# Patient Record
Sex: MALE | Race: WHITE | NOT HISPANIC OR LATINO | Employment: OTHER | URBAN - METROPOLITAN AREA
[De-identification: names, ages, dates, MRNs, and addresses within clinical notes are randomized per-mention and may not be internally consistent; named-entity substitution may affect disease eponyms.]

---

## 2017-01-30 ENCOUNTER — ALLSCRIPTS OFFICE VISIT (OUTPATIENT)
Dept: OTHER | Facility: OTHER | Age: 72
End: 2017-01-30

## 2017-01-30 DIAGNOSIS — Z11.59 ENCOUNTER FOR SCREENING FOR OTHER VIRAL DISEASES: ICD-10-CM

## 2017-01-31 ENCOUNTER — GENERIC CONVERSION - ENCOUNTER (OUTPATIENT)
Dept: OTHER | Facility: OTHER | Age: 72
End: 2017-01-31

## 2017-05-25 ENCOUNTER — APPOINTMENT (OUTPATIENT)
Dept: LAB | Facility: CLINIC | Age: 72
End: 2017-05-25
Payer: MEDICARE

## 2017-05-25 ENCOUNTER — TRANSCRIBE ORDERS (OUTPATIENT)
Dept: LAB | Facility: CLINIC | Age: 72
End: 2017-05-25

## 2017-05-25 ENCOUNTER — TRANSCRIBE ORDERS (OUTPATIENT)
Dept: ADMINISTRATIVE | Facility: HOSPITAL | Age: 72
End: 2017-05-25

## 2017-05-25 ENCOUNTER — ALLSCRIPTS OFFICE VISIT (OUTPATIENT)
Dept: OTHER | Facility: OTHER | Age: 72
End: 2017-05-25

## 2017-05-25 ENCOUNTER — HOSPITAL ENCOUNTER (OUTPATIENT)
Dept: RADIOLOGY | Facility: HOSPITAL | Age: 72
Discharge: HOME | End: 2017-05-25
Attending: INTERNAL MEDICINE
Payer: MEDICARE

## 2017-05-25 DIAGNOSIS — M10.071 IDIOPATHIC GOUT, RIGHT ANKLE AND FOOT: ICD-10-CM

## 2017-05-25 LAB — URATE SERPL-MCNC: 6.8 MG/DL (ref 4.2–8)

## 2017-05-25 PROCEDURE — 73630 X-RAY EXAM OF FOOT: CPT

## 2017-05-25 PROCEDURE — 84550 ASSAY OF BLOOD/URIC ACID: CPT

## 2017-05-25 PROCEDURE — 36415 COLL VENOUS BLD VENIPUNCTURE: CPT

## 2017-06-15 ENCOUNTER — ALLSCRIPTS OFFICE VISIT (OUTPATIENT)
Dept: OTHER | Facility: OTHER | Age: 72
End: 2017-06-15

## 2017-07-10 DIAGNOSIS — M10.071 IDIOPATHIC GOUT, RIGHT ANKLE AND FOOT: ICD-10-CM

## 2017-08-10 ENCOUNTER — APPOINTMENT (OUTPATIENT)
Dept: LAB | Facility: HOSPITAL | Age: 72
End: 2017-08-10
Attending: INTERNAL MEDICINE
Payer: MEDICARE

## 2017-08-10 ENCOUNTER — ALLSCRIPTS OFFICE VISIT (OUTPATIENT)
Dept: OTHER | Facility: OTHER | Age: 72
End: 2017-08-10

## 2017-08-10 ENCOUNTER — TRANSCRIBE ORDERS (OUTPATIENT)
Dept: ADMINISTRATIVE | Facility: HOSPITAL | Age: 72
End: 2017-08-10

## 2017-08-10 ENCOUNTER — HOSPITAL ENCOUNTER (OUTPATIENT)
Dept: RADIOLOGY | Facility: HOSPITAL | Age: 72
Discharge: HOME | End: 2017-08-10
Attending: INTERNAL MEDICINE
Payer: MEDICARE

## 2017-08-10 DIAGNOSIS — R06.00 DYSPNEA: ICD-10-CM

## 2017-08-10 LAB
ALBUMIN SERPL BCP-MCNC: 3.4 G/DL (ref 3.5–5)
ALP SERPL-CCNC: 76 U/L (ref 46–116)
ALT SERPL W P-5'-P-CCNC: 33 U/L (ref 12–78)
ANION GAP SERPL CALCULATED.3IONS-SCNC: 9 MMOL/L (ref 4–13)
AST SERPL W P-5'-P-CCNC: 34 U/L (ref 5–45)
BASOPHILS # BLD AUTO: 0.12 THOUSANDS/ΜL (ref 0–0.1)
BASOPHILS NFR BLD AUTO: 1 % (ref 0–1)
BILIRUB SERPL-MCNC: 1.7 MG/DL (ref 0.2–1)
BUN SERPL-MCNC: 13 MG/DL (ref 5–25)
CALCIUM SERPL-MCNC: 8.9 MG/DL (ref 8.3–10.1)
CHLORIDE SERPL-SCNC: 107 MMOL/L (ref 100–108)
CO2 SERPL-SCNC: 24 MMOL/L (ref 21–32)
CREAT SERPL-MCNC: 1.08 MG/DL (ref 0.6–1.3)
EOSINOPHIL # BLD AUTO: 0.41 THOUSAND/ΜL (ref 0–0.61)
EOSINOPHIL NFR BLD AUTO: 3 % (ref 0–6)
ERYTHROCYTE [DISTWIDTH] IN BLOOD BY AUTOMATED COUNT: 13.7 % (ref 11.6–15.1)
GFR SERPL CREATININE-BSD FRML MDRD: 69 ML/MIN/1.73SQ M
GLUCOSE P FAST SERPL-MCNC: 124 MG/DL (ref 65–99)
HCT VFR BLD AUTO: 41.9 % (ref 36.5–49.3)
HGB BLD-MCNC: 14.3 G/DL (ref 12–17)
LYMPHOCYTES # BLD AUTO: 1.07 THOUSANDS/ΜL (ref 0.6–4.47)
LYMPHOCYTES NFR BLD AUTO: 8 % (ref 14–44)
MCH RBC QN AUTO: 34.2 PG (ref 26.8–34.3)
MCHC RBC AUTO-ENTMCNC: 34.1 G/DL (ref 31.4–37.4)
MCV RBC AUTO: 100 FL (ref 82–98)
MONOCYTES # BLD AUTO: 1.59 THOUSAND/ΜL (ref 0.17–1.22)
MONOCYTES NFR BLD AUTO: 12 % (ref 4–12)
NEUTROPHILS # BLD AUTO: 10.05 THOUSANDS/ΜL (ref 1.85–7.62)
NEUTS SEG NFR BLD AUTO: 76 % (ref 43–75)
NRBC BLD AUTO-RTO: 0 /100 WBCS
PLATELET # BLD AUTO: 232 THOUSANDS/UL (ref 149–390)
PMV BLD AUTO: 9 FL (ref 8.9–12.7)
POTASSIUM SERPL-SCNC: 4 MMOL/L (ref 3.5–5.3)
PROT SERPL-MCNC: 7.3 G/DL (ref 6.4–8.2)
RBC # BLD AUTO: 4.18 MILLION/UL (ref 3.88–5.62)
SODIUM SERPL-SCNC: 140 MMOL/L (ref 136–145)
WBC # BLD AUTO: 13.3 THOUSAND/UL (ref 4.31–10.16)

## 2017-08-10 PROCEDURE — 85025 COMPLETE CBC W/AUTO DIFF WBC: CPT

## 2017-08-10 PROCEDURE — 80053 COMPREHEN METABOLIC PANEL: CPT

## 2017-08-10 PROCEDURE — 71020 HB CHEST X-RAY 2VW FRONTAL&LATL: CPT

## 2017-08-10 PROCEDURE — 36415 COLL VENOUS BLD VENIPUNCTURE: CPT

## 2017-08-14 ENCOUNTER — GENERIC CONVERSION - ENCOUNTER (OUTPATIENT)
Dept: OTHER | Facility: OTHER | Age: 72
End: 2017-08-14

## 2017-08-18 ENCOUNTER — ALLSCRIPTS OFFICE VISIT (OUTPATIENT)
Dept: OTHER | Facility: OTHER | Age: 72
End: 2017-08-18

## 2017-11-30 ENCOUNTER — GENERIC CONVERSION - ENCOUNTER (OUTPATIENT)
Dept: OTHER | Facility: OTHER | Age: 72
End: 2017-11-30

## 2017-11-30 DIAGNOSIS — I10 ESSENTIAL (PRIMARY) HYPERTENSION: ICD-10-CM

## 2018-01-10 NOTE — PROGRESS NOTES
Assessment    1  CAD (coronary atherosclerotic disease) (414 00) (I25 10)   2  Hypercholesterolemia (272 0) (E78 0)   3  Hypertension (401 9) (I10)   4  GERD (gastroesophageal reflux disease) (530 81) (K21 9)   5  Chronic back pain (724 5,338 29) (M54 9,G89 29)    Plan  Hypertension    · Follow-up visit in 6 months Evaluation and Treatment  Follow-up  Status: Complete   Done: [id number]  Unlinked    · Protonix 40 MG Oral Tablet Delayed Release (Pantoprazole Sodium)    Discussion/Summary  Discussion Summary:   His chronic problems are managed by cardiology  He will try to have copies of testing sent here as well  For his chronic narcotics, I explained that current recommendations are that they not be used long term and, thus, I would refer him to a pain specialist to consider other options  He declined and stated he will ask his doctors in [place]  Old records reviewed  Counseling Documentation With Imm: The patient was counseled regarding instructions for management, impressions  Chief Complaint  Chief Complaint Free Text Note Form: PATIENT HERE TO ESTABLISH WITH PRIMARY CARE PHYSICIAN      History of Present Illness  HPI: Patient is a 72year old male who comes in today for first time visit  His doctor in [place] has retired and he lives out here  He wishes to keep his specialists in [place] until our local hospital is built  He has CAD and had a valve replacement  Cardiology manages his coumadin  He also has high cholesterol and he reports that they take care of monitoring this as well  Pressure has been controlled  Further reports a history of acid reflux that he does better with taking Zantac vs a PPI  Finally, he reports a history of chronic back pain and is on chronic narcotics for this for many years  Review of Systems  Complete-Male:   Constitutional: no fever  Cardiovascular: no chest pain  Respiratory: no shortness of breath  Gastrointestinal: no abdominal pain  Musculoskeletal: joint stiffness  Integumentary: no rashes  ROS Reviewed:   ROS reviewed  Active Problems    1  Hypercholesterolemia (272 0) (E78 0)   2  Hypertension (401 9) (I10)    Past Medical History    1  History of conjunctivitis (V12 49) (Z86 69)   2  History of heart artery stent (V45 82) (Z95 5)   3  History of transient cerebral ischemia (V12 54) (Z86 73)    Surgical History    1  History of Cholecystectomy   2  History of Mitral Valve Repair   3  History of Surgery Testis Orchiectomy   4  History of Total Hip Replacement    Family History    1  Family history of     2  Family history of     Social History    · Former smoker (J93 00) (E65 550)   · Occasional alcohol use    Current Meds   1  Amiodarone HCl - 200 MG Oral Tablet; Therapy: (Recorded:20Ucd9228) to Recorded   2  Crestor 20 MG Oral Tablet; Therapy: (Recorded:17Sid7937) to Recorded   3  Furosemide 40 MG Oral Tablet; Therapy: (Recorded:48Sfj9577) to Recorded   4  Lisinopril 40 MG Oral Tablet; TAKE 1 TABLET DAILY; Therapy: 01XYO6349 to Recorded   5  Multivitamins TABS; Therapy: (Recorded:71Oty6842) to Recorded   6  Oxycodone-Acetaminophen 7 5-325 MG Oral Tablet; TAKE 1 TABLET EVERY 6 HOURS   AS NEEDED FOR PAIN;   Therapy: 21XLS6755 to Recorded   7  Protonix 40 MG Oral Tablet Delayed Release; Therapy: (Recorded:29Wdx8929) to Recorded   8  Ranitidine HCl - 300 MG Oral Capsule; TAKE 1 CAPSULE DAILY; Therapy: 67RSE9012 to Recorded   9  Vitamin C 500 MG Oral Tablet; Therapy: (Recorded:64Uyd6940) to Recorded   10  Warfarin Sodium 2 MG Oral Tablet; Therapy: (Recorded:40Lig5807) to Recorded    Allergies    1   No Known Drug Allergies    Vitals  Signs [Data Includes: Current Encounter]   Recorded: 01YMU5567 11:15AM   Temperature: 98 1 F  Heart Rate: 80  Systolic: 546  Diastolic: 60  Height: 5 ft 6 in  Weight: 158 lb   BMI Calculated: 25 5  BSA Calculated: 1 82  O2 Saturation: 98    Physical Exam    Constitutional   General appearance: No acute distress, well appearing and well nourished  Eyes   Conjunctiva and lids: No swelling, erythema, or discharge  Pupils and irises: Equal, round and reactive to light  Ears, Nose, Mouth, and Throat   External inspection of ears and nose: Normal     Otoscopic examination: Tympanic membrance translucent with normal light reflex  Canals patent without erythema  Oropharynx: Normal with no erythema, edema, exudate or lesions  Pulmonary   Respiratory effort: No increased work of breathing or signs of respiratory distress  Auscultation of lungs: Clear to auscultation, equal breath sounds bilaterally, no wheezes, no rales, no rhonci  Cardiovascular   Auscultation of heart: Normal rate and rhythm, normal S1 and S2, without murmurs  Examination of extremities for edema and/or varicosities: Normal     Carotid pulses: Normal     Abdomen   Abdomen: Non-tender, no masses  Liver and spleen: No hepatomegaly or splenomegaly  Musculoskeletal   Gait and station: Normal     Skin   Skin and subcutaneous tissue: Normal without rashes or lesions      Psychiatric   Orientation to person, place and time: Normal     Mood and affect: Normal          Future Appointments    Date/Time Provider Specialty Site   07/25/2016 11:00 AM Daniella Mojica MD Internal Medicine [address] INTERNAL MED     Signatures   Electronically signed by : Barb Vitale MD; Jan 25 2016 12:25PM EST                       (Author)

## 2018-01-10 NOTE — PROGRESS NOTES
Assessment    1  Encounter for preventive health examination (V70 0) (Z00 00)    Plan  Health Maintenance    · Begin a limited exercise program ; Status:Complete;   Done: [id number]   · There are many exercise options for seniors ; Status:Complete;   Done: 89EHX9979  Need for hepatitis C screening test    · (1) HEP C ANTIBODY; Status:Active; Requested EEO:24JNM2774;     Discussion/Summary  Impression: Initial Annual Wellness Visit, with preventive exam as well as age and risk appropriate counseling completed  Cardiovascular screening and counseling: screening is current  Diabetes screening and counseling: screening is current  Colorectal cancer screening and counseling: screening is current  Prostate cancer screening and counseling: screening is current  Glaucoma screening and counseling: screening is current  Immunizations: influenza vaccine is up to date this year and the lifetime pneumococcal vaccine has been completed  Advance Directive Planning: paperwork and instructions were given to the patient  Patient Discussion: plan discussed with the patient, follow-up visit needed in one year  History of Present Illness  The patient is being seen for the initial annual wellness visit and The patient's questionnaire was reviewed with them and a copy is in the chart as well  Medicare Screening and Risk Factors   Hospitalizations: no previous hospitalizations  Medicare Screening Tests Risk Questions   Drug and Alcohol Use: The patient has never smoked cigarettes  The patient reports occasional alcohol use  Alcohol concern:   The patient has no concerns about alcohol abuse  He has never used illicit drugs  Diet and Physical Activity: Current diet includes 1 servings of fruit per day, 1 servings of meat per day and 1 servings of whole grains per day  The patient does not exercise     Mood Disorder and Cognitive Impairment Screening: He denies feeling down, depressed, or hopeless over the past two weeks  He denies feeling little interest or pleasure in doing things over the past two weeks  Cognitive impairment screening: difficulty learning/retaining new information, denies difficulty handling complex tasks, difficulty with reasoning and denies difficulty with language  Functional Ability/Level of Safety: He denies hearing difficulties  Activities of daily living details: does not need help using the phone, no transportation help needed, does not need help shopping, no meal preparation help needed, does not need help doing housework, does not need help doing laundry and does not need help managing money  Fall risk factors: The patient fell 0 times in the past 12 months  Home safety risk factors:  loose rugs and no handrails on the stairs, but no unfamiliar surroundings, no poor household lighting, no uneven floors, no household clutter and grab bars in the bathroom  Advance Directives: Advance directives: living will and durable power of  for health care directives  end of life decisions were reviewed with the patient  Co-Managers and Medical Equipment/Suppliers: See Patient Care Team      Review of Systems    Cardiovascular: no chest pain  Respiratory: no shortness of breath  Active Problems     1  Chronic back pain (724 5,338 29) (M54 9,G89 29)   2  GERD (gastroesophageal reflux disease) (530 81) (K21 9)   3  Keratotic lesion (701 1) (L57 0)    Hypercholesterolemia (272 0) (E78 00)       Hypertension (401 9) (I10)       CAD (coronary atherosclerotic disease) (414 00) (I25 10)          Past Medical History    1  History of conjunctivitis (V12 49) (Z86 69)   2  History of heart artery stent (V45 82) (Z95 5)   3  Denied: History of mental disorder   4  History of transient cerebral ischemia (V12 54) (Z86 73)    The active problems and past medical history were reviewed and updated today  Surgical History    1  History of Cholecystectomy   2  History of Mitral Valve Repair   3  History of Surgery Testis Orchiectomy   4  History of Total Hip Replacement    The surgical history was reviewed and updated today  Family History  Mother    1  Family history of    2  Family history of cerebrovascular accident (CVA) (V17 1) (Z82 3)  Father    3  Family history of    4  Family history of Hodgkin's lymphoma (V16 7) (Z80 7)   5  Family history of lung cancer (V16 1) (Z80 1)  Family History    6  Denied: Family history of mental disorder   7  Denied: Family history of substance abuse    The family history was reviewed and updated today  Social History    ·    · Former smoker (O61 46) (M13 702)   · Denied: History of High risk sexual behavior   · Denied: History of Illicit drug use   · Occasional alcohol use   · Retired  The social history was reviewed and updated today  Current Meds   1  Atorvastatin Calcium 20 MG Oral Tablet; Therapy: (Recorded:90Aag1009) to Recorded   2  Carvedilol 6 25 MG Oral Tablet; Therapy: (Recorded:46Qlz4079) to Recorded   3  Furosemide 40 MG Oral Tablet; Therapy: (Recorded:37Qwq3263) to Recorded   4  Lisinopril 40 MG Oral Tablet; TAKE 1 TABLET DAILY; Therapy: 06VYQ0782 to Recorded   5  Multivitamins TABS; Therapy: (Recorded:84Hdv6756) to Recorded   6  Oxycodone-Acetaminophen  MG Oral Tablet; Therapy: (Recorded:54Hsu2685) to Recorded   7  RaNITidine HCl - 300 MG Oral Capsule; TAKE 1 CAPSULE DAILY; Therapy: 06WPX6755 to Recorded   8  Vitamin C 500 MG Oral Tablet; Therapy: (Recorded:68Axi2311) to Recorded   9  Warfarin Sodium 2 MG Oral Tablet; Therapy: (Recorded:00Iqs5201) to Recorded    The medication list was reviewed and updated today  Allergies    1   No Known Drug Allergies    Immunizations  Influenza --- [place]: [id number] Fetch: 20-Aug-2016   PCV --- Series1: 01-Dec-2015     Vitals  Signs   Recorded: 63BCH1809 11:31AM   Heart Rate: 80  Systolic: 214  Diastolic: 70  Height: 5 ft 6 in  Weight: 170 lb 4 00 oz  BMI Calculated: 27 48  BSA Calculated: 1 87  O2 Saturation: 97    Future Appointments    Date/Time Provider Specialty Site   08/02/2017 11:20 AM Jeramy Hager MD Internal Medicine [facility] INTERNAL MED     Signatures   Electronically signed by : Tiara Gill MD; Jan 30 2017 12:53PM EST                       (Author)

## 2018-01-12 VITALS
HEIGHT: 66 IN | BODY MASS INDEX: 26.52 KG/M2 | HEART RATE: 87 BPM | DIASTOLIC BLOOD PRESSURE: 76 MMHG | WEIGHT: 165 LBS | OXYGEN SATURATION: 98 % | SYSTOLIC BLOOD PRESSURE: 138 MMHG

## 2018-01-14 VITALS
BODY MASS INDEX: 26.36 KG/M2 | WEIGHT: 164 LBS | HEIGHT: 66 IN | HEART RATE: 80 BPM | OXYGEN SATURATION: 99 % | SYSTOLIC BLOOD PRESSURE: 110 MMHG | DIASTOLIC BLOOD PRESSURE: 70 MMHG

## 2018-01-14 VITALS
HEART RATE: 90 BPM | OXYGEN SATURATION: 94 % | SYSTOLIC BLOOD PRESSURE: 156 MMHG | TEMPERATURE: 97.5 F | DIASTOLIC BLOOD PRESSURE: 80 MMHG | BODY MASS INDEX: 27.02 KG/M2 | WEIGHT: 168.13 LBS | HEIGHT: 66 IN

## 2018-01-14 VITALS
SYSTOLIC BLOOD PRESSURE: 128 MMHG | BODY MASS INDEX: 27.36 KG/M2 | HEART RATE: 80 BPM | WEIGHT: 170.25 LBS | OXYGEN SATURATION: 97 % | DIASTOLIC BLOOD PRESSURE: 70 MMHG | HEIGHT: 66 IN

## 2018-01-14 VITALS
HEART RATE: 78 BPM | DIASTOLIC BLOOD PRESSURE: 80 MMHG | WEIGHT: 165 LBS | OXYGEN SATURATION: 98 % | BODY MASS INDEX: 26.52 KG/M2 | SYSTOLIC BLOOD PRESSURE: 124 MMHG | HEIGHT: 66 IN

## 2018-01-15 NOTE — RESULT NOTES
Verified Results  * XR CHEST PA & LATERAL 10Aug2017 12:15PM John Krueger Order Number: [id number]     Test Name Result Flag Reference   XR CHEST PA & LATERAL (Report)     CHEST - DUAL ENERGY     INDICATION: Dyspnea     COMPARISON: None     VIEWS: PA (including soft tissue/bone algorithms) and lateral projections     IMAGES: 4     FINDINGS:        Cardiomediastinal silhouette appears unremarkable  No acute airspace consolidation seen   Interstitial prominence in the lung seen   Kerley B line seen   Postsurgical changes from prior median sternotomy are noted   Mitral ring seen       IMPRESSION:     Interstitial prominence in the lung seen which may be due to mild congestion   No acute airspace consolidation seen       Workstation performed: [id number]     Signed by:   Luz Maria Nazario MD   8/14/17

## 2018-01-22 VITALS
HEART RATE: 68 BPM | SYSTOLIC BLOOD PRESSURE: 120 MMHG | BODY MASS INDEX: 26.42 KG/M2 | DIASTOLIC BLOOD PRESSURE: 72 MMHG | WEIGHT: 164.38 LBS | TEMPERATURE: 98.1 F | OXYGEN SATURATION: 97 % | HEIGHT: 66 IN

## 2018-02-05 ENCOUNTER — OFFICE VISIT (OUTPATIENT)
Dept: INTERNAL MEDICINE CLINIC | Facility: CLINIC | Age: 73
End: 2018-02-05
Payer: MEDICARE

## 2018-02-05 VITALS
SYSTOLIC BLOOD PRESSURE: 148 MMHG | BODY MASS INDEX: 26.66 KG/M2 | HEART RATE: 82 BPM | WEIGHT: 160 LBS | HEIGHT: 65 IN | OXYGEN SATURATION: 95 % | DIASTOLIC BLOOD PRESSURE: 80 MMHG | TEMPERATURE: 97.5 F

## 2018-02-05 DIAGNOSIS — J20.9 ACUTE BRONCHITIS, UNSPECIFIED ORGANISM: Primary | ICD-10-CM

## 2018-02-05 PROBLEM — I50.9 CHF (CONGESTIVE HEART FAILURE) (HCC): Status: ACTIVE | Noted: 2017-08-18

## 2018-02-05 PROCEDURE — 99213 OFFICE O/P EST LOW 20 MIN: CPT | Performed by: INTERNAL MEDICINE

## 2018-02-05 RX ORDER — MULTIVIT-MINERALS/FA/LYCOPENE 0.4 MG-6
TABLET ORAL
COMMUNITY

## 2018-02-05 RX ORDER — ALLOPURINOL 100 MG/1
TABLET ORAL DAILY
Refills: 0 | COMMUNITY
Start: 2018-01-17 | End: 2018-07-23 | Stop reason: SDUPTHER

## 2018-02-05 RX ORDER — AMOXICILLIN 500 MG/1
CAPSULE ORAL
Refills: 0 | COMMUNITY
Start: 2017-11-20 | End: 2018-05-01 | Stop reason: ALTCHOICE

## 2018-02-05 RX ORDER — FUROSEMIDE 20 MG/1
TABLET ORAL
Refills: 0 | COMMUNITY
Start: 2017-12-28 | End: 2021-04-23

## 2018-02-05 RX ORDER — AZITHROMYCIN 250 MG/1
TABLET, FILM COATED ORAL
Qty: 6 TABLET | Refills: 0 | Status: SHIPPED | OUTPATIENT
Start: 2018-02-05 | End: 2018-02-10

## 2018-02-05 RX ORDER — WARFARIN SODIUM 2.5 MG/1
2.5 TABLET ORAL
Refills: 0 | Status: ON HOLD | COMMUNITY
Start: 2018-01-10 | End: 2018-06-25

## 2018-02-05 NOTE — PROGRESS NOTES
Assessment/Plan:    Failure of conservative outpatient measures  Will add antibiotics  He can also take Robitussin DM over-the-counter  No problem-specific Assessment & Plan notes found for this encounter  Diagnoses and all orders for this visit:    Acute bronchitis, unspecified organism  -     azithromycin (ZITHROMAX) 250 mg tablet; 2 tabs today then 1 tab daily for 4 days    Other orders  -     allopurinol (ZYLOPRIM) 100 mg tablet;   -     amoxicillin (AMOXIL) 500 mg capsule; take 4 capsules by mouth 1 hour prior to appointment  -     furosemide (LASIX) 20 mg tablet;   -     Multiple Vitamins-Minerals (PX MENS MULTIVITAMINS) TABS; Take by mouth  -     warfarin (COUMADIN) 2 5 mg tablet; Take 2 5 mg by mouth 2 (two) times a day          Subjective:      Patient ID: Fernando Tafoya is a 71 y o  male  Patient comes in today complaining of a recurrence of his upper respiratory symptoms with productive cough and headaches  No fevers  This has been going on for about 2 weeks  He has tried over-the-counter remedies but is afraid to take stuff because of his other medical problems  States it is similar to what he had in November  That lasted a few weeks and the cough lasted a little longer  The following portions of the patient's history were reviewed and updated as appropriate: allergies, current medications and problem list     Review of Systems   Respiratory: Positive for cough  Negative for shortness of breath  Cardiovascular: Negative for chest pain  Gastrointestinal: Negative for abdominal pain  Objective:     Physical Exam   Constitutional: He is oriented to person, place, and time  He appears well-developed and well-nourished  HENT:   Right Ear: External ear normal    Left Ear: External ear normal    Nose: Nose normal    Mouth/Throat: Oropharynx is clear and moist  No oropharyngeal exudate     Eyes: Conjunctivae are normal    Cardiovascular: Normal rate, regular rhythm and normal heart sounds  Pulmonary/Chest: Effort normal and breath sounds normal    Lymphadenopathy:     He has no cervical adenopathy  Neurological: He is alert and oriented to person, place, and time  Skin: He is not diaphoretic  Nursing note and vitals reviewed

## 2018-04-25 ENCOUNTER — APPOINTMENT (OUTPATIENT)
Dept: LAB | Facility: HOSPITAL | Age: 73
End: 2018-04-25
Attending: INTERNAL MEDICINE
Payer: MEDICARE

## 2018-04-25 ENCOUNTER — TRANSCRIBE ORDERS (OUTPATIENT)
Dept: ADMINISTRATIVE | Facility: HOSPITAL | Age: 73
End: 2018-04-25

## 2018-04-25 DIAGNOSIS — Z79.01 LONG TERM (CURRENT) USE OF ANTICOAGULANTS: ICD-10-CM

## 2018-04-25 DIAGNOSIS — I10 ESSENTIAL (PRIMARY) HYPERTENSION: ICD-10-CM

## 2018-04-25 DIAGNOSIS — G45.9 TRANSIENT CEREBRAL ISCHEMIA, UNSPECIFIED TYPE: ICD-10-CM

## 2018-04-25 DIAGNOSIS — I48.0 PAROXYSMAL ATRIAL FIBRILLATION (HCC): ICD-10-CM

## 2018-04-25 DIAGNOSIS — I48.0 PAROXYSMAL ATRIAL FIBRILLATION (HCC): Primary | ICD-10-CM

## 2018-04-25 LAB
ALBUMIN SERPL BCP-MCNC: 3.8 G/DL (ref 3.5–5)
ALP SERPL-CCNC: 71 U/L (ref 46–116)
ALT SERPL W P-5'-P-CCNC: 41 U/L (ref 12–78)
ANION GAP SERPL CALCULATED.3IONS-SCNC: 6 MMOL/L (ref 4–13)
AST SERPL W P-5'-P-CCNC: 44 U/L (ref 5–45)
BASOPHILS # BLD AUTO: 0.14 THOUSANDS/ΜL (ref 0–0.1)
BASOPHILS NFR BLD AUTO: 2 % (ref 0–1)
BILIRUB SERPL-MCNC: 1 MG/DL (ref 0.2–1)
BUN SERPL-MCNC: 15 MG/DL (ref 5–25)
CALCIUM SERPL-MCNC: 8.7 MG/DL (ref 8.3–10.1)
CHLORIDE SERPL-SCNC: 106 MMOL/L (ref 100–108)
CHOLEST SERPL-MCNC: 119 MG/DL (ref 50–200)
CO2 SERPL-SCNC: 27 MMOL/L (ref 21–32)
CREAT SERPL-MCNC: 1.15 MG/DL (ref 0.6–1.3)
EOSINOPHIL # BLD AUTO: 0.42 THOUSAND/ΜL (ref 0–0.61)
EOSINOPHIL NFR BLD AUTO: 5 % (ref 0–6)
ERYTHROCYTE [DISTWIDTH] IN BLOOD BY AUTOMATED COUNT: 14.6 % (ref 11.6–15.1)
GFR SERPL CREATININE-BSD FRML MDRD: 64 ML/MIN/1.73SQ M
GLUCOSE P FAST SERPL-MCNC: 109 MG/DL (ref 65–99)
HCT VFR BLD AUTO: 44.7 % (ref 36.5–49.3)
HDLC SERPL-MCNC: 45 MG/DL (ref 40–60)
HGB BLD-MCNC: 14.8 G/DL (ref 12–17)
INR PPP: 2.33 (ref 0.86–1.16)
LDLC SERPL CALC-MCNC: 53 MG/DL (ref 0–100)
LYMPHOCYTES # BLD AUTO: 0.96 THOUSANDS/ΜL (ref 0.6–4.47)
LYMPHOCYTES NFR BLD AUTO: 11 % (ref 14–44)
MCH RBC QN AUTO: 34.9 PG (ref 26.8–34.3)
MCHC RBC AUTO-ENTMCNC: 33.1 G/DL (ref 31.4–37.4)
MCV RBC AUTO: 105 FL (ref 82–98)
MONOCYTES # BLD AUTO: 0.96 THOUSAND/ΜL (ref 0.17–1.22)
MONOCYTES NFR BLD AUTO: 11 % (ref 4–12)
NEUTROPHILS # BLD AUTO: 6.6 THOUSANDS/ΜL (ref 1.85–7.62)
NEUTS SEG NFR BLD AUTO: 73 % (ref 43–75)
NONHDLC SERPL-MCNC: 74 MG/DL
NRBC BLD AUTO-RTO: 0 /100 WBCS
PLATELET # BLD AUTO: 216 THOUSANDS/UL (ref 149–390)
PMV BLD AUTO: 9.1 FL (ref 8.9–12.7)
POTASSIUM SERPL-SCNC: 4 MMOL/L (ref 3.5–5.3)
PROT SERPL-MCNC: 7.2 G/DL (ref 6.4–8.2)
PROTHROMBIN TIME: 26.2 SECONDS (ref 12.1–14.4)
RBC # BLD AUTO: 4.24 MILLION/UL (ref 3.88–5.62)
SODIUM SERPL-SCNC: 139 MMOL/L (ref 136–145)
TRIGL SERPL-MCNC: 106 MG/DL
WBC # BLD AUTO: 9.11 THOUSAND/UL (ref 4.31–10.16)

## 2018-04-25 PROCEDURE — 85610 PROTHROMBIN TIME: CPT

## 2018-04-25 PROCEDURE — 85025 COMPLETE CBC W/AUTO DIFF WBC: CPT

## 2018-04-25 PROCEDURE — 80061 LIPID PANEL: CPT

## 2018-04-25 PROCEDURE — 36415 COLL VENOUS BLD VENIPUNCTURE: CPT

## 2018-04-25 PROCEDURE — 80053 COMPREHEN METABOLIC PANEL: CPT

## 2018-05-01 ENCOUNTER — OFFICE VISIT (OUTPATIENT)
Dept: INTERNAL MEDICINE CLINIC | Facility: CLINIC | Age: 73
End: 2018-05-01
Payer: MEDICARE

## 2018-05-01 VITALS
RESPIRATION RATE: 20 BRPM | BODY MASS INDEX: 28.02 KG/M2 | HEIGHT: 65 IN | TEMPERATURE: 97.9 F | SYSTOLIC BLOOD PRESSURE: 130 MMHG | WEIGHT: 168.2 LBS | HEART RATE: 67 BPM | DIASTOLIC BLOOD PRESSURE: 60 MMHG | OXYGEN SATURATION: 97 %

## 2018-05-01 DIAGNOSIS — Z11.59 NEED FOR HEPATITIS C SCREENING TEST: ICD-10-CM

## 2018-05-01 DIAGNOSIS — K63.5 POLYP OF COLON, UNSPECIFIED PART OF COLON, UNSPECIFIED TYPE: ICD-10-CM

## 2018-05-01 DIAGNOSIS — I48.0 PAF (PAROXYSMAL ATRIAL FIBRILLATION) (HCC): ICD-10-CM

## 2018-05-01 DIAGNOSIS — E78.00 HYPERCHOLESTEROLEMIA: ICD-10-CM

## 2018-05-01 DIAGNOSIS — I10 ESSENTIAL HYPERTENSION: Primary | ICD-10-CM

## 2018-05-01 DIAGNOSIS — R73.01 IMPAIRED FASTING GLUCOSE: ICD-10-CM

## 2018-05-01 PROBLEM — C61 PROSTATE CA (HCC): Status: ACTIVE | Noted: 2018-05-01

## 2018-05-01 PROBLEM — C61 PROSTATE CA (HCC): Status: RESOLVED | Noted: 2018-05-01 | Resolved: 2018-05-01

## 2018-05-01 PROCEDURE — 99214 OFFICE O/P EST MOD 30 MIN: CPT | Performed by: INTERNAL MEDICINE

## 2018-05-01 NOTE — PATIENT INSTRUCTIONS
Dr Armin Maciel (GI specialist) did your last colonoscopy? May try MIRALAX OTC low dose for the constipation

## 2018-05-01 NOTE — PROGRESS NOTES
Assessment/Plan:     Chronic problems are stable  Continue present medications  Continue diet and exercise  Ordered labs for next visit  For the slight elevation in sugar, reinforced diet with him  Will check A1c with next blood work continue follow-up with his cardiologist   He is going to call Dr Indio Chew office to make sure he is not due for a repeat colonoscopy  Followup scheduled per orders  No problem-specific Assessment & Plan notes found for this encounter  Diagnoses and all orders for this visit:    Essential hypertension  -     CBC and differential; Future  -     Comprehensive metabolic panel; Future  -     Lipid panel; Future    Hypercholesterolemia    PAF (paroxysmal atrial fibrillation) (HCC)    Impaired fasting glucose  -     HEMOGLOBIN A1C W/ EAG ESTIMATION; Future    Need for hepatitis C screening test  -     Hepatitis C antibody; Future    Polyp of colon, unspecified part of colon, unspecified type          Subjective:      Patient ID: Sofiya Ta is a 71 y o  male  Patient comes in today for routine follow-up  He states he is doing well for the most part  He is current with his cardiologist   His heart disease has been stable  Blood pressure was initially high but came down upon repeat  He did walk up the stairs to the office today  Blood work was okay except slightly elevated sugar  He admits that he likes his sweets  He thinks he may be due for a repeat colonoscopy  Because of the conversion to the new EMR, we only have an older colonoscopy from 2011  He has also noticed some mild constipation issues lately  No change in diet  No change in medications  He has been reluctant to try any medicine for that because he usually has looser stools          ALLERGIES:  No Known Allergies    CURRENT MEDICATIONS:    Current Outpatient Prescriptions:     allopurinol (ZYLOPRIM) 100 mg tablet, Take by mouth daily  , Disp: , Rfl: 0    atorvastatin (LIPITOR) 40 mg tablet, Take 40 mg by mouth daily  , Disp: , Rfl:     carvedilol (COREG) 6 25 mg tablet, Take 6 25 mg by mouth  , Disp: , Rfl:     furosemide (LASIX) 20 mg tablet, , Disp: , Rfl: 0    lisinopril (ZESTRIL) 40 mg tablet, Take 40 mg by mouth daily, Disp: , Rfl:     Multiple Vitamins-Minerals (PX MENS MULTIVITAMINS) TABS, Take by mouth, Disp: , Rfl:     ranitidine (ZANTAC) 300 MG capsule, Take 300 mg by mouth every evening, Disp: , Rfl:     warfarin (COUMADIN) 2 5 mg tablet, Take 2 5 mg by mouth 2 (two) times a day, Disp: , Rfl: 0    ACTIVE PROBLEM LIST:  Patient Active Problem List   Diagnosis    CAD (coronary atherosclerotic disease)    [facility]estive [facility] failure[facility])    Chronic back pain    GERD (gastroesophageal reflux disease)    Hypercholesterolemia    Hypertension    PAF (paroxysmal atrial fibrillation) ([facility])    Peptic ulcer disease    S/P MVR (mitral valve repair)    Polyp of colon       PAST MEDICAL HISTORY:  Past Medical History:   Diagnosis Date    CAD (coronary artery disease)     [facility]     prostate    Coronary artery disease     GERD (gastroesophageal reflux disease)     H/O heart artery stent     Hypercholesteremia     Hypertension     Keratotic lesion     [facility]        PAST SURGICAL HISTORY:  Past Surgical History:   Procedure Laterality Date    BACK SURGERY      CARDIAC SURGERY      mvp repair    CHOLECYSTECTOMY      JOINT REPLACEMENT      L hip replacement    ORCHIECTOMY      PROSTATECTOMY      TOTAL HIP ARTHROPLASTY         FAMILY HISTORY:  Family History   Problem Relation Age of Onset    Heart disease Mother     Cancer Father        SOCIAL HISTORY:  Social History     Social History    Marital status:      Spouse name: N/A    Number of children: N/A    Years of education: N/A     Occupational History    Not on file       Social History Main Topics    Smoking status: Former Smoker    Smokeless tobacco: Never Used    Alcohol use No      Comment: social    Drug use: No    Sexual activity: No     Other Topics Concern    Not on file     Social History Narrative    No narrative on file       Review of Systems   Respiratory: Negative for shortness of breath  Cardiovascular: Negative for chest pain  Gastrointestinal: Negative for abdominal pain  Objective:  Vitals:    05/01/18 1004 05/01/18 1028   BP: 168/82 130/60   BP Location: Right arm    Patient Position: Sitting    Cuff Size: Adult    Pulse: 67    Resp: 20    Temp: 97 9 °F (36 6 °C)    TempSrc: Tympanic    SpO2: 97%    Weight: 76 3 kg (168 lb 3 2 oz)    Height: 5' 5" (1 651 m)         Physical Exam   Constitutional: He is oriented to person, place, and time  He appears well-developed and well-nourished  Cardiovascular: Normal rate, regular rhythm and normal heart sounds  Pulmonary/Chest: Effort normal and breath sounds normal    Abdominal: Soft  Bowel sounds are normal    Musculoskeletal: He exhibits no edema  Neurological: He is alert and oriented to person, place, and time  Nursing note and vitals reviewed          RESULTS:    Recent Results (from the past 1008 hour(s))   CBC and differential    Collection Time: 04/25/18 10:39 AM   Result Value Ref Range    WBC 9 11 4 31 - 10 16 Thousand/uL    RBC 4 24 3 88 - 5 62 Million/uL    Hemoglobin 14 8 12 0 - 17 0 g/dL    Hematocrit 44 7 36 5 - 49 3 %     (H) 82 - 98 fL    MCH 34 9 (H) 26 8 - 34 3 pg    MCHC 33 1 31 4 - 37 4 g/dL    RDW 14 6 11 6 - 15 1 %    MPV 9 1 8 9 - 12 7 fL    Platelets 558 260 - 006 Thousands/uL    nRBC 0 /100 WBCs    Neutrophils Relative 73 43 - 75 %    Lymphocytes Relative 11 (L) 14 - 44 %    Monocytes Relative 11 4 - 12 %    Eosinophils Relative 5 0 - 6 %    Basophils Relative 2 (H) 0 - 1 %    Neutrophils Absolute 6 60 1 85 - 7 62 Thousands/µL    Lymphocytes Absolute 0 96 0 60 - 4 47 Thousands/µL    Monocytes Absolute 0 96 0 17 - 1 22 Thousand/µL    Eosinophils Absolute 0 42 0 00 - 0 61 Thousand/µL Basophils Absolute 0 14 (H) 0 00 - 0 10 Thousands/µL   Comprehensive metabolic panel    Collection Time: 04/25/18 10:39 AM   Result Value Ref Range    Sodium 139 136 - 145 mmol/L    Potassium 4 0 3 5 - 5 3 mmol/L    Chloride 106 100 - 108 mmol/L    CO2 27 21 - 32 mmol/L    Anion Gap 6 4 - 13 mmol/L    BUN 15 5 - 25 mg/dL    Creatinine 1 15 0 60 - 1 30 mg/dL    Glucose, Fasting 109 (H) 65 - 99 mg/dL    Calcium 8 7 8 3 - 10 1 mg/dL    AST 44 5 - 45 U/L    ALT 41 12 - 78 U/L    Alkaline Phosphatase 71 46 - 116 U/L    Total Protein 7 2 6 4 - 8 2 g/dL    Albumin 3 8 3 5 - 5 0 g/dL    Total Bilirubin 1 00 0 20 - 1 00 mg/dL    eGFR 64 ml/min/1 73sq m   Lipid panel    Collection Time: 04/25/18 10:39 AM   Result Value Ref Range    Cholesterol 119 50 - 200 mg/dL    Triglycerides 106 <=150 mg/dL    HDL, Direct 45 40 - 60 mg/dL    LDL Calculated 53 0 - 100 mg/dL    Non-HDL-Chol (CHOL-HDL) 74 mg/dl   Protime-INR    Collection Time: 04/25/18 10:39 AM   Result Value Ref Range    Protime 26 2 (H) 12 1 - 14 4 seconds    INR 2 33 (H) 0 86 - 1 16       This note was created with voice recognition software  Phonic, grammatical and spelling errors may be present within the note as a result

## 2018-06-07 ENCOUNTER — OFFICE VISIT (OUTPATIENT)
Dept: INTERNAL MEDICINE CLINIC | Facility: CLINIC | Age: 73
End: 2018-06-07
Payer: MEDICARE

## 2018-06-07 ENCOUNTER — HOSPITAL ENCOUNTER (OUTPATIENT)
Dept: RADIOLOGY | Facility: HOSPITAL | Age: 73
Discharge: HOME | End: 2018-06-07
Payer: MEDICARE

## 2018-06-07 VITALS
OXYGEN SATURATION: 97 % | BODY MASS INDEX: 28.16 KG/M2 | HEART RATE: 70 BPM | DIASTOLIC BLOOD PRESSURE: 62 MMHG | WEIGHT: 169 LBS | HEIGHT: 65 IN | RESPIRATION RATE: 18 BRPM | SYSTOLIC BLOOD PRESSURE: 134 MMHG

## 2018-06-07 DIAGNOSIS — J20.9 ACUTE BRONCHITIS WITH BRONCHOSPASM: ICD-10-CM

## 2018-06-07 DIAGNOSIS — R05.9 COUGH: ICD-10-CM

## 2018-06-07 DIAGNOSIS — J20.9 ACUTE BRONCHITIS WITH BRONCHOSPASM: Primary | ICD-10-CM

## 2018-06-07 DIAGNOSIS — J04.0 LARYNGITIS: ICD-10-CM

## 2018-06-07 PROCEDURE — 99213 OFFICE O/P EST LOW 20 MIN: CPT | Performed by: PHYSICIAN ASSISTANT

## 2018-06-07 PROCEDURE — 71046 X-RAY EXAM CHEST 2 VIEWS: CPT

## 2018-06-07 RX ORDER — METHYLPREDNISOLONE 4 MG/1
TABLET ORAL
Qty: 21 TABLET | Refills: 0 | Status: ON HOLD | OUTPATIENT
Start: 2018-06-07 | End: 2018-06-19 | Stop reason: ALTCHOICE

## 2018-06-07 RX ORDER — CEFUROXIME AXETIL 500 MG/1
500 TABLET ORAL EVERY 12 HOURS SCHEDULED
Qty: 20 TABLET | Refills: 0 | Status: SHIPPED | OUTPATIENT
Start: 2018-06-07 | End: 2018-06-17

## 2018-06-07 RX ORDER — BENZONATATE 100 MG/1
100 CAPSULE ORAL 3 TIMES DAILY PRN
Qty: 30 CAPSULE | Refills: 0 | Status: SHIPPED | OUTPATIENT
Start: 2018-06-07 | End: 2018-06-15 | Stop reason: SDUPTHER

## 2018-06-07 NOTE — PROGRESS NOTES
Assessment/Plan:      Patient Instructions   Will send patient to hospital for chest x-ray  Will start patient on antibiotic twice daily  Will also start Medrol for wheezing  And provide cough medicine as needed for cough  Will have patient return next week for re-evaluation  Stay hydrated  Return in about 1 week (around 6/14/2018) for Recheck  Diagnoses and all orders for this visit:    Acute bronchitis with bronchospasm  -     XR chest pa & lateral; Future  -     cefuroxime (CEFTIN) 500 mg tablet; Take 1 tablet (500 mg total) by mouth every 12 (twelve) hours for 10 days  -     Methylprednisolone 4 MG TBPK; Use as directed on package    Cough  -     XR chest pa & lateral; Future  -     cefuroxime (CEFTIN) 500 mg tablet; Take 1 tablet (500 mg total) by mouth every 12 (twelve) hours for 10 days  -     benzonatate (TESSALON PERLES) 100 mg capsule; Take 1 capsule (100 mg total) by mouth 3 (three) times a day as needed for cough    Laryngitis          Subjective:      Patient ID: Colleen Vick is a 71 y o  male  Acute visit    Patient states he started over 2 weeks ago with the onset of a cough  He then developed a hoarse voice, and a sore throat  He states over the past 3 days the cough has worsened, he is unable to sleep at night due to the cough  He has not been aware of any fever but did admit to chills  He has not noted any swelling in his ankles or feet  He has noted some increased shortness of breath  He also has admitted he has lost his appetite  He is feeling tired  He denies sneezing, runny nose, earaches          ALLERGIES:  No Known Allergies    CURRENT MEDICATIONS:    Current Outpatient Prescriptions:     allopurinol (ZYLOPRIM) 100 mg tablet, Take by mouth daily  , Disp: , Rfl: 0    atorvastatin (LIPITOR) 40 mg tablet, Take 40 mg by mouth daily  , Disp: , Rfl:     carvedilol (COREG) 6 25 mg tablet, Take 6 25 mg by mouth  , Disp: , Rfl:     furosemide (LASIX) 20 mg tablet, , Disp: , Rfl: 0    lisinopril (ZESTRIL) 40 mg tablet, Take 40 mg by mouth daily, Disp: , Rfl:     Multiple Vitamins-Minerals (PX MENS MULTIVITAMINS) TABS, Take by mouth, Disp: , Rfl:     ranitidine (ZANTAC) 300 MG capsule, Take 300 mg by mouth every evening, Disp: , Rfl:     warfarin (COUMADIN) 2 5 mg tablet, Take 2 5 mg by mouth 2 (two) times a day, Disp: , Rfl: 0    benzonatate (TESSALON PERLES) 100 mg capsule, Take 1 capsule (100 mg total) by mouth 3 (three) times a day as needed for cough, Disp: 30 capsule, Rfl: 0    cefuroxime (CEFTIN) 500 mg tablet, Take 1 tablet (500 mg total) by mouth every 12 (twelve) hours for 10 days, Disp: 20 tablet, Rfl: 0    Methylprednisolone 4 MG TBPK, Use as directed on package, Disp: 21 tablet, Rfl: 0    ACTIVE PROBLEM LIST:  Patient Active Problem List   Diagnosis    CAD (coronary atherosclerotic disease)    [facility]    Chronic back pain    GERD (gastroesophageal reflux disease)    Hypercholesterolemia    Hypertension    [facility]oxysmal atrial fibrillation) ([facility]    Peptic ulcer disease    S/P MVR (mitral valve repair)    Polyp of colon    Acute bronchitis with bronchospasm    Cough    Laryngitis       PAST MEDICAL HISTORY:  Past Medical History:   Diagnosis Date    CAD (coronary artery disease)     [email or web address] )     prostate    Coronary artery disease     GERD (gastroesophageal reflux disease)     H/O heart artery stent     Hypercholesteremia     Hypertension     Keratotic lesion     Stroke ([email or web address] )        PAST SURGICAL HISTORY:  Past Surgical History:   Procedure Laterality Date    BACK SURGERY      CARDIAC SURGERY      mvp repair    CHOLECYSTECTOMY      JOINT REPLACEMENT      L hip replacement    ORCHIECTOMY      PROSTATECTOMY      TOTAL HIP ARTHROPLASTY         FAMILY HISTORY:  Family History   Problem Relation Age of Onset    Heart disease Mother     Cancer Father        SOCIAL HISTORY:  Social History Social History    Marital status:      Spouse name: N/A    Number of children: N/A    Years of education: N/A     Occupational History    Not on file  Social History Main Topics    Smoking status: Former Smoker    Smokeless tobacco: Never Used    Alcohol use No      Comment: social    Drug use: No    Sexual activity: No     Other Topics Concern    Not on file     Social History Narrative    No narrative on file       Review of Systems   Constitutional: Positive for appetite change, chills and fatigue  Negative for activity change and fever  HENT: Positive for sore throat  Negative for congestion  Eyes: Negative for discharge and itching  Respiratory: Positive for cough, shortness of breath and wheezing  Negative for chest tightness  Cardiovascular: Negative for chest pain, palpitations and leg swelling  Gastrointestinal: Negative for abdominal pain, diarrhea and nausea  Genitourinary: Negative for difficulty urinating  Musculoskeletal: Negative for arthralgias and myalgias  Skin: Negative for rash  Allergic/Immunologic: Negative for immunocompromised state  Neurological: Negative for dizziness, syncope, weakness, light-headedness and headaches  Hematological: Negative for adenopathy  Does not bruise/bleed easily  Psychiatric/Behavioral: Negative for dysphoric mood  The patient is not nervous/anxious  Objective:  Vitals:    06/07/18 1258   BP: 134/62   BP Location: Left arm   Patient Position: Sitting   Cuff Size: Adult   Pulse: 70   Resp: 18   SpO2: 97%   Weight: 76 7 kg (169 lb)   Height: 5' 5" (1 651 m)        Physical Exam   Constitutional: He is oriented to person, place, and time  He appears well-developed and well-nourished  Hyperemia of cheeks is present   HENT:   HEENT-unremarkable, voice is hoarse  Eyes:   Conjunctiva are pale   Neck: Neck supple  Cardiovascular: Normal rate and regular rhythm      Murmur (Grade 2/6 systolic murmur best audible at the apex, grade 2/6 systolic murmur present in the aortic area) heard  Pulmonary/Chest: Effort normal  He has wheezes  He has rales (Decreased breath sounds left base with crackles present, and dullness to percussion)  Abdominal: Soft  Bowel sounds are normal    Musculoskeletal: He exhibits no edema  Lymphadenopathy:     He has no cervical adenopathy  Neurological: He is alert and oriented to person, place, and time  Skin: Skin is warm and dry  No rash noted  Psychiatric: He has a normal mood and affect  His behavior is normal    Nursing note and vitals reviewed  RESULTS:    No results found for this or any previous visit (from the past 1008 hour(s))  This note was created with voice recognition software  Phonic, grammatical and spelling errors may be present within the note as a result

## 2018-06-07 NOTE — PATIENT INSTRUCTIONS
Will send patient to hospital for chest x-ray  Will start patient on antibiotic twice daily  Will also start Medrol for wheezing  And provide cough medicine as needed for cough  Will have patient return next week for re-evaluation

## 2018-06-15 ENCOUNTER — OFFICE VISIT (OUTPATIENT)
Dept: INTERNAL MEDICINE CLINIC | Facility: CLINIC | Age: 73
End: 2018-06-15
Payer: MEDICARE

## 2018-06-15 VITALS
HEIGHT: 65 IN | WEIGHT: 156 LBS | DIASTOLIC BLOOD PRESSURE: 78 MMHG | BODY MASS INDEX: 25.99 KG/M2 | TEMPERATURE: 97 F | HEART RATE: 73 BPM | OXYGEN SATURATION: 98 % | RESPIRATION RATE: 18 BRPM | SYSTOLIC BLOOD PRESSURE: 142 MMHG

## 2018-06-15 DIAGNOSIS — R05.9 COUGH: ICD-10-CM

## 2018-06-15 DIAGNOSIS — R53.83 FATIGUE, UNSPECIFIED TYPE: ICD-10-CM

## 2018-06-15 DIAGNOSIS — R63.4 WEIGHT LOSS: ICD-10-CM

## 2018-06-15 DIAGNOSIS — Z12.11 SCREENING FOR COLON CANCER: ICD-10-CM

## 2018-06-15 DIAGNOSIS — J20.9 ACUTE BRONCHITIS WITH BRONCHOSPASM: Primary | ICD-10-CM

## 2018-06-15 DIAGNOSIS — R63.0 ANOREXIA: ICD-10-CM

## 2018-06-15 DIAGNOSIS — R71.8 ABNORMAL RBC INDICES: ICD-10-CM

## 2018-06-15 PROCEDURE — 99214 OFFICE O/P EST MOD 30 MIN: CPT | Performed by: PHYSICIAN ASSISTANT

## 2018-06-15 RX ORDER — BENZONATATE 100 MG/1
100 CAPSULE ORAL 3 TIMES DAILY PRN
Qty: 30 CAPSULE | Refills: 0 | Status: SHIPPED | OUTPATIENT
Start: 2018-06-15 | End: 2018-07-06 | Stop reason: ALTCHOICE

## 2018-06-15 NOTE — PATIENT INSTRUCTIONS
Will start workup for weight loss  Will check labs  Will schedule for CT of chest abdomen and pelvis  Schedule follow-up here to review them in 1 month  Continue other medications at this time

## 2018-06-15 NOTE — PROGRESS NOTES
Assessment/Plan:   Patient Instructions   Will start workup for weight loss  Will check labs  Will schedule for CT of chest abdomen and pelvis  Schedule follow-up here to review them in 1 month  Continue other medications at this time  Concern for this patient's weight loss  Will possibly have to proceed to EGD, however patient did not want to do this at this time  Return in about 4 weeks (around 7/13/2018) for Recheck  Diagnoses and all orders for this visit:    Acute bronchitis with bronchospasm  -     benzonatate (TESSALON PERLES) 100 mg capsule; Take 1 capsule (100 mg total) by mouth 3 (three) times a day as needed for cough    Weight loss  -     CT chest abdomen pelvis wo contrast; Future  -     CBC and differential; Future  -     Comprehensive metabolic panel; Future    Anorexia  -     CT chest abdomen pelvis wo contrast; Future  -     CBC and differential; Future  -     Comprehensive metabolic panel; Future  -     Lipase; Future  -     Amylase; Future    Cough  -     benzonatate (TESSALON PERLES) 100 mg capsule; Take 1 capsule (100 mg total) by mouth 3 (three) times a day as needed for cough    Abnormal RBC indices  -     Vitamin B12; Future    Fatigue, unspecified type    Screening for colon cancer  -     Occult Bloood,Fecal Immunochemical; Future          Subjective:      Patient ID: Tara Roche is a 71 y o  male  Follow-up    Patient returns for follow-up of a bronchitis with bronchospasm  He was treated with antibiotic, cough medicine and Medrol pack  He states today is the 1st day he feels better  He had less coughing last night was able to sleep  He does note that when he gets coughing jags his nose does run, but denies postnasal drip, head congestion, sneezing, itchy watery eyes  Chest x-ray done was unremarkable in its interpretation  Patient has a concern in that for the past 2 months he has lost 14 lb  He states he has no appetite    He notes he does have some upper abdomen discomfort  He has not noted any dark black stools or blood in his stools  No fever chills  No nausea or vomiting, no night sweats  Patient also notes generalized fatigue which has been persistent over the same time          ALLERGIES:  No Known Allergies    CURRENT MEDICATIONS:    Current Outpatient Prescriptions:     allopurinol (ZYLOPRIM) 100 mg tablet, Take by mouth daily  , Disp: , Rfl: 0    atorvastatin (LIPITOR) 40 mg tablet, Take 40 mg by mouth daily  , Disp: , Rfl:     benzonatate (TESSALON PERLES) 100 mg capsule, Take 1 capsule (100 mg total) by mouth 3 (three) times a day as needed for cough, Disp: 30 capsule, Rfl: 0    carvedilol (COREG) 6 25 mg tablet, Take 6 25 mg by mouth  , Disp: , Rfl:     cefuroxime (CEFTIN) 500 mg tablet, Take 1 tablet (500 mg total) by mouth every 12 (twelve) hours for 10 days, Disp: 20 tablet, Rfl: 0    furosemide (LASIX) 20 mg tablet, , Disp: , Rfl: 0    lisinopril (ZESTRIL) 40 mg tablet, Take 40 mg by mouth daily, Disp: , Rfl:     Methylprednisolone 4 MG TBPK, Use as directed on package, Disp: 21 tablet, Rfl: 0    Multiple Vitamins-Minerals (PX MENS MULTIVITAMINS) TABS, Take by mouth, Disp: , Rfl:     ranitidine (ZANTAC) 300 MG capsule, Take 300 mg by mouth every evening, Disp: , Rfl:     warfarin (COUMADIN) 2 5 mg tablet, Take 2 5 mg by mouth 2 (two) times a day, Disp: , Rfl: 0    ACTIVE PROBLEM LIST:  Patient Active Problem List   Diagnosis    CAD (coronary atherosclerotic disease)    CHF (congestive heart failure) ([facility])    Chronic back pain    GERD (gastroesophageal reflux disease)    Hypercholesterolemia    Hypertension    [facility])    Peptic ulcer disease    S/P MVR (mitral valve repair)    Polyp of colon    Acute bronchitis with bronchospasm    Cough    Laryngitis    Weight loss    Anorexia    Abnormal RBC indices    Fatigue       PAST MEDICAL HISTORY:  Past Medical History:   Diagnosis Date  CAD (coronary artery disease)     Cancer (HCC)     prostate    Coronary artery disease     GERD (gastroesophageal reflux disease)     H/O heart artery stent     x2    Hypercholesteremia     Hypertension     Keratotic lesion     St[facility] ([facility] [address] [facility]     Transient cerebral ischemia        PAST SURGICAL HISTORY:  Past Surgical History:   Procedure Laterality Date    BACK SURGERY      CARDIAC SURGERY      mvp repair    CHOLECYSTECTOMY      JOINT REPLACEMENT      L hip replacement    ORCHIECTOMY      PROSTATECTOMY      TOTAL HIP ARTHROPLASTY         FAMILY HISTORY:  Family History   Problem Relation Age of Onset    Heart disease Mother     Stroke Mother         CVA    Cancer Father     Hodgkin's lymphoma Father     Lung cancer Father        SOCIAL HISTORY:  Social History     Social History    Marital status:      Spouse name: N/A    Number of children: N/A    Years of education: N/A     Occupational History    retired      Social History Main Topics    Smoking status: Former Smoker    Smokeless tobacco: Never Used    Alcohol use No      Comment: social  / occasional per [employer]    Drug use: No    Sexual activity: No      Comment: no high risk sexual behavior     Other Topics Concern    Not on file     Social History Narrative    No narrative on file       Review of Systems   Constitutional: Positive for appetite change and unexpected weight change  Negative for activity change, chills, fatigue and fever  HENT: Positive for rhinorrhea  Negative for congestion  Eyes: Negative for discharge  Respiratory: Negative for cough, chest tightness and shortness of breath  Cardiovascular: Negative for chest pain, palpitations and leg swelling  Gastrointestinal: Positive for abdominal pain  Negative for blood in stool  Genitourinary: Negative for difficulty urinating  Musculoskeletal: Negative for arthralgias and myalgias  Skin: Negative for rash     Allergic/Immunologic: Negative for immunocompromised state  Neurological: Negative for dizziness, syncope, weakness, light-headedness and headaches  Hematological: Negative for adenopathy  Does not bruise/bleed easily  Psychiatric/Behavioral: Negative for dysphoric mood  The patient is not nervous/anxious  Objective:  Vitals:    06/15/18 1135   BP: 142/78   BP Location: Left arm   Patient Position: Sitting   Cuff Size: Adult   Pulse: 73   Resp: 18   Temp: (!) 97 °F (36 1 °C)   TempSrc: Temporal   SpO2: 98%   Weight: 70 8 kg (156 lb)   Height: 5' 5" (1 651 m)        Physical Exam   Constitutional: He is oriented to person, place, and time  He appears well-developed and well-nourished  HENT:   HEENT-unremarkable   Neck: Neck supple  No thyromegaly present  Cardiovascular: Normal rate, regular rhythm and normal heart sounds  Pulmonary/Chest: Effort normal  No respiratory distress  Coarse breath sounds remain in the anterior fields and right upper base   Abdominal: Soft  Bowel sounds are normal  There is tenderness (Epigastrium)  Musculoskeletal: He exhibits no edema  Lymphadenopathy:     He has no cervical adenopathy  No palpable adenopathy   Neurological: He is alert and oriented to person, place, and time  Skin: Skin is warm and dry  No rash noted  Psychiatric: He has a normal mood and affect  His behavior is normal    Nursing note and vitals reviewed  RESULTS:    No results found for this or any previous visit (from the past 1008 hour(s))  This note was created with voice recognition software  Phonic, grammatical and spelling errors may be present within the note as a result

## 2018-06-19 ENCOUNTER — HOSPITAL ENCOUNTER (INPATIENT)
Facility: HOSPITAL | Age: 73
LOS: 6 days | Discharge: HOME HEALTH | DRG: 868 | End: 2018-06-25
Attending: EMERGENCY MEDICINE | Admitting: INTERNAL MEDICINE
Payer: MEDICARE

## 2018-06-19 ENCOUNTER — APPOINTMENT (EMERGENCY)
Dept: CT IMAGING | Facility: HOSPITAL | Age: 73
DRG: 868 | End: 2018-06-19
Payer: MEDICARE

## 2018-06-19 DIAGNOSIS — R63.4 WEIGHT LOSS: Primary | ICD-10-CM

## 2018-06-19 DIAGNOSIS — N17.9 AKI (ACUTE KIDNEY INJURY) (HCC): ICD-10-CM

## 2018-06-19 DIAGNOSIS — D69.6 THROMBOCYTOPENIA (HCC): ICD-10-CM

## 2018-06-19 DIAGNOSIS — R50.9 FEVER: ICD-10-CM

## 2018-06-19 DIAGNOSIS — I50.9 CHF (CONGESTIVE HEART FAILURE) (HCC): ICD-10-CM

## 2018-06-19 DIAGNOSIS — K21.9 GASTROESOPHAGEAL REFLUX DISEASE WITHOUT ESOPHAGITIS: ICD-10-CM

## 2018-06-19 DIAGNOSIS — I48.0 PAF (PAROXYSMAL ATRIAL FIBRILLATION) (HCC): ICD-10-CM

## 2018-06-19 DIAGNOSIS — A77.49 ANAPLASMOSIS: ICD-10-CM

## 2018-06-19 DIAGNOSIS — R63.0 ANOREXIA: ICD-10-CM

## 2018-06-19 DIAGNOSIS — I25.10 ATHEROSCLEROSIS OF NATIVE CORONARY ARTERY OF NATIVE HEART WITHOUT ANGINA PECTORIS: ICD-10-CM

## 2018-06-19 DIAGNOSIS — E86.0 DEHYDRATION: ICD-10-CM

## 2018-06-19 PROBLEM — Z79.01 CHRONIC ANTICOAGULATION: Status: ACTIVE | Noted: 2018-06-19

## 2018-06-19 PROBLEM — R53.1 GENERALIZED WEAKNESS: Status: ACTIVE | Noted: 2018-06-19

## 2018-06-19 LAB
ALBUMIN SERPL BCP-MCNC: 3.2 G/DL (ref 3.5–5)
ALP SERPL-CCNC: 59 U/L (ref 46–116)
ALT SERPL W P-5'-P-CCNC: 78 U/L (ref 12–78)
ANION GAP SERPL CALCULATED.3IONS-SCNC: 12 MMOL/L (ref 4–13)
APTT PPP: 42 SECONDS (ref 24–36)
AST SERPL W P-5'-P-CCNC: 77 U/L (ref 5–45)
ATRIAL RATE: 78 BPM
BACTERIA UR QL AUTO: ABNORMAL /HPF
BASOPHILS # BLD MANUAL: 0 THOUSAND/UL (ref 0–0.1)
BASOPHILS NFR MAR MANUAL: 0 % (ref 0–1)
BILIRUB SERPL-MCNC: 1.4 MG/DL (ref 0.2–1)
BILIRUB UR QL STRIP: NEGATIVE
BUN SERPL-MCNC: 34 MG/DL (ref 5–25)
CALCIUM SERPL-MCNC: 8.9 MG/DL (ref 8.3–10.1)
CHLORIDE SERPL-SCNC: 98 MMOL/L (ref 100–108)
CLARITY UR: ABNORMAL
CO2 SERPL-SCNC: 27 MMOL/L (ref 21–32)
COLOR UR: ABNORMAL
CREAT SERPL-MCNC: 2.18 MG/DL (ref 0.6–1.3)
EOSINOPHIL # BLD MANUAL: 0 THOUSAND/UL (ref 0–0.4)
EOSINOPHIL NFR BLD MANUAL: 0 % (ref 0–6)
ERYTHROCYTE [DISTWIDTH] IN BLOOD BY AUTOMATED COUNT: 13.7 % (ref 11.6–15.1)
FIBRINOGEN PPP-MCNC: 455 MG/DL (ref 227–495)
GFR SERPL CREATININE-BSD FRML MDRD: 29 ML/MIN/1.73SQ M
GLUCOSE SERPL-MCNC: 130 MG/DL (ref 65–140)
GLUCOSE UR STRIP-MCNC: NEGATIVE MG/DL
HCT VFR BLD AUTO: 43.6 % (ref 36.5–49.3)
HGB BLD-MCNC: 14.5 G/DL (ref 12–17)
HGB UR QL STRIP.AUTO: ABNORMAL
HYALINE CASTS #/AREA URNS LPF: ABNORMAL /LPF
INR PPP: 1.93 (ref 0.86–1.17)
KETONES UR STRIP-MCNC: ABNORMAL MG/DL
LEUKOCYTE ESTERASE UR QL STRIP: NEGATIVE
LIPASE SERPL-CCNC: 112 U/L (ref 73–393)
LYMPHOCYTES # BLD AUTO: 0.4 THOUSAND/UL (ref 0.6–4.47)
LYMPHOCYTES # BLD AUTO: 5 % (ref 14–44)
MAGNESIUM SERPL-MCNC: 1.9 MG/DL (ref 1.6–2.6)
MCH RBC QN AUTO: 34.1 PG (ref 26.8–34.3)
MCHC RBC AUTO-ENTMCNC: 33.3 G/DL (ref 31.4–37.4)
MCV RBC AUTO: 103 FL (ref 82–98)
MONOCYTES # BLD AUTO: 0.56 THOUSAND/UL (ref 0–1.22)
MONOCYTES NFR BLD: 7 % (ref 4–12)
MUCOUS THREADS UR QL AUTO: ABNORMAL
NEUTROPHILS # BLD MANUAL: 7.01 THOUSAND/UL (ref 1.85–7.62)
NEUTS SEG NFR BLD AUTO: 88 % (ref 43–75)
NITRITE UR QL STRIP: NEGATIVE
NON-SQ EPI CELLS URNS QL MICRO: ABNORMAL /HPF
NRBC BLD AUTO-RTO: 0 /100 WBCS
NT-PROBNP SERPL-MCNC: 608 PG/ML
P AXIS: 66 DEGREES
PH UR STRIP.AUTO: 6 [PH] (ref 4.5–8)
PHOSPHATE SERPL-MCNC: 3.2 MG/DL (ref 2.3–4.1)
PLATELET # BLD AUTO: 89 THOUSANDS/UL (ref 149–390)
PLATELET BLD QL SMEAR: ABNORMAL
PMV BLD AUTO: 10.7 FL (ref 8.9–12.7)
POTASSIUM SERPL-SCNC: 4.3 MMOL/L (ref 3.5–5.3)
PR INTERVAL: 176 MS
PROT SERPL-MCNC: 6.6 G/DL (ref 6.4–8.2)
PROT UR STRIP-MCNC: ABNORMAL MG/DL
PROTHROMBIN TIME: 21.8 SECONDS (ref 11.8–14.2)
QRS AXIS: -20 DEGREES
QRSD INTERVAL: 146 MS
QT INTERVAL: 406 MS
QTC INTERVAL: 462 MS
RBC # BLD AUTO: 4.25 MILLION/UL (ref 3.88–5.62)
RBC #/AREA URNS AUTO: ABNORMAL /HPF
SODIUM SERPL-SCNC: 137 MMOL/L (ref 136–145)
SP GR UR STRIP.AUTO: 1.02 (ref 1–1.03)
T WAVE AXIS: 164 DEGREES
TOTAL CELLS COUNTED SPEC: 100
TROPONIN I SERPL-MCNC: 0.04 NG/ML
TSH SERPL DL<=0.05 MIU/L-ACNC: 0.91 UIU/ML (ref 0.36–3.74)
UROBILINOGEN UR QL STRIP.AUTO: 0.2 E.U./DL
VENTRICULAR RATE: 78 BPM
WBC # BLD AUTO: 7.97 THOUSAND/UL (ref 4.31–10.16)
WBC #/AREA URNS AUTO: ABNORMAL /HPF

## 2018-06-19 PROCEDURE — 83735 ASSAY OF MAGNESIUM: CPT | Performed by: EMERGENCY MEDICINE

## 2018-06-19 PROCEDURE — 96360 HYDRATION IV INFUSION INIT: CPT

## 2018-06-19 PROCEDURE — 74176 CT ABD & PELVIS W/O CONTRAST: CPT

## 2018-06-19 PROCEDURE — 85007 BL SMEAR W/DIFF WBC COUNT: CPT | Performed by: EMERGENCY MEDICINE

## 2018-06-19 PROCEDURE — 93005 ELECTROCARDIOGRAM TRACING: CPT

## 2018-06-19 PROCEDURE — 85362 FIBRIN DEGRADATION PRODUCTS: CPT | Performed by: INTERNAL MEDICINE

## 2018-06-19 PROCEDURE — 81001 URINALYSIS AUTO W/SCOPE: CPT | Performed by: EMERGENCY MEDICINE

## 2018-06-19 PROCEDURE — 83690 ASSAY OF LIPASE: CPT | Performed by: EMERGENCY MEDICINE

## 2018-06-19 PROCEDURE — 99223 1ST HOSP IP/OBS HIGH 75: CPT | Performed by: INTERNAL MEDICINE

## 2018-06-19 PROCEDURE — 84100 ASSAY OF PHOSPHORUS: CPT | Performed by: EMERGENCY MEDICINE

## 2018-06-19 PROCEDURE — 84484 ASSAY OF TROPONIN QUANT: CPT | Performed by: EMERGENCY MEDICINE

## 2018-06-19 PROCEDURE — 85384 FIBRINOGEN ACTIVITY: CPT | Performed by: INTERNAL MEDICINE

## 2018-06-19 PROCEDURE — 84443 ASSAY THYROID STIM HORMONE: CPT | Performed by: INTERNAL MEDICINE

## 2018-06-19 PROCEDURE — 99285 EMERGENCY DEPT VISIT HI MDM: CPT

## 2018-06-19 PROCEDURE — 85730 THROMBOPLASTIN TIME PARTIAL: CPT | Performed by: EMERGENCY MEDICINE

## 2018-06-19 PROCEDURE — 85027 COMPLETE CBC AUTOMATED: CPT | Performed by: EMERGENCY MEDICINE

## 2018-06-19 PROCEDURE — 85610 PROTHROMBIN TIME: CPT | Performed by: EMERGENCY MEDICINE

## 2018-06-19 PROCEDURE — 71250 CT THORAX DX C-: CPT

## 2018-06-19 PROCEDURE — 93010 ELECTROCARDIOGRAM REPORT: CPT | Performed by: INTERNAL MEDICINE

## 2018-06-19 PROCEDURE — 87040 BLOOD CULTURE FOR BACTERIA: CPT | Performed by: INTERNAL MEDICINE

## 2018-06-19 PROCEDURE — 80053 COMPREHEN METABOLIC PANEL: CPT | Performed by: EMERGENCY MEDICINE

## 2018-06-19 PROCEDURE — 36415 COLL VENOUS BLD VENIPUNCTURE: CPT | Performed by: EMERGENCY MEDICINE

## 2018-06-19 PROCEDURE — 83880 ASSAY OF NATRIURETIC PEPTIDE: CPT | Performed by: EMERGENCY MEDICINE

## 2018-06-19 RX ORDER — ATORVASTATIN CALCIUM 40 MG/1
40 TABLET, FILM COATED ORAL DAILY
Status: DISCONTINUED | OUTPATIENT
Start: 2018-06-20 | End: 2018-06-25 | Stop reason: HOSPADM

## 2018-06-19 RX ORDER — BENZONATATE 100 MG/1
100 CAPSULE ORAL 3 TIMES DAILY PRN
Status: DISCONTINUED | OUTPATIENT
Start: 2018-06-19 | End: 2018-06-25 | Stop reason: HOSPADM

## 2018-06-19 RX ORDER — CARVEDILOL 6.25 MG/1
6.25 TABLET ORAL 2 TIMES DAILY WITH MEALS
Status: DISCONTINUED | OUTPATIENT
Start: 2018-06-19 | End: 2018-06-25 | Stop reason: HOSPADM

## 2018-06-19 RX ORDER — FAMOTIDINE 20 MG/1
20 TABLET, FILM COATED ORAL DAILY
Status: DISCONTINUED | OUTPATIENT
Start: 2018-06-20 | End: 2018-06-20

## 2018-06-19 RX ORDER — SODIUM CHLORIDE 9 MG/ML
50 INJECTION, SOLUTION INTRAVENOUS CONTINUOUS
Status: DISCONTINUED | OUTPATIENT
Start: 2018-06-19 | End: 2018-06-23

## 2018-06-19 RX ORDER — ALLOPURINOL 100 MG/1
100 TABLET ORAL DAILY
Status: DISCONTINUED | OUTPATIENT
Start: 2018-06-20 | End: 2018-06-25 | Stop reason: HOSPADM

## 2018-06-19 RX ORDER — WARFARIN SODIUM 2.5 MG/1
2.5 TABLET ORAL
Status: DISCONTINUED | OUTPATIENT
Start: 2018-06-19 | End: 2018-06-20

## 2018-06-19 RX ORDER — ONDANSETRON 2 MG/ML
4 INJECTION INTRAMUSCULAR; INTRAVENOUS EVERY 6 HOURS PRN
Status: DISCONTINUED | OUTPATIENT
Start: 2018-06-19 | End: 2018-06-25 | Stop reason: HOSPADM

## 2018-06-19 RX ADMIN — WARFARIN SODIUM 2.5 MG: 2.5 TABLET ORAL at 18:17

## 2018-06-19 RX ADMIN — SODIUM CHLORIDE 1000 ML: 0.9 INJECTION, SOLUTION INTRAVENOUS at 12:26

## 2018-06-19 RX ADMIN — SODIUM CHLORIDE 75 ML/HR: 0.9 INJECTION, SOLUTION INTRAVENOUS at 18:17

## 2018-06-19 RX ADMIN — CARVEDILOL 6.25 MG: 6.25 TABLET, FILM COATED ORAL at 18:17

## 2018-06-19 NOTE — H&P
History and Physical - [facility] Internal Medicine    Patient Information: Felisha Morris 71 y o  male MRN: [id number]  Unit/Bed#: -01 Encounter: [id number]  Admitting Physician: Sophie Chow MD  PCP: Vani Davidson MD  Date of Admission:  06/19/18    Assessment/Plan:    Hospital Problem List:     Principal Problem:    Generalized weakness  Active Problems:    CAD (coronary atherosclerotic disease)    GERD (gastroesophageal reflux disease)    Hypercholesterolemia    Hypertension    PAF (paroxysmal atrial fibrillation) ([facility])    Peptic ulcer disease    Weight loss    Anorexia    ARF (acute renal failure) ([facility])    Chronic anticoagulation    Thrombocytopenia ([address] )    Present on Admission:   Generalized weakness   CAD (coronary atherosclerotic disease)   GERD (gastroesophageal reflux disease)   Hypercholesterolemia   Hypertension   PA[facility]oxysmal atrial fibrillation) [facility] [address] [facility]   Peptic ulcer disease   Weight loss   Anorexia   ARF (acute renal failure) ([facility] [address] )      Plan for the Primary Problem(s):  · Generalized weakness-likely multifactorial including being in acute renal failure and also not eating well with loss of weight-as per patient he usually weighs about 164 lb  He does not have any appetite  He has had previous EGD/colonoscopy done  Would ask for GI evaluation  Would also hold his diuretics and nephrotoxin medications and started on gentle IV fluids  PT/OT evaluation and treatment  Consult Nephrology regarding acute kidney injury  ·  He also has history of coronary artery disease and CABG in the past   On chronic anticoagulation with Coumadin with slightly subtherapeutic INR  Continue with close monitoring and Coumadin at this point which should suffice DVT prophylaxis as well  · Thrombocytopenia  This is new  Would ask for Hematology input  Would also ask for DIC profile  May not be accurate  He is on Coumadin  · Elevated BNP  Do not know what his baseline ejection fraction is   Would get an echocardiogram    Plan for Additional Problems:   · Essential hypertension  May have to use different antihypertensive medications in the setting of acute renal failure  VTE Prophylaxis: Warfarin (Coumadin)  / sequential compression device   Code Status:  Full code  POLST: There is no POLST form on file for this patient (pre-hospital)    Anticipated Length of Stay:  Patient will be admitted on an Inpatient basis with an anticipated length of stay of  more than 2 midnights  Justification for Hospital Stay:  Acute renal failure/generalized weakness    Total Time for Visit, including Counseling / Coordination of Care: 45 minutes  Greater than 50% of this total time spent on direct patient counseling and coordination of care  Chief Complaint:   Not feeling well    History of Present Illness:    Hilda Christiansen is a 71 y o  male who presents with not feeling well for the last couple of months  He has been sick  He describes being sick like having cough for the last 6 weeks and has been lightheaded  He has upset stomach  He cannot sleep and cannot eat  He has no appetite  He has lost  Weight he thinks that maybe 14 lb within the last few weeks/months although he weighed here 157 lb and he usually weighs about 164 lb  He has history of prostate cancer  He denies any chest pain  He has been having this cough for some time now as above  He had some diarrhea this morning although none now  He has been very weak and walking very slowly  He does not require any cane or walker when he walks  No fall  Has chronic back pain issues  No hematemesis, melena, or hematochezia            Review of Systems    All systems are reviewed  Positive as per history of presenting illness  Patient answered no to all other questions         Past Medical and Surgical History:     Past Medical History:   Diagnosis Date    CAD (coronary artery disease)     Cancer [facility] [address] )     prostate    Coronary artery disease  GERD (gastroesophageal reflux disease)     H/O heart artery stent     x2    Hypercholesteremia     Hypertension     Keratotic lesion     Stroke [facility] [address] [facility]     Transient cerebral ischemia        Past Surgical History:   Procedure Laterality Date    BACK SURGERY      CARDIAC SURGERY      mvp repair    CHOLECYSTECTOMY      JOINT REPLACEMENT      L hip replacement    ORCHIECTOMY      PROSTATECTOMY      TOTAL HIP ARTHROPLASTY         Meds/Allergies:    Prior to Admission medications    Medication Sig Start Date End Date Taking?  Authorizing Provider   allopurinol (ZYLOPRIM) 100 mg tablet Take by mouth daily   1/17/18  Yes Historical Provider, MD   atorvastatin (LIPITOR) 40 mg tablet Take 40 mg by mouth daily     Yes Historical Provider, MD   benzonatate (TESSALON PERLES) 100 mg capsule Take 1 capsule (100 mg total) by mouth 3 (three) times a day as needed for cough 6/15/18  Yes Jeniffer Henson PA-C   carvedilol (COREG) 6 25 mg tablet Take 6 25 mg by mouth     Yes Historical Provider, MD   furosemide (LASIX) 20 mg tablet  12/28/17  Yes Historical Provider, MD   lisinopril (ZESTRIL) 40 mg tablet Take 40 mg by mouth daily   Yes Historical Provider, MD   Multiple Vitamins-Minerals (PX MENS MULTIVITAMINS) TABS Take by mouth   Yes Historical Provider, MD   ranitidine (ZANTAC) 300 MG capsule Take 300 mg by mouth every evening   Yes Historical Provider, MD   warfarin (COUMADIN) 2 5 mg tablet Take 2 5 mg by mouth 2 (two) times a day 1/10/18  Yes Historical Provider, MD   Methylprednisolone 4 MG TBPK Use as directed on package 6/7/18 6/19/18  Jeniffer Henson PA-C     Reviewed as documented in admission med rec    Allergies: No Known Allergies    Social History:     Marital Status:    Occupation:  Does not work  Patient Pre-hospital Living Situation:  Lives alone  Patient Pre-hospital Level of Mobility:  Independent  Patient Pre-hospital Diet Restrictions:  None  Substance Use History:   History   Alcohol Use No Comment: social  / occasional per [employer]     History   Smoking Status    Former Smoker   Smokeless Tobacco    Never Used     History   Drug Use No       Family History:    Family history is reviewed and is not pertinent to his current illness        Physical Exam      Vitals:   Blood Pressure: 144/60 (06/19/18 1635)  Pulse: 80 (06/19/18 1635)  Temperature: 98 5 °F (36 9 °C) (06/19/18 1635)  Temp Source: Oral (06/19/18 1635)  Respirations: 18 (06/19/18 1635)  Height: 5' 6" (167 6 cm) (06/19/18 1104)  Weight - Scale: 71 2 kg (157 lb) (06/19/18 1104)  SpO2: 96 % (06/19/18 1635)    Vital signs are reviewed as above  Constitutional:  Patient sitting in bed  He would have some bouts of cough off and on  Eyes: EOM grossly intact  Conjunctivae slightly pale  Patient has anicteric sclera  HENT: Oropharynx are dry   Has some white coating  on the tongue  Head normocephalic  Neck: Neck is supple  I was not able to visualize any JVD at 90°  There is no significant lymphadenopathy  I also did not notice any significant thyromegaly  Cardiac: I did not hear any rubs or gallop  Patient appears to be in sinus rhythm  Respiratory: Patient not in significant respiratory distress while not talking, however, when he starts talking, he would become more short of breath and wheezy  Air entry in general is poor  He has few wheezes bilaterally on auscultation as well  GI: Abdomen is soft  It is grossly nontender  Bowel sounds are adequate  I was not able to appreciate any hepatosplenomegaly  Neurologic:  Patient is awake and alert  Neurological examination is grossly intact  No obvious focal neurological deficit noticed  Skin: Skin is warm and dry  Psychiatric: Mood and affect are pleasant  Musculoskeletal  Patient moving all extremities while in bed    He  Has chronic arthritic joints   Extremities: Patient has no significant cyanosis, clubbing, or lower extremity edema           Additional Data:     Lab Results: I have personally reviewed pertinent reports  Results from last 7 days  Lab Units 06/19/18  1200   WBC Thousand/uL 7 97   HEMOGLOBIN g/dL 14 5   HEMATOCRIT % 43 6   PLATELETS Thousands/uL 89*   LYMPHO PCT % 5*   MONO PCT MAN % 7   EOSINO PCT MANUAL % 0       Results from last 7 days  Lab Units 06/19/18  1200   SODIUM mmol/L 137   POTASSIUM mmol/L 4 3   CHLORIDE mmol/L 98*   CO2 mmol/L 27   BUN mg/dL 34*   CREATININE mg/dL 2 18*   CALCIUM mg/dL 8 9   TOTAL PROTEIN g/dL 6 6   BILIRUBIN TOTAL mg/dL 1 40*   ALK PHOS U/L 59   ALT U/L 78   AST U/L 77*   GLUCOSE RANDOM mg/dL 130       Results from last 7 days  Lab Units 06/19/18  1200   INR  1 93*       Imaging: I have personally reviewed pertinent reports  Ct Chest Abdomen Pelvis Wo Contrast    Result Date: 6/19/2018  Narrative: CT CHEST, ABDOMEN AND PELVIS WITHOUT IV CONTRAST INDICATION:   shortness of breath, weakness, loss of weight, abdominal pain, night sweats  History of cholecystectomy and prostatectomy COMPARISON: None  TECHNIQUE: CT examination of the chest, abdomen and pelvis was performed without intravenous contrast   Axial, sagittal, and coronal 2D reformatted images were created from the source data and submitted for interpretation  Radiation dose length product (DLP) for this visit:  526 mGy-cm   This examination, like all CT scans performed in the [facility], was performed utilizing techniques to minimize radiation dose exposure, including the use of iterative reconstruction and automated exposure control  Enteric contrast was not administered  The absence of intravenous and gastrointestinal contrast significantly limits evaluation of the abdominal and pelvic viscera  FINDINGS: CHEST LUNGS:  Subsegmental atelectasis or scar in the lingula and left lower lobe  Lungs otherwise clear  Alistair Rower PLEURA:  Unremarkable  HEART/GREAT VESSELS:  Cardiomegaly  Prior open heart surgery    Thoracic aorta and central pulmonary arteries normal in caliber  MEDIASTINUM AND GUSTAVO: No lymphadenopathy or mass  Esophagus unremarkable  Trachea and main stem bronchi unremarkable  A portion of the right upper lobe is supplied by a right-sided accessory tracheal bronchus ("pig bronchus"), an anatomic variant  CHEST WALL AND LOWER NECK:   Unremarkable  ABDOMEN LIVER/BILIARY TREE:  Unremarkable  GALLBLADDER:  Postcholecystectomy  SPLEEN:  Unremarkable  PANCREAS:  Unremarkable  ADRENAL GLANDS:  Unremarkable  KIDNEYS/URETERS:  Atrophic right kidney  Left kidney grossly unremarkable  STOMACH AND BOWEL:  Unremarkable  APPENDIX:  No findings to suggest appendicitis  ABDOMINOPELVIC CAVITY: No lymphadenopathy or mass  No ascites  No extraluminal gas  VESSELS:  Atherosclerotic changes are present  No evidence of aneurysm  PELVIS REPRODUCTIVE ORGANS:  Artifact from a left hip prosthesis obscures portions of the prostatic bed and prostate not identified with certainty  Prostatectomy history  URINARY BLADDER:  Largely obscured by artifact from a left hip prosthesis  ABDOMINAL WALL/INGUINAL REGIONS:  Unremarkable  OSSEOUS STRUCTURES:  Left hip prosthesis  Scoliosis  Degenerative disease in the thoracic and lumbar spine  Postoperative changes in the lumbar spine  No evidence of recent fracture or destructive lesion  Impression: No evidence of acute abnormality in the chest, abdomen or pelvis  Workstation performed: [id number]     Xr Chest Pa & Lateral    Result Date: 6/7/2018  Narrative: CHEST INDICATION:   J20 9: Acute bronchitis, unspecified R05: Cough  COMPARISON:  8/10/2017 EXAM PERFORMED/VIEWS:  XR CHEST PA & LATERAL  The frontal view was performed utilizing dual energy radiographic technique  Images: 4 FINDINGS:  There are median sternotomy wires indicating prior cardiac surgery  Cardiomediastinal silhouette appears unremarkable  The lungs are clear  No pneumothorax or pleural effusion  Osseous structures appear within normal limits for patient age  Impression: No acute cardiopulmonary disease  Workstation performed: [id number]       EKG, Pathology, and Other Studies Reviewed on Admission:   · EKG:  Sinus rhythm  Heart rate about 80  Patient has T-wave abnormalities/? changes, however, he has no old EKG to compare with    [employer] Records Reviewed: No     ** Please Note: Dragon 360 Dictation voice to text software may have been used in the creation of this document   **

## 2018-06-19 NOTE — PLAN OF CARE
DISCHARGE PLANNING     Discharge to home or other facility with appropriate resources Progressing        INFECTION - ADULT     Absence or prevention of progression during hospitalization Progressing     Absence of fever/infection during neutropenic period Progressing        Knowledge Deficit     Patient/family/caregiver demonstrates understanding of disease process, treatment plan, medications, and discharge instructions Progressing        Nutrition/Hydration-ADULT     Nutrient/Hydration intake appropriate for improving, restoring or maintaining nutritional needs Progressing        PAIN - ADULT     Verbalizes/displays adequate comfort level or baseline comfort level Progressing        Potential for Falls     Patient will remain free of falls Progressing        Prexisting or High Potential for Compromised Skin Integrity     Skin integrity is maintained or improved Progressing        SAFETY ADULT     Maintain or return to baseline ADL function Progressing     Maintain or return mobility status to optimal level Progressing          DISCHARGE PLANNING     Discharge to home or other facility with appropriate resources Progressing        INFECTION - ADULT     Absence or prevention of progression during hospitalization Progressing     Absence of fever/infection during neutropenic period Progressing        Knowledge Deficit     Patient/family/caregiver demonstrates understanding of disease process, treatment plan, medications, and discharge instructions Progressing        Nutrition/Hydration-ADULT     Nutrient/Hydration intake appropriate for improving, restoring or maintaining nutritional needs Progressing        PAIN - ADULT     Verbalizes/displays adequate comfort level or baseline comfort level Progressing        Potential for Falls     Patient will remain free of falls Progressing        Prexisting or High Potential for Compromised Skin Integrity     Skin integrity is maintained or improved Progressing        SAFETY ADULT     Maintain or return to baseline ADL function Progressing     Maintain or return mobility status to optimal level Progressing

## 2018-06-19 NOTE — ED PROVIDER NOTES
History  Chief Complaint   Patient presents with    Weight Loss     Patient presents to ED with complaints of weight loss (14lbs in 2 weeks), not eating, generalized weakness and fatigue  Was seen at PCP on Friday and given outpatient labs but has not had them done yet  HPI      This is a 71-year-old male with history of coronary disease, prostate cancer, atrial fibrillation on Coumadin, hypertension, who presents today with generalized weakness along with weight loss  Patient states for the past 2-3 weeks he has lost approximately 14 lb  Patient states he has not been eating or drinking any water  States generalized fatigue and weakness  Patient lives at home by himself and states all he has been doing is lying in bed  He has seen his PCP on Friday who wrote for outpatient labs along with imaging  Patient states this all started about 3 weeks ago when he had a bad cough that lingered on  He states no sputum production  Patient currently is a smoker  States he also has been having some mild abdominal pain that comes and goes  Back pain is chronic  Denies any fevers  Patient states he has been having significant night sweats where he is drenched  In terms of his prostate cancer he had prostatectomy  Previous abdominal surgeries also include cholecystectomy  He denies any travel outside the country  Patient states he cannot go on any longer like this since he feels very weak  He states slight movements cause him to P lightheaded and dizzy  On arrival patient is hypotensive  Patient appears to be extremely dehydrated  Will get lab work imaging and symptomatic treatment      Past Medical History:   Diagnosis Date    CAD (coronary artery disease)     Cancer ([facility] )     prostate    Coronary artery disease     GERD (gastroesophageal reflux disease)     H/O heart artery stent     x2    Hypercholesteremia     Hypertension     Keratotic lesion     Stroke ([facility] )     Transient cerebral ischemia Prior to Admission Medications   Prescriptions Last Dose Informant Patient Reported? Taking?    Multiple Vitamins-Minerals (PX MENS MULTIVITAMINS) TABS 6/19/2018 at Unknown time  Yes Yes   Sig: Take by mouth   allopurinol (ZYLOPRIM) 100 mg tablet 6/19/2018 at Unknown time Self Yes Yes   Sig: Take by mouth daily     atorvastatin (LIPITOR) 40 mg tablet 6/19/2018 at Unknown time Self Yes Yes   Sig: Take 40 mg by mouth daily     benzonatate (TESSALON PERLES) 100 mg capsule Past Month at Unknown time  No Yes   Sig: Take 1 capsule (100 mg total) by mouth 3 (three) times a day as needed for cough   carvedilol (COREG) 6 25 mg tablet 6/19/2018 at Unknown time Self Yes Yes   Sig: Take 6 25 mg by mouth     furosemide (LASIX) 20 mg tablet 6/18/2018 at Unknown time  Yes Yes   lisinopril (ZESTRIL) 40 mg tablet 6/19/2018 at Unknown time Self Yes Yes   Sig: Take 40 mg by mouth daily   ranitidine (ZANTAC) 300 MG capsule 6/19/2018 at Unknown time  Yes Yes   Sig: Take 300 mg by mouth every evening   warfarin (COUMADIN) 2 5 mg tablet 6/18/2018 at Unknown time  Yes Yes   Sig: Take 2 5 mg by mouth 2 (two) times a day      Facility-Administered Medications: None       Past Medical History:   Diagnosis Date    CAD (coronary artery disease)     [facility] ([facility] )     prostate    Coronary artery disease     GERD (gastroesophageal reflux disease)     H/O heart artery stent     x2    Hypercholesteremia     Hypertension     Keratotic lesion     Stroke ([facility] )     Transient cerebral ischemia        Past Surgical History:   Procedure Laterality Date    BACK SURGERY      CARDIAC SURGERY      mvp repair    CHOLECYSTECTOMY      JOINT REPLACEMENT      L hip replacement    ORCHIECTOMY      PROSTATECTOMY      TOTAL HIP ARTHROPLASTY         Family History   Problem Relation Age of Onset    Heart disease Mother     Stroke Mother         CVA    Cancer Father     Hodgkin's lymphoma Father     Lung cancer Father      I have reviewed and agree with the history as documented  Social History   Substance Use Topics    Smoking status: Former Smoker    Smokeless tobacco: Never Used    Alcohol use No      Comment: social  / occasional per [employer]        Review of Systems   Constitutional: Positive for activity change, appetite change, fatigue and unexpected weight change  Negative for diaphoresis and fever  HENT: Negative  Respiratory: Positive for cough and shortness of breath  Negative for wheezing  Cardiovascular: Negative  Negative for chest pain, palpitations and leg swelling  Gastrointestinal: Positive for abdominal pain  Negative for abdominal distention, nausea and vomiting  Genitourinary: Negative  Musculoskeletal: Positive for back pain  Skin: Negative  Neurological: Positive for dizziness, weakness and light-headedness  Negative for headaches  Psychiatric/Behavioral: Negative  All other systems reviewed and are negative  Physical Exam  Physical Exam   Constitutional: He is oriented to person, place, and time  He appears well-developed and well-nourished  No distress  HENT:   Head: Normocephalic and atraumatic  Nose: Nose normal    Dry mucous membranes   Eyes: Conjunctivae and EOM are normal  Pupils are equal, round, and reactive to light  Right eye exhibits no discharge  Left eye exhibits no discharge  Neck: Normal range of motion  Neck supple  Cardiovascular: Normal rate, regular rhythm and normal heart sounds  No murmur heard  Pulmonary/Chest: Effort normal and breath sounds normal  No respiratory distress  He has no wheezes  He has no rales  Abdominal: Soft  Bowel sounds are normal  He exhibits no distension  There is no tenderness  There is no rebound and no guarding  Musculoskeletal: Normal range of motion  He exhibits no edema, tenderness or deformity  Neurological: He is alert and oriented to person, place, and time  No cranial nerve deficit  Skin: Skin is warm and dry  Capillary refill takes less than 2 seconds  No rash noted  He is not diaphoretic  No erythema  No pallor  Psychiatric: He has a normal mood and affect  Vitals reviewed        Vital Signs  ED Triage Vitals   Temperature Pulse Respirations Blood Pressure SpO2   06/19/18 1104 06/19/18 1104 06/19/18 1104 06/19/18 1107 06/19/18 1104   97 6 °F (36 4 °C) 84 20 (!) 87/51 98 %      Temp Source Heart Rate Source Patient Position - Orthostatic VS BP Location FiO2 (%)   06/19/18 1104 06/19/18 1104 06/19/18 1602 06/19/18 1602 --   Oral Monitor Lying Left arm       Pain Score       06/19/18 1104       No Pain           Vitals:    06/21/18 1900 06/21/18 2356 06/22/18 0300 06/22/18 0700   BP: 148/69 160/70 156/68 147/67   Pulse: 66 77 75 72   Patient Position - Orthostatic VS: Lying Lying Lying Lying       Visual Acuity      ED Medications  Medications   allopurinol (ZYLOPRIM) tablet 100 mg (100 mg Oral Not Given 6/21/18 0907)   atorvastatin (LIPITOR) tablet 40 mg (40 mg Oral Given 6/21/18 0907)   benzonatate (TESSALON PERLES) capsule 100 mg (100 mg Oral Given 6/21/18 2109)   carvedilol (COREG) tablet 6 25 mg (6 25 mg Oral Given 6/21/18 1729)   ondansetron (ZOFRAN) injection 4 mg (not administered)   sodium chloride 0 9 % infusion (50 mL/hr Intravenous New Bag 6/22/18 0106)   acetaminophen (TYLENOL) tablet 650 mg (650 mg Oral Given 6/20/18 2318)   pantoprazole (PROTONIX) EC tablet 40 mg (40 mg Oral Not Given 6/22/18 0852)   sodium chloride 0 9 % bolus 1,000 mL (0 mL Intravenous Stopped 6/19/18 1452)   perflutren lipid microsphere (DEFINITY) injection (0 6 mL/min Intravenous Given 6/20/18 1111)       Diagnostic Studies  Results Reviewed     Procedure Component Value Units Date/Time    Urine Microscopic [[id number]]  (Abnormal) Collected:  06/19/18 1611    Lab Status:  Final result Specimen:  Urine from Urine, Clean Catch Updated:  06/19/18 1643     RBC, UA 0-1 (A) /hpf      WBC, UA None Seen /hpf      Epithelial Cells Occasional /hpf      Bacteria, UA None Seen /hpf      Hyaline Casts, UA 2-4 (A) /lpf      MUCOUS THREADS Occasional (A)    UA w Reflex to Microscopic [[id number]]  (Abnormal) Collected:  06/19/18 1611    Lab Status:  Final result Specimen:  Urine from Urine, Clean Catch Updated:  06/19/18 1630     Color, UA Elsa     Clarity, UA Cloudy     Specific Gravity, UA 1 025     pH, UA 6 0     Leukocytes, UA Negative     Nitrite, UA Negative     Protein, UA 30 (1+) (A) mg/dl      Glucose, UA Negative mg/dl      Ketones, UA Trace (A) mg/dl      Urobilinogen, UA 0 2 E U /dl      Bilirubin, UA Negative     Blood, UA Moderate (A)    APTT [[id number]]  (Abnormal) Collected:  06/19/18 1200    Lab Status:  Final result Specimen:  Blood from Arm, Left Updated:  06/19/18 1249     PTT 42 (H) seconds     Protime-INR [[id number]]  (Abnormal) Collected:  06/19/18 1200    Lab Status:  Final result Specimen:  Blood from Arm, Left Updated:  06/19/18 1243     Protime 21 8 (H) seconds      INR 1 93 (H)    Lipase [[id number]]  (Normal) Collected:  06/19/18 1200    Lab Status:  Final result Specimen:  Blood from Arm, Left Updated:  06/19/18 1234     Lipase 112 u/L     B-type natriuretic peptide [[id number]]  (Abnormal) Collected:  06/19/18 1200    Lab Status:  Final result Specimen:  Blood from Arm, Left Updated:  06/19/18 1234     NT-proBNP 608 (H) pg/mL     Magnesium [[id number]]  (Normal) Collected:  06/19/18 1200    Lab Status:  Final result Specimen:  Blood from Arm, Left Updated:  06/19/18 1234     Magnesium 1 9 mg/dL     Phosphorus [[id number]]  (Normal) Collected:  06/19/18 1200    Lab Status:  Final result Specimen:  Blood from Arm, Left Updated:  06/19/18 1234     Phosphorus 3 2 mg/dL     CBC and differential [[id number]]  (Abnormal) Collected:  06/19/18 1200    Lab Status:  Final result Specimen:  Blood from Arm, Left Updated:  06/19/18 1232     WBC 7 97 Thousand/uL      RBC 4 25 Million/uL      Hemoglobin 14 5 g/dL      Hematocrit 43 6 %       (H) fL MCH 34 1 pg      MCHC 33 3 g/dL      RDW 13 7 %      MPV 10 7 fL      Platelets 89 (L) Thousands/uL      nRBC 0 /100 WBCs     Troponin I [[id number]]  (Normal) Collected:  06/19/18 1200    Lab Status:  Final result Specimen:  Blood from Arm, Left Updated:  06/19/18 1230     Troponin I 0 04 ng/mL     Comprehensive metabolic panel [[id number]]  (Abnormal) Collected:  06/19/18 1200    Lab Status:  Final result Specimen:  Blood from Arm, Left Updated:  06/19/18 1227     Sodium 137 mmol/L      Potassium 4 3 mmol/L      Chloride 98 (L) mmol/L      CO2 27 mmol/L      Anion Gap 12 mmol/L      BUN 34 (H) mg/dL      Creatinine 2 18 (H) mg/dL      Glucose 130 mg/dL      Calcium 8 9 mg/dL      AST 77 (H) U/L      ALT 78 U/L      Alkaline Phosphatase 59 U/L      Total Protein 6 6 g/dL      Albumin 3 2 (L) g/dL      Total Bilirubin 1 40 (H) mg/dL      eGFR 29 ml/min/1 73sq m     Narrative:         National Kidney Disease Education Program recommendations are as follows:  GFR calculation is accurate only with a steady state creatinine  Chronic Kidney disease less than 60 ml/min/1 73 sq  meters  Kidney failure less than 15 ml/min/1 73 sq  meters  US kidney and bladder   Final Result by Erin Ness MD (06/21 2139)   1  Right renal atrophy is again noted  2   No hydronephrosis  Workstation performed: [id number]         US liver   Final Result by Douglas Johnson DO (06/20 1139)   1  Limited examination due to overlying bowel gas  2   Status post cholecystectomy  Otherwise, normal sonographic appearance of the right upper quadrant  Workstation performed: [id number]         CT chest abdomen pelvis wo contrast   Final Result by Miles Gomez MD (06/19 1319)      No evidence of acute abnormality in the chest, abdomen or pelvis                 Workstation performed: [id number]                    Procedures  Procedures       Phone Contacts  ED Phone Contact    ED Course  ED Course as of Jun 22 0928   Geovanny Campos Jun 19, 2018   1248 Total Bilirubin: (!) 1 40   1248 Dehydration Creatinine: (!) 2 18   1249 Acute change Platelets: (!) 89   4771 Will get patient admitted for weight loss, NED, thrombocytopenia     1339 I had a very lengthy discussion with patient and his son  All questions were answered  1353 No history of TB or risk factors for TB                                 MDM  Number of Diagnoses or Management Options  NED (acute kidney injury) ([facility] ): new and requires workup  Dehydration: new and requires workup  Thrombocytopenia ([facility] ): new and requires workup  Weight loss: new and requires workup  Diagnosis management comments: This is a 72 yo M who presents with weight loss and malaise  With the history will obtain labs and CT scan  Differential broad including but not limited to cancer, electrolyte deficiencies, anemia, infectious etilogy, endocarditiis, and more  Less likely to start antibiotic with no WBC, fever  Hold currently until further workup obtained  Less likely concerned for TB with normal CT of chest and no risk factors  Will admit patients for electrolyte abnormalities, dehydration     Patient will require further workup, hence inpatient admission        Amount and/or Complexity of Data Reviewed  Clinical lab tests: ordered and reviewed  Tests in the radiology section of CPT®: ordered and reviewed  Tests in the medicine section of CPT®: ordered and reviewed    Risk of Complications, Morbidity, and/or Mortality  Presenting problems: high  Diagnostic procedures: high  Management options: high    Patient Progress  Patient progress: stable    CritCare Time    Disposition  Final diagnoses:   Weight loss   Thrombocytopenia ([facility] )   NED (acute kidney injury) ([facility] )   Dehydration     Time reflects when diagnosis was documented in both MDM as applicable and the Disposition within this note     Time User Action Codes Description Comment    6/19/2018  1:30 PM Rajeev Aguirre U  8  [R63 4] Weight loss     6/19/2018 1:30 PM Joe Lazcanodip Add [D69 6] Thrombocytopenia ([place] [address] )     6/19/2018  1:30 PM Lynda Lazcanop Add [N17 9] NED (acute kidney injury) ([place] [address] )     6/19/2018  1:30 PM TrejoJoe Salcidodip Add [E86 0] Dehydration     6/20/2018  8:21 AM Lilli Pal Add [R50 9] Fever     6/21/2018  8:40 AM Lilli Pal Add [I50 9] CHF (congestive heart failure) ([place] [address] )     6/21/2018  4:25 PM Alvie Mention Add [R63 0] Anorexia       ED Disposition     ED Disposition Condition Comment    Admit  Case was discussed with raul and the patient's admission status was agreed to be Admission Status: inpatient status to the service of Dr Lolis Davenport   Follow-up Information    None         Current Discharge Medication List      CONTINUE these medications which have NOT CHANGED    Details   allopurinol (ZYLOPRIM) 100 mg tablet Take by mouth daily    Refills: 0      atorvastatin (LIPITOR) 40 mg tablet Take 40 mg by mouth daily        benzonatate (TESSALON PERLES) 100 mg capsule Take 1 capsule (100 mg total) by mouth 3 (three) times a day as needed for cough  Qty: 30 capsule, Refills: 0    Associated Diagnoses: Cough; Acute bronchitis with bronchospasm      carvedilol (COREG) 6 25 mg tablet Take 6 25 mg by mouth        furosemide (LASIX) 20 mg tablet Refills: 0      lisinopril (ZESTRIL) 40 mg tablet Take 40 mg by mouth daily      Multiple Vitamins-Minerals (PX MENS MULTIVITAMINS) TABS Take by mouth      ranitidine (ZANTAC) 300 MG capsule Take 300 mg by mouth every evening      warfarin (COUMADIN) 2 5 mg tablet Take 2 5 mg by mouth 2 (two) times a day  Refills: 0           No discharge procedures on file      ED Provider  Electronically Signed by           David Chaidez MD  06/22/18 8552

## 2018-06-20 ENCOUNTER — APPOINTMENT (INPATIENT)
Dept: NON INVASIVE DIAGNOSTICS | Facility: HOSPITAL | Age: 73
DRG: 868 | End: 2018-06-20
Payer: MEDICARE

## 2018-06-20 ENCOUNTER — APPOINTMENT (INPATIENT)
Dept: ULTRASOUND IMAGING | Facility: HOSPITAL | Age: 73
DRG: 868 | End: 2018-06-20
Payer: MEDICARE

## 2018-06-20 PROBLEM — D72.825 BANDEMIA: Status: ACTIVE | Noted: 2018-06-20

## 2018-06-20 PROBLEM — R19.7 DIARRHEA: Status: ACTIVE | Noted: 2018-06-20

## 2018-06-20 LAB
% PARASITEMIA: 0
ALBUMIN SERPL BCP-MCNC: 2.9 G/DL (ref 3.5–5)
ALP SERPL-CCNC: 57 U/L (ref 46–116)
ALT SERPL W P-5'-P-CCNC: 79 U/L (ref 12–78)
ANION GAP SERPL CALCULATED.3IONS-SCNC: 7 MMOL/L (ref 4–13)
AST SERPL W P-5'-P-CCNC: 82 U/L (ref 5–45)
BASOPHILS # BLD MANUAL: 0.06 THOUSAND/UL (ref 0–0.1)
BASOPHILS NFR MAR MANUAL: 1 % (ref 0–1)
BILIRUB SERPL-MCNC: 1.4 MG/DL (ref 0.2–1)
BUN SERPL-MCNC: 24 MG/DL (ref 5–25)
CALCIUM SERPL-MCNC: 7.7 MG/DL (ref 8.3–10.1)
CHLORIDE SERPL-SCNC: 104 MMOL/L (ref 100–108)
CO2 SERPL-SCNC: 26 MMOL/L (ref 21–32)
CREAT SERPL-MCNC: 1.48 MG/DL (ref 0.6–1.3)
EOSINOPHIL # BLD MANUAL: 0 THOUSAND/UL (ref 0–0.4)
EOSINOPHIL NFR BLD MANUAL: 0 % (ref 0–6)
ERYTHROCYTE [DISTWIDTH] IN BLOOD BY AUTOMATED COUNT: 13.8 % (ref 11.6–15.1)
FDP BLD QL AGGL: <10
GFR SERPL CREATININE-BSD FRML MDRD: 47 ML/MIN/1.73SQ M
GLUCOSE SERPL-MCNC: 101 MG/DL (ref 65–140)
HCT VFR BLD AUTO: 42 % (ref 36.5–49.3)
HGB BLD-MCNC: 13.9 G/DL (ref 12–17)
INR PPP: 2.13 (ref 0.86–1.17)
LDH SERPL-CCNC: 576 U/L (ref 81–234)
LYMPHOCYTES # BLD AUTO: 0.35 THOUSAND/UL (ref 0.6–4.47)
LYMPHOCYTES # BLD AUTO: 6 % (ref 14–44)
MAGNESIUM SERPL-MCNC: 2.3 MG/DL (ref 1.6–2.6)
MCH RBC QN AUTO: 33.9 PG (ref 26.8–34.3)
MCHC RBC AUTO-ENTMCNC: 33.1 G/DL (ref 31.4–37.4)
MCV RBC AUTO: 102 FL (ref 82–98)
MONOCYTES # BLD AUTO: 0.52 THOUSAND/UL (ref 0–1.22)
MONOCYTES NFR BLD: 9 % (ref 4–12)
NEUTROPHILS # BLD MANUAL: 4.86 THOUSAND/UL (ref 1.85–7.62)
NEUTS BAND NFR BLD MANUAL: 10 % (ref 0–8)
NEUTS SEG NFR BLD AUTO: 74 % (ref 43–75)
NRBC BLD AUTO-RTO: 0 /100 WBCS
PARASITE BLD: NO
PATHOLOGIST INTERPRETATION: NORMAL
PHOSPHATE SERPL-MCNC: 2.8 MG/DL (ref 2.3–4.1)
PLATELET # BLD AUTO: 60 THOUSANDS/UL (ref 149–390)
PLATELET BLD QL SMEAR: ABNORMAL
PMV BLD AUTO: 10.9 FL (ref 8.9–12.7)
POTASSIUM SERPL-SCNC: 3.9 MMOL/L (ref 3.5–5.3)
PROCALCITONIN SERPL-MCNC: 0.71 NG/ML
PROT SERPL-MCNC: 6.3 G/DL (ref 6.4–8.2)
PROTHROMBIN TIME: 23.5 SECONDS (ref 11.8–14.2)
RBC # BLD AUTO: 4.1 MILLION/UL (ref 3.88–5.62)
SODIUM SERPL-SCNC: 137 MMOL/L (ref 136–145)
TOTAL CELLS COUNTED SPEC: 100
WBC # BLD AUTO: 5.78 THOUSAND/UL (ref 4.31–10.16)

## 2018-06-20 PROCEDURE — 99222 1ST HOSP IP/OBS MODERATE 55: CPT | Performed by: PHYSICIAN ASSISTANT

## 2018-06-20 PROCEDURE — 87536 HIV-1 QUANT&REVRSE TRNSCRPJ: CPT | Performed by: PHYSICIAN ASSISTANT

## 2018-06-20 PROCEDURE — 85007 BL SMEAR W/DIFF WBC COUNT: CPT | Performed by: INTERNAL MEDICINE

## 2018-06-20 PROCEDURE — 86618 LYME DISEASE ANTIBODY: CPT | Performed by: INTERNAL MEDICINE

## 2018-06-20 PROCEDURE — 92610 EVALUATE SWALLOWING FUNCTION: CPT

## 2018-06-20 PROCEDURE — 86705 HEP B CORE ANTIBODY IGM: CPT | Performed by: PHYSICIAN ASSISTANT

## 2018-06-20 PROCEDURE — 86803 HEPATITIS C AB TEST: CPT | Performed by: PHYSICIAN ASSISTANT

## 2018-06-20 PROCEDURE — 87207 SMEAR SPECIAL STAIN: CPT | Performed by: INTERNAL MEDICINE

## 2018-06-20 PROCEDURE — 84145 PROCALCITONIN (PCT): CPT | Performed by: INTERNAL MEDICINE

## 2018-06-20 PROCEDURE — 99222 1ST HOSP IP/OBS MODERATE 55: CPT | Performed by: INTERNAL MEDICINE

## 2018-06-20 PROCEDURE — 76705 ECHO EXAM OF ABDOMEN: CPT

## 2018-06-20 PROCEDURE — 99221 1ST HOSP IP/OBS SF/LOW 40: CPT | Performed by: PHYSICIAN ASSISTANT

## 2018-06-20 PROCEDURE — 93306 TTE W/DOPPLER COMPLETE: CPT | Performed by: INTERNAL MEDICINE

## 2018-06-20 PROCEDURE — 83735 ASSAY OF MAGNESIUM: CPT | Performed by: INTERNAL MEDICINE

## 2018-06-20 PROCEDURE — 99223 1ST HOSP IP/OBS HIGH 75: CPT | Performed by: INTERNAL MEDICINE

## 2018-06-20 PROCEDURE — 86704 HEP B CORE ANTIBODY TOTAL: CPT | Performed by: PHYSICIAN ASSISTANT

## 2018-06-20 PROCEDURE — 87340 HEPATITIS B SURFACE AG IA: CPT | Performed by: PHYSICIAN ASSISTANT

## 2018-06-20 PROCEDURE — 87493 C DIFF AMPLIFIED PROBE: CPT | Performed by: INTERNAL MEDICINE

## 2018-06-20 PROCEDURE — 99233 SBSQ HOSP IP/OBS HIGH 50: CPT | Performed by: INTERNAL MEDICINE

## 2018-06-20 PROCEDURE — 85610 PROTHROMBIN TIME: CPT | Performed by: INTERNAL MEDICINE

## 2018-06-20 PROCEDURE — 87798 DETECT AGENT NOS DNA AMP: CPT | Performed by: INTERNAL MEDICINE

## 2018-06-20 PROCEDURE — C8929 TTE W OR WO FOL WCON,DOPPLER: HCPCS

## 2018-06-20 PROCEDURE — 85027 COMPLETE CBC AUTOMATED: CPT | Performed by: INTERNAL MEDICINE

## 2018-06-20 PROCEDURE — 83615 LACTATE (LD) (LDH) ENZYME: CPT | Performed by: INTERNAL MEDICINE

## 2018-06-20 PROCEDURE — 80053 COMPREHEN METABOLIC PANEL: CPT | Performed by: INTERNAL MEDICINE

## 2018-06-20 PROCEDURE — 84100 ASSAY OF PHOSPHORUS: CPT | Performed by: INTERNAL MEDICINE

## 2018-06-20 RX ORDER — PANTOPRAZOLE SODIUM 40 MG/1
40 TABLET, DELAYED RELEASE ORAL
Status: DISCONTINUED | OUTPATIENT
Start: 2018-06-21 | End: 2018-06-20

## 2018-06-20 RX ORDER — PANTOPRAZOLE SODIUM 40 MG/1
40 TABLET, DELAYED RELEASE ORAL
Status: DISCONTINUED | OUTPATIENT
Start: 2018-06-21 | End: 2018-06-25 | Stop reason: HOSPADM

## 2018-06-20 RX ORDER — ACETAMINOPHEN 325 MG/1
650 TABLET ORAL EVERY 6 HOURS PRN
Status: DISCONTINUED | OUTPATIENT
Start: 2018-06-20 | End: 2018-06-25 | Stop reason: HOSPADM

## 2018-06-20 RX ADMIN — ACETAMINOPHEN 650 MG: 325 TABLET, FILM COATED ORAL at 23:18

## 2018-06-20 RX ADMIN — PERFLUTREN 0.6 ML/MIN: 6.52 INJECTION, SUSPENSION INTRAVENOUS at 11:11

## 2018-06-20 RX ADMIN — SODIUM CHLORIDE 75 ML/HR: 0.9 INJECTION, SOLUTION INTRAVENOUS at 07:50

## 2018-06-20 RX ADMIN — ALLOPURINOL 100 MG: 100 TABLET ORAL at 09:59

## 2018-06-20 RX ADMIN — ACETAMINOPHEN 650 MG: 325 TABLET, FILM COATED ORAL at 00:15

## 2018-06-20 RX ADMIN — CARVEDILOL 6.25 MG: 6.25 TABLET, FILM COATED ORAL at 17:56

## 2018-06-20 RX ADMIN — ATORVASTATIN CALCIUM 40 MG: 40 TABLET, FILM COATED ORAL at 09:58

## 2018-06-20 RX ADMIN — FAMOTIDINE 20 MG: 20 TABLET, FILM COATED ORAL at 09:58

## 2018-06-20 RX ADMIN — SODIUM CHLORIDE 75 ML/HR: 0.9 INJECTION, SOLUTION INTRAVENOUS at 20:58

## 2018-06-20 RX ADMIN — CARVEDILOL 6.25 MG: 6.25 TABLET, FILM COATED ORAL at 09:59

## 2018-06-20 NOTE — SPEECH THERAPY NOTE
Speech-Language Pathology Bedside Swallow Evaluation        Patient Name: Jesus Alberto Navarrete    [id number] Date: 6/20/2018     Problem List  Patient Active Problem List   Diagnosis    CAD (coronary atherosclerotic disease)    CHF (congestive heart failure) (HCC)    Chronic back pain    GERD (gastroesophageal reflux disease)    Hypercholesterolemia    Hypertension    PAF (paroxysmal atrial fibrillation) (HCC)    Peptic ulcer disease    S/P MVR (mitral valve repair)    Polyp of colon    Acute bronchitis with bronchospasm    Cough    Laryngitis    Weight loss    Anorexia    Abnormal RBC indices    Fatigue    Generalized weakness    ARF (acute renal failure) (HCC)    Chronic anticoagulation    Thrombocytopenia (HCC)    Diarrhea    Bandemia       Past Medical History  Past Medical History:   Diagnosis Date    CAD (coronary artery disease)     [facility] )     prostate    Coronary artery disease     GERD (gastroesophageal reflux disease)     H/O heart artery stent     x2    Hypercholesteremia     Hypertension     Keratotic lesion     Stroke ([facility] )     Transient cerebral ischemia        Past Surgical History  Past Surgical History:   Procedure Laterality Date    BACK SURGERY      CARDIAC SURGERY      mvp repair    CHOLECYSTECTOMY      JOINT REPLACEMENT      L hip replacement    ORCHIECTOMY      PROSTATECTOMY      TOTAL HIP ARTHROPLASTY         Summary    Pt presents with oral and pharyngeal stages of swallowing largely [facility] for current PO diet  No overt s/s of aspiration observed with PO intake  Patient reports to have lower abdominal discomfort when eating  No further SLP intervention is felt clinically indicated at this time  Attending physician may wish to consult gastroenterology  Please reconsult SLP service if further is needed       Recommendations:   Diet: regular diet and thin liquids   Meds: whole with liquid   Aspiration precautions and compensatory swallowing strategies: upright posture, slow rate of feeding, small bites/sips and alternating bites and sips  Other Recommendations/ considerations: Please maintain current PO diet per physician discretion  May wish to consider GI consult related to patient's symptoms of lower abdomen discomfort      Current Medical Status  Pt is a 71 y o  male who presented to [facility] with multiple medical problems, has been ill for the last 2 months  Patient describes mostly constitutional symptoms, with intermittent chills, night sweats, generalized fatigue, anorexia and 10 lb weight loss over this time frame  He also had intermittent diarrhea without nausea/vomiting or abdominal pain  Approximately 2 weeks ago, he also developed a dry cough  He saw his PCP and was given a Z-Donta, without improvement  Patient finally came to the ER yesterday due to progressive symptom, without any acute event      On presentation, patient had fever but no leukocytosis  He had thrombocytopenia and elevated LFTs  He also had NED  Patient was admitted  Due to concern for possible C difficile colitis, he was started on p o  vancomycin and IV Flagyl  We are asked to evaluate the patient      This morning, patient feels about the same  His symptoms are as outlined above  He is still without abdominal pain  He had diarrhea last night but none today so far  No abdominal pain  Patient lives at home by himself  He denies extensive outdoor activities  No pets  No travel  No ill contact      Past medical history:   Please see H&P for details  CAD (coronary artery disease)       Cancer ([facility] )       prostate    Coronary artery disease      GERD (gastroesophageal reflux disease)      H/O heart artery stent       x2    Hypercholesteremia      Hypertension      Keratotic lesion      Stroke ([facility] )      Transient cerebral ischemia          Special Studies:6/19 CT chest abdomen pelvis: No evidence of acute abnormality in the chest, abdomen or pelvis      Social/Education/Vocational Hx:  Pt lives alone    Swallow Information   Current Risks for Dysphagia & Aspiration: none  Current Symptoms/Concerns: staff observed dry cough  Current Diet: regular diet and thin liquids   Baseline Diet: regular diet and thin liquids    Baseline Assessment   Behavior/Cognition: alert  Speech/Language Status: able to participate in conversation and able to follow commands  Patient Positioning: upright in bed     Swallow Mechanism Exam   Facial: symmetrical  Labial: WFL  Lingual: WFL  Velum: symmetrical  Mandible: adequate ROM  Dentition: upper dentures and natural lower dentition  Vocal quality:clear/adequate   Volitional Cough: strong/productive   Respiratory: On room air at time of assessment; Did not observe increased work of breathing with speech, chewing or swallowing    Consistencies Assessed and Performance   Consistencies Administered: thin liquids, puree and hard solids (very dry toast with fruit jelly)   Patient accepted very little PO intake given his report of discomfort in lower abdomen after he takes a tsp/bite of food  Needed encouragement to accept PO for assessment purposes  Patient reports that he is hungry although has discomfort in lower abdomen when he eats  Oral Stage: Self feeding was safe  Mastication was prolonged (+30 cycles) for hard solids although fully effective for breakdown  Noted that patient had delay with a-p transfer of hard solids  Pharyngeal Stage: Prompt swallow initiation suspected as felt by palpation with thin liquids via cup and straw sip  No overt s/s of aspiration   No reported sensation of residue      Esophageal Concerns: Patient did not report any globus sensation; Belch X1      Results Reviewed with: patient   Dysphagia Goals: none at this time     Speech Therapy Prognosis   Prognosis: good    Prognosis Considerations: medical status

## 2018-06-20 NOTE — CASE MANAGEMENT
Initial Clinical Review    Admission: Date/Time/Statement: 6/19/18 @ 1331 Inpatient Written     Orders Placed This Encounter   Procedures    Inpatient Admission (expected length of stay for this patient is greater than two midnights)     Standing Status:   Standing     Number of Occurrences:   1     Order Specific Question:   Admitting Physician     Answer:   Tremaine Juárez [[id number]]     Order Specific Question:   Level of Care     Answer:   Med Surg [[id number]]     Order Specific Question:   Estimated length of stay     Answer:   More than 2 Midnights     Order Specific Question:   Certification     Answer:   I certify that inpatient services are medically necessary for this patient for a duration of greater than two midnights  See H&P and MD Progress Notes for additional information about the patient's course of treatment  ED: Date/Time/Mode of Arrival:   ED Arrival Information     Expected Arrival Acuity Means of Arrival Escorted By Service Admission Type    - 6/19/2018 10:33 Emergent Walk-In Family [facility] General Medicine Emergency    Arrival Complaint    Weakness           Chief Complaint:   Chief Complaint   Patient presents with    Weight Loss     Patient presents to ED with complaints of weight loss (14lbs in 2 weeks), not eating, generalized weakness and fatigue  Was seen at PCP on Friday and given outpatient labs but has not had them done yet  History of Illness: Jena Zabala is a 71 y o  male who presents with not feeling well for the last couple of months  He has been sick  He describes being sick like having cough for the last 6 weeks and has been lightheaded  He has upset stomach  He cannot sleep and cannot eat  He has no appetite  He has lost  Weight he thinks that maybe 14 lb within the last few weeks/months although he weighed here 157 lb and he usually weighs about 164 lb  He has history of prostate cancer  He has been having this cough for some time now as above    He had some diarrhea this morning although none now  He has been very weak and walking very slowly  He does not require any cane or walker when he walks  No fall  Has chronic back pain issues  ED Vital Signs:   ED Triage Vitals   Temperature Pulse Respirations Blood Pressure SpO2   06/19/18 1104 06/19/18 1104 06/19/18 1104 06/19/18 1107 06/19/18 1104   97 6 °F (36 4 °C) 84 20 (!) 87/51 98 %      Temp Source Heart Rate Source Patient Position - Orthostatic VS BP Location FiO2 (%)   06/19/18 1104 06/19/18 1104 06/19/18 1602 06/19/18 1602 --   Oral Monitor Lying Left arm       Pain Score       06/19/18 1104       No Pain        Wt Readings from Last 1 Encounters:   06/20/18 69 5 kg (153 lb 3 5 oz)       Vital Signs (abnormal): temp 101 2    Abnormal Labs/Diagnostic Test Results:   PLATELETS 89*   LYMPHO PCT 5*     CHLORIDE 98*   BUN 34*   CREATININE 2 18*   BILIRUBIN TOTAL 1 40*   AST 77*     Protime 21 8     INR 1 93       NT-proBNP 608       Protein, UA 30 (1+)     Ketones, UA Trace     Blood, UA Moderate       RBC, UA 0-1     Hyaline Casts, UA 2-4     MUCOUS THREADS Occasional       CT Chest, Abd, pelvis:  WNL  EKG:  SR, rate 80    ED Treatment:   Medication Administration from 06/19/2018 1033 to 06/19/2018 1626       Date/Time Order Dose Route Action     06/19/2018 1226 sodium chloride 0 9 % bolus 1,000 mL 1,000 mL Intravenous New Bag          Past Medical/Surgical History:    Active Ambulatory Problems     Diagnosis Date Noted    CAD (coronary atherosclerotic disease) 01/25/2016    CHF (congestive heart failure) ([facility] ) 08/18/2017    Chronic back pain 01/25/2016    GERD (gastroesophageal reflux disease) 01/25/2016    Hypercholesterolemia 02/26/2015    Hypertension 02/26/2015    PAF (paroxysmal atrial fibrillation) ([facility] ) 05/01/2018    Peptic ulcer disease 03/26/2015    S/P MVR (mitral valve repair) 10/27/2015    Polyp of colon 05/01/2018    Acute bronchitis with bronchospasm 06/07/2018    Cough 06/07/2018    Laryngitis 06/07/2018    Weight loss 06/15/2018    Anorexia 06/15/2018    Abnormal RBC indices 06/15/2018    Fatigue 06/15/2018     Resolved Ambulatory Problems     Diagnosis Date Noted    Prostate CA ([place] [address] ) 05/01/2018     Past Medical History:   Diagnosis Date    CAD (coronary artery disease)     Cancer ([place] [address] )     Coronary artery disease     GERD (gastroesophageal reflux disease)     H/O heart artery stent     Hypercholesteremia     Hypertension     Keratotic lesion     Stroke ([place] [address] )     Transient cerebral ischemia        Admitting Diagnosis: Dehydration [E86 0]  Thrombocytopenia (HCC) [D69 6]  Weight loss [R63 4]  NED (acute kidney injury) ([place] [address] ) [N17 9]    Age/Sex: 71 y o  male    Assessment/Plan:   Principal Problem:    Generalized weakness  Active Problems:    CAD (coronary atherosclerotic disease)    GERD (gastroesophageal reflux disease)    Hypercholesterolemia    Hypertension    PAF (paroxysmal atrial fibrillation) (HCC)    Peptic ulcer disease    Weight loss    Anorexia    ARF (acute renal failure) (HCC)    Chronic anticoagulation    Thrombocytopenia (HCC)     Present on Admission:   Generalized weakness   CAD (coronary atherosclerotic disease)   GERD (gastroesophageal reflux disease)   Hypercholesterolemia   Hypertension   PAF (paroxysmal atrial fibrillation) (HCC)   Peptic ulcer disease   Weight loss   Anorexia   ARF (acute renal failure) ([place] [address] )        Plan for the Primary Problem(s):  · Generalized weakness-likely multifactorial including being in acute renal failure and also not eating well with loss of weight-as per patient he usually weighs about 164 lb  He does not have any appetite  He has had previous EGD/colonoscopy done  Would ask for GI evaluation  Would also hold his diuretics and nephrotoxin medications and started on gentle IV fluids  PT/OT evaluation and treatment  Consult Nephrology regarding acute kidney injury    ·  He also has history of coronary artery disease and CABG in the past   On chronic anticoagulation with Coumadin with slightly subtherapeutic INR  Continue with close monitoring and Coumadin at this point which should suffice DVT prophylaxis as well  ? Thrombocytopenia  This is new  Would ask for Hematology input  Would also ask for DIC profile  May not be accurate  He is on Coumadin  ? Elevated BNP  Do not know what his baseline ejection fraction is   Would get an echocardiogram     Plan for Additional Problems:   · Essential hypertension  May have to use different antihypertensive medications in the setting of acute renal failure      VTE Prophylaxis: Warfarin (Coumadin)  / sequential compression device      Anticipated Length of Stay:  Patient will be admitted on an Inpatient basis with an anticipated length of stay of  more than 2 midnights  Justification for Hospital Stay:  Acute renal failure/generalized weakness     Admission Orders:  Telemetry   OOB with assist  Daily weights, I/O  Echo  US liver  PT, OT, Speech  Consult gastroenterology, hematology, nephrology, ID, cm, nutr ser  Sequential compression device   Blood cxs pending    Scheduled Meds:   Current Facility-Administered Medications:  acetaminophen 650 mg Oral Q6H PRN   allopurinol 100 mg Oral Daily   atorvastatin 40 mg Oral Daily   benzonatate 100 mg Oral TID PRN   carvedilol 6 25 mg Oral BID With Meals   famotidine 20 mg Oral Daily   ondansetron 4 mg Intravenous Q6H PRN   sodium chloride 75 mL/hr Intravenous Continuous   warfarin 2 5 mg Oral Daily (warfarin)     Continuous Infusions:   sodium chloride 75 mL/hr Last Rate: 75 mL/hr (06/20/18 0750)     PRN Meds:   Acetaminophen 650mg PO x1 thus far    benzonatate    ondansetron    Thank you,  [facility] in the [facility] by [facility] Valer[facility] for 2017  Network Utilization Review Department  Phone: [phone];  Fax [phone]  ATTENTION: The Network Utilization Review Department is now centralized for our 7 Facilities  Make a note that we have a new phone and fax numbers for our Department  Please call with any questions or concerns to [phone] and carefully follow the prompts so that you are directed to the right person  All voicemails are confidential  Fax any determinations, approvals, denials, and requests for initial or continue stay review clinical to [phone]  Due to HIGH CALL volume, it would be easier if you could please send faxed requests to expedite your requests and in part, help us provide discharge notifications faster

## 2018-06-20 NOTE — PLAN OF CARE
Problem: DISCHARGE PLANNING - CARE MANAGEMENT  Goal: Discharge to post-acute care or home with appropriate resources  INTERVENTIONS:  - Conduct assessment to determine patient/family and health care team treatment goals, and need for post-acute services based on payer coverage, community resources, and patient preferences, and barriers to discharge  - Address psychosocial, clinical, and financial barriers to discharge as identified in assessment in conjunction with the patient/family and health care team  - Arrange appropriate level of post-acute services according to patients   needs and preference and payer coverage in collaboration with the physician and health care team  - Communicate with and update the patient/family, physician, and health care team regarding progress on the discharge plan  - Arrange appropriate transportation to post-acute venues  Outcome: Progressing  CM attempted to meet with pt but was not successful  CM contacted pt's son Tara Pruett for assistance with assessment  Pt lives alone in [facility]  Pt has access to a walker and cane form previous surgeries but currently does not use them  Pt does not have hx of Oxygen  Pt has hx of Rehab through [facility]  Pt currently uses [facility] pharmacy in [facility]  Pt does not have hx of MH or alcohol  Pt has hx of smoking  Pt's POA/AD is his son Taar Pruett  Pt is retired and currently drives  Pt's son Tara Pruett will transport pt when ready for discharge  CM reviewed discharge planning process including the following: identifying help at home, patient preference for discharge planning needs, pharmacy preference, and availability of treatment team to discuss questions or concerns patient and/or family may have regarding understanding medications and recognizing signs and symptoms once discharged  CM also encouraged patient to follow up with all recommended appointments after discharge   Patient advised of importance for patient and family to participate in managing patients medical well being

## 2018-06-20 NOTE — PHYSICAL THERAPY NOTE
Physical Therapy Cancellation Note    PT order received  Chart review performed  Cleared by RN for assessment  At this time, PT evaluation cancelled per pt request, notes "I feel lousy", requesting to rest  yTrell notified  RN reporting pt has been ambulatory to/from [facility] since admission with A of 1 from [facility] staff  PT will follow and evaluate as appropriate      Fransisca Strong, PT, DPT

## 2018-06-20 NOTE — CONSULTS
Consultation - Infectious Disease   Murel Krabbe Deremer 71 y o  male MRN: [id number]  Unit/Bed#: -01 Encounter: [id number]      IMPRESSION & RECOMMENDATIONS:   Impression/Recommendations: This is a 71 y o  male, with multiple but stable medical problems, admitted yesterday with 2 months history of progressing constitutional symptoms with weight loss  Is febrile but no neutropenia  He is thrombocytopenic with elevated LFTs  He has NED  He was started on IV Flagyl/PO vancomycin for possible C difficile colitis, given diarrhea on admission  1   Fever, with multitude of constitutional symptoms  Patient's symptoms appear to be chronic per progressive  He has no leukocytosis  He does have thrombocytopenia and elevated LFTs  Chest/abdomen/pelvis CT is completely normal  This clinical picture is somewhat suggestive of tick related infection, especially but babesiosis, and possibly the anaplasmosis  Also, consider SBE  With chronicity of symptoms, I would not treat patient empirically with antibiotics for now  Will workup for above possibilities  Will also check procalcitonin  Follow-up on pending blood cultures  Check Anaplasma PCR, parasite blood smear, Lyme serologies  Check procalcitonin  Hold off on any empiric antibiotic for now  Monitor temperature/WBC  2   Diarrhea  This predates patient taking azithromycin  Also, diarrhea has been intermittent  Doubt C difficile colitis  Would hold off on treatment for now  Discontinue vancomycin/Flagyl  Monitor diarrhea  3   Thrombocytopenia  I suspect it is secondary to the syndromes outlined above  Will monitor for now  Patient may ultimately need bone marrow biopsy, only if above workup is unrevealing  Monitor CBC for now  4   Elevated LFTs  Wound I suspect it is secondary to the syndromes outlined above  Will monitor for now  Patient's abdominal exam is benign  Doubt biliary tract obstruction  Monitor LFTs for now      5   Dry cough   Etiology of this is unclear  No clinical signs of pneumonia  Chest CT is negative  Monitor for now  Monitor cough/respiratory symptoms  6   NED  I suspect this is secondary to dehydration from decreased p o  intake  Creatinine is already improved with IV fluid hydration overnight  Monitor creatinine for now  Discussed with patient in detail regarding above plan  Discussed with Dr Sara Muse from [facility] service  Thank you for this consultation  We will follow along with you  HISTORY OF PRESENT ILLNESS:  Reason for Consult:  Fever  HPI: Guillermo Rowell is a 71 y o  male, with multiple medical problems, has been ill for the last 2 months  Patient describes mostly constitutional symptoms, with intermittent chills, night sweats, generalized fatigue, anorexia and 10 lb weight loss over this time frame  He also had intermittent diarrhea without nausea/vomiting or abdominal pain  Approximately 2 weeks ago, he also developed a dry cough  He saw his PCP and was given a Z-Donta, without improvement  Patient finally came to the ER yesterday due to progressive symptom, without any acute event  On presentation, patient had fever but no leukocytosis  He had thrombocytopenia and elevated LFTs  He also had NED  Patient was admitted  Due to concern for possible C difficile colitis, he was started on p o  vancomycin and IV Flagyl  We are asked to evaluate the patient  This morning, patient feels about the same  His symptoms are as outlined above  He is still without abdominal pain  He had diarrhea last night but none today so far  No abdominal pain  Patient lives at home by himself  He denies extensive outdoor activities  No pets  No travel  No ill contact  REVIEW OF SYSTEMS:  A complete 12 point system-based review of systems was done, except for what is noted in HPI above, ROS is otherwise negative      PAST MEDICAL HISTORY:  Past Medical History:   Diagnosis Date    CAD (coronary artery disease)     Cancer ([facility] )     prostate    Coronary artery disease     GERD (gastroesophageal reflux disease)     H/O heart artery stent     x2    Hypercholesteremia     Hypertension     Keratotic lesion     Stroke ([facility] )     Transient cerebral ischemia      Past Surgical History:   Procedure Laterality Date    BACK SURGERY      CARDIAC SURGERY      mvp repair    CHOLECYSTECTOMY      JOINT REPLACEMENT      L hip replacement    ORCHIECTOMY      PROSTATECTOMY      TOTAL HIP ARTHROPLASTY       Problem list reviewed  FAMILY HISTORY:  Non-contributory    SOCIAL HISTORY:  History   Alcohol Use No     Comment: social  / occasional per [employer]     History   Drug Use No     History   Smoking Status    Former Smoker   Smokeless Tobacco    Never Used       ALLERGIES:  No Known Allergies    MEDICATIONS:  All current active medications have been reviewed  Patient is currently on p o  vancomycin/IV Flagyl  PHYSICAL EXAM:  Vitals:  HR:  [68-84] 68  Resp:  [18-20] 18  BP: ()/(51-70) 136/70  SpO2:  [94 %-98 %] 96 %  Temp (24hrs), Av 1 °F (37 3 °C), Min:97 4 °F (36 3 °C), Max:101 2 °F (38 4 °C)  Current: Temperature: (!) 97 4 °F (36 3 °C)     Physical Exam:  General:  Somewhat chronically ill appearing, nontoxic, relatively comfortable  Awake, alert and oriented x 3  Eyes:  Conjunctive clear with no hemorrhages or effusions  Oropharynx:  No ulcers, no lesions, pharynx benign, no tonsillitis  Neck:  Supple, no lymphadenopathy, no mass, nontender  Lungs:  Expansion symmetric, no rales, no wheezing, no accessory muscle use  Cardiac:  Regular rate and rhythm, normal S1, normal S2, no murmurs  Abdomen:  Soft, nondistended, non-tender, no HSM  Extremities:  No edema, no erythema, nontender   No ulcers  Skin:  No rashes, no ulcers  Neurological:  Moves all four extremities spontaneously, sensation grossly intact    LABS, IMAGING, & OTHER STUDIES:  Lab Results:  I have personally reviewed pertinent labs     Results from last 7 days  Lab Units 06/20/18  0447 06/19/18  1200   SODIUM mmol/L 137 137   POTASSIUM mmol/L 3 9 4 3   CHLORIDE mmol/L 104 98*   CO2 mmol/L 26 27   ANION GAP mmol/L 7 12   BUN mg/dL 24 34*   CREATININE mg/dL 1 48* 2 18*   EGFR ml/min/1 73sq m 47 29   GLUCOSE RANDOM mg/dL 101 130   CALCIUM mg/dL 7 7* 8 9   AST U/L 82* 77*   ALT U/L 79* 78   ALK PHOS U/L 57 59   TOTAL PROTEIN g/dL 6 3* 6 6   BILIRUBIN TOTAL mg/dL 1 40* 1 40*       Results from last 7 days  Lab Units 06/20/18  0447 06/19/18  1200   WBC Thousand/uL 5 78 7 97   HEMOGLOBIN g/dL 13 9 14 5   PLATELETS Thousands/uL 60* 89*           Imaging Studies:   I have personally reviewed pertinent imaging study reports and images in PACS  CXR reviewed personally  No infiltrates or consolidations  Chest/abdomen/pelvis CT reviewed personally  No abnormalities  EKG, Pathology, and Other Studies:   I have personally reviewed pertinent reports

## 2018-06-20 NOTE — PROGRESS NOTES
[facility] Progress Note  Patient: Juni Morris 71 y o  male   MRN: [id number]  PCP: Vladimir Oseguera MD  Unit/Bed#: MS Sandra-01 Encounter: [id number]  Date Of Visit: 06/20/18    Assessment/Plan:    Principal Problem:    Generalized weakness  Active Problems:    CAD (coronary atherosclerotic disease)    GERD (gastroesophageal reflux disease)    Hypercholesterolemia    Hypertension    PAF (paroxysmal atrial fibrillation) (HCC)    Peptic ulcer disease    Weight loss    Anorexia    ARF (acute renal failure) (HCC)    Chronic anticoagulation    Thrombocytopenia (HCC)    Diarrhea    Bandemia    Present on Admission:   Generalized weakness   CAD (coronary atherosclerotic disease)   GERD (gastroesophageal reflux disease)   Hypercholesterolemia   Hypertension   PAF (paroxysmal atrial fibrillation) (HCC)   Peptic ulcer disease   Weight loss   Anorexia   ARF (acute renal failure) (HCC)   Thrombocytopenia (HCC)   Diarrhea      · Generalized weakness-multifactorial including some underlying chronic process with febrile illness and poor oral intake  Continue with current treatment  · Thrombocytopenia-?  Etiology  ? Secondary to infectious process  Consulted Hematology/Oncology  Workup in process  FSP/fibrin no Canelo are within normal range  · Diarrhea-patient was on antibiotics in the recent past   Await C diff results  He has bandemia  LDH also elevated  ? Need for antibiotics  Infectious Disease consultant following  I had started patient initially on Flagyl and oral vancomycin  After discussion with ID, hold any antibiotics at this moment  Other cultures so far have been negative  · Weight loss James Ayala  Encouraged to eat more  GI evaluation pending  · Acute kidney injury  Continue to hold diuretics/ARB/ACE-inhibitor  Nephrology to see patient as well  Creatinine is better  On IV fluids  · History of paroxysmal AFib  Also on chronic anticoagulation with Coumadin    INR is therapeutic now  Continue with current treatment including Coumadin  Can hold Coumadin if needed for any procedures  · GERD/Peptic ulcer disease  Continue with current treatment  · Generalized deconditioning  PT/OT evaluation and treatment  · History of coronary artery disease  Echocardiogram pending  ? Need for GUY  · Elevated transaminases-?  Etiology      VTE Pharmacologic Prophylaxis:   Pharmacologic: Warfarin (Coumadin)  Mechanical VTE Prophylaxis in Place: Yes    Patient Centered Rounds: I have performed bedside rounds with nursing staff today  Discussions with Specialists or Other Care Team Provider:  Yes    Education and Discussions with Family / Patient:  Yes    Time Spent for Care: 45+ minutes  More than 50% of total time spent on counseling and coordination of care as described above  Current Length of Stay: 1 day(s)    Current Patient Status: Inpatient   Certification Statement: The patient will continue to require additional inpatient hospital stay due to Further workup for his thrombocytopenia/fevers    Discharge Plan:   Possible short-term rehab    Code Status: Level 1 - Full Code      Subjective:   Patient still feels the same  He had quite a few loose bowel movements overnight  Also had again bowel movement, however, staff was not able to collect the stools to sent for C diff as it was not enough  He denies any abdominal pain  Had fevers  Still appetite not good    Objective:     Vitals:   Temp (24hrs), Av 1 °F (37 3 °C), Min:97 4 °F (36 3 °C), Max:101 2 °F (38 4 °C)    HR:  [68-84] 68  Resp:  [18-20] 18  BP: ()/(51-70) 136/70  SpO2:  [94 %-98 %] 96 %  Body mass index is 24 73 kg/m²  Input and Output Summary (last 24 hours):        Intake/Output Summary (Last 24 hours) at 18 0838  Last data filed at 18 7395   Gross per 24 hour   Intake             1320 ml   Output              750 ml   Net              570 ml           Physical Exam:     Vital signs are reviewed as above  Constitutional:  Patient lying in bed  He does not appear to be in any significant respiratory distress and also not having any breathing difficulty while lying flat and getting an echocardiogram   Also not having that much of a cough either   Eyes: EOM grossly intact  Conjunctivae slightly pale  Patient has anicteric sclera  HENT: Oropharynx are much better hydrated  Also there is less white coating of his tongue  Head normocephalic  Neck: Neck is supple    There is no significant lymphadenopathy  I also did not notice any significant thyromegaly  Cardiac: I did not hear any rubs or gallop  Patient appears to be in sinus rhythm  Respiratory: Patient not in significant respiratory distress  Air entry in general is fair anterolaterally  GI: Abdomen is soft  It is grossly nontender  Bowel sounds are adequate  I was not able to appreciate any hepatosplenomegaly  Neurologic:  Patient is awake and alert  Neurological examination is grossly intact  No obvious focal neurological deficit noticed  Skin: Skin is warm and dry  Psychiatric:  Not happy how he feels  Musculoskeletal  Patient moving all extremities while in bed   Has chronic arthritic joints  Extremities: Patient has no significant cyanosis, clubbing, or lower extremity edema      Additional Data:     Labs:      Results from last 7 days  Lab Units 06/20/18 0447   WBC Thousand/uL 5 78   HEMOGLOBIN g/dL 13 9   HEMATOCRIT % 42 0   PLATELETS Thousands/uL 60*   LYMPHO PCT % 6*   MONO PCT MAN % 9   EOSINO PCT MANUAL % 0       Results from last 7 days  Lab Units 06/20/18 0447   SODIUM mmol/L 137   POTASSIUM mmol/L 3 9   CHLORIDE mmol/L 104   CO2 mmol/L 26   BUN mg/dL 24   CREATININE mg/dL 1 48*   CALCIUM mg/dL 7 7*   TOTAL PROTEIN g/dL 6 3*   BILIRUBIN TOTAL mg/dL 1 40*   ALK PHOS U/L 57   ALT U/L 79*   AST U/L 82*   GLUCOSE RANDOM mg/dL 101       Results from last 7 days  Lab Units 06/20/18  0447   INR  2 13*       * I Have Reviewed All Lab Data Listed Above  * Additional Pertinent Lab Tests Reviewed: All Labs Within Last 24 Hours Reviewed      Recent Cultures (last 7 days):           Last 24 Hours Medication List:     Current Facility-Administered Medications:  acetaminophen 650 mg Oral Q6H PRN Saintclair Curling, MD    allopurinol 100 mg Oral Daily Claire Concepcion MD    atorvastatin 40 mg Oral Daily Claire Concepcion MD    benzonatate 100 mg Oral TID PRN Claire Concepcion MD    carvedilol 6 25 mg Oral BID With Meals Claire Concepcion MD    famotidine 20 mg Oral Daily Claire Concepcion MD    metroNIDAZOLE 500 mg Intravenous Vannessa Hickey MD    ondansetron 4 mg Intravenous Q6H PRN Claire Concepcion MD    sodium chloride 75 mL/hr Intravenous Continuous Claire Concepcion MD Last Rate: 75 mL/hr (06/20/18 0750)   vancomycin 125 mg Oral Q6H [facility] Claire Concepcion MD    warfarin 2 5 mg Oral Daily (warfarin) Claire Concepcion MD         Today, Patient Was Seen By: Claire Concepcion MD    ** Please Note: Dragon 360 Dictation voice to text software may have been used in the creation of this document   **

## 2018-06-20 NOTE — CONSULTS
Consultation - [facility]  Lopez Awildaemma Arturo 71 y o  male MRN: [id number]  Unit/Bed#: -01 Encounter: [id number]        Consults    Reason for Consult / Principal Problem: Diarrhea, Loss of Appetite, Weight Loss    HPI: Mr Francisco Soto is a 70 yo M with a PMH of CAD s/p CABG, PAF on coumadin, GERD, history of PUD, prostate cancer, hypertension, presented yesterday with multiple symptoms including weight loss, poor p o  intake, diarrhea over the past few months  He is a difficult historian to make a good time line  He believes about 5-6 months ago is when he started with intermittent nausea with p  o  intake without any clear trigger  He was having intermittent diarrhea but then 7-8 weeks ago started with more consistent diarrhea on a daily basis on average about 2 times a day  He was on antibiotics 5 weeks ago for respiratory infection but the diarrhea did start before this  He feels the diarrhea may have become worse once he was on antibiotics  He denies any melena or hematochezia  He denies any associated abdominal pain  He denies any vomiting, peptic symptoms, dysphagia  Now he is not eating anything because anything he tries to eat he becomes nauseous and gags  He does have history of PUD which was diagnosed on EGD >5 years ago  He had a colonoscopy believes about 3 years ago with Dr Ayla Zapata but we don't have access to this report  He believes he has lost 14 lb in the last 6-8 weeks unintentionally  He denies heavy alcohol use but does drink about 2 beers every week  He denies any heavier alcohol use in the past or any known liver problems  He does take Tylenol for aches and pains but denies NSAID use  He is on Coumadin      REVIEW OF SYSTEMS:  Negative except for as stated above    Historical Information   Past Medical History:   Diagnosis Date    CAD (coronary artery disease)     Cancer [facility] [address] )     prostate    Coronary artery disease     GERD (gastroesophageal reflux disease)     H/O heart artery stent     x2    Hypercholesteremia     Hypertension     Keratotic lesion     Stroke ([address] )     Transient cerebral ischemia      Past Surgical History:   Procedure Laterality Date    BACK SURGERY      CARDIAC SURGERY      mvp repair    CHOLECYSTECTOMY      JOINT REPLACEMENT      L hip replacement    ORCHIECTOMY      PROSTATECTOMY      TOTAL HIP ARTHROPLASTY       Social History   History   Alcohol Use No     Comment: social  / occasional per [employer]     History   Drug Use No     History   Smoking Status    Former Smoker   Smokeless Tobacco    Never Used     Family History   Problem Relation Age of Onset    Heart disease Mother     Stroke Mother         CVA    Cancer Father     Hodgkin's lymphoma Father     Lung cancer Father        Meds/Allergies     Prescriptions Prior to Admission   Medication    allopurinol (ZYLOPRIM) 100 mg tablet    atorvastatin (LIPITOR) 40 mg tablet    benzonatate (TESSALON PERLES) 100 mg capsule    carvedilol (COREG) 6 25 mg tablet    furosemide (LASIX) 20 mg tablet    lisinopril (ZESTRIL) 40 mg tablet    Multiple Vitamins-Minerals (PX MENS MULTIVITAMINS) TABS    ranitidine (ZANTAC) 300 MG capsule    warfarin (COUMADIN) 2 5 mg tablet     Current Facility-Administered Medications   Medication Dose Route Frequency    acetaminophen (TYLENOL) tablet 650 mg  650 mg Oral Q6H PRN    allopurinol (ZYLOPRIM) tablet 100 mg  100 mg Oral Daily    atorvastatin (LIPITOR) tablet 40 mg  40 mg Oral Daily    benzonatate (TESSALON PERLES) capsule 100 mg  100 mg Oral TID PRN    carvedilol (COREG) tablet 6 25 mg  6 25 mg Oral BID With Meals    ondansetron (ZOFRAN) injection 4 mg  4 mg Intravenous Q6H PRN    [START ON 6/21/2018] pantoprazole (PROTONIX) EC tablet 40 mg  40 mg Oral Early Morning    sodium chloride 0 9 % infusion  75 mL/hr Intravenous Continuous       No Known Allergies        Objective     Blood pressure 145/78, pulse 81, temperature 98 5 °F (36 9 °C), temperature source Axillary, resp  rate 18, height 5' 6" (1 676 m), weight 69 5 kg (153 lb 3 5 oz), SpO2 96 %  Intake/Output Summary (Last 24 hours) at 06/20/18 1617  Last data filed at 06/20/18 0730   Gross per 24 hour   Intake              520 ml   Output              750 ml   Net             -230 ml         PHYSICAL EXAM:      General Appearance:   Alert, oriented x3, no acute distress    HENT[de-identified]  Eyes:   Normocephalic, atraumatic  Anicteric   Neck:  Supple, symmetrical, trachea midline   Lungs:   Clear to auscultation bilaterally, no respiratory distress   Heart[de-identified]   RRR, no murmur   Abdomen:   Non-distended, soft, BS active, NTTP    Rectal:   Deferred    Extremities:  No cyanosis or LE edema    Pulses:  2+ and symmetric all extremities    Skin:  No jaundice or pallor     Lab Results:     Results from last 7 days  Lab Units 06/20/18  0447   WBC Thousand/uL 5 78   HEMOGLOBIN g/dL 13 9   HEMATOCRIT % 42 0   PLATELETS Thousands/uL 60*   LYMPHO PCT % 6*   MONO PCT MAN % 9   EOSINO PCT MANUAL % 0       Results from last 7 days  Lab Units 06/20/18  0447   SODIUM mmol/L 137   POTASSIUM mmol/L 3 9   CHLORIDE mmol/L 104   CO2 mmol/L 26   BUN mg/dL 24   CREATININE mg/dL 1 48*   CALCIUM mg/dL 7 7*   TOTAL PROTEIN g/dL 6 3*   BILIRUBIN TOTAL mg/dL 1 40*   ALK PHOS U/L 57   ALT U/L 79*   AST U/L 82*   GLUCOSE RANDOM mg/dL 101       Results from last 7 days  Lab Units 06/20/18  0447   INR  2 13*       Results from last 7 days  Lab Units 06/19/18  1200   LIPASE u/L 112       Imaging Studies: I have personally reviewed pertinent imaging studies  Ct Chest Abdomen Pelvis Wo Contrast  Result Date: 6/19/2018  Impression: No evidence of acute abnormality in the chest, abdomen or pelvis  Us Liver  Result Date: 6/20/2018  Impression: 1  Limited examination due to overlying bowel gas  2   Status post cholecystectomy    Otherwise, normal sonographic appearance of the right upper quadrant  ASSESSMENT and PLAN:      Diarrhea  Weight Loss  Nausea  - He will need an EGD/colonoscopy for further evaluation of his diarrhea, weight loss, and poor PO intake 2/2 nausea and gagging  - This could be 2/2 malignancy v microscopic colitis v IBS v parasite  - Cdiff PCR pending; low suspicion since his symptoms started prior to antibiotic exposure  - Check stool culture, O+P  - Encourage PO intake, add Ensure  - Tentatively plan for EGD/colonoscopy on Friday though this may be delayed due to new thrombocytopenia requiring workup  - Discussed with [facility], will hold coumadin tonight in preparation for possible procedures on Friday pending platelet trend, fever curve    Fever   - ID consultation reviewed, possible tick borne illness v malignancy v infectious diarrhea  - F/U anaplasma, lyme, parasite blood smear  - Monitor off antibiotics pending fever curve    Elevated LFTs  Thrombocytopenia   - RUQ US WNL  - CT A/P without acute abnormality  - Pt does have alcohol use 1x weekly, denies any heavier use PTA  - His LFTs are very mildly elevated; recheck CMP and direct bilirubin tomorrow, TTE pending  - Check acute hepatitis panel  - Heme/onc consultation pending      Patient will be seen and examined by Dr Christianne Perez

## 2018-06-20 NOTE — SOCIAL WORK
CM attempted to meet with pt but was not successful  CM contacted pt's son Hailey Aguilar for assistance with assessment  Pt lives alone in [place]  Pt has access to a walker and cane form previous surgeries but currently does not use them  Pt does not have hx of Oxygen  Pt has hx of Rehab [facility]  Pt currently uses [facility] pharmacy in [address] [facility]  Pt does not have hx of MH or alcohol  Pt has hx of smoking  Pt's POA/AD is his son Hailey Aguilar  Pt is retired and currently drives  Pt's son Hailey Aguilar will transport pt when ready for discharge  CM reviewed discharge planning process including the following: identifying help at home, patient preference for discharge planning needs, pharmacy preference, and availability of treatment team to discuss questions or concerns patient and/or family may have regarding understanding medications and recognizing signs and symptoms once discharged  CM also encouraged patient to follow up with all recommended appointments after discharge  Patient advised of importance for patient and family to participate in managing patients medical well being  In the next few weeks you may receive a Press Ganey survey regarding your most recent clinic visit with us.  Please take a few moments to accurately evaluate your visit.  We strive for excellence and value your feedback.       If we need to contact you regarding any test results, we will make 2 attempts to reach you at the number you have listed during your office visit today.  If we are unable to reach you, a letter with your results and any further instructions will be mailed to you home.    Please do not hesitate to call our office with any questions or concerns at Dept: [phone].

## 2018-06-20 NOTE — CONSULTS
Consultation -  Hematology/Oncology   Terry Morris 71 y o  male MRN: [id number]  Unit/Bed#: -01 Encounter: [id number]    Physician Requesting Consult: Jerel Ramirez MD  Reason for Consult: thrombocytopenia    HPI: Hilda Christiansen is a 71y o  year old male with a history of HTN, HLD, GERD, prostate ca and CAD  who presented 6/19 for a 2 month history of intermittent anorexia, insomnia, sweats, chills, dry non productive cough, nausea and diarrhea  He reports over there past 2 weeks anorexia, fatigue, nausea and diarrhea have been worsening  Fatigue has been profound to the point he has been sleeping 20 hours a day  He denies SOB, CP, emesis, urinary symptoms  He denies pain including myalgia, arthralgia, headache, abdominal and flank pain  He has not been confused  He denies new medications other than tessalon perils for cough  He has not been around any sick contacts, no recent travel, no known insect bites  He has not been on heparin but takes coumadin for h/o cardiac stents  He denies ETOH use, is a tobacco smoker  Only history of malignancy is prostate cancer 6 years ago s/p radical prostatectomy resulting in LESLEE  PSA has been undetectable since that time  Admission CBC showed hemoglobin 14 5 (), tWBC 7 97 (w/88% bands), platelets 63,631  Total Bili 1 4, ast 77, alt 78, alk phos 59  Creatinine 2 1 (up from baseline 1 0-1 1)  CT CAP negative, no hepatosplenomegaly  ID is consulted and he is undergoing infectious work   Assessment/Plan:   #1Thrombocytopenia, isolated  -admission platelets 18,457 with drop to 60,000 the following day  Prior to that on 4/28 platelets were normal range (216,000)  -hemoglobin and leukocytes are normal, however his MCV is slightly elevated (103)  -CT CAP negative for hepatosplenomegaly, therefore no splenic sequestration of platelets  -he has slight elevation of LFT's and creatinine (dehydration)  LDH is elevated    -Recommend check  HIV and Hepatitis panel  I do not suspect TTP or ABDOULAYE  DIC panel is negative   -ITP is most likely diagnosis but this a diagnosis of exclusion  Consider ITP 2/2 infection/inflammation given isolated thrombocytopenia  MDS will remain in the differential for now, however, will defer bone marrow biopsy/further work up until other etiologies ruled out  We will follow infectious work up    -Do not recommend corticosteroids at this time, continue to monitor with daily CBCD  -if needed, transfuse to keep platelets>15,000 or sooner if bleeding or symptomatic  Discussed with the primary team ([facility]-Dr Prem Deshpande) on this date  Please contact us if you have any questions regarding this consult  Thank you for this consult  Past Medical History:   Diagnosis Date    CAD (coronary artery disease)     Cancer ([facility] )     prostate    Coronary artery disease     GERD (gastroesophageal reflux disease)     H/O heart artery stent     x2    Hypercholesteremia     Hypertension     Keratotic lesion     Stroke ([place] [id number] )     Transient cerebral ischemia      Family History   Problem Relation Age of Onset    Heart disease Mother     Stroke Mother         CVA    Cancer Father     Hodgkin's lymphoma Father     Lung cancer Father      Social History     Social History    Marital status:      Spouse name: N/A    Number of children: N/A    Years of education: N/A     Occupational History    retired      Social History Main Topics    Smoking status: Former Smoker    Smokeless tobacco: Never Used    Alcohol use No      Comment: social  / occasional per [employer]    Drug use: No    Sexual activity: No      Comment: no high risk sexual behavior     Other Topics Concern    None     Social History Narrative    None     No Known Allergies    Subjective: Today, he continues to feel poorly  He has ongoing diarrhea, is not yet taking imodium due to pending CDiff study  He has severe anorexia, cannot tolerate the thought of food   He is not nauseous or had emesis  He feels fatigued  No SOB, CP, fever or chills  No pain other than chronic back pain, unchanged  Review of Systems   Constitutional: Positive for appetite change and fatigue  Negative for chills and fever  HENT:   Negative for nosebleeds, sore throat and trouble swallowing  Eyes: Negative for eye problems and icterus  Respiratory: Positive for cough  Negative for chest tightness, hemoptysis, shortness of breath and wheezing  Cardiovascular: Negative for chest pain, leg swelling and palpitations  Gastrointestinal: Positive for diarrhea  Negative for abdominal distention, abdominal pain, blood in stool, constipation, nausea and vomiting  Genitourinary: Negative for dysuria, frequency and hematuria  Musculoskeletal: Positive for back pain  Negative for arthralgias and myalgias  Skin: Negative for rash  Neurological: Negative for extremity weakness, headaches and speech difficulty  Hematological: Negative for adenopathy  Does not bruise/bleed easily  Psychiatric/Behavioral: Negative for confusion  All other systems reviewed and are negative          Objective:  /78 (BP Location: Left arm)   Pulse 81   Temp 98 5 °F (36 9 °C) (Axillary)   Resp 18   Ht 5' 6" (1 676 m)   Wt 69 5 kg (153 lb 3 5 oz)   SpO2 96%   BMI 24 73 kg/m²       Current Facility-Administered Medications:     acetaminophen (TYLENOL) tablet 650 mg, 650 mg, Oral, Q6H PRN, Marie Wilson MD, 650 mg at 06/20/18 0015    allopurinol (ZYLOPRIM) tablet 100 mg, 100 mg, Oral, Daily, Trisha Ibrahim MD, 100 mg at 06/20/18 0959    atorvastatin (LIPITOR) tablet 40 mg, 40 mg, Oral, Daily, Trisha Ibrahim MD, 40 mg at 06/20/18 7446    benzonatate (TESSALON PERLES) capsule 100 mg, 100 mg, Oral, TID PRN, Trisha Ibrahim MD    carvedilol (COREG) tablet 6 25 mg, 6 25 mg, Oral, BID With Meals, Trisha Ibrahim MD, 6 25 mg at 06/20/18 0959    famotidine (PEPCID) tablet 20 mg, 20 mg, Oral, Daily, Trisha Ibrahim MD, 20 mg at 06/20/18 0958    ondansetron (ZOFRAN) injection 4 mg, 4 mg, Intravenous, Q6H PRN, Filomena Chowdary MD    sodium chloride 0 9 % infusion, 75 mL/hr, Intravenous, Continuous, Filomena Chowdary MD, Last Rate: 75 mL/hr at 06/20/18 0750, 75 mL/hr at 06/20/18 0750    warfarin (COUMADIN) tablet 2 5 mg, 2 5 mg, Oral, Daily (warfarin), Filomena Chowdary MD, 2 5 mg at 06/19/18 1817    Recent Labs      06/19/18   1200  06/20/18   0447   WBC  7 97  5 78   HGB  14 5  13 9   PLT  89*  60*   MCV  103*  102*   RDW  13 7  13 8   CREATININE  2 18*  1 48*   AST  77*  82*   ALT  78  79*     Laboratory studies were reviewed    Imaging:   -6/19 CT CAP:  No evidence of acute abnormality in the chest, abdomen or pelvis  Pathology: None    Physical Exam   Constitutional: He is oriented to person, place, and time  No distress  HENT:   Mouth/Throat: No oropharyngeal exudate  Eyes: Conjunctivae and EOM are normal  No scleral icterus  Cardiovascular: Normal rate and regular rhythm  Pulmonary/Chest: Effort normal and breath sounds normal  No respiratory distress  Abdominal: Soft  Bowel sounds are normal  He exhibits no distension  There is no tenderness  Musculoskeletal: Normal range of motion  He exhibits no edema or tenderness  Lymphadenopathy:     He has no cervical adenopathy  Neurological: He is alert and oriented to person, place, and time  Skin: Skin is warm and dry  No rash noted  Vitals reviewed  Code Status: Level 1 - Full Code    ** Please Note: Dictation voice to text software may have been used in the creation of this document   **

## 2018-06-21 ENCOUNTER — APPOINTMENT (INPATIENT)
Dept: ULTRASOUND IMAGING | Facility: HOSPITAL | Age: 73
DRG: 868 | End: 2018-06-21
Payer: MEDICARE

## 2018-06-21 PROBLEM — N17.9 AKI (ACUTE KIDNEY INJURY) (HCC): Status: ACTIVE | Noted: 2018-06-21

## 2018-06-21 LAB
25(OH)D3 SERPL-MCNC: 18.9 NG/ML (ref 30–100)
ALBUMIN SERPL BCP-MCNC: 2.3 G/DL (ref 3.5–5)
ALP SERPL-CCNC: 46 U/L (ref 46–116)
ALT SERPL W P-5'-P-CCNC: 66 U/L (ref 12–78)
ANION GAP SERPL CALCULATED.3IONS-SCNC: 4 MMOL/L (ref 4–13)
AST SERPL W P-5'-P-CCNC: 74 U/L (ref 5–45)
B BURGDOR IGG SER IA-ACNC: 0.18
B BURGDOR IGM SER IA-ACNC: 0.37
BACTERIA UR QL AUTO: ABNORMAL /HPF
BASOPHILS # BLD MANUAL: 0 THOUSAND/UL (ref 0–0.1)
BASOPHILS NFR MAR MANUAL: 0 % (ref 0–1)
BILIRUB DIRECT SERPL-MCNC: 0.32 MG/DL (ref 0–0.2)
BILIRUB SERPL-MCNC: 1 MG/DL (ref 0.2–1)
BILIRUB UR QL STRIP: NEGATIVE
BLD SMEAR INTERP: NORMAL
BUN SERPL-MCNC: 19 MG/DL (ref 5–25)
C DIFF TOX GENS STL QL NAA+PROBE: NORMAL
CALCIUM SERPL-MCNC: 7.3 MG/DL (ref 8.3–10.1)
CHLORIDE SERPL-SCNC: 108 MMOL/L (ref 100–108)
CHLORIDE UR-SCNC: 81 MMOL/L (ref 10–330)
CLARITY UR: CLEAR
CO2 SERPL-SCNC: 25 MMOL/L (ref 21–32)
COLOR UR: YELLOW
CREAT SERPL-MCNC: 1.18 MG/DL (ref 0.6–1.3)
CREAT UR-MCNC: 134 MG/DL
CREAT UR-MCNC: 137 MG/DL
CRP SERPL QL: >90 MG/L
EOSINOPHIL # BLD MANUAL: 0 THOUSAND/UL (ref 0–0.4)
EOSINOPHIL NFR BLD MANUAL: 0 % (ref 0–6)
ERYTHROCYTE [DISTWIDTH] IN BLOOD BY AUTOMATED COUNT: 13.5 % (ref 11.6–15.1)
ERYTHROCYTE [SEDIMENTATION RATE] IN BLOOD: 9 MM/HOUR (ref 0–10)
FERRITIN SERPL-MCNC: 920 NG/ML (ref 8–388)
FOLATE SERPL-MCNC: 19.8 NG/ML (ref 3.1–17.5)
GFR SERPL CREATININE-BSD FRML MDRD: 62 ML/MIN/1.73SQ M
GLUCOSE SERPL-MCNC: 87 MG/DL (ref 65–140)
GLUCOSE SERPL-MCNC: 89 MG/DL (ref 65–140)
GLUCOSE UR STRIP-MCNC: NEGATIVE MG/DL
HBV CORE AB SER QL: NORMAL
HBV CORE IGM SER QL: NORMAL
HBV SURFACE AG SER QL: NORMAL
HCT VFR BLD AUTO: 35.4 % (ref 36.5–49.3)
HCV AB SER QL: NORMAL
HGB BLD-MCNC: 11.8 G/DL (ref 12–17)
HGB UR QL STRIP.AUTO: ABNORMAL
INR PPP: 2.3 (ref 0.86–1.17)
IRON SATN MFR SERPL: 8 %
IRON SERPL-MCNC: 20 UG/DL (ref 65–175)
KETONES UR STRIP-MCNC: ABNORMAL MG/DL
LEUKOCYTE ESTERASE UR QL STRIP: NEGATIVE
LYMPHOCYTES # BLD AUTO: 1.15 THOUSAND/UL (ref 0.6–4.47)
LYMPHOCYTES # BLD AUTO: 30 % (ref 14–44)
MCH RBC QN AUTO: 34.4 PG (ref 26.8–34.3)
MCHC RBC AUTO-ENTMCNC: 33.3 G/DL (ref 31.4–37.4)
MCV RBC AUTO: 103 FL (ref 82–98)
MICROALBUMIN UR-MCNC: 37.4 MG/L (ref 0–20)
MICROALBUMIN/CREAT 24H UR: 27 MG/G CREATININE (ref 0–30)
MONOCYTES # BLD AUTO: 0.23 THOUSAND/UL (ref 0–1.22)
MONOCYTES NFR BLD: 6 % (ref 4–12)
NEUTROPHILS # BLD MANUAL: 2.33 THOUSAND/UL (ref 1.85–7.62)
NEUTS BAND NFR BLD MANUAL: 1 % (ref 0–8)
NEUTS SEG NFR BLD AUTO: 60 % (ref 43–75)
NITRITE UR QL STRIP: NEGATIVE
NON-SQ EPI CELLS URNS QL MICRO: ABNORMAL /HPF
NRBC BLD AUTO-RTO: 0 /100 WBCS
OSMOLALITY UR: 620 MMOL/KG
PH UR STRIP.AUTO: 6 [PH] (ref 4.5–8)
PHOSPHATE SERPL-MCNC: 2.1 MG/DL (ref 2.3–4.1)
PLATELET # BLD AUTO: 50 THOUSANDS/UL (ref 149–390)
PLATELET BLD QL SMEAR: ABNORMAL
PMV BLD AUTO: 12.1 FL (ref 8.9–12.7)
POTASSIUM SERPL-SCNC: 3.8 MMOL/L (ref 3.5–5.3)
PROT SERPL-MCNC: 5.2 G/DL (ref 6.4–8.2)
PROT UR STRIP-MCNC: ABNORMAL MG/DL
PROTHROMBIN TIME: 25 SECONDS (ref 11.8–14.2)
PTH-INTACT SERPL-MCNC: 89.9 PG/ML (ref 18.4–80.1)
RBC # BLD AUTO: 3.43 MILLION/UL (ref 3.88–5.62)
RBC #/AREA URNS AUTO: ABNORMAL /HPF
SODIUM 24H UR-SCNC: 60 MOL/L
SODIUM SERPL-SCNC: 137 MMOL/L (ref 136–145)
SP GR UR STRIP.AUTO: 1.02 (ref 1–1.03)
TIBC SERPL-MCNC: 244 UG/DL (ref 250–450)
TOTAL CELLS COUNTED SPEC: 100
URATE SERPL-MCNC: 5.5 MG/DL (ref 4.2–8)
UROBILINOGEN UR QL STRIP.AUTO: 0.2 E.U./DL
VARIANT LYMPHS # BLD AUTO: 3 %
VIT B12 SERPL-MCNC: 880 PG/ML (ref 100–900)
WBC # BLD AUTO: 3.82 THOUSAND/UL (ref 4.31–10.16)
WBC #/AREA URNS AUTO: ABNORMAL /HPF

## 2018-06-21 PROCEDURE — 81001 URINALYSIS AUTO W/SCOPE: CPT | Performed by: INTERNAL MEDICINE

## 2018-06-21 PROCEDURE — G8979 MOBILITY GOAL STATUS: HCPCS

## 2018-06-21 PROCEDURE — 82746 ASSAY OF FOLIC ACID SERUM: CPT | Performed by: PHYSICIAN ASSISTANT

## 2018-06-21 PROCEDURE — 82948 REAGENT STRIP/BLOOD GLUCOSE: CPT

## 2018-06-21 PROCEDURE — 99233 SBSQ HOSP IP/OBS HIGH 50: CPT | Performed by: INTERNAL MEDICINE

## 2018-06-21 PROCEDURE — 99232 SBSQ HOSP IP/OBS MODERATE 35: CPT | Performed by: INTERNAL MEDICINE

## 2018-06-21 PROCEDURE — 86038 ANTINUCLEAR ANTIBODIES: CPT | Performed by: INTERNAL MEDICINE

## 2018-06-21 PROCEDURE — 82728 ASSAY OF FERRITIN: CPT | Performed by: INTERNAL MEDICINE

## 2018-06-21 PROCEDURE — 82570 ASSAY OF URINE CREATININE: CPT | Performed by: INTERNAL MEDICINE

## 2018-06-21 PROCEDURE — 99222 1ST HOSP IP/OBS MODERATE 55: CPT | Performed by: INTERNAL MEDICINE

## 2018-06-21 PROCEDURE — 82248 BILIRUBIN DIRECT: CPT | Performed by: PHYSICIAN ASSISTANT

## 2018-06-21 PROCEDURE — 83970 ASSAY OF PARATHORMONE: CPT | Performed by: INTERNAL MEDICINE

## 2018-06-21 PROCEDURE — 85007 BL SMEAR W/DIFF WBC COUNT: CPT | Performed by: INTERNAL MEDICINE

## 2018-06-21 PROCEDURE — 82436 ASSAY OF URINE CHLORIDE: CPT | Performed by: INTERNAL MEDICINE

## 2018-06-21 PROCEDURE — 82043 UR ALBUMIN QUANTITATIVE: CPT | Performed by: INTERNAL MEDICINE

## 2018-06-21 PROCEDURE — 97163 PT EVAL HIGH COMPLEX 45 MIN: CPT

## 2018-06-21 PROCEDURE — G8978 MOBILITY CURRENT STATUS: HCPCS

## 2018-06-21 PROCEDURE — 83550 IRON BINDING TEST: CPT | Performed by: INTERNAL MEDICINE

## 2018-06-21 PROCEDURE — 83540 ASSAY OF IRON: CPT | Performed by: INTERNAL MEDICINE

## 2018-06-21 PROCEDURE — 82306 VITAMIN D 25 HYDROXY: CPT | Performed by: INTERNAL MEDICINE

## 2018-06-21 PROCEDURE — 85027 COMPLETE CBC AUTOMATED: CPT | Performed by: INTERNAL MEDICINE

## 2018-06-21 PROCEDURE — 84550 ASSAY OF BLOOD/URIC ACID: CPT | Performed by: INTERNAL MEDICINE

## 2018-06-21 PROCEDURE — 80053 COMPREHEN METABOLIC PANEL: CPT | Performed by: PHYSICIAN ASSISTANT

## 2018-06-21 PROCEDURE — 82607 VITAMIN B-12: CPT | Performed by: PHYSICIAN ASSISTANT

## 2018-06-21 PROCEDURE — 83935 ASSAY OF URINE OSMOLALITY: CPT | Performed by: INTERNAL MEDICINE

## 2018-06-21 PROCEDURE — 84300 ASSAY OF URINE SODIUM: CPT | Performed by: INTERNAL MEDICINE

## 2018-06-21 PROCEDURE — 76770 US EXAM ABDO BACK WALL COMP: CPT

## 2018-06-21 PROCEDURE — 84100 ASSAY OF PHOSPHORUS: CPT | Performed by: INTERNAL MEDICINE

## 2018-06-21 PROCEDURE — 86140 C-REACTIVE PROTEIN: CPT | Performed by: INTERNAL MEDICINE

## 2018-06-21 PROCEDURE — 85652 RBC SED RATE AUTOMATED: CPT | Performed by: INTERNAL MEDICINE

## 2018-06-21 PROCEDURE — 85610 PROTHROMBIN TIME: CPT | Performed by: INTERNAL MEDICINE

## 2018-06-21 RX ADMIN — BENZONATATE 100 MG: 100 CAPSULE ORAL at 21:09

## 2018-06-21 RX ADMIN — ATORVASTATIN CALCIUM 40 MG: 40 TABLET, FILM COATED ORAL at 09:07

## 2018-06-21 RX ADMIN — CARVEDILOL 6.25 MG: 6.25 TABLET, FILM COATED ORAL at 09:07

## 2018-06-21 RX ADMIN — CARVEDILOL 6.25 MG: 6.25 TABLET, FILM COATED ORAL at 17:29

## 2018-06-21 RX ADMIN — SODIUM CHLORIDE 75 ML/HR: 0.9 INJECTION, SOLUTION INTRAVENOUS at 09:07

## 2018-06-21 RX ADMIN — PANTOPRAZOLE SODIUM 40 MG: 40 TABLET, DELAYED RELEASE ORAL at 17:29

## 2018-06-21 RX ADMIN — PANTOPRAZOLE SODIUM 40 MG: 40 TABLET, DELAYED RELEASE ORAL at 09:09

## 2018-06-21 NOTE — PROGRESS NOTES
[facility] Progress Note  Patient: Reuben Morris 71 y o  male   MRN: [id number]  PCP: Varun Lutz MD  Unit/Bed#: MS [facility]-01 Encounter: [id number]  Date Of Visit: 06/21/18    Assessment/Plan:    Principal Problem: Thrombocytopenia (HCC)  Active Problems:    CAD (coronary atherosclerotic disease)    GERD (gastroesophageal reflux disease)    Hypercholesterolemia    Hypertension    PAF (paroxysmal atrial fibrillation) (HCC)    Peptic ulcer disease    Weight loss    Anorexia    Generalized weakness    ARF (acute renal failure) (HCC)    Chronic anticoagulation    Diarrhea    Bandemia    NED (acute kidney injury) ([facility] )    Present on Admission:   Generalized weakness   CAD (coronary atherosclerotic disease)   GERD (gastroesophageal reflux disease)   Hypercholesterolemia   Hypertension   PAF (paroxysmal atrial fibrillation) (HCC)   Peptic ulcer disease   Weight loss   Anorexia   ARF (acute renal failure) (HCC)   Thrombocytopenia (HCC)   Diarrhea   NED (acute kidney injury) (HCC)      · Thrombocytopenia  Exactly not sure what the etiology is  Infectious Disease consultant and Hematology on board  Platelets are further down  Anticoagulation on hold as he may need to go for procedures soon including GI and also transesophageal echocardiogram   · History of mitral valve prolapse and has had previous repair  Echocardiogram showed significant mitral regurgitation  For transesophageal echocardiogram to rule out any endocarditis  · Diarrhea-C diff negative  ? Other etiology  So far negative  · Coronary artery disease with history of previous intervention  Medical management  Cardiology input appreciated  · Fever of unknown origin  Currently not on any antibiotics  · Anorexia with weight loss and diarrhea  GI workup in process  · Peptic ulcer disease  Continue with current treatment  · Paroxysmal atrial fibrillation  Patient in sinus rhythm    Anticoagulation on hold because of need for procedures as above  · Acute kidney injury-resolving  Nephrology following  · Anemia-iron is pretty low  ?  Etiology  On the other hand, he also has high MCV  · Pancytopenia  His white cell count is now low as well in addition to low platelets and mild anemia  Decrease in the hemoglobin could be secondary to dilution  ? Need for bone marrow  · Vitamin-D deficiency  Replace  · Scott Avila is gone now  He has more lymphocytes and also has atypical lymphocytes on the smear  ? Etiology    Generalized deconditioning and weakness  Patient has no plan to go to any rehab because of issues with paying the bills as he has had rough time in the past   He is open to have home health services on discharge        VTE Pharmacologic Prophylaxis:   Pharmacologic: Coumadin on hold for reasons as above  INR is still therapeutic  Mechanical VTE Prophylaxis in Place: Yes    Patient Centered Rounds: I have performed bedside rounds with nursing staff today  Discussions with Specialists or Other Care Team Provider:  Yes    Education and Discussions with Family / Patient:  Yes    Time Spent for Care: 40+ minutes  More than 50% of total time spent on counseling and coordination of care as described above  Current Length of Stay: 2 day(s)    Current Patient Status: Inpatient   Certification Statement: The patient will continue to require additional inpatient hospital stay due to Thrombocytopenia/fevers/further workup for his symptoms    Discharge Plan:  Home with home health services most likely as patient would declined to go to rehab    Code Status: Level 1 - Full Code      Subjective:   He feels little better  Still having cough  Diarrhea is little better  No significant abdominal pain  No chest pain    No fever or chills today    Objective:     Vitals:   Temp (24hrs), Av 5 °F (36 9 °C), Min:97 7 °F (36 5 °C), Max:100 °F (37 8 °C)    HR:  [58-76] 64  Resp:  [18-19] 18  BP: (103-141)/(54-72) 141/63  SpO2:  [91 %-98 %] 95 %  Body mass index is 24 73 kg/m²  Input and Output Summary (last 24 hours): Intake/Output Summary (Last 24 hours) at 06/21/18 1417  Last data filed at 06/21/18 2060   Gross per 24 hour   Intake              950 ml   Output              575 ml   Net              375 ml           Physical Exam:     Vital signs are reviewed as above  Constitutional:  Patient in bed  Patient is sitting comfortably  He is having bouts of cough off and on  Eyes: EOM grossly intact  Conjunctivae slightly pale  Patient has anicteric sclera  HENT: Oropharynx are moist   Neck: Neck is supple  Cardiac: I did not hear any rubs or gallop  Patient appears to be in sinus rhythm  Respiratory: Patient not in significant respiratory distress  Air entry in general is fair with decreased breath sounds at the bases  GI: Abdomen is soft  It is grossly nontender  Bowel sounds are adequate  I was not able to appreciate any hepatosplenomegaly  Neurologic:  Patient is awake and alert  Neurological examination is grossly intact  No obvious focal neurological deficit noticed  Skin: Skin is warm and dry  Psychiatric: Mood and affect are pleasant  Musculoskeletal  Patient moving all extremities while in bed    He has been going to the bathroom with some help and has been able to ambulate by himself although not much   Extremities: Patient has no significant cyanosis, clubbing, or lower extremity edema       Additional Data:     Labs:      Results from last 7 days  Lab Units 06/21/18  0439   WBC Thousand/uL 3 82*   HEMOGLOBIN g/dL 11 8*   HEMATOCRIT % 35 4*   PLATELETS Thousands/uL 50*   LYMPHO PCT % 30   MONO PCT MAN % 6   EOSINO PCT MANUAL % 0       Results from last 7 days  Lab Units 06/21/18  0439   SODIUM mmol/L 137   POTASSIUM mmol/L 3 8   CHLORIDE mmol/L 108   CO2 mmol/L 25   BUN mg/dL 19   CREATININE mg/dL 1 18   CALCIUM mg/dL 7 3*   TOTAL PROTEIN g/dL 5 2*   BILIRUBIN TOTAL mg/dL 1 00   ALK PHOS U/L 46 ALT U/L 66   AST U/L 74*   GLUCOSE RANDOM mg/dL 87       Results from last 7 days  Lab Units 06/21/18  0440   INR  2 30*       * I Have Reviewed All Lab Data Listed Above  * Additional Pertinent Lab Tests Reviewed: All Labs Within Last 24 Hours Reviewed      Recent Cultures (last 7 days):       Results from last 7 days  Lab Units 06/20/18  1415 06/19/18  1915   BLOOD CULTURE   --  No Growth at 24 hrs  No Growth at 24 hrs  C DIFF TOXIN B  NEGATIVE for C difficle toxin by PCR    --        Last 24 Hours Medication List:     Current Facility-Administered Medications:  acetaminophen 650 mg Oral Q6H PRN Melvin Sherman MD    allopurinol 100 mg Oral Daily Deyanira Lemon MD    atorvastatin 40 mg Oral Daily Deyanira Lemon MD    benzonatate 100 mg Oral TID PRN Deyanira Lemon MD    carvedilol 6 25 mg Oral BID With Meals Deyanira Lemon MD    ondansetron 4 mg Intravenous Q6H PRN Deyanira Lemon MD    pantoprazole 40 mg Oral BID AC Baltazar Teague III, MD    sodium chloride 50 mL/hr Intravenous Continuous Deyanira Lemon MD Last Rate: 50 mL/hr (06/21/18 1151)        Today, Patient Was Seen By: Deyanira Lemon MD    ** Please Note: Dragon 360 Dictation voice to text software may have been used in the creation of this document   **

## 2018-06-21 NOTE — PROGRESS NOTES
NEPHROLOGY PROGRESS NOTE    Patient: Libby Morris               Sex: male          DOA: 6/19/2018 10:58 AM   YOB: 1945        Age:  71 y o         LOS:  LOS: 2 days       HPI     Patient admitted generalized weakness and anorexia along with acute kidney injury    SUBJECTIVE     Feeling better though still does not much appetite  Still feeling weak though better than yesterday  No feeling of thirst no chest pain no palpitation    CURRENT MEDICATIONS       Current Facility-Administered Medications:     acetaminophen (TYLENOL) tablet 650 mg, 650 mg, Oral, Q6H PRN, Idalia Burton MD, 650 mg at 06/20/18 2318    allopurinol (ZYLOPRIM) tablet 100 mg, 100 mg, Oral, Daily, Minus MD Dea, 100 mg at 06/20/18 0959    atorvastatin (LIPITOR) tablet 40 mg, 40 mg, Oral, Daily, Minus MD Dea, 40 mg at 06/21/18 0907    benzonatate (TESSALON PERLES) capsule 100 mg, 100 mg, Oral, TID PRN, Minus MD Dea    carvedilol (COREG) tablet 6 25 mg, 6 25 mg, Oral, BID With Meals, Minus MD Dea, 6 25 mg at 06/21/18 0907    ondansetron (ZOFRAN) injection 4 mg, 4 mg, Intravenous, Q6H PRN, Minus MD Dea    pantoprazole (PROTONIX) EC tablet 40 mg, 40 mg, Oral, BID AC, Rell Naqvi III, MD, 40 mg at 06/21/18 0909    sodium chloride 0 9 % infusion, 50 mL/hr, Intravenous, Continuous, Minus MD Dea, Last Rate: 50 mL/hr at 06/21/18 1151, 50 mL/hr at 06/21/18 1151    OBJECTIVE     Current Weight: Weight - Scale: 69 5 kg (153 lb 3 5 oz)  Vitals:    06/21/18 1100   BP: 141/63   Pulse: 64   Resp: 18   Temp: 98 1 °F (36 7 °C)   SpO2: 95%       Intake/Output Summary (Last 24 hours) at 06/21/18 1401  Last data filed at 06/21/18 9325   Gross per 24 hour   Intake              950 ml   Output              575 ml   Net              375 ml       PHYSICAL EXAMINATION     Physical Exam   Constitutional: He is oriented to person, place, and time  He appears well-developed  No distress     HENT:   Head: Normocephalic  Mouth/Throat: Oropharynx is clear and moist    Eyes: Conjunctivae are normal  No scleral icterus  Neck: Neck supple  No JVD present  Cardiovascular: Normal rate and normal heart sounds  Pulmonary/Chest: Effort normal  He has no wheezes  Abdominal: Soft  There is no tenderness  Musculoskeletal: Normal range of motion  He exhibits no edema  Neurological: He is alert and oriented to person, place, and time  Skin: Skin is warm  No rash noted  Psychiatric: He has a normal mood and affect  His behavior is normal         LAB RESULTS       Results from last 7 days  Lab Units 06/21/18  0439 06/20/18  0447 06/19/18  1200   WBC Thousand/uL 3 82* 5 78 7 97   HEMOGLOBIN g/dL 11 8* 13 9 14 5   HEMATOCRIT % 35 4* 42 0 43 6   PLATELETS Thousands/uL 50* 60* 89*   SODIUM mmol/L 137 137 137   POTASSIUM mmol/L 3 8 3 9 4 3   CHLORIDE mmol/L 108 104 98*   CO2 mmol/L 25 26 27   BUN mg/dL 19 24 34*   CREATININE mg/dL 1 18 1 48* 2 18*   EGFR ml/min/1 73sq m 62 47 29   CALCIUM mg/dL 7 3* 7 7* 8 9   MAGNESIUM mg/dL  --  2 3 1 9   PHOSPHORUS mg/dL 2 1* 2 8 3 2   TOTAL PROTEIN g/dL 5 2* 6 3* 6 6   GLUCOSE RANDOM mg/dL 87 101 130       RADIOLOGY RESULTS      No results found for this or any previous visit  Results for orders placed during the hospital encounter of 06/07/18   XR chest pa & lateral    Narrative CHEST     INDICATION:   J20 9: Acute bronchitis, unspecified  R05: Cough  COMPARISON:  8/10/2017    EXAM PERFORMED/VIEWS:  XR CHEST PA & LATERAL  The frontal view was performed utilizing dual energy radiographic technique  Images: 4    FINDINGS:  There are median sternotomy wires indicating prior cardiac surgery  Cardiomediastinal silhouette appears unremarkable  The lungs are clear  No pneumothorax or pleural effusion  Osseous structures appear within normal limits for patient age  Impression No acute cardiopulmonary disease          Workstation performed: [id number]       No results found for this or any previous visit  No results found for this or any previous visit  No results found for this or any previous visit  No results found for this or any previous visit  PLAN / RECOMMENDATIONS      Acute kidney injury:  Improved with hydration  I think it was volume related even though I do not have a urinary study back yet I will continue hydration and monitor    Generalized weakness and anorexia:  So far workup is negative Will continue to monitor    Anemia:  Etiology unclear workup in progress    Will continue to monitor    Spike Alonzo MD  Nephrology  6/21/2018        Portions of the record may have been created with voice recognition software  Occasional wrong word or "sound a like" substitutions may have occurred due to the inherent limitations of voice recognition software  Read the chart carefully and recognize, using context, where substitutions have occurred

## 2018-06-21 NOTE — PHYSICAL THERAPY NOTE
PT Evaluation (23min)  (10:20-10:43)    Past Medical History:   Diagnosis Date    CAD (coronary artery disease)     Cancer ([facility] )     prostate    Coronary artery disease     GERD (gastroesophageal reflux disease)     H/O heart artery stent     x2    Hypercholesteremia     Hypertension     Keratotic lesion     Stroke ([facility] )     Transient cerebral ischemia       06/21/18 1043   Note Type   Note type Eval only   Pain Assessment   Pain Assessment No/denies pain   Home Living   Type of [address] Two level  (bi-level; 5+6 steps to main level c rail)   Bathroom Equipment Shower chair; Toilet raiser   Home Equipment Walker;Cane   Prior Function   Level of [place] Independent with ADLs and functional mobility   Lives With Alone   Receives Help From Family   ADL Assistance Independent   IADLs Independent   Falls in the last 6 months 0   Vocational Retired   Comments (+) drives   Restrictions/Precautions   Other Precautions Bed Alarm;Multiple lines; Fall Risk   General   Additional Pertinent History pt presents [facility] c decreased appetite + diarrhea  dx: generalized weakness  pending EGD + colonoscopy 6/22/18  PT consulted for mobility + d/c planning  up c (A)  Family/Caregiver Present No   Cognition   Orientation Level Oriented X4   RUE Assessment   RUE Assessment WFL   LUE Assessment   LUE Assessment WFL   RLE Assessment   RLE Assessment WFL  (4-/5)   LLE Assessment   LLE Assessment WFL  (4-/5)   Coordination   Sensation WFL   Bed Mobility   Supine to Sit 5  Supervision   Additional items HOB elevated; Increased time required;Verbal cues   Sit to Supine 5  Supervision   Additional items Verbal cues; Increased time required   Transfers   Sit to Stand 5  Supervision   Additional items Verbal cues; Increased time required   Stand to Sit 5  Supervision   Additional items Verbal cues; Increased time required   Toilet transfer 5  Supervision   Additional items Verbal cues;Standard toilet; Increased time required  (c grab bar)   Ambulation/Elevation   Gait pattern Forward Flexion;Decreased foot clearance; Inconsistent melissa   Gait Assistance 5  Supervision   Additional items Verbal cues   Assistive Device None   Distance 20'x2 c seated rest for toileting; limited by fatigue   Balance   Static Sitting Good   Dynamic Sitting Good   Static Standing Fair   Dynamic Standing Fair   Ambulatory Fair   Activity Tolerance   Activity Tolerance Patient limited by fatigue   Nurse Made Aware Prem Mouse   Assessment   Prognosis Good   Problem List Decreased strength;Decreased endurance; Impaired balance;Decreased mobility   Assessment pt is a 71y/o m who presents to [facility] c generalized weakness  pending colonoscopy + EGD 6/22/18  PMH significant for chronic back pain, CHF, thrombocytopenia, CAD, + prostate cancer  at baseline, pt (I) c functional mobility s AD  resides alone in bi-level home c 5+6 steps to main level c rail  currently presents c significant deficits in strength, balance, gait quality, + activity tolerance noted in PT exam above  Barthel Index 55/100  ambulated 20'x2 c (S) + seated rest 2* fatigue  declined further ambulation at this time 2* significant fatigue  would benefit from skilled PT to maximize functional mobility + improve quality of life  upon d/c, recommend pt return home c hhpt  PT eval of high complexity 2* unstable med status c pt cont to report decreased appetite + weakness  pt pending colonsocopy + EGD 6/22/18  lives alone in bi-level home c 5+6 steps btwn levels  Barriers to Discharge Inaccessible home environment;Decreased caregiver support   Goals   Patient Goals "to go home"  STG Expiration Date 07/01/18   Short Term Goal #1 1  increase strength 1/2 grade to improve overall functional mobility, 2  perform bed mobility mod (I) to sit up + eat a meal, 3  perform transfers mod (I) to safely perform ADLs, 4  ambulate 300' (I)/mod (I) c no or least restrictive AD to safely navigate in community, 5  negotiate 6 stairs mod (I) to safely access main level of home   Plan   Treatment/Interventions Functional transfer training;LE strengthening/ROM; Elevations; Therapeutic exercise; Endurance training;Patient/family training;Bed mobility; Equipment eval/education;Gait training;Spoke to nursing   PT Frequency 5x/wk   Recommendation   Recommendation Home PT   PT - OK to Discharge No   Barthel Index   Feeding 10   Bathing 0   Grooming Score 5   Dressing Score 5   Bladder Score 10   Bowels Score 10   Toilet Use Score 5   Transfers (Bed/Chair) Score 10   Mobility (Level Surface) Score 0   Stairs Score 0   Barthel Index Score 55     Carissa Pacheco, PT

## 2018-06-21 NOTE — PROGRESS NOTES
Progress Note - Infectious Disease   Chon Morris 71 y o  male MRN: [id number]  Unit/Bed#: -01 Encounter: [id number]      Impression/Recommendations:  1  Fever, with multitude of constitutional symptoms  Patient's symptoms appear to be chronic per progressive  He has no leukocytosis  He does have thrombocytopenia and elevated LFTs  Chest/abdomen/pelvis CT is completely normal  This clinical picture is somewhat suggestive of tick related infection, especially but babesiosis, and possibly the anaplasmosis  Also, consider SBE  PCT mildly elevated at 0 71  2D echo with MV calcification versus vegetation  Blood cultures remain negative  Parasite blood smear negative  With chronicity of symptoms, I would not treat patient empirically with antibiotics for now  Will workup for above possibilities  Follow-up on pending blood cultures  Follow-up Anaplasma PCR, Lyme serologies  Hold off on any empiric antibiotic for now  Monitor temperature/WBC  Plan for GUY noted      2    Diarrhea  This predates patient taking azithromycin  Also, diarrhea has been intermittent  Doubt C difficile colitis  Stool C diff toxin negative  Monitor diarrhea for now  3   Thrombocytopenia  I suspect it is secondary to the syndromes outlined above  Will monitor for now  Patient may ultimately need bone marrow biopsy, only if above workup is unrevealing  Monitor CBC for now      4   Elevated LFTs  Wound I suspect it is secondary to the syndromes outlined above  Will monitor for now  Patient's abdominal exam remains benign  Doubt biliary tract obstruction  Monitor LFTs for now      5   Dry cough  Etiology of this is unclear  No clinical signs of pneumonia  Chest CT is negative  Monitor for now  Monitor cough/respiratory symptoms      6  NED  I suspect this is secondary to dehydration from decreased p o  intake  Creatinine continues to improve with IV fluid hydration    Monitor creatinine for now      Discussed with patient in detail regarding above plan  Discussed with Dr Karina Nuno from [facility] service earlier  Antibiotics:  None    Subjective:  Patient feels about the same  He has generalized fatigue  He is nauseated  He has no appetite  Diarrhea mild, off and on  Temperature is down  No further chills  Objective:  Vitals:  HR:  [58-74] 68  Resp:  [18] 18  BP: (103-151)/(54-65) 151/65  SpO2:  [91 %-96 %] 96 %  Temp (24hrs), Av 5 °F (36 9 °C), Min:97 7 °F (36 5 °C), Max:100 °F (37 8 °C)  Current: Temperature: 98 2 °F (36 8 °C)    Physical Exam:     General: Awake, alert, cooperative, no distress  Lungs: Expansion symmetric, no rales, no wheezing, respirations unlabored  Heart[de-identified]  Regular rate and rhythm, S1 and S2 normal, no murmur  Abdomen: Soft, nondistended, non-tender, bowel sounds active all four quadrants,        no masses, no organomegaly  Extremities: Trace edema  No erythema/warmth  No ulcer  Nontender to palpation  Skin:  No rash  Invasive Devices     Peripheral Intravenous Line            Peripheral IV 18 Left Antecubital 2 days    Peripheral IV 18 Right Antecubital 2 days                Labs studies:   I have personally reviewed pertinent labs      Results from last 7 days  Lab Units 18  04318  1200   SODIUM mmol/L 137 137 137   POTASSIUM mmol/L 3 8 3 9 4 3   CHLORIDE mmol/L 108 104 98*   CO2 mmol/L 25 26 27   ANION GAP mmol/L 4 7 12   BUN mg/dL 19 24 34*   CREATININE mg/dL 1 18 1 48* 2 18*   EGFR ml/min/1 73sq m 62 47 29   GLUCOSE RANDOM mg/dL 87 101 130   CALCIUM mg/dL 7 3* 7 7* 8 9   AST U/L 74* 82* 77*   ALT U/L 66 79* 78   ALK PHOS U/L 46 57 59   TOTAL PROTEIN g/dL 5 2* 6 3* 6 6   BILIRUBIN TOTAL mg/dL 1 00 1 40* 1 40*       Results from last 7 days  Lab Units 18  0439 18  0447 18  1200   WBC Thousand/uL 3 82* 5 78 7 97   HEMOGLOBIN g/dL 11 8* 13 9 14 5   PLATELETS Thousands/uL 50* 60* 89*       Results from last 7 days  Lab Units 06/20/18  1415 06/19/18  1915   BLOOD CULTURE   --  No Growth at 24 hrs  No Growth at 24 hrs  C DIFF TOXIN B  NEGATIVE for C difficle toxin by PCR    --        Imaging Studies:   I have personally reviewed pertinent imaging study reports and images in PACS  EKG, Pathology, and Other Studies:   I have personally reviewed pertinent reports

## 2018-06-21 NOTE — CONSULTS
Consultation - Cardiology    Tenzin Kyle Morris 71 y o  male MRN: [id number]  Unit/Bed#: -01 Encounter: [id number]    Physician Requesting Consult: Carrie Nj MD    Reason for Consult / Principal Problem: CHF     HPI: Tessa Menon is a 71y o  year old male with a past medical history significant for CAD, mitral valve regurgitation status post mitral valve repair, hypertension, hyperlipidemia  Patient presented with complaints of loss of appetite, fatigue, and weight loss  On arrival he was noted to have fever and thrombocytopenia--etiology at this time is unknown  Patient states he had mitral valve repair several years ago at [facility]   He follows with cardiologist regularly-- Dr Martin Junior in [place]  He denies recent dental procedures but did have dental cleaning in the last few months  He does report taking antibiotics beforehand   He denies chest pain, palpitations, shortness of breath and states that up until recently he was feeling well        Historical Information   Past Medical History:   Diagnosis Date    CAD (coronary artery disease)     [facility]     prostate    Coronary artery disease     GERD (gastroesophageal reflux disease)     H/O heart artery stent     x2    Hypercholesteremia     Hypertension     Keratotic lesion     Stroke [facility]     Transient cerebral ischemia      Past Surgical History:   Procedure Laterality Date    BACK SURGERY      CARDIAC SURGERY      mvp repair    CHOLECYSTECTOMY      JOINT REPLACEMENT      L hip replacement    ORCHIECTOMY      PROSTATECTOMY      TOTAL HIP ARTHROPLASTY       History   Alcohol Use No     Comment: social  / occasional per [employer]     History   Drug Use No     History   Smoking Status    Former Smoker   Smokeless Tobacco    Never Used       FAMILY HISTORY:  Family History   Problem Relation Age of Onset    Heart disease Mother     Stroke Mother         CVA    Cancer Father     Hodgkin's lymphoma Father     Lung cancer Father        MEDS & ALLERGIES:  all current active meds have been reviewed    sodium chloride 75 mL/hr Last Rate: 75 mL/hr (06/21/18 0907)     No Known Allergies      REVIEW OF SYSTEMS:  Constitutional: +fever, fatigue, loss of appetite   Eyes: Denies visual disturbance change in visual acuity   HENT: Denies nasal congestion or sore throat   Respiratory: Denies cough, expectoration or shortness of breath   Cardiovascular: Denies chest pain, palpitations or lower extremity swelling   GI: Denies abdominal pain, nausea, vomiting, bloody stools or diarrhea   : Denies dysuria, frequency, hematuria  Musculoskeletal: Denies back pain or joint pain   Neurologic: Denies lightheadedness, syncope, headache, focal weakness or sensory changes   Psychiatric: Denies depression, anxiety or panic       PHYSICAL EXAM:  Vitals:   Vitals:    06/21/18 0700   BP: 127/57   Pulse: 58   Resp: 18   Temp: 97 7 °F (36 5 °C)   SpO2: 91%     Body mass index is 24 73 kg/m²  Systolic (60ZMM), BYQ:071 , Min:103 , SCK:916     Diastolic (08XKH), ZSV:98, Min:54, Max:78      Intake/Output Summary (Last 24 hours) at 06/21/18 0912  Last data filed at 06/21/18 0276   Gross per 24 hour   Intake              950 ml   Output              575 ml   Net              375 ml     Weight (last 2 days)     Date/Time   Weight    06/21/18 0600  69 5 (153 22)    06/20/18 0600  69 5 (153 22)    06/19/18 1734  72 8 (160 5)    06/19/18 1635  71 3 (157 19)    06/19/18 1104  71 2 (157)            Invasive Devices     Peripheral Intravenous Line            Peripheral IV 06/19/18 Left Antecubital 1 day    Peripheral IV 06/19/18 Right Antecubital 1 day                General: Patient is not in acute distress  Laying comfortably in the bed  Head: Normocephalic  Atraumatic  HEENT: Both pupils normal sized, round and reactive to light  Nonicteric  Neck: JVP not elevated   Trachea central    Respiratory: Bilateral bronchovascular breath sounds with no crackles or rhonchi  Cardiovascular: RRR  S1 and S2 normal  3/6 Systolic murmur at apex   GI: Abdomen soft, nontender  No guarding or rigidity  Neurologic: Patient is awake, alert, responding to commands  Well-oriented to time, place and person   Moving all extremities  Extremities: No clubbing, cyanosis or edema  Integument: No skin rashes or ulceration      LABORATORY RESULTS:    Results from last 7 days  Lab Units 06/19/18  1200   TROPONIN I ng/mL 0 04       CBC with diff:   Results from last 7 days  Lab Units 06/21/18  0439 06/20/18 0447 06/19/18  1200   WBC Thousand/uL 3 82* 5 78 7 97   HEMOGLOBIN g/dL 11 8* 13 9 14 5   HEMATOCRIT % 35 4* 42 0 43 6   MCV fL 103* 102* 103*   PLATELETS Thousands/uL 50* 60* 89*   MCH pg 34 4* 33 9 34 1   MCHC g/dL 33 3 33 1 33 3   RDW % 13 5 13 8 13 7   MPV fL 12 1 10 9 10 7   NRBC AUTO /100 WBCs 0 0 0       CMP:  Results from last 7 days  Lab Units 06/21/18  0439 06/20/18 0447 06/19/18  1200   SODIUM mmol/L 137 137 137   POTASSIUM mmol/L 3 8 3 9 4 3   CHLORIDE mmol/L 108 104 98*   CO2 mmol/L 25 26 27   ANION GAP mmol/L 4 7 12   BUN mg/dL 19 24 34*   CREATININE mg/dL 1 18 1 48* 2 18*   GLUCOSE RANDOM mg/dL 87 101 130   CALCIUM mg/dL 7 3* 7 7* 8 9   AST U/L 74* 82* 77*   ALT U/L 66 79* 78   ALK PHOS U/L 46 57 59   TOTAL PROTEIN g/dL 5 2* 6 3* 6 6   BILIRUBIN TOTAL mg/dL 1 00 1 40* 1 40*   EGFR ml/min/1 73sq m 62 47 29       BMP:  Results from last 7 days  Lab Units 06/21/18 0439 06/20/18 0447 06/19/18  1200   SODIUM mmol/L 137 137 137   POTASSIUM mmol/L 3 8 3 9 4 3   CHLORIDE mmol/L 108 104 98*   CO2 mmol/L 25 26 27   BUN mg/dL 19 24 34*   CREATININE mg/dL 1 18 1 48* 2 18*   GLUCOSE RANDOM mg/dL 87 101 130   CALCIUM mg/dL 7 3* 7 7* 8 9         Results from last 7 days  Lab Units 06/19/18  1200   NT-PRO BNP pg/mL 608*        Results from last 7 days  Lab Units 06/20/18  0447 06/19/18  1200   MAGNESIUM mg/dL 2 3 1 9           Results from last 7 days  Lab Units 06/19/18  1912   TSH 3RD ANDREINA uIU/mL 0 908       Results from last 7 days  Lab Units 18  0440 18  0447 18  1200   INR  2 30* 2 13* 1 93*       Lipid Profile:   Lab Results   Component Value Date    CHOL 119 2018     Lab Results   Component Value Date    HDL 45 2018     Lab Results   Component Value Date    LDLCALC 53 2018     Lab Results   Component Value Date    TRIG 106 2018       Cardiac testing:   Results for orders placed during the hospital encounter of 18   Echo complete with contrast if indicated    [place], [place] [address]  [phone]    Transthoracic Echocardiogram  2D, M-mode, Doppler, and Color Doppler    Study date:  2018    Patient: Bonita Runner  MR number: [id number]  Account number: [de-identified]  : 1945  Age: 71 years  Gender: Male  Status: Inpatient  Location: Bedside  Height: 66 in  Weight: 153 lb  BP: 144/ 60 mmHg    Indications: Coronary Artery Disease    Diagnoses: I25 10 - Atherosclerotic heart disease of native coronary artery without angina pectoris    Sonographer:  RACHANA Steven,RDCS  Interpreting Physician:  Katerina Live MD  Referring Physician:  Suki Fernandez MD  Group:  [facility]    SUMMARY    LEFT VENTRICLE:  Ejection fraction was estimated to be 55 %  There were no regional wall motion abnormalities  Concentric hypertrophy was present  RIGHT VENTRICLE:  The ventricle was mildly dilated  Estimated peak pressure was at least 70 mmHg c/w severe pulmonary hypertension  LEFT ATRIUM:  The atrium was mildly to moderately dilated  RIGHT ATRIUM:  The atrium was dilated  MITRAL VALVE:  There was moderate to marked annular calcification  There was moderate calcification of MV leaflets  Vegetation can not be ruled out with certainty  Consider GUY if clinically indicated  There was moderate to severe regurgitation   Patient has history of MV repair  AORTIC VALVE:  There was mild to moderate stenosis  There was mild to moderate regurgitation  TRICUSPID VALVE:  There was mild regurgitation  HISTORY: PRIOR HISTORY: Hyperlipidemia, Atrial Fibrillation, Transient Ischemic Attack, Hypertension, Acute Renal Failure, S/P Stent, Mitral Valve Repair    PROCEDURE: The procedure was performed at the bedside  This was a routine study  The transthoracic approach was used  The study included complete 2D imaging, M-mode, complete spectral Doppler, and color Doppler  The heart rate was 77 bpm,  at the start of the study  Images were obtained from the parasternal, apical, subcostal, and suprasternal notch acoustic windows  Intravenous contrast ( 0 6ml Definity used in NSS) was administered to opacify the left ventricle  Image  quality was adequate  LEFT VENTRICLE: Size was normal  Ejection fraction was estimated to be 55 %  There were no regional wall motion abnormalities  Concentric hypertrophy was present  RIGHT VENTRICLE: The ventricle was mildly dilated  Systolic function was normal  Wall thickness was normal  DOPPLER: Estimated peak pressure was at least 70 mmHg c/w severe pulmonary hypertension  LEFT ATRIUM: The atrium was mildly to moderately dilated  RIGHT ATRIUM: The atrium was dilated  MITRAL VALVE: There was moderate to marked annular calcification  There was moderate calcification of MV leaflets  Vegetation can not be ruled out with certainty  Consider GUY if clinically indicated  DOPPLER: There was moderate to severe  regurgitation  Patient has history of MV repair  AORTIC VALVE: The valve was probably trileaflet  Leaflets exhibited mild to moderate calcification  DOPPLER: There was mild to moderate stenosis  There was mild to moderate regurgitation  TRICUSPID VALVE: The valve structure was normal  There was normal leaflet separation  DOPPLER: The transtricuspid velocity was within the normal range   There was no evidence for stenosis  There was mild regurgitation  PULMONIC VALVE: Leaflets exhibited normal thickness, no calcification, and normal cuspal separation  DOPPLER: The transpulmonic velocity was within the normal range  There was no regurgitation  PERICARDIUM: There was no pericardial effusion  The pericardium was normal in appearance  AORTA: The root exhibited normal size  SYSTEM MEASUREMENT TABLES    2D  %FS: 32 3 %  Ao Diam: 3 7 cm  EDV(Teich): 122 6 ml  EF Biplane: 46 %  EF(Teich): 60 3 %  ESV(Teich): 48 7 ml  IVSd: 1 3 cm  LA Area: 28 7 cm2  LA Diam: 3 3 cm  LVEDV MOD A2C: 147 ml  LVEDV MOD A4C: 149 7 ml  LVEDV MOD BP: 148 1 ml  LVEF MOD A2C: 39 7 %  LVEF MOD A4C: 52 3 %  LVESV MOD A2C: 88 6 ml  LVESV MOD A4C: 71 4 ml  LVESV MOD BP: 80 ml  LVIDd: 5 1 cm  LVIDs: 3 4 cm  LVLd A2C: 8 8 cm  LVLd A4C: 8 9 cm  LVLs A2C: 8 cm  LVLs A4C: 8 1 cm  LVOT Diam: 2 1 cm  LVPWd: 1 2 cm  RA Area: 21 3 cm2  RVIDd: 3 4 cm  SV MOD A2C: 58 4 ml  SV MOD A4C: 78 4 ml  SV(Teich): 73 9 ml    CW  AR Dec Scott: 2 7 m/s2  AR Dec Time: 1294 ms  AR PHT: 375 3 ms  AR Vmax: 3 5 m/s  AR maxP 1 mmHg  AV Env  Ti: 253 5 ms  AV VTI: 45 1 cm  AV Vmax: 2 8 m/s  AV Vmean: 1 8 m/s  AV maxP 4 mmHg  AV meanPG: 15 3 mmHg  HR: 71 5 BPM  MR VTI: 150 1 cm  MR Vmax: 5 6 m/s  MR Vmean: 4 4 m/s  MR maxP 7 mmHg  MR meanP 5 mmHg  MV VTI: 65 cm  MV Vmax: 2 2 m/s  MV Vmean: 1 2 m/s  MV maxP 4 mmHg  MV meanP 2 mmHg  TR Vmax: 4 2 m/s  TR maxP 6 mmHg    MM  TAPSE: 2 1 cm    PW  RADHA (VTI): 1 7 cm2  RADHA Vmax: 1 6 cm2  E': 0 m/s  E/E': 48 8  LVOT Env  Ti: 267 7 ms  LVOT VTI: 21 8 cm  LVOT Vmax: 1 3 m/s  LVOT Vmean: 0 8 m/s  LVOT maxP 8 mmHg  LVOT meanPG: 3 2 mmHg  LVSV Dopp: 76 8 ml  MV A Naun: 1 6 m/s  MV Dec Scott: 4 8 m/s2  MV DecT: 353 4 ms  MV E Naun: 1 7 m/s  MV E/A Ratio: 1 1  MV PHT: 102 5 ms  MVA (VTI): 1 2 cm2  MVA By PHT: 2 1 cm2    [facility] Accredited [facility]ch[facility]    Prepared and electronically signed by    Harpreet Fierro MD  Signed 20-Jun-2018 16:40:44       Imaging: I have personally reviewed pertinent reports  EKG reviewed personally:     Assessment and Plan:    Echocardiogram EF 55%, concentric hypertrophy, estimated peak PA pressure 70 mmhg (severe pulmonary hypertension, moderate to marked MAC, moderate to severe MR (history of MV repair) vegetation could not be ruled out , mild to moderate AS, mild to moderate AI, mild TR    Mitral valve regurgitation s/p Mitral valve repair   -last echo in 2015 through primary cardiologist Dr Karen Gonzales in [facility], mitral regurgitation was only mild, given fever of unknown origin and worsening of MR, endocarditis should be included in differential   -Would likely benefit from GUY to better evaluate degree of MR and also for presence of vegetation (could not be ruled on TTE)  Consider this to be done along with endoscopy vs at later time     CAD s/p PCI   -no recent anginal symptoms   -EF preserved, no evidence of acute volume overload  -ASA held in the setting of thrombocytopenia, on carvedilol, atorvastatin    Thrombocytopenia   -platelets 67-->99-->99 this admission, continue to follow   -heme/onc evaluated-- 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  Also ruling out HIV and hepitits  HIIT and TTP not suspected at this time      Fever/Weight loss  -CT chest/ab/pevlis without acute abnormality   -Infectious disease evaluated--possible tick borne illness vs malignancy   -GI also planning EGD/colonoscopy dependent on fever and platelet trend to evaluate for malignancy vs microscopic colitis vs IBS vs parasite     Hypertension- BP stable     Hyperlipidemia- on atorvastatin                  Code Status: Level 1 - Full Code      Thank you for allowing us to participate in this patient's care  Counseling / Coordination of Care  Total floor / unit time spent today 35 minutes    Greater than 50% of total time was spent with the patient and / or family counseling and / or coordination of care  A description of the counseling / coordination of care: Review of history, current assessment, development of a plan         Brooke Rodriguez [place]  6/21/2018,9:12 AM

## 2018-06-21 NOTE — PROGRESS NOTES
GI Progress Note - Nuha Morris 71 y o  male MRN: [id number]    Unit/Bed#: -01 Encounter: [id number]    Subjective: Mr Candida Reyna is forcing himself to eat  He kept down eggs this morning and is eating ice cream now  He still has no appetite  He has a sore spot on his upper gums anteriorly which has been bothering him  Objective:     Vitals: Blood pressure 151/65, pulse 68, temperature 98 2 °F (36 8 °C), temperature source Oral, resp  rate 18, height 5' 6" (1 676 m), weight 69 5 kg (153 lb 3 5 oz), SpO2 96 %  ,Body mass index is 24 73 kg/m²        Intake/Output Summary (Last 24 hours) at 06/21/18 1620  Last data filed at 06/21/18 1500   Gross per 24 hour   Intake             1190 ml   Output              725 ml   Net              465 ml       Physical Exam:     General Appearance: Alert, oriented x3, no acute distress  Lungs: Clear to auscultation bilaterally, no rales or rhonchi  Heart: RRR, no murmur  Abdomen: Non-distended, soft, BS active, NTTP  Extremities: No cyanosis or LE edema    Invasive Devices     Peripheral Intravenous Line            Peripheral IV 06/19/18 Left Antecubital 2 days    Peripheral IV 06/19/18 Right Antecubital 2 days                Lab Results:    Results from last 7 days  Lab Units 06/21/18  0439   WBC Thousand/uL 3 82*   HEMOGLOBIN g/dL 11 8*   HEMATOCRIT % 35 4*   PLATELETS Thousands/uL 50*   LYMPHO PCT % 30   MONO PCT MAN % 6   EOSINO PCT MANUAL % 0       Results from last 7 days  Lab Units 06/21/18  0439   SODIUM mmol/L 137   POTASSIUM mmol/L 3 8   CHLORIDE mmol/L 108   CO2 mmol/L 25   BUN mg/dL 19   CREATININE mg/dL 1 18   CALCIUM mg/dL 7 3*   TOTAL PROTEIN g/dL 5 2*   BILIRUBIN TOTAL mg/dL 1 00   ALK PHOS U/L 46   ALT U/L 66   AST U/L 74*   GLUCOSE RANDOM mg/dL 87       Results from last 7 days  Lab Units 06/21/18  0440   INR  2 30*       Results from last 7 days  Lab Units 06/19/18  1200   LIPASE u/L 112       Imaging Studies: I have personally reviewed pertinent imaging studies  Ct Chest Abdomen Pelvis Wo Contrast  Result Date: 6/19/2018  Impression: No evidence of acute abnormality in the chest, abdomen or pelvis  Us Liver  Result Date: 6/20/2018  Impression: 1  Limited examination due to overlying bowel gas  2   Status post cholecystectomy  Otherwise, normal sonographic appearance of the right upper quadrant  Us Kidney And Bladder  Result Date: 6/21/2018  Impression: 1  Right renal atrophy is again noted  2   No hydronephrosis  Assessment and Plan:     Diarrhea  Weight Loss  Nausea  - He will need an EGD/colonoscopy for further evaluation of his diarrhea, weight loss, and poor PO intake 2/2 nausea and gagging  - His thrombocytopenia has worsened; we will tentatively plan for EGD tomorrow only pending platelet count and combine with GUY due to abnormal appearance of mitral TTE  - Cdiff negative  - Stool culture and O+P pending  - Encourage PO intake, add Ensure  - NPO after midnight for EGD tomorrow; colonoscopy deferred for now  - Hold coumadin; recheck INR tomorrow AM  - Recheck CBC/platelets tomorrow morning prior to EGD/GUY    Fever   - ID consultation reviewed, possible tick borne illness v malignancy v infectious diarrhea  - Lyme panel negative  - F/U anaplasma, parasite blood smear  - Monitor off antibiotics for now     Elevated LFTs  Thrombocytopenia   - RUQ US WNL  - Acute hepatitis panel negative  - CT A/P without acute abnormality  - Pt does have alcohol use 1x weekly, denies any heavier use or any use in the past month  - Heme/onc consultation reviewed  - TTE shows abnormal appearing mitral valve; history of MV repair, GUY tomorrow to rule out vegetation      The patient will be seen by Dr Aletta Aase

## 2018-06-22 ENCOUNTER — APPOINTMENT (INPATIENT)
Dept: INTERVENTIONAL RADIOLOGY/VASCULAR | Facility: HOSPITAL | Age: 73
DRG: 868 | End: 2018-06-22
Payer: MEDICARE

## 2018-06-22 ENCOUNTER — APPOINTMENT (INPATIENT)
Dept: CT IMAGING | Facility: HOSPITAL | Age: 73
DRG: 868 | End: 2018-06-22
Payer: MEDICARE

## 2018-06-22 ENCOUNTER — ANESTHESIA (INPATIENT)
Dept: PERIOP | Facility: HOSPITAL | Age: 73
DRG: 868 | End: 2018-06-22
Payer: MEDICARE

## 2018-06-22 ENCOUNTER — ANESTHESIA EVENT (INPATIENT)
Dept: PERIOP | Facility: HOSPITAL | Age: 73
DRG: 868 | End: 2018-06-22
Payer: MEDICARE

## 2018-06-22 PROBLEM — K06.8 PAIN IN GUMS: Status: ACTIVE | Noted: 2018-06-22

## 2018-06-22 PROBLEM — E44.1 MILD PROTEIN-CALORIE MALNUTRITION (HCC): Status: ACTIVE | Noted: 2018-06-22

## 2018-06-22 LAB
A PHAGOCYTOPH DNA BLD QL NAA+PROBE: POSITIVE
ALBUMIN SERPL BCP-MCNC: 2.5 G/DL (ref 3.5–5)
ALP SERPL-CCNC: 54 U/L (ref 46–116)
ALT SERPL W P-5'-P-CCNC: 62 U/L (ref 12–78)
ANION GAP SERPL CALCULATED.3IONS-SCNC: 9 MMOL/L (ref 4–13)
ANISOCYTOSIS BLD QL SMEAR: PRESENT
AST SERPL W P-5'-P-CCNC: 64 U/L (ref 5–45)
BASOPHILS # BLD MANUAL: 0 THOUSAND/UL (ref 0–0.1)
BASOPHILS NFR MAR MANUAL: 0 % (ref 0–1)
BILIRUB SERPL-MCNC: 1.1 MG/DL (ref 0.2–1)
BUN SERPL-MCNC: 15 MG/DL (ref 5–25)
CALCIUM SERPL-MCNC: 7.7 MG/DL (ref 8.3–10.1)
CHLORIDE SERPL-SCNC: 108 MMOL/L (ref 100–108)
CO2 SERPL-SCNC: 22 MMOL/L (ref 21–32)
CREAT SERPL-MCNC: 1.04 MG/DL (ref 0.6–1.3)
EOSINOPHIL # BLD MANUAL: 0 THOUSAND/UL (ref 0–0.4)
EOSINOPHIL NFR BLD MANUAL: 0 % (ref 0–6)
ERYTHROCYTE [DISTWIDTH] IN BLOOD BY AUTOMATED COUNT: 13.2 % (ref 11.6–15.1)
GFR SERPL CREATININE-BSD FRML MDRD: 72 ML/MIN/1.73SQ M
GLUCOSE SERPL-MCNC: 108 MG/DL (ref 65–140)
HCT VFR BLD AUTO: 36 % (ref 36.5–49.3)
HGB BLD-MCNC: 12.1 G/DL (ref 12–17)
HIV1 RNA # SERPL NAA+PROBE: <20 COPIES/ML
HIV1 RNA SERPL NAA+PROBE-LOG#: NORMAL LOG10COPY/ML
INR PPP: 1.79 (ref 0.86–1.17)
LYMPHOCYTES # BLD AUTO: 0.96 THOUSAND/UL (ref 0.6–4.47)
LYMPHOCYTES # BLD AUTO: 13 % (ref 14–44)
MCH RBC QN AUTO: 34.1 PG (ref 26.8–34.3)
MCHC RBC AUTO-ENTMCNC: 33.6 G/DL (ref 31.4–37.4)
MCV RBC AUTO: 101 FL (ref 82–98)
MONOCYTES # BLD AUTO: 1.11 THOUSAND/UL (ref 0–1.22)
MONOCYTES NFR BLD: 15 % (ref 4–12)
NEUTROPHILS # BLD MANUAL: 5.01 THOUSAND/UL (ref 1.85–7.62)
NEUTS SEG NFR BLD AUTO: 68 % (ref 43–75)
NRBC BLD AUTO-RTO: 0 /100 WBCS
PLATELET # BLD AUTO: 63 THOUSANDS/UL (ref 149–390)
PLATELET BLD QL SMEAR: ABNORMAL
PMV BLD AUTO: 11.5 FL (ref 8.9–12.7)
POTASSIUM SERPL-SCNC: 4.3 MMOL/L (ref 3.5–5.3)
PROT SERPL-MCNC: 5.7 G/DL (ref 6.4–8.2)
PROTHROMBIN TIME: 20.5 SECONDS (ref 11.8–14.2)
RBC # BLD AUTO: 3.55 MILLION/UL (ref 3.88–5.62)
RYE IGE QN: NEGATIVE
SODIUM SERPL-SCNC: 139 MMOL/L (ref 136–145)
TOTAL CELLS COUNTED SPEC: 100
VARIANT LYMPHS # BLD AUTO: 4 %
WBC # BLD AUTO: 7.37 THOUSAND/UL (ref 4.31–10.16)

## 2018-06-22 PROCEDURE — 85007 BL SMEAR W/DIFF WBC COUNT: CPT | Performed by: INTERNAL MEDICINE

## 2018-06-22 PROCEDURE — 97116 GAIT TRAINING THERAPY: CPT

## 2018-06-22 PROCEDURE — 99233 SBSQ HOSP IP/OBS HIGH 50: CPT | Performed by: INTERNAL MEDICINE

## 2018-06-22 PROCEDURE — 93325 DOPPLER ECHO COLOR FLOW MAPG: CPT | Performed by: INTERNAL MEDICINE

## 2018-06-22 PROCEDURE — 99232 SBSQ HOSP IP/OBS MODERATE 35: CPT | Performed by: INTERNAL MEDICINE

## 2018-06-22 PROCEDURE — 88342 IMHCHEM/IMCYTCHM 1ST ANTB: CPT | Performed by: PATHOLOGY

## 2018-06-22 PROCEDURE — 85027 COMPLETE CBC AUTOMATED: CPT | Performed by: INTERNAL MEDICINE

## 2018-06-22 PROCEDURE — 97110 THERAPEUTIC EXERCISES: CPT

## 2018-06-22 PROCEDURE — 70487 CT MAXILLOFACIAL W/DYE: CPT

## 2018-06-22 PROCEDURE — 0DB68ZX EXCISION OF STOMACH, VIA NATURAL OR ARTIFICIAL OPENING ENDOSCOPIC, DIAGNOSTIC: ICD-10-PCS | Performed by: INTERNAL MEDICINE

## 2018-06-22 PROCEDURE — 88305 TISSUE EXAM BY PATHOLOGIST: CPT | Performed by: PATHOLOGY

## 2018-06-22 PROCEDURE — 80053 COMPREHEN METABOLIC PANEL: CPT | Performed by: PHYSICIAN ASSISTANT

## 2018-06-22 PROCEDURE — 93312 ECHO TRANSESOPHAGEAL: CPT | Performed by: INTERNAL MEDICINE

## 2018-06-22 PROCEDURE — 85610 PROTHROMBIN TIME: CPT | Performed by: INTERNAL MEDICINE

## 2018-06-22 PROCEDURE — 93320 DOPPLER ECHO COMPLETE: CPT | Performed by: INTERNAL MEDICINE

## 2018-06-22 PROCEDURE — 97535 SELF CARE MNGMENT TRAINING: CPT

## 2018-06-22 PROCEDURE — 43239 EGD BIOPSY SINGLE/MULTIPLE: CPT | Performed by: INTERNAL MEDICINE

## 2018-06-22 PROCEDURE — 93312 ECHO TRANSESOPHAGEAL: CPT

## 2018-06-22 RX ORDER — SODIUM CHLORIDE, SODIUM LACTATE, POTASSIUM CHLORIDE, CALCIUM CHLORIDE 600; 310; 30; 20 MG/100ML; MG/100ML; MG/100ML; MG/100ML
INJECTION, SOLUTION INTRAVENOUS CONTINUOUS PRN
Status: DISCONTINUED | OUTPATIENT
Start: 2018-06-22 | End: 2018-06-22 | Stop reason: SURG

## 2018-06-22 RX ORDER — ALBUTEROL SULFATE 2.5 MG/3ML
2.5 SOLUTION RESPIRATORY (INHALATION) EVERY 4 HOURS PRN
Status: DISCONTINUED | OUTPATIENT
Start: 2018-06-22 | End: 2018-06-25 | Stop reason: HOSPADM

## 2018-06-22 RX ORDER — LIDOCAINE HYDROCHLORIDE 10 MG/ML
INJECTION, SOLUTION INFILTRATION; PERINEURAL AS NEEDED
Status: DISCONTINUED | OUTPATIENT
Start: 2018-06-22 | End: 2018-06-22 | Stop reason: SURG

## 2018-06-22 RX ORDER — PROPOFOL 10 MG/ML
INJECTION, EMULSION INTRAVENOUS AS NEEDED
Status: DISCONTINUED | OUTPATIENT
Start: 2018-06-22 | End: 2018-06-22 | Stop reason: SURG

## 2018-06-22 RX ADMIN — PROPOFOL 50 MG: 10 INJECTION, EMULSION INTRAVENOUS at 13:25

## 2018-06-22 RX ADMIN — BENZONATATE 100 MG: 100 CAPSULE ORAL at 19:13

## 2018-06-22 RX ADMIN — CARVEDILOL 6.25 MG: 6.25 TABLET, FILM COATED ORAL at 07:30

## 2018-06-22 RX ADMIN — PROPOFOL 50 MG: 10 INJECTION, EMULSION INTRAVENOUS at 13:08

## 2018-06-22 RX ADMIN — ACETAMINOPHEN 650 MG: 325 TABLET, FILM COATED ORAL at 17:27

## 2018-06-22 RX ADMIN — IOHEXOL 85 ML: 350 INJECTION, SOLUTION INTRAVENOUS at 10:31

## 2018-06-22 RX ADMIN — CARVEDILOL 6.25 MG: 6.25 TABLET, FILM COATED ORAL at 17:27

## 2018-06-22 RX ADMIN — BENZONATATE 100 MG: 100 CAPSULE ORAL at 10:16

## 2018-06-22 RX ADMIN — LIDOCAINE HYDROCHLORIDE 50 MG: 10 INJECTION, SOLUTION INFILTRATION; PERINEURAL at 13:06

## 2018-06-22 RX ADMIN — PROPOFOL 50 MG: 10 INJECTION, EMULSION INTRAVENOUS at 13:06

## 2018-06-22 RX ADMIN — ALLOPURINOL 100 MG: 100 TABLET ORAL at 09:00

## 2018-06-22 RX ADMIN — PROPOFOL 50 MG: 10 INJECTION, EMULSION INTRAVENOUS at 13:12

## 2018-06-22 RX ADMIN — PROPOFOL 50 MG: 10 INJECTION, EMULSION INTRAVENOUS at 13:17

## 2018-06-22 RX ADMIN — PANTOPRAZOLE SODIUM 40 MG: 40 TABLET, DELAYED RELEASE ORAL at 16:00

## 2018-06-22 RX ADMIN — SODIUM CHLORIDE, SODIUM LACTATE, POTASSIUM CHLORIDE, AND CALCIUM CHLORIDE: .6; .31; .03; .02 INJECTION, SOLUTION INTRAVENOUS at 12:54

## 2018-06-22 RX ADMIN — SODIUM CHLORIDE 50 ML/HR: 0.9 INJECTION, SOLUTION INTRAVENOUS at 01:06

## 2018-06-22 RX ADMIN — ATORVASTATIN CALCIUM 40 MG: 40 TABLET, FILM COATED ORAL at 09:00

## 2018-06-22 NOTE — ANESTHESIA PREPROCEDURE EVALUATION
Review of Systems/Medical History  Patient summary reviewed  Chart reviewed  No history of anesthetic complications     Cardiovascular  Hyperlipidemia, Hypertension on > 1 medication, Valvular heart disease , aortic valve stenosis, aortic insufficiency, mitral regurgitation and tricuspid insufficiency, CAD , Dysrhythmias , atrial fibrillation, CHF compensated CHF,   Pulmonary hypertension Pulmonary       GI/Hepatic    PUD, GERD poorly controlled,        Prostatic disorder, history of prostate cancer       Endo/Other     GYN       Hematology    Thrombocytopenia,    Musculoskeletal       Neurology    TIA, CVA ,    Psychology           Physical Exam    Airway    Mallampati score: II  TM Distance: >3 FB  Neck ROM: full     Dental   upper dentures,     Cardiovascular      Pulmonary      Other Findings        Anesthesia Plan  ASA Score- 3     Anesthesia Type- IV sedation with anesthesia with ASA Monitors  Additional Monitors:   Airway Plan:         Plan Factors-    Induction- intravenous  Postoperative Plan-     Informed Consent- Anesthetic plan and risks discussed with patient  I personally reviewed this patient with the CRNA  Discussed and agreed on the Anesthesia Plan with the CRNA  Matthew Natarajan

## 2018-06-22 NOTE — PROGRESS NOTES
NEPHROLOGY PROGRESS NOTE    Patient: Harry Morris               Sex: male          DOA: 6/19/2018 10:58 AM   YOB: 1945        Age:  71 y o         LOS:  LOS: 3 days       HPI     Patient with acute kidney injury along with loss of weight and loss of appetite    SUBJECTIVE     Feeling better  Still feeling weak though improving  No other acute complaint    CURRENT MEDICATIONS       Current Facility-Administered Medications:     acetaminophen (TYLENOL) tablet 650 mg, 650 mg, Oral, Q6H PRN, Marie Wilson MD, 650 mg at 06/20/18 2318    albuterol inhalation solution 2 5 mg, 2 5 mg, Nebulization, Q4H PRN, Kylah Smith PA-C    allopurinol (ZYLOPRIM) tablet 100 mg, 100 mg, Oral, Daily, Trisha Ibrahim MD, 100 mg at 06/22/18 0900    atorvastatin (LIPITOR) tablet 40 mg, 40 mg, Oral, Daily, Trisha Ibrahim MD, 40 mg at 06/22/18 0900    benzonatate (TESSALON PERLES) capsule 100 mg, 100 mg, Oral, TID PRN, Trisha Ibrahim MD, 100 mg at 06/22/18 1016    carvedilol (COREG) tablet 6 25 mg, 6 25 mg, Oral, BID With Meals, Trisha Ibrahim MD, 6 25 mg at 06/22/18 0730    ondansetron (ZOFRAN) injection 4 mg, 4 mg, Intravenous, Q6H PRN, Trisha Ibrahim MD    pantoprazole (PROTONIX) EC tablet 40 mg, 40 mg, Oral, BID AC, Maryam Willis III, MD, 40 mg at 06/21/18 1729    sodium chloride 0 9 % infusion, 50 mL/hr, Intravenous, Continuous, Trisha Ibrahim MD, Last Rate: 50 mL/hr at 06/22/18 0106, 50 mL/hr at 06/22/18 0106    OBJECTIVE     Current Weight: Weight - Scale: 69 5 kg (153 lb 3 5 oz)  Vitals:    06/22/18 1100   BP: 117/89   Pulse: 75   Resp: 16   Temp: 98 6 °F (37 °C)   SpO2: 94%       Intake/Output Summary (Last 24 hours) at 06/22/18 1213  Last data filed at 06/22/18 1001   Gross per 24 hour   Intake              520 ml   Output             1250 ml   Net             -730 ml       PHYSICAL EXAMINATION     Physical Exam   Constitutional: He is oriented to person, place, and time   He appears well-developed  No distress  HENT:   Head: Normocephalic  Mouth/Throat: Oropharynx is clear and moist    Eyes: Conjunctivae are normal  No scleral icterus  Neck: Neck supple  No JVD present  Cardiovascular: Normal rate and normal heart sounds  Pulmonary/Chest: Effort normal  He has no wheezes  Abdominal: Soft  There is no tenderness  Musculoskeletal: Normal range of motion  He exhibits no edema  Neurological: He is alert and oriented to person, place, and time  Skin: Skin is warm  No rash noted  Psychiatric: He has a normal mood and affect  His behavior is normal         LAB RESULTS       Results from last 7 days  Lab Units 06/22/18  0554 06/21/18  0439 06/20/18  0447 06/19/18  1200   WBC Thousand/uL 7 37 3 82* 5 78 7 97   HEMOGLOBIN g/dL 12 1 11 8* 13 9 14 5   HEMATOCRIT % 36 0* 35 4* 42 0 43 6   PLATELETS Thousands/uL 63* 50* 60* 89*   SODIUM mmol/L 139 137 137 137   POTASSIUM mmol/L 4 3 3 8 3 9 4 3   CHLORIDE mmol/L 108 108 104 98*   CO2 mmol/L 22 25 26 27   BUN mg/dL 15 19 24 34*   CREATININE mg/dL 1 04 1 18 1 48* 2 18*   EGFR ml/min/1 73sq m 72 62 47 29   CALCIUM mg/dL 7 7* 7 3* 7 7* 8 9   MAGNESIUM mg/dL  --   --  2 3 1 9   PHOSPHORUS mg/dL  --  2 1* 2 8 3 2   TOTAL PROTEIN g/dL 5 7* 5 2* 6 3* 6 6   GLUCOSE RANDOM mg/dL 108 87 101 130       RADIOLOGY RESULTS      No results found for this or any previous visit  Results for orders placed during the hospital encounter of 06/07/18   XR chest pa & lateral    Narrative CHEST     INDICATION:   J20 9: Acute bronchitis, unspecified  R05: Cough  COMPARISON:  8/10/2017    EXAM PERFORMED/VIEWS:  XR CHEST PA & LATERAL  The frontal view was performed utilizing dual energy radiographic technique  Images: 4    FINDINGS:  There are median sternotomy wires indicating prior cardiac surgery  Cardiomediastinal silhouette appears unremarkable  The lungs are clear  No pneumothorax or pleural effusion      Osseous structures appear within normal limits for patient age  Impression No acute cardiopulmonary disease  Workstation performed: [id number]       No results found for this or any previous visit  No results found for this or any previous visit  No results found for this or any previous visit  No results found for this or any previous visit  PLAN / RECOMMENDATIONS      Acute kidney injury:  Improved with hydration  I will continue to monitor    Loss of weight:  For EGD colonoscopy on Monday    Will continue to monitor    Tiara Lion MD  Nephrology  6/22/2018        Portions of the record may have been created with voice recognition software  Occasional wrong word or "sound a like" substitutions may have occurred due to the inherent limitations of voice recognition software  Read the chart carefully and recognize, using context, where substitutions have occurred

## 2018-06-22 NOTE — PROGRESS NOTES
[facility] Progress Note  Patient: Daisha Morris 71 y o  male   MRN: [id number]  PCP: Ashley St MD  Unit/Bed#: -01 Encounter: [id number]  Date Of Visit: 06/22/18    Assessment/Plan:    Principal Problem: Thrombocytopenia (HCC)  Active Problems:    CAD (coronary atherosclerotic disease)    GERD (gastroesophageal reflux disease)    Hypercholesterolemia    Hypertension    PAF (paroxysmal atrial fibrillation) (HCC)    Peptic ulcer disease    Weight loss    Anorexia    Generalized weakness    ARF (acute renal failure) (HCC)    Chronic anticoagulation    Diarrhea    Bandemia    NED (acute kidney injury) (HCC)    Pain in gums    Mild protein-calorie malnutrition (HCC)    Present on Admission:   Generalized weakness   CAD (coronary atherosclerotic disease)   GERD (gastroesophageal reflux disease)   Hypercholesterolemia   Hypertension   PAF (paroxysmal atrial fibrillation) (HCC)   Peptic ulcer disease   Weight loss   Anorexia   ARF (acute renal failure) (HCC)   Thrombocytopenia (HCC)   Diarrhea   NED (acute kidney injury) (HCC)   Pain in gums      · Thrombocytopenia-exact etiology not clear  Yesterday, patient was also pancytopenic  His white cell count is better and so is his hemoglobin  Drop in his hemoglobin could be dilutional   His platelets are also slightly better today  He is also not febrile today  He is complaining of pain in his upper gums  This started when he came in, however, he did not mention to anyone until yesterday  I discussed with Radiology  Cannot get panoramic view  Would order a CT scan of his facial bones to make sure that he does not have any abscess/underlying inflammatory process causing his illness/symptoms  Hematology following  · Acute renal failure-resolved  Creatinine is better  On gentle IV fluids  · Poor oral intake with weight loss  At least mild protein caloric malnutrition    He is eating better although he claims that he feels the same and he had his food/legs/Ensure yesterday which he could not do it before  · Diarrhea-C diff negative  GI workup in process  He is going to get I believe EGD today  No diarrhea today either  · History of atrial fibrillation  Currently in sinus rhythm  He needs GUY given his mitral valve disease and also concern about endocarditis  He would likely have that done while he would get any EGD done today as well  Anticoagulation on hold for the procedures  · Fever-on no antibiotics  Plan as above  · Generalized deconditioning  Patient is absolutely refusing to go to any rehab  · Atypical lymphocytes/increased monocytes  ?  Etiology  ? Viral illness causing his symptoms  · Essential hypertension  Continue with current treatment  Blood pressure stable  · Coronary artery disease  Medical management      VTE Pharmacologic Prophylaxis:   Pharmacologic: Coumadin on hold for procedures as above  Mechanical VTE Prophylaxis in Place: Yes    Patient Centered Rounds: I have performed bedside rounds with nursing staff today  Discussions with Specialists or Other Care Team Provider:  Yes    Education and Discussions with Family / Patient:  Yes    Time Spent for Care: 40+ minutes  More than 50% of total time spent on counseling and coordination of care as described above  Current Length of Stay: 3 day(s)    Current Patient Status: Inpatient   Certification Statement: The patient will continue to require additional inpatient hospital stay due to Thrombocytopenia/further workup for his fevers/weight loss/diarrhea    Discharge Plan:  Home with home health services when stable    Code Status: Level 1 - Full Code      Subjective:   Patient plays down quite a bit  Although he ate yesterday, he mentioned that he feels the same although given the fact that he was not eating at all at home 4 weeks  No more diarrhea and he has had diarrhea before which is also better    He denies any chest pain or shortness of breath to me  He denies any fever or chills  He is waiting to go for GI/cardiology Procedures  Obviously could not be given food today because he is NPO for the procedures  His appetite surprisingly is back now and previously he had no appetite which is also better although he does not feel that he is better  Objective:     Vitals:   Temp (24hrs), Av 5 °F (36 9 °C), Min:98 °F (36 7 °C), Max:99 3 °F (37 4 °C)    HR:  [64-77] 72  Resp:  [18] 18  BP: (141-160)/(63-70) 147/67  SpO2:  [91 %-98 %] 93 %  Body mass index is 24 73 kg/m²  Input and Output Summary (last 24 hours): Intake/Output Summary (Last 24 hours) at 18 0947  Last data filed at 18 3636   Gross per 24 hour   Intake              520 ml   Output             1050 ml   Net             -530 ml           Physical Exam:     Vital signs are reviewed as above  Constitutional:  Patient lying flat in the bed on his left side  He is not in any respiratory distress   Eyes: EOM grossly intact  Conjunctivae are normal   Patient has anicteric sclera  HENT: Oropharynx are moist  Did not notice any significant lesions on the tongue  Head normocephalic  Neck: Neck is supple  I was not able to visualize any JVD while he is lying   There is no significant lymphadenopathy  I also did not notice any significant thyromegaly  Cardiac: I did not hear any rubs or gallop  Patient appears to be in sinus rhythm  Respiratory: Patient not in significant respiratory distress  Air entry in general is fair with decreased breath sounds at the bases  GI: Abdomen is soft  It is grossly nontender  Bowel sounds are adequate  I was not able to appreciate any hepatosplenomegaly  Neurologic:  Patient is awake and alert  Neurological examination is grossly intact  No obvious focal neurological deficit noticed  Skin: Skin is warm and dry  Psychiatric:  Sort of depressed  Musculoskeletal  Patient moving all extremities while in bed   Has chronic arthritic joints Extremities: Patient has no significant cyanosis, clubbing, or lower extremity edema       Additional Data:     Labs:      Results from last 7 days  Lab Units 06/22/18  0554   WBC Thousand/uL 7 37   HEMOGLOBIN g/dL 12 1   HEMATOCRIT % 36 0*   PLATELETS Thousands/uL 63*   LYMPHO PCT % 13*   MONO PCT MAN % 15*   EOSINO PCT MANUAL % 0       Results from last 7 days  Lab Units 06/22/18  0554   SODIUM mmol/L 139   POTASSIUM mmol/L 4 3   CHLORIDE mmol/L 108   CO2 mmol/L 22   BUN mg/dL 15   CREATININE mg/dL 1 04   CALCIUM mg/dL 7 7*   TOTAL PROTEIN g/dL 5 7*   BILIRUBIN TOTAL mg/dL 1 10*   ALK PHOS U/L 54   ALT U/L 62   AST U/L 64*   GLUCOSE RANDOM mg/dL 108       Results from last 7 days  Lab Units 06/22/18  0554   INR  1 79*       * I Have Reviewed All Lab Data Listed Above  * Additional Pertinent Lab Tests Reviewed: All Labs Within Last 24 Hours Reviewed    Recent Cultures (last 7 days):       Results from last 7 days  Lab Units 06/20/18  1415 06/19/18  1915   BLOOD CULTURE   --  No Growth at 48 hrs  No Growth at 48 hrs  C DIFF TOXIN B  NEGATIVE for C difficle toxin by PCR    --        Last 24 Hours Medication List:     Current Facility-Administered Medications:  acetaminophen 650 mg Oral Q6H PRN Kel Shelby MD    allopurinol 100 mg Oral Daily Reba Zee MD    atorvastatin 40 mg Oral Daily Reba Zee MD    benzonatate 100 mg Oral TID PRN Reba Zee MD    carvedilol 6 25 mg Oral BID With Meals Reba Zee MD    ondansetron 4 mg Intravenous Q6H PRN Reba Zee MD    pantoprazole 40 mg Oral BID AC Katherine Fung III, MD    sodium chloride 50 mL/hr Intravenous Continuous Reba Zee MD Last Rate: 50 mL/hr (06/22/18 0106)        Today, Patient Was Seen By: Reba Zee MD    ** Please Note: Dragon 360 Dictation voice to text software may have been used in the creation of this document   **

## 2018-06-22 NOTE — PROGRESS NOTES
Progress Note - Cardiology     Pranav Morris 71 y o  male MRN: [id number]  Unit/Bed#: -01 Encounter: [id number]      Subjective:   Patient states he is doing "so-so"  He has cough which is worsened from yesterday  He attributes this to not taking tessalon perles as he does regularly at home  He is pending EGD with GUY to follow later today  Colonoscopy planned for Monday  Results of echo were reviewed and need for GUY was once again discussed  Objective:   Vitals:  Vitals:    06/22/18 0700   BP: 147/67   Pulse: 72   Resp: 18   Temp: 99 3 °F (37 4 °C)   SpO2: 93%       Body mass index is 24 73 kg/m²  Systolic (99MDA), HLA:981 , Min:141 , WTE:474     Diastolic (53XAF), YCH:57, Min:63, Max:70      Intake/Output Summary (Last 24 hours) at 06/22/18 1006  Last data filed at 06/22/18 4745   Gross per 24 hour   Intake              520 ml   Output             1050 ml   Net             -530 ml     Weight (last 2 days)     Date/Time   Weight    06/21/18 0600  69 5 (153 22)    06/20/18 0600  69 5 (153 22)              PHYSICAL EXAM:  General: Patient is not in acute distress  Laying comfortably in the bed  Head: Normocephalic  Atraumatic  HEENT: Both pupils normal sized, round and reactive to light  Nonicteric  Neck: JVP not raised  Trachea central    Respiratory: scattered wheezing   Cardiovascular: RRR  S1 and S2 normal  3/6 systolic murmur   GI: Abdomen soft, nontender  No guarding or rigidity  Neurologic: Patient is awake, alert, responding to command   Moving all extremities  Integumentary:  No skin rash  Extremities: No clubbing, cyanosis or edema    LABORATORY RESULTS:    Results from last 7 days  Lab Units 06/19/18  1200   TROPONIN I ng/mL 0 04     CBC with diff:   Results from last 7 days  Lab Units 06/22/18  0554 06/21/18  0439 06/20/18  0447   WBC Thousand/uL 7 37 3 82* 5 78   HEMOGLOBIN g/dL 12 1 11 8* 13 9   HEMATOCRIT % 36 0* 35 4* 42 0   MCV fL 101* 103* 102*   PLATELETS Thousands/uL 63* 50* 60* MCH pg 34 1 34 4* 33 9   MCHC g/dL 33 6 33 3 33 1   RDW % 13 2 13 5 13 8   MPV fL 11 5 12 1 10 9   NRBC AUTO /100 WBCs 0 0 0       CMP:  Results from last 7 days  Lab Units 06/22/18  0554 06/21/18  0439 06/20/18  0447   SODIUM mmol/L 139 137 137   POTASSIUM mmol/L 4 3 3 8 3 9   CHLORIDE mmol/L 108 108 104   CO2 mmol/L 22 25 26   ANION GAP mmol/L 9 4 7   BUN mg/dL 15 19 24   CREATININE mg/dL 1 04 1 18 1 48*   GLUCOSE RANDOM mg/dL 108 87 101   CALCIUM mg/dL 7 7* 7 3* 7 7*   AST U/L 64* 74* 82*   ALT U/L 62 66 79*   ALK PHOS U/L 54 46 57   TOTAL PROTEIN g/dL 5 7* 5 2* 6 3*   BILIRUBIN TOTAL mg/dL 1 10* 1 00 1 40*   EGFR ml/min/1 73sq m 72 62 47       BMP:  Results from last 7 days  Lab Units 06/22/18  0554 06/21/18  0439 06/20/18  0447   SODIUM mmol/L 139 137 137   POTASSIUM mmol/L 4 3 3 8 3 9   CHLORIDE mmol/L 108 108 104   CO2 mmol/L 22 25 26   BUN mg/dL 15 19 24   CREATININE mg/dL 1 04 1 18 1 48*   GLUCOSE RANDOM mg/dL 108 87 101   CALCIUM mg/dL 7 7* 7 3* 7 7*         Results from last 7 days  Lab Units 06/19/18  1200   NT-PRO BNP pg/mL 608*          Results from last 7 days  Lab Units 06/20/18  0447 06/19/18  1200   MAGNESIUM mg/dL 2 3 1 9               Results from last 7 days  Lab Units 06/19/18  1912   TSH 3RD GENERATON uIU/mL 0 908         Results from last 7 days  Lab Units 06/22/18  0554 06/21/18  0440 06/20/18  0447 06/19/18  1200   INR  1 79* 2 30* 2 13* 1 93*       Lipid Profile:   Lab Results   Component Value Date    CHOL 119 04/25/2018     Lab Results   Component Value Date    HDL 45 04/25/2018     Lab Results   Component Value Date    LDLCALC 53 04/25/2018     Lab Results   Component Value Date    TRIG 106 04/25/2018       Cardiac testing:   Results for orders placed during the hospital encounter of 06/19/18   Echo complete with contrast if indicated    Narrative [address] [place] [id number] [phone]    Transthoracic Echocardiogram  2D, M-mode, Doppler, and Color Doppler    Study date:  2018    Patient: Asiya Amin  MR number: [id number]  Account number: [de-identified]  : 1945  Age: 71 years  Gender: Male  Status: Inpatient  Location: Bedside  Height: 66 in  Weight: 153 lb  BP: 144/ 60 mmHg    Indications: Coronary Artery Disease    Diagnoses: I25 10 - Atherosclerotic heart disease of native coronary artery without angina pectoris    Sonographer:  RACHANA Ho,RDCS  Interpreting Physician:  Bridgette Montano MD  Referring Physician:  Darvin Calles MD  Group:  [facility]    SUMMARY    LEFT VENTRICLE:  Ejection fraction was estimated to be 55 %  There were no regional wall motion abnormalities  Concentric hypertrophy was present  RIGHT VENTRICLE:  The ventricle was mildly dilated  Estimated peak pressure was at least 70 mmHg c/w severe pulmonary hypertension  LEFT ATRIUM:  The atrium was mildly to moderately dilated  RIGHT ATRIUM:  The atrium was dilated  MITRAL VALVE:  There was moderate to marked annular calcification  There was moderate calcification of MV leaflets  Vegetation can not be ruled out with certainty  Consider GUY if clinically indicated  There was moderate to severe regurgitation  Patient has history of MV repair  AORTIC VALVE:  There was mild to moderate stenosis  There was mild to moderate regurgitation  TRICUSPID VALVE:  There was mild regurgitation  HISTORY: PRIOR HISTORY: Hyperlipidemia, Atrial Fibrillation, Transient Ischemic Attack, Hypertension, Acute Renal Failure, S/P Stent, Mitral Valve Repair    PROCEDURE: The procedure was performed at the bedside  This was a routine study  The transthoracic approach was used  The study included complete 2D imaging, M-mode, complete spectral Doppler, and color Doppler  The heart rate was 77 bpm,  at the start of the study   Images were obtained from the parasternal, apical, subcostal, and suprasternal notch acoustic windows  Intravenous contrast ( 0 6ml Definity used in NSS) was administered to opacify the left ventricle  Image  quality was adequate  LEFT VENTRICLE: Size was normal  Ejection fraction was estimated to be 55 %  There were no regional wall motion abnormalities  Concentric hypertrophy was present  RIGHT VENTRICLE: The ventricle was mildly dilated  Systolic function was normal  Wall thickness was normal  DOPPLER: Estimated peak pressure was at least 70 mmHg c/w severe pulmonary hypertension  LEFT ATRIUM: The atrium was mildly to moderately dilated  RIGHT ATRIUM: The atrium was dilated  MITRAL VALVE: There was moderate to marked annular calcification  There was moderate calcification of MV leaflets  Vegetation can not be ruled out with certainty  Consider GUY if clinically indicated  DOPPLER: There was moderate to severe  regurgitation  Patient has history of MV repair  AORTIC VALVE: The valve was probably trileaflet  Leaflets exhibited mild to moderate calcification  DOPPLER: There was mild to moderate stenosis  There was mild to moderate regurgitation  TRICUSPID VALVE: The valve structure was normal  There was normal leaflet separation  DOPPLER: The transtricuspid velocity was within the normal range  There was no evidence for stenosis  There was mild regurgitation  PULMONIC VALVE: Leaflets exhibited normal thickness, no calcification, and normal cuspal separation  DOPPLER: The transpulmonic velocity was within the normal range  There was no regurgitation  PERICARDIUM: There was no pericardial effusion  The pericardium was normal in appearance  AORTA: The root exhibited normal size      SYSTEM MEASUREMENT TABLES    2D  %FS: 32 3 %  Ao Diam: 3 7 cm  EDV(Teich): 122 6 ml  EF Biplane: 46 %  EF(Teich): 60 3 %  ESV(Teich): 48 7 ml  IVSd: 1 3 cm  LA Area: 28 7 cm2  LA Diam: 3 3 cm  LVEDV MOD A2C: 147 ml  LVEDV MOD A4C: 149 7 ml  LVEDV MOD BP: 148 1 ml  LVEF MOD A2C: 39 7 %  LVEF MOD A4C: 52 3 %  LVESV MOD A2C: 88 6 ml  LVESV MOD A4C: 71 4 ml  LVESV MOD BP: 80 ml  LVIDd: 5 1 cm  LVIDs: 3 4 cm  LVLd A2C: 8 8 cm  LVLd A4C: 8 9 cm  LVLs A2C: 8 cm  LVLs A4C: 8 1 cm  LVOT Diam: 2 1 cm  LVPWd: 1 2 cm  RA Area: 21 3 cm2  RVIDd: 3 4 cm  SV MOD A2C: 58 4 ml  SV MOD A4C: 78 4 ml  SV(Teich): 73 9 ml    CW  AR Dec Miner: 2 7 m/s2  AR Dec Time: 1294 ms  AR PHT: 375 3 ms  AR Vmax: 3 5 m/s  AR maxP 1 mmHg  AV Env  Ti: 253 5 ms  AV VTI: 45 1 cm  AV Vmax: 2 8 m/s  AV Vmean: 1 8 m/s  AV maxP 4 mmHg  AV meanPG: 15 3 mmHg  HR: 71 5 BPM  MR VTI: 150 1 cm  MR Vmax: 5 6 m/s  MR Vmean: 4 4 m/s  MR maxP 7 mmHg  MR meanP 5 mmHg  MV VTI: 65 cm  MV Vmax: 2 2 m/s  MV Vmean: 1 2 m/s  MV maxP 4 mmHg  MV meanP 2 mmHg  TR Vmax: 4 2 m/s  TR maxP 6 mmHg    MM  TAPSE: 2 1 cm    PW  RADHA (VTI): 1 7 cm2  RADHA Vmax: 1 6 cm2  E': 0 m/s  E/E': 48 8  LVOT Env  Ti: 267 7 ms  LVOT VTI: 21 8 cm  LVOT Vmax: 1 3 m/s  LVOT Vmean: 0 8 m/s  LVOT maxP 8 mmHg  LVOT meanPG: 3 2 mmHg  LVSV Dopp: 76 8 ml  MV A Naun: 1 6 m/s  MV Dec Miner: 4 8 m/s2  MV DecT: 353 4 ms  MV E Naun: 1 7 m/s  MV E/A Ratio: 1 1  MV PHT: 102 5 ms  MVA (VTI): 1 2 cm2  MVA By PHT: 2 1 cm2    [employer] Accredited [facility]    Prepared and electronically signed by    Bk James MD  Signed 2018 16:40:44             Meds/Allergies   all current active meds have been reviewed  Prescriptions Prior to Admission   Medication    allopurinol (ZYLOPRIM) 100 mg tablet    atorvastatin (LIPITOR) 40 mg tablet    benzonatate (TESSALON PERLES) 100 mg capsule    carvedilol (COREG) 6 25 mg tablet    furosemide (LASIX) 20 mg tablet    lisinopril (ZESTRIL) 40 mg tablet    Multiple Vitamins-Minerals (PX MENS MULTIVITAMINS) TABS    ranitidine (ZANTAC) 300 MG capsule    warfarin (COUMADIN) 2 5 mg tablet         sodium chloride 50 mL/hr Last Rate: 50 mL/hr (18 0106)         Assessment and Plan:    Echocardiogram EF 55%, concentric hypertrophy, estimated peak PA pressure 70 mmhg (severe pulmonary hypertension, moderate to marked MAC, moderate to severe MR (history of MV repair) vegetation could not be ruled out , mild to moderate AS, mild to moderate AI, mild TR     Mitral valve regurgitation s/p Mitral valve repair   -last echo in 2015 through primary cardiologist Dr Jameson Cohen in [facility], mitral regurgitation was only mild, given fever of unknown origin and worsening of MR, endocarditis should be included in differential   -Would likely benefit from GUY to better evaluate degree of MR and also for presence of vegetation (could not be ruled on TTE)  Plan to do GUY immediately following endoscopy today  Risks and benefits reviewed  Discussed with GI  PAF  -INR 1 79, coumadin on hold      CAD s/p PCI   -no recent anginal symptoms   -EF preserved, no evidence of acute volume overload  -ASA held in the setting of thrombocytopenia, on carvedilol, atorvastatin     Thrombocytopenia   -platelets 44-->04-->07-->75 this admission, continue to follow   -heme/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

## 2018-06-22 NOTE — PROGRESS NOTES
Addendum:  Will also recheck PCT in a [facility] Progress Note - Infectious Disease   Sabi Morris 71 y o  male MRN: [id number]  Unit/Bed#: OR POOL Encounter: [id number]      Impression/Recommendations:  1   Fever, with multitude of constitutional symptoms   Patient's symptoms appear to be chronic per progressive   He has no leukocytosis   He does have thrombocytopenia and elevated LFTs   Chest/abdomen/pelvis CT is completely normal  This clinical picture is somewhat suggestive of tick related infection, especially babesiosis, and possibly the anaplasmosis   Also, consider SBE    PCT mildly elevated at 0 71  2D echo with MV calcification versus vegetation  Blood cultures remain negative  Parasite blood smear negative  Lyme screen negative  With chronicity of symptoms, I would not treat patient empirically with antibiotics for now   Will workup for above possibilities     Follow-up on pending blood cultures     Follow-up Anaplasma PCR     Hold off on any empiric antibiotic for now  Monitor temperature/WBC      2    Possible endocarditis  2D echo with MV calcification versus vegetation  Symptom complex above is certainly consistent with SBP  However, admission blood cultures are negative so far  Patient is scheduled to undergo GUY later today  If GUY shows MV vegetation, patient will need serial blood cultures  3   Pain in upper gum  This symptom is only over last week, unlikely etiology of constitutional symptoms over the last 2 months  CT imaging without significant abnormalities  Monitor for now  4   Thrombocytopenia   I suspect it is secondary to the syndromes outlined above   Will monitor for now  Platelet count is stable   Patient may ultimately need bone marrow biopsy, only if above workup is unrevealing  Monitor CBC for now      5   Elevated LFTs   Wound I suspect it is secondary to the syndromes outlined above   Will monitor for now   Patient's abdominal exam remains benign  Abdominal CT and ultrasound quite normal  Doubt biliary tract obstruction  LFTs improving spontaneously  Monitor LFTs for now      6  Nausea and anorexia  Abdominal exam benign  CT/ultrasound benign  GI evaluation noted  Plan for EGD noted  GI follow-up  7  Diarrhea   This predates patient taking azithromycin   Also, diarrhea has been intermittent   Doubt C difficile colitis    Stool C diff toxin negative  Monitor diarrhea for now      8   Dry cough   Etiology of this is unclear   No clinical signs of pneumonia   Chest CT is negative   Monitor for now  Monitor cough/respiratory symptoms      9   NED   I suspect this is secondary to dehydration from decreased p o  intake   Creatinine continues to improve with IV fluid hydration, near normalized now  Monitor creatinine for now      Discussed with patient in detail regarding above plan      Antibiotics:  None     Subjective:  Patient complains of pain in his upper gumline over the last week  He has dentures in upper jaw  He has teeth in lower jaw  Patient feels about the same  He has generalized fatigue  He is nauseated  He remains anorexic  Diarrhea mild, off and on  No abdominal pain  Temperature has been down  No further chills  Objective:  Vitals:  HR:  [66-77] 75  Resp:  [16-18] 16  BP: (117-160)/(65-89) 117/89  SpO2:  [91 %-98 %] 94 %  Temp (24hrs), Av 6 °F (37 °C), Min:98 °F (36 7 °C), Max:99 3 °F (37 4 °C)  Current: Temperature: 98 6 °F (37 °C)    Physical Exam:     General: Awake, alert, cooperative, no distress  Mouth: Mild erythema in right frontal upper gum  No purulence  No fluctuance  Mild tenderness  Lungs: Expansion symmetric, no rales, no wheezing, respirations unlabored  Heart[de-identified]  Regular rate and rhythm, S1 and S2 normal, no murmur  Abdomen: Soft, nondistended, non-tender, bowel sounds active all four quadrants,        no masses, no organomegaly  Extremities: Trace edema  No erythema/warmth  No ulcer  Nontender to palpation  Skin:  No rash  Invasive Devices     Peripheral Intravenous Line            Peripheral IV 06/19/18 Left Antecubital 3 days    Peripheral IV 06/19/18 Right Antecubital 2 days                Labs studies:   I have personally reviewed pertinent labs  Results from last 7 days  Lab Units 06/22/18  0554 06/21/18  0439 06/20/18  0447   SODIUM mmol/L 139 137 137   POTASSIUM mmol/L 4 3 3 8 3 9   CHLORIDE mmol/L 108 108 104   CO2 mmol/L 22 25 26   ANION GAP mmol/L 9 4 7   BUN mg/dL 15 19 24   CREATININE mg/dL 1 04 1 18 1 48*   EGFR ml/min/1 73sq m 72 62 47   GLUCOSE RANDOM mg/dL 108 87 101   CALCIUM mg/dL 7 7* 7 3* 7 7*   AST U/L 64* 74* 82*   ALT U/L 62 66 79*   ALK PHOS U/L 54 46 57   TOTAL PROTEIN g/dL 5 7* 5 2* 6 3*   BILIRUBIN TOTAL mg/dL 1 10* 1 00 1 40*       Results from last 7 days  Lab Units 06/22/18  0554 06/21/18  0439 06/20/18  0447   WBC Thousand/uL 7 37 3 82* 5 78   HEMOGLOBIN g/dL 12 1 11 8* 13 9   PLATELETS Thousands/uL 63* 50* 60*       Results from last 7 days  Lab Units 06/20/18  1415 06/19/18  1915   BLOOD CULTURE   --  No Growth at 48 hrs  No Growth at 48 hrs  C DIFF TOXIN B  NEGATIVE for C difficle toxin by PCR    --        Imaging Studies:   I have personally reviewed pertinent imaging study reports and images in PACS  Facial bone CT reviewed personally  Minimal left maxillary sinus mucosal thickening  Otherwise, normal study  Renal ultrasound reviewed  Mild right renal atrophy  No hydronephrosis  RUQ ultrasound reviewed  Status post cholecystectomy  Normal study  EKG, Pathology, and Other Studies:   I have personally reviewed pertinent reports

## 2018-06-22 NOTE — ANESTHESIA POSTPROCEDURE EVALUATION
Post-Op Assessment Note      CV Status:  Stable    Mental Status:  Awake and alert    Hydration Status:  Euvolemic    PONV Controlled:  Controlled    Airway Patency:  Patent    Post Op Vitals Reviewed: Yes          Staff: ALYSON           BP (!) 96/46 (06/22/18 1337)    Temp 98 3 °F (36 8 °C) (06/22/18 1337)    Pulse 68 (06/22/18 1337)   Resp (!) 24 (06/22/18 1337)    SpO2 94 % (06/22/18 1337)

## 2018-06-22 NOTE — PLAN OF CARE
Problem: PHYSICAL THERAPY ADULT  Goal: Performs mobility at highest level of function for planned discharge setting  See evaluation for individualized goals  Treatment/Interventions: Functional transfer training, LE strengthening/ROM, Elevations, Therapeutic exercise, Endurance training, Patient/family training, Bed mobility, Equipment eval/education, Gait training, Spoke to nursing          See flowsheet documentation for full assessment, interventions and recommendations  Outcome: Progressing  Prognosis: Good  Problem List: Decreased strength, Decreased endurance, Impaired balance, Decreased mobility  Assessment: pt demonstrating progress c PT, however cont to demonstrate significant weakness + fatigue  progressed ambulation distance to [de-identified]' c (S)  initiated B/L LE ther ex in supine/seated x10 reps c AROM + min verbal cues for technique  pt encouraged to ambulate c nsg staff TID to improve activity tolerance  also educated pt on safety at home + energy conservation techniques  pt declined sitting in chair at this time requesting return to supine 2* significant fatigue  pending EGD + GUY 6/22 along c colonoscopy 6/25  will cont skilled PT to maximize functional mobility + improve quality of life  upon d/c, recommend hhpt  Barriers to Discharge: Inaccessible home environment, Decreased caregiver support     Recommendation: Home PT     PT - OK to Discharge: No    See flowsheet documentation for full assessment

## 2018-06-22 NOTE — OP NOTE
**** GI/ENDOSCOPY REPORT ****     PATIENT NAME: SALTY JOINER - VISIT ID:  Patient ID: [id number]   YOB: 1945     INTRODUCTION: Esophagogastroduodenoscopy - A 71 male patient presents for   an inpatient Esophagogastroduodenoscopy at [facility]  INDICATIONS: Abdominal pain  GERD  CONSENT: The benefits, risks, and alternatives to the procedure were   discussed and informed consent was obtained from the patient  PREPARATION:  EKG, pulse, pulse oximetry and blood pressure were monitored   throughout the procedure  ASA Classification: Class 3 - Patient has severe   systemic disturbance that may or may not be related to the disorder   requiring surgery  The patient was kept NPO for eight hours prior to the   procedure  MEDICATIONS: MAC anesthesia  PROCEDURE:  The endoscope was passed without difficulty through the mouth   under direct visualization and advanced to the 2nd portion of the   duodenum  The scope was withdrawn and the mucosa was carefully examined  FINDINGS:   Esophagus: The esophagus appeared to be normal   Stomach: The   antrum, body of the stomach, cardia, and fundus appeared to be normal    Multiple random biopsies was taken  Duodenum: There was a single   medium-sized ulcer in the duodenal bulb  It was not bleeding and showed no   bleeding stigmata  The 2nd portion of the duodenum appeared to be normal      COMPLICATIONS: There were no complications  IMPRESSIONS: Normal esophagus  Normal antrum, body of the stomach, cardia,   and fundus  Multiple biopsies taken  An ulcer found in the duodenal bulb  Normal 2nd portion of the duodenum  RECOMMENDATIONS: Anti-reflux measures: Raise the head of the bed 4 to 6   inches  Avoid smoking  Avoid excess coffee, tea or other caffeinated   beverages  Avoid garments that fit tightly through the abdomen  Avoid   eating before bed  Follow-up on the results of the biopsy specimens in 1   week  Protonix 40mg BID for 3mths  Antibiotics per ID service; LFT   elevation likely reactive  No Colonoscopy planned; will sign off  ESTIMATED BLOOD LOSS: None  PATHOLOGY SPECIMENS: Multiple random biopsies taken  Yes     PROCEDURE CODES: 79655 - EGD flexible; with biopsy     ICD-9 Codes: 789 00 Abdominal pain, unspecified site 530 81 Esophageal   reflux     ICD-10 Codes: R10 Abdominal and pelvic pain K21 Gastro-esophageal reflux   disease K26 Duodenal ulcer     PERFORMED BY: SALLY Méndez  on 06/22/2018  Version 1, electronically signed by SALLY Isaac  on   06/22/2018 at 13:19

## 2018-06-22 NOTE — PHYSICAL THERAPY NOTE
PT Progress Note (39min)  (8:23-9:02)       06/22/18 0902   Pain Assessment   Pain Assessment No/denies pain   Restrictions/Precautions   Other Precautions Multiple lines;Telemetry; Fall Risk   General   Chart Reviewed Yes   Response to Previous Treatment Patient with no complaints from previous session  Family/Caregiver Present No   Cognition   Orientation Level Oriented X4   Subjective   Subjective pt agreeable to PT session  "I feel so weak and I didn't sleep at all last night"  Bed Mobility   Supine to Sit 5  Supervision   Additional items Increased time required;Verbal cues   Sit to Supine 5  Supervision   Additional items Increased time required;Verbal cues   Transfers   Sit to Stand 5  Supervision   Additional items Verbal cues; Increased time required   Stand to Sit 5  Supervision   Additional items Verbal cues; Increased time required   Ambulation/Elevation   Gait pattern Narrow KWASI; Forward Flexion;Decreased foot clearance; Excessively slow   Gait Assistance 5  Supervision   Additional items Verbal cues   Assistive Device None   Distance 80'; limited by fatigue   Balance   Static Sitting Good   Dynamic Sitting Good   Static Standing Fair   Dynamic Standing Fair   Ambulatory Fair   Activity Tolerance   Activity Tolerance Patient limited by fatigue   Nurse Made Aware Sophie   Exercises   Quad Sets Supine;10 reps;AROM; Bilateral   Heelslides Supine;10 reps;AROM; Bilateral   Glute Sets Supine;10 reps;AROM; Bilateral   Hip Abduction Supine;10 reps;AROM; Bilateral   Knee AROM Long Arc Quad Sitting;10 reps;AROM; Bilateral   Ankle Pumps Supine;10 reps;AROM; Bilateral   Marching 10 reps;AROM; Bilateral;Sitting   Assessment   Prognosis Good   Problem List Decreased strength;Decreased endurance; Impaired balance;Decreased mobility   Assessment pt demonstrating progress c PT, however cont to demonstrate significant weakness + fatigue  progressed ambulation distance to [de-identified]' c (S)   initiated B/L LE ther ex in supine/seated x10 reps c AROM + min verbal cues for technique  pt encouraged to ambulate c nsg staff TID to improve activity tolerance  also educated pt on safety at home + energy conservation techniques  pt declined sitting in chair at this time requesting return to supine 2* significant fatigue  pending EGD + GUY 6/22 along c colonoscopy 6/25  will cont skilled PT to maximize functional mobility + improve quality of life  upon d/c, recommend hhpt  Barriers to Discharge Inaccessible home environment;Decreased caregiver support   Goals   Patient Goals "to go home"  STG Expiration Date 07/01/18   Treatment Day 1   Plan   Treatment/Interventions Functional transfer training;LE strengthening/ROM; Elevations; Therapeutic exercise; Endurance training;Patient/family training;Equipment eval/education; Bed mobility;Gait training;Spoke to nursing   Progress Progressing toward goals   PT Frequency 5x/wk   Recommendation   Recommendation Home PT   PT - OK to Discharge Faiza Mcdermott, PT

## 2018-06-23 PROBLEM — I38 ENDOCARDITIS: Status: ACTIVE | Noted: 2018-06-23

## 2018-06-23 LAB
ALBUMIN SERPL BCP-MCNC: 2.3 G/DL (ref 3.5–5)
ALP SERPL-CCNC: 51 U/L (ref 46–116)
ALT SERPL W P-5'-P-CCNC: 56 U/L (ref 12–78)
ANION GAP SERPL CALCULATED.3IONS-SCNC: 7 MMOL/L (ref 4–13)
AST SERPL W P-5'-P-CCNC: 53 U/L (ref 5–45)
BASOPHILS # BLD AUTO: 0.03 THOUSANDS/ΜL (ref 0–0.1)
BASOPHILS NFR BLD AUTO: 0 % (ref 0–1)
BILIRUB SERPL-MCNC: 1.1 MG/DL (ref 0.2–1)
BUN SERPL-MCNC: 13 MG/DL (ref 5–25)
CALCIUM SERPL-MCNC: 7.8 MG/DL (ref 8.3–10.1)
CHLORIDE SERPL-SCNC: 108 MMOL/L (ref 100–108)
CO2 SERPL-SCNC: 26 MMOL/L (ref 21–32)
CREAT SERPL-MCNC: 0.98 MG/DL (ref 0.6–1.3)
EOSINOPHIL # BLD AUTO: 0.11 THOUSAND/ΜL (ref 0–0.61)
EOSINOPHIL NFR BLD AUTO: 1 % (ref 0–6)
ERYTHROCYTE [DISTWIDTH] IN BLOOD BY AUTOMATED COUNT: 13.3 % (ref 11.6–15.1)
GFR SERPL CREATININE-BSD FRML MDRD: 77 ML/MIN/1.73SQ M
GLUCOSE SERPL-MCNC: 106 MG/DL (ref 65–140)
HCT VFR BLD AUTO: 33.4 % (ref 36.5–49.3)
HGB BLD-MCNC: 11.3 G/DL (ref 12–17)
IMM GRANULOCYTES # BLD AUTO: 0.04 THOUSAND/UL (ref 0–0.2)
IMM GRANULOCYTES NFR BLD AUTO: 1 % (ref 0–2)
INR PPP: 1.76 (ref 0.86–1.17)
LYMPHOCYTES # BLD AUTO: 1.19 THOUSANDS/ΜL (ref 0.6–4.47)
LYMPHOCYTES NFR BLD AUTO: 15 % (ref 14–44)
MCH RBC QN AUTO: 33.9 PG (ref 26.8–34.3)
MCHC RBC AUTO-ENTMCNC: 33.8 G/DL (ref 31.4–37.4)
MCV RBC AUTO: 100 FL (ref 82–98)
MONOCYTES # BLD AUTO: 0.88 THOUSAND/ΜL (ref 0.17–1.22)
MONOCYTES NFR BLD AUTO: 11 % (ref 4–12)
NEUTROPHILS # BLD AUTO: 5.5 THOUSANDS/ΜL (ref 1.85–7.62)
NEUTS SEG NFR BLD AUTO: 72 % (ref 43–75)
NRBC BLD AUTO-RTO: 0 /100 WBCS
PLATELET # BLD AUTO: 82 THOUSANDS/UL (ref 149–390)
PMV BLD AUTO: 11.3 FL (ref 8.9–12.7)
POTASSIUM SERPL-SCNC: 4.2 MMOL/L (ref 3.5–5.3)
PROCALCITONIN SERPL-MCNC: 0.17 NG/ML
PROT SERPL-MCNC: 5.7 G/DL (ref 6.4–8.2)
PROTHROMBIN TIME: 20.3 SECONDS (ref 11.8–14.2)
RBC # BLD AUTO: 3.33 MILLION/UL (ref 3.88–5.62)
SODIUM SERPL-SCNC: 141 MMOL/L (ref 136–145)
WBC # BLD AUTO: 7.75 THOUSAND/UL (ref 4.31–10.16)

## 2018-06-23 PROCEDURE — 86038 ANTINUCLEAR ANTIBODIES: CPT | Performed by: INTERNAL MEDICINE

## 2018-06-23 PROCEDURE — 99232 SBSQ HOSP IP/OBS MODERATE 35: CPT | Performed by: INTERNAL MEDICINE

## 2018-06-23 PROCEDURE — 85610 PROTHROMBIN TIME: CPT | Performed by: INTERNAL MEDICINE

## 2018-06-23 PROCEDURE — 87103 BLOOD FUNGUS CULTURE: CPT | Performed by: INTERNAL MEDICINE

## 2018-06-23 PROCEDURE — 80053 COMPREHEN METABOLIC PANEL: CPT | Performed by: PHYSICIAN ASSISTANT

## 2018-06-23 PROCEDURE — 99233 SBSQ HOSP IP/OBS HIGH 50: CPT | Performed by: INTERNAL MEDICINE

## 2018-06-23 PROCEDURE — 84145 PROCALCITONIN (PCT): CPT | Performed by: INTERNAL MEDICINE

## 2018-06-23 PROCEDURE — 86226 DNA ANTIBODY SINGLE STRAND: CPT | Performed by: INTERNAL MEDICINE

## 2018-06-23 PROCEDURE — 85025 COMPLETE CBC W/AUTO DIFF WBC: CPT | Performed by: INTERNAL MEDICINE

## 2018-06-23 PROCEDURE — 87040 BLOOD CULTURE FOR BACTERIA: CPT | Performed by: INTERNAL MEDICINE

## 2018-06-23 RX ORDER — WARFARIN SODIUM 5 MG/1
5 TABLET ORAL
Status: DISCONTINUED | OUTPATIENT
Start: 2018-06-23 | End: 2018-06-23

## 2018-06-23 RX ORDER — WARFARIN SODIUM 5 MG/1
5 TABLET ORAL
Status: COMPLETED | OUTPATIENT
Start: 2018-06-23 | End: 2018-06-23

## 2018-06-23 RX ORDER — MIRTAZAPINE 15 MG/1
15 TABLET, FILM COATED ORAL
Status: DISCONTINUED | OUTPATIENT
Start: 2018-06-23 | End: 2018-06-25 | Stop reason: HOSPADM

## 2018-06-23 RX ADMIN — CARVEDILOL 6.25 MG: 6.25 TABLET, FILM COATED ORAL at 17:12

## 2018-06-23 RX ADMIN — ALLOPURINOL 100 MG: 100 TABLET ORAL at 08:50

## 2018-06-23 RX ADMIN — PANTOPRAZOLE SODIUM 40 MG: 40 TABLET, DELAYED RELEASE ORAL at 16:00

## 2018-06-23 RX ADMIN — WARFARIN SODIUM 5 MG: 5 TABLET ORAL at 17:12

## 2018-06-23 RX ADMIN — MIRTAZAPINE 15 MG: 15 TABLET, FILM COATED ORAL at 23:04

## 2018-06-23 RX ADMIN — CARVEDILOL 6.25 MG: 6.25 TABLET, FILM COATED ORAL at 06:31

## 2018-06-23 RX ADMIN — ATORVASTATIN CALCIUM 40 MG: 40 TABLET, FILM COATED ORAL at 08:51

## 2018-06-23 RX ADMIN — BENZONATATE 100 MG: 100 CAPSULE ORAL at 06:33

## 2018-06-23 RX ADMIN — PANTOPRAZOLE SODIUM 40 MG: 40 TABLET, DELAYED RELEASE ORAL at 06:31

## 2018-06-23 RX ADMIN — BENZONATATE 100 MG: 100 CAPSULE ORAL at 14:58

## 2018-06-23 NOTE — PROGRESS NOTES
NEPHROLOGY PROGRESS NOTE    Patient: Lazarus Hammock Deremer               Sex: male          DOA: 6/19/2018 10:58 AM   YOB: 1945        Age:  71 y o         LOS:  LOS: 4 days       HPI     Patient admitted with generalized weakness loss of weight and acute kidney injury    SUBJECTIVE     Slowly improving  No acute complaint  Eating better    CURRENT MEDICATIONS       Current Facility-Administered Medications:     acetaminophen (TYLENOL) tablet 650 mg, 650 mg, Oral, Q6H PRN, Naveen Smallwood MD, 650 mg at 06/22/18 1727    albuterol inhalation solution 2 5 mg, 2 5 mg, Nebulization, Q4H PRN, Kylah Smith PA-C    allopurinol (ZYLOPRIM) tablet 100 mg, 100 mg, Oral, Daily, Arvind Gill MD, 100 mg at 06/23/18 0850    atorvastatin (LIPITOR) tablet 40 mg, 40 mg, Oral, Daily, Arvind Gill MD, 40 mg at 06/23/18 0851    benzonatate (TESSALON PERLES) capsule 100 mg, 100 mg, Oral, TID PRN, Arvind Gill MD, 100 mg at 06/23/18 7069    carvedilol (COREG) tablet 6 25 mg, 6 25 mg, Oral, BID With Meals, Arvind Gill MD, 6 25 mg at 06/23/18 0631    mirtazapine (REMERON) tablet 15 mg, 15 mg, Oral, HS, Arvind Gill MD    ondansetron [facility]) injection 4 mg, 4 mg, Intravenous, Q6H PRN, Arvind Gill MD    pantoprazole (PROTONIX) EC tablet 40 mg, 40 mg, Oral, BID AC, Robert Johansen III, MD, 40 mg at 06/23/18 0582    warfarin (COUMADIN) tablet 5 mg, 5 mg, Oral, Once (warfarin), Arvind Gill MD    OBJECTIVE     Current Weight: Weight - Scale: 70 2 kg (154 lb 12 2 oz)  Vitals:    06/23/18 0700   BP: 144/62   Pulse: 81   Resp: 18   Temp: 98 3 °F (36 8 °C)   SpO2: 92%       Intake/Output Summary (Last 24 hours) at 06/23/18 1033  Last data filed at 06/23/18 0300   Gross per 24 hour   Intake              250 ml   Output              850 ml   Net             -600 ml       PHYSICAL EXAMINATION     Physical Exam   Constitutional: He is oriented to person, place, and time  He appears well-developed  No distress  HENT:   Head: Normocephalic  Mouth/Throat: Oropharynx is clear and moist    Eyes: Conjunctivae are normal  No scleral icterus  Neck: Neck supple  No JVD present  Cardiovascular: Normal rate and normal heart sounds  Pulmonary/Chest: Effort normal  He has no wheezes  Abdominal: Soft  There is no tenderness  Musculoskeletal: Normal range of motion  He exhibits no edema  Neurological: He is alert and oriented to person, place, and time  Skin: Skin is warm  No rash noted  Psychiatric: He has a normal mood and affect  His behavior is normal         LAB RESULTS       Results from last 7 days  Lab Units 06/23/18  0541 06/22/18  0554 06/21/18  0439 06/20/18  0447 06/19/18  1200   WBC Thousand/uL 7 75 7 37 3 82* 5 78 7 97   HEMOGLOBIN g/dL 11 3* 12 1 11 8* 13 9 14 5   HEMATOCRIT % 33 4* 36 0* 35 4* 42 0 43 6   PLATELETS Thousands/uL 82* 63* 50* 60* 89*   SODIUM mmol/L 141 139 137 137 137   POTASSIUM mmol/L 4 2 4 3 3 8 3 9 4 3   CHLORIDE mmol/L 108 108 108 104 98*   CO2 mmol/L 26 22 25 26 27   BUN mg/dL 13 15 19 24 34*   CREATININE mg/dL 0 98 1 04 1 18 1 48* 2 18*   EGFR ml/min/1 73sq m 77 72 62 47 29   CALCIUM mg/dL 7 8* 7 7* 7 3* 7 7* 8 9   MAGNESIUM mg/dL  --   --   --  2 3 1 9   PHOSPHORUS mg/dL  --   --  2 1* 2 8 3 2   TOTAL PROTEIN g/dL 5 7* 5 7* 5 2* 6 3* 6 6   GLUCOSE RANDOM mg/dL 106 108 87 101 130       RADIOLOGY RESULTS      No results found for this or any previous visit  Results for orders placed during the hospital encounter of 06/07/18   XR chest pa & lateral    Narrative CHEST     INDICATION:   J20 9: Acute bronchitis, unspecified  R05: Cough  COMPARISON:  8/10/2017    EXAM PERFORMED/VIEWS:  XR CHEST PA & LATERAL  The frontal view was performed utilizing dual energy radiographic technique  Images: 4    FINDINGS:  There are median sternotomy wires indicating prior cardiac surgery  Cardiomediastinal silhouette appears unremarkable  The lungs are clear    No pneumothorax or pleural effusion  Osseous structures appear within normal limits for patient age  Impression No acute cardiopulmonary disease  Workstation performed: [id number]       No results found for this or any previous visit  No results found for this or any previous visit  No results found for this or any previous visit  No results found for this or any previous visit  PLAN / RECOMMENDATIONS      Acute kidney injury:  Improved and normal at this point with hydration    Mitral valve vegetation:  Possible culture negative endocarditis discussed with cardiologist as well as slim    Generalized weakness:  Slowly improving    No new suggest renal point of view will follow up p r n  basis    Savannah Mack MD  Nephrology  6/23/2018        Portions of the record may have been created with voice recognition software  Occasional wrong word or "sound a like" substitutions may have occurred due to the inherent limitations of voice recognition software  Read the chart carefully and recognize, using context, where substitutions have occurred

## 2018-06-23 NOTE — PROGRESS NOTES
Progress Note - Cardiology     John Morris 71 y o  male MRN: [id number]  Unit/Bed#: -01 Encounter: [id number]      Subjective:   Patient reports that his appetite is the same  He otherwise has no new complaints  He had GUY and EGD yesterday  Objective:   Vitals:  Vitals:    06/23/18 0700   BP: 144/62   Pulse: 81   Resp: 18   Temp: 98 3 °F (36 8 °C)   SpO2: 92%       Body mass index is 24 98 kg/m²  Systolic (56JPW), FLB:736 , Min:96 , PPZ:188     Diastolic (05VMS), QZS:25, Min:46, Max:89      Intake/Output Summary (Last 24 hours) at 06/23/18 1003  Last data filed at 06/23/18 0300   Gross per 24 hour   Intake              250 ml   Output              850 ml   Net             -600 ml     Weight (last 2 days)     Date/Time   Weight    06/23/18 0600  70 2 (154 76)    06/21/18 0600  69 5 (153 22)              PHYSICAL EXAM:  General: Patient is not in acute distress  Laying comfortably in the bed  Head: Normocephalic  Atraumatic  HEENT: Both pupils normal sized, round and reactive to light  Nonicteric  Neck: JVP not raised  Trachea central    Respiratory: Bilateral bronchovascular breath sounds with no crackles or rhonchi  Cardiovascular: RRR  3/6 systolic murmur   GI: Abdomen soft, nontender  No guarding or rigidity  Neurologic: Patient is awake, alert, responding to command   Moving all extremities  Integumentary:  No skin rash  Extremities: No clubbing, cyanosis or edema    LABORATORY RESULTS:    Results from last 7 days  Lab Units 06/19/18  1200   TROPONIN I ng/mL 0 04     CBC with diff:   Results from last 7 days  Lab Units 06/23/18  0541 06/22/18  0554 06/21/18  0439   WBC Thousand/uL 7 75 7 37 3 82*   HEMOGLOBIN g/dL 11 3* 12 1 11 8*   HEMATOCRIT % 33 4* 36 0* 35 4*   MCV fL 100* 101* 103*   PLATELETS Thousands/uL 82* 63* 50*   MCH pg 33 9 34 1 34 4*   MCHC g/dL 33 8 33 6 33 3   RDW % 13 3 13 2 13 5   MPV fL 11 3 11 5 12 1   NRBC AUTO /100 WBCs 0 0 0       CMP:  Results from last 7 days  Lab Units 18  0541 18  0554 18  0439   SODIUM mmol/L 141 139 137   POTASSIUM mmol/L 4 2 4 3 3 8   CHLORIDE mmol/L 108 108 108   CO2 mmol/L 26 22 25   ANION GAP mmol/L 7 9 4   BUN mg/dL 13 15 19   CREATININE mg/dL 0 98 1 04 1 18   GLUCOSE RANDOM mg/dL 106 108 87   CALCIUM mg/dL 7 8* 7 7* 7 3*   AST U/L 53* 64* 74*   ALT U/L 56 62 66   ALK PHOS U/L 51 54 46   TOTAL PROTEIN g/dL 5 7* 5 7* 5 2*   BILIRUBIN TOTAL mg/dL 1 10* 1 10* 1 00   EGFR ml/min/1 73sq m 77 72 62       BMP:  Results from last 7 days  Lab Units 18  0541 18  0554 18  0439   SODIUM mmol/L 141 139 137   POTASSIUM mmol/L 4 2 4 3 3 8   CHLORIDE mmol/L 108 108 108   CO2 mmol/L 26 22 25   BUN mg/dL 13 15 19   CREATININE mg/dL 0 98 1 04 1 18   GLUCOSE RANDOM mg/dL 106 108 87   CALCIUM mg/dL 7 8* 7 7* 7 3*         Results from last 7 days  Lab Units 18  1200   NT-PRO BNP pg/mL 608*          Results from last 7 days  Lab Units 18  0447 18  1200   MAGNESIUM mg/dL 2 3 1 9               Results from last 7 days  Lab Units 18  1912   TSH 3RD GENERATON uIU/mL 0 908         Results from last 7 days  Lab Units 18  0541 18  0554 18  0440 18  0447 18  1200   INR  1 76* 1 79* 2 30* 2 13* 1 93*       Lipid Profile:   Lab Results   Component Value Date    CHOL 119 2018     Lab Results   Component Value Date    HDL 45 2018     Lab Results   Component Value Date    LDLCALC 53 2018     Lab Results   Component Value Date    TRIG 106 2018       Cardiac testing:   Results for orders placed during the hospital encounter of 18   Echo complete with contrast if indicated    Narrative [address] [place] [address] [phone]    Transthoracic Echocardiogram  2D, M-mode, Doppler, and Color Doppler    Study date:  2018    Patient: Camila Maldonado  MR number: [id number]  Account number: [de-identified]  : [id number]  Age: 71 years  Gender: Male  Status: Inpatient  Location: Bedside  Height: 66 in  Weight: 153 lb  BP: 144/ 60 mmHg    Indications: Coronary Artery Disease    Diagnoses: I25 10 - Atherosclerotic heart disease of native coronary artery without angina pectoris    Sonographer:  Jeoffrey Kehr, BS,RDCS  Interpreting Physician:  Niko Mathews MD  Referring Physician:  Lizet Rocha MD  Group:  [facility]    SUMMARY    LEFT VENTRICLE:  Ejection fraction was estimated to be 55 %  There were no regional wall motion abnormalities  Concentric hypertrophy was present  RIGHT VENTRICLE:  The ventricle was mildly dilated  Estimated peak pressure was at least 70 mmHg c/w severe pulmonary hypertension  LEFT ATRIUM:  The atrium was mildly to moderately dilated  RIGHT ATRIUM:  The atrium was dilated  MITRAL VALVE:  There was moderate to marked annular calcification  There was moderate calcification of MV leaflets  Vegetation can not be ruled out with certainty  Consider GUY if clinically indicated  There was moderate to severe regurgitation  Patient has history of MV repair  AORTIC VALVE:  There was mild to moderate stenosis  There was mild to moderate regurgitation  TRICUSPID VALVE:  There was mild regurgitation  HISTORY: PRIOR HISTORY: Hyperlipidemia, Atrial Fibrillation, Transient Ischemic Attack, Hypertension, Acute Renal Failure, S/P Stent, Mitral Valve Repair    PROCEDURE: The procedure was performed at the bedside  This was a routine study  The transthoracic approach was used  The study included complete 2D imaging, M-mode, complete spectral Doppler, and color Doppler  The heart rate was 77 bpm,  at the start of the study  Images were obtained from the parasternal, apical, subcostal, and suprasternal notch acoustic windows  Intravenous contrast ( 0 6ml Definity used in NSS) was administered to opacify the left ventricle  Image  quality was adequate      LEFT VENTRICLE: Size was normal  Ejection fraction was estimated to be 55 %  There were no regional wall motion abnormalities  Concentric hypertrophy was present  RIGHT VENTRICLE: The ventricle was mildly dilated  Systolic function was normal  Wall thickness was normal  DOPPLER: Estimated peak pressure was at least 70 mmHg c/w severe pulmonary hypertension  LEFT ATRIUM: The atrium was mildly to moderately dilated  RIGHT ATRIUM: The atrium was dilated  MITRAL VALVE: There was moderate to marked annular calcification  There was moderate calcification of MV leaflets  Vegetation can not be ruled out with certainty  Consider GUY if clinically indicated  DOPPLER: There was moderate to severe  regurgitation  Patient has history of MV repair  AORTIC VALVE: The valve was probably trileaflet  Leaflets exhibited mild to moderate calcification  DOPPLER: There was mild to moderate stenosis  There was mild to moderate regurgitation  TRICUSPID VALVE: The valve structure was normal  There was normal leaflet separation  DOPPLER: The transtricuspid velocity was within the normal range  There was no evidence for stenosis  There was mild regurgitation  PULMONIC VALVE: Leaflets exhibited normal thickness, no calcification, and normal cuspal separation  DOPPLER: The transpulmonic velocity was within the normal range  There was no regurgitation  PERICARDIUM: There was no pericardial effusion  The pericardium was normal in appearance  AORTA: The root exhibited normal size      SYSTEM MEASUREMENT TABLES    2D  %FS: 32 3 %  Ao Diam: 3 7 cm  EDV(Teich): 122 6 ml  EF Biplane: 46 %  EF(Teich): 60 3 %  ESV(Teich): 48 7 ml  IVSd: 1 3 cm  LA Area: 28 7 cm2  LA Diam: 3 3 cm  LVEDV MOD A2C: 147 ml  LVEDV MOD A4C: 149 7 ml  LVEDV MOD BP: 148 1 ml  LVEF MOD A2C: 39 7 %  LVEF MOD A4C: 52 3 %  LVESV MOD A2C: 88 6 ml  LVESV MOD A4C: 71 4 ml  LVESV MOD BP: 80 ml  LVIDd: 5 1 cm  LVIDs: 3 4 cm  LVLd A2C: 8 8 cm  LVLd A4C: 8 9 cm  LVLs A2C: 8 cm  LVLs A4C: 8 1 cm  LVOT Diam: 2 1 cm  LVPWd: 1 2 cm  RA Area: 21 3 cm2  RVIDd: 3 4 cm  SV MOD A2C: 58 4 ml  SV MOD A4C: 78 4 ml  SV(Teich): 73 9 ml    CW  AR Dec [place]: 2 7 m/s2  AR Dec Time: 1294 ms  AR PHT: 375 3 ms  AR Vmax: 3 5 m/s  AR maxP 1 mmHg  AV Env  Ti: 253 5 ms  AV VTI: 45 1 cm  AV Vmax: 2 8 m/s  AV Vmean: 1 8 m/s  AV maxP 4 mmHg  AV meanPG: 15 3 mmHg  HR: 71 5 BPM  MR VTI: 150 1 cm  MR Vmax: 5 6 m/s  MR Vmean: 4 4 m/s  MR maxP 7 mmHg  MR meanP 5 mmHg  MV VTI: 65 cm  MV Vmax: 2 2 m/s  MV Vmean: 1 2 m/s  MV maxP 4 mmHg  MV meanP 2 mmHg  TR Vmax: 4 2 m/s  TR maxP 6 mmHg    MM  TAPSE: 2 1 cm    PW  RADHA (VTI): 1 7 cm2  RADHA Vmax: 1 6 cm2  E': 0 m/s  E/E': 48 8  LVOT Env  Ti: 267 7 ms  LVOT VTI: 21 8 cm  LVOT Vmax: 1 3 m/s  LVOT Vmean: 0 8 m/s  LVOT maxP 8 mmHg  LVOT meanPG: 3 2 mmHg  LVSV Dopp: 76 8 ml  MV A Naun: 1 6 m/s  MV Dec [place]: 4 8 m/s2  MV DecT: 353 4 ms  MV E Naun: 1 7 m/s  MV E/A Ratio: 1 1  MV PHT: 102 5 ms  MVA (VTI): 1 2 cm2  MVA By PHT: 2 1 cm2    [address] [facility]    Prepared and electronically signed by    Ya Zaman MD  Signed 2018 16:40:44       Results for orders placed during the hospital encounter of 18   GUY    Narrative [address] [place] [address] [place], [place] [address] [phone]    Transesophageal Echocardiogram  2D, Doppler, and Color Doppler    Study date:  2018    Patient: Demetra Hoffman  MR number: [id number]  Account number: [de-identified]  : 1945  Age: 71 years  Gender: Male  Status: Inpatient  Location: Cath lab  Height: 66 in  Weight: 153 lb  BP: 109/ 52 mmHg    Indications: Mitral Valve Disease    Diagnoses: I34 9 - Nonrheumatic mitral valve disorder, unspecified    Sonographer:  RACHANA Palumbo,RDCS  Interpreting Physician:  Ya Zaman MD  Referring Physician:  Ya Zaman MD  Group:  [facility]    SUMMARY    LEFT VENTRICLE:  Ejection fraction was estimated to be 55 %  Abnormal septal motion noted  Concentric hypertrophy was present  LEFT ATRIUM:  The atrium was mildly to moderately dilated  ATRIAL SEPTUM:  Contrast injection was performed  suggestion of a small PFO with late apperance of bubbles crossing from right to left side  RIGHT ATRIUM:  The atrium was mildly dilated  MITRAL VALVE:  There was mild to moderate annular calcification  There is an echo density attached to the anterior mitral leaflet measuring 0 7X0 6cm likely c/w vegetation  There is restriction of the mobility of the posterior mitral leaflet  There was moderate to severe regurgitation  Patient is s/p mitral valve repair in 2014  AORTIC VALVE:  There was mild regurgitation  TRICUSPID VALVE:  There was moderate regurgitation  AORTA:  There was moderately severe atheroma  HISTORY: PRIOR HISTORY: Mitral Valve Repair, Coronary Artery Disease, Hyperlipidemia, Hypertension, Atrial Fibrillation, Congestive Heart Failure, Fatigue, Prostate Cancer, Stent, Stroke, Transient Ischemic Attack    PROCEDURE: The procedure was performed in the catheterization laboratory  This was a routine study  The risks and alternatives of the procedure were explained to the patient and informed consent was obtained  The transesophageal approach  was used  The study included complete 2D imaging, limited spectral Doppler, and color Doppler  The heart rate was 68 bpm, at the start of the study  An adult omniplane probe was inserted by the attending cardiologist  Intubated with ease  One intubation attempt(s)  There was no blood detected on the probe  There were no complications during the procedure  MEDICATIONS: Benzocaine spray, topical to oropharynx, prior to procedure  Sedation administered by anesthesia team     LEFT VENTRICLE: Size was normal  Ejection fraction was estimated to be 55 %  Abnormal septal motion noted  Concentric hypertrophy was present  RIGHT VENTRICLE: The size was normal  Systolic function was normal  Wall thickness was normal     LEFT ATRIUM: The atrium was mildly to moderately dilated  No thrombus was identified  ATRIAL SEPTUM: Contrast injection was performed  suggestion of a small PFO with late apperance of bubbles crossing from right to left side  RIGHT ATRIUM: The atrium was mildly dilated  MITRAL VALVE: There was mild to moderate annular calcification  There is an echo density attached to the anterior mitral leaflet measuring 0 7X0 6cm likely c/w vegetation  There is restriction of the mobility of the posterior mitral  leaflet  DOPPLER: There was moderate to severe regurgitation  Patient is s/p mitral valve repair in 2014  AORTIC VALVE: The valve was trileaflet  Leaflets exhibited mild calcification  DOPPLER: There was mild regurgitation  TRICUSPID VALVE: The valve structure was normal  There was normal leaflet separation  There was no echocardiographic evidence of vegetation  DOPPLER: There was moderate regurgitation  PULMONIC VALVE: Leaflets exhibited normal thickness, no calcification, and normal cuspal separation  There was no echocardiographic evidence of vegetation  PERICARDIUM: There was no pericardial effusion  The pericardium was normal in appearance  AORTA: The root exhibited normal size  There was no atheroma  There was moderately severe atheroma  There was no evidence for dissection  There was no evidence for aneurysm      [facility]    Prepared and electronically signed by    Niko Mathews MD  Signed 22-Jun-2018 15:23:48       Meds/Allergies   all current active meds have been reviewed  Prescriptions Prior to Admission   Medication    allopurinol (ZYLOPRIM) 100 mg tablet    atorvastatin (LIPITOR) 40 mg tablet    benzonatate (TESSALON PERLES) 100 mg capsule    carvedilol (COREG) 6 25 mg tablet    furosemide (LASIX) 20 mg tablet    lisinopril (ZESTRIL) 40 mg tablet    Multiple Vitamins-Minerals (PX MENS MULTIVITAMINS) TABS    ranitidine (ZANTAC) 300 MG capsule    warfarin (COUMADIN) 2 5 mg tablet              Assessment and Plan:    Trans esophageal echocardiogram EF 55%, concentric hypertrophy, mild to moderately dilated LA, suggestion of small PFO with bubbles crossing from right to left, mildly dilated RA, mild to moderate MAC, ECHODENSITY ATTACHED TO ANTERIOR MITRAL LEAFLET MEASURING 0 7 X 0 6 CM CONSISTENT WITH VEGETATION, restricted mobility of posterior mitral leaflet, moderate to severe MR (status post mitral valve repair in 2014), mild AI, moderate TR, moderate-severe atheroma noted in aorta    Trans thoracic Echocardiogram EF 55%, concentric hypertrophy, estimated peak PA pressure 70 mmhg (severe pulmonary hypertension, moderate to marked MAC, moderate to severe MR (history of MV repair) vegetation could not be ruled out , mild to moderate AS, mild to moderate AI, mild TR     Mitral valve regurgitation s/p Mitral valve repair/ Suspected endocarditis  -last echo in 2015 through primary cardiologist Dr Jasper Smith in [facility], mitral regurgitation was only mild   -atrial this visitation unknown  Blood culture negative  Consider infectious versus noninfectious cause  Rule out vasculitis/inflammatory  -possible need for intervention on valve in the future discussed    PAF  -INR 1 76, coumadin will likely need to be held for colonoscopy on monday     CAD s/p PCI   -no recent anginal symptoms   -EF preserved, no evidence of acute volume overload  -ASA held in the setting of thrombocytopenia, on carvedilol, atorvastatin     Thrombocytopenia   -platelets 84-->18-->26-->09-->47 this admission, continue to follow   -heme/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

## 2018-06-23 NOTE — PROGRESS NOTES
[facility] Progress Note  Patient: Sabi Morris 71 y o  male   MRN: [id number]  PCP: Judith Hampton MD  Unit/Bed#: MS Sandra-01 Encounter: [id number]  Date Of Visit: 06/23/18    Assessment/Plan:    Principal Problem: Thrombocytopenia (HCC)  Active Problems:    CAD (coronary atherosclerotic disease)    GERD (gastroesophageal reflux disease)    Hypercholesterolemia    Hypertension    PAF (paroxysmal atrial fibrillation) (HCC)    Peptic ulcer disease    Weight loss    Anorexia    Generalized weakness    ARF (acute renal failure) (HCC)    Chronic anticoagulation    Diarrhea    Bandemia    NED (acute kidney injury) (HCC)    Pain in gums    Mild protein-calorie malnutrition (HCC)    Endocarditis    Present on Admission:   Generalized weakness   CAD (coronary atherosclerotic disease)   GERD (gastroesophageal reflux disease)   Hypercholesterolemia   Hypertension   PAF (paroxysmal atrial fibrillation) (HCC)   Peptic ulcer disease   Weight loss   Anorexia   ARF (acute renal failure) (HCC)   Thrombocytopenia (HCC)   Diarrhea   NED (acute kidney injury) (HCC)   Pain in gums   Mild protein-calorie malnutrition (HCC)   Endocarditis      · Thrombocytopenia-exact etiology not clear  Platelets are getting better  Patient had a transesophageal echocardiogram yesterday  Results were reviewed  Patient appears to have a mitral valve vegetation  I did called Infectious Disease consultant-Dr Yue Pablo who has been seeing this patient with during this hospitalization  He advised to draw more blood cultures today  He would also order blood cultures on Monday  I ordered fungal cultures  Patient to have a colonoscopy on Monday as well as he has been having lots of diarrhea before he came in  So far other cultures have been negative  Possible culture negative endocarditis  · Gum pain-resolving  He is not eating much  Encouraged him to eat more  · Diarrhea-resolved  C diff negative    Has not had any bowel movement for the last couple of days, however, he was having lot of diarrhea before and has not eaten much at all  Again encouraged him to eat more  His appetite is poor  · Poor appetite/weight loss-mild protein caloric malnutrition-would try Remeron which would also help him sleep at night/depression and may also stimulate his appetite  ? Using Magace  · Acute renal failure-resolved  Discontinue IV fluids  · History of paroxysmal atrial fibrillation  Patient also has history of mitral bouts surgery and now has worsening/Kasie regurgitation/findings  · Chronic anticoagulation-Coumadin was on hold for procedures  Would give him a dose of Coumadin today  Can hold the Coumadin for colonoscopy on Monday  · Negative attitude   He has very negative attitude  He looks better although he still stays the same that he does not  · Peptic ulcer disease -to renal ulcer-seen on EGD  On PPI twice daily      VTE Pharmacologic Prophylaxis:   Pharmacologic: Warfarin (Coumadin)  Mechanical VTE Prophylaxis in Place: Yes    Patient Centered Rounds: I have performed bedside rounds with nursing staff today  Discussions with Specialists or Other Care Team Provider:  Yes    Education and Discussions with Family / Patient:  Yes    Time Spent for Care: 45+ minutes  More than 50% of total time spent on counseling and coordination of care as described above  Current Length of Stay: 4 day(s)    Current Patient Status: Inpatient   Certification Statement: The patient will continue to require additional inpatient hospital stay due to Further workup for his thrombocytopenia/endocarditis    Discharge Plan:  Home with home health services when stable    Code Status: Level 1 - Full Code      Subjective: Although patient appears much better to me since he came in, he still feels the same  Not eating much  Denies any chest pain  Denies any abdominal pain    He has not eaten much and therefore, has not had a bowel movement either  No more diarrhea  No chest pain or shortness of breath  He is tired and has no energy-obviously because he has not been eating much    Objective:     Vitals:   Temp (24hrs), Av 3 °F (36 8 °C), Min:97 4 °F (36 3 °C), Max:98 6 °F (37 °C)    HR:  [66-81] 81  Resp:  [16-24] 18  BP: ()/(46-89) 144/62  SpO2:  [92 %-95 %] 92 %  Body mass index is 24 98 kg/m²  Input and Output Summary (last 24 hours): Intake/Output Summary (Last 24 hours) at 18 0944  Last data filed at 18 0300   Gross per 24 hour   Intake              250 ml   Output             1050 ml   Net             -800 ml           Physical Exam:     Vital signs are reviewed as above  Constitutional:  Sitting up in the bed today  Trying to eat some food  Eyes: EOM grossly intact  Conjunctivae are  pale  Patient has anicteric sclera  HENT: Oropharynx are moist   Sore upper gum is better  Neck: Neck is supple  I was not able to visualize any JVD at 90°  There is no significant lymphadenopathy  I also did not notice any significant thyromegaly  Cardiac: I did not hear any rubs or gallop  Patient appears to be in sinus rhythm  Respiratory: Patient not in significant respiratory distress  Air entry in general is fair  GI: Abdomen is soft  It is grossly nontender  Bowel sounds are adequate  I was not able to appreciate any hepatosplenomegaly  Neurologic:  Patient is awake and alert  Neurological examination is grossly intact  No obvious focal neurological deficit noticed  Skin: Skin is warm and dry  Psychiatric:  Depressed  He has very negative behavior /attitude  Musculoskeletal  Patient moving all extremities while in bed   Has chronic arthritic joints   Extremities: Patient has no significant cyanosis, clubbing, or lower extremity edema       Additional Data:     Labs:      Results from last 7 days  Lab Units 18  0541   WBC Thousand/uL 7 75   HEMOGLOBIN g/dL 11 3*   HEMATOCRIT % 33 4*   PLATELETS Thousands/uL 82* NEUTROS PCT % 72   LYMPHS PCT % 15   MONOS PCT % 11   EOS PCT % 1       Results from last 7 days  Lab Units 06/23/18  0541   SODIUM mmol/L 141   POTASSIUM mmol/L 4 2   CHLORIDE mmol/L 108   CO2 mmol/L 26   BUN mg/dL 13   CREATININE mg/dL 0 98   CALCIUM mg/dL 7 8*   TOTAL PROTEIN g/dL 5 7*   BILIRUBIN TOTAL mg/dL 1 10*   ALK PHOS U/L 51   ALT U/L 56   AST U/L 53*   GLUCOSE RANDOM mg/dL 106       Results from last 7 days  Lab Units 06/23/18  0541   INR  1 76*       * I Have Reviewed All Lab Data Listed Above  Recent Cultures (last 7 days):       Results from last 7 days  Lab Units 06/20/18  1415 06/19/18  1915   BLOOD CULTURE   --  No Growth at 72 hrs  No Growth at 72 hrs  C DIFF TOXIN B  NEGATIVE for C difficle toxin by PCR    --        Last 24 Hours Medication List:     Current Facility-Administered Medications:  acetaminophen 650 mg Oral Q6H PRN Lacey Cleveland MD   albuterol 2 5 mg Nebulization Q4H PRN Kylah Smith PA-C   allopurinol 100 mg Oral Daily Ismael Hathaway MD   atorvastatin 40 mg Oral Daily Ismael Hathaway MD   benzonatate 100 mg Oral TID PRN Ismael Hathaway MD   carvedilol 6 25 mg Oral BID With Meals Ismael Hathaway MD   ondansetron 4 mg Intravenous Q6H PRN Ismael Hathaway MD   pantoprazole 40 mg Oral BID AC Sharon Morris MD        Today, Patient Was Seen By: Ismael Hathaway MD    ** Please Note: Dragon 360 Dictation voice to text software may have been used in the creation of this document   **

## 2018-06-24 LAB
ANION GAP SERPL CALCULATED.3IONS-SCNC: 8 MMOL/L (ref 4–13)
BACTERIA BLD CULT: NORMAL
BACTERIA BLD CULT: NORMAL
BASOPHILS # BLD MANUAL: 0 THOUSAND/UL (ref 0–0.1)
BASOPHILS NFR MAR MANUAL: 0 % (ref 0–1)
BUN SERPL-MCNC: 15 MG/DL (ref 5–25)
CALCIUM SERPL-MCNC: 8.1 MG/DL (ref 8.3–10.1)
CHLORIDE SERPL-SCNC: 108 MMOL/L (ref 100–108)
CO2 SERPL-SCNC: 27 MMOL/L (ref 21–32)
CREAT SERPL-MCNC: 1.15 MG/DL (ref 0.6–1.3)
EOSINOPHIL # BLD MANUAL: 0 THOUSAND/UL (ref 0–0.4)
EOSINOPHIL NFR BLD MANUAL: 0 % (ref 0–6)
ERYTHROCYTE [DISTWIDTH] IN BLOOD BY AUTOMATED COUNT: 13.2 % (ref 11.6–15.1)
GFR SERPL CREATININE-BSD FRML MDRD: 64 ML/MIN/1.73SQ M
GLUCOSE SERPL-MCNC: 95 MG/DL (ref 65–140)
HCT VFR BLD AUTO: 32.9 % (ref 36.5–49.3)
HGB BLD-MCNC: 11.3 G/DL (ref 12–17)
INR PPP: 1.47 (ref 0.86–1.17)
LYMPHOCYTES # BLD AUTO: 1.14 THOUSAND/UL (ref 0.6–4.47)
LYMPHOCYTES # BLD AUTO: 14 % (ref 14–44)
MCH RBC QN AUTO: 34.2 PG (ref 26.8–34.3)
MCHC RBC AUTO-ENTMCNC: 34.3 G/DL (ref 31.4–37.4)
MCV RBC AUTO: 100 FL (ref 82–98)
MONOCYTES # BLD AUTO: 0.24 THOUSAND/UL (ref 0–1.22)
MONOCYTES NFR BLD: 3 % (ref 4–12)
NEUTROPHILS # BLD MANUAL: 6.59 THOUSAND/UL (ref 1.85–7.62)
NEUTS SEG NFR BLD AUTO: 81 % (ref 43–75)
NRBC BLD AUTO-RTO: 0 /100 WBCS
PLATELET # BLD AUTO: 115 THOUSANDS/UL (ref 149–390)
PLATELET BLD QL SMEAR: ABNORMAL
PMV BLD AUTO: 10.4 FL (ref 8.9–12.7)
POTASSIUM SERPL-SCNC: 4.3 MMOL/L (ref 3.5–5.3)
PROTHROMBIN TIME: 17.7 SECONDS (ref 11.8–14.2)
RBC # BLD AUTO: 3.3 MILLION/UL (ref 3.88–5.62)
RBC MORPH BLD: NORMAL
SODIUM SERPL-SCNC: 143 MMOL/L (ref 136–145)
TOTAL CELLS COUNTED SPEC: 100
VARIANT LYMPHS # BLD AUTO: 2 %
WBC # BLD AUTO: 8.13 THOUSAND/UL (ref 4.31–10.16)

## 2018-06-24 PROCEDURE — 99232 SBSQ HOSP IP/OBS MODERATE 35: CPT | Performed by: INTERNAL MEDICINE

## 2018-06-24 PROCEDURE — 80048 BASIC METABOLIC PNL TOTAL CA: CPT | Performed by: INTERNAL MEDICINE

## 2018-06-24 PROCEDURE — 99233 SBSQ HOSP IP/OBS HIGH 50: CPT | Performed by: INTERNAL MEDICINE

## 2018-06-24 PROCEDURE — 85007 BL SMEAR W/DIFF WBC COUNT: CPT | Performed by: INTERNAL MEDICINE

## 2018-06-24 PROCEDURE — 85610 PROTHROMBIN TIME: CPT | Performed by: INTERNAL MEDICINE

## 2018-06-24 PROCEDURE — 85027 COMPLETE CBC AUTOMATED: CPT | Performed by: INTERNAL MEDICINE

## 2018-06-24 RX ORDER — WARFARIN SODIUM 5 MG/1
5 TABLET ORAL
Status: COMPLETED | OUTPATIENT
Start: 2018-06-24 | End: 2018-06-24

## 2018-06-24 RX ORDER — DOXYCYCLINE HYCLATE 100 MG/1
100 CAPSULE ORAL EVERY 12 HOURS SCHEDULED
Status: DISCONTINUED | OUTPATIENT
Start: 2018-06-25 | End: 2018-06-25 | Stop reason: HOSPADM

## 2018-06-24 RX ORDER — POLYETHYLENE GLYCOL 3350 17 G/17G
17 POWDER, FOR SOLUTION ORAL DAILY PRN
Status: DISCONTINUED | OUTPATIENT
Start: 2018-06-24 | End: 2018-06-25 | Stop reason: HOSPADM

## 2018-06-24 RX ADMIN — PANTOPRAZOLE SODIUM 40 MG: 40 TABLET, DELAYED RELEASE ORAL at 16:00

## 2018-06-24 RX ADMIN — ATORVASTATIN CALCIUM 40 MG: 40 TABLET, FILM COATED ORAL at 09:00

## 2018-06-24 RX ADMIN — CARVEDILOL 6.25 MG: 6.25 TABLET, FILM COATED ORAL at 07:30

## 2018-06-24 RX ADMIN — MIRTAZAPINE 15 MG: 15 TABLET, FILM COATED ORAL at 21:55

## 2018-06-24 RX ADMIN — BENZONATATE 100 MG: 100 CAPSULE ORAL at 17:06

## 2018-06-24 RX ADMIN — CARVEDILOL 6.25 MG: 6.25 TABLET, FILM COATED ORAL at 17:06

## 2018-06-24 RX ADMIN — WARFARIN SODIUM 5 MG: 5 TABLET ORAL at 17:05

## 2018-06-24 RX ADMIN — PANTOPRAZOLE SODIUM 40 MG: 40 TABLET, DELAYED RELEASE ORAL at 06:08

## 2018-06-24 RX ADMIN — ALLOPURINOL 100 MG: 100 TABLET ORAL at 09:00

## 2018-06-24 NOTE — PROGRESS NOTES
NEPHROLOGY PROGRESS NOTE    Patient: Violette Morris               Sex: male          DOA: 6/19/2018 10:58 AM   YOB: 1945        Age:  71 y o         LOS:  LOS: 5 days       HPI     Patient admitted loss of weight ,loss of appetite and acute kidney injury    SUBJECTIVE       Feeling better  Appetite is improving  No nausea no vomiting    Feeling stronger and walking    CURRENT MEDICATIONS       Current Facility-Administered Medications:     acetaminophen (TYLENOL) tablet 650 mg, 650 mg, Oral, Q6H PRN, Juan J Devine MD, 650 mg at 06/22/18 1727    albuterol inhalation solution 2 5 mg, 2 5 mg, Nebulization, Q4H PRN, Kylah Smith PA-C    allopurinol (ZYLOPRIM) tablet 100 mg, 100 mg, Oral, Daily, Sara Cuellar MD, 100 mg at 06/24/18 0900    atorvastatin (LIPITOR) tablet 40 mg, 40 mg, Oral, Daily, Sara Cuellar MD, 40 mg at 06/24/18 0900    benzonatate (TESSALON PERLES) capsule 100 mg, 100 mg, Oral, TID PRN, Sara Cuellar MD, 100 mg at 06/23/18 1458    carvedilol (COREG) tablet 6 25 mg, 6 25 mg, Oral, BID With Meals, Sara Cuellar MD, 6 25 mg at 06/24/18 0730    mirtazapine (REMERON) tablet 15 mg, 15 mg, Oral, HS, Sara Cuellar MD, 15 mg at 06/23/18 2304    ondansetron (ZOFRAN) injection 4 mg, 4 mg, Intravenous, Q6H PRN, Sara Cuellar MD    pantoprazole (PROTONIX) EC tablet 40 mg, 40 mg, Oral, BID AC, Jen Head III, MD, 40 mg at 06/24/18 0608    polyethylene glycol (MIRALAX) packet 17 g, 17 g, Oral, Daily PRN, Sara Cuellar MD    warfarin (COUMADIN) tablet 5 mg, 5 mg, Oral, Once (warfarin), Sara Cuellar MD    OBJECTIVE     Current Weight: Weight - Scale: 71 9 kg (158 lb 8 2 oz)  Vitals:    06/24/18 0700   BP: 133/60   Pulse: 76   Resp: 18   Temp: 98 7 °F (37 1 °C)   SpO2: 93%       Intake/Output Summary (Last 24 hours) at 06/24/18 0942  Last data filed at 06/24/18 0603   Gross per 24 hour   Intake              240 ml   Output              700 ml   Net             -460 ml PHYSICAL EXAMINATION     Physical Exam   Constitutional: He is oriented to person, place, and time  He appears well-developed  No distress  HENT:   Head: Normocephalic  Mouth/Throat: Oropharynx is clear and moist    Eyes: Conjunctivae are normal  No scleral icterus  Neck: Neck supple  No JVD present  Cardiovascular: Normal rate and normal heart sounds  Pulmonary/Chest: Effort normal  He has no wheezes  Abdominal: Soft  There is no tenderness  Musculoskeletal: Normal range of motion  He exhibits no edema  Neurological: He is alert and oriented to person, place, and time  Skin: Skin is warm  No rash noted  Psychiatric: He has a normal mood and affect  His behavior is normal         LAB RESULTS       Results from last 7 days  Lab Units 06/24/18  0602 06/23/18  0541 06/22/18  0554 06/21/18  0439 06/20/18  0447 06/19/18  1200   WBC Thousand/uL 8 13 7 75 7 37 3 82* 5 78 7 97   HEMOGLOBIN g/dL 11 3* 11 3* 12 1 11 8* 13 9 14 5   HEMATOCRIT % 32 9* 33 4* 36 0* 35 4* 42 0 43 6   PLATELETS Thousands/uL 115* 82* 63* 50* 60* 89*   SODIUM mmol/L 143 141 139 137 137 137   POTASSIUM mmol/L 4 3 4 2 4 3 3 8 3 9 4 3   CHLORIDE mmol/L 108 108 108 108 104 98*   CO2 mmol/L 27 26 22 25 26 27   BUN mg/dL 15 13 15 19 24 34*   CREATININE mg/dL 1 15 0 98 1 04 1 18 1 48* 2 18*   EGFR ml/min/1 73sq m 64 77 72 62 47 29   CALCIUM mg/dL 8 1* 7 8* 7 7* 7 3* 7 7* 8 9   MAGNESIUM mg/dL  --   --   --   --  2 3 1 9   PHOSPHORUS mg/dL  --   --   --  2 1* 2 8 3 2   TOTAL PROTEIN g/dL  --  5 7* 5 7* 5 2* 6 3* 6 6   GLUCOSE RANDOM mg/dL 95 106 108 87 101 130       RADIOLOGY RESULTS      No results found for this or any previous visit  Results for orders placed during the hospital encounter of 06/07/18   XR chest pa & lateral    Narrative CHEST     INDICATION:   J20 9: Acute bronchitis, unspecified  R05: Cough      COMPARISON:  8/10/2017    EXAM PERFORMED/VIEWS:  XR CHEST PA & LATERAL  The frontal view was performed utilizing dual energy radiographic technique  Images: 4    FINDINGS:  There are median sternotomy wires indicating prior cardiac surgery  Cardiomediastinal silhouette appears unremarkable  The lungs are clear  No pneumothorax or pleural effusion  Osseous structures appear within normal limits for patient age  Impression No acute cardiopulmonary disease  Workstation performed: [id number]       No results found for this or any previous visit  No results found for this or any previous visit  No results found for this or any previous visit  No results found for this or any previous visit  PLAN / RECOMMENDATIONS       acute kidney injury:  Improved with hydration    Mitral valve vegetation:  Workup in progress possible culture negative endocarditis    Anemia:  Seems to be stable    Will continue to monitor    Navarro Kasper MD  Nephrology  6/24/2018        Portions of the record may have been created with voice recognition software  Occasional wrong word or "sound a like" substitutions may have occurred due to the inherent limitations of voice recognition software  Read the chart carefully and recognize, using context, where substitutions have occurred

## 2018-06-24 NOTE — PROGRESS NOTES
[facility] Progress Note  Patient: Melida Mroris 71 y o  male   MRN: [id number]  PCP: Arnie Mata MD  Unit/Bed#: -01 Encounter: [id number]  Date Of Visit: 06/24/18    Assessment/Plan:    Principal Problem: Thrombocytopenia (HCC)  Active Problems:    CAD (coronary atherosclerotic disease)    GERD (gastroesophageal reflux disease)    Hypercholesterolemia    Hypertension    PAF (paroxysmal atrial fibrillation) (HCC)    Peptic ulcer disease    Weight loss    Anorexia    Generalized weakness    ARF (acute renal failure) (HCC)    Chronic anticoagulation    Diarrhea    Bandemia    NED (acute kidney injury) (HCC)    Pain in gums    Mild protein-calorie malnutrition (HCC)    Endocarditis    Present on Admission:   Generalized weakness   CAD (coronary atherosclerotic disease)   GERD (gastroesophageal reflux disease)   Hypercholesterolemia   Hypertension   PAF (paroxysmal atrial fibrillation) (HCC)   Peptic ulcer disease   Weight loss   Anorexia   ARF (acute renal failure) (HCC)   Thrombocytopenia (HCC)   Diarrhea   NED (acute kidney injury) (HCC)   Pain in gums   Mild protein-calorie malnutrition (HCC)   Endocarditis      · Thrombocytopenia  Exact etiology not clear  Patient has a vegetation on his mitral valve  So far cultures have been negative  Infectious Disease consultant and cardiology on board  Also being followed up by GI for diarrhea  Feels strongly that patient should get colonoscopy  I have called on-call GI service to discussed with them about doing a colonoscopy tomorrow  Awaiting to hear back from them  · Chronic anticoagulation-Coumadin was held for his procedures  He is back on Coumadin  INR is subtherapeutic  No need for bridging are giving him extra Coumadin at this moment  · Acute kidney injury-resolved  Creatinine is slightly up  He likely has some underlying chronic kidney disease stage 3  · Diarrhea-resolved    No bowel movement for the last 3 days   Started MiraLax p r n     C diff was negative  · Weight loss  with anorexia  Started Remeron yesterday  Would take some time for this to kick in  · Peptic ulcer disease/duodenal ulcer  On b i d  PPI  Status post EGD  · Poor oral intake-partly his issue is having no upper teeth with sore gums  This has led to at least mild protein caloric malnutrition  Encouraged him to eat as much as possible  · Mitral regurgitation-patient with history of mitral valve prolapse and mitral repair  Patient also has history of coronary artery disease  Medical management  Other plan as above      VTE Pharmacologic Prophylaxis:   Pharmacologic: Warfarin (Coumadin)  Mechanical VTE Prophylaxis in Place: Yes    Patient Centered Rounds: I have performed bedside rounds with nursing staff today  Discussions with Specialists or Other Care Team Provider:  Yes    Education and Discussions with Family / Patient:  Yes    Time Spent for Care: 35+ minutes  More than 50% of total time spent on counseling and coordination of care as described above  Current Length of Stay: 5 day(s)    Current Patient Status: Inpatient   Certification Statement: The patient will continue to require additional inpatient hospital stay due to Further monitoring for his thrombocytopenia and workup for endocarditis    Discharge Plan:  Home with home health services when stable    Code Status: Level 1 - Full Code      Subjective:   He feels little better today  Still no bowel movement today  He had his dinner last night  No chest pain or any trouble breathing per se  He has been urinating frequently    Objective:     Vitals:   Temp (24hrs), Av 3 °F (36 8 °C), Min:97 9 °F (36 6 °C), Max:98 7 °F (37 1 °C)    HR:  [66-77] 76  Resp:  [18] 18  BP: (121-147)/(58-67) 133/60  SpO2:  [91 %-95 %] 93 %  Body mass index is 25 58 kg/m²  Input and Output Summary (last 24 hours):        Intake/Output Summary (Last 24 hours) at 18 0914  Last data filed at 06/24/18 0603   Gross per 24 hour   Intake              240 ml   Output              700 ml   Net             -460 ml           Physical Exam:      Vital signs are reviewed as above  Constitutional:  Sitting up in the bed on the edge   Eyes: EOM grossly intact  Conjunctiva slightly pale  HENT: Oropharynx are moist  Did not notice any significant lesions on the tongue  Head normocephalic  Neck: Neck is supple  I was not able to visualize any JVD at 90°  There is no significant lymphadenopathy  I also did not notice any significant thyromegaly  Cardiac: I did not hear any rubs or gallop  Patient appears to be in sinus rhythm  Respiratory: Patient not in significant respiratory distress  Air entry in general is fair  GI: Abdomen is soft  It is grossly nontender  Bowel sounds are adequate  I was not able to appreciate any hepatosplenomegaly  Neurologic:  Patient is awake and alert  Neurological examination is grossly intact  No obvious focal neurological deficit noticed  Skin: Skin is warm and dry  Psychiatric:  Quite negative attitude  Musculoskeletal  Patient moving all extremities while in bed   Has chronic arthritic joints   Extremities: Patient has no significant cyanosis, clubbing, or lower extremity edema       Additional Data:     Labs:      Results from last 7 days  Lab Units 06/24/18  0602 06/23/18  0541   WBC Thousand/uL 8 13 7 75   HEMOGLOBIN g/dL 11 3* 11 3*   HEMATOCRIT % 32 9* 33 4*   PLATELETS Thousands/uL 115* 82*   NEUTROS PCT %  --  72   LYMPHS PCT %  --  15   LYMPHO PCT % 14  --    MONOS PCT %  --  11   MONO PCT MAN % 3*  --    EOS PCT %  --  1   EOSINO PCT MANUAL % 0  --        Results from last 7 days  Lab Units 06/24/18  0602 06/23/18  0541   SODIUM mmol/L 143 141   POTASSIUM mmol/L 4 3 4 2   CHLORIDE mmol/L 108 108   CO2 mmol/L 27 26   BUN mg/dL 15 13   CREATININE mg/dL 1 15 0 98   CALCIUM mg/dL 8 1* 7 8*   TOTAL PROTEIN g/dL  --  5 7*   BILIRUBIN TOTAL mg/dL  --  1 10*   ALK PHOS U/L  --  51   ALT U/L  --  56   AST U/L  --  53*   GLUCOSE RANDOM mg/dL 95 106       Results from last 7 days  Lab Units 06/24/18  0602   INR  1 47*       * I Have Reviewed All Lab Data Listed Above  * Additional Pertinent Lab Tests Reviewed: All Labs Within Last 24 Hours Reviewed      Recent Cultures (last 7 days):       Results from last 7 days  Lab Units 06/20/18  1415 06/19/18  1915   BLOOD CULTURE   --  No Growth After 4 Days  No Growth After 4 Days  C DIFF TOXIN B  NEGATIVE for C difficle toxin by PCR    --        Last 24 Hours Medication List:     Current Facility-Administered Medications:  acetaminophen 650 mg Oral Q6H PRN Willie Majano MD   albuterol 2 5 mg Nebulization Q4H PRN Kylah Smith PA-C   allopurinol 100 mg Oral Daily Tabitha Faulkner MD   atorvastatin 40 mg Oral Daily Tabitha Faulkner MD   benzonatate 100 mg Oral TID PRN Tabitha Faulkner MD   carvedilol 6 25 mg Oral BID With Meals Tabitha Faulkner MD   mirtazapine 15 mg Oral HS Tabitha Faulkner MD   ondansetron 4 mg Intravenous Q6H PRN Tabitha Faulkner MD   pantoprazole 40 mg Oral BID AC Cyrus Knight MD        Today, Patient Was Seen By: Tabitha Faulkner MD    ** Please Note: Dragon 360 Dictation voice to text software may have been used in the creation of this document   **

## 2018-06-24 NOTE — PROGRESS NOTES
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

## 2018-06-24 NOTE — PROGRESS NOTES
Progress Note - Cardiology     Johan Morris 71 y o  male MRN: [id number]  Unit/Bed#: -01 Encounter: [id number]      Subjective:   No significant events overnight  Patient states he is finally starting to feel better today  Appetite slightly improved  Objective:   Vitals:  Vitals:    06/24/18 1100   BP: 129/63   Pulse: 81   Resp: 18   Temp: 98 °F (36 7 °C)   SpO2: 94%       Body mass index is 25 58 kg/m²  Systolic (82PVW), VFF:519 , Min:121 , KWC:630     Diastolic (81WQZ), FOH:47, Min:58, Max:67      Intake/Output Summary (Last 24 hours) at 06/24/18 1138  Last data filed at 06/24/18 0900   Gross per 24 hour   Intake              480 ml   Output              600 ml   Net             -120 ml     Weight (last 2 days)     Date/Time   Weight    06/24/18 0600  71 9 (158 51)    06/23/18 0600  70 2 (154 76)              PHYSICAL EXAM:  General: Patient is not in acute distress  Head: Normocephalic  Atraumatic  HEENT: Both pupils normal sized, round and reactive to light  Nonicteric  Neck: JVP not raised  Trachea central    Respiratory: Bilateral bronchovascular breath sounds with no crackles or rhonchi  Cardiovascular: RRR  +murmur   GI: Abdomen soft, nontender  No guarding or rigidity  Neurologic: Patient is awake, alert, responding to command   Moving all extremities  Integumentary:  No skin rash  Extremities: No clubbing, cyanosis or edema    LABORATORY RESULTS:    Results from last 7 days  Lab Units 06/19/18  1200   TROPONIN I ng/mL 0 04     CBC with diff:   Results from last 7 days  Lab Units 06/24/18  0602 06/23/18  0541 06/22/18  0554   WBC Thousand/uL 8 13 7 75 7 37   HEMOGLOBIN g/dL 11 3* 11 3* 12 1   HEMATOCRIT % 32 9* 33 4* 36 0*   MCV fL 100* 100* 101*   PLATELETS Thousands/uL 115* 82* 63*   MCH pg 34 2 33 9 34 1   MCHC g/dL 34 3 33 8 33 6   RDW % 13 2 13 3 13 2   MPV fL 10 4 11 3 11 5   NRBC AUTO /100 WBCs 0 0 0       CMP:  Results from last 7 days  Lab Units 06/24/18  0602 06/23/18  0541 06/22/18  0554 06/21/18  0439   SODIUM mmol/L 143 141 139 137   POTASSIUM mmol/L 4 3 4 2 4 3 3 8   CHLORIDE mmol/L 108 108 108 108   CO2 mmol/L 27 26 22 25   ANION GAP mmol/L 8 7 9 4   BUN mg/dL 15 13 15 19   CREATININE mg/dL 1 15 0 98 1 04 1 18   GLUCOSE RANDOM mg/dL 95 106 108 87   CALCIUM mg/dL 8 1* 7 8* 7 7* 7 3*   AST U/L  --  53* 64* 74*   ALT U/L  --  56 62 66   ALK PHOS U/L  --  51 54 46   TOTAL PROTEIN g/dL  --  5 7* 5 7* 5 2*   BILIRUBIN TOTAL mg/dL  --  1 10* 1 10* 1 00   EGFR ml/min/1 73sq m 64 77 72 62       BMP:  Results from last 7 days  Lab Units 06/24/18  0602 06/23/18  0541 06/22/18  0554   SODIUM mmol/L 143 141 139   POTASSIUM mmol/L 4 3 4 2 4 3   CHLORIDE mmol/L 108 108 108   CO2 mmol/L 27 26 22   BUN mg/dL 15 13 15   CREATININE mg/dL 1 15 0 98 1 04   GLUCOSE RANDOM mg/dL 95 106 108   CALCIUM mg/dL 8 1* 7 8* 7 7*         Results from last 7 days  Lab Units 06/19/18  1200   NT-PRO BNP pg/mL 608*          Results from last 7 days  Lab Units 06/20/18  0447 06/19/18  1200   MAGNESIUM mg/dL 2 3 1 9               Results from last 7 days  Lab Units 06/19/18  1912   TSH 3RD GENERATON uIU/mL 0 908         Results from last 7 days  Lab Units 06/24/18  0602 06/23/18  0541 06/22/18  0554 06/21/18  0440 06/20/18  0447 06/19/18  1200   INR  1 47* 1 76* 1 79* 2 30* 2 13* 1 93*       Lipid Profile:   Lab Results   Component Value Date    CHOL 119 04/25/2018     Lab Results   Component Value Date    HDL 45 04/25/2018     Lab Results   Component Value Date    LDLCALC 53 04/25/2018     Lab Results   Component Value Date    TRIG 106 04/25/2018       Cardiac testing:   Results for orders placed during the hospital encounter of 06/19/18   Echo complete with contrast if indicated    Narrative [address] [place], [place] [address]  [phone]    Transthoracic Echocardiogram  2D, M-mode, Doppler, and Color Doppler    Study date:  20-Jun-2018    Patient: Tenzin Michelle  MR number: [id number]  Account number: [de-identified]  : 1945  Age: 71 years  Gender: Male  Status: Inpatient  Location: Bedside  Height: 66 in  Weight: 153 lb  BP: 144/ 60 mmHg    Indications: Coronary Artery Disease    Diagnoses: I25 10 - Atherosclerotic heart disease of native coronary artery without angina pectoris    Sonographer:  Fred Scheuermann, BS,RDCS  Interpreting Physician:  Nelida Sahu MD  Referring Physician:  Uziel Willingham MD  Group:  [facility]    SUMMARY    LEFT VENTRICLE:  Ejection fraction was estimated to be 55 %  There were no regional wall motion abnormalities  Concentric hypertrophy was present  RIGHT VENTRICLE:  The ventricle was mildly dilated  Estimated peak pressure was at least 70 mmHg c/w severe pulmonary hypertension  LEFT ATRIUM:  The atrium was mildly to moderately dilated  RIGHT ATRIUM:  The atrium was dilated  MITRAL VALVE:  There was moderate to marked annular calcification  There was moderate calcification of MV leaflets  Vegetation can not be ruled out with certainty  Consider GUY if clinically indicated  There was moderate to severe regurgitation  Patient has history of MV repair  AORTIC VALVE:  There was mild to moderate stenosis  There was mild to moderate regurgitation  TRICUSPID VALVE:  There was mild regurgitation  HISTORY: PRIOR HISTORY: Hyperlipidemia, Atrial Fibrillation, Transient Ischemic Attack, Hypertension, Acute Renal Failure, S/P Stent, Mitral Valve Repair    PROCEDURE: The procedure was performed at the bedside  This was a routine study  The transthoracic approach was used  The study included complete 2D imaging, M-mode, complete spectral Doppler, and color Doppler  The heart rate was 77 bpm,  at the start of the study  Images were obtained from the parasternal, apical, subcostal, and suprasternal notch acoustic windows   Intravenous contrast ( 0 6ml Definity used in NSS) was administered to opacify the left ventricle  Image  quality was adequate  LEFT VENTRICLE: Size was normal  Ejection fraction was estimated to be 55 %  There were no regional wall motion abnormalities  Concentric hypertrophy was present  RIGHT VENTRICLE: The ventricle was mildly dilated  Systolic function was normal  Wall thickness was normal  DOPPLER: Estimated peak pressure was at least 70 mmHg c/w severe pulmonary hypertension  LEFT ATRIUM: The atrium was mildly to moderately dilated  RIGHT ATRIUM: The atrium was dilated  MITRAL VALVE: There was moderate to marked annular calcification  There was moderate calcification of MV leaflets  Vegetation can not be ruled out with certainty  Consider GUY if clinically indicated  DOPPLER: There was moderate to severe  regurgitation  Patient has history of MV repair  AORTIC VALVE: The valve was probably trileaflet  Leaflets exhibited mild to moderate calcification  DOPPLER: There was mild to moderate stenosis  There was mild to moderate regurgitation  TRICUSPID VALVE: The valve structure was normal  There was normal leaflet separation  DOPPLER: The transtricuspid velocity was within the normal range  There was no evidence for stenosis  There was mild regurgitation  PULMONIC VALVE: Leaflets exhibited normal thickness, no calcification, and normal cuspal separation  DOPPLER: The transpulmonic velocity was within the normal range  There was no regurgitation  PERICARDIUM: There was no pericardial effusion  The pericardium was normal in appearance  AORTA: The root exhibited normal size      SYSTEM MEASUREMENT TABLES    2D  %FS: 32 3 %  Ao Diam: 3 7 cm  EDV(Teich): 122 6 ml  EF Biplane: 46 %  EF(Teich): 60 3 %  ESV(Teich): 48 7 ml  IVSd: 1 3 cm  LA Area: 28 7 cm2  LA Diam: 3 3 cm  LVEDV MOD A2C: 147 ml  LVEDV MOD A4C: 149 7 ml  LVEDV MOD BP: 148 1 ml  LVEF MOD A2C: 39 7 %  LVEF MOD A4C: 52 3 %  LVESV MOD A2C: 88 6 ml  LVESV MOD A4C: 71 4 ml  LVESV MOD BP: 80 ml  LVIDd: 5 1 cm  LVIDs: 3 4 cm  LVLd A2C: 8 8 cm  LVLd A4C: 8 9 cm  LVLs A2C: 8 cm  LVLs A4C: 8 1 cm  LVOT Diam: 2 1 cm  LVPWd: 1 2 cm  RA Area: 21 3 cm2  RVIDd: 3 4 cm  SV MOD A2C: 58 4 ml  SV MOD A4C: 78 4 ml  SV(Teich): 73 9 ml    CW  AR Dec King: 2 7 m/s2  AR Dec Time: 1294 ms  AR PHT: 375 3 ms  AR Vmax: 3 5 m/s  AR maxP 1 mmHg  AV Env  Ti: 253 5 ms  AV VTI: 45 1 cm  AV Vmax: 2 8 m/s  AV Vmean: 1 8 m/s  AV maxP 4 mmHg  AV meanPG: 15 3 mmHg  HR: 71 5 BPM  MR VTI: 150 1 cm  MR Vmax: 5 6 m/s  MR Vmean: 4 4 m/s  MR maxP 7 mmHg  MR meanP 5 mmHg  MV VTI: 65 cm  MV Vmax: 2 2 m/s  MV Vmean: 1 2 m/s  MV maxP 4 mmHg  MV meanP 2 mmHg  TR Vmax: 4 2 m/s  TR maxP 6 mmHg    MM  TAPSE: 2 1 cm    PW  RADHA (VTI): 1 7 cm2  RADHA Vmax: 1 6 cm2  E': 0 m/s  E/E': 48 8  LVOT Env  Ti: 267 7 ms  LVOT VTI: 21 8 cm  LVOT Vmax: 1 3 m/s  LVOT Vmean: 0 8 m/s  LVOT maxP 8 mmHg  LVOT meanPG: 3 2 mmHg  LVSV Dopp: 76 8 ml  MV A Naun: 1 6 m/s  MV Dec King: 4 8 m/s2  MV DecT: 353 4 ms  MV E Naun: 1 7 m/s  MV E/A Ratio: 1 1  MV PHT: 102 5 ms  MVA (VTI): 1 2 cm2  MVA By PHT: 2 1 cm2    [employer] Accredited Echocardiography [facility]    Prepared and electronically signed by    Balaji Richard MD  Signed 2018 16:40:44       Results for orders placed during the hospital encounter of 18   GUY    Narrative [address] [place] [address] [place] [id number]  [phone]    Transesophageal Echocardiogram  2D, Doppler, and Color Doppler    Study date:  2018    Patient: Adolfo Watkins  MR number: [id number]  Account number: [de-identified]  : 1945  Age: 71 years  Gender: Male  Status: Inpatient  Location: Cath lab  Height: 66 in  Weight: 153 lb  BP: 109/ 52 mmHg    Indications: Mitral Valve Disease    Diagnoses: I34 9 - Nonrheumatic mitral valve disorder, unspecified    Sonographer:  RACHANA Peng,RDCS  Interpreting Physician:  Balaji Richard, MD  Referring Physician:  Niko Mathews MD  Group:  [facility]    SUMMARY    LEFT VENTRICLE:  Ejection fraction was estimated to be 55 %  Abnormal septal motion noted  Concentric hypertrophy was present  LEFT ATRIUM:  The atrium was mildly to moderately dilated  ATRIAL SEPTUM:  Contrast injection was performed  suggestion of a small PFO with late apperance of bubbles crossing from right to left side  RIGHT ATRIUM:  The atrium was mildly dilated  MITRAL VALVE:  There was mild to moderate annular calcification  There is an echo density attached to the anterior mitral leaflet measuring 0 7X0 6cm likely c/w vegetation  There is restriction of the mobility of the posterior mitral leaflet  There was moderate to severe regurgitation  Patient is s/p mitral valve repair in 2014  AORTIC VALVE:  There was mild regurgitation  TRICUSPID VALVE:  There was moderate regurgitation  AORTA:  There was moderately severe atheroma  HISTORY: PRIOR HISTORY: Mitral Valve Repair, Coronary Artery Disease, Hyperlipidemia, Hypertension, Atrial Fibrillation, Congestive Heart Failure, Fatigue, Prostate Cancer, Stent, Stroke, Transient Ischemic Attack    PROCEDURE: The procedure was performed in the catheterization laboratory  This was a routine study  The risks and alternatives of the procedure were explained to the patient and informed consent was obtained  The transesophageal approach  was used  The study included complete 2D imaging, limited spectral Doppler, and color Doppler  The heart rate was 68 bpm, at the start of the study  An adult omniplane probe was inserted by the attending cardiologist  Intubated with ease  One intubation attempt(s)  There was no blood detected on the probe  There were no complications during the procedure  MEDICATIONS: Benzocaine spray, topical to oropharynx, prior to procedure   Sedation administered by anesthesia team     LEFT VENTRICLE: Size was normal  Ejection fraction was estimated to be 55 %  Abnormal septal motion noted  Concentric hypertrophy was present  RIGHT VENTRICLE: The size was normal  Systolic function was normal  Wall thickness was normal     LEFT ATRIUM: The atrium was mildly to moderately dilated  No thrombus was identified  ATRIAL SEPTUM: Contrast injection was performed  suggestion of a small PFO with late apperance of bubbles crossing from right to left side  RIGHT ATRIUM: The atrium was mildly dilated  MITRAL VALVE: There was mild to moderate annular calcification  There is an echo density attached to the anterior mitral leaflet measuring 0 7X0 6cm likely c/w vegetation  There is restriction of the mobility of the posterior mitral  leaflet  DOPPLER: There was moderate to severe regurgitation  Patient is s/p mitral valve repair in 2014  AORTIC VALVE: The valve was trileaflet  Leaflets exhibited mild calcification  DOPPLER: There was mild regurgitation  TRICUSPID VALVE: The valve structure was normal  There was normal leaflet separation  There was no echocardiographic evidence of vegetation  DOPPLER: There was moderate regurgitation  PULMONIC VALVE: Leaflets exhibited normal thickness, no calcification, and normal cuspal separation  There was no echocardiographic evidence of vegetation  PERICARDIUM: There was no pericardial effusion  The pericardium was normal in appearance  AORTA: The root exhibited normal size  There was no atheroma  There was moderately severe atheroma  There was no evidence for dissection  There was no evidence for aneurysm      [facility]    Prepared and electronically signed by    Nelida Sahu MD  Signed 22-Jun-2018 15:23:48         Meds/Allergies   all current active meds have been reviewed  Prescriptions Prior to Admission   Medication    allopurinol (ZYLOPRIM) 100 mg tablet    atorvastatin (LIPITOR) 40 mg tablet    benzonatate (TESSALON PERLES) 100 mg capsule    carvedilol (COREG) 6 25 mg tablet    furosemide (LASIX) 20 mg tablet    lisinopril (ZESTRIL) 40 mg tablet    Multiple Vitamins-Minerals (PX MENS MULTIVITAMINS) TABS    ranitidine (ZANTAC) 300 MG capsule    warfarin (COUMADIN) 2 5 mg tablet              Assessment and Plan:  Trans esophageal echocardiogram EF 55%, concentric hypertrophy, mild to moderately dilated LA, suggestion of small PFO with bubbles crossing from right to left, mildly dilated RA, mild to moderate MAC, ECHODENSITY ATTACHED TO ANTERIOR MITRAL LEAFLET MEASURING 0 7 X 0 6 CM CONSISTENT WITH VEGETATION, restricted mobility of posterior mitral leaflet, moderate to severe MR (status post mitral valve repair in 2014), mild AI, moderate TR, moderate-severe atheroma noted in aorta     Trans thoracic Echocardiogram EF 55%, concentric hypertrophy, estimated peak PA pressure 70 mmhg (severe pulmonary hypertension, moderate to marked MAC, moderate to severe MR (history of MV repair) vegetation could not be ruled out , mild to moderate AS, mild to moderate AI, mild TR     Mitral valve regurgitation s/p Mitral valve repair  -last echo in 2015 through primary cardiologist Dr Lauri Mina [facility], mitral regurgitation was only mild   -cause of vegetation unknown   No evidence of infection--patient without leukocytosis,  Blood culture negative  Consider vasculitis as cause  -possible need for intervention on valve in the future discussed     PAF  -INR 1 47, coumadin may be resumed      CAD s/p PCI   -no recent anginal symptoms   -EF preserved, no evidence of acute volume overload  -ASA held in the setting of thrombocytopenia, on carvedilol, atorvastatin     Thrombocytopenia   -platelets 46-->38-->45-->37-->25-->782 this admission, continue to follow   -heme/onc evaluated-- 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

## 2018-06-25 VITALS
HEIGHT: 66 IN | HEART RATE: 72 BPM | DIASTOLIC BLOOD PRESSURE: 60 MMHG | TEMPERATURE: 99 F | RESPIRATION RATE: 18 BRPM | WEIGHT: 156.75 LBS | BODY MASS INDEX: 25.19 KG/M2 | OXYGEN SATURATION: 91 % | SYSTOLIC BLOOD PRESSURE: 128 MMHG

## 2018-06-25 PROBLEM — A77.49 ANAPLASMOSIS: Status: ACTIVE | Noted: 2018-06-25

## 2018-06-25 LAB
ANION GAP SERPL CALCULATED.3IONS-SCNC: 9 MMOL/L (ref 4–13)
BASOPHILS # BLD MANUAL: 0 THOUSAND/UL (ref 0–0.1)
BASOPHILS NFR MAR MANUAL: 0 % (ref 0–1)
BUN SERPL-MCNC: 13 MG/DL (ref 5–25)
CALCIUM SERPL-MCNC: 8.4 MG/DL (ref 8.3–10.1)
CHLORIDE SERPL-SCNC: 106 MMOL/L (ref 100–108)
CO2 SERPL-SCNC: 26 MMOL/L (ref 21–32)
CREAT SERPL-MCNC: 1.25 MG/DL (ref 0.6–1.3)
EOSINOPHIL # BLD MANUAL: 0.09 THOUSAND/UL (ref 0–0.4)
EOSINOPHIL NFR BLD MANUAL: 1 % (ref 0–6)
ERYTHROCYTE [DISTWIDTH] IN BLOOD BY AUTOMATED COUNT: 13.3 % (ref 11.6–15.1)
GFR SERPL CREATININE-BSD FRML MDRD: 58 ML/MIN/1.73SQ M
GLUCOSE SERPL-MCNC: 105 MG/DL (ref 65–140)
HCT VFR BLD AUTO: 37.2 % (ref 36.5–49.3)
HGB BLD-MCNC: 12.5 G/DL (ref 12–17)
INR PPP: 1.49 (ref 0.86–1.17)
LYMPHOCYTES # BLD AUTO: 0.61 THOUSAND/UL (ref 0.6–4.47)
LYMPHOCYTES # BLD AUTO: 7 % (ref 14–44)
MCH RBC QN AUTO: 34 PG (ref 26.8–34.3)
MCHC RBC AUTO-ENTMCNC: 33.6 G/DL (ref 31.4–37.4)
MCV RBC AUTO: 101 FL (ref 82–98)
MONOCYTES # BLD AUTO: 0.18 THOUSAND/UL (ref 0–1.22)
MONOCYTES NFR BLD: 2 % (ref 4–12)
NEUTROPHILS # BLD MANUAL: 7.73 THOUSAND/UL (ref 1.85–7.62)
NEUTS SEG NFR BLD AUTO: 88 % (ref 43–75)
NRBC BLD AUTO-RTO: 0 /100 WBCS
PLATELET # BLD AUTO: 169 THOUSANDS/UL (ref 149–390)
PLATELET BLD QL SMEAR: ADEQUATE
PMV BLD AUTO: 9.9 FL (ref 8.9–12.7)
POTASSIUM SERPL-SCNC: 4.2 MMOL/L (ref 3.5–5.3)
PROTHROMBIN TIME: 17.9 SECONDS (ref 11.8–14.2)
RBC # BLD AUTO: 3.68 MILLION/UL (ref 3.88–5.62)
RBC MORPH BLD: NORMAL
RYE IGE QN: NEGATIVE
SODIUM SERPL-SCNC: 141 MMOL/L (ref 136–145)
TOTAL CELLS COUNTED SPEC: 100
VARIANT LYMPHS # BLD AUTO: 2 %
WBC # BLD AUTO: 8.78 THOUSAND/UL (ref 4.31–10.16)

## 2018-06-25 PROCEDURE — 85027 COMPLETE CBC AUTOMATED: CPT | Performed by: INTERNAL MEDICINE

## 2018-06-25 PROCEDURE — 87040 BLOOD CULTURE FOR BACTERIA: CPT | Performed by: INTERNAL MEDICINE

## 2018-06-25 PROCEDURE — 85007 BL SMEAR W/DIFF WBC COUNT: CPT | Performed by: INTERNAL MEDICINE

## 2018-06-25 PROCEDURE — 99232 SBSQ HOSP IP/OBS MODERATE 35: CPT | Performed by: INTERNAL MEDICINE

## 2018-06-25 PROCEDURE — 80048 BASIC METABOLIC PNL TOTAL CA: CPT | Performed by: INTERNAL MEDICINE

## 2018-06-25 PROCEDURE — 97116 GAIT TRAINING THERAPY: CPT

## 2018-06-25 PROCEDURE — 99233 SBSQ HOSP IP/OBS HIGH 50: CPT | Performed by: INTERNAL MEDICINE

## 2018-06-25 PROCEDURE — 99239 HOSP IP/OBS DSCHRG MGMT >30: CPT | Performed by: INTERNAL MEDICINE

## 2018-06-25 PROCEDURE — 85610 PROTHROMBIN TIME: CPT | Performed by: INTERNAL MEDICINE

## 2018-06-25 RX ORDER — LISINOPRIL 20 MG/1
20 TABLET ORAL DAILY
Status: DISCONTINUED | OUTPATIENT
Start: 2018-06-25 | End: 2018-06-25

## 2018-06-25 RX ORDER — WARFARIN SODIUM 2.5 MG/1
5 TABLET ORAL
Refills: 0
Start: 2018-06-25 | End: 2021-05-19 | Stop reason: ALTCHOICE

## 2018-06-25 RX ORDER — LOSARTAN POTASSIUM 25 MG/1
25 TABLET ORAL DAILY
Status: DISCONTINUED | OUTPATIENT
Start: 2018-06-25 | End: 2018-06-25 | Stop reason: HOSPADM

## 2018-06-25 RX ORDER — LOSARTAN POTASSIUM 25 MG/1
25 TABLET ORAL DAILY
Qty: 30 TABLET | Refills: 2 | Status: SHIPPED | OUTPATIENT
Start: 2018-06-25 | End: 2020-09-21 | Stop reason: ALTCHOICE

## 2018-06-25 RX ORDER — PANTOPRAZOLE SODIUM 40 MG/1
40 TABLET, DELAYED RELEASE ORAL
Qty: 60 TABLET | Refills: 2 | Status: SHIPPED | OUTPATIENT
Start: 2018-06-25 | End: 2018-11-30 | Stop reason: SDUPTHER

## 2018-06-25 RX ORDER — FAMOTIDINE 20 MG/1
20 TABLET, FILM COATED ORAL DAILY
Status: DISCONTINUED | OUTPATIENT
Start: 2018-06-25 | End: 2018-06-25 | Stop reason: HOSPADM

## 2018-06-25 RX ORDER — WARFARIN SODIUM 7.5 MG/1
7.5 TABLET ORAL
Status: COMPLETED | OUTPATIENT
Start: 2018-06-25 | End: 2018-06-25

## 2018-06-25 RX ORDER — MIRTAZAPINE 15 MG/1
15 TABLET, FILM COATED ORAL
Qty: 30 TABLET | Refills: 2 | Status: SHIPPED | OUTPATIENT
Start: 2018-06-25 | End: 2018-11-30 | Stop reason: SDUPTHER

## 2018-06-25 RX ORDER — DOXYCYCLINE HYCLATE 100 MG/1
100 CAPSULE ORAL EVERY 12 HOURS SCHEDULED
Qty: 40 CAPSULE | Refills: 0 | Status: SHIPPED | OUTPATIENT
Start: 2018-06-25 | End: 2018-07-15

## 2018-06-25 RX ADMIN — PANTOPRAZOLE SODIUM 40 MG: 40 TABLET, DELAYED RELEASE ORAL at 17:17

## 2018-06-25 RX ADMIN — FAMOTIDINE 20 MG: 20 TABLET, FILM COATED ORAL at 10:57

## 2018-06-25 RX ADMIN — WARFARIN SODIUM 7.5 MG: 7.5 TABLET ORAL at 17:17

## 2018-06-25 RX ADMIN — ACETAMINOPHEN 650 MG: 325 TABLET, FILM COATED ORAL at 06:00

## 2018-06-25 RX ADMIN — BENZONATATE 100 MG: 100 CAPSULE ORAL at 08:58

## 2018-06-25 RX ADMIN — CARVEDILOL 6.25 MG: 6.25 TABLET, FILM COATED ORAL at 17:17

## 2018-06-25 RX ADMIN — PANTOPRAZOLE SODIUM 40 MG: 40 TABLET, DELAYED RELEASE ORAL at 06:01

## 2018-06-25 RX ADMIN — LOSARTAN POTASSIUM 25 MG: 25 TABLET, FILM COATED ORAL at 10:57

## 2018-06-25 RX ADMIN — DOXYCYCLINE 100 MG: 100 CAPSULE ORAL at 08:58

## 2018-06-25 RX ADMIN — ALLOPURINOL 100 MG: 100 TABLET ORAL at 08:58

## 2018-06-25 RX ADMIN — ATORVASTATIN CALCIUM 40 MG: 40 TABLET, FILM COATED ORAL at 08:58

## 2018-06-25 RX ADMIN — CARVEDILOL 6.25 MG: 6.25 TABLET, FILM COATED ORAL at 08:58

## 2018-06-25 NOTE — PROGRESS NOTES
Progress Note - Infectious Disease   Pranav Morris 71 y o  male MRN: [id number]  Unit/Bed#: -01 Encounter: [id number]      Impression/Recommendations:  1   Anaplasmosis  New diagnosis  Fever with thrombocytopenia, LFT abnormalities  Lyme screen and blood parasite smear negative  Continue doxycycline for 10 days total   Follow temperatures closely   Check CBC, CMP in a m  if patient remains in house    2  Fever  Likely due to #1  Chest/abdomen/pelvis CT, blood cultures negative  Continue antibiotics as above   Follow temperatures closely     3   Mitral valve vegetation  Suspect sterile thrombus  Blood cultures remain negative  Alternative source of fever given dx of Anaplasmosis  No further workup or treatment from an ID perspective      Discussed with Dr Raymond White      Antibiotics:  Doxycycline #1    Subjective:  Patient seen on AM rounds  Still feels very tired  Denies fevers or diarrhea  24 Hour Events:  No documented fevers, chills, sweats, nausea, vomiting, or diarrhea  Objective:  Vitals:  HR:  [59-81] 72  Resp:  [18] 18  BP: (119-146)/(60-65) 128/60  SpO2:  [91 %-96 %] 91 %  Temp (24hrs), Av 4 °F (36 9 °C), Min:98 °F (36 7 °C), Max:99 °F (37 2 °C)  Current: Temperature: 99 °F (37 2 °C)    Physical Exam:   General:  No acute distress  Eyes:  Normal lids and conjunctivae  ENT:  Normal external ears and nose  Neck:  Neck symmetric with midline trachea  Pulmonary:  Normal respiratory effort without accessory muscle use  Cardiovascular:  Regular rate and rhythm; no peripheral edema  Gastrointestinal:  No tenderness or distention  Musculoskeletal:  No digital clubbing or cyanosis  Skin:  No visible rashes; No palpable nodules  Neurologic:  Sensation grossly intact to light touch  Psychiatric:  Alert and oriented; Normal mood    Lab Results:  I have personally reviewed pertinent labs      Results from last 7 days  Lab Units 18  0602 18  0602 18  0541 18  7779 06/21/18  0439   SODIUM mmol/L 141 143 141 139 137   POTASSIUM mmol/L 4 2 4 3 4 2 4 3 3 8   CHLORIDE mmol/L 106 108 108 108 108   CO2 mmol/L 26 27 26 22 25   ANION GAP mmol/L 9 8 7 9 4   BUN mg/dL 13 15 13 15 19   CREATININE mg/dL 1 25 1 15 0 98 1 04 1 18   EGFR ml/min/1 73sq m 58 64 77 72 62   GLUCOSE RANDOM mg/dL 105 95 106 108 87   CALCIUM mg/dL 8 4 8 1* 7 8* 7 7* 7 3*   AST U/L  --   --  53* 64* 74*   ALT U/L  --   --  56 62 66   ALK PHOS U/L  --   --  51 54 46   TOTAL PROTEIN g/dL  --   --  5 7* 5 7* 5 2*   BILIRUBIN TOTAL mg/dL  --   --  1 10* 1 10* 1 00       Results from last 7 days  Lab Units 06/25/18  0602 06/24/18  0602 06/23/18  0541   WBC Thousand/uL 8 78 8 13 7 75   HEMOGLOBIN g/dL 12 5 11 3* 11 3*   PLATELETS Thousands/uL 169 115* 82*       Results from last 7 days  Lab Units 06/23/18  1151 06/23/18  1150 06/20/18  1415 06/19/18  1915   BLOOD CULTURE  No Growth at 24 hrs  No Growth at 24 hrs   --  No Growth After 5 Days  No Growth After 5 Days  C DIFF TOXIN B   --   --  NEGATIVE for C difficle toxin by PCR    --        Imaging Studies:   I have personally reviewed pertinent imaging study reports and images in PACS  EKG, Pathology, and Other Studies:   I have personally reviewed pertinent reports

## 2018-06-25 NOTE — SOCIAL WORK
CM met with [facility] in regards to pt's dc plan  Pt is in need of going home with [facility]  CM met with pt at bedside  pt is in agreement  CM sent a referral to [address] and [place] as per pt's dc plan  CM will keep pt informed as referrals are approved  Pt has no other needs at this time

## 2018-06-25 NOTE — PROGRESS NOTES
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  He still feeling weak and tired  Eating better though      CURRENT MEDICATIONS       Current Facility-Administered Medications:     acetaminophen (TYLENOL) tablet 650 mg, 650 mg, Oral, Q6H PRN, Willie Majano MD, 650 mg at 06/25/18 0600    albuterol inhalation solution 2 5 mg, 2 5 mg, Nebulization, Q4H PRN, Kylah Smith PA-C    allopurinol (ZYLOPRIM) tablet 100 mg, 100 mg, Oral, Daily, Tabitha Faulkner MD, 100 mg at 06/25/18 0858    atorvastatin (LIPITOR) tablet 40 mg, 40 mg, Oral, Daily, Tabitha Faulkner MD, 40 mg at 06/25/18 0858    benzonatate (TESSALON PERLES) capsule 100 mg, 100 mg, Oral, TID PRN, Tabitha Faulkner MD, 100 mg at 06/25/18 0858    carvedilol (COREG) tablet 6 25 mg, 6 25 mg, Oral, BID With Meals, Tabitha Faulkner MD, 6 25 mg at 06/25/18 0858    doxycycline hyclate (VIBRAMYCIN) capsule 100 mg, 100 mg, Oral, Q12H [address], Colleen Borja MD, 100 mg at 06/25/18 0858    famotidine (PEPCID) tablet 20 mg, 20 mg, Oral, Daily, Tabitha Faulkner MD, 20 mg at 06/25/18 1057    losartan (COZAAR) tablet 25 mg, 25 mg, Oral, Daily, Tabitha Faulkner MD, 25 mg at 06/25/18 1057    mirtazapine (REMERON) tablet 15 mg, 15 mg, Oral, HS, Tabitha Faulkner MD, 15 mg at 06/24/18 2155    ondansetron (ZOFRAN) injection 4 mg, 4 mg, Intravenous, Q6H PRN, Tabitha Faulkner MD    pantoprazole (PROTONIX) EC tablet 40 mg, 40 mg, Oral, BID Anayeli FAIR III, MD, 40 mg at 06/25/18 0601    polyethylene glycol (MIRALAX) packet 17 g, 17 g, Oral, Daily PRN, Tabitha Faulkner MD    warfarin (COUMADIN) tablet 7 5 mg, 7 5 mg, Oral, Once (warfarin), Tabitha Faulkner MD    OBJECTIVE     Current Weight: Weight - Scale: 71 1 kg (156 lb 12 oz)  Vitals:    06/25/18 0700   BP: 128/60   Pulse: 72   Resp: 18   Temp: 99 °F (37 2 °C)   SpO2: 91%       Intake/Output Summary (Last 24 hours) at 06/25/18 1341  Last data filed at 06/25/18 1101   Gross per 24 hour   Intake              180 ml   Output             1775 ml   Net            -1595 ml       PHYSICAL EXAMINATION     Physical Exam   Constitutional: He is oriented to person, place, and time  He appears well-developed  No distress  HENT:   Head: Normocephalic  Mouth/Throat: Oropharynx is clear and moist    Eyes: Conjunctivae are normal  No scleral icterus  Neck: Neck supple  No JVD present  Cardiovascular: Normal rate and normal heart sounds  Pulmonary/Chest: Effort normal  He has no wheezes  Abdominal: Soft  There is no tenderness  Musculoskeletal: Normal range of motion  He exhibits no edema  Neurological: He is alert and oriented to person, place, and time  Skin: Skin is warm  No rash noted  Psychiatric: He has a normal mood and affect   His behavior is normal         LAB RESULTS       Results from last 7 days  Lab Units 06/25/18  0602 06/24/18  0602 06/23/18  0541 06/22/18  0554 06/21/18  0439 06/20/18  0447 06/19/18  1200   WBC Thousand/uL 8 78 8 13 7 75 7 37 3 82* 5 78 7 97   HEMOGLOBIN g/dL 12 5 11 3* 11 3* 12 1 11 8* 13 9 14 5   HEMATOCRIT % 37 2 32 9* 33 4* 36 0* 35 4* 42 0 43 6   PLATELETS Thousands/uL 169 115* 82* 63* 50* 60* 89*   SODIUM mmol/L 141 143 141 139 137 137 137   POTASSIUM mmol/L 4 2 4 3 4 2 4 3 3 8 3 9 4 3   CHLORIDE mmol/L 106 108 108 108 108 104 98*   CO2 mmol/L 26 27 26 22 25 26 27   BUN mg/dL 13 15 13 15 19 24 34*   CREATININE mg/dL 1 25 1 15 0 98 1 04 1 18 1 48* 2 18*   EGFR ml/min/1 73sq m 58 64 77 72 62 47 29   CALCIUM mg/dL 8 4 8 1* 7 8* 7 7* 7 3* 7 7* 8 9   MAGNESIUM mg/dL  --   --   --   --   --  2 3 1 9   PHOSPHORUS mg/dL  --   --   --   --  2 1* 2 8 3 2   TOTAL PROTEIN g/dL  --   --  5 7* 5 7* 5 2* 6 3* 6 6   GLUCOSE RANDOM mg/dL 105 95 106 108 87 101 130       RADIOLOGY RESULTS      No results found for this or any previous visit  Results for orders placed during the hospital encounter of 06/07/18   XR chest pa & lateral    Narrative CHEST     INDICATION:   J20 9: Acute bronchitis, unspecified  R05: Cough  COMPARISON:  8/10/2017    EXAM PERFORMED/VIEWS:  XR CHEST PA & LATERAL  The frontal view was performed utilizing dual energy radiographic technique  Images: 4    FINDINGS:  There are median sternotomy wires indicating prior cardiac surgery  Cardiomediastinal silhouette appears unremarkable  The lungs are clear  No pneumothorax or pleural effusion  Osseous structures appear within normal limits for patient age  Impression No acute cardiopulmonary disease  Workstation performed: [id number]       No results found for this or any previous visit  No results found for this or any previous visit  No results found for this or any previous visit  No results found for this or any previous visit  PLAN / RECOMMENDATIONS      Acute kidney injury:  Improved  Advised continue hydration by mouth    Anaplasmosis:  Being treated by ID    Disposition:  Can be discharged renal point of view he can follow up with me if kidney function deteriorated    Ramsey Sandhu MD  Nephrology  6/25/2018        Portions of the record may have been created with voice recognition software  Occasional wrong word or "sound a like" substitutions may have occurred due to the inherent limitations of voice recognition software  Read the chart carefully and recognize, using context, where substitutions have occurred

## 2018-06-25 NOTE — DISCHARGE SUMMARY
[facility] [address] [facility]    Patient Information: Felisha Morris 71 y o  male MRN: [id number]  Unit/Bed#: -01 Encounter: [id number]    Discharging Physician / Practitioner: Sophie Chow MD  PCP: Vani Davidson MD  Admission Date: 6/19/2018  Discharge Date: 06/25/18    Reason for Admission:  Generalized weakness    Discharge Diagnoses:     Principal Problem:    Anaplasmosis  Active Problems:    CAD (coronary atherosclerotic disease)    GERD (gastroesophageal reflux disease)    Hypercholesterolemia    Hypertension    PAF (paroxysmal atrial fibrillation) (HCC)    Peptic ulcer disease    Weight loss    Anorexia    Generalized weakness    ARF (acute renal failure) (HCC)    Chronic anticoagulation    Thrombocytopenia (HCC)    Diarrhea    Bandemia    NED (acute kidney injury) ([address] )    Pain in gums    Mild protein-calorie malnutrition ([address] )    Endocarditis  Resolved Problems:    * No resolved hospital problems  *    Present on Admission:   Generalized weakness   CAD (coronary atherosclerotic disease)   GERD (gastroesophageal reflux disease)   Hypercholesterolemia   Hypertension   PAF (paroxysmal atrial fibrillation) (HCC)   Peptic ulcer disease   Weight loss   Anorexia   ARF (acute renal failure) (HCC)   Thrombocytopenia (HCC)   Diarrhea   NED (acute kidney injury) ([address] )   Pain in gums   Mild protein-calorie malnutrition ([address] )   Endocarditis   Anaplasmosis    Consultations During Hospital Stay:  · Cardiology  · Nephrology  · Infectious disease consult  · GI    Procedures Performed:     · EGD showing peptic ulcer disease/duodenal ulcer  · CT chest/abdomen/pelvis-unremarkable  · GUY-showing mitral valve vegetation  · Echocardiogram-mitral regurgitation    Patient with history of mitral repair  ·  CT facial bones-unremarkable  · Renal ultrasound-unremarkable  · Abdominal/liver ultrasound-patient with history of cholecystectomy otherwise normal so on a graph of the right upper quadrant    Significant Findings:      as above    Incidental Findings:   · As above     Test Results Pending at Discharge (will require follow up): · None     Outpatient Tests Requested:  · Follow up INR    Complications:  None    Hospital Course:     Norberto Alford is a 71 y o  male patient who originally presented to the hospital on 6/19/2018 due to symptoms of not feeling well and being sick for the last couple of months at least   He had lost his appetite  He has been feeling lightheaded  He also was complaining of upset stomach  He could not sleep or eat  He lost about 14+ lb within the last few weeks  He had no appetite  He also had diarrhea  He had extensive workup done  He was found to be thrombocytopenia  He was pancultured  His diarrhea resolved on his own in cultures were negative  Underwent EGD which showed duodenal ulcer  Seen by Infectious Disease consultant and further workup showed that he has Anaplasmosis  He was started on doxycycline  Patient was also found to be dehydrated  His diuretics and some of the other nephrotoxin medications were held  Slowly, his symptoms started to improve  His diarrhea resolved  He also started eating better  I have started him on Remeron at bedtime  He also had fevers early on  More cultures were drawn  All the cultures so far have been negative except positive for Anaplasma  as above    His platelets are also improving now now they are above 100,000  There were as low as 50,000  He has history of a paroxysmal atrial fibrillation  For procedures, his anticoagulation was held  He is back on his Coumadin  No need for any bridging as I have discussed with Cardiology explicitly about this  He has remained in sinus rhythm here anyway  He had an echocardiogram which showed moderate mitral regurgitation    He has history of mitral prolapse and repair in the past   Cardiology service discussed about him with his cardiologist as he had a murmur  He underwent transesophageal echocardiogram which showed mitral valve vegetation  As mentioned above cultures so far have been negative  I have offered patient multiple times about going to rehab, however, he would like to go home with home health services  Patient also had developed cough for the last few weeks  As per patient, other people are also sick around him  I have changed his ACE-inhibitor to ARB  His creatinine is slightly up and he may have underlying some chronic kidney disease stage 3  For peptic ulcer disease, he is on PPI  Patient to follow up with all consultants in the near future including Infectious Disease/hematology/oncology/Cardiology and his primary care physician  Patient does not have upper teeth and he has been using artificial-this has been also causing some soreness  Advised him to follow up with his dentist/oral surgeon    Condition at Discharge: fair     Discharge Day Visit / Exam:     Subjective:  He feels better today  No more diarrhea  He had bowel movement which was not diarrheal at all  No more fever or chills  Here and there, he would have some cough  He is eating better  Also sleeping better  Vitals: Blood Pressure: 128/60 (06/25/18 0700)  Pulse: 72 (06/25/18 0700)  Temperature: 99 °F (37 2 °C) (06/25/18 0700)  Temp Source: Oral (06/25/18 0700)  Respirations: 18 (06/25/18 0700)  Height: 5' 6" (167 6 cm) (06/19/18 1635)  Weight - Scale: 71 1 kg (156 lb 12 oz) (06/25/18 0600)  SpO2: 91 % (06/25/18 0700)  Exam:        Vital signs are reviewed as above  Patient sitting in bed  Oropharynx are moist   Missing upper teeth  S1 and S2 audible  Awake and alert  Oriented x3  Decreased breath sounds at the bases  I did not hear any added sounds  Skin is warm and dry  Abdomen is soft  It is nontender   Bowel sounds are audible              Discharge instructions/Information to patient and family:   See after visit summary for information provided to patient and family  Provisions for Follow-Up Care:  See after visit summary for information related to follow-up care and any pertinent home health orders  Disposition:     Home with [facility] Services (Reminder: Complete face to face encounter)    For Discharges to [address] [facility]:   · Not Applicable to this Patient - Not Applicable to this Patient    Planned Readmission:  None      Discharge Statement:  I spent 45+ minutes discharging the patient  This time was spent on the day of discharge  I had direct contact with the patient on the day of discharge  Greater than 50% of the total time was spent examining patient, answering all patient questions, arranging and discussing plan of care with patient as well as directly providing post-discharge instructions  Additional time then spent on discharge activities  Discharge Medications:  See after visit summary for reconciled discharge medications provided to patient and family  ** Please Note: Dragon 360 Dictation voice to text software may have been used in the creation of this document   **

## 2018-06-25 NOTE — PHYSICAL THERAPY NOTE
PT Progress Note (18min)  (13:50-14:08)       06/25/18 1408   Pain Assessment   Pain Assessment No/denies pain   Restrictions/Precautions   Other Precautions Fall Risk   General   Chart Reviewed Yes   Response to Previous Treatment Patient with no complaints from previous session  Family/Caregiver Present No   Cognition   Orientation Level Oriented X4   Subjective   Subjective pt agreeable to PT session  "they said I'm going home today"  Bed Mobility   Supine to Sit 6  Modified independent   Additional items HOB elevated   Transfers   Sit to Stand 5  Supervision   Additional items Verbal cues   Stand to Sit 5  Supervision   Additional items Verbal cues   Ambulation/Elevation   Gait pattern Narrow KWASI; Forward Flexion;Decreased foot clearance;L Knee Celina; Inconsistent melissa   Gait Assistance 5  Supervision   Additional items Verbal cues   Assistive Device None   Distance 120'   Stair Management Assistance 5  Supervision   Additional items Verbal cues   Stair Management Technique Alternating pattern; One rail R;Foreward   Number of Stairs 6   Balance   Static Sitting Good   Dynamic Sitting Good   Static Standing Fair   Dynamic Standing Fair   Ambulatory Fair   Activity Tolerance   Activity Tolerance Patient limited by fatigue   Nurse Made Aware Jerry   Assessment   Prognosis Good   Problem List Decreased strength;Decreased endurance; Impaired balance;Decreased mobility   Assessment pt cont to progress c PT  performs all OOB mobility tasks c (S)  progressed ambulation distance to 120' s AD or overt loss of balance noted  negotiated 6 stairs in stairwell c R hand rail c (S)  will follow up for 1-2 additional PT sessions to maximize functional mobility + return home safely  upon d/c, recommend pt return home c hhpt  Barriers to Discharge Decreased caregiver support   Goals   Patient Goals "to go home"     STG Expiration Date 07/01/18   Treatment Day 2   Plan   Treatment/Interventions Functional transfer training;LE strengthening/ROM; Elevations; Therapeutic exercise; Endurance training;Patient/family training;Bed mobility;Gait training;Spoke to nursing   Progress Progressing toward goals   PT Frequency 1-2x/wk   Recommendation   Recommendation Home PT   PT - OK to Discharge Yes     Mikala Maldonado, PT

## 2018-06-25 NOTE — PROGRESS NOTES
Progress Note - Cardiology     Benny Brothjuliocesar Morris 71 y o  male MRN: [id number]  Unit/Bed#: -01 Encounter: [id number]      Subjective:   Patient reports feeling about the same from yesterday  He denies new complaints     Objective:   Vitals:  Vitals:    06/25/18 0700   BP: 128/60   Pulse: 72   Resp: 18   Temp: 99 °F (37 2 °C)   SpO2: 91%       Body mass index is 25 3 kg/m²  Systolic (55LLL), RBK:832 , Min:119 , DQC:768     Diastolic (61ZZQ), HYQ:63, Min:60, Max:65      Intake/Output Summary (Last 24 hours) at 06/25/18 1432  Last data filed at 06/25/18 1101   Gross per 24 hour   Intake              180 ml   Output             1775 ml   Net            -1595 ml     Weight (last 2 days)     Date/Time   Weight    06/25/18 0600  71 1 (156 75)    06/24/18 0600  71 9 (158 51)    06/23/18 0600  70 2 (154 76)              PHYSICAL EXAM:  General: Patient is not in acute distress  Laying comfortably in the bed  Head: Normocephalic  Atraumatic  HEENT: Both pupils normal sized, round and reactive to light  Nonicteric  Neck: JVP not raised  Trachea central    Respiratory: Bilateral bronchovascular breath sounds with no crackles or rhonchi  Cardiovascular: RRR  3/6 systolic murmur at apex   GI: Abdomen soft, nontender  No guarding or rigidity  Neurologic: Patient is awake, alert, responding to command   Moving all extremities  Integumentary:  No skin rash  Extremities: No clubbing, cyanosis or edema    LABORATORY RESULTS:    Results from last 7 days  Lab Units 06/19/18  1200   TROPONIN I ng/mL 0 04     CBC with diff:   Results from last 7 days  Lab Units 06/25/18  0602 06/24/18  0602 06/23/18  0541   WBC Thousand/uL 8 78 8 13 7 75   HEMOGLOBIN g/dL 12 5 11 3* 11 3*   HEMATOCRIT % 37 2 32 9* 33 4*   MCV fL 101* 100* 100*   PLATELETS Thousands/uL 169 115* 82*   MCH pg 34 0 34 2 33 9   MCHC g/dL 33 6 34 3 33 8   RDW % 13 3 13 2 13 3   MPV fL 9 9 10 4 11 3   NRBC AUTO /100 WBCs 0 0 0       CMP:  Results from last 7 days  Lab Units 06/25/18  0602 06/24/18  0602 06/23/18  0541 06/22/18  0554 06/21/18  0439   SODIUM mmol/L 141 143 141 139 137   POTASSIUM mmol/L 4 2 4 3 4 2 4 3 3 8   CHLORIDE mmol/L 106 108 108 108 108   CO2 mmol/L 26 27 26 22 25   ANION GAP mmol/L 9 8 7 9 4   BUN mg/dL 13 15 13 15 19   CREATININE mg/dL 1 25 1 15 0 98 1 04 1 18   GLUCOSE RANDOM mg/dL 105 95 106 108 87   CALCIUM mg/dL 8 4 8 1* 7 8* 7 7* 7 3*   AST U/L  --   --  53* 64* 74*   ALT U/L  --   --  56 62 66   ALK PHOS U/L  --   --  51 54 46   TOTAL PROTEIN g/dL  --   --  5 7* 5 7* 5 2*   BILIRUBIN TOTAL mg/dL  --   --  1 10* 1 10* 1 00   EGFR ml/min/1 73sq m 58 64 77 72 62       BMP:  Results from last 7 days  Lab Units 06/25/18  0602 06/24/18  0602 06/23/18  0541   SODIUM mmol/L 141 143 141   POTASSIUM mmol/L 4 2 4 3 4 2   CHLORIDE mmol/L 106 108 108   CO2 mmol/L 26 27 26   BUN mg/dL 13 15 13   CREATININE mg/dL 1 25 1 15 0 98   GLUCOSE RANDOM mg/dL 105 95 106   CALCIUM mg/dL 8 4 8 1* 7 8*         Results from last 7 days  Lab Units 06/19/18  1200   NT-PRO BNP pg/mL 608*          Results from last 7 days  Lab Units 06/20/18  0447 06/19/18  1200   MAGNESIUM mg/dL 2 3 1 9               Results from last 7 days  Lab Units 06/19/18  1912   TSH 3RD GENERATON uIU/mL 0 908         Results from last 7 days  Lab Units 06/25/18  0602 06/24/18  0602 06/23/18  0541 06/22/18  0554 06/21/18  0440 06/20/18  0447 06/19/18  1200   INR  1 49* 1 47* 1 76* 1 79* 2 30* 2 13* 1 93*       Lipid Profile:   Lab Results   Component Value Date    CHOL 119 04/25/2018     Lab Results   Component Value Date    HDL 45 04/25/2018     Lab Results   Component Value Date    LDLCALC 53 04/25/2018     Lab Results   Component Value Date    TRIG 106 04/25/2018       Cardiac testing:   Results for orders placed during the hospital encounter of 06/19/18   Echo complete with contrast if indicated    Narrative [facility] [address] [address]  [phone]    Transthoracic Echocardiogram  2D, M-mode, Doppler, and Color Doppler    Study date:  2018    Patient: Franca Gooden  MR number: [id number]  Account number: [de-identified]  : 1945  Age: 71 years  Gender: Male  Status: Inpatient  Location: Bedside  Height: 66 in  Weight: 153 lb  BP: 144/ 60 mmHg    Indications: Coronary Artery Disease    Diagnoses: I25 10 - Atherosclerotic heart disease of native coronary artery without angina pectoris    Sonographer:  RACHANA Jacobo,RDCS  Interpreting Physician:  Shanti Mcmanus MD  Referring Physician:  Eun Holden MD  Group:  [facility]    SUMMARY    LEFT VENTRICLE:  Ejection fraction was estimated to be 55 %  There were no regional wall motion abnormalities  Concentric hypertrophy was present  RIGHT VENTRICLE:  The ventricle was mildly dilated  Estimated peak pressure was at least 70 mmHg c/w severe pulmonary hypertension  LEFT ATRIUM:  The atrium was mildly to moderately dilated  RIGHT ATRIUM:  The atrium was dilated  MITRAL VALVE:  There was moderate to marked annular calcification  There was moderate calcification of MV leaflets  Vegetation can not be ruled out with certainty  Consider GUY if clinically indicated  There was moderate to severe regurgitation  Patient has history of MV repair  AORTIC VALVE:  There was mild to moderate stenosis  There was mild to moderate regurgitation  TRICUSPID VALVE:  There was mild regurgitation  HISTORY: PRIOR HISTORY: Hyperlipidemia, Atrial Fibrillation, Transient Ischemic Attack, Hypertension, Acute Renal Failure, S/P Stent, Mitral Valve Repair    PROCEDURE: The procedure was performed at the bedside  This was a routine study  The transthoracic approach was used  The study included complete 2D imaging, M-mode, complete spectral Doppler, and color Doppler  The heart rate was 77 bpm,  at the start of the study   Images were obtained from the parasternal, apical, subcostal, and suprasternal notch acoustic windows  Intravenous contrast ( 0 6ml Definity used in NSS) was administered to opacify the left ventricle  Image  quality was adequate  LEFT VENTRICLE: Size was normal  Ejection fraction was estimated to be 55 %  There were no regional wall motion abnormalities  Concentric hypertrophy was present  RIGHT VENTRICLE: The ventricle was mildly dilated  Systolic function was normal  Wall thickness was normal  DOPPLER: Estimated peak pressure was at least 70 mmHg c/w severe pulmonary hypertension  LEFT ATRIUM: The atrium was mildly to moderately dilated  RIGHT ATRIUM: The atrium was dilated  MITRAL VALVE: There was moderate to marked annular calcification  There was moderate calcification of MV leaflets  Vegetation can not be ruled out with certainty  Consider GUY if clinically indicated  DOPPLER: There was moderate to severe  regurgitation  Patient has history of MV repair  AORTIC VALVE: The valve was probably trileaflet  Leaflets exhibited mild to moderate calcification  DOPPLER: There was mild to moderate stenosis  There was mild to moderate regurgitation  TRICUSPID VALVE: The valve structure was normal  There was normal leaflet separation  DOPPLER: The transtricuspid velocity was within the normal range  There was no evidence for stenosis  There was mild regurgitation  PULMONIC VALVE: Leaflets exhibited normal thickness, no calcification, and normal cuspal separation  DOPPLER: The transpulmonic velocity was within the normal range  There was no regurgitation  PERICARDIUM: There was no pericardial effusion  The pericardium was normal in appearance  AORTA: The root exhibited normal size      SYSTEM MEASUREMENT TABLES    2D  %FS: 32 3 %  Ao Diam: 3 7 cm  EDV(Teich): 122 6 ml  EF Biplane: 46 %  EF(Teich): 60 3 %  ESV(Teich): 48 7 ml  IVSd: 1 3 cm  LA Area: 28 7 cm2  LA Diam: 3 3 cm  LVEDV MOD A2C: 147 ml  LVEDV MOD A4C: 149 7 ml  LVEDV MOD BP: 148 1 ml  LVEF MOD A2C: 39 7 %  LVEF MOD A4C: 52 3 %  LVESV MOD A2C: 88 6 ml  LVESV MOD A4C: 71 4 ml  LVESV MOD BP: 80 ml  LVIDd: 5 1 cm  LVIDs: 3 4 cm  LVLd A2C: 8 8 cm  LVLd A4C: 8 9 cm  LVLs A2C: 8 cm  LVLs A4C: 8 1 cm  LVOT Diam: 2 1 cm  LVPWd: 1 2 cm  RA Area: 21 3 cm2  RVIDd: 3 4 cm  SV MOD A2C: 58 4 ml  SV MOD A4C: 78 4 ml  SV(Teich): 73 9 ml    CW  AR Dec [place]: 2 7 m/s2  AR Dec Time: 1294 ms  AR PHT: 375 3 ms  AR Vmax: 3 5 m/s  AR maxP 1 mmHg  AV Env  Ti: 253 5 ms  AV VTI: 45 1 cm  AV Vmax: 2 8 m/s  AV Vmean: 1 8 m/s  AV maxP 4 mmHg  AV meanPG: 15 3 mmHg  HR: 71 5 BPM  MR VTI: 150 1 cm  MR Vmax: 5 6 m/s  MR Vmean: 4 4 m/s  MR maxP 7 mmHg  MR meanP 5 mmHg  MV VTI: 65 cm  MV Vmax: 2 2 m/s  MV Vmean: 1 2 m/s  MV maxP 4 mmHg  MV meanP 2 mmHg  TR Vmax: 4 2 m/s  TR maxP 6 mmHg    MM  TAPSE: 2 1 cm    PW  RADHA (VTI): 1 7 cm2  RADHA Vmax: 1 6 cm2  E': 0 m/s  E/E': 48 8  LVOT Env  Ti: 267 7 ms  LVOT VTI: 21 8 cm  LVOT Vmax: 1 3 m/s  LVOT Vmean: 0 8 m/s  LVOT maxP 8 mmHg  LVOT meanPG: 3 2 mmHg  LVSV Dopp: 76 8 ml  MV A Naun: 1 6 m/s  MV Dec [place]: 4 8 m/s2  MV DecT: 353 4 ms  MV E Naun: 1 7 m/s  MV E/A Ratio: 1 1  MV PHT: 102 5 ms  MVA (VTI): 1 2 cm2  MVA By PHT: 2 1 cm2    [facility] Accredited Ech[facility] Laboratory    Prepared and electronically signed by    Nelida Sahu MD  Signed 2018 16:40:44       Results for orders placed during the hospital encounter of 18   GUY    Narrative [address] [place] [address] [id number] [phone]    Transesophageal Echocardiogram  2D, Doppler, and Color Doppler    Study date:  2018    Patient: Baljit Lindsay  MR number: [id number]  Account number: [de-identified]  : 1945  Age: 71 years  Gender: Male  Status: Inpatient  Location: Cath lab  Height: 66 in  Weight: 153 lb  BP: 109/ 52 mmHg    Indications: Mitral Valve Disease    Diagnoses: I34 9 - Nonrheumatic mitral valve disorder, unspecified    Sonographer:  RACHANA Nguyen,RDCS  Interpreting Physician:  Any Canela MD  Referring Physician:  Any Canela MD  Group:  [facility]    SUMMARY    LEFT VENTRICLE:  Ejection fraction was estimated to be 55 %  Abnormal septal motion noted  Concentric hypertrophy was present  LEFT ATRIUM:  The atrium was mildly to moderately dilated  ATRIAL SEPTUM:  Contrast injection was performed  suggestion of a small PFO with late apperance of bubbles crossing from right to left side  RIGHT ATRIUM:  The atrium was mildly dilated  MITRAL VALVE:  There was mild to moderate annular calcification  There is an echo density attached to the anterior mitral leaflet measuring 0 7X0 6cm likely c/w vegetation  There is restriction of the mobility of the posterior mitral leaflet  There was moderate to severe regurgitation  Patient is s/p mitral valve repair in 2014  AORTIC VALVE:  There was mild regurgitation  TRICUSPID VALVE:  There was moderate regurgitation  AORTA:  There was moderately severe atheroma  HISTORY: PRIOR HISTORY: Mitral Valve Repair, Coronary Artery Disease, Hyperlipidemia, Hypertension, Atrial Fibrillation, Congestive Heart Failure, Fatigue, Prostate Cancer, Stent, Stroke, Transient Ischemic Attack    PROCEDURE: The procedure was performed in the catheterization laboratory  This was a routine study  The risks and alternatives of the procedure were explained to the patient and informed consent was obtained  The transesophageal approach  was used  The study included complete 2D imaging, limited spectral Doppler, and color Doppler  The heart rate was 68 bpm, at the start of the study  An adult omniplane probe was inserted by the attending cardiologist  Intubated with ease  One intubation attempt(s)  There was no blood detected on the probe   There were no complications during the procedure  MEDICATIONS: Benzocaine spray, topical to oropharynx, prior to procedure  Sedation administered by anesthesia team     LEFT VENTRICLE: Size was normal  Ejection fraction was estimated to be 55 %  Abnormal septal motion noted  Concentric hypertrophy was present  RIGHT VENTRICLE: The size was normal  Systolic function was normal  Wall thickness was normal     LEFT ATRIUM: The atrium was mildly to moderately dilated  No thrombus was identified  ATRIAL SEPTUM: Contrast injection was performed  suggestion of a small PFO with late apperance of bubbles crossing from right to left side  RIGHT ATRIUM: The atrium was mildly dilated  MITRAL VALVE: There was mild to moderate annular calcification  There is an echo density attached to the anterior mitral leaflet measuring 0 7X0 6cm likely c/w vegetation  There is restriction of the mobility of the posterior mitral  leaflet  DOPPLER: There was moderate to severe regurgitation  Patient is s/p mitral valve repair in 2014  AORTIC VALVE: The valve was trileaflet  Leaflets exhibited mild calcification  DOPPLER: There was mild regurgitation  TRICUSPID VALVE: The valve structure was normal  There was normal leaflet separation  There was no echocardiographic evidence of vegetation  DOPPLER: There was moderate regurgitation  PULMONIC VALVE: Leaflets exhibited normal thickness, no calcification, and normal cuspal separation  There was no echocardiographic evidence of vegetation  PERICARDIUM: There was no pericardial effusion  The pericardium was normal in appearance  AORTA: The root exhibited normal size  There was no atheroma  There was moderately severe atheroma  There was no evidence for dissection  There was no evidence for aneurysm      [address] Accredited Echocardiography Laboratory    Prepared and electronically signed by    Loco Bhatia MD  Signed [id number] 15:23:48         Meds/Allergies   all current active meds have been reviewed and current meds:   Current Facility-Administered Medications   Medication Dose Route Frequency    acetaminophen (TYLENOL) tablet 650 mg  650 mg Oral Q6H PRN    albuterol inhalation solution 2 5 mg  2 5 mg Nebulization Q4H PRN    allopurinol (ZYLOPRIM) tablet 100 mg  100 mg Oral Daily    atorvastatin (LIPITOR) tablet 40 mg  40 mg Oral Daily    benzonatate (TESSALON PERLES) capsule 100 mg  100 mg Oral TID PRN    carvedilol (COREG) tablet 6 25 mg  6 25 mg Oral BID With Meals    doxycycline hyclate (VIBRAMYCIN) capsule 100 mg  100 mg Oral Q12H [address]    famotidine (PEPCID) tablet 20 mg  20 mg Oral Daily    losartan (COZAAR) tablet 25 mg  25 mg Oral Daily    mirtazapine (REMERON) tablet 15 mg  15 mg Oral HS    ondansetron (ZOFRAN) injection 4 mg  4 mg Intravenous Q6H PRN    pantoprazole (PROTONIX) EC tablet 40 mg  40 mg Oral BID AC    polyethylene glycol (MIRALAX) packet 17 g  17 g Oral Daily PRN    warfarin (COUMADIN) tablet 7 5 mg  7 5 mg Oral Once (warfarin)     Prescriptions Prior to Admission   Medication    allopurinol (ZYLOPRIM) 100 mg tablet    atorvastatin (LIPITOR) 40 mg tablet    benzonatate (TESSALON PERLES) 100 mg capsule    carvedilol (COREG) 6 25 mg tablet    furosemide (LASIX) 20 mg tablet    lisinopril (ZESTRIL) 40 mg tablet    Multiple Vitamins-Minerals (PX MENS MULTIVITAMINS) TABS    ranitidine (ZANTAC) 300 MG capsule    [DISCONTINUED] warfarin (COUMADIN) 2 5 mg tablet              Assessment and Plan:  Trans esophageal echocardiogram EF 55%, concentric hypertrophy, mild to moderately dilated LA, suggestion of small PFO with bubbles crossing from right to left, mildly dilated RA, mild to moderate MAC, ECHODENSITY ATTACHED TO ANTERIOR MITRAL LEAFLET MEASURING 0 7 X 0 6 CM CONSISTENT WITH VEGETATION, restricted mobility of posterior mitral leaflet, moderate to severe MR (status post mitral valve repair in 2014), mild AI, moderate TR, moderate-severe atheroma noted in aorta     Trans thoracic Echocardiogram EF 55%, concentric hypertrophy, estimated peak PA pressure 70 mmhg (severe pulmonary hypertension, moderate to marked MAC, moderate to severe MR (history of MV repair) vegetation could not be ruled out , mild to moderate AS, mild to moderate AI, mild TR      Mitral valve regurgitation s/p Mitral valve repair  -last echo in 2015 through primary cardiologist Dr Mikie Peña in [facility], mitral regurgitation was only mild   -patient without leukocytosis,  Blood culture negative  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  Continue doxycycline for anaplasmosis   -possible need for intervention on valve in the future discussed     PAF  -maintaining NSR  On coumadin      CAD s/p PCI   -no recent anginal symptoms   -EF preserved, no evidence of acute volume overload  -ASA held in the setting of thrombocytopenia, on carvedilol, atorvastatin     Thrombocytopenia   -platelets 47-->17-->43-->58-->78-->871-->091  Now resolved  Likely reactive with infection      Fever/Weight loss  -PCR positive for anaplasmosis-- patient stated on doxycyline      Hypertension- BP stable      Hyperlipidemia- on atorvastatin     ** Please Note: Dragon 360 Dictation voice to text software may have been used in the creation of this document   **

## 2018-06-25 NOTE — PLAN OF CARE
Problem: PHYSICAL THERAPY ADULT  Goal: Performs mobility at highest level of function for planned discharge setting  See evaluation for individualized goals  Treatment/Interventions: Functional transfer training, LE strengthening/ROM, Elevations, Therapeutic exercise, Endurance training, Patient/family training, Bed mobility, Equipment eval/education, Gait training, Spoke to nursing          See flowsheet documentation for full assessment, interventions and recommendations  Outcome: Progressing  Prognosis: Good  Problem List: Decreased strength, Decreased endurance, Impaired balance, Decreased mobility  Assessment: pt cont to progress c PT  performs all OOB mobility tasks c (S)  progressed ambulation distance to 120' s AD or overt loss of balance noted  negotiated 6 stairs in stairwell c R hand rail c (S)  will follow up for 1-2 additional PT sessions to maximize functional mobility + return home safely  upon d/c, recommend pt return home c hhpt  Barriers to Discharge: Decreased caregiver support     Recommendation: Home PT     PT - OK to Discharge: Yes    See flowsheet documentation for full assessment

## 2018-06-25 NOTE — PLAN OF CARE
Problem: DISCHARGE PLANNING - CARE MANAGEMENT  Goal: Discharge to post-acute care or home with appropriate resources  INTERVENTIONS:  - Conduct assessment to determine patient/family and health care team treatment goals, and need for post-acute services based on payer coverage, community resources, and patient preferences, and barriers to discharge  - Address psychosocial, clinical, and financial barriers to discharge as identified in assessment in conjunction with the patient/family and health care team  - Arrange appropriate level of post-acute services according to patients   needs and preference and payer coverage in collaboration with the physician and health care team  - Communicate with and update the patient/family, physician, and health care team regarding progress on the discharge plan  - Arrange appropriate transportation to post-acute venues   Outcome: Progressing  CM met with SLIM in regards to pt's dc plan  Pt is in need of going home with [facility]  CM met with pt at bedside  pt is in agreement  CM sent a referral to [address] and [address] as per pt's dc plan  CM will keep pt informed as referrals are approved  Pt has no other needs at this time

## 2018-06-25 NOTE — CASE MANAGEMENT
Continued Stay Review    Date: 6/23/18   Inpatient  (completed on 6/25/18)    Vital Signs:   06/23/18 0700  98 3 °F (36 8 °C)  81  18  144/62  92 %  None (Room air)     Medications:   Scheduled Meds:   Current Facility-Administered Medications:  acetaminophen 650 mg Oral Q6H PRN   albuterol 2 5 mg Nebulization Q4H PRN   allopurinol 100 mg Oral Daily   atorvastatin 40 mg Oral Daily   benzonatate 100 mg Oral TID PRN   carvedilol 6 25 mg Oral BID With Meals   doxycycline hyclate 100 mg Oral Q12H ANGELA   famotidine 20 mg Oral Daily   losartan 25 mg Oral Daily   mirtazapine 15 mg Oral HS   ondansetron 4 mg Intravenous Q6H PRN   pantoprazole 40 mg Oral BID AC   polyethylene glycol 17 g Oral Daily PRN   warfarin 7 5 mg Oral Once (warfarin)     Abnormal Labs/Diagnostic Results:   6/19: CT chest a&p: nothing acute  6/20: US liver: 1   Limited examination due to overlying bowel gas  2   Status post cholecystectomy  6/21: renal US: R renal atrophy  6/22 CT facial: L max sinus mucosal thickening  6/23: CA 7 8   AST 53   T PROT 5 7   ALB 2 3   T BILI 1 1   hgb 11 3   hct 33 4  Pt 20 3   inr 1 76  bld c&s pending  funcal culture pending  Trans esophageal echocardiogram EF 55%, concentric hypertrophy, mild to moderately dilated LA, suggestion of small PFO with bubbles crossing from right to left, mildly dilated RA, mild to moderate MAC, ECHODENSITY ATTACHED TO ANTERIOR MITRAL LEAFLET MEASURING 0 7 X 0 6 CM CONSISTENT WITH VEGETATION, restricted mobility of posterior mitral leaflet, moderate to severe MR (status post mitral valve repair in 2014), mild AI, moderate TR, moderate-severe atheroma noted in aorta     Trans thoracic Echocardiogram EF 55%, concentric hypertrophy, estimated peak PA pressure 70 mmhg (severe pulmonary hypertension, moderate to marked MAC, moderate to severe MR (history of MV repair) vegetation could not be ruled out , mild to moderate AS, mild to moderate AI, mild TR    Age/Sex: 71 y o  male     Assessment/Plan: PER MED 6/23:  · Thrombocytopenia-exact etiology not clear  Platelets are getting better  Patient had a transesophageal echocardiogram yesterday  Results were reviewed  Patient appears to have a mitral valve vegetation  I did called Infectious Disease consultant-Dr Beatris Aschoff who has been seeing this patient with during this hospitalization  He advised to draw more blood cultures today  He would also order blood cultures on Monday  I ordered fungal cultures  Patient to have a colonoscopy on Monday as well as he has been having lots of diarrhea before he came in  So far other cultures have been negative  Possible culture negative endocarditis  · Gum pain-resolving  He is not eating much  Encouraged him to eat more  · Diarrhea- C diff negative  Has not had any bowel movement for the last couple of days, however, he was having lot of diarrhea before and has not eaten much at all  Again encouraged him to eat more  His appetite is poor  · Poor appetite/weight loss-mild protein caloric malnutrition-would try Remeron which would also help him sleep at night/depression and may also stimulate his appetite  ? Using Magace  · History of paroxysmal atrial fibrillation  Patient also has history of mitral valve surgery and now has worsening/New regurgitation/findings  · Chronic anticoagulation-Coumadin was on hold for procedures  Would give him a dose of Coumadin today  Can hold the Coumadin for colonoscopy on Monday  · Negative attitude   He has very negative attitude  · Peptic ulcer disease seen on EGD  On PPI twice daily    Discharge Plan: to be determined    PER CARDIO 6/23:  Mitral valve regurgitation s/p Mitral valve repair/ Suspected endocarditis  Consider infectious versus noninfectious cause    Rule out vasculitis/inflammatory  -possible need for intervention on valve in the future discussed  -ASA held in the setting of thrombocytopenia, on carvedilol, atorvastatin

## 2018-06-26 ENCOUNTER — TELEPHONE (OUTPATIENT)
Dept: INTERNAL MEDICINE CLINIC | Facility: CLINIC | Age: 73
End: 2018-06-26

## 2018-06-26 ENCOUNTER — TRANSITIONAL CARE MANAGEMENT (OUTPATIENT)
Dept: INTERNAL MEDICINE CLINIC | Facility: CLINIC | Age: 73
End: 2018-06-26

## 2018-06-29 LAB
BACTERIA BLD CULT: NORMAL
BACTERIA BLD CULT: NORMAL
SSDNA IGG SER-ACNC: <20 EU (ref 0–19)

## 2018-06-30 LAB
BACTERIA BLD CULT: NORMAL
BACTERIA BLD CULT: NORMAL

## 2018-07-02 ENCOUNTER — TELEPHONE (OUTPATIENT)
Dept: GASTROENTEROLOGY | Facility: CLINIC | Age: 73
End: 2018-07-02

## 2018-07-02 NOTE — TELEPHONE ENCOUNTER
----- Message from Coleta Gaucher, MD sent at 7/1/2018 12:29 PM EDT -----  PLS TELL HIM THAT PATHOLOGY IS NEGATIVE AND NORMAL

## 2018-07-06 ENCOUNTER — OFFICE VISIT (OUTPATIENT)
Dept: INTERNAL MEDICINE CLINIC | Facility: CLINIC | Age: 73
End: 2018-07-06
Payer: MEDICARE

## 2018-07-06 VITALS
HEART RATE: 73 BPM | DIASTOLIC BLOOD PRESSURE: 62 MMHG | BODY MASS INDEX: 25.07 KG/M2 | WEIGHT: 156 LBS | OXYGEN SATURATION: 98 % | SYSTOLIC BLOOD PRESSURE: 130 MMHG | HEIGHT: 66 IN

## 2018-07-06 DIAGNOSIS — A77.49 ANAPLASMOSIS: Primary | ICD-10-CM

## 2018-07-06 DIAGNOSIS — R71.8 ABNORMAL RBC INDICES: ICD-10-CM

## 2018-07-06 DIAGNOSIS — D69.6 THROMBOCYTOPENIA (HCC): ICD-10-CM

## 2018-07-06 PROBLEM — D72.825 BANDEMIA: Status: RESOLVED | Noted: 2018-06-20 | Resolved: 2018-07-06

## 2018-07-06 PROBLEM — J04.0 LARYNGITIS: Status: RESOLVED | Noted: 2018-06-07 | Resolved: 2018-07-06

## 2018-07-06 PROBLEM — J20.9 ACUTE BRONCHITIS WITH BRONCHOSPASM: Status: RESOLVED | Noted: 2018-06-07 | Resolved: 2018-07-06

## 2018-07-06 PROCEDURE — 99495 TRANSJ CARE MGMT MOD F2F 14D: CPT | Performed by: INTERNAL MEDICINE

## 2018-07-06 NOTE — PROGRESS NOTES
Assessment/Plan:      His illness appears to be resolving  He is going to finish the course of antibiotics as instructed  Will repeat his labs in 2 weeks to make sure everything has returned to his baseline  Continue all other medications  Hospital records were reviewed  Return for Next scheduled follow up  No problem-specific Assessment & Plan notes found for this encounter  Diagnoses and all orders for this visit:    Anaplasmosis  -     CBC and differential; Future  -     Comprehensive metabolic panel; Future    Thrombocytopenia (HCC)    Abnormal RBC indices          Subjective:      Patient ID: Edmundo Dougherty is a 71 y o  male  Patient comes in today for hospital follow-up  He was admitted with fevers and overall feeling miserable  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, Anaplasmosis  Because of the illness, he developed thrombocytopenia and liver enzyme elevation  He was treated with doxycycline and improved  His numbers are returning to normal   He feels much better  Also while in the hospital he was recheck for his stomach ulcer and found to have a recurrence  He is on pantoprazole for this  Date and time hospital follow up call was made:  6/26/2018 11:39 AM  [facility]  Date of admission:  6/18/18  Date of discharge:  6/25/18  Diagnosis:  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    Disposition:  Home  Were the patients medicaitons reviewed and updated:  Yes  Current symptoms:  Cough, Shortness of breath, Back pain - left side, Back   pain - right side, Upper abdominal pain, Fatigue, Weakness  Cough Severity:  Mild  Shortness of breath severity:  Mild  Weakness severity:  Mild  Back pain, left side, severity:  Mild  Back pain, left side, onset:  Ongoing  Upper abdominal pain severity:  Mild  Back pain, right side, onset:  Unable to assess  Fatigue severity:  Moderate  Post hospital issues:  None  Should patient be enrolled in anticoag monitoring?:  No  Scheduled for follow up?:  Yes  Patients specialists:  Other (comment)  Other specialists Name:  Primary Dr Maynor Cedillo  Did you obtain your prescribed medications:  Yes  Do you need help managing your perscriptions or medications:  No  Is transportation to your appointments needed:  No  I have advised the patient to call PCP with any new or worsening symptoms   (please type in name along with any credentials):  [facility]   assistant for Dr Maynor Cedillo    Living Arrangements:  Alone  Support System:  Children  The type of support provided:  Physical, Emotional  Do you have social support:  Yes, some  Are you recieving outpatient services:  No  Are you recieving home care services:  No  Are you using any community resources:  No  Current waiver service:  No  Have you fallen in the last 12 months:  No  Interperter language line required?:  No  Counseling:  Patient  Counseling topics:  Importance of RX compliance           ALLERGIES:  No Known Allergies    CURRENT MEDICATIONS:    Current Outpatient Prescriptions:     allopurinol (ZYLOPRIM) 100 mg tablet, Take by mouth daily  , Disp: , Rfl: 0    atorvastatin (LIPITOR) 40 mg tablet, Take 40 mg by mouth daily  , Disp: , Rfl:     carvedilol (COREG) 6 25 mg tablet, Take 6 25 mg by mouth  , Disp: , Rfl:     doxycycline hyclate (VIBRAMYCIN) 100 mg capsule, Take 1 capsule (100 mg total) by mouth every 12 (twelve) hours for 20 days, Disp: 40 capsule, Rfl: 0    furosemide (LASIX) 20 mg tablet, , Disp: , Rfl: 0    losartan (COZAAR) 25 mg tablet, Take 1 tablet (25 mg total) by mouth daily, Disp: 30 tablet, Rfl: 2    mirtazapine (REMERON) 15 mg tablet, Take 1 tablet (15 mg total) by mouth daily at bedtime, Disp: 30 tablet, Rfl: 2    Multiple Vitamins-Minerals (PX MENS MULTIVITAMINS) TABS, Take by mouth, Disp: , Rfl:     pantoprazole (PROTONIX) 40 mg tablet, Take 1 tablet (40 mg total) by mouth 2 (two) times a day before meals, Disp: 60 tablet, Rfl: 2   warfarin (COUMADIN) 2 5 mg tablet, Take 2 tablets (5 mg total) by mouth daily, Disp: , Rfl: 0    ACTIVE PROBLEM LIST:  Patient Active Problem List   Diagnosis    CAD (coronary atherosclerotic disease)    CHF (congestive heart failure) (HCC)    Chronic back pain    GERD (gastroesophageal reflux disease)    Hypercholesterolemia    Hypertension    PAF (paroxysmal atrial fibrillation) (HCC)    Peptic ulcer disease    S/P MVR (mitral valve repair)    Polyp of colon    Cough    Weight loss    Anorexia    Abnormal RBC indices    Fatigue    Generalized weakness    ARF (acute renal failure) (HCC)    Chronic anticoagulation    Thrombocytopenia (HCC)    Diarrhea    NED (acute kidney injury) (HCC)    Pain in gums    Mild protein-calorie malnutrition (HCC)    Endocarditis    Anaplasmosis       PAST MEDICAL HISTORY:  Past Medical History:   Diagnosis Date    CAD (coronary artery disease)     Cancer ([facility] )     prostate    Coronary artery disease     GERD (gastroesophageal reflux disease)     H/O heart artery stent     x2    Hypercholesteremia     Hypertension     Keratotic lesion     Stroke ([facility] )     10YRS AGO    Transient cerebral ischemia        PAST SURGICAL HISTORY:  Past Surgical History:   Procedure Laterality Date    BACK SURGERY      CARDIAC SURGERY      mvp repair    CHOLECYSTECTOMY      ESOPHAGOGASTRODUODENOSCOPY N/A 6/22/2018    Procedure: ESOPHAGOGASTRODUODENOSCOPY (EGD); Surgeon: Maricel Gomez MD;  Location: [facility];   Service: Gastroenterology    JOINT REPLACEMENT      L hip replacement    ORCHIECTOMY      PROSTATECTOMY      TOTAL HIP ARTHROPLASTY         FAMILY HISTORY:  Family History   Problem Relation Age of Onset    Heart disease Mother     Stroke Mother         CVA    Cancer Father     Hodgkin's lymphoma Father     Lung cancer Father        SOCIAL HISTORY:  Social History     Social History    Marital status:      Spouse name: N/A    Number of children: N/A    Years of education: N/A     Occupational History    retired      Social History Main Topics    Smoking status: Former Smoker     Quit date: 5/22/2018    Smokeless tobacco: Never Used    Alcohol use 1 2 oz/week     2 Standard drinks or equivalent per week    Drug use: No    Sexual activity: No      Comment: no high risk sexual behavior     Other Topics Concern    Not on file     Social History Narrative    No narrative on file       Review of Systems   Respiratory: Negative for shortness of breath  Cardiovascular: Negative for chest pain  Gastrointestinal: Negative for abdominal pain  Objective:  Vitals:    07/06/18 1025   BP: 130/62   Pulse: 73   SpO2: 98%   Weight: 70 8 kg (156 lb)   Height: 5' 6" (1 676 m)        Physical Exam   Constitutional: He is oriented to person, place, and time  He appears well-developed and well-nourished  Cardiovascular: Normal rate, regular rhythm and normal heart sounds  Pulmonary/Chest: Effort normal and breath sounds normal    Abdominal: Soft  Bowel sounds are normal    Musculoskeletal: He exhibits no edema  Neurological: He is alert and oriented to person, place, and time  Nursing note and vitals reviewed          RESULTS:    Recent Results (from the past 1008 hour(s))   ECG 12 lead    Collection Time: 06/19/18 11:11 AM   Result Value Ref Range    Ventricular Rate 78 BPM    Atrial Rate 78 BPM    AZ Interval 176 ms    QRSD Interval 146 ms    QT Interval 406 ms    QTC Interval 462 ms    P Axis 66 degrees    QRS Axis -20 degrees    T Wave Axis 164 degrees   CBC and differential    Collection Time: 06/19/18 12:00 PM   Result Value Ref Range    WBC 7 97 4 31 - 10 16 Thousand/uL    RBC 4 25 3 88 - 5 62 Million/uL    Hemoglobin 14 5 12 0 - 17 0 g/dL    Hematocrit 43 6 36 5 - 49 3 %     (H) 82 - 98 fL    MCH 34 1 26 8 - 34 3 pg    MCHC 33 3 31 4 - 37 4 g/dL    RDW 13 7 11 6 - 15 1 %    MPV 10 7 8 9 - 12 7 fL    Platelets 89 (L) 978 - 390 Thousands/uL    nRBC 0 /100 WBCs   Comprehensive metabolic panel    Collection Time: 06/19/18 12:00 PM   Result Value Ref Range    Sodium 137 136 - 145 mmol/L    Potassium 4 3 3 5 - 5 3 mmol/L    Chloride 98 (L) 100 - 108 mmol/L    CO2 27 21 - 32 mmol/L    Anion Gap 12 4 - 13 mmol/L    BUN 34 (H) 5 - 25 mg/dL    Creatinine 2 18 (H) 0 60 - 1 30 mg/dL    Glucose 130 65 - 140 mg/dL    Calcium 8 9 8 3 - 10 1 mg/dL    AST 77 (H) 5 - 45 U/L    ALT 78 12 - 78 U/L    Alkaline Phosphatase 59 46 - 116 U/L    Total Protein 6 6 6 4 - 8 2 g/dL    Albumin 3 2 (L) 3 5 - 5 0 g/dL    Total Bilirubin 1 40 (H) 0 20 - 1 00 mg/dL    eGFR 29 ml/min/1 73sq m   Lipase    Collection Time: 06/19/18 12:00 PM   Result Value Ref Range    Lipase 112 73 - 393 u/L   Protime-INR    Collection Time: 06/19/18 12:00 PM   Result Value Ref Range    Protime 21 8 (H) 11 8 - 14 2 seconds    INR 1 93 (H) 0 86 - 1 17   APTT    Collection Time: 06/19/18 12:00 PM   Result Value Ref Range    PTT 42 (H) 24 - 36 seconds   Troponin I    Collection Time: 06/19/18 12:00 PM   Result Value Ref Range    Troponin I 0 04 <=0 04 ng/mL   B-type natriuretic peptide    Collection Time: 06/19/18 12:00 PM   Result Value Ref Range    NT-proBNP 608 (H) <125 pg/mL   Magnesium    Collection Time: 06/19/18 12:00 PM   Result Value Ref Range    Magnesium 1 9 1 6 - 2 6 mg/dL   Phosphorus    Collection Time: 06/19/18 12:00 PM   Result Value Ref Range    Phosphorus 3 2 2 3 - 4 1 mg/dL   Manual Differential(PHLEBS Do Not Order)    Collection Time: 06/19/18 12:00 PM   Result Value Ref Range    Segmented % 88 (H) 43 - 75 %    Lymphocytes % 5 (L) 14 - 44 %    Monocytes % 7 4 - 12 %    Eosinophils % 0 0 - 6 %    Basophils % 0 0 - 1 %    Absolute Neutrophils 7 01 1 85 - 7 62 Thousand/uL    Lymphocytes Absolute 0 40 (L) 0 60 - 4 47 Thousand/uL    Monocytes Absolute 0 56 0 00 - 1 22 Thousand/uL    Eosinophils Absolute 0 00 0 00 - 0 40 Thousand/uL    Basophils Absolute 0 00 0 00 - 0 10 Thousand/uL    Total Counted 100     Platelet Estimate Decreased (A) Adequate   UA w Reflex to Microscopic    Collection Time: 06/19/18  4:11 PM   Result Value Ref Range    Color, UA Elsa     Clarity, UA Cloudy     Specific [place], UA 1 025 1 003 - 1 030    pH, UA 6 0 4 5 - 8 0    Leukocytes, UA Negative Negative    Nitrite, UA Negative Negative    Protein, UA 30 (1+) (A) Negative mg/dl    Glucose, UA Negative Negative mg/dl    Ketones, UA Trace (A) Negative mg/dl    Urobilinogen, UA 0 2 0 2, 1 0 E U /dl E U /dl    Bilirubin, UA Negative Negative    Blood, UA Moderate (A) Negative   Urine Microscopic    Collection Time: 06/19/18  4:11 PM   Result Value Ref Range    RBC, UA 0-1 (A) None Seen, 0-5 /hpf    WBC, UA None Seen None Seen, 0-5, 5-55, 5-65 /hpf    Epithelial Cells Occasional None Seen, Occasional /hpf    Bacteria, UA None Seen None Seen, Occasional /hpf    Hyaline Casts, UA 2-4 (A) (none) /lpf    MUCOUS THREADS Occasional (A) None Seen   Fibrin split products    Collection Time: 06/19/18  7:12 PM   Result Value Ref Range    FDP <10 <10, >40 <80   Fibrinogen    Collection Time: 06/19/18  7:12 PM   Result Value Ref Range    Fibrinogen 455 227 - 495 mg/dL   TSH, 3rd generation    Collection Time: 06/19/18  7:12 PM   Result Value Ref Range    TSH 3RD GENERATON 0 908 0 358 - 3 740 uIU/mL   Blood culture    Collection Time: 06/19/18  7:15 PM   Result Value Ref Range    Blood Culture No Growth After 5 Days  Blood culture    Collection Time: 06/19/18  7:15 PM   Result Value Ref Range    Blood Culture No Growth After 5 Days      Comprehensive metabolic panel    Collection Time: 06/20/18  4:47 AM   Result Value Ref Range    Sodium 137 136 - 145 mmol/L    Potassium 3 9 3 5 - 5 3 mmol/L    Chloride 104 100 - 108 mmol/L    CO2 26 21 - 32 mmol/L    Anion Gap 7 4 - 13 mmol/L    BUN 24 5 - 25 mg/dL    Creatinine 1 48 (H) 0 60 - 1 30 mg/dL    Glucose 101 65 - 140 mg/dL    Calcium 7 7 (L) 8 3 - 10 1 mg/dL    AST 82 (H) 5 - 45 U/L    ALT 79 (H) 12 - 78 U/L    Alkaline Phosphatase 57 46 - 116 U/L    Total Protein 6 3 (L) 6 4 - 8 2 g/dL    Albumin 2 9 (L) 3 5 - 5 0 g/dL    Total Bilirubin 1 40 (H) 0 20 - 1 00 mg/dL    eGFR 47 ml/min/1 73sq m   Magnesium    Collection Time: 06/20/18  4:47 AM   Result Value Ref Range    Magnesium 2 3 1 6 - 2 6 mg/dL   Phosphorus    Collection Time: 06/20/18  4:47 AM   Result Value Ref Range    Phosphorus 2 8 2 3 - 4 1 mg/dL   CBC and differential    Collection Time: 06/20/18  4:47 AM   Result Value Ref Range    WBC 5 78 4 31 - 10 16 Thousand/uL    RBC 4 10 3 88 - 5 62 Million/uL    Hemoglobin 13 9 12 0 - 17 0 g/dL    Hematocrit 42 0 36 5 - 49 3 %     (H) 82 - 98 fL    MCH 33 9 26 8 - 34 3 pg    MCHC 33 1 31 4 - 37 4 g/dL    RDW 13 8 11 6 - 15 1 %    MPV 10 9 8 9 - 12 7 fL    Platelets 60 (L) 439 - 390 Thousands/uL    nRBC 0 /100 WBCs   Protime-INR    Collection Time: 06/20/18  4:47 AM   Result Value Ref Range    Protime 23 5 (H) 11 8 - 14 2 seconds    INR 2 13 (H) 0 86 - 1 17   Manual Differential(PHLEBS Do Not Order)    Collection Time: 06/20/18  4:47 AM   Result Value Ref Range    Segmented % 74 43 - 75 %    Bands % 10 (H) 0 - 8 %    Lymphocytes % 6 (L) 14 - 44 %    Monocytes % 9 4 - 12 %    Eosinophils % 0 0 - 6 %    Basophils % 1 0 - 1 %    Absolute Neutrophils 4 86 1 85 - 7 62 Thousand/uL    Lymphocytes Absolute 0 35 (L) 0 60 - 4 47 Thousand/uL    Monocytes Absolute 0 52 0 00 - 1 22 Thousand/uL    Eosinophils Absolute 0 00 0 00 - 0 40 Thousand/uL    Basophils Absolute 0 06 0 00 - 0 10 Thousand/uL    Total Counted 100     Platelet Estimate Decreased (A) Adequate   Procalcitonin    Collection Time: 06/20/18 10:21 AM   Result Value Ref Range    Procalcitonin 0 71 (H) <=0 25 ng/ml   Anaplasma Phagocytophilum, PCR    Collection Time: 06/20/18 10:21 AM   Result Value Ref Range    Anaplasma phagocytophilum Positive (A) Negative   Blood Parasite smear    Collection Time: 06/20/18 10:21 AM   Result Value Ref Range    % Parasitemia 0     Is Blood Parasite Present No    Lyme Antibody Profile with reflex to WB    Collection Time: 06/20/18 10:21 AM   Result Value Ref Range    LYME AB IGG 0 18 0 00 - 0 79    LYME AB IGM 0 37 0 00 - 0 79   Lactate dehydrogenase    Collection Time: 06/20/18 10:21 AM   Result Value Ref Range     (H) 81 - 234 U/L   Path Slide Review    Collection Time: 06/20/18 10:21 AM   Result Value Ref Range    Path Review No blood parasites seen  Clostridium difficile toxin by PCR    Collection Time: 06/20/18  2:15 PM   Result Value Ref Range    C difficile toxin by PCR NEGATIVE for C difficle toxin by PCR  NEGATIVE for C difficle toxin by PCR      HIV-1 RNA, quantitative, PCR    Collection Time: 06/20/18  2:17 PM   Result Value Ref Range    HIV-1 RNA by PCR, Qn <20 copies/mL    HIV-1 RNA Viral Load Log COMMENT [id number]   Chronic Hepatitis Panel    Collection Time: 06/20/18  2:17 PM   Result Value Ref Range    Hepatitis B Surface Ag Non-reactive Non-reactive, NonReactive - Confirmed    Hepatitis C Ab Non-reactive Non-reactive    Hep B C IgM Non-reactive Non-reactive    Hep B Core Total Ab Non-reactive Non-reactive   CBC and differential    Collection Time: 06/21/18  4:39 AM   Result Value Ref Range    WBC 3 82 (L) 4 31 - 10 16 Thousand/uL    RBC 3 43 (L) 3 88 - 5 62 Million/uL    Hemoglobin 11 8 (L) 12 0 - 17 0 g/dL    Hematocrit 35 4 (L) 36 5 - 49 3 %     (H) 82 - 98 fL    MCH 34 4 (H) 26 8 - 34 3 pg    MCHC 33 3 31 4 - 37 4 g/dL    RDW 13 5 11 6 - 15 1 %    MPV 12 1 8 9 - 12 7 fL    Platelets 50 (L) 372 - 390 Thousands/uL    nRBC 0 /100 WBCs   Hemolysis Smear    Collection Time: 06/21/18  4:39 AM   Result Value Ref Range    Hemolysis Smear No Schistocytes or Helmet Cells noted    Comprehensive metabolic panel    Collection Time: 06/21/18  4:39 AM   Result Value Ref Range    Sodium 137 136 - 145 mmol/L    Potassium 3 8 3 5 - 5 3 mmol/L    Chloride 108 100 - 108 mmol/L    CO2 25 21 - 32 mmol/L    Anion Gap 4 4 - 13 mmol/L    BUN 19 5 - 25 mg/dL    Creatinine 1 18 0 60 - 1 30 mg/dL    Glucose 87 65 - 140 mg/dL    Calcium 7 3 (L) 8 3 - 10 1 mg/dL    AST 74 (H) 5 - 45 U/L    ALT 66 12 - 78 U/L    Alkaline Phosphatase 46 46 - 116 U/L    Total Protein 5 2 (L) 6 4 - 8 2 g/dL    Albumin 2 3 (L) 3 5 - 5 0 g/dL    Total Bilirubin 1 00 0 20 - 1 00 mg/dL    eGFR 62 ml/min/1 73sq m   Bilirubin, direct    Collection Time: 06/21/18  4:39 AM   Result Value Ref Range    Bilirubin, Direct 0 32 (H) 0 00 - 0 20 mg/dL   Phosphorus    Collection Time: 06/21/18  4:39 AM   Result Value Ref Range    Phosphorus 2 1 (L) 2 3 - 4 1 mg/dL   PTH, intact    Collection Time: 06/21/18  4:39 AM   Result Value Ref Range    PTH 89 9 (H) 18 4 - 80 1 pg/mL   Uric acid    Collection Time: 06/21/18  4:39 AM   Result Value Ref Range    Uric Acid 5 5 4 2 - 8 0 mg/dL   C-reactive protein    Collection Time: 06/21/18  4:39 AM   Result Value Ref Range    CRP >90 0 (H) <3 0 mg/L   Sedimentation rate, automated    Collection Time: 06/21/18  4:39 AM   Result Value Ref Range    Sed Rate 9 0 - 10 mm/hour   Manual Differential(PHLEBS Do Not Order)    Collection Time: 06/21/18  4:39 AM   Result Value Ref Range    Segmented % 60 43 - 75 %    Bands % 1 0 - 8 %    Lymphocytes % 30 14 - 44 %    Monocytes % 6 4 - 12 %    Eosinophils % 0 0 - 6 %    Basophils % 0 0 - 1 %    Atypical Lymphocytes % 3 (H) <=0 %    Absolute Neutrophils 2 33 1 85 - 7 62 Thousand/uL    Lymphocytes Absolute 1 15 0 60 - 4 47 Thousand/uL    Monocytes Absolute 0 23 0 00 - 1 22 Thousand/uL    Eosinophils Absolute 0 00 0 00 - 0 40 Thousand/uL    Basophils Absolute 0 00 0 00 - 0 10 Thousand/uL    Total Counted 100     Platelet Estimate Decreased (A) Adequate   Protime-INR    Collection Time: 06/21/18  4:40 AM   Result Value Ref Range    Protime 25 0 (H) 11 8 - 14 2 seconds    INR 2 30 (H) 0 86 - 1 17   Folate    Collection Time: 06/21/18  4:40 AM   Result Value Ref Range Folate 19 8 (H) 3 1 - 17 5 ng/mL   Vitamin B12    Collection Time: 06/21/18  4:40 AM   Result Value Ref Range    Vitamin B-12 880 100 - 900 pg/mL   Ferritin    Collection Time: 06/21/18  4:40 AM   Result Value Ref Range    Ferritin 920 (H) 8 - 388 ng/mL   Iron Saturation %    Collection Time: 06/21/18  4:40 AM   Result Value Ref Range    Iron Saturation 8 %    TIBC 244 (L) 250 - 450 ug/dL    Iron 20 (L) 65 - 175 ug/dL   Vitamin D 25 hydroxy    Collection Time: 06/21/18  4:40 AM   Result Value Ref Range    Vit D, 25-Hydroxy 18 9 (L) 30 0 - 100 0 ng/mL   MARISOL Screen w/ Reflex to Titer/Pattern    Collection Time: 06/21/18  4:40 AM   Result Value Ref Range    MARISOL Negative Negative   Fingerstick Glucose (POCT)    Collection Time: 06/21/18  6:07 AM   Result Value Ref Range    POC Glucose 89 65 - 140 mg/dl   Chloride, urine, random    Collection Time: 06/21/18 10:31 AM   Result Value Ref Range    Chloride, Ur 81 10 - 330 mmol/L   Creatinine, urine, random    Collection Time: 06/21/18 10:31 AM   Result Value Ref Range    Creatinine, Ur 134 0 mg/dL   Microalbumin / creatinine urine ratio    Collection Time: 06/21/18 10:31 AM   Result Value Ref Range    Creatinine, Ur 137 0 mg/dL    Microalbum  ,U,Random 37 4 (H) 0 0 - 20 0 mg/L    Microalb Creat Ratio 27 0 - 30 mg/g creatinine   Osmolality, urine    Collection Time: 06/21/18 10:31 AM   Result Value Ref Range    Osmolality, Ur 620 250 - 900 mmol/KG   Sodium, urine, random    Collection Time: 06/21/18 10:31 AM   Result Value Ref Range    Sodium, Ur 60    Urinalysis with reflex to microscopic    Collection Time: 06/21/18 10:31 AM   Result Value Ref Range    Color, UA Yellow     Clarity, UA Clear     Specific Gravity, UA 1 020 1 003 - 1 030    pH, UA 6 0 4 5 - 8 0    Leukocytes, UA Negative Negative    Nitrite, UA Negative Negative    Protein, UA 30 (1+) (A) Negative mg/dl    Glucose, UA Negative Negative mg/dl    Ketones, UA 15 (1+) (A) Negative mg/dl    Urobilinogen, UA 0 2 0 2, 1 0 E U /dl E U /dl    Bilirubin, UA Negative Negative    Blood, UA Moderate (A) Negative   Urine Microscopic    Collection Time: 06/21/18 10:31 AM   Result Value Ref Range    RBC, UA 2-4 (A) None Seen, 0-5 /hpf    WBC, UA None Seen None Seen, 0-5, 5-55, 5-65 /hpf    Epithelial Cells Occasional None Seen, Occasional /hpf    Bacteria, UA None Seen None Seen, Occasional /hpf   CBC and differential    Collection Time: 06/22/18  5:54 AM   Result Value Ref Range    WBC 7 37 4 31 - 10 16 Thousand/uL    RBC 3 55 (L) 3 88 - 5 62 Million/uL    Hemoglobin 12 1 12 0 - 17 0 g/dL    Hematocrit 36 0 (L) 36 5 - 49 3 %     (H) 82 - 98 fL    MCH 34 1 26 8 - 34 3 pg    MCHC 33 6 31 4 - 37 4 g/dL    RDW 13 2 11 6 - 15 1 %    MPV 11 5 8 9 - 12 7 fL    Platelets 63 (L) 494 - 390 Thousands/uL    nRBC 0 /100 WBCs   Comprehensive metabolic panel    Collection Time: 06/22/18  5:54 AM   Result Value Ref Range    Sodium 139 136 - 145 mmol/L    Potassium 4 3 3 5 - 5 3 mmol/L    Chloride 108 100 - 108 mmol/L    CO2 22 21 - 32 mmol/L    Anion Gap 9 4 - 13 mmol/L    BUN 15 5 - 25 mg/dL    Creatinine 1 04 0 60 - 1 30 mg/dL    Glucose 108 65 - 140 mg/dL    Calcium 7 7 (L) 8 3 - 10 1 mg/dL    AST 64 (H) 5 - 45 U/L    ALT 62 12 - 78 U/L    Alkaline Phosphatase 54 46 - 116 U/L    Total Protein 5 7 (L) 6 4 - 8 2 g/dL    Albumin 2 5 (L) 3 5 - 5 0 g/dL    Total Bilirubin 1 10 (H) 0 20 - 1 00 mg/dL    eGFR 72 ml/min/1 73sq m   Protime-INR    Collection Time: 06/22/18  5:54 AM   Result Value Ref Range    Protime 20 5 (H) 11 8 - 14 2 seconds    INR 1 79 (H) 0 86 - 1 17   Manual Differential(PHLEBS Do Not Order)    Collection Time: 06/22/18  5:54 AM   Result Value Ref Range    Segmented % 68 43 - 75 %    Lymphocytes % 13 (L) 14 - 44 %    Monocytes % 15 (H) 4 - 12 %    Eosinophils % 0 0 - 6 %    Basophils % 0 0 - 1 %    Atypical Lymphocytes % 4 (H) <=0 %    Absolute Neutrophils 5 01 1 85 - 7 62 Thousand/uL    Lymphocytes Absolute 0 96 0 60 - 4 47 Thousand/uL    Monocytes Absolute 1 11 0 00 - 1 22 Thousand/uL    Eosinophils Absolute 0 00 0 00 - 0 40 Thousand/uL    Basophils Absolute 0 00 0 00 - 0 10 Thousand/uL    Total Counted 100     Anisocytosis Present     Platelet Estimate Decreased (A) Adequate   Tissue Exam    Collection Time: 06/22/18  1:09 PM   Result Value Ref Range    Case Report       Surgical Pathology Report                         Case: [id number]                                   Authorizing Provider:  Eros Ryan MD   Collected:           06/22/2018 1309              Ordering Location:     [address] [place] [address] Received:            06/22/2018 1533                                     2nd Floor Med Surg Unit                                                      Pathologist:           Malik Martin MD                                                        Specimen:    Stomach, antrum                                                                            Final Diagnosis       A  Stomach, antrum, biopsy:  - Fragments of gastric antral mucosa with mild chronic nonspecific inflammation and focal intestina metaplasia  - No Helicobacter pylori organisms are identified with immunoperoxidase stain  Additional Information       All controls performed with the immunohistochemical stains reported above reacted appropriately  These tests were developed and their performance characteristics determined by [facility] or [facility]  They may not be cleared or approved by the [place]  Food and Drug Administration  The FDA has determined that such clearance or approval is not necessary  These tests are used for clinical purposes  They should not be regarded as investigational or for research  This laboratory has been approved by CLIA 88, designated as a high-complexity laboratory and is qualified to perform these tests      - Interpretation performed at [facility], [address] [place] [id number]           Gross Description       A  The specimen is received in formalin, labeled with the patient's name and hospital number, and is designated "stomach antrum biopsy  The specimen consists of 3 tan-pink, focally friable soft tissue fragments ranging from 0 1-0 5 cm in greatest dimension  The specimen is entirely submitted in 1 cassette  Note: The estimated total formalin fixation time based upon information provided by the submitting clinician and the standard processing schedule is under 72 hours       Juan      Clinical Information Cold biopsies r/o H Pylori    CBC and differential    Collection Time: 06/23/18  5:41 AM   Result Value Ref Range    WBC 7 75 4 31 - 10 16 Thousand/uL    RBC 3 33 (L) 3 88 - 5 62 Million/uL    Hemoglobin 11 3 (L) 12 0 - 17 0 g/dL    Hematocrit 33 4 (L) 36 5 - 49 3 %     (H) 82 - 98 fL    MCH 33 9 26 8 - 34 3 pg    MCHC 33 8 31 4 - 37 4 g/dL    RDW 13 3 11 6 - 15 1 %    MPV 11 3 8 9 - 12 7 fL    Platelets 82 (L) 649 - 390 Thousands/uL    nRBC 0 /100 WBCs    Neutrophils Relative 72 43 - 75 %    Immat GRANS % 1 0 - 2 %    Lymphocytes Relative 15 14 - 44 %    Monocytes Relative 11 4 - 12 %    Eosinophils Relative 1 0 - 6 %    Basophils Relative 0 0 - 1 %    Neutrophils Absolute 5 50 1 85 - 7 62 Thousands/µL    Immature Grans Absolute 0 04 0 00 - 0 20 Thousand/uL    Lymphocytes Absolute 1 19 0 60 - 4 47 Thousands/µL    Monocytes Absolute 0 88 0 17 - 1 22 Thousand/µL    Eosinophils Absolute 0 11 0 00 - 0 61 Thousand/µL    Basophils Absolute 0 03 0 00 - 0 10 Thousands/µL   Comprehensive metabolic panel    Collection Time: 06/23/18  5:41 AM   Result Value Ref Range    Sodium 141 136 - 145 mmol/L    Potassium 4 2 3 5 - 5 3 mmol/L    Chloride 108 100 - 108 mmol/L    CO2 26 21 - 32 mmol/L    Anion Gap 7 4 - 13 mmol/L    BUN 13 5 - 25 mg/dL    Creatinine 0 98 0 60 - 1 30 mg/dL    Glucose 106 65 - 140 mg/dL    Calcium 7 8 (L) 8 3 - 10 1 mg/dL    AST 53 (H) 5 - 45 U/L    ALT 56 12 - 78 U/L    Alkaline Phosphatase 51 46 - 116 U/L    Total Protein 5 7 (L) 6 4 - 8 2 g/dL    Albumin 2 3 (L) 3 5 - 5 0 g/dL    Total Bilirubin 1 10 (H) 0 20 - 1 00 mg/dL    eGFR 77 ml/min/1 73sq m   Protime-INR    Collection Time: 06/23/18  5:41 AM   Result Value Ref Range    Protime 20 3 (H) 11 8 - 14 2 seconds    INR 1 76 (H) 0 86 - 1 17   Procalcitonin    Collection Time: 06/23/18  5:41 AM   Result Value Ref Range    Procalcitonin 0 17 <=0 25 ng/ml   Blood culture    Collection Time: 06/23/18 11:50 AM   Result Value Ref Range    Blood Culture No Growth After 5 Days  Fungal culture, blood    Collection Time: 06/23/18 11:50 AM   Result Value Ref Range    Fungal Blood Culture No Fungus Isolated at 1 Week    Blood culture    Collection Time: 06/23/18 11:51 AM   Result Value Ref Range    Blood Culture No Growth After 5 Days      DNA antibody, single strand    Collection Time: 06/23/18  2:02 PM   Result Value Ref Range    ss DNA Ab <20 0 - 19 EU   MARISOL Screen w/ Reflex to Titer/Pattern    Collection Time: 06/23/18  2:03 PM   Result Value Ref Range    MARISOL Negative Negative   Protime-INR    Collection Time: 06/24/18  6:02 AM   Result Value Ref Range    Protime 17 7 (H) 11 8 - 14 2 seconds    INR 1 47 (H) 0 86 - 3 72   Basic metabolic panel    Collection Time: 06/24/18  6:02 AM   Result Value Ref Range    Sodium 143 136 - 145 mmol/L    Potassium 4 3 3 5 - 5 3 mmol/L    Chloride 108 100 - 108 mmol/L    CO2 27 21 - 32 mmol/L    Anion Gap 8 4 - 13 mmol/L    BUN 15 5 - 25 mg/dL    Creatinine 1 15 0 60 - 1 30 mg/dL    Glucose 95 65 - 140 mg/dL    Calcium 8 1 (L) 8 3 - 10 1 mg/dL    eGFR 64 ml/min/1 73sq m   CBC and differential    Collection Time: 06/24/18  6:02 AM   Result Value Ref Range    WBC 8 13 4 31 - 10 16 Thousand/uL    RBC 3 30 (L) 3 88 - 5 62 Million/uL    Hemoglobin 11 3 (L) 12 0 - 17 0 g/dL    Hematocrit 32 9 (L) 36 5 - 49 3 %     (H) 82 - 98 fL    MCH 34 2 26 8 - 34 3 pg    MCHC 34 3 31 4 - 37 4 g/dL RDW 13 2 11 6 - 15 1 %    MPV 10 4 8 9 - 12 7 fL    Platelets 179 (L) 930 - 390 Thousands/uL    nRBC 0 /100 WBCs   Manual Differential(PHLEBS Do Not Order)    Collection Time: 06/24/18  6:02 AM   Result Value Ref Range    Segmented % 81 (H) 43 - 75 %    Lymphocytes % 14 14 - 44 %    Monocytes % 3 (L) 4 - 12 %    Eosinophils % 0 0 - 6 %    Basophils % 0 0 - 1 %    Atypical Lymphocytes % 2 (H) <=0 %    Absolute Neutrophils 6 59 1 85 - 7 62 Thousand/uL    Lymphocytes Absolute 1 14 0 60 - 4 47 Thousand/uL    Monocytes Absolute 0 24 0 00 - 1 22 Thousand/uL    Eosinophils Absolute 0 00 0 00 - 0 40 Thousand/uL    Basophils Absolute 0 00 0 00 - 0 10 Thousand/uL    Total Counted 100     RBC Morphology Normal     Platelet Estimate Borderline (A) Adequate   Blood culture    Collection Time: 06/25/18  5:55 AM   Result Value Ref Range    Blood Culture No Growth After 5 Days  Blood culture    Collection Time: 06/25/18  5:55 AM   Result Value Ref Range    Blood Culture No Growth After 5 Days      Basic metabolic panel    Collection Time: 06/25/18  6:02 AM   Result Value Ref Range    Sodium 141 136 - 145 mmol/L    Potassium 4 2 3 5 - 5 3 mmol/L    Chloride 106 100 - 108 mmol/L    CO2 26 21 - 32 mmol/L    Anion Gap 9 4 - 13 mmol/L    BUN 13 5 - 25 mg/dL    Creatinine 1 25 0 60 - 1 30 mg/dL    Glucose 105 65 - 140 mg/dL    Calcium 8 4 8 3 - 10 1 mg/dL    eGFR 58 ml/min/1 73sq m   CBC and differential    Collection Time: 06/25/18  6:02 AM   Result Value Ref Range    WBC 8 78 4 31 - 10 16 Thousand/uL    RBC 3 68 (L) 3 88 - 5 62 Million/uL    Hemoglobin 12 5 12 0 - 17 0 g/dL    Hematocrit 37 2 36 5 - 49 3 %     (H) 82 - 98 fL    MCH 34 0 26 8 - 34 3 pg    MCHC 33 6 31 4 - 37 4 g/dL    RDW 13 3 11 6 - 15 1 %    MPV 9 9 8 9 - 12 7 fL    Platelets 979 230 - 394 Thousands/uL    nRBC 0 /100 WBCs   Protime-INR    Collection Time: 06/25/18  6:02 AM   Result Value Ref Range    Protime 17 9 (H) 11 8 - 14 2 seconds    INR 1 49 (H) 0 86 - 1 17   Manual Differential(PHLEBS Do Not Order)    Collection Time: 06/25/18  6:02 AM   Result Value Ref Range    Segmented % 88 (H) 43 - 75 %    Lymphocytes % 7 (L) 14 - 44 %    Monocytes % 2 (L) 4 - 12 %    Eosinophils % 1 0 - 6 %    Basophils % 0 0 - 1 %    Atypical Lymphocytes % 2 (H) <=0 %    Absolute Neutrophils 7 73 (H) 1 85 - 7 62 Thousand/uL    Lymphocytes Absolute 0 61 0 60 - 4 47 Thousand/uL    Monocytes Absolute 0 18 0 00 - 1 22 Thousand/uL    Eosinophils Absolute 0 09 0 00 - 0 40 Thousand/uL    Basophils Absolute 0 00 0 00 - 0 10 Thousand/uL    Total Counted 100     RBC Morphology Normal     Platelet Estimate Adequate Adequate       This note was created with voice recognition software  Phonic, grammatical and spelling errors may be present within the note as a result

## 2018-07-23 DIAGNOSIS — M1A.09X0 IDIOPATHIC CHRONIC GOUT OF MULTIPLE SITES WITHOUT TOPHUS: Primary | ICD-10-CM

## 2018-07-23 RX ORDER — ALLOPURINOL 100 MG/1
100 TABLET ORAL DAILY
Qty: 90 TABLET | Refills: 3 | Status: SHIPPED | OUTPATIENT
Start: 2018-07-23 | End: 2021-09-22 | Stop reason: SDUPTHER

## 2018-08-06 LAB — FUNGAL BLOOD CULTURE: NORMAL

## 2018-09-04 ENCOUNTER — OFFICE VISIT (OUTPATIENT)
Dept: INTERNAL MEDICINE CLINIC | Facility: CLINIC | Age: 73
End: 2018-09-04
Payer: MEDICARE

## 2018-09-04 VITALS
DIASTOLIC BLOOD PRESSURE: 68 MMHG | TEMPERATURE: 98.7 F | WEIGHT: 162.8 LBS | BODY MASS INDEX: 26.16 KG/M2 | SYSTOLIC BLOOD PRESSURE: 130 MMHG | HEART RATE: 75 BPM | HEIGHT: 66 IN | OXYGEN SATURATION: 93 %

## 2018-09-04 DIAGNOSIS — I10 ESSENTIAL HYPERTENSION: ICD-10-CM

## 2018-09-04 DIAGNOSIS — I50.9 CONGESTIVE HEART FAILURE, UNSPECIFIED HF CHRONICITY, UNSPECIFIED HEART FAILURE TYPE (HCC): ICD-10-CM

## 2018-09-04 DIAGNOSIS — R19.7 DIARRHEA, UNSPECIFIED TYPE: ICD-10-CM

## 2018-09-04 DIAGNOSIS — I25.10 ATHEROSCLEROSIS OF NATIVE CORONARY ARTERY OF NATIVE HEART WITHOUT ANGINA PECTORIS: Primary | ICD-10-CM

## 2018-09-04 DIAGNOSIS — E78.00 HYPERCHOLESTEROLEMIA: ICD-10-CM

## 2018-09-04 PROBLEM — R05.9 COUGH: Status: RESOLVED | Noted: 2018-06-07 | Resolved: 2018-09-04

## 2018-09-04 PROBLEM — A77.49 ANAPLASMOSIS: Status: RESOLVED | Noted: 2018-06-25 | Resolved: 2018-09-04

## 2018-09-04 PROBLEM — D69.6 THROMBOCYTOPENIA (HCC): Status: RESOLVED | Noted: 2018-06-19 | Resolved: 2018-09-04

## 2018-09-04 PROBLEM — I38 ENDOCARDITIS: Status: RESOLVED | Noted: 2018-06-23 | Resolved: 2018-09-04

## 2018-09-04 PROCEDURE — 99214 OFFICE O/P EST MOD 30 MIN: CPT | Performed by: INTERNAL MEDICINE

## 2018-09-04 NOTE — PROGRESS NOTES
Assessment/Plan:      Chronic problems appear stable  It is unclear if his labs were done so I gave him new orders  He will do them before his next visit  Continue follow-up with Cardiology  Continue current medications  For his chronic diarrhea and episodic constipation, suggested he start with probiotics and fiber  Return in about 4 months (around 1/4/2019)  No problem-specific Assessment & Plan notes found for this encounter  Diagnoses and all orders for this visit:    Atherosclerosis of native coronary artery of native heart without angina pectoris    Congestive heart failure, unspecified HF chronicity, unspecified heart failure type ([facility] [address] )    Essential hypertension    Hypercholesterolemia  -     CBC and differential; Future  -     Comprehensive metabolic panel; Future  -     Lipid panel; Future    Diarrhea, unspecified type          Subjective:      Patient ID: Everett Buenrostro is a 68 y o  male  Patient comes in today for routine follow-up  He is still doing well 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  Heart disease has been stable  He is taking his medicine as directed  Pressure appears under control  He thinks he did blood work for his cholesterol but we do not have the results  I do see his Coumadin results for his cardiologist   Today, he is complaining of episodic diarrhea alternating with constipation  He thinks this has been going on for at least a year but maybe longer          ALLERGIES:  No Known Allergies    CURRENT MEDICATIONS:    Current Outpatient Prescriptions:     allopurinol (ZYLOPRIM) 100 mg tablet, Take 1 tablet (100 mg total) by mouth daily, Disp: 90 tablet, Rfl: 3    atorvastatin (LIPITOR) 40 mg tablet, Take 40 mg by mouth daily  , Disp: , Rfl:     carvedilol (COREG) 6 25 mg tablet, Take 6 25 mg by mouth  , Disp: , Rfl:     furosemide (LASIX) 20 mg tablet, , Disp: , Rfl: 0    losartan (COZAAR) 25 mg tablet, Take 1 tablet (25 mg total) by mouth daily, Disp: 30 tablet, Rfl: 2    mirtazapine (REMERON) 15 mg tablet, Take 1 tablet (15 mg total) by mouth daily at bedtime, Disp: 30 tablet, Rfl: 2    Multiple Vitamins-Minerals (PX MENS MULTIVITAMINS) TABS, Take by mouth, Disp: , Rfl:     pantoprazole (PROTONIX) 40 mg tablet, Take 1 tablet (40 mg total) by mouth 2 (two) times a day before meals, Disp: 60 tablet, Rfl: 2    warfarin (COUMADIN) 2 5 mg tablet, Take 2 tablets (5 mg total) by mouth daily, Disp: , Rfl: 0    ACTIVE PROBLEM LIST:  Patient Active Problem List   Diagnosis    CAD (coronary atherosclerotic disease)    CHF (congestive heart failure) (HCC)    Chronic back pain    GERD (gastroesophageal reflux disease)    Hypercholesterolemia    Hypertension    PAF (paroxysmal atrial fibrillation) (HCC)    Peptic ulcer disease    S/P MVR (mitral valve repair)    Polyp of colon    Weight loss    Anorexia    Abnormal RBC indices    Fatigue    Generalized weakness    ARF (acute renal failure) (HCC)    Chronic anticoagulation    Diarrhea    NED (acute kidney injury) (HCC)    Pain in gums    Mild protein-calorie malnutrition (HCC)    Idiopathic chronic gout of multiple sites without tophus       PAST MEDICAL HISTORY:  Past Medical History:   Diagnosis Date    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    CAD (coronary artery disease)     Cancer ([facility] [address] )     prostate    Coronary artery disease     GERD (gastroesophageal reflux disease)     H/O heart artery stent     x2    Hypercholesteremia     Hypertension     Keratotic lesion     Stroke ([facility] )     10YRS AGO    Transient cerebral ischemia        PAST SURGICAL HISTORY:  Past Surgical History:   Procedure Laterality Date    BACK SURGERY      CARDIAC SURGERY      mvp repair    CHOLECYSTECTOMY      ESOPHAGOGASTRODUODENOSCOPY N/A 6/22/2018    Procedure: ESOPHAGOGASTRODUODENOSCOPY (EGD); Surgeon: Eros Ryan MD;  Location: [facility];   Service: Gastroenterology    JOINT REPLACEMENT      L hip replacement    ORCHIECTOMY      PROSTATECTOMY      TOTAL HIP ARTHROPLASTY         FAMILY HISTORY:  Family History   Problem Relation Age of Onset    Heart disease Mother     Stroke Mother         CVA    Cancer Father     Hodgkin's lymphoma Father     Lung cancer Father        SOCIAL HISTORY:  Social History     Social History    Marital status:      Spouse name: N/A    Number of children: N/A    Years of education: N/A     Occupational History    retired      Social History Main Topics    Smoking status: Former Smoker     Quit date: 5/22/2018    Smokeless tobacco: Never Used    Alcohol use 1 2 oz/week     2 Standard drinks or equivalent per week    Drug use: No    Sexual activity: No      Comment: no high risk sexual behavior     Other Topics Concern    Not on file     Social History Narrative    No narrative on file       Review of Systems   Respiratory: Negative for shortness of breath  Cardiovascular: Negative for chest pain  Gastrointestinal: Negative for abdominal pain  Objective:  Vitals:    09/04/18 1053   BP: 130/68   BP Location: Left arm   Patient Position: Sitting   Pulse: 75   Temp: 98 7 °F (37 1 °C)   SpO2: 93%   Weight: 73 8 kg (162 lb 12 8 oz)   Height: 5' 6" (1 676 m)        Physical Exam   Constitutional: He is oriented to person, place, and time  He appears well-developed and well-nourished  Cardiovascular: Normal rate, regular rhythm and normal heart sounds  Pulmonary/Chest: Effort normal and breath sounds normal    Abdominal: Soft  Bowel sounds are normal    Musculoskeletal: He exhibits no edema  Neurological: He is alert and oriented to person, place, and time  Nursing note and vitals reviewed  RESULTS:    No results found for this or any previous visit (from the past 1008 hour(s))  This note was created with voice recognition software  Phonic, grammatical and spelling errors may be present within the note as a result

## 2018-11-30 ENCOUNTER — OFFICE VISIT (OUTPATIENT)
Dept: INTERNAL MEDICINE CLINIC | Facility: CLINIC | Age: 73
End: 2018-11-30
Payer: MEDICARE

## 2018-11-30 VITALS
DIASTOLIC BLOOD PRESSURE: 64 MMHG | WEIGHT: 154 LBS | OXYGEN SATURATION: 95 % | BODY MASS INDEX: 24.75 KG/M2 | HEART RATE: 81 BPM | SYSTOLIC BLOOD PRESSURE: 122 MMHG | HEIGHT: 66 IN

## 2018-11-30 DIAGNOSIS — K21.9 GASTROESOPHAGEAL REFLUX DISEASE WITHOUT ESOPHAGITIS: ICD-10-CM

## 2018-11-30 DIAGNOSIS — R63.0 ANOREXIA: ICD-10-CM

## 2018-11-30 DIAGNOSIS — R63.4 WEIGHT LOSS: Primary | ICD-10-CM

## 2018-11-30 DIAGNOSIS — R53.83 FATIGUE, UNSPECIFIED TYPE: ICD-10-CM

## 2018-11-30 PROBLEM — N17.9 AKI (ACUTE KIDNEY INJURY) (HCC): Status: RESOLVED | Noted: 2018-06-21 | Resolved: 2018-11-30

## 2018-11-30 PROBLEM — N17.9 ARF (ACUTE RENAL FAILURE) (HCC): Status: RESOLVED | Noted: 2018-06-19 | Resolved: 2018-11-30

## 2018-11-30 PROCEDURE — 99214 OFFICE O/P EST MOD 30 MIN: CPT | Performed by: INTERNAL MEDICINE

## 2018-11-30 RX ORDER — PANTOPRAZOLE SODIUM 40 MG/1
40 TABLET, DELAYED RELEASE ORAL
Qty: 60 TABLET | Refills: 3 | Status: SHIPPED | OUTPATIENT
Start: 2018-11-30 | End: 2020-09-21 | Stop reason: SDUPTHER

## 2018-11-30 RX ORDER — MIRTAZAPINE 15 MG/1
15 TABLET, FILM COATED ORAL
Qty: 30 TABLET | Refills: 3 | Status: SHIPPED | OUTPATIENT
Start: 2018-11-30 | End: 2019-01-08 | Stop reason: ALTCHOICE

## 2018-11-30 NOTE — PROGRESS NOTES
Assessment/Plan:      Unclear etiology  Agree with heart testing with his cardiologist   Ordered further labs and he can do his regular labs in the next week or 2  Since his episodes of diaphoresis appear to be associated with constipation and straining at stool, suggested a low-dose stool softener  Return for Next scheduled follow up  No problem-specific Assessment & Plan notes found for this encounter  Diagnoses and all orders for this visit:    Weight loss  -     mirtazapine (REMERON) 15 mg tablet; Take 1 tablet (15 mg total) by mouth daily at bedtime  -     TSH, 3rd generation; Future    Gastroesophageal reflux disease without esophagitis  -     pantoprazole (PROTONIX) 40 mg tablet; Take 1 tablet (40 mg total) by mouth 2 (two) times a day before meals    Anorexia  -     mirtazapine (REMERON) 15 mg tablet; Take 1 tablet (15 mg total) by mouth daily at bedtime  -     Amylase; Future  -     Lipase; Future    Fatigue, unspecified type          Subjective:      Patient ID: Rene Gupta is a 68 y o  male  Patient comes in today complaining of feeling tired fatigue no appetite  He saw his cardiologist 2 weeks ago and have ordered some cardiac testing  But he was concerned that he has a recurrence of his Anaplasmosis  No fevers  No URI symptoms  He is also having trouble with what appears to be constipation  He states that he does not go twice a day every day, when he does go to the bathroom, he strains and breaks out into a cold sweat          ALLERGIES:  No Known Allergies    CURRENT MEDICATIONS:    Current Outpatient Prescriptions:     allopurinol (ZYLOPRIM) 100 mg tablet, Take 1 tablet (100 mg total) by mouth daily, Disp: 90 tablet, Rfl: 3    atorvastatin (LIPITOR) 40 mg tablet, Take 40 mg by mouth daily  , Disp: , Rfl:     carvedilol (COREG) 6 25 mg tablet, Take 6 25 mg by mouth  , Disp: , Rfl:     furosemide (LASIX) 20 mg tablet, , Disp: , Rfl: 0    losartan (COZAAR) 25 mg tablet, Take 1 tablet (25 mg total) by mouth daily, Disp: 30 tablet, Rfl: 2    mirtazapine (REMERON) 15 mg tablet, Take 1 tablet (15 mg total) by mouth daily at bedtime, Disp: 30 tablet, Rfl: 3    Multiple Vitamins-Minerals (PX MENS MULTIVITAMINS) TABS, Take by mouth, Disp: , Rfl:     pantoprazole (PROTONIX) 40 mg tablet, Take 1 tablet (40 mg total) by mouth 2 (two) times a day before meals, Disp: 60 tablet, Rfl: 3    warfarin (COUMADIN) 2 5 mg tablet, Take 2 tablets (5 mg total) by mouth daily, Disp: , Rfl: 0    ACTIVE PROBLEM LIST:  Patient Active Problem List   Diagnosis    CAD (coronary atherosclerotic disease)    CHF (congestive heart failure) (HCC)    Chronic back pain    GERD (gastroesophageal reflux disease)    Hypercholesterolemia    Hypertension    PAF (paroxysmal atrial fibrillation) (HCC)    Peptic ulcer disease    S/P MVR (mitral valve repair)    Polyp of colon    Weight loss    Anorexia    Abnormal RBC indices    Fatigue    Generalized weakness    Chronic anticoagulation    Diarrhea    Pain in gums    Mild protein-calorie malnutrition (HCC)    Idiopathic chronic gout of multiple sites without tophus       PAST MEDICAL HISTORY:  Past Medical History:   Diagnosis Date    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    CAD (coronary artery disease)     Cancer (HCC)     prostate    Coronary artery disease     GERD (gastroesophageal reflux disease)     H/O heart artery stent     x2    Hypercholesteremia     Hypertension     Keratotic lesion     Stroke ([facility] [address] )     10YRS AGO    Transient cerebral ischemia        PAST SURGICAL HISTORY:  Past Surgical History:   Procedure Laterality Date    BACK SURGERY      CARDIAC SURGERY      mvp repair    CHOLECYSTECTOMY      ESOPHAGOGASTRODUODENOSCOPY N/A 6/22/2018    Procedure: ESOPHAGOGASTRODUODENOSCOPY (EGD); Surgeon: Lida Cline MD;  Location: [facility];   Service: Gastroenterology    JOINT REPLACEMENT      L hip replacement    ORCHIECTOMY      PROSTATECTOMY      TOTAL HIP ARTHROPLASTY         FAMILY HISTORY:  Family History   Problem Relation Age of Onset    Heart disease Mother     Stroke Mother         CVA    Cancer Father     Hodgkin's lymphoma Father     Lung cancer Father        SOCIAL HISTORY:  Social History     Social History    Marital status:      Spouse name: N/A    Number of children: N/A    Years of education: N/A     Occupational History    retired      Social History Main Topics    Smoking status: Former Smoker     Quit date: 5/22/2018    Smokeless tobacco: Never Used    Alcohol use 1 2 oz/week     2 Standard drinks or equivalent per week    Drug use: No    Sexual activity: No      Comment: no high risk sexual behavior     Other Topics Concern    Not on file     Social History Narrative    No narrative on file       Review of Systems   Respiratory: Negative for shortness of breath  Cardiovascular: Negative for chest pain  Gastrointestinal: Negative for abdominal pain  Objective:  Vitals:    11/30/18 1356   BP: 122/64   BP Location: Left arm   Patient Position: Sitting   Pulse: 81   SpO2: 95%   Weight: 69 9 kg (154 lb)   Height: 5' 6" (1 676 m)        Physical Exam   Constitutional: He is oriented to person, place, and time  He appears well-developed and well-nourished  HENT:   Head: Atraumatic  Right Ear: External ear normal    Left Ear: External ear normal    Mouth/Throat: Oropharynx is clear and moist  No oropharyngeal exudate  Eyes: Pupils are equal, round, and reactive to light  Conjunctivae and EOM are normal    Neck: Normal range of motion  Neck supple  Cardiovascular: Normal rate and regular rhythm  Pulmonary/Chest: Effort normal and breath sounds normal    Abdominal: Soft  There is no tenderness  Musculoskeletal: Normal range of motion  He exhibits no edema  Lymphadenopathy:     He has no cervical adenopathy  Neurological: He is alert and oriented to person, place, and time  Skin: Skin is warm and dry  No rash noted  Psychiatric: He has a normal mood and affect  RESULTS:    No results found for this or any previous visit (from the past 1008 hour(s))  This note was created with voice recognition software  Phonic, grammatical and spelling errors may be present within the note as a result

## 2018-12-06 ENCOUNTER — TELEPHONE (OUTPATIENT)
Dept: INTERNAL MEDICINE CLINIC | Facility: CLINIC | Age: 73
End: 2018-12-06

## 2018-12-06 NOTE — TELEPHONE ENCOUNTER
Pt called of and states that he is going for blood work on Monday and wanted to know if you could order BW to check his kidney function

## 2018-12-10 ENCOUNTER — APPOINTMENT (OUTPATIENT)
Dept: LAB | Facility: HOSPITAL | Age: 73
End: 2018-12-10
Attending: INTERNAL MEDICINE
Payer: MEDICARE

## 2018-12-10 ENCOUNTER — TELEPHONE (OUTPATIENT)
Dept: INTERNAL MEDICINE CLINIC | Facility: CLINIC | Age: 73
End: 2018-12-10

## 2018-12-10 DIAGNOSIS — E78.00 HYPERCHOLESTEROLEMIA: ICD-10-CM

## 2018-12-10 DIAGNOSIS — R63.0 ANOREXIA: ICD-10-CM

## 2018-12-10 DIAGNOSIS — R73.01 IMPAIRED FASTING GLUCOSE: Primary | ICD-10-CM

## 2018-12-10 DIAGNOSIS — R63.4 WEIGHT LOSS: ICD-10-CM

## 2018-12-10 LAB
ALBUMIN SERPL BCP-MCNC: 3.8 G/DL (ref 3.5–5)
ALP SERPL-CCNC: 90 U/L (ref 46–116)
ALT SERPL W P-5'-P-CCNC: 24 U/L (ref 12–78)
AMYLASE SERPL-CCNC: 57 IU/L (ref 25–115)
ANION GAP SERPL CALCULATED.3IONS-SCNC: 9 MMOL/L (ref 4–13)
AST SERPL W P-5'-P-CCNC: 32 U/L (ref 5–45)
BASOPHILS # BLD AUTO: 0.12 THOUSANDS/ΜL (ref 0–0.1)
BASOPHILS NFR BLD AUTO: 1 % (ref 0–1)
BILIRUB SERPL-MCNC: 1.4 MG/DL (ref 0.2–1)
BUN SERPL-MCNC: 22 MG/DL (ref 5–25)
CALCIUM SERPL-MCNC: 9 MG/DL (ref 8.3–10.1)
CHLORIDE SERPL-SCNC: 107 MMOL/L (ref 100–108)
CHOLEST SERPL-MCNC: 104 MG/DL (ref 50–200)
CO2 SERPL-SCNC: 28 MMOL/L (ref 21–32)
CREAT SERPL-MCNC: 1.24 MG/DL (ref 0.6–1.3)
EOSINOPHIL # BLD AUTO: 0.36 THOUSAND/ΜL (ref 0–0.61)
EOSINOPHIL NFR BLD AUTO: 4 % (ref 0–6)
ERYTHROCYTE [DISTWIDTH] IN BLOOD BY AUTOMATED COUNT: 16.9 % (ref 11.6–15.1)
GFR SERPL CREATININE-BSD FRML MDRD: 58 ML/MIN/1.73SQ M
GLUCOSE P FAST SERPL-MCNC: 120 MG/DL (ref 65–99)
HCT VFR BLD AUTO: 40.2 % (ref 36.5–49.3)
HDLC SERPL-MCNC: 34 MG/DL (ref 40–60)
HGB BLD-MCNC: 11.8 G/DL (ref 12–17)
IMM GRANULOCYTES # BLD AUTO: 0.04 THOUSAND/UL (ref 0–0.2)
IMM GRANULOCYTES NFR BLD AUTO: 0 % (ref 0–2)
LDLC SERPL CALC-MCNC: 46 MG/DL (ref 0–100)
LIPASE SERPL-CCNC: 86 U/L (ref 73–393)
LYMPHOCYTES # BLD AUTO: 0.99 THOUSANDS/ΜL (ref 0.6–4.47)
LYMPHOCYTES NFR BLD AUTO: 10 % (ref 14–44)
MCH RBC QN AUTO: 27.1 PG (ref 26.8–34.3)
MCHC RBC AUTO-ENTMCNC: 29.4 G/DL (ref 31.4–37.4)
MCV RBC AUTO: 92 FL (ref 82–98)
MONOCYTES # BLD AUTO: 0.91 THOUSAND/ΜL (ref 0.17–1.22)
MONOCYTES NFR BLD AUTO: 9 % (ref 4–12)
NEUTROPHILS # BLD AUTO: 8.01 THOUSANDS/ΜL (ref 1.85–7.62)
NEUTS SEG NFR BLD AUTO: 76 % (ref 43–75)
NONHDLC SERPL-MCNC: 70 MG/DL
NRBC BLD AUTO-RTO: 0 /100 WBCS
PLATELET # BLD AUTO: 306 THOUSANDS/UL (ref 149–390)
PMV BLD AUTO: 9.8 FL (ref 8.9–12.7)
POTASSIUM SERPL-SCNC: 4 MMOL/L (ref 3.5–5.3)
PROT SERPL-MCNC: 7.7 G/DL (ref 6.4–8.2)
RBC # BLD AUTO: 4.35 MILLION/UL (ref 3.88–5.62)
SODIUM SERPL-SCNC: 144 MMOL/L (ref 136–145)
TRIGL SERPL-MCNC: 119 MG/DL
TSH SERPL DL<=0.05 MIU/L-ACNC: 2.05 UIU/ML (ref 0.36–3.74)
WBC # BLD AUTO: 10.43 THOUSAND/UL (ref 4.31–10.16)

## 2018-12-10 PROCEDURE — 85025 COMPLETE CBC W/AUTO DIFF WBC: CPT

## 2018-12-10 PROCEDURE — 83690 ASSAY OF LIPASE: CPT

## 2018-12-10 PROCEDURE — 80053 COMPREHEN METABOLIC PANEL: CPT

## 2018-12-10 PROCEDURE — 36415 COLL VENOUS BLD VENIPUNCTURE: CPT

## 2018-12-10 PROCEDURE — 82150 ASSAY OF AMYLASE: CPT

## 2018-12-10 PROCEDURE — 84443 ASSAY THYROID STIM HORMONE: CPT

## 2018-12-10 PROCEDURE — 80061 LIPID PANEL: CPT

## 2018-12-10 NOTE — TELEPHONE ENCOUNTER
----- Message from Huan Mcghee sent at 12/10/2018  1:32 PM EST -----  I also spoke to patient not knowing he was called already  He said he is going to cut back on his carbs and sweets  He knows that is his problem

## 2018-12-13 ENCOUNTER — APPOINTMENT (OUTPATIENT)
Dept: LAB | Facility: HOSPITAL | Age: 73
End: 2018-12-13
Attending: INTERNAL MEDICINE
Payer: MEDICARE

## 2018-12-13 DIAGNOSIS — R73.01 IMPAIRED FASTING GLUCOSE: ICD-10-CM

## 2018-12-13 LAB
EST. AVERAGE GLUCOSE BLD GHB EST-MCNC: 114 MG/DL
GLUCOSE P FAST SERPL-MCNC: 116 MG/DL (ref 65–99)
HBA1C MFR BLD: 5.6 % (ref 4.2–6.3)

## 2018-12-13 PROCEDURE — 82947 ASSAY GLUCOSE BLOOD QUANT: CPT

## 2018-12-13 PROCEDURE — 83036 HEMOGLOBIN GLYCOSYLATED A1C: CPT

## 2018-12-13 PROCEDURE — 36415 COLL VENOUS BLD VENIPUNCTURE: CPT

## 2019-01-08 ENCOUNTER — OFFICE VISIT (OUTPATIENT)
Dept: INTERNAL MEDICINE CLINIC | Facility: CLINIC | Age: 74
End: 2019-01-08
Payer: MEDICARE

## 2019-01-08 VITALS
OXYGEN SATURATION: 96 % | DIASTOLIC BLOOD PRESSURE: 60 MMHG | RESPIRATION RATE: 20 BRPM | HEART RATE: 70 BPM | WEIGHT: 159 LBS | BODY MASS INDEX: 25.55 KG/M2 | SYSTOLIC BLOOD PRESSURE: 146 MMHG | HEIGHT: 66 IN

## 2019-01-08 DIAGNOSIS — R61 SWEATING PROFUSELY: ICD-10-CM

## 2019-01-08 DIAGNOSIS — R73.01 IMPAIRED FASTING GLUCOSE: ICD-10-CM

## 2019-01-08 DIAGNOSIS — E44.1 MILD PROTEIN-CALORIE MALNUTRITION (HCC): Primary | ICD-10-CM

## 2019-01-08 DIAGNOSIS — K21.9 GASTROESOPHAGEAL REFLUX DISEASE WITHOUT ESOPHAGITIS: ICD-10-CM

## 2019-01-08 PROCEDURE — 99214 OFFICE O/P EST MOD 30 MIN: CPT | Performed by: INTERNAL MEDICINE

## 2019-01-08 NOTE — PROGRESS NOTES
Assessment/Plan:     His weight is doing better but he is still getting the sweats  Since he describes them as a soaking sweat as opposed to just feeling warm, we are going to try and taper off his Remeron  Also, with the next episode, he is going to take a glucose tab or any sugar he can find and see if that rapidly clears it  Follow-up with Cardiology as planned  BMI Counseling: Body mass index is 25 66 kg/m²  Discussed the patient's BMI with him  The BMI is above average  No BMI follow-up plan is appropriate  Patient is 73 years old and weight reduction/weight gain would further complicate their underlying illness  Return in about 6 weeks (around 2/19/2019) for Regular visit and Medicare Wellness  No problem-specific Assessment & Plan notes found for this encounter  There are no diagnoses linked to this encounter  Subjective:      Patient ID: Krista Mares is a 68 y o  male  Patient comes in today for follow-up  His weight is doing a little better  He states he is eating but still feels like he has to force himself to eat  He did have his cardiology tests but he is waiting for the results  He does have follow-up with them next week  Still getting the episodes of what he describes as soaking sweats that come on suddenly  Although, he admits he has only had 1 episode of this since his last visit here  His blood work was unrevealing          ALLERGIES:  No Known Allergies    CURRENT MEDICATIONS:    Current Outpatient Prescriptions:     allopurinol (ZYLOPRIM) 100 mg tablet, Take 1 tablet (100 mg total) by mouth daily, Disp: 90 tablet, Rfl: 3    atorvastatin (LIPITOR) 40 mg tablet, Take 40 mg by mouth daily  , Disp: , Rfl:     carvedilol (COREG) 6 25 mg tablet, Take 6 25 mg by mouth  , Disp: , Rfl:     furosemide (LASIX) 20 mg tablet, , Disp: , Rfl: 0    losartan (COZAAR) 25 mg tablet, Take 1 tablet (25 mg total) by mouth daily, Disp: 30 tablet, Rfl: 2    Multiple Vitamins-Minerals (PX MENS MULTIVITAMINS) TABS, Take by mouth, Disp: , Rfl:     pantoprazole (PROTONIX) 40 mg tablet, Take 1 tablet (40 mg total) by mouth 2 (two) times a day before meals, Disp: 60 tablet, Rfl: 3    warfarin (COUMADIN) 2 5 mg tablet, Take 2 tablets (5 mg total) by mouth daily, Disp: , Rfl: 0    ACTIVE PROBLEM LIST:  Patient Active Problem List   Diagnosis    CAD (coronary atherosclerotic disease)    CHF (congestive heart failure) (HCC)    Chronic back pain    GERD (gastroesophageal reflux disease)    Hypercholesterolemia    Hypertension    PAF (paroxysmal atrial fibrillation) (HCC)    Peptic ulcer disease    S/P MVR (mitral valve repair)    Polyp of colon    Weight loss    Anorexia    Abnormal RBC indices    Fatigue    Generalized weakness    Chronic anticoagulation    Diarrhea    Pain in gums    Mild protein-calorie malnutrition (HCC)    Idiopathic chronic gout of multiple sites without tophus    Impaired fasting glucose       PAST MEDICAL HISTORY:  Past Medical History:   Diagnosis Date    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    CAD (coronary artery disease)     [facility]     prostate    Coronary artery disease     GERD (gastroesophageal reflux disease)     H/O heart artery stent     x2    Hypercholesteremia     Hypertension     Keratotic lesion     Stroke [facility] )     10YRS AGO    Transient cerebral ischemia        PAST SURGICAL HISTORY:  Past Surgical History:   Procedure Laterality Date    BACK SURGERY      CARDIAC SURGERY      mvp repair    CHOLECYSTECTOMY      ESOPHAGOGASTRODUODENOSCOPY N/A 6/22/2018    Procedure: ESOPHAGOGASTRODUODENOSCOPY (EGD); Surgeon: Malgorzata Blackwood MD;  Location: [facility];   Service: Gastroenterology    JOINT REPLACEMENT      L hip replacement    ORCHIECTOMY      PROSTATECTOMY      TOTAL HIP ARTHROPLASTY         FAMILY HISTORY:  Family History   Problem Relation Age of Onset    Heart disease Mother     Stroke Mother         DOMO Olmedo Cancer Father     Hodgkin's lymphoma Father     Lung cancer Father        SOCIAL HISTORY:  Social History     Social History    Marital status:      Spouse name: N/A    Number of children: N/A    Years of education: N/A     Occupational History    retired      Social History Main Topics    Smoking status: Former Smoker     Quit date: 5/22/2018    Smokeless tobacco: Never Used    Alcohol use 1 2 oz/week     2 Standard drinks or equivalent per week    Drug use: No    Sexual activity: No      Comment: no high risk sexual behavior     Other Topics Concern    Not on file     Social History Narrative    No narrative on file       Review of Systems   Respiratory: Negative for shortness of breath  Cardiovascular: Negative for chest pain  Gastrointestinal: Negative for abdominal pain  Objective:  Vitals:    01/08/19 1041   BP: 146/60   BP Location: Left arm   Patient Position: Sitting   Cuff Size: Adult   Pulse: 70   Resp: 20   SpO2: 96%   Weight: 72 1 kg (159 lb)   Height: 5' 6" (1 676 m)     Body mass index is 25 66 kg/m²  Physical Exam   Constitutional: He is oriented to person, place, and time  He appears well-developed and well-nourished  Cardiovascular: Normal rate, regular rhythm and normal heart sounds  Pulmonary/Chest: Effort normal and breath sounds normal    Abdominal: Soft  Bowel sounds are normal    Musculoskeletal: He exhibits no edema  Neurological: He is alert and oriented to person, place, and time  Nursing note and vitals reviewed          RESULTS:    Recent Results (from the past 1008 hour(s))   CBC and differential    Collection Time: 12/10/18 11:06 AM   Result Value Ref Range    WBC 10 43 (H) 4 31 - 10 16 Thousand/uL    RBC 4 35 3 88 - 5 62 Million/uL    Hemoglobin 11 8 (L) 12 0 - 17 0 g/dL    Hematocrit 40 2 36 5 - 49 3 %    MCV 92 82 - 98 fL    MCH 27 1 26 8 - 34 3 pg    MCHC 29 4 (L) 31 4 - 37 4 g/dL    RDW 16 9 (H) 11 6 - 15 1 %    MPV 9 8 8 9 - 12 7 fL Platelets 056 320 - 790 Thousands/uL    nRBC 0 /100 WBCs    Neutrophils Relative 76 (H) 43 - 75 %    Immat GRANS % 0 0 - 2 %    Lymphocytes Relative 10 (L) 14 - 44 %    Monocytes Relative 9 4 - 12 %    Eosinophils Relative 4 0 - 6 %    Basophils Relative 1 0 - 1 %    Neutrophils Absolute 8 01 (H) 1 85 - 7 62 Thousands/µL    Immature Grans Absolute 0 04 0 00 - 0 20 Thousand/uL    Lymphocytes Absolute 0 99 0 60 - 4 47 Thousands/µL    Monocytes Absolute 0 91 0 17 - 1 22 Thousand/µL    Eosinophils Absolute 0 36 0 00 - 0 61 Thousand/µL    Basophils Absolute 0 12 (H) 0 00 - 0 10 Thousands/µL   Comprehensive metabolic panel    Collection Time: 12/10/18 11:06 AM   Result Value Ref Range    Sodium 144 136 - 145 mmol/L    Potassium 4 0 3 5 - 5 3 mmol/L    Chloride 107 100 - 108 mmol/L    CO2 28 21 - 32 mmol/L    ANION GAP 9 4 - 13 mmol/L    BUN 22 5 - 25 mg/dL    Creatinine 1 24 0 60 - 1 30 mg/dL    Glucose, Fasting 120 (H) 65 - 99 mg/dL    Calcium 9 0 8 3 - 10 1 mg/dL    AST 32 5 - 45 U/L    ALT 24 12 - 78 U/L    Alkaline Phosphatase 90 46 - 116 U/L    Total Protein 7 7 6 4 - 8 2 g/dL    Albumin 3 8 3 5 - 5 0 g/dL    Total Bilirubin 1 40 (H) 0 20 - 1 00 mg/dL    eGFR 58 ml/min/1 73sq m   Lipid panel    Collection Time: 12/10/18 11:06 AM   Result Value Ref Range    Cholesterol 104 50 - 200 mg/dL    Triglycerides 119 <=150 mg/dL    HDL, Direct 34 (L) 40 - 60 mg/dL    LDL Calculated 46 0 - 100 mg/dL    Non-HDL-Chol (CHOL-HDL) 70 mg/dl   Amylase    Collection Time: 12/10/18 11:06 AM   Result Value Ref Range    Amylase 57 25 - 115 IU/L   Lipase    Collection Time: 12/10/18 11:06 AM   Result Value Ref Range    Lipase 86 73 - 393 u/L   TSH, 3rd generation    Collection Time: 12/10/18 11:06 AM   Result Value Ref Range    TSH 3RD GENERATON 2 049 0 358 - 3 740 uIU/mL   Glucose, fasting    Collection Time: 12/13/18 10:53 AM   Result Value Ref Range    Glucose, Fasting 116 (H) 65 - 99 mg/dL   Hemoglobin A1C    Collection Time: 12/13/18 10:53 AM   Result Value Ref Range    Hemoglobin A1C 5 6 4 2 - 6 3 %     mg/dl       This note was created with voice recognition software  Phonic, grammatical and spelling errors may be present within the note as a result

## 2019-02-11 ENCOUNTER — DOCUMENTATION (OUTPATIENT)
Dept: INTERNAL MEDICINE CLINIC | Facility: CLINIC | Age: 74
End: 2019-02-11

## 2019-02-22 ENCOUNTER — OFFICE VISIT (OUTPATIENT)
Dept: INTERNAL MEDICINE CLINIC | Facility: CLINIC | Age: 74
End: 2019-02-22
Payer: MEDICARE

## 2019-02-22 VITALS
HEART RATE: 78 BPM | SYSTOLIC BLOOD PRESSURE: 118 MMHG | BODY MASS INDEX: 25.55 KG/M2 | HEIGHT: 66 IN | OXYGEN SATURATION: 99 % | RESPIRATION RATE: 20 BRPM | DIASTOLIC BLOOD PRESSURE: 68 MMHG | WEIGHT: 159 LBS

## 2019-02-22 DIAGNOSIS — R63.4 WEIGHT LOSS: ICD-10-CM

## 2019-02-22 DIAGNOSIS — I25.10 ATHEROSCLEROSIS OF NATIVE CORONARY ARTERY OF NATIVE HEART WITHOUT ANGINA PECTORIS: ICD-10-CM

## 2019-02-22 DIAGNOSIS — I50.9 CONGESTIVE HEART FAILURE, UNSPECIFIED HF CHRONICITY, UNSPECIFIED HEART FAILURE TYPE (HCC): ICD-10-CM

## 2019-02-22 DIAGNOSIS — Z00.00 MEDICARE ANNUAL WELLNESS VISIT, SUBSEQUENT: Primary | ICD-10-CM

## 2019-02-22 DIAGNOSIS — R63.0 ANOREXIA: ICD-10-CM

## 2019-02-22 PROBLEM — R53.1 GENERALIZED WEAKNESS: Status: RESOLVED | Noted: 2018-06-19 | Resolved: 2019-02-22

## 2019-02-22 PROCEDURE — G0439 PPPS, SUBSEQ VISIT: HCPCS | Performed by: INTERNAL MEDICINE

## 2019-02-22 PROCEDURE — 99214 OFFICE O/P EST MOD 30 MIN: CPT | Performed by: INTERNAL MEDICINE

## 2019-02-22 RX ORDER — SPIRONOLACTONE 25 MG/1
25 TABLET ORAL DAILY
COMMUNITY
End: 2021-04-23

## 2019-02-22 NOTE — PATIENT INSTRUCTIONS
Obesity   AMBULATORY CARE:   Obesity  is when your body mass index (BMI) is greater than 30  Your healthcare provider will use your height and weight to measure your BMI  The risks of obesity include  many health problems, such as injuries or physical disability  You may need tests to check for the following:  · Diabetes     · High blood pressure or high cholesterol     · Heart disease     · Gallbladder or liver disease     · Cancer of the colon, breast, prostate, liver, or kidney     · Sleep apnea     · Arthritis or gout  Seek care immediately if:   · You have a severe headache, confusion, or difficulty speaking  · You have weakness on one side of your body  · You have chest pain, sweating, or shortness of breath  Contact your healthcare provider if:   · You have symptoms of gallbladder or liver disease, such as pain in your upper abdomen  · You have knee or hip pain and discomfort while walking  · You have symptoms of diabetes, such as intense hunger and thirst, and frequent urination  · You have symptoms of sleep apnea, such as snoring or daytime sleepiness  · You have questions or concerns about your condition or care  Treatment for obesity  focuses on helping you lose weight to improve your health  Even a small decrease in BMI can reduce the risk for many health problems  Your healthcare provider will help you set a weight-loss goal   · Lifestyle changes  are the first step in treating obesity  These include making healthy food choices and getting regular physical activity  Your healthcare provider may suggest a weight-loss program that involves coaching, education, and therapy  · Medicine  may help you lose weight when it is used with a healthy diet and physical activity  · Surgery  can help you lose weight if you are very obese and have other health problems  There are several types of weight-loss surgery  Ask your healthcare provider for more information    Be successful losing weight:   · Set small, realistic goals  An example of a small goal is to walk for 20 minutes 5 days a week  [place] goal is to lose 5% of your body weight  · Tell friends, family members, and coworkers about your goals  and ask for their support  Ask a friend to lose weight with you, or join a weight-loss support group  · Identify foods or triggers that may cause you to overeat , and find ways to avoid them  Remove tempting high-calorie foods from your home and workplace  Place a bowl of fresh fruit on your kitchen counter  If stress causes you to eat, then find other ways to cope with stress  · Keep a diary to track what you eat and drink  Also write down how many minutes of physical activity you do each day  Weigh yourself once a week and record it in your diary  Eating changes: You will need to eat 500 to 1,000 fewer calories each day than you currently eat to lose 1 to 2 pounds a week  The following changes will help you cut calories:  · Eat smaller portions  Use small plates, no larger than 9 inches in diameter  Fill your plate half full of fruits and vegetables  Measure your food using measuring cups until you know what a serving size looks like  · Eat 3 meals and 1 or 2 snacks each day  Plan your meals in advance  Renetta Flores and eat at home most of the time  Eat slowly  · Eat fruits and vegetables at every meal   They are low in calories and high in fiber, which makes you feel full  Do not add butter, margarine, or cream sauce to vegetables  Use herbs to season steamed vegetables  · Eat less fat and fewer fried foods  Eat more baked or grilled chicken and fish  These protein sources are lower in calories and fat than red meat  Limit fast food  Dress your salads with olive oil and vinegar instead of bottled dressing  · Limit the amount of sugar you eat  Do not drink sugary beverages  Limit alcohol  Activity changes:  Physical activity is good for your body in many ways   It helps you burn calories and build strong muscles  It decreases stress and depression, and improves your mood  It can also help you sleep better  Talk to your healthcare provider before you begin an exercise program   · Exercise for at least 30 minutes 5 days a week  Start slowly  Set aside time each day for physical activity that you enjoy and that is convenient for you  It is best to do both weight training and an activity that increases your heart rate, such as walking, bicycling, or swimming  · Find ways to be more active  Do yard work and housecleaning  Walk up the stairs instead of using elevators  Spend your leisure time going to events that require walking, such as outdoor festivals or fairs  This extra physical activity can help you lose weight and keep it off  Follow up with your healthcare provider as directed: You may need to meet with a dietitian  Write down your questions so you remember to ask them during your visits  © 2017 [address] [employer] Information is for End User's use only and may not be sold, redistributed or otherwise used for commercial purposes  All illustrations and images included in CareNotes® are the copyrighted property of [employer]  or Bubba Valerio  The above information is an  only  It is not intended as medical advice for individual conditions or treatments  Talk to your doctor, nurse or pharmacist before following any medical regimen to see if it is safe and effective for you  Urinary Incontinence   WHAT YOU NEED TO KNOW:   What is urinary incontinence? Urinary incontinence (UI) is when you lose control of your bladder  What causes UI? UI occurs because your bladder cannot store or empty urine properly  The following are the most common types of UI:  · Stress incontinence  is when you leak urine due to increased bladder pressure  This may happen when you cough, sneeze, or exercise       · Urge incontinence  is when you feel the need to urinate right away and leak urine accidentally  · Mixed incontinence  is when you have both stress and urge UI  What are the signs and symptoms of UI?   · You feel like your bladder does not empty completely when you urinate  · You urinate often and need to urinate immediately  · You leak urine when you sleep, or you wake up with the urge to urinate  · You leak urine when you cough, sneeze, exercise, or laugh  How is UI diagnosed? Your healthcare provider will ask how often you leak urine and whether you have stress or urge symptoms  Tell him which medicines you take, how often you urinate, and how much liquid you drink each day  You may need any of the following tests:  · Urine tests  may show infection or kidney function  · A pelvic exam  may be done to check for blockages  A pelvic exam will also show if your bladder, uterus, or other organs have moved out of place  · An x-ray, ultrasound, or CT  may show problems with parts of your urinary system  You may be given contrast liquid to help your organs show up better in the pictures  Tell the healthcare provider if you have ever had an allergic reaction to contrast liquid  Do not enter the MRI room with anything metal  Metal can cause serious injury  Tell the healthcare provider if you have any metal in or on your body  · A bladder scan  will show how much urine is left in your bladder after you urinate  You will be asked to urinate and then healthcare providers will use a small ultrasound machine to check the urine left in your bladder  · Cystometry  is used to check the function of your urinary system  Your healthcare provider checks the pressure in your bladder while filling it with fluid  Your bladder pressure may also be tested when your bladder is full and while you urinate  How is UI treated? · Medicines  can help strengthen your bladder control      · Electrical stimulation  is used to send a small amount of electrical energy to your pelvic floor muscles  This helps control your bladder function  Electrodes may be placed outside your body or in your rectum  For women, the electrodes may be placed in the vagina  · A bulking agent  may be injected into the wall of your urethra to make it thicker  This helps keep your urethra closed and decreases urine leakage  · Devices  such as a clamp, pessary, or tampon may help stop urine leaks  Ask your healthcare provider for more information about these and other devices  · Surgery  may be needed if other treatments do not work  Several types of surgery can help improve your bladder control  Ask your healthcare provider for more information about the surgery you may need  How can I manage my symptoms? · Do pelvic muscle exercises often  Your pelvic muscles help you stop urinating  Squeeze these muscles tight for 5 seconds, then relax for 5 seconds  Gradually work up to squeezing for 10 seconds  Do 3 sets of 15 repetitions a day, or as directed  This will help strengthen your pelvic muscles and improve bladder control  · A catheter  may be used to help empty your bladder  A catheter is a tiny, plastic tube that is put into your bladder to drain your urine  Your healthcare provider may tell you to use a catheter to prevent your bladder from getting too full and leaking urine  · Keep a UI record  Write down how often you leak urine and how much you leak  Make a note of what you were doing when you leaked urine  · Train your bladder  Go to the bathroom at set times, such as every 2 hours, even if you do not feel the urge to go  You can also try to hold your urine when you feel the urge to go  For example, hold your urine for 5 minutes when you feel the urge to go  As that becomes easier, hold your urine for 10 minutes  · Drink liquids as directed  Ask your healthcare provider how much liquid to drink each day and which liquids are best for you   You may need to limit the amount of liquid you drink to help control your urine leakage  Limit or do not have drinks that contain caffeine or alcohol  Do not drink any liquid right before you go to bed  · Prevent constipation  Eat a variety of high-fiber foods  Good examples are high-fiber cereals, beans, vegetables, and whole-grain breads  Prune juice may help make your bowel movement softer  Walking is the best way to trigger your intestines to have a bowel movement  · Exercise regularly and maintain a healthy weight  Ask your healthcare provider how much you should weigh and about the best exercise plan for you  Weight loss and exercise will decrease pressure on your bladder and help you control your leakage  Ask him to help you create a weight loss plan if you are overweight  When should I seek immediate care? · You have severe pain  · You are confused or cannot think clearly  When should I contact my healthcare provider? · You have a fever  · You see blood in your urine  · You have pain when you urinate  · You have new or worse pain, even after treatment  · Your mouth feels dry or you have vision changes  · Your urine is cloudy or smells bad  · You have questions or concerns about your condition or care  CARE AGREEMENT:   You have the right to help plan your care  Learn about your health condition and how it may be treated  Discuss treatment options with your caregivers to decide what care you want to receive  You always have the right to refuse treatment  The above information is an  only  It is not intended as medical advice for individual conditions or treatments  Talk to your doctor, nurse or pharmacist before following any medical regimen to see if it is safe and effective for you  © 2017 [address] [employer] Information is for End User's use only and may not be sold, redistributed or otherwise used for commercial purposes   All illustrations and images included in CareNotes® are the copyrighted property of [employer] M [employer] Inc  or Bubba Valerio  Cigarette Smoking and Your Health   AMBULATORY CARE:   Risks to your health if you smoke:  Nicotine and other chemicals found in tobacco damage every cell in your body  Even if you are a light smoker, you have an increased risk for cancer, heart disease, and lung disease  If you are pregnant or have diabetes, smoking increases your risk for complications  Benefits to your health if you stop smoking:   · You decrease respiratory symptoms such as coughing, wheezing, and shortness of breath  · You reduce your risk for cancers of the lung, mouth, throat, kidney, bladder, pancreas, stomach, and cervix  If you already have cancer, you increase the benefits of chemotherapy  You also reduce your risk for cancer returning or a second cancer from developing  · You reduce your risk for heart disease, blood clots, heart attack, and stroke  · You reduce your risk for lung infections, and diseases such as pneumonia, asthma, chronic bronchitis, and emphysema  · Your circulation improves  More oxygen can be delivered to your body  If you have diabetes, you lower your risk for complications, such as kidney, artery, and eye diseases  You also lower your risk for nerve damage  Nerve damage can lead to amputations, poor vision, and blindness  · You improve your body's ability to heal and to fight infections  Benefits to the health of others if you stop smoking:  Tobacco is harmful to nonsmokers who breathe in your secondhand smoke  The following are ways the health of others around you may improve when you stop smoking:  · You lower the risks for lung cancer and heart disease in nonsmoking adults  · If you are pregnant, you lower the risk for miscarriage, early delivery, low birth weight, and stillbirth  You also lower your baby's risk for SIDS, obesity, developmental delay, and neurobehavioral problems, such as ADHD  · If you have children, you lower their risk for ear infections, colds, pneumonia, bronchitis, and asthma  For more information and support to stop smoking:   · Smokefree  gov  Phone: 2- 444 - 533-5117  Web Address: www smokefrA2B  Follow up with your healthcare provider as directed:  Write down your questions so you remember to ask them during your visits  © 2017 [address] [employer] Information is for End User's use only and may not be sold, redistributed or otherwise used for commercial purposes  All illustrations and images included in CareNotes® are the copyrighted property of [employer] M [employer]  or Bubba Valerio  The above information is an  only  It is not intended as medical advice for individual conditions or treatments  Talk to your doctor, nurse or pharmacist before following any medical regimen to see if it is safe and effective for you  Fall Prevention   WHAT YOU NEED TO KNOW:   What is fall prevention? Fall prevention includes ways to make your home and other areas safer  It also includes ways you can move more carefully to prevent a fall  What increases my risk for falls? · Lack of vitamin D    · Not getting enough sleep each night    · Trouble walking or keeping your balance, or foot problems    · Health conditions that cause changes in your blood pressure, vision, or muscle strength and coordination    · Medicines that make you dizzy, weak, or sleepy    · Problems seeing clearly    · Shoes that have high heels or are not supportive    · Tripping hazards, such as items left on the floor, no handrails on the stairs, or broken steps  How can I help protect myself from falls? · Stand or sit up slowly  This may help you keep your balance and prevent falls  If you need to get up during the night, sit up first  Be sure you are fully awake before you stand  Turn on the light before you start walking  Go slowly in case you are still sleepy   Make sure you will not trip over any pets sleeping in the bedroom  · Use assistive devices as directed  Your healthcare provider may suggest that you use a cane or walker to help you keep your balance  You may need to have grab bars put in your bathroom near the toilet or in the shower  · Wear shoes that fit well and have soles that   Wear shoes both inside and outside  Use slippers with good   Do not wear shoes with high heels  · Wear a personal alarm  This is a device that allows you to call 911 if you fall and need help  Ask your healthcare provider for more information  · Stay active  Exercise can help strengthen your muscles and improve your balance  Your healthcare provider may recommend water aerobics or walking  He or she may also recommend physical therapy to improve your coordination  Never start an exercise program without talking to your healthcare provider first      · Manage medical conditions  Keep all appointments with your healthcare providers  Visit your eye doctor as directed  How can I make my home safer? · Add items to prevent falls in the bathroom  Put nonslip strips on your bath or shower floor to prevent you from slipping  Use a bath mat if you do not have carpet in the bathroom  This will prevent you from falling when you step out of the bath or shower  Use a shower seat so you do not need to stand while you shower  Sit on the toilet or a chair in your bathroom to dry yourself and put on clothing  This will prevent you from losing your balance from drying or dressing yourself while you are standing  · Keep paths clear  Remove books, shoes, and other objects from walkways and stairs  Place cords for telephones and lamps out of the way so that you do not need to walk over them  Tape them down if you cannot move them  Remove small rugs  If you cannot remove a rug, secure it with double-sided tape  This will prevent you from tripping  · Install bright lights in your home  Use night lights to help light paths to the bathroom or kitchen  Always turn on the light before you start walking  · Keep items you use often on shelves within reach  Do not use a step stool to help you reach an item  · Paint or place reflective tape on the edges of your stairs  This will help you see the stairs better  Call 911 or have someone else call if:   · You have fallen and are unconscious  · You have fallen and cannot move part of your body  Contact your healthcare provider if:   · You have fallen and have pain or a headache  · You have questions or concerns about your condition or care  CARE AGREEMENT:   You have the right to help plan your care  Learn about your health condition and how it may be treated  Discuss treatment options with your caregivers to decide what care you want to receive  You always have the right to refuse treatment  The above information is an  only  It is not intended as medical advice for individual conditions or treatments  Talk to your doctor, nurse or pharmacist before following any medical regimen to see if it is safe and effective for you  © 2017 [address] [facility] Information is for End User's use only and may not be sold, redistributed or otherwise used for commercial purposes  All illustrations and images included in CareNotes® are the copyrighted property of [employer]  or Bubba Valerio  Advance Directives   WHAT YOU NEED TO KNOW:   What are advance directives? Advance directives are legal documents that state your wishes and plans for medical care  These plans are made ahead of time in case you lose your ability to make decisions for yourself  Advance directives can apply to any medical decision, such as the treatments you want, and if you want to donate organs  What are the types of advance directives? There are many types of advance directives, and each state has rules about how to use them   You may choose a combination of any of the following:  · Living will: This is a written record of the treatment you want  You can also choose which treatments you do not want, which to limit, and which to stop at a certain time  This includes surgery, medicine, IV fluid, and tube feedings  · Durable power of  for healthcare [facility]): This is a written record that states who you want to make healthcare choices for you when you are unable to make them for yourself  This person, called a proxy, is usually a family member or a friend  You may choose more than 1 proxy  · Do not resuscitate (DNR) order:  A DNR order is used in case your heart stops beating or you stop breathing  It is a request not to have certain forms of treatment, such as CPR  A DNR order may be included in other types of advance directives  · Medical directive: This covers the care that you want if you are in a coma, near death, or unable to make decisions for yourself  You can list the treatments you want for each condition  Treatment may include pain medicine, surgery, blood transfusions, dialysis, IV or tube feedings, and a ventilator (breathing machine)  · Values history: This document has questions about your views, beliefs, and how you feel and think about life  This information can help others choose the care that you would choose  Why are advance directives important? An advance directive helps you control your care  Although spoken wishes may be used, it is better to have your wishes written down  Spoken wishes can be misunderstood, or not followed  Treatments may be given even if you do not want them  An advance directive may make it easier for your family to make difficult choices about your care  How do I decide what to put in my advance directives? · Make informed decisions:  Make sure you fully understand treatments or care you may receive   Think about the benefits and problems your decisions could cause for you or your family  Talk to healthcare providers if you have concerns or questions before you write down your wishes  You may also want to talk with your [employer] orthodox or , or a   Check your state laws to make sure that what you put in your advance directive is legal      · Sign all forms:  Sign and date your advance directive when you have finished  You may also need 2 witnesses to sign the forms  Witnesses cannot be your doctor or his staff, your spouse, heirs or beneficiaries, people you owe money to, or your chosen proxy  Talk to your family, proxy, and healthcare providers about your advance directive  Give each person a copy, and keep one for yourself in a place you can get to easily  Do not keep it hidden or locked away  · Review and revise your plans: You can revise your advance directive at any time, as long as you are able to make decisions  Review your plan every year, and when there are changes in your life, or your health  When you make changes, let your family, proxy, and healthcare providers know  Give each a new copy  Where can I find more information? · [employer]  [address] , [address]  Phone: 9- [phone]  Phone: 9- 924 - [phone]  Web Address: http://www  aafp org  · [address] [facility])  [address] [facility], [address]  Phone: 8- [phone]  Phone: [phone]  Web Address: Adalgisa lara  [facility] AGREEMENT:   You have the right to help plan your care  To help with this plan, you must learn about your health condition and treatment options  You must also learn about advance directives and how they are used  Work with your healthcare providers to decide what care will be used to treat you  You always have the right to refuse treatment  The above information is an  only   It is not intended as medical advice for individual conditions or treatments  Talk to your doctor, nurse or pharmacist before following any medical regimen to see if it is safe and effective for you  © 2017 [address] [employer] Information is for End User's use only and may not be sold, redistributed or otherwise used for commercial purposes  All illustrations and images included in CareNotes® are the copyrighted property of A D [employer] M [employer]  or Bubba Valerio

## 2019-02-22 NOTE — PROGRESS NOTES
Assessment/Plan:          BMI Counseling: Body mass index is 25 66 kg/m²  Discussed the patient's BMI with him  The BMI is above average  No BMI follow-up plan is appropriate  Patient is 73 years old and weight reduction/weight gain would further complicate their underlying illness  Return in about 4 months (around 6/22/2019)  No problem-specific Assessment & Plan notes found for this encounter  Diagnoses and all orders for this visit:    Medicare annual wellness visit, subsequent    Atherosclerosis of native coronary artery of native heart without angina pectoris    Congestive heart failure, unspecified HF chronicity, unspecified heart failure type ([facility] [address] )    Weight loss    Anorexia    Other orders  -     spironolactone (ALDACTONE) 25 mg tablet; Take 25 mg by mouth daily          Subjective:      Patient ID: Abiel Jacobson is a 68 y o  male  Patient comes in today for follow-up and for his Medicare wellness exam   He did stop the Remeron as instructed and has had no episodes of sweating  However, his cardiac testing was positive and he is scheduled for catheterization  He will most likely need a procedure  He reports his cardiologist believes he probably has a blockage and it is probably affecting his heart failure  They adjusted his medicines and he was feeling better in terms of his appetite  The weight loss has stopped  No other medication changes        ALLERGIES:  No Known Allergies    CURRENT MEDICATIONS:    Current Outpatient Medications:     allopurinol (ZYLOPRIM) 100 mg tablet, Take 1 tablet (100 mg total) by mouth daily, Disp: 90 tablet, Rfl: 3    atorvastatin (LIPITOR) 40 mg tablet, Take 40 mg by mouth daily  , Disp: , Rfl:     carvedilol (COREG) 6 25 mg tablet, Take 6 25 mg by mouth  , Disp: , Rfl:     furosemide (LASIX) 20 mg tablet, , Disp: , Rfl: 0    losartan (COZAAR) 25 mg tablet, Take 1 tablet (25 mg total) by mouth daily, Disp: 30 tablet, Rfl: 2    Multiple Vitamins-Minerals (PX MENS MULTIVITAMINS) TABS, Take by mouth, Disp: , Rfl:     pantoprazole (PROTONIX) 40 mg tablet, Take 1 tablet (40 mg total) by mouth 2 (two) times a day before meals, Disp: 60 tablet, Rfl: 3    spironolactone (ALDACTONE) 25 mg tablet, Take 25 mg by mouth daily, Disp: , Rfl:     warfarin (COUMADIN) 2 5 mg tablet, Take 2 tablets (5 mg total) by mouth daily, Disp: , Rfl: 0    ACTIVE PROBLEM LIST:  Patient Active Problem List   Diagnosis    CAD (coronary atherosclerotic disease)    CHF (congestive heart failure) (HCC)    Chronic back pain    GERD (gastroesophageal reflux disease)    Hypercholesterolemia    Hypertension    PAF (paroxysmal atrial fibrillation) (HCC)    Peptic ulcer disease    S/P MVR (mitral valve repair)    Polyp of colon    Abnormal RBC indices    Fatigue    Chronic anticoagulation    Diarrhea    Pain in gums    Mild protein-calorie malnutrition (HCC)    Idiopathic chronic gout of multiple sites without tophus    Impaired fasting glucose       PAST MEDICAL HISTORY:  Past Medical History:   Diagnosis Date    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    CAD (coronary artery disease)     Cancer ([facility] [address] )     prostate    Coronary artery disease     GERD (gastroesophageal reflux disease)     H/O heart artery stent     x2    Hypercholesteremia     Hypertension     Keratotic lesion     Stroke ([facility] [address] )     10YRS AGO    Transient cerebral ischemia        PAST SURGICAL HISTORY:  Past Surgical History:   Procedure Laterality Date    BACK SURGERY      CARDIAC SURGERY      mvp repair    CHOLECYSTECTOMY      ESOPHAGOGASTRODUODENOSCOPY N/A 6/22/2018    Procedure: ESOPHAGOGASTRODUODENOSCOPY (EGD); Surgeon: Atilio Ruano MD;  Location: [facility];   Service: Gastroenterology    JOINT REPLACEMENT      L hip replacement    ORCHIECTOMY      PROSTATECTOMY      TOTAL HIP ARTHROPLASTY         FAMILY HISTORY:  Family History   Problem Relation Age of Onset    Heart disease Mother  Stroke Mother         CVA    Cancer Father     Hodgkin's lymphoma Father     Lung cancer Father        SOCIAL HISTORY:  Social History     Socioeconomic History    Marital status:      Spouse name: Not on file    Number of children: Not on file    Years of education: Not on file    Highest education level: Not on file   Occupational History    Occupation: retired   Social Needs    Financial resource strain: Not on file    Food insecurity:     Worry: Not on file     Inability: Not on file   [employer] needs:     Medical: Not on file     Non-medical: Not on file   Tobacco Use    Smoking status: Former Smoker     Last attempt to quit: 2018     Years since quittin 7    Smokeless tobacco: Never Used   Substance and Sexual Activity    Alcohol use: Yes     Alcohol/week: 1 2 oz     Types: 2 Standard drinks or equivalent per week    Drug use: No    Sexual activity: Never     Comment: no high risk sexual behavior   Lifestyle    Physical activity:     Days per week: Not on file     Minutes per session: Not on file    Stress: Not on file   Relationships    Social connections:     Talks on phone: Not on file     Gets together: Not on file     Attends [employer] service: Not on file     Active member of club or organization: Not on file     Attends meetings of clubs or organizations: Not on file     Relationship status: Not on file    Intimate partner violence:     Fear of current or ex partner: Not on file     Emotionally abused: Not on file     Physically abused: Not on file     Forced sexual activity: Not on file   Other Topics Concern    Not on file   Social History Narrative    Not on file       Review of Systems   Respiratory: Negative for shortness of breath  Cardiovascular: Negative for chest pain  Gastrointestinal: Negative for abdominal pain           Objective:  Vitals:    19 1125   BP: 118/68   BP Location: Left arm   Patient Position: Sitting   Cuff Size: Adult Pulse: 78   Resp: 20   SpO2: 99%   Weight: 72 1 kg (159 lb)   Height: 5' 6" (1 676 m)     Body mass index is 25 66 kg/m²  Physical Exam   Constitutional: He is oriented to person, place, and time  He appears well-developed and well-nourished  Cardiovascular: Normal rate and regular rhythm  Murmur heard  Pulmonary/Chest: Effort normal and breath sounds normal    Abdominal: Soft  Bowel sounds are normal    Musculoskeletal: He exhibits no edema  Neurological: He is alert and oriented to person, place, and time  Nursing note and vitals reviewed  RESULTS:    No results found for this or any previous visit (from the past 1008 hour(s))  This note was created with voice recognition software  Phonic, grammatical and spelling errors may be present within the note as a result

## 2019-02-22 NOTE — PROGRESS NOTES
Assessment and Plan:  Problem List Items Addressed This Visit     None        Health Maintenance Due   Topic Date Due    Medicare Annual Wellness Visit (AWV)  1945    DTaP,Tdap,and Td Vaccines (1 - Tdap) 08/17/1967         HPI:  Patient Active Problem List   Diagnosis    CAD (coronary atherosclerotic disease)    CHF (congestive heart failure) (HCC)    Chronic back pain    GERD (gastroesophageal reflux disease)    Hypercholesterolemia    Hypertension    PAF (paroxysmal atrial fibrillation) (HCC)    Peptic ulcer disease    S/P MVR (mitral valve repair)    Polyp of colon    Weight loss    Anorexia    Abnormal RBC indices    Fatigue    Generalized weakness    Chronic anticoagulation    Diarrhea    Pain in gums    Mild protein-calorie malnutrition (HCC)    Idiopathic chronic gout of multiple sites without tophus    Impaired fasting glucose     Past Medical History:   Diagnosis Date    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    CAD (coronary artery disease)     [facility]     prostate    Coronary artery disease     GERD (gastroesophageal reflux disease)     H/O heart artery stent     x2    Hypercholesteremia     Hypertension     Keratotic lesion     Stroke [facility]     10[facility]    Transient cerebral ischemia      Past Surgical History:   Procedure Laterality Date    BACK SURGERY      CARDIAC SURGERY      mvp repair    CHOLECYSTECTOMY      ESOPHAGOGASTRODUODENOSCOPY N/A 6/22/2018    Procedure: ESOPHAGOGASTRODUODENOSCOPY (EGD); Surgeon: Mary Carmen Ponce MD;  Location: [facility];   Service: Gastroenterology    JOINT REPLACEMENT      L hip replacement    ORCHIECTOMY      PROSTATECTOMY      TOTAL HIP ARTHROPLASTY       Family History   Problem Relation Age of Onset    Heart disease Mother     Stroke Mother         CVA    Cancer Father     Hodgkin's lymphoma Father     Lung cancer Father      Social History     Tobacco Use   Smoking Status Former Smoker    Last attempt to quit: 2018    Years since quittin 7   Smokeless Tobacco Never Used     Social History     Substance and Sexual Activity   Alcohol Use Yes    Alcohol/week: 1 2 oz    Types: 2 Standard drinks or equivalent per week      Social History     Substance and Sexual Activity   Drug Use No         Current Outpatient Medications   Medication Sig Dispense Refill    allopurinol (ZYLOPRIM) 100 mg tablet Take 1 tablet (100 mg total) by mouth daily 90 tablet 3    atorvastatin (LIPITOR) 40 mg tablet Take 40 mg by mouth daily        carvedilol (COREG) 6 25 mg tablet Take 6 25 mg by mouth        furosemide (LASIX) 20 mg tablet   0    losartan (COZAAR) 25 mg tablet Take 1 tablet (25 mg total) by mouth daily 30 tablet 2    Multiple Vitamins-Minerals (PX MENS MULTIVITAMINS) TABS Take by mouth      pantoprazole (PROTONIX) 40 mg tablet Take 1 tablet (40 mg total) by mouth 2 (two) times a day before meals 60 tablet 3    spironolactone (ALDACTONE) 25 mg tablet Take 25 mg by mouth daily      warfarin (COUMADIN) 2 5 mg tablet Take 2 tablets (5 mg total) by mouth daily  0     No current facility-administered medications for this visit  No Known Allergies  Immunization History   Administered Date(s) Administered    INFLUENZA 2018    Influenza Split High Dose Preservative Free IM 2015    Influenza TIV (IM) 2016, 2017    Pneumococcal Conjugate 13-Valent 2015, 2016    Pneumococcal Polysaccharide PPV23 2017    Zoster 2015       Patient Care Team:  Eva Bumpers, MD as PCP - General    Medicare Screening Tests and Risk Assessments:  Milton Noyola is here for his Subsequent Wellness visit  Last Medicare Wellness visit information reviewed, patient interviewed and updates made to the record today  Health Risk Assessment:  Patient rates overall health as fair  Patient feels that their physical health rating is Slightly worse  Eyesight was rated as Same  Hearing was rated as Same  Patient feels that their emotional and mental health rating is Same  Pain experienced by patient in the last 7 days has been None  Patient states that he has experienced no weight loss or gain in last 6 months  Emotional/Mental Health:  Patient has been feeling nervous/anxious  PHQ-9 Depression Screening:    Frequency of the following problems over the past two weeks:      1  Little interest or pleasure in doing things: 0 - not at all      2  Feeling down, depressed, or hopeless: 0 - not at all  PHQ-2 Score: 0          Broken Bones/Falls: Fall Risk Assessment:    In the past year, patient has experienced: No history of falling in past year          Bladder/Bowel:  Patient has not leaked urine accidently in the last six months  Patient reports loss of bowel control  (Additional Comments: Episodic diarrhea)    Immunizations:  Patient has had a flu vaccination within the last year  Patient has received a pneumonia shot  Patient has received a shingles shot  Patient has received tetanus/diphtheria shot  Home Safety:  Patient does not have trouble with stairs inside or outside of their home  Patient currently reports that there are no safety hazards present in home, working smoke alarms, no working carbon monoxide detectors  Preventative Screenings:   prostate cancer screen performed, no colon cancer screen completed, cholesterol screen completed, glaucoma eye exam completed,     Nutrition:  Current diet: No Added Salt with servings of the following:    Medications:  Patient is not currently taking any over-the-counter supplements  Patient is able to manage medications  Lifestyle Choices:  Patient reports no tobacco use  Patient has not smoked or used tobacco in the past   Patient reports no alcohol use  Patient drives a vehicle  Patient wears seat belt          Activities of Daily Living:  Can get out of bed by his or her self, able to dress self, able to make own meals, able to do own shopping, able to bathe self, can do own laundry/housekeeping, can manage own money, pay bills and track expenses    Previous Hospitalizations:  No hospitalization or ED visit in past 12 months        Advanced Directives:  Patient has decided on a power of   Patient has spoken to designated power of   Patient has completed advanced directive  Preventative Screening/Counseling:      Cardiovascular:      General: Screening Current          Diabetes:      General: Screening Current          Colorectal Cancer:      General: Screening Current          Prostate Cancer:      General: Screening Current          Osteoporosis:      General: Screening Not Indicated          AAA:      General: Screening Not Indicated          Glaucoma:      General: Screening Current          HIV:      General: Screening Not Indicated          Hepatitis C:      General: Screening Current        Advanced Directives:   Patient has living will for healthcare, has durable POA for healthcare, patient has an advanced directive  Additional Comments: Son is POA    Immunizations:      Influenza: Influenza UTD This Year      Pneumococcal: Lifetime Vaccine Completed            No exam data present    Physical Exam:  Review of Systems   Respiratory: Negative for shortness of breath  Cardiovascular: Negative for chest pain  Gastrointestinal: Positive for bowel incontinence  Negative for abdominal pain  Psychiatric/Behavioral: The patient is nervous/anxious  Vitals:    02/22/19 1125   Resp: 20   Weight: 72 1 kg (159 lb)   Height: 5' 6" (1 676 m)   Body mass index is 25 66 kg/m²  Physical Exam   Constitutional: He is oriented to person, place, and time  Neurological: He is alert and oriented to person, place, and time  Psychiatric: He has a normal mood and affect  His behavior is normal  Judgment and thought content normal    Nursing note and vitals reviewed

## 2019-04-18 ENCOUNTER — TRANSCRIBE ORDERS (OUTPATIENT)
Dept: ADMINISTRATIVE | Facility: HOSPITAL | Age: 74
End: 2019-04-18

## 2019-04-18 ENCOUNTER — APPOINTMENT (OUTPATIENT)
Dept: LAB | Facility: HOSPITAL | Age: 74
End: 2019-04-18
Payer: MEDICARE

## 2019-04-18 DIAGNOSIS — G45.9 INTERMITTENT CEREBRAL ISCHEMIA: ICD-10-CM

## 2019-04-18 DIAGNOSIS — I36.1 TRICUSPID VALVE INSUFFICIENCY, NON-RHEUMATIC: ICD-10-CM

## 2019-04-18 DIAGNOSIS — I48.0 PAROXYSMAL ATRIAL FIBRILLATION (HCC): Primary | ICD-10-CM

## 2019-04-18 DIAGNOSIS — I48.0 PAROXYSMAL ATRIAL FIBRILLATION (HCC): ICD-10-CM

## 2019-04-18 LAB
ANION GAP SERPL CALCULATED.3IONS-SCNC: 8 MMOL/L (ref 4–13)
BUN SERPL-MCNC: 26 MG/DL (ref 5–25)
CALCIUM SERPL-MCNC: 9.2 MG/DL (ref 8.3–10.1)
CHLORIDE SERPL-SCNC: 105 MMOL/L (ref 100–108)
CO2 SERPL-SCNC: 27 MMOL/L (ref 21–32)
CREAT SERPL-MCNC: 1.38 MG/DL (ref 0.6–1.3)
GFR SERPL CREATININE-BSD FRML MDRD: 51 ML/MIN/1.73SQ M
GLUCOSE P FAST SERPL-MCNC: 105 MG/DL (ref 65–99)
POTASSIUM SERPL-SCNC: 4.9 MMOL/L (ref 3.5–5.3)
SODIUM SERPL-SCNC: 140 MMOL/L (ref 136–145)

## 2019-04-18 PROCEDURE — 36415 COLL VENOUS BLD VENIPUNCTURE: CPT

## 2019-04-18 PROCEDURE — 80048 BASIC METABOLIC PNL TOTAL CA: CPT

## 2019-07-08 ENCOUNTER — OFFICE VISIT (OUTPATIENT)
Dept: INTERNAL MEDICINE CLINIC | Facility: CLINIC | Age: 74
End: 2019-07-08
Payer: MEDICARE

## 2019-07-08 VITALS
RESPIRATION RATE: 16 BRPM | OXYGEN SATURATION: 98 % | SYSTOLIC BLOOD PRESSURE: 110 MMHG | WEIGHT: 160 LBS | HEIGHT: 66 IN | HEART RATE: 82 BPM | BODY MASS INDEX: 25.71 KG/M2 | DIASTOLIC BLOOD PRESSURE: 68 MMHG

## 2019-07-08 DIAGNOSIS — I10 ESSENTIAL HYPERTENSION: ICD-10-CM

## 2019-07-08 DIAGNOSIS — K21.9 GASTROESOPHAGEAL REFLUX DISEASE WITHOUT ESOPHAGITIS: ICD-10-CM

## 2019-07-08 DIAGNOSIS — I25.10 ATHEROSCLEROSIS OF NATIVE CORONARY ARTERY OF NATIVE HEART WITHOUT ANGINA PECTORIS: Primary | ICD-10-CM

## 2019-07-08 DIAGNOSIS — I48.0 PAF (PAROXYSMAL ATRIAL FIBRILLATION) (HCC): ICD-10-CM

## 2019-07-08 PROBLEM — R19.7 DIARRHEA: Status: RESOLVED | Noted: 2018-06-20 | Resolved: 2019-07-08

## 2019-07-08 PROCEDURE — 99214 OFFICE O/P EST MOD 30 MIN: CPT | Performed by: INTERNAL MEDICINE

## 2019-07-08 RX ORDER — METOPROLOL SUCCINATE 25 MG/1
25 TABLET, EXTENDED RELEASE ORAL DAILY
Refills: 0 | COMMUNITY
Start: 2019-04-23 | End: 2021-04-23

## 2019-07-08 NOTE — PROGRESS NOTES
Assessment/Plan:     Chronic problems are stable  Continue follow-up with Cardiology  Confirm that they order his labs  Will continue to follow  No medication changes at this point  Encouraged diet and exercise  BMI Counseling: Body mass index is 25 82 kg/m²  Discussed the patient's BMI with him  The BMI is above average  BMI counseling and education was provided to the patient  Nutrition recommendations include moderation in carbohydrate intake  Return in about 4 months (around 11/8/2019)  No problem-specific Assessment & Plan notes found for this encounter  Diagnoses and all orders for this visit:    Atherosclerosis of native coronary artery of native heart without angina pectoris    PAF (paroxysmal atrial fibrillation) (HCC)    Essential hypertension    Gastroesophageal reflux disease without esophagitis    Other orders  -     sacubitril-valsartan (ENTRESTO) 24-26 MG TABS; Take 1 tablet by mouth daily  -     metoprolol succinate (TOPROL-XL) 25 mg 24 hr tablet; Take 25 mg by mouth daily          Subjective:      Patient ID: Maliha Martínez is a 68 y o  male  Patient comes in today for routine follow-up  He did have his cardiac catheterization and was found to have a blockage  He did have a stent placed  But he reports it did not improve his fatigue  His medications were adjusted and he has follow-up with Cardiology again soon  Reports he did have a bout of reflux acting up again and had to take the pantoprazole for 2 months  He is now off of it again  No recent trouble with his AFib  No other complaints today  No further additions to his history        ALLERGIES:  No Known Allergies    CURRENT MEDICATIONS:    Current Outpatient Medications:     allopurinol (ZYLOPRIM) 100 mg tablet, Take 1 tablet (100 mg total) by mouth daily, Disp: 90 tablet, Rfl: 3    atorvastatin (LIPITOR) 40 mg tablet, Take 40 mg by mouth daily  , Disp: , Rfl:     carvedilol (COREG) 6 25 mg tablet, Take 6 25 mg by mouth  , Disp: , Rfl:     furosemide (LASIX) 20 mg tablet, , Disp: , Rfl: 0    losartan (COZAAR) 25 mg tablet, Take 1 tablet (25 mg total) by mouth daily, Disp: 30 tablet, Rfl: 2    Multiple Vitamins-Minerals (PX MENS MULTIVITAMINS) TABS, Take by mouth, Disp: , Rfl:     pantoprazole (PROTONIX) 40 mg tablet, Take 1 tablet (40 mg total) by mouth 2 (two) times a day before meals, Disp: 60 tablet, Rfl: 3    sacubitril-valsartan (ENTRESTO) 24-26 MG TABS, Take 1 tablet by mouth daily, Disp: , Rfl:     spironolactone (ALDACTONE) 25 mg tablet, Take 25 mg by mouth daily, Disp: , Rfl:     warfarin (COUMADIN) 2 5 mg tablet, Take 2 tablets (5 mg total) by mouth daily, Disp: , Rfl: 0    metoprolol succinate (TOPROL-XL) 25 mg 24 hr tablet, Take 25 mg by mouth daily, Disp: , Rfl: 0    ACTIVE PROBLEM LIST:  Patient Active Problem List   Diagnosis    CAD (coronary atherosclerotic disease)    CHF (congestive heart failure) (HCC)    Chronic back pain    GERD (gastroesophageal reflux disease)    Hypercholesterolemia    Hypertension    PAF (paroxysmal atrial fibrillation) (HCC)    Peptic ulcer disease    S/P MVR (mitral valve repair)    Polyp of colon    Abnormal RBC indices    Fatigue    Chronic anticoagulation    Pain in gums    Mild protein-calorie malnutrition (HCC)    Idiopathic chronic gout of multiple sites without tophus    Impaired fasting glucose       PAST MEDICAL HISTORY:  Past Medical History:   Diagnosis Date    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    CAD (coronary artery disease)     Cancer ([facility] )     prostate    Coronary artery disease     GERD (gastroesophageal reflux disease)     H/O heart artery stent     x2    Hypercholesteremia     Hypertension     Keratotic lesion     Stroke ([facility] )     10YRS AGO    Transient cerebral ischemia        PAST SURGICAL HISTORY:  Past Surgical History:   Procedure Laterality Date    BACK SURGERY      CARDIAC SURGERY      mvp repair    CHOLECYSTECTOMY  ESOPHAGOGASTRODUODENOSCOPY N/A 2018    Procedure: ESOPHAGOGASTRODUODENOSCOPY (EGD); Surgeon: Mat Lam MD;  Location: [facility]; Service: Gastroenterology    JOINT REPLACEMENT      L hip replacement    ORCHIECTOMY      PROSTATECTOMY      TOTAL HIP ARTHROPLASTY         FAMILY HISTORY:  Family History   Problem Relation Age of Onset    Heart disease Mother     Stroke Mother         CVA    Cancer Father     Hodgkin's lymphoma Father     Lung cancer Father        SOCIAL HISTORY:  Social History     Socioeconomic History    Marital status:      Spouse name: Not on file    Number of children: Not on file    Years of education: Not on file    Highest education level: Not on file   Occupational History    Occupation: retired   Social Needs    Financial resource strain: Not on file    Food insecurity:     Worry: Not on file     Inability: Not on file   [employer] needs:     Medical: Not on file     Non-medical: Not on file   Tobacco Use    Smoking status: Former Smoker     Last attempt to quit: 2018     Years since quittin 1    Smokeless tobacco: Never Used   Substance and Sexual Activity    Alcohol use:  Yes     Alcohol/week: 2 0 standard drinks     Types: 2 Standard drinks or equivalent per week    Drug use: No    Sexual activity: Never     Comment: no high risk sexual behavior   Lifestyle    Physical activity:     Days per week: Not on file     Minutes per session: Not on file    Stress: Not on file   Relationships    Social connections:     Talks on phone: Not on file     Gets together: Not on file     Attends [employer] service: Not on file     Active member of club or organization: Not on file     Attends meetings of clubs or organizations: Not on file     Relationship status: Not on file    Intimate partner violence:     Fear of current or ex partner: Not on file     Emotionally abused: Not on file     Physically abused: Not on file     Forced sexual activity: Not on file   Other Topics Concern    Not on file   Social History Narrative    Not on file       Review of Systems   Respiratory: Negative for shortness of breath  Cardiovascular: Negative for chest pain  Gastrointestinal: Negative for abdominal pain  Objective:  Vitals:    07/08/19 1125   BP: 110/68   BP Location: Left arm   Patient Position: Sitting   Cuff Size: Standard   Pulse: 82   Resp: 16   SpO2: 98%   Weight: 72 6 kg (160 lb)   Height: 5' 6" (1 676 m)     Body mass index is 25 82 kg/m²  Physical Exam   Constitutional: He is oriented to person, place, and time  He appears well-developed and well-nourished  Cardiovascular: Normal rate, regular rhythm and normal heart sounds  Pulmonary/Chest: Effort normal and breath sounds normal    Abdominal: Soft  There is no tenderness  Musculoskeletal: He exhibits no edema  Neurological: He is alert and oriented to person, place, and time  Nursing note and vitals reviewed  RESULTS:    No results found for this or any previous visit (from the past 1008 hour(s))  This note was created with voice recognition software  Phonic, grammatical and spelling errors may be present within the note as a result

## 2019-11-11 ENCOUNTER — OFFICE VISIT (OUTPATIENT)
Dept: INTERNAL MEDICINE CLINIC | Facility: CLINIC | Age: 74
End: 2019-11-11
Payer: MEDICARE

## 2019-11-11 VITALS
HEIGHT: 66 IN | RESPIRATION RATE: 16 BRPM | SYSTOLIC BLOOD PRESSURE: 124 MMHG | OXYGEN SATURATION: 99 % | BODY MASS INDEX: 26.03 KG/M2 | HEART RATE: 74 BPM | DIASTOLIC BLOOD PRESSURE: 52 MMHG | WEIGHT: 162 LBS

## 2019-11-11 DIAGNOSIS — I50.9 CONGESTIVE HEART FAILURE, UNSPECIFIED HF CHRONICITY, UNSPECIFIED HEART FAILURE TYPE (HCC): ICD-10-CM

## 2019-11-11 DIAGNOSIS — K21.9 GASTROESOPHAGEAL REFLUX DISEASE WITHOUT ESOPHAGITIS: ICD-10-CM

## 2019-11-11 DIAGNOSIS — D22.9 ATYPICAL MOLE: ICD-10-CM

## 2019-11-11 DIAGNOSIS — I10 ESSENTIAL HYPERTENSION: ICD-10-CM

## 2019-11-11 DIAGNOSIS — I25.10 ATHEROSCLEROSIS OF NATIVE CORONARY ARTERY OF NATIVE HEART WITHOUT ANGINA PECTORIS: Primary | ICD-10-CM

## 2019-11-11 PROCEDURE — 99214 OFFICE O/P EST MOD 30 MIN: CPT | Performed by: INTERNAL MEDICINE

## 2019-11-11 RX ORDER — CLOPIDOGREL BISULFATE 75 MG/1
75 TABLET ORAL EVERY OTHER DAY
COMMUNITY
End: 2021-04-23

## 2019-11-11 RX ORDER — VALSARTAN 320 MG/1
160 TABLET ORAL DAILY
COMMUNITY
Start: 2019-10-31 | End: 2021-04-23

## 2019-11-11 NOTE — PROGRESS NOTES
Assessment/Plan:      Quality Measures: BMI Counseling: Body mass index is 26 15 kg/m²  The BMI is above normal  Nutrition recommendations include moderation in carbohydrate intake  Return in about 6 months (around 5/11/2020) for Regular visit and Medicare Wellness  No problem-specific Assessment & Plan notes found for this encounter  Diagnoses and all orders for this visit:    Atherosclerosis of native coronary artery of native heart without angina pectoris    Congestive heart failure, unspecified HF chronicity, unspecified heart failure type [facility])    Essential hypertension    Gastroesophageal reflux disease without esophagitis    Atypical mole  -     Ambulatory referral to Dermatology; Future    Other orders  -     clopidogrel (PLAVIX) 75 mg tablet; Take 75 mg by mouth every other day  -     valsartan (DIOVAN) 80 mg tablet; Take 80 mg by mouth daily          Subjective:      Patient ID: Rachel Delgadillo is a 69 y o  male  Patient comes in today for routine follow-up  He states he is doing well with no new complaints  His heart disease is stable  He continues to follow routinely with his cardiologist   Reports no problems in that regards  Weight has been steady  He has I actually lost a little weight  Reflux is under control but he does need to take the medicine  He has a mole on his left cheek that he would like to see a dermatologist for  No other new complaints  No further additions to his history        ALLERGIES:  No Known Allergies    CURRENT MEDICATIONS:    Current Outpatient Medications:     allopurinol (ZYLOPRIM) 100 mg tablet, Take 1 tablet (100 mg total) by mouth daily, Disp: 90 tablet, Rfl: 3    atorvastatin (LIPITOR) 40 mg tablet, Take 40 mg by mouth daily  , Disp: , Rfl:     clopidogrel (PLAVIX) 75 mg tablet, Take 75 mg by mouth every other day, Disp: , Rfl:     furosemide (LASIX) 20 mg tablet, , Disp: , Rfl: 0    metoprolol succinate (TOPROL-XL) 25 mg 24 hr tablet, Take 25 mg by mouth daily, Disp: , Rfl: 0    Multiple Vitamins-Minerals (PX MENS MULTIVITAMINS) TABS, Take by mouth, Disp: , Rfl:     pantoprazole (PROTONIX) 40 mg tablet, Take 1 tablet (40 mg total) by mouth 2 (two) times a day before meals, Disp: 60 tablet, Rfl: 3    valsartan (DIOVAN) 80 mg tablet, Take 80 mg by mouth daily, Disp: , Rfl:     warfarin (COUMADIN) 2 5 mg tablet, Take 2 tablets (5 mg total) by mouth daily, Disp: , Rfl: 0    carvedilol (COREG) 6 25 mg tablet, Take 6 25 mg by mouth  , Disp: , Rfl:     losartan (COZAAR) 25 mg tablet, Take 1 tablet (25 mg total) by mouth daily (Patient not taking: Reported on 11/11/2019), Disp: 30 tablet, Rfl: 2    sacubitril-valsartan (ENTRESTO) 24-26 MG TABS, Take 1 tablet by mouth daily, Disp: , Rfl:     spironolactone (ALDACTONE) 25 mg tablet, Take 25 mg by mouth daily, Disp: , Rfl:     ACTIVE PROBLEM LIST:  Patient Active Problem List   Diagnosis    CAD (coronary atherosclerotic disease)    CHF (congestive heart failure) (HCC)    Chronic back pain    GERD (gastroesophageal reflux disease)    Hypercholesterolemia    Hypertension    PAF (paroxysmal atrial fibrillation) (HCC)    Peptic ulcer disease    S/P MVR (mitral valve repair)    Polyp of colon    Abnormal RBC indices    Fatigue    Chronic anticoagulation    Pain in gums    Mild protein-calorie malnutrition (HCC)    Idiopathic chronic gout of multiple sites without tophus    Impaired fasting glucose       PAST MEDICAL HISTORY:  Past Medical History:   Diagnosis Date    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    CAD (coronary artery disease)     [facility]     prostate    Coronary artery disease     GERD (gastroesophageal reflux disease)     H/O heart artery stent     x2    Hypercholesteremia     Hypertension     Keratotic lesion     Stroke [facility]     10YRS A[facility]    Transient cerebral ischemia        PAST SURGICAL HISTORY:  Past Surgical History:   Procedure Laterality Date    BACK SURGERY      CARDIAC SURGERY      mvp repair    CHOLECYSTECTOMY      ESOPHAGOGASTRODUODENOSCOPY N/A 2018    Procedure: ESOPHAGOGASTRODUODENOSCOPY (EGD); Surgeon: Kim Wayne MD;  [facility]; Service: Gastroenterology    JOINT REPLACEMENT      L hip replacement    ORCHIECTOMY      PROSTATECTOMY      TOTAL HIP ARTHROPLASTY         FAMILY HISTORY:  Family History   Problem Relation Age of Onset    Heart disease Mother     Stroke Mother         CVA    Cancer Father     Hodgkin's lymphoma Father     Lung cancer Father        SOCIAL HISTORY:  Social History     Socioeconomic History    Marital status:      Spouse name: Not on file    Number of children: Not on file    Years of education: Not on file    Highest education level: Not on file   Occupational History    Occupation: retired   Social Needs    Financial resource strain: Not on file    Food insecurity:     Worry: Not on file     Inability: Not on file   [employer] needs:     Medical: Not on file     Non-medical: Not on file   Tobacco Use    Smoking status: Former Smoker     Last attempt to quit: 2018     Years since quittin 4    Smokeless tobacco: Never Used   Substance and Sexual Activity    Alcohol use:  Yes     Alcohol/week: 2 0 standard drinks     Types: 2 Standard drinks or equivalent per week    Drug use: No    Sexual activity: Never     Comment: no high risk sexual behavior   Lifestyle    Physical activity:     Days per week: Not on file     Minutes per session: Not on file    Stress: Not on file   Relationships    Social connections:     Talks on phone: Not on file     Gets together: Not on file     Attends Hoahaoism service: Not on file     Active member of club or organization: Not on file     Attends meetings of clubs or organizations: Not on file     Relationship status: Not on file    Intimate partner violence:     Fear of current or ex partner: Not on file     Emotionally abused: Not on file     Physically abused: Not on file     Forced sexual activity: Not on file   Other Topics Concern    Not on file   Social History Narrative    Not on file       Review of Systems   Respiratory: Negative for shortness of breath  Cardiovascular: Negative for chest pain  Gastrointestinal: Negative for abdominal pain  Objective:  Vitals:    11/11/19 1053   BP: 124/52   BP Location: Left arm   Patient Position: Sitting   Cuff Size: Standard   Pulse: 74   Resp: 16   SpO2: 99%   Weight: 73 5 kg (162 lb)   Height: 5' 6" (1 676 m)     Body mass index is 26 15 kg/m²  Physical Exam   Constitutional: He is oriented to person, place, and time  He appears well-developed and well-nourished  Cardiovascular: Normal rate, regular rhythm and normal heart sounds  Pulmonary/Chest: Effort normal and breath sounds normal    Abdominal: Soft  There is no tenderness  Musculoskeletal: He exhibits no edema  Neurological: He is alert and oriented to person, place, and time  Nursing note and vitals reviewed  RESULTS:    No results found for this or any previous visit (from the past 1008 hour(s))  This note was created with voice recognition software  Phonic, grammatical and spelling errors may be present within the note as a result

## 2020-02-14 ENCOUNTER — OFFICE VISIT (OUTPATIENT)
Dept: DERMATOLOGY | Facility: CLINIC | Age: 75
End: 2020-02-14
Payer: MEDICARE

## 2020-02-14 DIAGNOSIS — Z13.89 SCREENING FOR SKIN CONDITION: ICD-10-CM

## 2020-02-14 DIAGNOSIS — L30.9 FACIAL DERMATITIS: ICD-10-CM

## 2020-02-14 DIAGNOSIS — D22.9 ATYPICAL MOLE: ICD-10-CM

## 2020-02-14 DIAGNOSIS — L82.1 SEBORRHEIC KERATOSIS: Primary | ICD-10-CM

## 2020-02-14 PROCEDURE — 3074F SYST BP LT 130 MM HG: CPT | Performed by: DERMATOLOGY

## 2020-02-14 PROCEDURE — 4040F PNEUMOC VAC/ADMIN/RCVD: CPT | Performed by: DERMATOLOGY

## 2020-02-14 PROCEDURE — 1160F RVW MEDS BY RX/DR IN RCRD: CPT | Performed by: DERMATOLOGY

## 2020-02-14 PROCEDURE — 1036F TOBACCO NON-USER: CPT | Performed by: DERMATOLOGY

## 2020-02-14 PROCEDURE — 3078F DIAST BP <80 MM HG: CPT | Performed by: DERMATOLOGY

## 2020-02-14 PROCEDURE — 99203 OFFICE O/P NEW LOW 30 MIN: CPT | Performed by: DERMATOLOGY

## 2020-02-14 RX ORDER — DESONIDE 0.5 MG/G
CREAM TOPICAL 2 TIMES DAILY
Qty: 15 G | Refills: 2 | Status: SHIPPED | OUTPATIENT
Start: 2020-02-14 | End: 2020-12-28 | Stop reason: ALTCHOICE

## 2020-02-14 NOTE — PATIENT INSTRUCTIONS
Seborrheic Keratosis  Patient reasurred these are normal growths we acquire with age no treatment needed    Eczematous process question seborrheic dermatitis look slightly atypical will go ahead treat with a cortisone salve to see if we get this under control there may be underlying acne as well if no improvement may have to re-evaluate  Screening for Dermatologic Disorders: Nothing else of concern noted on complete exam follow up in 1 year

## 2020-02-14 NOTE — PROGRESS NOTES
[facility] DERMATOLOGY  [address] [place] [address]  [place] [address]  [phone]  [phone]     MRN: [id number] : 1945  Encounter: [id number]  Patient Information: Paco Morris  Chief complaint:  Lesion on left cheek and rash on face    History of present illness:  44-year-old male without previous history of any skin problems presents for concerns regarding a growth that was noted on his face and was referred here for evaluation also concerned regarding itchy rash that started recently has been using moisturizer without much effect  No previous history of skin issues noted  Past Medical History:   Diagnosis Date    Anaplasmosis 2018    CAD (coronary artery disease)     Cancer ([place] [address] )     prostate    Coronary artery disease     GERD (gastroesophageal reflux disease)     H/O heart artery stent     x2    Hypercholesteremia     Hypertension     Keratotic lesion     Stroke ([place] [address] )     10YRS AGO    Transient cerebral ischemia      Past Surgical History:   Procedure Laterality Date    BACK SURGERY      CARDIAC SURGERY      mvp repair    CHOLECYSTECTOMY      ESOPHAGOGASTRODUODENOSCOPY N/A 2018    Procedure: ESOPHAGOGASTRODUODENOSCOPY (EGD); Surgeon: Raul Johnson MD;  Location: [facility];   Service: Gastroenterology    JOINT REPLACEMENT      L hip replacement    ORCHIECTOMY      PROSTATECTOMY      TOTAL HIP ARTHROPLASTY       Social History   Social History     Substance and Sexual Activity   Alcohol Use Yes    Alcohol/week: 2 0 standard drinks    Types: 2 Standard drinks or equivalent per week     Social History     Substance and Sexual Activity   Drug Use No     Social History     Tobacco Use   Smoking Status Former Smoker    Last attempt to quit: 2018    Years since quittin 7   Smokeless Tobacco Never Used     Family History   Problem Relation Age of Onset    Heart disease Mother     Stroke Mother         CVA    Cancer Father     Hodgkin's lymphoma Father     Lung cancer Father      Meds/Allergies   No Known Allergies    Meds:  Prior to Admission medications    Medication Sig Start Date End Date Taking?  Authorizing Provider   allopurinol (ZYLOPRIM) 100 mg tablet Take 1 tablet (100 mg total) by mouth daily  Patient taking differently: Take 100 mg by mouth every other day  7/23/18  Yes Nadiya Parker MD   atorvastatin (LIPITOR) 40 mg tablet Take 40 mg by mouth daily     Yes Historical Provider, MD   clopidogrel (PLAVIX) 75 mg tablet Take 75 mg by mouth every other day   Yes Historical Provider, MD   furosemide (LASIX) 20 mg tablet  12/28/17  Yes Historical Provider, MD   metoprolol succinate (TOPROL-XL) 25 mg 24 hr tablet Take 25 mg by mouth daily 4/23/19  Yes Historical Provider, MD   Multiple Vitamins-Minerals (PX MENS MULTIVITAMINS) TABS Take by mouth   Yes Historical Provider, MD   pantoprazole (PROTONIX) 40 mg tablet Take 1 tablet (40 mg total) by mouth 2 (two) times a day before meals 11/30/18  Yes Nadiya Parker MD   spironolactone (ALDACTONE) 25 mg tablet Take 25 mg by mouth daily   Yes Historical Provider, MD   valsartan (DIOVAN) 80 mg tablet Take 80 mg by mouth daily 10/31/19  Yes Historical Provider, MD   warfarin (COUMADIN) 2 5 mg tablet Take 2 tablets (5 mg total) by mouth daily 6/25/18  Yes Cintia Chatman MD   carvedilol (COREG) 6 25 mg tablet Take 6 25 mg by mouth      Historical Provider, MD   losartan (COZAAR) 25 mg tablet Take 1 tablet (25 mg total) by mouth daily  Patient not taking: Reported on 11/11/2019 6/25/18   Cintia Chatman MD   sacubitril-valsartan (ENTRESTO) 24-26 MG TABS Take 1 tablet by mouth daily 4/23/19   Historical Provider, MD       Subjective:     Review of Systems:    General: negative for - chills, fatigue, fever,  weight gain or weight loss  Psychological: negative for - anxiety, behavioral disorder, concentration difficulties, decreased libido, depression, irritability, memory difficulties, mood swings, sleep disturbances or suicidal ideation  ENT: negative for - hearing difficulties , nasal congestion, nasal discharge, oral lesions, sinus pain, sneezing, sore throat  Allergy and Immunology: negative for - hives, insect bite sensitivity,  Hematological and Lymphatic: negative for - bleeding problems, blood clots,bruising, swollen lymph nodes  Endocrine: negative for - hair pattern changes, hot flashes, malaise/lethargy, mood swings, palpitations, polydipsia/polyuria, skin changes, temperature intolerance or unexpected weight change  Respiratory: negative for - cough, hemoptysis, orthopnea, shortness of breath, or wheezing  Cardiovascular: negative for - chest pain, dyspnea on exertion, edema,  Gastrointestinal: negative for - abdominal pain, nausea/vomiting  Genito-Urinary: negative for - dysuria, incontinence, irregular/heavy menses or urinary frequency/urgency  Musculoskeletal: negative for - gait disturbance, joint pain, joint stiffness, joint swelling, muscle pain, muscular weakness  Dermatological:  As in HPI  Neurological: negative for confusion, dizziness, headaches, impaired coordination/balance, memory loss, numbness/tingling, seizures, speech problems, tremors or weakness       Objective: There were no vitals taken for this visit      Physical Exam:    General Appearance:    Alert, cooperative, no distress   Head:    Normocephalic, without obvious abnormality, atraumatic           Skin:   A full skin exam was performed including scalp, head scalp, eyes, ears, nose, lips, neck, chest, axilla, abdomen, back, buttocks, bilateral upper extremities, bilateral lower extremities, hands, feet, fingers, toes, fingernails, and toenails normal keratotic papules greasy stuck on appearance scaling erythematous patches noted widespread areas of his forehead also on his cheeks with numerous excoriated papules noted on the neck face and scalp as well nothing else of concern noted exam Assessment:     1  Seborrheic keratosis     2  Atypical mole  Ambulatory referral to Dermatology   3  Facial dermatitis     4  Screening for skin condition           Plan:   Seborrheic Keratosis  Patient reasurred these are normal growths we acquire with age no treatment needed  Eczematous process question seborrheic dermatitis look slightly atypical will go ahead treat with a cortisone salve to see if we get this under control there may be underlying acne as well if no improvement may have to re-evaluate  Screening for Dermatologic Disorders: Nothing else of concern noted on complete exam follow up in 1 year       Monroe Beltrán MD  2/14/2020,12:06 PM    Portions of the record may have been created with voice recognition software   Occasional wrong word or "sound a like" substitutions may have occurred due to the inherent limitations of voice recognition software   Read the chart carefully and recognize, using context, where substitutions have occurred

## 2020-03-30 ENCOUNTER — TELEPHONE (OUTPATIENT)
Dept: INTERNAL MEDICINE CLINIC | Facility: CLINIC | Age: 75
End: 2020-03-30

## 2020-04-15 ENCOUNTER — TELEMEDICINE (OUTPATIENT)
Dept: INTERNAL MEDICINE CLINIC | Facility: CLINIC | Age: 75
End: 2020-04-15
Payer: MEDICARE

## 2020-04-15 DIAGNOSIS — K52.9 GASTROENTERITIS: Primary | ICD-10-CM

## 2020-04-15 PROCEDURE — 99441 PR PHYS/QHP TELEPHONE EVALUATION 5-10 MIN: CPT | Performed by: INTERNAL MEDICINE

## 2020-05-18 ENCOUNTER — OFFICE VISIT (OUTPATIENT)
Dept: INTERNAL MEDICINE CLINIC | Facility: CLINIC | Age: 75
End: 2020-05-18
Payer: MEDICARE

## 2020-05-18 VITALS
TEMPERATURE: 97.9 F | WEIGHT: 151 LBS | DIASTOLIC BLOOD PRESSURE: 82 MMHG | HEIGHT: 66 IN | SYSTOLIC BLOOD PRESSURE: 136 MMHG | OXYGEN SATURATION: 97 % | BODY MASS INDEX: 24.27 KG/M2 | HEART RATE: 74 BPM

## 2020-05-18 DIAGNOSIS — I10 ESSENTIAL HYPERTENSION: ICD-10-CM

## 2020-05-18 DIAGNOSIS — I48.0 PAF (PAROXYSMAL ATRIAL FIBRILLATION) (HCC): ICD-10-CM

## 2020-05-18 DIAGNOSIS — Z98.890 S/P CAROTID ENDARTERECTOMY: ICD-10-CM

## 2020-05-18 DIAGNOSIS — Z00.00 MEDICARE ANNUAL WELLNESS VISIT, SUBSEQUENT: Primary | ICD-10-CM

## 2020-05-18 DIAGNOSIS — I25.10 ATHEROSCLEROSIS OF NATIVE CORONARY ARTERY OF NATIVE HEART WITHOUT ANGINA PECTORIS: ICD-10-CM

## 2020-05-18 PROCEDURE — 3079F DIAST BP 80-89 MM HG: CPT | Performed by: INTERNAL MEDICINE

## 2020-05-18 PROCEDURE — 1160F RVW MEDS BY RX/DR IN RCRD: CPT | Performed by: INTERNAL MEDICINE

## 2020-05-18 PROCEDURE — 3008F BODY MASS INDEX DOCD: CPT | Performed by: INTERNAL MEDICINE

## 2020-05-18 PROCEDURE — 1125F AMNT PAIN NOTED PAIN PRSNT: CPT | Performed by: INTERNAL MEDICINE

## 2020-05-18 PROCEDURE — 1036F TOBACCO NON-USER: CPT | Performed by: INTERNAL MEDICINE

## 2020-05-18 PROCEDURE — 3075F SYST BP GE 130 - 139MM HG: CPT | Performed by: INTERNAL MEDICINE

## 2020-05-18 PROCEDURE — 99214 OFFICE O/P EST MOD 30 MIN: CPT | Performed by: INTERNAL MEDICINE

## 2020-05-18 PROCEDURE — 1170F FXNL STATUS ASSESSED: CPT | Performed by: INTERNAL MEDICINE

## 2020-05-18 PROCEDURE — G0439 PPPS, SUBSEQ VISIT: HCPCS | Performed by: INTERNAL MEDICINE

## 2020-05-18 PROCEDURE — 4040F PNEUMOC VAC/ADMIN/RCVD: CPT | Performed by: INTERNAL MEDICINE

## 2020-09-21 ENCOUNTER — OFFICE VISIT (OUTPATIENT)
Dept: INTERNAL MEDICINE CLINIC | Facility: CLINIC | Age: 75
End: 2020-09-21
Payer: MEDICARE

## 2020-09-21 VITALS
TEMPERATURE: 97.8 F | SYSTOLIC BLOOD PRESSURE: 130 MMHG | WEIGHT: 140 LBS | BODY MASS INDEX: 22.5 KG/M2 | OXYGEN SATURATION: 99 % | HEIGHT: 66 IN | DIASTOLIC BLOOD PRESSURE: 74 MMHG | HEART RATE: 83 BPM

## 2020-09-21 DIAGNOSIS — K21.9 GASTROESOPHAGEAL REFLUX DISEASE WITHOUT ESOPHAGITIS: Primary | ICD-10-CM

## 2020-09-21 DIAGNOSIS — I25.10 ATHEROSCLEROSIS OF NATIVE CORONARY ARTERY OF NATIVE HEART WITHOUT ANGINA PECTORIS: ICD-10-CM

## 2020-09-21 DIAGNOSIS — G89.29 CHRONIC MIDLINE LOW BACK PAIN WITHOUT SCIATICA: ICD-10-CM

## 2020-09-21 DIAGNOSIS — M54.50 CHRONIC MIDLINE LOW BACK PAIN WITHOUT SCIATICA: ICD-10-CM

## 2020-09-21 DIAGNOSIS — I10 ESSENTIAL HYPERTENSION: ICD-10-CM

## 2020-09-21 PROCEDURE — 99214 OFFICE O/P EST MOD 30 MIN: CPT | Performed by: INTERNAL MEDICINE

## 2020-09-21 RX ORDER — TRIAMCINOLONE ACETONIDE 1 MG/G
CREAM TOPICAL 3 TIMES DAILY
COMMUNITY
Start: 2020-08-26 | End: 2021-04-23

## 2020-09-21 RX ORDER — PANTOPRAZOLE SODIUM 40 MG/1
40 TABLET, DELAYED RELEASE ORAL DAILY
Qty: 90 TABLET | Refills: 3 | Status: SHIPPED | OUTPATIENT
Start: 2020-09-21 | End: 2021-01-13 | Stop reason: SDUPTHER

## 2020-09-21 NOTE — PROGRESS NOTES
Assessment/Plan:     Continue follow-up with Cardiology  For his GI symptoms he is going to continue the Protonix  I suggested we refer him to GI but he wants to wait another 3 months on the medicine  Suggested adding probiotics  For his ongoing back issues, he is now willing to consider injections  Will refer him to pain management to be evaluated  Quality Measures:       Return in about 3 months (around 12/21/2020)  No problem-specific Assessment & Plan notes found for this encounter  Diagnoses and all orders for this visit:    Gastroesophageal reflux disease without esophagitis  -     pantoprazole (PROTONIX) 40 mg tablet; Take 1 tablet (40 mg total) by mouth daily    Atherosclerosis of native coronary artery of native heart without angina pectoris    Essential hypertension    Chronic midline low back pain without sciatica  -     Ambulatory referral to Pain Management; Future    Other orders  -     triamcinolone (KENALOG) 0 1 % cream; Apply topically Three times a day          Subjective:      Patient ID: Rene Gupta is a 77 y o  male  Patient comes in today for routine follow-up  He states he has been following of Cardiology but only virtual visit still  He has had no problems but has not seen them in person in 6 months  He was having trouble with his stomach still and diarrhea  He went back on the Protonix and it does seem to be better  But he still has some stomach symptoms after eating  He notes no new medicines  No change in his diet  Continues to have his back pain        ALLERGIES:  No Known Allergies    CURRENT MEDICATIONS:    Current Outpatient Medications:     allopurinol (ZYLOPRIM) 100 mg tablet, Take 1 tablet (100 mg total) by mouth daily (Patient taking differently: Take 100 mg by mouth every other day ), Disp: 90 tablet, Rfl: 3    atorvastatin (LIPITOR) 40 mg tablet, Take 40 mg by mouth daily  , Disp: , Rfl:     carvedilol (COREG) 6 25 mg tablet, Take 6 25 mg by mouth  , Disp: , Rfl:     clopidogrel (PLAVIX) 75 mg tablet, Take 75 mg by mouth every other day, Disp: , Rfl:     desonide (DESOWEN) 0 05 % cream, Apply topically 2 (two) times a day To facial rash till resolved, Disp: 15 g, Rfl: 2    furosemide (LASIX) 20 mg tablet, , Disp: , Rfl: 0    metoprolol succinate (TOPROL-XL) 25 mg 24 hr tablet, Take 25 mg by mouth daily, Disp: , Rfl: 0    Multiple Vitamins-Minerals (PX MENS MULTIVITAMINS) TABS, Take by mouth, Disp: , Rfl:     pantoprazole (PROTONIX) 40 mg tablet, Take 1 tablet (40 mg total) by mouth daily, Disp: 90 tablet, Rfl: 3    sacubitril-valsartan (ENTRESTO) 24-26 MG TABS, Take 1 tablet by mouth daily, Disp: , Rfl:     spironolactone (ALDACTONE) 25 mg tablet, Take 25 mg by mouth daily, Disp: , Rfl:     triamcinolone (KENALOG) 0 1 % cream, Apply topically Three times a day, Disp: , Rfl:     valsartan (DIOVAN) 80 mg tablet, Take 80 mg by mouth daily, Disp: , Rfl:     warfarin (COUMADIN) 2 5 mg tablet, Take 2 tablets (5 mg total) by mouth daily, Disp: , Rfl: 0    ACTIVE PROBLEM LIST:  Patient Active Problem List   Diagnosis    CAD (coronary atherosclerotic disease)    CHF (congestive heart failure) (HCC)    Chronic back pain    GERD (gastroesophageal reflux disease)    Hypercholesterolemia    Hypertension    PAF (paroxysmal atrial fibrillation) (HCC)    Peptic ulcer disease    S/P MVR (mitral valve repair)    Polyp of colon    Abnormal RBC indices    Fatigue    Chronic anticoagulation    Pain in gums    Mild protein-calorie malnutrition (HCC)    Idiopathic chronic gout of multiple sites without tophus    Impaired fasting glucose       PAST MEDICAL HISTORY:  Past Medical History:   Diagnosis Date    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    CAD (coronary artery disease)     [facility]     prostate    Coronary artery disease     GERD (gastroesophageal reflux disease)     H/O heart artery stent     x2    Hypercholesteremia     Hypertension     Keratotic lesion     Stroke [facility]     10YRS A[facility]    Transient cerebral ischemia        PAST SURGICAL HISTORY:  Past Surgical History:   Procedure Laterality Date    BACK SURGERY      CARDIAC SURGERY      mvp repair    CAROTID ENDARTERECTOMY Right 2020    CHOLECYSTECTOMY      ESOPHAGOGASTRODUODENOSCOPY N/A 2018    Procedure: ESOPHAGOGASTRODUODENOSCOPY (EGD); Surgeon: Preeti Bradshaw MD;  [facility]; Service: Gastroenterology    JOINT REPLACEMENT      L hip replacement    ORCHIECTOMY      PROSTATECTOMY      TOTAL HIP ARTHROPLASTY         FAMILY HISTORY:  Family History   Problem Relation Age of Onset    Heart disease Mother     Stroke Mother         CVA    Cancer Father     Hodgkin's lymphoma Father     Lung cancer Father        SOCIAL HISTORY:  Social History     Socioeconomic History    Marital status:      Spouse name: Not on file    Number of children: Not on file    Years of education: Not on file    Highest education level: Not on file   Occupational History    Occupation: retired   Social Needs    Financial resource strain: Not on file    Food insecurity     Worry: Not on file     Inability: Not on file   [employer] needs     Medical: Not on file     Non-medical: Not on file   Tobacco Use    Smoking status: Former Smoker     Last attempt to quit: 2018     Years since quittin 3    Smokeless tobacco: Never Used   Substance and Sexual Activity    Alcohol use:  Yes     Alcohol/week: 2 0 standard drinks     Types: 2 Standard drinks or equivalent per week    Drug use: No    Sexual activity: Never     Comment: no high risk sexual behavior   Lifestyle    Physical activity     Days per week: Not on file     Minutes per session: Not on file    Stress: Not on file   Relationships    Social connections     Talks on phone: Not on file     Gets together: Not on file     Attends Rastafarian service: Not on file     Active member of club or organization: Not on file     Attends meetings of clubs or organizations: Not on file     Relationship status: Not on file    Intimate partner violence     Fear of current or ex partner: Not on file     Emotionally abused: Not on file     Physically abused: Not on file     Forced sexual activity: Not on file   Other Topics Concern    Not on file   Social History Narrative    Not on file       Review of Systems   Respiratory: Negative for shortness of breath  Cardiovascular: Negative for chest pain  Gastrointestinal: Negative for abdominal pain  Objective:  Vitals:    09/21/20 1111   BP: 130/74   BP Location: Left arm   Patient Position: Sitting   Cuff Size: Adult   Pulse: 83   Temp: 97 8 °F (36 6 °C)   TempSrc: Temporal   SpO2: 99%   Weight: 63 5 kg (140 lb)   Height: 5' 6" (1 676 m)     Body mass index is 22 6 kg/m²  Physical Exam  Vitals signs and nursing note reviewed  Constitutional:       Appearance: He is well-developed  Cardiovascular:      Rate and Rhythm: Normal rate and regular rhythm  Heart sounds: Normal heart sounds  Pulmonary:      Effort: Pulmonary effort is normal       Breath sounds: Normal breath sounds  Abdominal:      Palpations: Abdomen is soft  Tenderness: There is no abdominal tenderness  Neurological:      Mental Status: He is alert and oriented to person, place, and time  RESULTS:    No results found for this or any previous visit (from the past 1008 hour(s))  This note was created with voice recognition software  Phonic, grammatical and spelling errors may be present within the note as a result

## 2020-12-28 ENCOUNTER — OFFICE VISIT (OUTPATIENT)
Dept: INTERNAL MEDICINE CLINIC | Facility: CLINIC | Age: 75
End: 2020-12-28
Payer: MEDICARE

## 2020-12-28 VITALS
SYSTOLIC BLOOD PRESSURE: 120 MMHG | HEIGHT: 66 IN | OXYGEN SATURATION: 97 % | TEMPERATURE: 98.3 F | RESPIRATION RATE: 16 BRPM | WEIGHT: 140 LBS | BODY MASS INDEX: 22.5 KG/M2 | DIASTOLIC BLOOD PRESSURE: 58 MMHG | HEART RATE: 70 BPM

## 2020-12-28 DIAGNOSIS — R53.83 FATIGUE, UNSPECIFIED TYPE: ICD-10-CM

## 2020-12-28 DIAGNOSIS — D53.9 MACROCYTIC ANEMIA: ICD-10-CM

## 2020-12-28 DIAGNOSIS — D64.9 ANEMIA, UNSPECIFIED TYPE: ICD-10-CM

## 2020-12-28 DIAGNOSIS — R06.02 SHORTNESS OF BREATH: ICD-10-CM

## 2020-12-28 DIAGNOSIS — K21.9 GASTROESOPHAGEAL REFLUX DISEASE WITHOUT ESOPHAGITIS: ICD-10-CM

## 2020-12-28 DIAGNOSIS — K59.09 CHRONIC CONSTIPATION: Primary | ICD-10-CM

## 2020-12-28 PROCEDURE — 99214 OFFICE O/P EST MOD 30 MIN: CPT | Performed by: INTERNAL MEDICINE

## 2021-01-04 ENCOUNTER — TELEPHONE (OUTPATIENT)
Dept: GASTROENTEROLOGY | Facility: CLINIC | Age: 76
End: 2021-01-04

## 2021-01-04 ENCOUNTER — TELEPHONE (OUTPATIENT)
Dept: INTERNAL MEDICINE CLINIC | Facility: CLINIC | Age: 76
End: 2021-01-04

## 2021-01-04 DIAGNOSIS — R19.4 CHANGE IN BOWEL HABITS: ICD-10-CM

## 2021-01-04 DIAGNOSIS — K59.09 CHRONIC CONSTIPATION: Primary | ICD-10-CM

## 2021-01-04 DIAGNOSIS — D50.9 IRON DEFICIENCY ANEMIA, UNSPECIFIED IRON DEFICIENCY ANEMIA TYPE: Primary | ICD-10-CM

## 2021-01-04 NOTE — TELEPHONE ENCOUNTER
Jasen pt-  Patient is experiencing severe diarrhea and weakness    He has scheduled an office apt on 01/07 with Migdalia Sheridan     He has taken imodium with little relief     [facility] [phone]  Elisha Lucio Please phone [phone] to advise  Elisha Lucio

## 2021-01-04 NOTE — TELEPHONE ENCOUNTER
GUSTAVOI: Spoke with patient  Last OV is 2018  Patient states he does not want to leave his home and get anything from the store    Patient c/o 8-9 diarrhea mucous BM daily for 3 days  Denies nausea, vomiting, fever, chills, abdominal pain, blood in stool  He does not want to have any stool studies done at this time, and does not want to get any medications from the store  Advised patient to follow BRAT diet, and encourage plenty of fluids  He will follow-up in OV 1/7/2021

## 2021-01-04 NOTE — TELEPHONE ENCOUNTER
Patient has mucus in his stool and also has diarrhea for about 3 days  He would like about what his next step would be  He mentioned a upper GI? He is also very weak  Please advise        Call back #[phone] OR [facility]

## 2021-01-05 ENCOUNTER — TELEPHONE (OUTPATIENT)
Dept: GASTROENTEROLOGY | Facility: CLINIC | Age: 76
End: 2021-01-05

## 2021-01-05 ENCOUNTER — TELEPHONE (OUTPATIENT)
Dept: HEMATOLOGY ONCOLOGY | Facility: CLINIC | Age: 76
End: 2021-01-05

## 2021-01-05 ENCOUNTER — TELEPHONE (OUTPATIENT)
Dept: INTERNAL MEDICINE CLINIC | Facility: CLINIC | Age: 76
End: 2021-01-05

## 2021-01-05 NOTE — TELEPHONE ENCOUNTER
New Patient Encounter    New Patient Intake Form   Patient Details:  Mandie Stein  [birth year]  [id number]    Background Information:  [address] starts by opening a telephone encounter and gathering the following information   Who is calling to schedule? If not self, relationship to patient? self   Referring Provider Dr Otto Burns   What is the diagnosis? Iron deficiency anemia   Is this diagnosis confirmed? Yes   When was the diagnosis? 12/2020   Is there a confirmed diagnosis from a biopsy/tissue reviewed by pathology? Were outside slides requested? NA   Is patient aware of diagnosis? Yes   Is there a personal history and what kind? Is there a family history and what kind? Reason for visit? New Diagnosis   Have you had any imaging or labs done? If so: when, where? yes  [facility]   Are records in [employer]? yes   If patient has a prior history of breast cancer were old records obtained? NA   Was the patient told to bring a disk? no   Does the patient smoke or Vape? yes   If yes, how many packs or cartridges per day? Occasional smoker   Scheduling Information:   Preferred [place]:  [facility]     Are there any dates/time the patient cannot be seen? Miscellaneous:    After completing the above information, please route to Financial Counselor and the appropriate Nurse Navigator for review

## 2021-01-07 ENCOUNTER — OFFICE VISIT (OUTPATIENT)
Dept: GASTROENTEROLOGY | Facility: CLINIC | Age: 76
End: 2021-01-07
Payer: MEDICARE

## 2021-01-07 ENCOUNTER — TELEPHONE (OUTPATIENT)
Dept: GASTROENTEROLOGY | Facility: CLINIC | Age: 76
End: 2021-01-07

## 2021-01-07 ENCOUNTER — PREP FOR PROCEDURE (OUTPATIENT)
Dept: GASTROENTEROLOGY | Facility: CLINIC | Age: 76
End: 2021-01-07

## 2021-01-07 VITALS
DIASTOLIC BLOOD PRESSURE: 40 MMHG | HEART RATE: 63 BPM | SYSTOLIC BLOOD PRESSURE: 100 MMHG | BODY MASS INDEX: 23.33 KG/M2 | WEIGHT: 145.2 LBS | HEIGHT: 66 IN

## 2021-01-07 DIAGNOSIS — R19.7 DIARRHEA OF PRESUMED INFECTIOUS ORIGIN: Primary | ICD-10-CM

## 2021-01-07 DIAGNOSIS — R19.4 CHANGE IN BOWEL HABITS: ICD-10-CM

## 2021-01-07 DIAGNOSIS — K90.89 OTHER INTESTINAL MALABSORPTION: ICD-10-CM

## 2021-01-07 DIAGNOSIS — R71.8 OTHER ABNORMALITY OF RED BLOOD CELLS: ICD-10-CM

## 2021-01-07 DIAGNOSIS — R63.4 WEIGHT LOSS, ABNORMAL: Primary | ICD-10-CM

## 2021-01-07 DIAGNOSIS — R19.7 DIARRHEA OF PRESUMED INFECTIOUS ORIGIN: ICD-10-CM

## 2021-01-07 PROCEDURE — 99214 OFFICE O/P EST MOD 30 MIN: CPT | Performed by: PHYSICIAN ASSISTANT

## 2021-01-07 RX ORDER — MONTELUKAST SODIUM 4 MG/1
1 TABLET, CHEWABLE ORAL 2 TIMES DAILY PRN
Qty: 30 TABLET | Refills: 1 | Status: SHIPPED | OUTPATIENT
Start: 2021-01-07 | End: 2021-04-23

## 2021-01-07 NOTE — TELEPHONE ENCOUNTER
Tanya Paula called has some questions for [facility]   Please call Yanet Subramanian at [phone] ty

## 2021-01-07 NOTE — PROGRESS NOTES
[facility] - Outpatient Consultation  Lauren Alden Morris 77 y o  male MRN: [id number]  Encounter: [id number]          ASSESSMENT AND PLAN:      1  Change in bowel habits  2  Anemia  3  Diarrhea   4  Weight loss, abnormal  He reports almost 1 year of altering diarrhea and constipation - constipation typically comes on after taking Imodium  He is losing weight and has a new anemia  Will plan EGD and Colonoscopy  Check labs and stool cultures    Start benefiber and probiotic  Use Colestid rather than Imodium prn  ______________________________________________________________________    HPI:  50-year-old male presents for evaluation of diarrhea, weight loss and new anemia  Reports that the symptoms began last March when he suffered a flu-like illness  After this his bowel movements fluctuate between diarrhea and constipation  He reports that the symptoms do tend to come and go over time  His constipation occurs after he takes Imodium for the diarrhea  The constipation we so severe that he then has to take stool softeners and suppository to help stools are going again  Anemia was noted for the 1st time in September of this year  It progressed and he has now been referred to a hematologist   He denies seeing any bloody stools or black stools  He denies any hematemesis  He has no upper symptoms such as nausea or vomiting  He is eating well and despite this has lost approximately 20 lb since symptom onset  His last colonoscopy was in 2015 at which point he was diagnosed with ischemic colitis  He was having rectal bleeding at that time  Prior to symptom onset he denies antibiotics, travel, sick contacts, new medications  He has never had persisting symptoms such as these before  REVIEW OF SYSTEMS:    CONSTITUTIONAL: Denies any fever, chills, rigors, and weight loss  HEENT: No earache or tinnitus  Denies hearing loss or visual disturbances    CARDIOVASCULAR: No chest pain or palpitations  RESPIRATORY: Denies any cough, hemoptysis, shortness of breath or dyspnea on exertion  GASTROINTESTINAL: As noted in the History of Present Illness  GENITOURINARY: No problems with urination  Denies any hematuria or dysuria  NEUROLOGIC: No dizziness or vertigo, denies headaches  MUSCULOSKELETAL: Denies any muscle or joint pain  SKIN: Denies skin rashes or itching  ENDOCRINE: Denies excessive thirst  Denies intolerance to heat or cold  PSYCHOSOCIAL: Denies depression or anxiety  Denies any recent memory loss  Historical Information   Past Medical History:   Diagnosis Date    Anaplasmosis 2018    CAD (coronary artery disease)     Cancer (HCC)     prostate    Coronary artery disease     GERD (gastroesophageal reflux disease)     H/O heart artery stent     x2    Hypercholesteremia     Hypertension     Keratotic lesion     Stroke ([address] )     10YRS AGO    Transient cerebral ischemia      Past Surgical History:   Procedure Laterality Date    BACK SURGERY      CARDIAC SURGERY      mvp repair    CAROTID ENDARTERECTOMY Right 2020    CHOLECYSTECTOMY      ESOPHAGOGASTRODUODENOSCOPY N/A 2018    Procedure: ESOPHAGOGASTRODUODENOSCOPY (EGD); Surgeon: Romel Vergara MD;  Location: [facility];   Service: Gastroenterology    JOINT REPLACEMENT      L hip replacement    ORCHIECTOMY      PROSTATECTOMY      TOTAL HIP ARTHROPLASTY       Social History   Social History     Substance and Sexual Activity   Alcohol Use Yes    Alcohol/week: 2 0 standard drinks    Types: 2 Standard drinks or equivalent per week     Social History     Substance and Sexual Activity   Drug Use No     Social History     Tobacco Use   Smoking Status Former Smoker    Quit date: 2018    Years since quittin 6   Smokeless Tobacco Never Used     Family History   Problem Relation Age of Onset    Heart disease Mother     Stroke Mother         CVA    Cancer Father     Hodgkin's lymphoma Father     Lung cancer Father        Meds/Allergies       Current Outpatient Medications:     allopurinol (ZYLOPRIM) 100 mg tablet    atorvastatin (LIPITOR) 40 mg tablet    carvedilol (COREG) 6 25 mg tablet    clopidogrel (PLAVIX) 75 mg tablet    furosemide (LASIX) 20 mg tablet    metoprolol succinate (TOPROL-XL) 25 mg 24 hr tablet    Multiple Vitamins-Minerals (PX MENS MULTIVITAMINS) TABS    pantoprazole (PROTONIX) 40 mg tablet    sacubitril-valsartan (ENTRESTO) 24-26 MG TABS    spironolactone (ALDACTONE) 25 mg tablet    triamcinolone (KENALOG) 0 1 % cream    valsartan (DIOVAN) 80 mg tablet    warfarin (COUMADIN) 2 5 mg tablet    colestipol (COLESTID) 1 g tablet    No Known Allergies        Objective     Blood pressure (!) 100/40, pulse 63, height 5' 6" (1 676 m), weight 65 9 kg (145 lb 3 2 oz)  Body mass index is 23 44 kg/m²  PHYSICAL EXAM:      General Appearance:   Alert, cooperative, no distress   HEENT:   Normocephalic, atraumatic, anicteric      Neck:  Supple, symmetrical, trachea midline   Lungs:   Clear to auscultation bilaterally; no rales, rhonchi or wheezing; respirations unlabored    Heart[de-identified]   Regular rate and rhythm; no murmur, rub, or gallop  Abdomen:   Soft, non-tender, non-distended; normal bowel sounds; no masses, no organomegaly    Genitalia:   Deferred    Rectal:   Deferred    Extremities:  No cyanosis, clubbing or edema    Pulses:  2+ and symmetric    Skin:  No jaundice, rashes, or lesions    Lymph nodes:  No palpable cervical lymphadenopathy        Lab Results:   No visits with results within 1 Day(s) from this visit     Latest known visit with results is:   Appointment on 04/18/2019   Component Date Value    Sodium 04/18/2019 140     Potassium 04/18/2019 4 9     Chloride 04/18/2019 105     CO2 04/18/2019 27     ANION GAP 04/18/2019 8     BUN 04/18/2019 26*    Creatinine 04/18/2019 1 38*    Glucose, Fasting 04/18/2019 105*    Calcium 04/18/2019 9 2     eGFR 04/18/2019 51          Radiology Results:   No results found

## 2021-01-10 NOTE — PROGRESS NOTES
Hematology Outpatient Consult  Vannessa Morris 77 y o  male MRN: @ Encounter: [id number]    Date:  1/11/2021      Assessment and Plan:    1  Anemia, unspecified type; 2  Chronic anticoagulation; 3  History of macrocytic anemia 4  Decreased glomerular filtration rate (GFR) 5  Blood creatinine increased compared with prior measurement  44-year-old male presents for consultation of anemia  Patient has had intermittent macrocytic and normocytic anemia since 2018  More recent trends are listed under HPI  White blood count with diff and platelet count are acceptable  Will follow-up on results of colonoscopy, EGD to R/O GI bleed, possible malignancy  causing anemia  GI ordered lab work for other possible etiologies such as malabsorption  Prior to EGD, colonoscopy patient will hold coumadin  Patient states GI is in the process of calling cardiology office to see their recommendation  Our recommendation is hold 5 days prior  B12, TSH, folate were all normal  Cr, Bilirubin, BUN all slightly elevated  Iron panel showed low iron  Cause of anemia is likely multifactorial  Will evaluate etiology of anemia including iron deficiency anemia, anemia of chronic disease, hemolytic anemia  Labs ordered are as below:     - CBC and differential; Future  - Iron Panel (Includes Ferritin, Iron Sat%, Iron, and TIBC); Future  - Protein electrophoresis, serum; Future  - Comprehensive metabolic panel; Future  - Reticulocytes; Future  - Haptoglobin; Future  - Erythropoietin; Future  - Ferritin; Future       Patient will return to the office in 2 months  Patient voiced understanding and agreement to the above  The patient knows to call the office with any questions or concerns regarding the above  I have spent 50 minutes with Patient  today in which greater than 50% of this time was spent in counseling/coordination of care regarding Diagnostic results, Intructions for management and Patient and family education      HPI:    44-year-old male presents for a consult regarding anemia  Patient's PMH is significant for coronary artery disease, congestive heart failure, hypertension, Afib, GERD, peptic ulcer disease, anaplasmosis, TIA, prostate cancer  Patient has had alternating increased MCV and normal MCV intermittently since 2017 (as far as records go in epic), and intermittent borderline associated anemia since 2018  Patient's trends:  - 6/23/2018: Hgb 11 3,   - 12/10/2018: Hgb 11 8, MCV 92  - 2/25/2019: Hgb 12 7, MCV 85  - 2/26/2019: Hgb 10 5, MCV 86 2  - 9/25/2020: Hgb 12 5,   - 12/10/2020: Hgb 10 4, , RDW 17 0; TSH norm  -12/31/2020: Hgb 10 1, , RDW 17 9;       -  bilirubin 1 7, Cr 1 58, GFR 42, BUN 31         -  Iron panel: Iron 27, Iron sat 8%  - folate normal       - vitamin B12 normal    Patient states he had a transfusion once after a surgical procedure many years ago  He could not recall which one it was  Patient has a history of colon cancer from approximately 12 years ago  Patient states he had a prostatectomy and follows a urologist yearly  Patient most recent PSA 4/27/2020 <0 01  Patient states he has lost 30lbs since March  The onset of this were flu like symptoms he had  Patient was not tested for covid at the time  Patient's flu like symptoms resolved, but left him with change in bowel movements  He states less stools per week  Patient states he alternates with diarrhea and constipation since March  Patient has also decreased appetite  Patient consumes a diet with red meal, vegetables, fruits; however, he only consumes 1-2 meals per day  Patient denies black or bloody stools  Patient was recently seen by GI on 1/7/2021  Patient is expected to have EGD, colonoscopy 2/2/2021  Patient's last colonoscopy was in 2015 which he notes had benign polyps (cannot see results in epic)  Patient denies black or bloody stools, denies PICA  Patient on coumadin for Afib   This is managed by cardiologist      Patient does not drink alcohol  Patient smokes 3 cigarettes a day  Patient has 50 year history of smoking  ROS: Review of Systems   Constitutional: Positive for fatigue (Decreased ADLs) and unexpected weight change (30lbs since march)  Negative for activity change, chills, diaphoresis and fever  HENT: Negative for trouble swallowing  Respiratory: Positive for apnea (minimal)  Negative for cough, chest tightness and shortness of breath  Cardiovascular: Negative for chest pain and leg swelling  Gastrointestinal: Positive for constipation and diarrhea  Negative for abdominal distention, abdominal pain, anal bleeding, blood in stool, nausea and vomiting  Genitourinary: Negative for dysuria, flank pain and hematuria  Musculoskeletal: Negative for myalgias  Skin: Negative for pallor  Neurological: Negative for dizziness, weakness, light-headedness and headaches  Hematological: Negative for adenopathy  Past Medical History:   Diagnosis Date   [place] 6/25/2018    CAD (coronary artery disease)     [facility]     prostate    Coronary artery disease     GERD (gastroesophageal reflux disease)     H/O heart artery stent     x2    Hypercholesteremia     Hypertension     Keratotic lesion     Stroke ([facility] )     10YRS AGO    Transient cerebral ischemia        Past Surgical History:   Procedure Laterality Date    BACK SURGERY      CARDIAC SURGERY      mvp repair    CAROTID ENDARTERECTOMY Right 03/2020    CHOLECYSTECTOMY      ESOPHAGOGASTRODUODENOSCOPY N/A 6/22/2018    Procedure: ESOPHAGOGASTRODUODENOSCOPY (EGD); Surgeon: Danyelle Ny MD;  Location: [facility];   Service: Gastroenterology    JOINT REPLACEMENT      L hip replacement    ORCHIECTOMY      PROSTATECTOMY      TOTAL HIP ARTHROPLASTY         Social History     Socioeconomic History    Marital status:      Spouse name: Not on file    Number of children: Not on file    Years of education: Not on file    Highest education level: Not on file   Occupational History    Occupation: retired   Social Needs    Financial resource strain: Not on file    Food insecurity     Worry: Not on file     Inability: Not on file   [employer] needs     Medical: Not on file     Non-medical: Not on file   Tobacco Use    Smoking status: Former Smoker     Quit date: 2018     Years since quittin 6    Smokeless tobacco: Never Used   Substance and Sexual Activity    Alcohol use:  Yes     Alcohol/week: 2 0 standard drinks     Types: 2 Standard drinks or equivalent per week    Drug use: No    Sexual activity: Never     Comment: no high risk sexual behavior   Lifestyle    Physical activity     Days per week: Not on file     Minutes per session: Not on file    Stress: Not on file   Relationships    Social connections     Talks on phone: Not on file     Gets together: Not on file     Attends Rastafari service: Not on file     Active member of club or organization: Not on file     Attends meetings of clubs or organizations: Not on file     Relationship status: Not on file    Intimate partner violence     Fear of current or ex partner: Not on file     Emotionally abused: Not on file     Physically abused: Not on file     Forced sexual activity: Not on file   Other Topics Concern    Not on file   Social History Narrative    Not on file       Family History   Problem Relation Age of Onset    Heart disease Mother     Stroke Mother         CVA    Cancer Father     Hodgkin's lymphoma Father     Lung cancer Father        No Known Allergies      Current Outpatient Medications:     allopurinol (ZYLOPRIM) 100 mg tablet, Take 1 tablet (100 mg total) by mouth daily (Patient taking differently: Take 100 mg by mouth every other day ), Disp: 90 tablet, Rfl: 3    atorvastatin (LIPITOR) 40 mg tablet, Take 40 mg by mouth daily  , Disp: , Rfl:     carvedilol (COREG) 6 25 mg tablet, Take 6 25 mg by mouth  , Disp: , Rfl:     clopidogrel (PLAVIX) 75 mg tablet, Take 75 mg by mouth every other day, Disp: , Rfl:     colestipol (COLESTID) 1 g tablet, Take 1 tablet (1 g total) by mouth 2 (two) times a day as needed (diarrhea), Disp: 30 tablet, Rfl: 1    furosemide (LASIX) 20 mg tablet, , Disp: , Rfl: 0    metoprolol succinate (TOPROL-XL) 25 mg 24 hr tablet, Take 25 mg by mouth daily, Disp: , Rfl: 0    Multiple Vitamins-Minerals (PX MENS MULTIVITAMINS) TABS, Take by mouth, Disp: , Rfl:     pantoprazole (PROTONIX) 40 mg tablet, Take 1 tablet (40 mg total) by mouth daily, Disp: 90 tablet, Rfl: 3    sacubitril-valsartan (ENTRESTO) 24-26 MG TABS, Take 1 tablet by mouth daily, Disp: , Rfl:     spironolactone (ALDACTONE) 25 mg tablet, Take 25 mg by mouth daily, Disp: , Rfl:     triamcinolone (KENALOG) 0 1 % cream, Apply topically Three times a day, Disp: , Rfl:     valsartan (DIOVAN) 80 mg tablet, Take 80 mg by mouth daily, Disp: , Rfl:     warfarin (COUMADIN) 2 5 mg tablet, Take 2 tablets (5 mg total) by mouth daily, Disp: , Rfl: 0    (Not in a hospital admission)        Physical Exam:  There were no vitals taken for this visit  Physical Exam  Constitutional:       General: He is not in acute distress  Appearance: Normal appearance  He is normal weight  He is not ill-appearing  HENT:      Head: Normocephalic and atraumatic  Eyes:      General: No scleral icterus  Extraocular Movements: Extraocular movements intact  Conjunctiva/sclera: Conjunctivae normal    Neck:      Musculoskeletal: Normal range of motion and neck supple  Cardiovascular:      Rate and Rhythm: Normal rate and regular rhythm  Heart sounds: Murmur (diffuse murmurs across precordium ) present  Pulmonary:      Effort: Pulmonary effort is normal       Breath sounds: Normal breath sounds  Abdominal:      General: Bowel sounds are normal    Lymphadenopathy:      Cervical: No cervical adenopathy     Skin:     General: Skin is warm and dry  Neurological:      Mental Status: He is alert  Psychiatric:         Mood and Affect: Mood normal          Behavior: Behavior normal          Thought Content: Thought content normal        Labs:  Lab Results   Component Value Date    WBC 10 43 (H) 12/10/2018    HGB 11 8 (L) 12/10/2018    HCT 40 2 12/10/2018    MCV 92 12/10/2018     12/10/2018     Lab Results   Component Value Date    K 4 9 04/18/2019     04/18/2019    CO2 27 04/18/2019    BUN 26 (H) 04/18/2019    CREATININE 1 38 (H) 04/18/2019    GLUF 105 (H) 04/18/2019    CALCIUM 9 2 04/18/2019    AST 32 12/10/2018    ALT 24 12/10/2018    ALKPHOS 90 12/10/2018    EGFR 51 04/18/2019       Patient voiced understanding and agreement in the above discussion  Aware to contact our office with questions/symptoms in the interim

## 2021-01-11 ENCOUNTER — CONSULT (OUTPATIENT)
Dept: HEMATOLOGY ONCOLOGY | Facility: CLINIC | Age: 76
End: 2021-01-11
Payer: MEDICARE

## 2021-01-11 ENCOUNTER — TELEPHONE (OUTPATIENT)
Dept: GASTROENTEROLOGY | Facility: CLINIC | Age: 76
End: 2021-01-11

## 2021-01-11 VITALS
HEIGHT: 66 IN | RESPIRATION RATE: 16 BRPM | BODY MASS INDEX: 23.63 KG/M2 | OXYGEN SATURATION: 94 % | SYSTOLIC BLOOD PRESSURE: 106 MMHG | HEART RATE: 68 BPM | DIASTOLIC BLOOD PRESSURE: 54 MMHG | TEMPERATURE: 95.8 F | WEIGHT: 147 LBS

## 2021-01-11 DIAGNOSIS — D64.9 ANEMIA, UNSPECIFIED TYPE: Primary | ICD-10-CM

## 2021-01-11 DIAGNOSIS — D50.9 IRON DEFICIENCY ANEMIA, UNSPECIFIED IRON DEFICIENCY ANEMIA TYPE: ICD-10-CM

## 2021-01-11 DIAGNOSIS — R79.89 BLOOD CREATININE INCREASED COMPARED WITH PRIOR MEASUREMENT: ICD-10-CM

## 2021-01-11 DIAGNOSIS — Z86.2 HISTORY OF MACROCYTIC ANEMIA: ICD-10-CM

## 2021-01-11 DIAGNOSIS — R94.4 DECREASED GLOMERULAR FILTRATION RATE (GFR): ICD-10-CM

## 2021-01-11 DIAGNOSIS — Z79.01 CHRONIC ANTICOAGULATION: ICD-10-CM

## 2021-01-11 PROCEDURE — 99215 OFFICE O/P EST HI 40 MIN: CPT | Performed by: INTERNAL MEDICINE

## 2021-01-11 NOTE — TELEPHONE ENCOUNTER
Tana Alicea - patient called for us to send this labs to an outside lab  Patient says that Tana Alicea knows  I asked patient for name of lab and he did not know   Please call Daryl Clark when Tana Alicea comes in tomorrow 01/12/20  At [phone] ty

## 2021-01-11 NOTE — TELEPHONE ENCOUNTER
Spoke with pt and we agreed we will mail the labs to pt's home  He will let us know where he got labs done so we can call for results

## 2021-01-12 ENCOUNTER — TELEPHONE (OUTPATIENT)
Dept: GASTROENTEROLOGY | Facility: CLINIC | Age: 76
End: 2021-01-12

## 2021-01-13 ENCOUNTER — LAB (OUTPATIENT)
Dept: LAB | Facility: HOSPITAL | Age: 76
End: 2021-01-13
Attending: INTERNAL MEDICINE
Payer: MEDICARE

## 2021-01-13 ENCOUNTER — OFFICE VISIT (OUTPATIENT)
Dept: INTERNAL MEDICINE CLINIC | Facility: CLINIC | Age: 76
End: 2021-01-13
Payer: MEDICARE

## 2021-01-13 VITALS
OXYGEN SATURATION: 97 % | RESPIRATION RATE: 16 BRPM | HEART RATE: 67 BPM | SYSTOLIC BLOOD PRESSURE: 110 MMHG | DIASTOLIC BLOOD PRESSURE: 70 MMHG | TEMPERATURE: 96.8 F | WEIGHT: 147 LBS | BODY MASS INDEX: 23.73 KG/M2

## 2021-01-13 DIAGNOSIS — K90.89 OTHER INTESTINAL MALABSORPTION: ICD-10-CM

## 2021-01-13 DIAGNOSIS — D64.9 ANEMIA, UNSPECIFIED TYPE: ICD-10-CM

## 2021-01-13 DIAGNOSIS — R19.4 CHANGE IN BOWEL HABITS: ICD-10-CM

## 2021-01-13 DIAGNOSIS — K21.9 GASTROESOPHAGEAL REFLUX DISEASE WITHOUT ESOPHAGITIS: ICD-10-CM

## 2021-01-13 DIAGNOSIS — D50.9 IRON DEFICIENCY ANEMIA, UNSPECIFIED IRON DEFICIENCY ANEMIA TYPE: Primary | ICD-10-CM

## 2021-01-13 DIAGNOSIS — K27.9 PEPTIC ULCER DISEASE: ICD-10-CM

## 2021-01-13 DIAGNOSIS — R53.83 FATIGUE, UNSPECIFIED TYPE: ICD-10-CM

## 2021-01-13 DIAGNOSIS — R71.8 OTHER ABNORMALITY OF RED BLOOD CELLS: ICD-10-CM

## 2021-01-13 DIAGNOSIS — D53.9 MACROCYTIC ANEMIA: ICD-10-CM

## 2021-01-13 LAB
ERYTHROCYTE [SEDIMENTATION RATE] IN BLOOD: 8 MM/HOUR (ref 0–19)
FERRITIN SERPL-MCNC: 77 NG/ML (ref 8–388)
IRON SATN MFR SERPL: 7 %
IRON SERPL-MCNC: 28 UG/DL (ref 65–175)
TIBC SERPL-MCNC: 390 UG/DL (ref 250–450)
VIT B12 SERPL-MCNC: 765 PG/ML (ref 100–900)

## 2021-01-13 PROCEDURE — 83540 ASSAY OF IRON: CPT

## 2021-01-13 PROCEDURE — 82607 VITAMIN B-12: CPT

## 2021-01-13 PROCEDURE — 99214 OFFICE O/P EST MOD 30 MIN: CPT | Performed by: PHYSICIAN ASSISTANT

## 2021-01-13 PROCEDURE — 82728 ASSAY OF FERRITIN: CPT

## 2021-01-13 PROCEDURE — 82784 ASSAY IGA/IGD/IGG/IGM EACH: CPT

## 2021-01-13 PROCEDURE — 83550 IRON BINDING TEST: CPT

## 2021-01-13 PROCEDURE — 83516 IMMUNOASSAY NONANTIBODY: CPT

## 2021-01-13 PROCEDURE — 85652 RBC SED RATE AUTOMATED: CPT

## 2021-01-13 PROCEDURE — 86255 FLUORESCENT ANTIBODY SCREEN: CPT

## 2021-01-13 PROCEDURE — 36415 COLL VENOUS BLD VENIPUNCTURE: CPT

## 2021-01-13 RX ORDER — PANTOPRAZOLE SODIUM 40 MG/1
40 TABLET, DELAYED RELEASE ORAL DAILY
Qty: 90 TABLET | Refills: 3 | Status: SHIPPED | OUTPATIENT
Start: 2021-01-13 | End: 2022-01-25 | Stop reason: SDUPTHER

## 2021-01-13 NOTE — PATIENT INSTRUCTIONS
Patient will go to hospital and have lab work done today  We spent time today reviewing recent office visits with GI, and Hematology  Patient has EGD/ colonoscopy scheduled for early February  Follow-up here as scheduled, sooner as needed

## 2021-01-13 NOTE — PROGRESS NOTES
Assessment/Plan:   Patient Instructions   Patient will go to hospital and have lab work done today  We spent time today reviewing recent office visits with GI, and Hematology  Patient has EGD/ colonoscopy scheduled for early February  Follow-up here as scheduled, sooner as needed  Total time spent was greater than 25 minutes face to face of which greater than 50 percent was  for counseling and coordination of care   Discussing with patient his current workup and specialists visits  Quality Measures:       Return in about 2 months (around 3/15/2021) for Next scheduled follow up-Dr Braulio Esquivel  Diagnoses and all orders for this visit:    Iron deficiency anemia, unspecified iron deficiency anemia type    Gastroesophageal reflux disease without esophagitis  -     pantoprazole (PROTONIX) 40 mg tablet; Take 1 tablet (40 mg total) by mouth daily    Peptic ulcer disease    Change in bowel habits    Fatigue, unspecified type          Subjective:      Patient ID: Qi Rock is a 77 y o  male  Follow-up     Patient had been seen last month for change in bowel habits  According to the patient he had become constipated  He also was experiencing some upper abdominal pain, at that time started on pantoprazole which she felt was helpful, however after feeling better he stopped pantoprazole and his symptoms would recur  He was advised by Dr Braulio Esquivel to restart pantoprazole and stay on  It which she has finally done  He however is stating that he believes his symptomatology started last March/April when he was having problems with intermittent diarrhea and constipation along with weight loss  He recently has been found to be anemic  He has been sent to GI, GI workup will include EGD and colonoscopy set up for  February 2nd  He had also been sent to Hematology who again noted the iron deficiency anemia, concurred with the GI workup and have ordered other studies also    Patient will go for lab work after the visit today  He did talk to me about his past history of peptic ulcer disease and previous GI bleed from that  He is also on Coumadin and Plavix secondary to valvular heart disease and paroxysmal atrial fibrillation  At this time he continues to be symptomatic with shortness of breath on exertion, some mild lightheadedness, and his blood pressure being low  He is also complaining of fatigue        ALLERGIES:  No Known Allergies    CURRENT MEDICATIONS:    Current Outpatient Medications:     allopurinol (ZYLOPRIM) 100 mg tablet, Take 1 tablet (100 mg total) by mouth daily (Patient taking differently: Take 100 mg by mouth every other day ), Disp: 90 tablet, Rfl: 3    atorvastatin (LIPITOR) 40 mg tablet, Take 40 mg by mouth daily  , Disp: , Rfl:     carvedilol (COREG) 6 25 mg tablet, Take 6 25 mg by mouth  , Disp: , Rfl:     clopidogrel (PLAVIX) 75 mg tablet, Take 75 mg by mouth every other day, Disp: , Rfl:     colestipol (COLESTID) 1 g tablet, Take 1 tablet (1 g total) by mouth 2 (two) times a day as needed (diarrhea), Disp: 30 tablet, Rfl: 1    furosemide (LASIX) 20 mg tablet, , Disp: , Rfl: 0    metoprolol succinate (TOPROL-XL) 25 mg 24 hr tablet, Take 25 mg by mouth daily, Disp: , Rfl: 0    Multiple Vitamins-Minerals (PX MENS MULTIVITAMINS) TABS, Take by mouth, Disp: , Rfl:     pantoprazole (PROTONIX) 40 mg tablet, Take 1 tablet (40 mg total) by mouth daily, Disp: 90 tablet, Rfl: 3    sacubitril-valsartan (ENTRESTO) 24-26 MG TABS, Take 1 tablet by mouth daily, Disp: , Rfl:     spironolactone (ALDACTONE) 25 mg tablet, Take 25 mg by mouth daily, Disp: , Rfl:     triamcinolone (KENALOG) 0 1 % cream, Apply topically Three times a day, Disp: , Rfl:     valsartan (DIOVAN) 320 MG tablet, Take 160 mg by mouth daily Patient is taking 320 mg daily, Disp: , Rfl:     warfarin (COUMADIN) 2 5 mg tablet, Take 2 tablets (5 mg total) by mouth daily, Disp: , Rfl: 0    ACTIVE PROBLEM LIST:  Patient Active Problem List   Diagnosis    CAD (coronary atherosclerotic disease)    CHF (congestive heart failure) (HCC)    Chronic back pain    GERD (gastroesophageal reflux disease)    Hypercholesterolemia    Hypertension    PAF (paroxysmal atrial fibrillation) (HCC)    Peptic ulcer disease    S/P MVR (mitral valve repair)    Polyp of colon    Abnormal RBC indices    Fatigue    Chronic anticoagulation    Pain in gums    Mild protein-calorie malnutrition (HCC)    Idiopathic chronic gout of multiple sites without tophus    Impaired fasting glucose       PAST MEDICAL HISTORY:  Past Medical History:   Diagnosis Date    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    CAD (coronary artery disease)     Cancer ([facility] )     prostate    Coronary artery disease     GERD (gastroesophageal reflux disease)     H/O heart artery stent     x2    Hypercholesteremia     Hypertension     Keratotic lesion     Stroke ([facility] )     10YRS AGO    Transient cerebral ischemia        PAST SURGICAL HISTORY:  Past Surgical History:   Procedure Laterality Date    BACK SURGERY      CARDIAC SURGERY      mvp repair    CAROTID ENDARTERECTOMY Right 03/2020    CHOLECYSTECTOMY      ESOPHAGOGASTRODUODENOSCOPY N/A 6/22/2018    Procedure: ESOPHAGOGASTRODUODENOSCOPY (EGD); Surgeon: Terry Ruby MD;  Location: [facility];   Service: Gastroenterology    JOINT REPLACEMENT      L hip replacement    ORCHIECTOMY      PROSTATECTOMY      TOTAL HIP ARTHROPLASTY         FAMILY HISTORY:  Family History   Problem Relation Age of Onset    Heart disease Mother     Stroke Mother         CVA    Cancer Father     Hodgkin's lymphoma Father     Lung cancer Father        SOCIAL HISTORY:  Social History     Socioeconomic History    Marital status:      Spouse name: Not on file    Number of children: Not on file    Years of education: Not on file    Highest education level: Not on file   Occupational History    Occupation: retired   Social Needs    Financial resource strain: Not on file    Food insecurity     Worry: Not on file     Inability: Not on file    Transportation needs     Medical: Not on file     Non-medical: Not on file   Tobacco Use    Smoking status: Former Smoker     Quit date: 2018     Years since quittin 6    Smokeless tobacco: Never Used   Substance and Sexual Activity    Alcohol use: Yes     Alcohol/week: 2 0 standard drinks     Types: 2 Standard drinks or equivalent per week    Drug use: No    Sexual activity: Never     Comment: no high risk sexual behavior   Lifestyle    Physical activity     Days per week: Not on file     Minutes per session: Not on file    Stress: Not on file   Relationships    Social connections     Talks on phone: Not on file     Gets together: Not on file     Attends Bahai service: Not on file     Active member of club or organization: Not on file     Attends meetings of clubs or organizations: Not on file     Relationship status: Not on file    Intimate partner violence     Fear of current or ex partner: Not on file     Emotionally abused: Not on file     Physically abused: Not on file     Forced sexual activity: Not on file   Other Topics Concern    Not on file   Social History Narrative    Not on file       Review of Systems   Constitutional: Positive for fatigue  Negative for activity change, chills and fever  HENT: Negative for congestion  Eyes: Negative for discharge  Respiratory: Positive for shortness of breath  Negative for cough and chest tightness  Cardiovascular: Negative for chest pain, palpitations and leg swelling  Gastrointestinal: Positive for constipation, diarrhea and nausea  Negative for abdominal pain, blood in stool and vomiting  Endocrine: Negative for polydipsia, polyphagia and polyuria  Genitourinary: Negative for difficulty urinating  Musculoskeletal: Negative for arthralgias and myalgias  Skin: Negative for rash     Allergic/Immunologic: Negative for immunocompromised state  Neurological: Positive for light-headedness  Negative for dizziness, syncope, weakness and headaches  Hematological: Negative for adenopathy  Does not bruise/bleed easily  Psychiatric/Behavioral: Negative for dysphoric mood and sleep disturbance  The patient is not nervous/anxious  Objective:  Vitals:    01/13/21 1158   BP: 110/70   BP Location: Left arm   Pulse: 67   Resp: 16   Temp: (!) 96 8 °F (36 °C)   SpO2: 97%   Weight: 66 7 kg (147 lb)     Body mass index is 23 73 kg/m²  Physical Exam  Vitals signs and nursing note reviewed  Constitutional:       General: He is not in acute distress  Appearance: He is well-developed  Comments: Pale appearing   HENT:      Head: Normocephalic and atraumatic  Eyes:      Pupils: Pupils are equal, round, and reactive to light  Neck:      Musculoskeletal: Neck supple  Thyroid: No thyromegaly  Vascular: No carotid bruit or JVD  Cardiovascular:      Rate and Rhythm: Regular rhythm  Tachycardia present  Heart sounds: Murmur (  2/6 systolic murmur best audible at the apex radiating to the aortic area) present  Pulmonary:      Effort: Pulmonary effort is normal  No respiratory distress  Breath sounds: Normal breath sounds  Comments: Few crackles that clear with subsequent breath sounds present at both bases  Abdominal:      Tenderness: There is no abdominal tenderness  Musculoskeletal:      Right lower leg: No edema  Left lower leg: No edema  Lymphadenopathy:      Cervical: No cervical adenopathy  Skin:     General: Skin is warm and dry  Findings: No rash  Neurological:      General: No focal deficit present  Mental Status: He is alert and oriented to person, place, and time  Mental status is at baseline     Psychiatric:         Mood and Affect: Mood normal          Behavior: Behavior normal            RESULTS:    No results found for this or any previous visit (from the past 1008 hour(s))  This note was created with voice recognition software  Phonic, grammatical and spelling errors may be present within the note as a result

## 2021-01-14 ENCOUNTER — APPOINTMENT (OUTPATIENT)
Dept: LAB | Facility: HOSPITAL | Age: 76
End: 2021-01-14
Payer: MEDICARE

## 2021-01-14 DIAGNOSIS — R19.4 CHANGE IN BOWEL HABITS: ICD-10-CM

## 2021-01-14 DIAGNOSIS — R19.7 DIARRHEA OF PRESUMED INFECTIOUS ORIGIN: ICD-10-CM

## 2021-01-14 LAB
ENDOMYSIUM IGA SER QL: NEGATIVE
GLIADIN PEPTIDE IGA SER-ACNC: 4 UNITS (ref 0–19)
GLIADIN PEPTIDE IGG SER-ACNC: 2 UNITS (ref 0–19)
HEMOCCULT STL QL IA: POSITIVE
IGA SERPL-MCNC: 400 MG/DL (ref 61–437)
TTG IGA SER-ACNC: <2 U/ML (ref 0–3)
TTG IGG SER-ACNC: 3 U/ML (ref 0–5)

## 2021-01-14 PROCEDURE — G0328 FECAL BLOOD SCRN IMMUNOASSAY: HCPCS

## 2021-01-14 PROCEDURE — 83631 LACTOFERRIN FECAL (QUANT): CPT

## 2021-01-14 PROCEDURE — 87505 NFCT AGENT DETECTION GI: CPT

## 2021-01-14 PROCEDURE — 87205 SMEAR GRAM STAIN: CPT

## 2021-01-15 LAB
CAMPYLOBACTER DNA SPEC NAA+PROBE: NORMAL
SALMONELLA DNA SPEC QL NAA+PROBE: NORMAL
SHIGA TOXIN STX GENE SPEC NAA+PROBE: NORMAL
SHIGELLA DNA SPEC QL NAA+PROBE: NORMAL
WBC STL QL MICRO: NORMAL

## 2021-01-19 ENCOUNTER — TELEPHONE (OUTPATIENT)
Dept: HEMATOLOGY ONCOLOGY | Facility: CLINIC | Age: 76
End: 2021-01-19

## 2021-01-19 ENCOUNTER — TELEPHONE (OUTPATIENT)
Dept: GASTROENTEROLOGY | Facility: CLINIC | Age: 76
End: 2021-01-19

## 2021-01-19 DIAGNOSIS — R70.1 RETICULOCYTOSIS: ICD-10-CM

## 2021-01-19 DIAGNOSIS — R77.8 LOW SERUM HAPTOGLOBIN: Primary | ICD-10-CM

## 2021-01-19 DIAGNOSIS — D64.9 ANEMIA, UNSPECIFIED TYPE: ICD-10-CM

## 2021-01-19 NOTE — TELEPHONE ENCOUNTER
Patient is requesting to speak directly to you  Jacquelyn Mcguire He received a letter re his labs     Please phone [phone] @ your convenience

## 2021-01-19 NOTE — TELEPHONE ENCOUNTER
Called Patient and reviewed lab work  Patient has low Haptoglobin likely due to valve repair history  Patient Hgb 9 5, was 10 1 previously 12/31  Patient to have colonoscopy 2/2/21  Patient will get CBC, iron panel, CMP every month  Patient will also have MANDY to R/O AIHA  Patient has lab work outside at [facility]  Patient to follow up in 3 months

## 2021-01-20 LAB — LACTOFERRIN SER-MCNC: 3.92 UG/ML(G) (ref 0–7.24)

## 2021-01-25 ENCOUNTER — TELEPHONE (OUTPATIENT)
Dept: HEMATOLOGY ONCOLOGY | Facility: CLINIC | Age: 76
End: 2021-01-25

## 2021-01-25 NOTE — TELEPHONE ENCOUNTER
I phoned the patient and informed him that, per Jada Phelps, the labs were mailed out last Tuesday  He expressed understanding of this conversation

## 2021-01-25 NOTE — TELEPHONE ENCOUNTER
Patient states he would like to confirm if the labs ordered by Jose Clay was mailed out to his home, states he will be going to [facility] to complete the blood work this Thursday  Best alex back [phone]

## 2021-01-25 NOTE — TELEPHONE ENCOUNTER
I spoke with Jaydon Cabello, and she notes that the labs were mailed last Tuesday  I will phone the patient to update him

## 2021-02-03 ENCOUNTER — TELEPHONE (OUTPATIENT)
Dept: GASTROENTEROLOGY | Facility: CLINIC | Age: 76
End: 2021-02-03

## 2021-02-08 ENCOUNTER — TELEPHONE (OUTPATIENT)
Dept: HEMATOLOGY ONCOLOGY | Facility: CLINIC | Age: 76
End: 2021-02-08

## 2021-02-08 NOTE — TELEPHONE ENCOUNTER
Called patient to check on symptoms regarding anemia  Patient states he is stable- still feeling fatigued  Patient received lab work orders in mail  Patient will get labs as requested previously every month  To bundle lab work with other providers, patient will have labs drawn end of this week or early next week per patient  Patient's EGD, colonoscopy were pushed back to 2/23/21  Will follow up on results  Patient understands if he has a worsening in symptoms, chest pain, significant chest pain, SOB to report to ED  Patient appreciated call

## 2021-02-22 ENCOUNTER — TELEPHONE (OUTPATIENT)
Dept: GASTROENTEROLOGY | Facility: CLINIC | Age: 76
End: 2021-02-22

## 2021-02-22 NOTE — TELEPHONE ENCOUNTER
Patient called lmom would like to speak with Alejandro Poisson   Please call Sharon Cardenas at [phone] ty

## 2021-02-23 ENCOUNTER — ANESTHESIA EVENT (OUTPATIENT)
Dept: GASTROENTEROLOGY | Facility: HOSPITAL | Age: 76
End: 2021-02-23

## 2021-02-23 ENCOUNTER — ANESTHESIA (OUTPATIENT)
Dept: GASTROENTEROLOGY | Facility: HOSPITAL | Age: 76
End: 2021-02-23

## 2021-02-23 ENCOUNTER — HOSPITAL ENCOUNTER (OUTPATIENT)
Dept: GASTROENTEROLOGY | Facility: HOSPITAL | Age: 76
Setting detail: OUTPATIENT SURGERY
Discharge: HOME | End: 2021-02-23
Attending: INTERNAL MEDICINE | Admitting: INTERNAL MEDICINE
Payer: MEDICARE

## 2021-02-23 VITALS
HEIGHT: 65 IN | BODY MASS INDEX: 24.24 KG/M2 | RESPIRATION RATE: 16 BRPM | TEMPERATURE: 97.8 F | SYSTOLIC BLOOD PRESSURE: 118 MMHG | HEART RATE: 66 BPM | DIASTOLIC BLOOD PRESSURE: 51 MMHG | OXYGEN SATURATION: 95 % | WEIGHT: 145.5 LBS

## 2021-02-23 VITALS — HEART RATE: 70 BPM

## 2021-02-23 DIAGNOSIS — R19.7 DIARRHEA OF PRESUMED INFECTIOUS ORIGIN: ICD-10-CM

## 2021-02-23 DIAGNOSIS — K90.89 OTHER INTESTINAL MALABSORPTION: ICD-10-CM

## 2021-02-23 DIAGNOSIS — R19.4 CHANGE IN BOWEL HABITS: ICD-10-CM

## 2021-02-23 PROCEDURE — 45385 COLONOSCOPY W/LESION REMOVAL: CPT | Performed by: INTERNAL MEDICINE

## 2021-02-23 PROCEDURE — 88305 TISSUE EXAM BY PATHOLOGIST: CPT | Performed by: PATHOLOGY

## 2021-02-23 PROCEDURE — 43239 EGD BIOPSY SINGLE/MULTIPLE: CPT | Performed by: INTERNAL MEDICINE

## 2021-02-23 RX ORDER — PROPOFOL 10 MG/ML
INJECTION, EMULSION INTRAVENOUS AS NEEDED
Status: DISCONTINUED | OUTPATIENT
Start: 2021-02-23 | End: 2021-02-23

## 2021-02-23 RX ORDER — SODIUM CHLORIDE, SODIUM LACTATE, POTASSIUM CHLORIDE, CALCIUM CHLORIDE 600; 310; 30; 20 MG/100ML; MG/100ML; MG/100ML; MG/100ML
125 INJECTION, SOLUTION INTRAVENOUS CONTINUOUS
Status: DISCONTINUED | OUTPATIENT
Start: 2021-02-23 | End: 2021-02-27 | Stop reason: HOSPADM

## 2021-02-23 RX ORDER — EPHEDRINE SULFATE 50 MG/ML
INJECTION INTRAVENOUS AS NEEDED
Status: DISCONTINUED | OUTPATIENT
Start: 2021-02-23 | End: 2021-02-23

## 2021-02-23 RX ORDER — LIDOCAINE HYDROCHLORIDE 10 MG/ML
INJECTION, SOLUTION EPIDURAL; INFILTRATION; INTRACAUDAL; PERINEURAL AS NEEDED
Status: DISCONTINUED | OUTPATIENT
Start: 2021-02-23 | End: 2021-02-23

## 2021-02-23 RX ADMIN — PROPOFOL 20 MG: 10 INJECTION, EMULSION INTRAVENOUS at 14:02

## 2021-02-23 RX ADMIN — EPHEDRINE SULFATE 10 MG: 50 INJECTION INTRAVENOUS at 14:11

## 2021-02-23 RX ADMIN — PROPOFOL 50 MG: 10 INJECTION, EMULSION INTRAVENOUS at 13:48

## 2021-02-23 RX ADMIN — EPHEDRINE SULFATE 10 MG: 50 INJECTION INTRAVENOUS at 13:53

## 2021-02-23 RX ADMIN — LIDOCAINE HYDROCHLORIDE 50 MG: 10 INJECTION, SOLUTION EPIDURAL; INFILTRATION; INTRACAUDAL; PERINEURAL at 13:46

## 2021-02-23 RX ADMIN — PROPOFOL 30 MG: 10 INJECTION, EMULSION INTRAVENOUS at 14:07

## 2021-02-23 RX ADMIN — PROPOFOL 100 MG: 10 INJECTION, EMULSION INTRAVENOUS at 13:46

## 2021-02-23 RX ADMIN — EPHEDRINE SULFATE 10 MG: 50 INJECTION INTRAVENOUS at 13:55

## 2021-02-23 RX ADMIN — SODIUM CHLORIDE, SODIUM LACTATE, POTASSIUM CHLORIDE, AND CALCIUM CHLORIDE: .6; .31; .03; .02 INJECTION, SOLUTION INTRAVENOUS at 13:08

## 2021-02-23 RX ADMIN — PROPOFOL 20 MG: 10 INJECTION, EMULSION INTRAVENOUS at 14:14

## 2021-02-23 NOTE — ANESTHESIA POSTPROCEDURE EVALUATION
Post-Op Assessment Note    CV Status:  Stable    Pain management: adequate     Mental Status:  Arousable   Hydration Status:  Euvolemic   PONV Controlled:  Controlled   Airway Patency:  Patent      Post Op Vitals Reviewed: Yes      Staff: CRNA         No complications documented      BP  104/52    Temp      Pulse 70   Resp   20   SpO2   97

## 2021-02-23 NOTE — ANESTHESIA PREPROCEDURE EVALUATION
Procedure:  COLONOSCOPY  EGD    Relevant Problems   CARDIO   (+) CAD (coronary atherosclerotic disease)   (+) CHF (congestive heart failure) (HCC)   (+) Hypercholesterolemia   (+) Hypertension   (+) PAF (paroxysmal atrial fibrillation) (HCC)   (+) S/P MVR (mitral valve repair)      GI/HEPATIC   (+) GERD (gastroesophageal reflux disease)   (+) Peptic ulcer disease      MUSCULOSKELETAL   (+) Chronic back pain   (+) Idiopathic chronic gout of multiple sites without tophus      NEURO/PSYCH   (+) Chronic back pain        Physical Exam    Airway    Mallampati score: III  TM Distance: >3 FB  Neck ROM: full     Dental   upper dentures and lower dentures,     Cardiovascular  Rhythm: regular, Rate: normal, Cardiovascular exam normal    Pulmonary  Pulmonary exam normal Breath sounds clear to auscultation,     Other Findings        Anesthesia Plan  ASA Score- 3     Anesthesia Type- IV sedation with anesthesia with ASA Monitors  Additional Monitors:   Airway Plan:           Plan Factors-Exercise tolerance (METS): >4 METS  Chart reviewed  Existing labs reviewed  Patient is not a current smoker  Patient not instructed to abstain from smoking on day of procedure  Patient did not smoke on day of surgery  Induction- intravenous  Postoperative Plan-     Informed Consent- Anesthetic plan and risks discussed with patient  I personally reviewed this patient with the CRNA  Discussed and agreed on the Anesthesia Plan with the CRNA  Anita Console

## 2021-02-23 NOTE — H&P
History and Physical - [facility]  Rukhsana Morris 77 y o  male MRN: [id number]      HPI: Eh Laws is a 77y o  year old male who presents for the evaluation of weight loss, anemia, diarrhea      REVIEW OF SYSTEMS: Per the HPI, and otherwise unremarkable  Historical Information   Past Medical History:   Diagnosis Date    Anaplasmosis 2018    CAD (coronary artery disease)     Cancer (HCC)     prostate    Coronary artery disease     GERD (gastroesophageal reflux disease)     H/O heart artery stent     x2    Hypercholesteremia     Hypertension     Keratotic lesion     Stroke ([address] )     10YRS AGO    Transient cerebral ischemia      Past Surgical History:   Procedure Laterality Date    BACK SURGERY      CARDIAC SURGERY      mvp repair    CAROTID ENDARTERECTOMY Right 2020    CHOLECYSTECTOMY      ESOPHAGOGASTRODUODENOSCOPY N/A 2018    Procedure: ESOPHAGOGASTRODUODENOSCOPY (EGD); Surgeon: Jax Renteria MD;  Location: [facility]; Service: Gastroenterology    JOINT REPLACEMENT      L hip replacement    ORCHIECTOMY      PROSTATECTOMY      TOTAL HIP ARTHROPLASTY       Social History   Social History     Substance and Sexual Activity   Alcohol Use Yes    Alcohol/week: 2 0 standard drinks    Types: 2 Standard drinks or equivalent per week     Social History     Substance and Sexual Activity   Drug Use No     Social History     Tobacco Use   Smoking Status Former Smoker    Quit date: 2018    Years since quittin 7   Smokeless Tobacco Never Used     Family History   Problem Relation Age of Onset    Heart disease Mother     Stroke Mother         CVA    Cancer Father     Hodgkin's lymphoma Father     Lung cancer Father        Meds/Allergies     (Not in a hospital admission)      No Known Allergies    Objective     There were no vitals taken for this visit        PHYSICAL EXAM    Gen: NAD  CV: RRR  CHEST: Clear  ABD: soft, NT/ND  EXT: no edema      ASSESSMENT/PLAN:  This is a 77y o  year old male here for esophagogastroduodenoscopy with biopsies, colonoscopy with biopsies, and he is stable and optimized for his procedure

## 2021-02-23 NOTE — DISCHARGE INSTRUCTIONS

## 2021-02-25 ENCOUNTER — TELEPHONE (OUTPATIENT)
Dept: HEMATOLOGY ONCOLOGY | Facility: CLINIC | Age: 76
End: 2021-02-25

## 2021-02-25 DIAGNOSIS — D64.9 ANEMIA, UNSPECIFIED TYPE: Primary | ICD-10-CM

## 2021-02-25 NOTE — TELEPHONE ENCOUNTER
Called and spoke with patient letting him know he would need to have CBC recollected prior to his appt 3/8  Patient understood and informed me he would try to get to the lab 2/26  Let Sharon Cardenas know I would be faxing over his lab slip to [facility] in [address] [place] [address] where he stated he got his labs done

## 2021-02-25 NOTE — TELEPHONE ENCOUNTER
[facility] is calling to report that they are unable to run 2/24/21 CBCD due to insufficient to repeat analysis    They will fax other lab results to [facility] Manuel office for Dr Heron Griffin

## 2021-02-25 NOTE — TELEPHONE ENCOUNTER
Can you please notify the patient that he will need to get a repeat CBCD completed  This lab work is important for his diagnosis

## 2021-02-25 NOTE — TELEPHONE ENCOUNTER
Called and spoke with patient letting him know he would need to have CBC recollected prior to his appt 3/8  Patient understood and informed me he would try to get to the lab 2/26  Let Stevie Lowery know I would be faxing over his lab slip to [facility] in [address] [facility] where he stated he got his labs done

## 2021-02-26 ENCOUNTER — TELEPHONE (OUTPATIENT)
Dept: HEMATOLOGY ONCOLOGY | Facility: CLINIC | Age: 76
End: 2021-02-26

## 2021-02-26 ENCOUNTER — TELEPHONE (OUTPATIENT)
Dept: GASTROENTEROLOGY | Facility: CLINIC | Age: 76
End: 2021-02-26

## 2021-02-26 NOTE — TELEPHONE ENCOUNTER
Called patient and stated the blood work wasn't even processed due to lack of blood  The lab should not have billed him for the test since it wasn't run  He can verify this with the [facility] lab  Pt verbalized understanding and stated he would be going to get his this blood work completed today

## 2021-02-26 NOTE — TELEPHONE ENCOUNTER
----- Message from Yuliana Rojas DO sent at 2/26/2021 12:44 PM EST -----  Please call the patient with these biopsy results  Biopsies the small intestine were benign and negative for celiac sprue  Biopsies the stomach were benign and negative for Helicobacter pylori  Three out of the 4 polyps in the colon were adenomas which are precancerous polyps and were completely removed

## 2021-02-26 NOTE — TELEPHONE ENCOUNTER
Patient is calling back about having repeat CBCD done today  He is worried that his insurance company will not cover the repeat test since he just had this drawn  Patient expressed he will be upset and would not be able to afford to pay for the test if he gets a bill  Suggested he call [facility] to see if they are actually billing the insurance for the test   Test was unable to be preformed due to insufficient material   He will also call to speak with his insurance company to get confirmation from them regarding coverage     Patient will call back with any questions or concerns      Will send to RN to update - I am not sure when patient will be getting his CBCD checked

## 2021-03-08 ENCOUNTER — OFFICE VISIT (OUTPATIENT)
Dept: HEMATOLOGY ONCOLOGY | Facility: CLINIC | Age: 76
End: 2021-03-08
Payer: MEDICARE

## 2021-03-08 VITALS
DIASTOLIC BLOOD PRESSURE: 72 MMHG | SYSTOLIC BLOOD PRESSURE: 122 MMHG | WEIGHT: 148 LBS | BODY MASS INDEX: 24.66 KG/M2 | HEART RATE: 62 BPM | RESPIRATION RATE: 17 BRPM | HEIGHT: 65 IN | TEMPERATURE: 97.2 F

## 2021-03-08 DIAGNOSIS — Z85.46 HISTORY OF PROSTATE CANCER: ICD-10-CM

## 2021-03-08 DIAGNOSIS — D62 ANEMIA DUE TO BLOOD LOSS, ACUTE: Primary | ICD-10-CM

## 2021-03-08 DIAGNOSIS — Z86.2 HISTORY OF NORMOCYTIC NORMOCHROMIC ANEMIA: ICD-10-CM

## 2021-03-08 DIAGNOSIS — I48.0 PAF (PAROXYSMAL ATRIAL FIBRILLATION) (HCC): ICD-10-CM

## 2021-03-08 DIAGNOSIS — R77.8 LOW SERUM HAPTOGLOBIN: ICD-10-CM

## 2021-03-08 DIAGNOSIS — Z98.890 HISTORY OF MITRAL VALVE REPAIR: ICD-10-CM

## 2021-03-08 PROCEDURE — 99215 OFFICE O/P EST HI 40 MIN: CPT | Performed by: INTERNAL MEDICINE

## 2021-03-08 RX ORDER — SODIUM CHLORIDE 9 MG/ML
20 INJECTION, SOLUTION INTRAVENOUS ONCE
Status: CANCELLED | OUTPATIENT
Start: 2021-03-15

## 2021-03-08 NOTE — PROGRESS NOTES
HEMATOLOGY CLINIC NOTE    Primary Care Provider: Oliverio Back MD  Referring Provider:    MRN: [id number]  : 1945    Assessment / Plan:     1  Acute on Chronic Anemia; 2  History of normocytic normochromic anemia; 3  History of Mitral Valve Repair  This is a 77year old male with a past medical history of coronary artery disease, congestive heart failure, hypertension, Afib, GERD, peptic ulcer disease, anaplasmosis, TIA, prostate cancer presenting for follow up regarding anemia  Patient has a history of chronic normocytic/macrocytic anemia which appears to be due to CHF, mitral valve repair history  Initial W/U as in HPI mostly unremarkable; however, haptoglobin low likely due to history mitral valve repair  Unfortunately, MANDY was previously ordered, but not done  Patient reminded to get this done  Patient most recent Hgb 8 9g/dL which has dropped about 1g/dL since initial consult in 2020  EPO elevated, retic elevated  Patient EGD, colonoscopy had no significant findings other than precancerous polyps - patient to repeat colonoscopy in 3 years  No further GI workup, scopes per patient  Will obtain U/A to R/O microscopic bleeding  Patient on Coumadin lifelong due to Afib  Iron panel 21 showed Iron 27, TIBC 386, sat 7%, ferritin 76  Patient will be having an open heart procedure due to valvular disease within the next month which has yet to be scheduled  In the meantime, patient will have Feraheme 510mg x2  Patient understands side effects of IV iron include, but are not limited to: hypotension, muscle cramps, nausea, headache, vomiting, hypertension, diarrhea, dizziness, rarely anaphylaxis  - UA w Reflex to Microscopic w Reflex to Culture  - Iron Panel (Includes Ferritin, Iron Sat%, Iron, and TIBC); Future  - CBC and differential; Future    3  PAF (paroxysmal atrial fibrillation) ([facility] [address] )  - Patient on Coumadin lifelong due to Afib   Managed by cardiologist      4  History of prostate cancer  - approximately 12 years ago  S/p prostatectomy  Follows a urologist yearly  - Most recent PSA 4/27/2020 <0 01      - no recurrence    5  Low Serum haptoglobin  - as above    - Direct antiglobulin test; Future    Patient will return to the office in 3 months to see Dr Desire Orozco with labs prior    Patient voiced understanding and agreement to the above  The patient knows to call the office with any questions or concerns regarding the above  I have spent 40 minutes with Patient  today in which greater than 50% of this time was spent in counseling/coordination of care regarding Diagnostic results, Risks and benefits of tx options, Intructions for management, Patient and family education, Importance of tx compliance and Risk factor reductions  Reason for visit:       Chief Complaint   Patient presents with    Follow-up       History of Hematology Illness:     Darshana Oh is a 77 y o  male presents for a follow up regarding anemia  Patient's PMH is significant for coronary artery disease, congestive heart failure, hypertension, Afib, GERD, peptic ulcer disease, anaplasmosis, TIA, prostate cancer      1  Acute on Chronic Anemia   Patient has had alternating increased MCV and normal MCV intermittently since 2017 (as far as records go in epic), and intermittent borderline associated anemia since 2018      Lab (HNL) review:   - 6/23/2018: Hgb 11 3,   - 12/10/2018: Hgb 11 8, MCV 92  - 2/25/2019: Hgb 12 7, MCV 85  - 2/26/2019: Hgb 10 5, MCV 86 2  - 9/25/2020: Hgb 12 5,   - 12/10/2020: Hgb 10 4, , RDW 17 0; TSH norm  -12/31/2020: Hgb 10 1, , RDW 17 9;       -  bilirubin 1 7, Cr 1 58, GFR 42, BUN 31         -  Iron panel: Iron 27, Iron sat 8%          - folate normal       - vitamin B12 normal   -1/13/2021:            - Iron panel: Iron 28, TIBC 390, Iron sat 7%, ferritin 77           - sed rate normal  - 1/14/2021: Hgb 9 5, Hct 31 2, MCV 96          - Iron panel: Iron 27, TIBC 386, sat 7%, ferritin 76          - SPEP WNL,           - abs Retic 0 1316, haptoglobin < 3 - likely mitral valve history          - EPO high 634          - Fecal Occult test +  - 2/23/2021: EGD, colonoscopy - no significant findings  - 2/26/2021: Hgb 8 9, Hct 29 4, MCV 87, RDW 20 8, diff acceptable    Patient states he had a transfusion once after a surgical procedure many years ago  He could not recall which one it was        Patient states he has lost 30lbs since March  The onset of this were flu like symptoms he had  Patient was not tested for covid at the time  Patient's flu like symptoms resolved, but left him with change in bowel movements  He states less stools per week  Patient states he alternates with diarrhea and constipation since March  Patient has also decreased appetite  Patient consumes a diet with red meal, vegetables, fruits; however, he only consumes 1-2 meals per day  Patient recently had an EGD, colonoscopy with no significant findings, precancerous polyps - repeat colonoscopy in 3 years  Patient denies black or bloody stools, denies PICA  Patient does not drink alcohol  Patient smokes 3 cigarettes a day  Patient has 50 year history of smoking  2  History of Prostate Cancer   Patient has a history of prostate cancer from approximately 12 years ago  Patient states he had a prostatectomy and follows a urologist yearly  Patient most recent PSA 4/27/2020 <0 01        3  Lifelong anticoagulation due to Afib   Patient on coumadin for Afib  This is managed by cardiologist         Interval History:   03/08/21: Patient came in for follow up  Patient has fatigue likely due to history of CHF, valvular disease, anemia can contribute to this too  Patient appears pale  No PICA  Patient will undergo open heart surgery due to valvular disease soon   Patient denies dark stools, bleeding in stools or urine, constitutional symptoms like sudden weight loss, fevers, chills, night sweats, lumps, bumps, enlarged lymph nodes  Patient does have maculopapular rash to posterior trunk  Patient denies new detergents, pets, clothing, lotions, shampoos, conditioners  Pruritus present, wakes patient at night  Suggested OTC Claritin or Zyrtec  Patient to see PCP Dr Little Search next week  Problem list:       Patient Active Problem List   Diagnosis    CAD (coronary atherosclerotic disease)    CHF (congestive heart failure) (HCC)    Chronic back pain    GERD (gastroesophageal reflux disease)    Hypercholesterolemia    Hypertension    PAF (paroxysmal atrial fibrillation) (HCC)    Peptic ulcer disease    S/P MVR (mitral valve repair)    Polyp of colon    Abnormal RBC indices    Fatigue    Chronic anticoagulation    Pain in gums    Mild protein-calorie malnutrition (HCC)    Idiopathic chronic gout of multiple sites without tophus    Impaired fasting glucose       REVIEW OF SYMPTOMS:   Review of Systems   Constitutional: Positive for fatigue (largely due to history of CHF, valvular history  [facility] cardiology)  Negative for activity change, appetite change, chills, diaphoresis, fever and unexpected weight change  HENT: Negative for nosebleeds  Respiratory: Negative for apnea, cough, chest tightness and shortness of breath  Cardiovascular: Negative for chest pain, palpitations and leg swelling  Gastrointestinal: Negative for abdominal distention, abdominal pain, anal bleeding, blood in stool, constipation, diarrhea, nausea and vomiting  Endocrine: Negative for cold intolerance  Genitourinary: Negative for hematuria  Skin: Positive for rash (mild maculopapular rash to posterior trunk  Pruritius present)  Negative for color change and pallor  Neurological: Negative for dizziness, weakness, light-headedness and headaches  Hematological: Negative for adenopathy  Does not bruise/bleed easily         PHYSICAL EXAMINATION:     Vital Signs:   /72 (BP Location: Left arm, Patient Position: Sitting)   Pulse 62   Temp (!) 97 2 °F (36 2 °C) (Tympanic)   Resp 17   Ht 5' 5" (1 651 m)   Wt 67 1 kg (148 lb)   BMI 24 63 kg/m²   Body surface area is 1 74 meters squared  Ht Readings from Last 8 Encounters:   03/08/21 5' 5" (1 651 m)   02/23/21 5' 5" (1 651 m)   01/11/21 5' 6" (1 676 m)   01/07/21 5' 6" (1 676 m)   12/28/20 5' 6" (1 676 m)   09/21/20 5' 6" (1 676 m)   05/18/20 5' 6" (1 676 m)   11/11/19 5' 6" (1 676 m)       Wt Readings from Last 8 Encounters:   03/08/21 67 1 kg (148 lb)   02/23/21 66 kg (145 lb 8 1 oz)   01/13/21 66 7 kg (147 lb)   01/11/21 66 7 kg (147 lb)   01/07/21 65 9 kg (145 lb 3 2 oz)   12/28/20 63 5 kg (140 lb)   09/21/20 63 5 kg (140 lb)   05/18/20 68 5 kg (151 lb)          Physical Exam  Constitutional:       General: He is not in acute distress  Appearance: Normal appearance  He is normal weight  He is not ill-appearing, toxic-appearing or diaphoretic  Comments: Patient appears pale   HENT:      Head: Normocephalic and atraumatic  Eyes:      General: No scleral icterus  Extraocular Movements: Extraocular movements intact  Conjunctiva/sclera: Conjunctivae normal    Neck:      Musculoskeletal: Normal range of motion and neck supple  Cardiovascular:      Rate and Rhythm: Normal rate and regular rhythm  Heart sounds: Murmur (diffuse murmurs across the precordium) present  Pulmonary:      Effort: Pulmonary effort is normal  No respiratory distress  Breath sounds: Normal breath sounds  No stridor  No wheezing, rhonchi or rales  Abdominal:      General: Bowel sounds are normal    Musculoskeletal:         General: No swelling  Right lower leg: No edema  Left lower leg: No edema  Lymphadenopathy:      Cervical: No cervical adenopathy  Skin:     General: Skin is warm and dry  Coloration: Skin is pale  Findings: Rash (mild maculopapular rash to posterior trunk appears consistent with contact dermatitis) present     Neurological: General: No focal deficit present  Mental Status: He is alert  Psychiatric:         Mood and Affect: Mood normal          Behavior: Behavior normal          Thought Content: Thought content normal        Reviewed historical information  PAST MEDICAL HISTORY:    Past Medical History:   Diagnosis Date   Elis Lombardo 6/25/2018    CAD (coronary artery disease)     Cancer (HCC)     prostate    Colon polyp     Coronary artery disease     GERD (gastroesophageal reflux disease)     H/O heart artery stent     x2    Hypercholesteremia     Hypertension     Keratotic lesion     Stroke ([address] )     10YRS AGO    Transient cerebral ischemia        PAST SURGICAL HISTORY:    Past Surgical History:   Procedure Laterality Date    BACK SURGERY      CARDIAC SURGERY      mvp repair    CAROTID ENDARTERECTOMY Right 03/2020    CHOLECYSTECTOMY      COLONOSCOPY      ESOPHAGOGASTRODUODENOSCOPY N/A 6/22/2018    Procedure: ESOPHAGOGASTRODUODENOSCOPY (EGD); Surgeon: Kirstie Fleming MD;  Location: [facility];   Service: Gastroenterology    JOINT REPLACEMENT      L hip replacement    ORCHIECTOMY      PROSTATECTOMY      TOTAL HIP ARTHROPLASTY           CURRENT MEDICATIONS:     Current Outpatient Medications:     allopurinol (ZYLOPRIM) 100 mg tablet, Take 1 tablet (100 mg total) by mouth daily (Patient taking differently: Take 100 mg by mouth every other day ), Disp: 90 tablet, Rfl: 3    atorvastatin (LIPITOR) 40 mg tablet, Take 40 mg by mouth daily  , Disp: , Rfl:     carvedilol (COREG) 6 25 mg tablet, Take 6 25 mg by mouth  , Disp: , Rfl:     clopidogrel (PLAVIX) 75 mg tablet, Take 75 mg by mouth every other day, Disp: , Rfl:     colestipol (COLESTID) 1 g tablet, Take 1 tablet (1 g total) by mouth 2 (two) times a day as needed (diarrhea), Disp: 30 tablet, Rfl: 1    furosemide (LASIX) 20 mg tablet, , Disp: , Rfl: 0    metoprolol succinate (TOPROL-XL) 25 mg 24 hr tablet, Take 25 mg by mouth daily, Disp: , Rfl: 0    Multiple Vitamins-Minerals (PX MENS MULTIVITAMINS) TABS, Take by mouth, Disp: , Rfl:     pantoprazole (PROTONIX) 40 mg tablet, Take 1 tablet (40 mg total) by mouth daily, Disp: 90 tablet, Rfl: 3    sacubitril-valsartan (ENTRESTO) 24-26 MG TABS, Take 1 tablet by mouth daily, Disp: , Rfl:     spironolactone (ALDACTONE) 25 mg tablet, Take 25 mg by mouth daily, Disp: , Rfl:     triamcinolone (KENALOG) 0 1 % cream, Apply topically Three times a day, Disp: , Rfl:     valsartan (DIOVAN) 320 MG tablet, Take 160 mg by mouth daily Patient is taking 320 mg daily, Disp: , Rfl:     warfarin (COUMADIN) 2 5 mg tablet, Take 2 tablets (5 mg total) by mouth daily, Disp: , Rfl: 0    SOCIAL HISTORY:    Social History     Tobacco Use    Smoking status: Former Smoker     Packs/day: 0 25     Quit date: 2018     Years since quittin 7    Smokeless tobacco: Never Used    Tobacco comment: 4 cig/day   Substance Use Topics    Alcohol use: Never     Alcohol/week: 2 0 standard drinks     Types: 2 Standard drinks or equivalent per week     Frequency: Never    Drug use: No       FAMILY HISTORY:    Family History   Problem Relation Age of Onset    Heart disease Mother     Stroke Mother         CVA    Cancer Father     Hodgkin's lymphoma Father     Lung cancer Father        ALLERGIES:  No Known Allergies      LAB:    Lab Results   Component Value Date    WBC 10 43 (H) 12/10/2018    HGB 11 8 (L) 12/10/2018    HCT 40 2 12/10/2018    MCV 92 12/10/2018     12/10/2018       Lab Results   Component Value Date    SODIUM 140 2019    K 4 9 2019     2019    CO2 27 2019    AGAP 8 2019    BUN 26 (H) 2019    CREATININE 1 38 (H) 2019    GLUC 105 2018    GLUF 105 (H) 2019    CALCIUM 9 2 2019    AST 32 12/10/2018    ALT 24 12/10/2018    ALKPHOS 90 12/10/2018    TP 7 7 12/10/2018    TBILI 1 40 (H) 12/10/2018    EGFR 51 2019 IMAGING:  Colonoscopy  Narrative:  [id number] [facility]  [address]  [phone]    DATE OF SERVICE:  2/23/21    PHYSICIAN(S):  Chaya Wolff DO - Attending Physician    INDICATION:  Diarrhea of presumed infectious origin, Change in bowel habits  Colonoscopy performed for a diagnostic indication  POST-OP DIAGNOSIS:  See the impression below  HISTORY:  Prior colonoscopy: 5 years ago  PREPROCEDURE:  Informed consent was obtained for the procedure, including sedation  Risks   including but not limited to bleeding, infection, perforation, adverse   drug reaction and aspiration were explained in detail  Also explained   about less than 100% sensitivity with the exam and other alternatives  The   patient was placed in the left lateral decubitus position  DETAILS OF PROCEDURE:  Patient was taken to the procedure room where a time out was performed to   confirm correct patient and correct procedure  The patient underwent   monitored anesthesia care, which was administered by an anesthesia   professional  The patient's blood pressure, heart rate, level of   consciousness, oxygen and respirations were monitored throughout the   procedure  A digital rectal exam was performed  The scope was introduced   through the anus and advanced to the cecum  Photodocumentation was   obtained at the ileocecal valve, appendiceal orifice and retroflexed view   of the rectum  Retroflexion was performed in the rectum  The quality of   bowel preparation was evaluated using the [facility] Bowel Preparation Scale   with scores of: right colon = 2, transverse colon = 2, left colon = 2  The   total BBPS score was 6  Bowel prep was adequate  The patient experienced   no blood loss  The procedure was not difficult  The patient tolerated the   procedure well  There were no apparent complications       ANESTHESIA INFORMATION:  ASA: III  Anesthesia Type: IV Sedation with Anesthesia    FINDINGS:  One sessile polyp measuring smaller than 5 mm in the cecum; completely   removed en bloc by cold snare and retrieved specimen; bleeding occurred   after intervention  Three sessile polyps measuring smaller than 5 mm in the transverse colon;   performed complete en bloc removal by cold forceps biopsy; completely   removed en bloc by cold snare and retrieved specimen  One sessile polyp measuring smaller than 5 mm in the sigmoid colon;   completely removed en bloc by cold snare and retrieved specimen; bleeding   occurred after intervention  Two sessile polyps measuring smaller than 5 mm in the rectum; completely   removed by cold snare and retrieved specimen; placed 1 clip successfully   (clip is MRI compatible)  All observed locations appeared normal, including the ascending colon,   hepatic flexure, splenic flexure, descending colon and rectosigmoid  EVENTS:  Procedure Events   Event Event Time   ENDO CECUM REACHED 2/23/2021  1:58 PM   ENDO SCOPE OUT TIME 2/23/2021  2:25 PM     SPECIMENS:  ID Type Source Tests Collected by Time Destination   1 :  Tissue Duodenum TISSUE EXAM Gilliland Aliyah, DO 2/23/2021  1:51 PM    2 : antrum Tissue Stomach TISSUE EXAM Gilliland Aliyah, DO 2/23/2021  1:52 PM      3 : and sigmoid Tissue Large Intestine, Right/Ascending Colon TISSUE Sharmaine Maldonado, DO 2/23/2021  2:07 PM    4 : cecum Tissue Polyp, Colorectal TISSUE EXAM Gilliland Aliyah, DO 2/23/2021    2:13 PM    5 : transverse Tissue Polyp, Colorectal TISSUE EXAM Gilliland Aliyah, DO   2/23/2021  2:23 PM    6 : sigmoid Tissue Polyp, Colorectal TISSUE EXAM Gilliland Aliyah, DO   2/23/2021  2:25 PM    7 : rectum Tissue Polyp, Colorectal TISSUE EXAM Gilliland Aliyah, DO   2/23/2021  2:25 PM         Impression: 1  Cecal polyp status post snare polypectomy and retrieval  2  Three polyps of the transverse colon status post snare polypectomy and   retrieval  3  Sigmoid polyp status post snare polypectomy and retrieval  4  Two polyps of the rectum status post snare polypectomy retrieval,   complicated by bleeding, successfully stopped with a single clip     RECOMMENDATION:  Repeat in 3 years due to a personal history of colon polyps     Will call the patient in 1 week regarding the polyp results    Steve Griffin DO, Coye Joseph  EGD  Narrative:  [address] [facility] [address]  [phone]    DATE OF SERVICE:  2/23/21    PHYSICIAN(S):  Steve Griffin DO - Attending Physician    INDICATION:  Diarrhea of presumed infectious origin, Other intestinal malabsorption    POST-OP DIAGNOSIS:  See the impression below  PREPROCEDURE:  Informed consent was obtained for the procedure, including sedation  Risks of perforation, hemorrhage, adverse drug reaction and aspiration   were discussed  The patient was placed in the left lateral decubitus   position  Patient was explained about the risks and benefits of the procedure  Risks   including but not limited to bleeding, infection, and perforation were   explained in detail  Also explained about less than 100% sensitivity with   the exam and other alternatives  DETAILS OF PROCEDURE:  Patient was taken to the procedure room where a time out was performed to   confirm correct patient and correct procedure  The patient underwent   monitored anesthesia care, which was administered by an anesthesia   professional  The patient's blood pressure, heart rate, level of   consciousness, respirations and oxygen were monitored throughout the   procedure  The scope was advanced to the second part of the duodenum  Retroflexion was performed in the cardia and fundus  Prior to the   procedure, the patient's H  Pylori status was unknown  The patient's   estimated blood loss was minimal (<5 mL)  The procedure was not difficult  The patient tolerated the procedure well  There were no apparent   complications       ANESTHESIA INFORMATION:  ASA: III  Anesthesia Type: IV Sedation with Anesthesia    FINDINGS:  The cricopharynx, upper third of the esophagus, middle third of the   esophagus, lower third of the esophagus, GE junction, Z-line, cardia,   fundus of the stomach, body of the stomach, greater curve of the stomach,   lesser curve of the stomach, incisura, antrum, prepyloric region, pylorus   and duodenal bulb appeared normal   Performed 4 biopsies in the antrum  Rule out Helicobacter pylori  Performed 6 biopsies in the duodenal bulb and 2nd part of the duodenum  Rule out the celiac sprue  SPECIMENS:  ID Type Source Tests Collected by Time Destination   1 :  Tissue Duodenum TISSUE EXAM Yuliana Rojas DO 2/23/2021  1:51 PM    2 : antrum Tissue Stomach TISSUE EXAM Yuliana Rojas DO 2/23/2021  1:52 PM           Impression: 1  Duodenum diverticulum  2  Otherwise normal esophagogastroduodenoscopy    RECOMMENDATION:  1   Call the patient 1 week regarding the biopsy results       Yuliana Rojas DO, Calvin Durand

## 2021-03-12 ENCOUNTER — TELEPHONE (OUTPATIENT)
Dept: HEMATOLOGY ONCOLOGY | Facility: CLINIC | Age: 76
End: 2021-03-12

## 2021-03-12 NOTE — TELEPHONE ENCOUNTER
Patient would like Infusion scheduled for 3- & 3- and follow up for 6-7-2021  Patient is going to have open heart surgery on 3-18-21

## 2021-03-12 NOTE — TELEPHONE ENCOUNTER
Dedra, please cancel patients appointments  Patient will call us when he is recovered from his surgery to reschedule  Thanks!

## 2021-03-12 NOTE — TELEPHONE ENCOUNTER
This is patients current schedule  Can we clarify what the message is? Does patient wish to reschedule these appointments?

## 2021-03-12 NOTE — TELEPHONE ENCOUNTER
Spoke with patient  Patient was seen by PA on 3/8 then AFTER appt, cardiologist set up patient for covid test and quarantine for anticipation of heart surgery in [facility] on 3/18/2021  Please cancel feraheme injections scheduled by PA on 3/8/2021  Patient will call for f/u with Dr Kristna Uribe when recovered from open heart surgery      Will pass on to Dr Veronica Montero RNs

## 2021-04-23 ENCOUNTER — NURSING HOME VISIT (OUTPATIENT)
Dept: GERIATRICS | Facility: OTHER | Age: 76
End: 2021-04-23
Payer: MEDICARE

## 2021-04-23 VITALS
BODY MASS INDEX: 22.3 KG/M2 | RESPIRATION RATE: 18 BRPM | DIASTOLIC BLOOD PRESSURE: 77 MMHG | SYSTOLIC BLOOD PRESSURE: 130 MMHG | WEIGHT: 134 LBS | TEMPERATURE: 98.6 F | HEART RATE: 77 BPM | OXYGEN SATURATION: 96 %

## 2021-04-23 DIAGNOSIS — Z98.890 S/P MVR (MITRAL VALVE REPAIR): Primary | ICD-10-CM

## 2021-04-23 PROCEDURE — 99306 1ST NF CARE HIGH MDM 50: CPT | Performed by: INTERNAL MEDICINE

## 2021-04-23 RX ORDER — FLUDROCORTISONE ACETATE 0.1 MG/1
0.1 TABLET ORAL 2 TIMES DAILY
COMMUNITY

## 2021-04-23 RX ORDER — CARVEDILOL 3.12 MG/1
3.12 TABLET ORAL 2 TIMES DAILY WITH MEALS
COMMUNITY
End: 2021-09-22 | Stop reason: SDUPTHER

## 2021-04-23 RX ORDER — COLCHICINE 0.6 MG/1
0.6 TABLET ORAL DAILY
COMMUNITY

## 2021-04-23 RX ORDER — ASPIRIN 81 MG/1
81 TABLET, CHEWABLE ORAL DAILY
COMMUNITY

## 2021-04-23 RX ORDER — POTASSIUM CHLORIDE 750 MG/1
10 CAPSULE, EXTENDED RELEASE ORAL 2 TIMES DAILY
COMMUNITY
End: 2022-01-19 | Stop reason: ALTCHOICE

## 2021-04-23 NOTE — PROGRESS NOTES
[facility]  [address] [place] [address], [address] [place] [address]    Nursing Home Admission    NAME: Faith Morris  AGE: 77 y o  SEX: male [id number]      Patient Location      [facility] rehab    Patients care was coordinated with nursing facility staff  Recent vitals, labs and updated medications were reviewed on [facility] of facility  Past Medical, surgical, social, medication and allergy history and patients previous records reviewed  Assessment/Plan:    S/P MVR (mitral valve repair)    Patient underwent recent redo MVR /AVR  Postop course was complicated by swallowing issues needing NG tube feeding for some time  NG tube was later removed  Patient was started on Marinol  Recent echo revealed EF of 30-35%  Patient was discharged to SNF on LifeVest   Will continue to follow  Follow-up with cardiology service     CAD:  history of PTCA, LAD  Recent cardiac catheterization revealed patent stent with no new CAD  Continue maintenance cardiac meds including carvedilol, Entresto and  Aspirin     PAF:   heart rate stable on carvedilol  Patient remains on Coumadin  Follow-up PT INR     chronic systolic congestive heart failure /cardiomyopathy:   clinically compensated  Patient was on Lasix  For a short time in the hospital, discontinued upon discharge due to  orthostatic hypotension and prerenal azotemia  Monitor weights continue maintenance cardiac meds  UTI:   recent urine culture revealed growth of Klebsiella  Patient completed antibiotic course on 04/09/2021       history of gout   continue colchicine and allopurinol     hyperlipidemia:     continue atorvastatin     history of peptic ulcer disease   continue Protonix     history of BPH   patient was recently taken off Rapaflo due to orthostatic hypotension     orthostatic hypotension   continue Florinef    Patient was taken off of Lasix, Rapaflo and Aldactone at the hospital       sacral decubitus ulcer   continue local wound care    Anemia:   recent hemoglobin was stable  at 11 8    follow-up repeat CBC       Dysphagia   patient had issues with dysphagia recently at the hospital, needed tube feedings through NG tube for some time  NG was later removed  Appetite improved on Marinol  Continue dysphagia tailored diet  Follow-up with speech therapist       Insomnia   continue melatonin     prerenal azotemia:   noted recently at the hospital   Patient was taken off of Lasix  Repeat BMP was stable  Follow-up labs    Chief Complaint      status post redo MVR/AVR  Deconditionig      HPI     Patient is a 77 y o  male with past medical history significant for CAD status post PTCA, hyperlipidemia, peptic ulcer disease, prostate CA, TIA, tricuspid regurgitation, severe aortic stenosis, severe mitral regurgitation status post mitral valve repair, CHF , PAF on chronic anticoagulation, hypertension, senna and anemia  Patient was recently hospitalized for  Elective  Redo MVR   For dyspnea on exertion  Outpatient echocardiogram had revealed EF 35%, moderately AS, moderate to severe MR, moderate TR   Patient failed outpatient management    Cardiac catheterization done on 03/30 revealed patent LAD stent, no new CAD  Patient underwent redo of AVR /MVR  Procedure went well  He was successfully extubated  Patient initially had difficulty swallowing  He was maintained on NG tube feeds for some time  Later passed dysphagia evaluation  Pt  was started on honey thick lipid /dysphagia tailored diet  Coumadin was resumed for preop  Afib  Echo done in the hospital revealed EF of 30-35%  Patient was discharged to SNF on [address] course was also significant for UTI with urine culture revealing Klebsiella  He was started on Rocephin subsequently switched over to cefuroxime  Completed antibiotic course on 04/09/2021         During the hospital stay patient had issues with orthostatic hypotension    He was taken off of Rapaflo Aldactone and Lasix      Patient's appetite gradually improved on Marinol  He was noted to have been stable on 7 left buttock for which local wound care was performed  patient was evaluated by Psychiatry service for life changing events /situation, patient refused SSRI  Treatment for recurrent  Hospital course was also significant for blood loss anemia treated with iron and vitamin-C supplements   Patient was subsequently transferred to [facility] rehab where he is being seen evaluation patient is doing okay  Past Medical History:   Diagnosis Date   Hungarian Alert 2018    CAD (coronary artery disease)     Cancer ([facility] [address] )     prostate    Colon polyp     Coronary artery disease     GERD (gastroesophageal reflux disease)     H/O heart artery stent     x2    Hypercholesteremia     Hypertension     Keratotic lesion     Stroke ([facility] [address] )     10YRS AGO    Transient cerebral ischemia       Other  medical diagnosed as listed in HPI    Past Surgical History:   Procedure Laterality Date    BACK SURGERY      CARDIAC SURGERY      mvp repair    CAROTID ENDARTERECTOMY Right 2020    CHOLECYSTECTOMY      COLONOSCOPY      ESOPHAGOGASTRODUODENOSCOPY N/A 2018    Procedure: ESOPHAGOGASTRODUODENOSCOPY (EGD); Surgeon: Nelda Calderón MD;  Location: [facility];   Service: Gastroenterology    JOINT REPLACEMENT      L hip replacement    ORCHIECTOMY      PROSTATECTOMY      TOTAL HIP ARTHROPLASTY     -PTCA  - status post recent redo MVR /AVR    Social History     Tobacco Use   Smoking Status Former Smoker    Packs/day: 0 25    Quit date: 2018    Years since quittin 9   Smokeless Tobacco Never Used   Tobacco Comment    4 cig/day          Family History   Problem Relation Age of Onset    Heart disease Mother     Stroke Mother         CVA    Cancer Father     Hodgkin's lymphoma Father     Lung cancer Father         No Known Allergies       Current Outpatient Medications:     aspirin 81 mg chewable tablet, Chew 81 mg daily, Disp: , Rfl:     carvedilol (COREG) 3 125 mg tablet, Take 3 125 mg by mouth 2 (two) times a day with meals, Disp: , Rfl:     colchicine (COLCRYS) 0 6 mg tablet, Take 0 6 mg by mouth daily, Disp: , Rfl:     fludrocortisone (FLORINEF) 0 1 mg tablet, Take 0 1 mg by mouth daily, Disp: , Rfl:     Melatonin 2 5 MG CAPS, Take 5 mg by mouth, Disp: , Rfl:     potassium chloride (MICRO-K) 10 MEQ CR capsule, Take 10 mEq by mouth 2 (two) times a day, Disp: , Rfl:     allopurinol (ZYLOPRIM) 100 mg tablet, Take 1 tablet (100 mg total) by mouth daily (Patient taking differently: Take 100 mg by mouth every other day ), Disp: 90 tablet, Rfl: 3    atorvastatin (LIPITOR) 40 mg tablet, Take 40 mg by mouth daily  , Disp: , Rfl:     Multiple Vitamins-Minerals (PX MENS MULTIVITAMINS) TABS, Take by mouth, Disp: , Rfl:     pantoprazole (PROTONIX) 40 mg tablet, Take 1 tablet (40 mg total) by mouth daily, Disp: 90 tablet, Rfl: 3    sacubitril-valsartan (ENTRESTO) 24-26 MG TABS, Take 1 tablet by mouth daily, Disp: , Rfl:     warfarin (COUMADIN) 2 5 mg tablet, Take 2 tablets (5 mg total) by mouth daily, Disp: , Rfl: 0    Updated list was reviewed in [facility] system of facility  Vitals:    04/23/21 0915   BP: 130/77   Pulse: 77   Resp: 18   Temp: 98 6 °F (37 °C)   SpO2: 96%       Review of Systems   Constitutional: Positive for fatigue  Negative for chills and fever  HENT: Negative for nosebleeds and rhinorrhea  Eyes: Negative for discharge and redness  Respiratory: Negative for cough, chest tightness, shortness of breath, wheezing and stridor  Cardiovascular: Negative for chest pain and leg swelling  Gastrointestinal: Positive for constipation (earlier, improved at present)  Negative for abdominal distention, abdominal pain, diarrhea and vomiting  Genitourinary: Negative for dysuria, flank pain and hematuria     Musculoskeletal: Positive for gait problem  Negative for arthralgias and back pain  Skin: Negative for pallor  Neurological: Positive for weakness (Generalized)  Negative for tremors, seizures, syncope and headaches  Psychiatric/Behavioral: Negative for agitation, behavioral problems and confusion  Physical Exam  Constitutional:       General: He is not in acute distress  Appearance: He is well-developed  He is not diaphoretic  HENT:      Head: Atraumatic  Eyes:      General: No scleral icterus  Right eye: No discharge  Left eye: No discharge  Neck:      Musculoskeletal: Neck supple  Cardiovascular:      Rate and Rhythm: Normal rate and regular rhythm  Pulmonary:      Breath sounds: No stridor  No wheezing  Abdominal:      General: There is no distension  Palpations: Abdomen is soft  Tenderness: There is no abdominal tenderness  There is no guarding  Skin:     Coloration: Skin is not jaundiced or pale  Comments: Anterior sternal incision has healed well   Neurological:      General: No focal deficit present  Mental Status: He is alert and oriented to person, place, and time  Cranial Nerves: No cranial nerve deficit  Psychiatric:         Mood and Affect: Mood normal          Behavior: Behavior normal            Diagnostic Data       Recent labs were reviewed  labs done on 04/18/2021 revealed sodium of 140, potassium 4 1, BUN 32, creatinine 0 73, WBC 8, hemoglobin 11 8, platelet count 740  Imaging studies done at the hospital were  Reviewed  EKG showed normal sinus rhythm with nonspecific IVCD/LVH    Additional notes:      continue current treatment  Monitor weights  Follow-up repeat labs  Follow-up PT INR and adjust Coumadin dose accordingly  Monitor blood pressures on fludrocortisone   Continue PT OT  Follow-up with cardiology service   Total time spent on this patient's encounter was in excess of 45 minutes  data was personally added    Medication llist in epic was personally updated as well      This note was electronically signed by Dr Anny Tam

## 2021-04-23 NOTE — ASSESSMENT & PLAN NOTE
Patient underwent recent redo MVR /AVR  Postop course was complicated by swallowing issues needing NG tube feeding for some time  NG tube was later removed  Patient was started on Marinol  Recent echo revealed EF of 30-35%  Patient was discharged to SNF on LifeVest   Will continue to follow    Follow-up with cardiology service

## 2021-04-26 ENCOUNTER — TELEPHONE (OUTPATIENT)
Dept: FAMILY MEDICINE CLINIC | Facility: CLINIC | Age: 76
End: 2021-04-26

## 2021-04-26 NOTE — TELEPHONE ENCOUNTER
Odilia Lemos from [facility] called, patient needs to repeat PT- INR test  ND

## 2021-04-28 ENCOUNTER — NURSING HOME VISIT (OUTPATIENT)
Dept: GERIATRICS | Facility: OTHER | Age: 76
End: 2021-04-28
Payer: MEDICARE

## 2021-04-28 ENCOUNTER — NURSING HOME VISIT (OUTPATIENT)
Dept: WOUND CARE | Facility: HOSPITAL | Age: 76
End: 2021-04-28
Payer: MEDICARE

## 2021-04-28 VITALS
SYSTOLIC BLOOD PRESSURE: 158 MMHG | HEART RATE: 86 BPM | RESPIRATION RATE: 18 BRPM | DIASTOLIC BLOOD PRESSURE: 81 MMHG | WEIGHT: 137.6 LBS | OXYGEN SATURATION: 95 % | BODY MASS INDEX: 22.9 KG/M2 | TEMPERATURE: 98.1 F

## 2021-04-28 DIAGNOSIS — R26.2 AMBULATORY DYSFUNCTION: ICD-10-CM

## 2021-04-28 DIAGNOSIS — L89.150 PRESSURE INJURY OF SACRAL REGION, UNSTAGEABLE (HCC): Primary | ICD-10-CM

## 2021-04-28 DIAGNOSIS — Z98.890 S/P MVR (MITRAL VALVE REPAIR): Primary | ICD-10-CM

## 2021-04-28 PROBLEM — Z95.810 USES LIFEVEST DEFIBRILLATOR: Status: ACTIVE | Noted: 2021-04-28

## 2021-04-28 PROCEDURE — 99309 SBSQ NF CARE MODERATE MDM 30: CPT | Performed by: INTERNAL MEDICINE

## 2021-04-28 PROCEDURE — 99305 1ST NF CARE MODERATE MDM 35: CPT | Performed by: NURSE PRACTITIONER

## 2021-04-28 NOTE — PATIENT INSTRUCTIONS
Orders Placed This Encounter   Procedures    Wound cleansing and dressings     Wound:  Sacrum  Discontinue previous wound order  Remove the old wound dressing gently  If dry, wet with NSS prior to removal    Cleanse the wound bed with NSS  Apply non-sting skin prep to periwound area  Apply medihoney to wound bed, then cover with bordered foam   Frequency : daily and prn for soiling  Offload all wounds  Continue to use low air loss mattress  Turn and reposition frequently, maximum of every two hours  Increase protein intake as per RD recommendation  Monitor for any sign of infection or worsening, inform PCP or patient's primary physician in your facility immediately       Standing Status:   Future     Standing Expiration Date:   4/28/2022

## 2021-04-28 NOTE — PROGRESS NOTES
[address]  [address]   [address] [facility] [address] [facility], [address]     Progress Note  Code SNF 31    Patient Location     [facility] rehab    Reason for visit      follow-up recent surgery for MVR/AVR, CAD, PAF   hyperlipidemia, history of gout, history of orthostatic hypotension  Patients care was coordinated with nursing facility staff  Recent vitals, labs and updated medications were reviewed on [employer] system of facility  Past Medical, surgical, social, medication and allergy history and patients previous records reviewed  Problem List Items Addressed This Visit        Other    S/P MVR (mitral valve repair) - Primary             S/P MVR (mitral valve repair)    Patient underwent recent redo MVR /AVR  Recent echo revealed EF of 30-35%  Patient was discharged to SNF on LifeVest   Will continue to follow  Follow-up with cardiology service      CAD:  history of PTCA, LAD  Recent cardiac catheterization revealed patent stent with no new CAD  Continue maintenance cardiac meds including carvedilol, Entresto and   Aspirin      PAF:   heart rate stable on carvedilol  Patient remains on Coumadin     Follow-up repeat INR  PT INR was 3 yesterday  Coumadin dose was adjusted      chronic systolic congestive heart failure /cardiomyopathy:   Patient has gained 3 lb since admission, was on Lasix  For a short time in the hospital, discontinued upon discharge due to  orthostatic hypotension and prerenal azotemia  Patient complains of orthopnea Recent creatinine was stable at 0 72  Blood pressure is currently stable at 150 8/81  Will give 1 dose of Lasix 40 mg followed by Lasix 20 mg daily      UTI:   recent urine culture revealed growth of Klebsiella    Patient completed antibiotic course on 04/09/2021         history of gout   continue colchicine and allopurinol      hyperlipidemia:      continue atorvastatin      history of peptic ulcer disease   continue Protonix      history of BPH   patient was recently taken off Rapaflo due to orthostatic hypotension      orthostatic hypotension   continue Florinef  Blood pressure readings have been stable, occasionally high  If levels remain persistently high will reduce days to 1 tablet daily  Patient was recently taken off of Lasix, Rapaflo and Aldactone at the hospital due to orthostatic hypotension     hypokalemia:   continue case supplements  Recent K level was 3 7      HPI      patient is being seen for a follow-up visit today  He was recently transferred to [facility] rehab following redo surgery for MVR and AVR  Patient is progressing well with therapy  Awake alert in no distress  Able to make his needs known  Blood pressure readings have been stable, occasionally high  Patient is continent of bowel and bladder  No fever chills or chest congestion  patient reports having symptoms of orthopnea  He is additionally noted to have increased edema bilateral lower extremities            Review of Systems   Constitutional: Positive for unexpected weight change (Weight gain of 3 lb since admission)  Negative for chills, fatigue and fever  HENT: Negative for nosebleeds and rhinorrhea  Eyes: Negative for discharge and redness  Respiratory: Positive for shortness of breath (Upon laying down)  Negative for cough, chest tightness, wheezing and stridor  Cardiovascular: Negative for chest pain and leg swelling  Gastrointestinal: Negative for abdominal distention, abdominal pain, diarrhea and vomiting  Genitourinary: Negative for dysuria, flank pain and hematuria  Musculoskeletal: Positive for gait problem  Negative for arthralgias and back pain  Skin: Negative for pallor  Neurological: Positive for weakness (Generalized)  Negative for tremors, seizures, syncope and headaches  Psychiatric/Behavioral: Negative for agitation, behavioral problems and confusion         Past Medical History:   Diagnosis Date  Anaplasmosis 2018    CAD (coronary artery disease)     Cancer (HCC)     prostate    Colon polyp     Coronary artery disease     GERD (gastroesophageal reflux disease)     H/O heart artery stent     x2    Hypercholesteremia     Hypertension     Keratotic lesion     Stroke ([address] )     10YRS AGO    Transient cerebral ischemia        Past Surgical History:   Procedure Laterality Date    BACK SURGERY      CARDIAC SURGERY      mvp repair    CAROTID ENDARTERECTOMY Right 2020    CHOLECYSTECTOMY      COLONOSCOPY      ESOPHAGOGASTRODUODENOSCOPY N/A 2018    Procedure: ESOPHAGOGASTRODUODENOSCOPY (EGD); Surgeon: Benjamin Nino MD;  Location: [facility];   Service: Gastroenterology    JOINT REPLACEMENT      L hip replacement    ORCHIECTOMY      PROSTATECTOMY      TOTAL HIP ARTHROPLASTY         Social History     Tobacco Use   Smoking Status Former Smoker    Packs/day: 0 25    Quit date: 2018    Years since quittin 9   Smokeless Tobacco Never Used   Tobacco Comment    4 cig/day       Family History   Problem Relation Age of Onset    Heart disease Mother     Stroke Mother         CVA    Cancer Father     Hodgkin's lymphoma Father     Lung cancer Father         No Known Allergies      Current Outpatient Medications:     allopurinol (ZYLOPRIM) 100 mg tablet, Take 1 tablet (100 mg total) by mouth daily (Patient taking differently: Take 100 mg by mouth every other day ), Disp: 90 tablet, Rfl: 3    aspirin 81 mg chewable tablet, Chew 81 mg daily, Disp: , Rfl:     atorvastatin (LIPITOR) 40 mg tablet, Take 40 mg by mouth daily  , Disp: , Rfl:     carvedilol (COREG) 3 125 mg tablet, Take 3 125 mg by mouth 2 (two) times a day with meals, Disp: , Rfl:     colchicine (COLCRYS) 0 6 mg tablet, Take 0 6 mg by mouth daily, Disp: , Rfl:     fludrocortisone (FLORINEF) 0 1 mg tablet, Take 0 1 mg by mouth 2 (two) times a day, Disp: , Rfl:     Melatonin 2 5 MG CAPS, Take 5 mg by mouth, Disp: , Rfl:     Multiple Vitamins-Minerals (PX MENS MULTIVITAMINS) TABS, Take by mouth, Disp: , Rfl:     pantoprazole (PROTONIX) 40 mg tablet, Take 1 tablet (40 mg total) by mouth daily, Disp: 90 tablet, Rfl: 3    potassium chloride (MICRO-K) 10 MEQ CR capsule, Take 10 mEq by mouth 2 (two) times a day, Disp: , Rfl:     sacubitril-valsartan (ENTRESTO) 24-26 MG TABS, Take 1 tablet by mouth daily, Disp: , Rfl:     warfarin (COUMADIN) 2 5 mg tablet, Take 2 tablets (5 mg total) by mouth daily, Disp: , Rfl: 0    Updated list was reviewed in [facility] system of facility  Vitals:    04/28/21 1132   BP: 158/81   Pulse: 86   Resp: 18   Temp: 98 1 °F (36 7 °C)   SpO2: 95%       Physical Exam  Constitutional:       General: He is not in acute distress  Appearance: He is well-developed  He is not diaphoretic  HENT:      Head: Normocephalic and atraumatic  Nose: No rhinorrhea  Eyes:      General: No scleral icterus  Right eye: No discharge  Left eye: No discharge  Neck:      Musculoskeletal: Neck supple  Cardiovascular:      Rate and Rhythm: Normal rate and regular rhythm  Pulmonary:      Breath sounds: No stridor  No wheezing  Abdominal:      General: There is no distension  Palpations: Abdomen is soft  Tenderness: There is no abdominal tenderness  Musculoskeletal:      Right lower leg: Edema present  Left lower leg: Edema present  Skin:     Coloration: Skin is not jaundiced or pale  Neurological:      Mental Status: He is alert  Cranial Nerves: No cranial nerve deficit  Psychiatric:         Mood and Affect: Mood normal          Behavior: Behavior normal          Diagnostic Data:    Recent labs were reviewed     labs done on 04/26/2021 revealed hemoglobin of 12 1, WBC count 9, platelet count 994, BUN 11, creatinine 0 72, sodium 143, potassium 3 7    Additional Notes:    continue current treatment   hold Coumadin today as PT INR went up from 1 4 to 3 yesterday  Continue Coumadin 5 mg starting tomorrow    Follow-up repeat INR on 05/04/2021   Lasix 40 mg x 1 dose  Today followed by 20 mg daily  KCl 40 mEq x 1 dose today    This note was electronically signed by Dr Yadira Koch

## 2021-04-28 NOTE — ASSESSMENT & PLAN NOTE
- Location : [facility]  - Wound healing status: first visit  - Autolytic debridement using medihoney  - Pressure redistribution device -  On low air loss mattress  - Continue to offload  - Increase protein as per RD recommendation   - No sign localized infection during visit  - Patient is for discharge this week with [facility]  If wound worsened, please schedule a follow up appointment with [facility] outpatient wound center

## 2021-04-28 NOTE — PROGRESS NOTES
[address] MANAGEMENT   [facility]       Patient ID: Trav Matias is a 77 y o  male Date of Birth 1945     Location of Service: [facility]    Performed wound round with: GLORIA MALCOLM, and unit manager      Chief Complaint   Patient presents with    New Patient Visit     sacrum       Wound Instructions:  Orders Placed This Encounter   Procedures    Wound cleansing and dressings     Wound:  Sacrum  Discontinue previous wound order  Remove the old wound dressing gently  If dry, wet with NSS prior to removal    Cleanse the wound bed with NSS  Apply non-sting skin prep to periwound area  Apply medihoney to wound bed, then cover with bordered foam   Frequency : daily and prn for soiling  Offload all wounds  Continue to use low air loss mattress  Turn and reposition frequently, maximum of every two hours  Increase protein intake as per RD recommendation  Monitor for any sign of infection or worsening, inform PCP or patient's primary physician in your facility immediately  Standing Status:   Future     Standing Expiration Date:   4/28/2022       Allergies  Patient has no known allergies  Assessment & Plan:  1  Pressure injury of sacral region, unstageable [facility])  Assessment & Plan:  - Location : Sacrum  - Wound healing status: first visit  - Autolytic debridement using medihoney  - Pressure redistribution device -  On low air loss mattress  - Continue to offload  - Increase protein as per RD recommendation   - No sign localized infection during visit  - Patient is for discharge this week with [facility]  If wound worsened, please schedule a follow up appointment with [address] [place] [address] outpatient wound center  Orders:  -     Wound cleansing and dressings; Future    2  Ambulatory dysfunction  Assessment & Plan:  - on PT/OT    Orders:  -     Wound cleansing and dressings; Future           Subjective: This is a 77year old male referred to our service for wound on the sacrum  Patient was recently hospitalized for MVP repair and was [facility]  Received patient in bed, eating breakfast with lifevest  As per report, the wound started prior to admission to [facility], 4/22/2021  Etiology : as per report, pressure ulcer  Severity : no sign of infection  Patient is ambulatory, continent of both bowel and bladder and AO x 3  Currently using low air loss mattress  Patient's care was coordinated with nursing facility staff  Recent vitals, labs and updated medications were reviewed on EMR or chart system of facility   Past Medical, surgical, social, medication and allergy history and patient's previous records were reviewed and updated as appropriate:     Patient Active Problem List   Diagnosis    CAD (coronary atherosclerotic disease)    [facility]est[facility]    Chronic back pain    GERD (gastroesophageal reflux disease)    Hypercholesterolemia    Hypertension    PAF (paroxysmal atrial fibrillation) ([facility])    Peptic ulcer disease    S/P MVR (mitral valve repair)    Polyp of colon    Abnormal RBC indices    Fatigue    Chronic anticoagulation    Pain in gums    Mild protein-calorie malnutrition (HCC)    Idiopathic chronic gout of multiple sites without tophus    Impaired fasting glucose    Anemia due to blood loss, acute    History of normocytic normochromic anemia    Pressure injury of sacral region, unstageable ([address] )    Uses LifeVest defibrillator    Ambulatory dysfunction     Past Medical History:   Diagnosis Date    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    CAD (coronary artery disease)     Cancer ([facility] )     prostate    Colon polyp     Coronary artery disease     GERD (gastroesophageal reflux disease)     H/O heart artery stent     x2    Hypercholesteremia     Hypertension     Keratotic lesion     Stroke ([facility] )     10YRS AGO    Transient cerebral ischemia      Past Surgical History:   Procedure Laterality Date    BACK SURGERY      CARDIAC SURGERY      mvp repair    CAROTID ENDARTERECTOMY Right 2020    CHOLECYSTECTOMY      COLONOSCOPY      ESOPHAGOGASTRODUODENOSCOPY N/A 2018    Procedure: ESOPHAGOGASTRODUODENOSCOPY (EGD); Surgeon: Pippa Black MD;  Location: [facility];   Service: Gastroenterology    JOINT REPLACEMENT      L hip replacement    ORCHIECTOMY      PROSTATECTOMY      TOTAL HIP ARTHROPLASTY       Social History     Socioeconomic History    Marital status:      Spouse name: None    Number of children: None    Years of education: None    Highest education level: None   Occupational History    Occupation: retired   Social Needs    Financial resource strain: None    Food insecurity     Worry: None     Inability: None    Transportation needs     Medical: None     Non-medical: None   Tobacco Use    Smoking status: Former Smoker     Packs/day: 0 25     Quit date: 2018     Years since quittin 9    Smokeless tobacco: Never Used    Tobacco comment: 4 cig/day   Substance and Sexual Activity    Alcohol use: Never     Alcohol/week: 2 0 standard drinks     Types: 2 Standard drinks or equivalent per week     Frequency: Never    Drug use: No    Sexual activity: Never     Comment: no high risk sexual behavior   Lifestyle    Physical activity     Days per week: None     Minutes per session: None    Stress: None   Relationships    Social connections     Talks on phone: None     Gets together: None     Attends Zoroastrian service: None     Active member of club or organization: None     Attends meetings of clubs or organizations: None     Relationship status: None    Intimate partner violence     Fear of current or ex partner: None     Emotionally abused: None     Physically abused: None     Forced sexual activity: None   Other Topics Concern    None   Social History Narrative    None        Current Outpatient Medications:     allopurinol (ZYLOPRIM) 100 mg tablet, Take 1 tablet (100 mg total) by mouth daily (Patient taking differently: Take 100 mg by mouth every other day ), Disp: 90 tablet, Rfl: 3    aspirin 81 mg chewable tablet, Chew 81 mg daily, Disp: , Rfl:     atorvastatin (LIPITOR) 40 mg tablet, Take 40 mg by mouth daily  , Disp: , Rfl:     carvedilol (COREG) 3 125 mg tablet, Take 3 125 mg by mouth 2 (two) times a day with meals, Disp: , Rfl:     colchicine (COLCRYS) 0 6 mg tablet, Take 0 6 mg by mouth daily, Disp: , Rfl:     fludrocortisone (FLORINEF) 0 1 mg tablet, Take 0 1 mg by mouth 2 (two) times a day, Disp: , Rfl:     Melatonin 2 5 MG CAPS, Take 5 mg by mouth, Disp: , Rfl:     Multiple Vitamins-Minerals (PX MENS MULTIVITAMINS) TABS, Take by mouth, Disp: , Rfl:     pantoprazole (PROTONIX) 40 mg tablet, Take 1 tablet (40 mg total) by mouth daily, Disp: 90 tablet, Rfl: 3    potassium chloride (MICRO-K) 10 MEQ CR capsule, Take 10 mEq by mouth 2 (two) times a day, Disp: , Rfl:     sacubitril-valsartan (ENTRESTO) 24-26 MG TABS, Take 1 tablet by mouth daily, Disp: , Rfl:     warfarin (COUMADIN) 2 5 mg tablet, Take 2 tablets (5 mg total) by mouth daily, Disp: , Rfl: 0  Family History   Problem Relation Age of Onset    Heart disease Mother     Stroke Mother         CVA    Cancer Father     Hodgkin's lymphoma Father     Lung cancer Father         Review of Systems   Constitutional: Negative for appetite change and fatigue  HENT: Negative for mouth sores, sore throat and trouble swallowing  Eyes: Negative for pain, discharge, itching and visual disturbance  Respiratory: Negative for cough, chest tightness and shortness of breath  Cardiovascular: Negative for chest pain and leg swelling  Gastrointestinal: Negative for abdominal distention, abdominal pain, constipation, diarrhea and nausea  Endocrine: Negative for polydipsia, polyphagia and polyuria  Genitourinary: Negative for dysuria and flank pain  Musculoskeletal: Positive for gait problem     Skin: Positive for wound  See HPI   Allergic/Immunologic: Negative for environmental allergies  Neurological: Negative for dizziness, seizures and headaches  Psychiatric/Behavioral: Negative for behavioral problems and confusion  The patient is not nervous/anxious  Objective: There were no vitals taken for this visit  Physical Exam  Constitutional:       Appearance: Normal appearance  HENT:      Head: Normocephalic and atraumatic  Nose: Nose normal    Eyes:      Pupils: Pupils are equal, round, and reactive to light  Neck:      Musculoskeletal: Normal range of motion  Cardiovascular:      Rate and Rhythm: Normal rate  Pulmonary:      Effort: Pulmonary effort is normal    Abdominal:      Tenderness: There is no abdominal tenderness  There is no guarding  Musculoskeletal:      Right lower leg: Edema present  Skin:     General: Skin is dry  Findings: Lesion present  Comments: Location Sacrum wound bed - 3 0 x 0 2 x 0 1 cm , no undermining, no tunneling, 0 % epithelial, 50%granulation, 50%slough, exudate - scant amount of serosanguinous drainage, no malodor ( assess after dressing removal and cleansing), wound edge - attached to base, periwound - intact  No localized sign of infection, Denies pain     Neurological:      Mental Status: He is alert and oriented to person, place, and time  Psychiatric:         Mood and Affect: Mood normal          Behavior: Behavior normal               Procedures             Coordination of Care: Unit manager aware of the treatment plan    Follow up :  Return in about 1 week (around 5/5/2021)        JAYLAN Damon

## 2021-05-04 ENCOUNTER — TRANSCRIBE ORDERS (OUTPATIENT)
Dept: LAB | Facility: CLINIC | Age: 76
End: 2021-05-04

## 2021-05-04 ENCOUNTER — APPOINTMENT (OUTPATIENT)
Dept: LAB | Facility: CLINIC | Age: 76
End: 2021-05-04
Payer: MEDICARE

## 2021-05-04 DIAGNOSIS — I48.0 PAROXYSMAL ATRIAL FIBRILLATION (HCC): ICD-10-CM

## 2021-05-04 DIAGNOSIS — I48.0 PAROXYSMAL ATRIAL FIBRILLATION (HCC): Primary | ICD-10-CM

## 2021-05-04 DIAGNOSIS — Z79.01 LONG TERM (CURRENT) USE OF ANTICOAGULANTS: ICD-10-CM

## 2021-05-04 LAB
INR PPP: 1.43 (ref 0.84–1.19)
PROTHROMBIN TIME: 17.4 SECONDS (ref 11.6–14.5)

## 2021-05-04 PROCEDURE — 36415 COLL VENOUS BLD VENIPUNCTURE: CPT

## 2021-05-04 PROCEDURE — 85610 PROTHROMBIN TIME: CPT

## 2021-05-19 ENCOUNTER — OFFICE VISIT (OUTPATIENT)
Dept: INTERNAL MEDICINE CLINIC | Facility: CLINIC | Age: 76
End: 2021-05-19
Payer: MEDICARE

## 2021-05-19 VITALS
BODY MASS INDEX: 22.82 KG/M2 | HEART RATE: 68 BPM | TEMPERATURE: 98.3 F | HEIGHT: 65 IN | RESPIRATION RATE: 16 BRPM | DIASTOLIC BLOOD PRESSURE: 58 MMHG | SYSTOLIC BLOOD PRESSURE: 100 MMHG | WEIGHT: 137 LBS

## 2021-05-19 DIAGNOSIS — Z95.2 S/P MVR (MITRAL VALVE REPLACEMENT): ICD-10-CM

## 2021-05-19 DIAGNOSIS — I10 ESSENTIAL HYPERTENSION: ICD-10-CM

## 2021-05-19 DIAGNOSIS — I25.10 ATHEROSCLEROSIS OF NATIVE CORONARY ARTERY OF NATIVE HEART WITHOUT ANGINA PECTORIS: ICD-10-CM

## 2021-05-19 DIAGNOSIS — Z95.2 S/P AVR (AORTIC VALVE REPLACEMENT): Primary | ICD-10-CM

## 2021-05-19 PROCEDURE — 1124F ACP DISCUSS-NO DSCNMKR DOCD: CPT | Performed by: INTERNAL MEDICINE

## 2021-05-19 PROCEDURE — 99214 OFFICE O/P EST MOD 30 MIN: CPT | Performed by: INTERNAL MEDICINE

## 2021-05-19 RX ORDER — FUROSEMIDE 40 MG/1
40 TABLET ORAL DAILY
COMMUNITY
Start: 2021-02-11 | End: 2022-02-11

## 2021-05-19 NOTE — PROGRESS NOTES
Assessment/Plan:       Appears to be doing okay  Continue follow-up with Cardiology  Quality Measures:       Return in about 4 months (around 9/19/2021)  No problem-specific Assessment & Plan notes found for this encounter  Diagnoses and all orders for this visit:    S/P AVR (aortic valve replacement)    S/P MVR (mitral valve replacement)    Atherosclerosis of native coronary artery of native heart without angina pectoris    Essential hypertension    Other orders  -     furosemide (LASIX) 40 mg tablet; Take 40 mg by mouth daily          Subjective:      Patient ID: Joleen Rao is a 77 y o  male  Patient comes in today for routine follow-up  However, since his last visit he did require valve replacement surgery for his aortic and mitral valve  From what he describes, he had some trouble with bleeding and was anemic  They had trouble maintaining his pressure for a while but then that was corrected  He went to rehab, actually 2 facilities  At the 2nd facility, he felt he was not really getting much therapy so he asked to go home  Since discharge he has been doing okay  He sees his cardiologist virtually  His valves appear to be working well  Medications were adjusted but he still unclear about all of them  He is still off of the blood thinner he reports  No recent troubles with his stomach or bowels  Denies any new complaints today  No further additions to his history        ALLERGIES:  No Known Allergies    CURRENT MEDICATIONS:    Current Outpatient Medications:     allopurinol (ZYLOPRIM) 100 mg tablet, Take 1 tablet (100 mg total) by mouth daily (Patient taking differently: Take 100 mg by mouth every other day ), Disp: 90 tablet, Rfl: 3    atorvastatin (LIPITOR) 40 mg tablet, Take 40 mg by mouth daily  , Disp: , Rfl:     carvedilol (COREG) 3 125 mg tablet, Take 3 125 mg by mouth 2 (two) times a day with meals, Disp: , Rfl:     colchicine (COLCRYS) 0 6 mg tablet, Take 0 6 mg by mouth daily, Disp: , Rfl:     fludrocortisone (FLORINEF) 0 1 mg tablet, Take 0 1 mg by mouth 2 (two) times a day, Disp: , Rfl:     furosemide (LASIX) 40 mg tablet, Take 40 mg by mouth daily, Disp: , Rfl:     Melatonin 2 5 MG CAPS, Take 5 mg by mouth, Disp: , Rfl:     Multiple Vitamins-Minerals (PX MENS MULTIVITAMINS) TABS, Take by mouth, Disp: , Rfl:     pantoprazole (PROTONIX) 40 mg tablet, Take 1 tablet (40 mg total) by mouth daily, Disp: 90 tablet, Rfl: 3    potassium chloride (MICRO-K) 10 MEQ CR capsule, Take 10 mEq by mouth 2 (two) times a day, Disp: , Rfl:     sacubitril-valsartan (ENTRESTO) 24-26 MG TABS, Take 1 tablet by mouth daily, Disp: , Rfl:     aspirin 81 mg chewable tablet, Chew 81 mg daily, Disp: , Rfl:     ACTIVE PROBLEM LIST:  Patient Active Problem List   Diagnosis    CAD (coronary atherosclerotic disease)    [facility]) [facility])    Chronic back pain    GERD (gastroesophageal reflux disease)    Hypercholesterolemia    Hypertension    PAF (paroxysmal atrial fibrillation) ([facility])    Peptic ulcer disease    S/P MVR (mitral valve repair)    Polyp of colon    Abnormal RBC indices    Fatigue    Chronic anticoagulation    Pain in gums    Mild protein-calorie malnutrition (HCC)    Idiopathic chronic gout of multiple sites without tophus    Impaired fasting glucose    Anemia due to blood loss, acute    History of normocytic normochromic anemia    Pressure injury of sacral region, unstageable ([facility] )    Uses LifeVest defibrillator    Ambulatory dysfunction       PAST MEDICAL HISTORY:  Past Medical History:   Diagnosis Date    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    CAD (coronary artery disease)     Cancer ([facility] )     prostate    Colon polyp     Coronary artery disease     GERD (gastroesophageal reflux disease)     H/O heart artery stent     x2    Hypercholesteremia     Hypertension     Keratotic lesion     Stroke ([facility] )     10YRS AGO    Transient cerebral ischemia PAST SURGICAL HISTORY:  Past Surgical History:   Procedure Laterality Date    BACK SURGERY      CARDIAC SURGERY      mvp repair    CAROTID ENDARTERECTOMY Right 2020    CHOLECYSTECTOMY      COLONOSCOPY      ESOPHAGOGASTRODUODENOSCOPY N/A 2018    Procedure: ESOPHAGOGASTRODUODENOSCOPY (EGD); Surgeon: Eric Curry MD;  [facility];   Service: Gastroenterology    JOINT REPLACEMENT      L hip replacement    ORCHIECTOMY      PROSTATECTOMY      TOTAL HIP ARTHROPLASTY         FAMILY HISTORY:  Family History   Problem Relation Age of Onset    Heart disease Mother     Stroke Mother         CVA    Cancer Father     Hodgkin's lymphoma Father     Lung cancer Father        SOCIAL HISTORY:  Social History     Socioeconomic History    Marital status:      Spouse name: Not on file    Number of children: Not on file    Years of education: Not on file    Highest education level: Not on file   Occupational History    Occupation: retired   Social Needs    Financial resource strain: Not on file    Food insecurity     Worry: Not on file     Inability: Not on file   [employer] needs     Medical: Not on file     Non-medical: Not on file   Tobacco Use    Smoking status: Former Smoker     Packs/day: 0 25     Quit date: 2018     Years since quittin 9    Smokeless tobacco: Never Used    Tobacco comment: 4 cig/day   Substance and Sexual Activity    Alcohol use: Never     Alcohol/week: 2 0 standard drinks     Types: 2 Standard drinks or equivalent per week     Frequency: Never    Drug use: No    Sexual activity: Never     Comment: no high risk sexual behavior   Lifestyle    Physical activity     Days per week: Not on file     Minutes per session: Not on file    Stress: Not on file   Relationships    Social connections     Talks on phone: Not on file     Gets together: Not on file     Attends Taoism service: Not on file     Active member of club or organization: Not on file     Attends meetings of clubs or organizations: Not on file     Relationship status: Not on file    Intimate partner violence     Fear of current or ex partner: Not on file     Emotionally abused: Not on file     Physically abused: Not on file     Forced sexual activity: Not on file   Other Topics Concern    Not on file   Social History Narrative    Not on file       Review of Systems   Respiratory: Negative for shortness of breath  Cardiovascular: Negative for chest pain  Gastrointestinal: Negative for abdominal pain  Objective:  Vitals:    05/19/21 1321   BP: 100/58   BP Location: Left arm   Patient Position: Sitting   Cuff Size: Standard   Pulse: 68   Resp: 16   Temp: 98 3 °F (36 8 °C)   TempSrc: Tympanic   Weight: 62 1 kg (137 lb)   Height: 5' 5" (1 651 m)     Body mass index is 22 8 kg/m²  Physical Exam  Vitals signs and nursing note reviewed  Constitutional:       Appearance: He is well-developed  Cardiovascular:      Rate and Rhythm: Normal rate and regular rhythm  Heart sounds: Normal heart sounds  Pulmonary:      Effort: Pulmonary effort is normal       Breath sounds: Normal breath sounds  Abdominal:      Palpations: Abdomen is soft  Tenderness: There is no abdominal tenderness  Neurological:      Mental Status: He is alert and oriented to person, place, and time  RESULTS:    Recent Results (from the past 1008 hour(s))   Protime-INR    Collection Time: 05/04/21  3:26 PM   Result Value Ref Range    Protime 17 4 (H) 11 6 - 14 5 seconds    INR 1 43 (H) 0 84 - 1 19       This note was created with voice recognition software  Phonic, grammatical and spelling errors may be present within the note as a result

## 2021-06-28 ENCOUNTER — TELEPHONE (OUTPATIENT)
Dept: INTERNAL MEDICINE CLINIC | Facility: CLINIC | Age: 76
End: 2021-06-28

## 2021-06-28 NOTE — TELEPHONE ENCOUNTER
Patient was told by [facility] that he needs a prior authorization on his pantoprazole 40 mg tablet  [facility]    Please call patient back at [phone]  and let him know the status of this

## 2021-09-22 ENCOUNTER — OFFICE VISIT (OUTPATIENT)
Dept: INTERNAL MEDICINE CLINIC | Facility: CLINIC | Age: 76
End: 2021-09-22
Payer: MEDICARE

## 2021-09-22 VITALS
TEMPERATURE: 96.7 F | BODY MASS INDEX: 23.43 KG/M2 | DIASTOLIC BLOOD PRESSURE: 68 MMHG | HEART RATE: 74 BPM | HEIGHT: 65 IN | WEIGHT: 140.6 LBS | OXYGEN SATURATION: 98 % | RESPIRATION RATE: 16 BRPM | SYSTOLIC BLOOD PRESSURE: 96 MMHG

## 2021-09-22 DIAGNOSIS — Z00.00 MEDICARE ANNUAL WELLNESS VISIT, SUBSEQUENT: Primary | ICD-10-CM

## 2021-09-22 DIAGNOSIS — I50.9 CONGESTIVE HEART FAILURE, UNSPECIFIED HF CHRONICITY, UNSPECIFIED HEART FAILURE TYPE (HCC): ICD-10-CM

## 2021-09-22 DIAGNOSIS — M1A.09X0 IDIOPATHIC CHRONIC GOUT OF MULTIPLE SITES WITHOUT TOPHUS: ICD-10-CM

## 2021-09-22 DIAGNOSIS — I25.10 ATHEROSCLEROSIS OF NATIVE CORONARY ARTERY OF NATIVE HEART WITHOUT ANGINA PECTORIS: ICD-10-CM

## 2021-09-22 DIAGNOSIS — I10 ESSENTIAL HYPERTENSION: ICD-10-CM

## 2021-09-22 DIAGNOSIS — E78.00 HYPERCHOLESTEROLEMIA: ICD-10-CM

## 2021-09-22 PROBLEM — Z95.810 USES LIFEVEST DEFIBRILLATOR: Status: RESOLVED | Noted: 2021-04-28 | Resolved: 2021-09-22

## 2021-09-22 PROCEDURE — G0439 PPPS, SUBSEQ VISIT: HCPCS | Performed by: INTERNAL MEDICINE

## 2021-09-22 PROCEDURE — 99214 OFFICE O/P EST MOD 30 MIN: CPT | Performed by: INTERNAL MEDICINE

## 2021-09-22 RX ORDER — ALLOPURINOL 100 MG/1
100 TABLET ORAL DAILY
Qty: 90 TABLET | Refills: 3 | Status: SHIPPED | OUTPATIENT
Start: 2021-09-22

## 2021-09-22 NOTE — PATIENT INSTRUCTIONS
Medicare Preventive Visit Patient Instructions  Thank you for completing your Welcome to Medicare Visit or Medicare Annual Wellness Visit today  Your next wellness visit will be due in one year (9/23/2022)  The screening/preventive services that you may require over the next 5-10 years are detailed below  Some tests may not apply to you based off risk factors and/or age  Screening tests ordered at today's visit but not completed yet may show as past due  Also, please note that scanned in results may not display below  Preventive Screenings:  Service Recommendations Previous Testing/Comments   Colorectal Cancer Screening  · Colonoscopy    · Fecal Occult Blood Test (FOBT)/Fecal Immunochemical Test (FIT)  · Fecal DNA/Cologuard Test  · Flexible Sigmoidoscopy Age: 54-65 years old   Colonoscopy: every 10 years (May be performed more frequently if at higher risk)  OR  FOBT/FIT: every 1 year  OR  Cologuard: every 3 years  OR  Sigmoidoscopy: every 5 years  Screening may be recommended earlier than age 48 if at higher risk for colorectal cancer  Also, an individualized decision between you and your healthcare provider will decide whether screening between the ages of 74-80 would be appropriate   Colonoscopy: 02/23/2021  FOBT/FIT: 01/14/2021  Cologuard: Not on file  Sigmoidoscopy: 11/03/2015    Screening Current     Prostate Cancer Screening Individualized decision between patient and health care provider in men between ages of 53-78   Medicare will cover every 12 months beginning on the day after your 50th birthday PSA: No results in last 5 years     History Prostate Cancer  Screening Not Indicated     Hepatitis C Screening Once for adults born between 1945 and 1965  More frequently in patients at high risk for Hepatitis C Hep C Antibody: Not on file    Screening Current   Diabetes Screening 1-2 times per year if you're at risk for diabetes or have pre-diabetes Fasting glucose: 105 mg/dL   A1C: 5 6 %        Cholesterol Screening Once every 5 years if you don't have a lipid disorder  May order more often based on risk factors  Lipid panel: 12/10/2018    Screening Not Indicated  History Lipid Disorder      Other Preventive Screenings Covered by Medicare:  1  Abdominal Aortic Aneurysm (AAA) Screening: covered once if your at risk  You're considered to be at risk if you have a family history of AAA or a male between the age of 73-68 who smoking at least 100 cigarettes in your lifetime  2  Lung Cancer Screening: covers low dose CT scan once per year if you meet all of the following conditions: (1) Age 50-69; (2) No signs or symptoms of lung cancer; (3) Current smoker or have quit smoking within the last 15 years; (4) You have a tobacco smoking history of at least 30 pack years (packs per day x number of years you smoked); (5) You get a written order from a healthcare provider  3  Glaucoma Screening: covered annually if you're considered high risk: (1) You have diabetes OR (2) Family history of glaucoma OR (3)  aged 48 and older OR (3)  American aged 72 and older  3  Osteoporosis Screening: covered every 2 years if you meet one of the following conditions: (1) Have a vertebral abnormality; (2) On glucocorticoid therapy for more than 3 months; (3) Have primary hyperparathyroidism; (4) On osteoporosis medications and need to assess response to drug therapy  5  HIV Screening: covered annually if you're between the age of 12-76  Also covered annually if you are younger than 13 and older than 72 with risk factors for HIV infection  For pregnant patients, it is covered up to 3 times per pregnancy      Immunizations:  Immunization Recommendations   Influenza Vaccine Annual influenza vaccination during flu season is recommended for all persons aged >= 6 months who do not have contraindications   Pneumococcal Vaccine (Prevnar and Pneumovax)  * Prevnar = PCV13  * Pneumovax = PPSV23 Adults 25-60 years old: 1-3 doses may be recommended based on certain risk factors  Adults 72 years old: Prevnar (PCV13) vaccine recommended followed by Pneumovax (PPSV23) vaccine  If already received PPSV23 since turning 65, then PCV13 recommended at least one year after PPSV23 dose  Hepatitis B Vaccine 3 dose series if at intermediate or high risk (ex: diabetes, end stage renal disease, liver disease)   Tetanus (Td) Vaccine - COST NOT COVERED BY MEDICARE PART B Following completion of primary series, a booster dose should be given every 10 years to maintain immunity against tetanus  Td may also be given as tetanus wound prophylaxis  Tdap Vaccine - COST NOT COVERED BY MEDICARE PART B Recommended at least once for all adults  For pregnant patients, recommended with each pregnancy  Shingles Vaccine (Shingrix) - COST NOT COVERED BY MEDICARE PART B  2 shot series recommended in those aged 48 and above     Health Maintenance Due:      Topic Date Due    Colorectal Cancer Screening  02/23/2024    Hepatitis C Screening  Completed     Immunizations Due:  There are no preventive care reminders to display for this patient  Advance Directives   What are advance directives? Advance directives are legal documents that state your wishes and plans for medical care  These plans are made ahead of time in case you lose your ability to make decisions for yourself  Advance directives can apply to any medical decision, such as the treatments you want, and if you want to donate organs  What are the types of advance directives? There are many types of advance directives, and each state has rules about how to use them  You may choose a combination of any of the following:  · Living will: This is a written record of the treatment you want  You can also choose which treatments you do not want, which to limit, and which to stop at a certain time  This includes surgery, medicine, IV fluid, and tube feedings     · Durable power of  for healthcare [facility]): This is a written record that states who you want to make healthcare choices for you when you are unable to make them for yourself  This person, called a proxy, is usually a family member or a friend  You may choose more than 1 proxy  · Do not resuscitate (DNR) order:  A DNR order is used in case your heart stops beating or you stop breathing  It is a request not to have certain forms of treatment, such as CPR  A DNR order may be included in other types of advance directives  · Medical directive: This covers the care that you want if you are in a coma, near death, or unable to make decisions for yourself  You can list the treatments you want for each condition  Treatment may include pain medicine, surgery, blood transfusions, dialysis, IV or tube feedings, and a ventilator (breathing machine)  · Values history: This document has questions about your views, beliefs, and how you feel and think about life  This information can help others choose the care that you would choose  Why are advance directives important? An advance directive helps you control your care  Although spoken wishes may be used, it is better to have your wishes written down  Spoken wishes can be misunderstood, or not followed  Treatments may be given even if you do not want them  An advance directive may make it easier for your family to make difficult choices about your care  © Copyright [employer] 2018 Information is for End User's use only and may not be sold, redistributed or otherwise used for commercial purposes   All illustrations and images included in CareNotes® are the copyrighted property of [employer]  or [address] [place] [address] [place]

## 2021-09-22 NOTE — PROGRESS NOTES
Assessment/Plan:      Chronic problems appear stable  Was going to do blood work but he states he gets enough blood work from his cardiologist so he does not want to get anything else yet  Continue current medications  Continue follow-up with his specialist   Resume allopurinol  Quality Measures:       Return in about 4 months (around 1/22/2022) for Recheck  No problem-specific Assessment & Plan notes found for this encounter  Diagnoses and all orders for this visit:    Medicare annual wellness visit, subsequent    Essential hypertension    Atherosclerosis of native coronary artery of native heart without angina pectoris    Congestive heart failure, unspecified HF chronicity, unspecified heart failure type (HCC)    Idiopathic chronic gout of multiple sites without tophus  -     allopurinol (ZYLOPRIM) 100 mg tablet; Take 1 tablet (100 mg total) by mouth daily    Hypercholesterolemia          Subjective:      Patient ID: Jena Zabala is a 77 y o  male  Patient comes in today for routine follow-up and Medicare wellness  He states he is doing okay at this point  He did get his COVID vaccine  No problems in that regards  His heart disease is stable  He will be seeing his cardiologist soon  His Life Vest is off  Although, they are still talking about putting in a defibrillator  His blood pressure is controlled  His gout has not been bothering him but he was unable to refill his allopurinol  He has been taking it  He is not sure why and he states they gave him colchicine  He does not take that  Has not taken that in awhile        ALLERGIES:  No Known Allergies    CURRENT MEDICATIONS:    Current Outpatient Medications:     allopurinol (ZYLOPRIM) 100 mg tablet, Take 1 tablet (100 mg total) by mouth daily, Disp: 90 tablet, Rfl: 3    aspirin 81 mg chewable tablet, Chew 81 mg daily, Disp: , Rfl:     atorvastatin (LIPITOR) 40 mg tablet, Take 40 mg by mouth daily  , Disp: , Rfl:    carvedilol (COREG) 6 25 mg tablet, Take 6 25 mg by mouth 2 (two) times a day, Disp: , Rfl:     clopidogrel (PLAVIX) 75 mg tablet, Take 75 mg by mouth daily, Disp: , Rfl:     colchicine (COLCRYS) 0 6 mg tablet, Take 0 6 mg by mouth daily, Disp: , Rfl:     fludrocortisone (FLORINEF) 0 1 mg tablet, Take 0 1 mg by mouth 2 (two) times a day, Disp: , Rfl:     furosemide (LASIX) 40 mg tablet, Take 40 mg by mouth daily, Disp: , Rfl:     Melatonin 2 5 MG CAPS, Take 5 mg by mouth, Disp: , Rfl:     Multiple Vitamins-Minerals (PX MENS MULTIVITAMINS) TABS, Take by mouth, Disp: , Rfl:     pantoprazole (PROTONIX) 40 mg tablet, Take 1 tablet (40 mg total) by mouth daily, Disp: 90 tablet, Rfl: 3    sacubitril-valsartan (ENTRESTO) 49-51 MG TABS, Take 1 tablet by mouth 2 (two) times a day, Disp: , Rfl:     spironolactone (ALDACTONE) 25 mg tablet, Take 25 mg by mouth daily, Disp: , Rfl:     warfarin (COUMADIN) 2 5 mg tablet, take 2 tablets by mouth daily EXCEPT 3 TABLETS EVERY THURSDAY OR AS DIRECTED, Disp: , Rfl:     potassium chloride (MICRO-K) 10 MEQ CR capsule, Take 10 mEq by mouth 2 (two) times a day (Patient not taking: Reported on 9/22/2021), Disp: , Rfl:     spironolactone (ALDACTONE) 25 mg tablet, Take 25 mg by mouth daily (Patient not taking: Reported on 9/22/2021), Disp: , Rfl:     warfarin (COUMADIN) 2 5 mg tablet, 5 mg daily or as directed by doctor (Patient not taking: Reported on 9/22/2021), Disp: , Rfl:     ACTIVE PROBLEM LIST:  Patient Active Problem List   Diagnosis    CAD (coronary atherosclerotic disease)    CHF (congestive heart failure) (HCC)    Chronic back pain    GERD (gastroesophageal reflux disease)    Hypercholesterolemia    Hypertension    PAF (paroxysmal atrial fibrillation) (HCC)    Peptic ulcer disease    S/P MVR (mitral valve repair)    Polyp of colon    Abnormal RBC indices    Fatigue    Chronic anticoagulation    Pain in gums    Mild protein-calorie malnutrition ([address] )    Idiopathic chronic gout of multiple sites without tophus    Impaired fasting glucose    Anemia due to blood loss, acute    History of normocytic normochromic anemia    Pressure injury of sacral region, unstageable ([facility] [address] )    Ambulatory dysfunction       PAST MEDICAL HISTORY:  Past Medical History:   Diagnosis Date    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    CAD (coronary artery disease)     Cancer (HCC)     prostate    Colon polyp     Coronary artery disease     GERD (gastroesophageal reflux disease)     H/O heart artery stent     x2    Hypercholesteremia     Hypertension     Keratotic lesion     Stroke ([facility] [address] )     10YRS AGO    Transient cerebral ischemia        PAST SURGICAL HISTORY:  Past Surgical History:   Procedure Laterality Date    BACK SURGERY      CARDIAC SURGERY      mvp repair    CAROTID ENDARTERECTOMY Right 03/2020    CHOLECYSTECTOMY      COLONOSCOPY      ESOPHAGOGASTRODUODENOSCOPY N/A 6/22/2018    Procedure: ESOPHAGOGASTRODUODENOSCOPY (EGD); Surgeon: Kranthi Art MD;  Location: [facility];   Service: Gastroenterology    JOINT REPLACEMENT      L hip replacement    ORCHIECTOMY      PROSTATECTOMY      TOTAL HIP ARTHROPLASTY         FAMILY HISTORY:  Family History   Problem Relation Age of Onset    Heart disease Mother     Stroke Mother         CVA    Cancer Father     Hodgkin's lymphoma Father     Lung cancer Father        SOCIAL HISTORY:  Social History     Socioeconomic History    Marital status:      Spouse name: Not on file    Number of children: Not on file    Years of education: Not on file    Highest education level: Not on file   Occupational History    Occupation: retired   Tobacco Use    Smoking status: Former Smoker     Packs/day: 0 25     Quit date: 5/22/2018     Years since quitting: 3 3    Smokeless tobacco: Never Used    Tobacco comment: 4 cig/day   Vaping Use    Vaping Use: Never used   Substance and Sexual Activity    Alcohol use: Never Alcohol/week: 2 0 standard drinks     Types: 2 Standard drinks or equivalent per week    Drug use: No    Sexual activity: Never     Comment: no high risk sexual behavior   Other Topics Concern    Not on file   Social History Narrative    Not on file     Social Determinants of Health     Financial Resource Strain:     Difficulty of Paying Living Expenses:    Food Insecurity:     Worried About Running Out of Food in the Last Year:     Ran Out of Food in the Last Year:    Transportation Needs:     Lack of Transportation (Medical):  Lack of Transportation (Non-Medical):    Physical Activity:     Days of Exercise per Week:     Minutes of Exercise per Session:    Stress:     Feeling of Stress :    Social Connections:     Frequency of Communication with Friends and Family:     Frequency of Social Gatherings with Friends and Family:     Attends Hoah[employer] Services:     Active Member of Clubs or Organizations:     Attends Club or Organization Meetings:     Marital Status:    Intimate Partner Violence:     Fear of Current or Ex-Partner:     Emotionally Abused:     Physically Abused:     Sexually Abused:        Review of Systems   Respiratory: Negative for shortness of breath  Cardiovascular: Negative for chest pain  Gastrointestinal: Negative for abdominal pain  Objective:  Vitals:    09/22/21 1124   BP: 96/68   BP Location: Left arm   Patient Position: Sitting   Cuff Size: Adult   Pulse: 74   Resp: 16   Temp: (!) 96 7 °F (35 9 °C)   TempSrc: Tympanic   SpO2: 98%   Weight: 63 8 kg (140 lb 9 6 oz)   Height: 5' 5" (1 651 m)     Body mass index is 23 4 kg/m²  Physical Exam  Vitals and nursing note reviewed  Constitutional:       Appearance: He is well-developed  Cardiovascular:      Rate and Rhythm: Normal rate and regular rhythm  Heart sounds: Normal heart sounds  Pulmonary:      Effort: Pulmonary effort is normal       Breath sounds: Normal breath sounds     Abdominal: Palpations: Abdomen is soft  Tenderness: There is no abdominal tenderness  Neurological:      Mental Status: He is alert and oriented to person, place, and time  RESULTS:    No results found for this or any previous visit (from the past 1008 hour(s))  This note was created with voice recognition software  Phonic, grammatical and spelling errors may be present within the note as a result

## 2021-09-22 NOTE — PROGRESS NOTES
Assessment and Plan:     Problem List Items Addressed This Visit     None          Depression Screening and Follow-up Plan:   Patient was screened for depression during today's encounter  They screened negative with a PHQ-2 score of 2  Preventive health issues were discussed with patient, and age appropriate screening tests were ordered as noted in patient's After Visit Summary  Personalized health advice and appropriate referrals for health education or preventive services given if needed, as noted in patient's After Visit Summary       History of Present Illness:     Patient presents for Medicare Annual Wellness visit    Patient Care Team:  Soila Hernandez MD as PCP - [address] [place], [place] [id number] as Physician Assistant (Physician Assistant)     Problem List:     Patient Active Problem List   Diagnosis    CAD (coronary atherosclerotic disease)    CHF (congestive heart failure) (HCC)    Chronic back pain    GERD (gastroesophageal reflux disease)    Hypercholesterolemia    Hypertension    PAF (paroxysmal atrial fibrillation) (HCC)    Peptic ulcer disease    S/P MVR (mitral valve repair)    Polyp of colon    Abnormal RBC indices    Fatigue    Chronic anticoagulation    Pain in gums    Mild protein-calorie malnutrition (HCC)    Idiopathic chronic gout of multiple sites without tophus    Impaired fasting glucose    Anemia due to blood loss, acute    History of normocytic normochromic anemia    Pressure injury of sacral region, unstageable (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 [address] )     prostate    Colon polyp     Coronary artery disease     GERD (gastroesophageal reflux disease)     H/O heart artery stent     x2    Hypercholesteremia     Hypertension     Keratotic lesion     Stroke ([facility] [address] )     10YRS AGO    Transient cerebral ischemia      Past Surgical History:   Procedure Laterality Date    BACK SURGERY      CARDIAC SURGERY      mvp repair    CAROTID ENDARTERECTOMY Right 03/2020    CHOLECYSTECTOMY      COLONOSCOPY      ESOPHAGOGASTRODUODENOSCOPY N/A 6/22/2018    Procedure: ESOPHAGOGASTRODUODENOSCOPY (EGD); Surgeon: Coleta Gaucher, MD;  Location: [facility]; Service: Gastroenterology    JOINT REPLACEMENT      L hip replacement    ORCHIECTOMY      PROSTATECTOMY      TOTAL HIP ARTHROPLASTY        Family History:     Family History   Problem Relation Age of Onset    Heart disease Mother     Stroke Mother         CVA    Cancer Father     Hodgkin's lymphoma Father     Lung cancer Father       Social History:     Social History     Socioeconomic History    Marital status:      Spouse name: Not on file    Number of children: Not on file    Years of education: Not on file    Highest education level: Not on file   Occupational History    Occupation: retired   Tobacco Use    Smoking status: Former Smoker     Packs/day: 0 25     Quit date: 5/22/2018     Years since quitting: 3 3    Smokeless tobacco: Never Used    Tobacco comment: 4 cig/day   Vaping Use    Vaping Use: Never used   Substance and Sexual Activity    Alcohol use: Never     Alcohol/week: 2 0 standard drinks     Types: 2 Standard drinks or equivalent per week    Drug use: No    Sexual activity: Never     Comment: no high risk sexual behavior   Other Topics Concern    Not on file   Social History Narrative    Not on file     Social Determinants of Health     Financial Resource Strain:     Difficulty of Paying Living Expenses:    Food Insecurity:     Worried About Running Out of Food in the Last Year:     Ran Out of Food in the Last Year:    Transportation Needs:     Lack of Transportation (Medical):      Lack of Transportation (Non-Medical):    Physical Activity:     Days of Exercise per Week:     Minutes of Exercise per Session:    Stress:     Feeling of Stress :    Social Connections:     Frequency of Communication with Friends and Family:     Frequency of Social Gatherings with Friends and Family:     Attends [employer] Services:     Active Member of Clubs or Organizations:     Attends Club or Organization Meetings:     Marital Status:    Intimate Partner Violence:     Fear of Current or Ex-Partner:     Emotionally Abused:     Physically Abused:     Sexually Abused:       Medications and Allergies:     Current Outpatient Medications   Medication Sig Dispense Refill    allopurinol (ZYLOPRIM) 100 mg tablet Take 1 tablet (100 mg total) by mouth daily (Patient taking differently: Take 100 mg by mouth every other day ) 90 tablet 3    aspirin 81 mg chewable tablet Chew 81 mg daily      atorvastatin (LIPITOR) 40 mg tablet Take 40 mg by mouth daily        carvedilol (COREG) 3 125 mg tablet Take 3 125 mg by mouth 2 (two) times a day with meals      carvedilol (COREG) 6 25 mg tablet Take 6 25 mg by mouth 2 (two) times a day      clopidogrel (PLAVIX) 75 mg tablet Take 75 mg by mouth daily      clopidogrel (PLAVIX) 75 mg tablet Take 75 mg by mouth daily      colchicine (COLCRYS) 0 6 mg tablet Take 0 6 mg by mouth daily      fludrocortisone (FLORINEF) 0 1 mg tablet Take 0 1 mg by mouth 2 (two) times a day      furosemide (LASIX) 40 mg tablet Take 40 mg by mouth daily      Melatonin 2 5 MG CAPS Take 5 mg by mouth      Multiple Vitamins-Minerals (PX MENS MULTIVITAMINS) TABS Take by mouth      pantoprazole (PROTONIX) 40 mg tablet Take 1 tablet (40 mg total) by mouth daily 90 tablet 3    pantoprazole (PROTONIX) 40 mg tablet Take 40 mg by mouth daily      potassium chloride (MICRO-K) 10 MEQ CR capsule Take 10 mEq by mouth 2 (two) times a day      sacubitril-valsartan (ENTRESTO) 24-26 MG TABS Take 1 tablet by mouth daily      sacubitril-valsartan (ENTRESTO) 49-51 MG TABS Take 1 tablet by mouth 2 (two) times a day  spironolactone (ALDACTONE) 25 mg tablet Take 25 mg by mouth daily      spironolactone (ALDACTONE) 25 mg tablet Take 25 mg by mouth daily      warfarin (COUMADIN) 2 5 mg tablet 5 mg daily or as directed by doctor      warfarin (COUMADIN) 2 5 mg tablet take 2 tablets by mouth daily EXCEPT 3 TABLETS EVERY THURSDAY OR AS DIRECTED       No current facility-administered medications for this visit  No Known Allergies   Immunizations:     Immunization History   Administered Date(s) Administered    INFLUENZA 08/30/2018, 08/21/2020, 08/31/2021    Influenza Split High Dose Preservative Free IM 11/01/2015    Influenza, seasonal, injectable 08/20/2016, 08/30/2017, 09/04/2020    Pneumococcal Conjugate 13-Valent 12/01/2015, 01/09/2016    Pneumococcal Polysaccharide PPV23 06/30/2017    SARS-CoV-2 / COVID-19 mRNA IM (Moderna) 06/05/2021, 07/03/2021    Tdap 07/11/2020    Zoster 05/22/2015    Zoster Vaccine Recombinant 06/10/2020, 11/30/2020    influenza, trivalent, adjuvanted 09/10/2019      Health Maintenance:         Topic Date Due    Colorectal Cancer Screening  02/23/2024    Hepatitis C Screening  Completed     There are no preventive care reminders to display for this patient  Medicare Health Risk Assessment:     Ht 5' 5" (1 651 m)   BMI 22 80 kg/m²      Yaneli Alvares is here for his Subsequent Wellness visit  Health Risk Assessment:   Patient rates overall health as fair  Patient feels that their physical health rating is slightly worse  Patient is satisfied with their life  Eyesight was rated as same  Hearing was rated as same  Patient feels that their emotional and mental health rating is same  Patients states they are sometimes angry  Patient states they are sometimes unusually tired/fatigued  Pain experienced in the last 7 days has been some  Patient states that he has experienced no weight loss or gain in last 6 months   Back pain    Depression Screening:   PHQ-2 Score: 2      Fall Risk Screening: In the past year, patient has experienced: no history of falling in past year      Home Safety:  Patient does not have trouble with stairs inside or outside of their home  Patient has working smoke alarms and has working carbon monoxide detector  Home safety hazards include: none  Medications:   Patient is currently taking over-the-counter supplements  OTC medications include: see medication list  Patient is able to manage medications  Activities of Daily Living (ADLs)/Instrumental Activities of Daily Living (IADLs):   Walk and transfer into and out of bed and chair?: Yes  Dress and groom yourself?: Yes    Bathe or shower yourself?: Yes    Feed yourself?  Yes  Do your laundry/housekeeping?: Yes  Manage your money, pay your bills and track your expenses?: Yes  Make your own meals?: Yes    Do your own shopping?: Yes    Previous Hospitalizations:   Any hospitalizations or ED visits within the last 12 months?: Yes    How many hospitalizations have you had in the last year?: 1-2    Advance Care Planning:   Living will: Yes    Advanced directive: Yes    Advanced directive counseling given: Yes      Cognitive Screening:   Provider or family/friend/caregiver concerned regarding cognition?: No    PREVENTIVE SCREENINGS      Cardiovascular Screening:    General: Screening Not Indicated and History Lipid Disorder      Diabetes Screening:     General: Screening Current      Colorectal Cancer Screening:     General: Screening Current      Prostate Cancer Screening:    General: History Prostate Cancer and Screening Not Indicated      Osteoporosis Screening:    General: Screening Not Indicated      Abdominal Aortic Aneurysm (AAA) Screening:    Risk factors include: age between 73-69 yo and tobacco use        General: Screening Not Indicated      Lung Cancer Screening:     General: Screening Not Indicated      Hepatitis C Screening:    General: Screening Current    Screening, Brief Intervention, and Referral to Treatment (SBIRT)    Screening      AUDIT-C Screenin) How often did you have a drink containing alcohol in the past year? never  2) How many drinks did you have on a typical day when you were drinking in the past year? 0  3) How often did you have 6 or more drinks on one occasion in the past year? never    AUDIT-C Score: 0  Interpretation: Score 0-3 (male): Negative screen for alcohol misuse    Single Item Drug Screening:  How often have you used an illegal drug (including marijuana) or a prescription medication for non-medical reasons in the past year? never    Single Item Drug Screen Score: 0  Interpretation: Negative screen for possible drug use disorder    Brief Intervention  Alcohol & drug use screenings were reviewed  No concerns regarding substance use disorder identified         Briana Garza MD

## 2022-01-19 ENCOUNTER — OFFICE VISIT (OUTPATIENT)
Dept: INTERNAL MEDICINE CLINIC | Facility: CLINIC | Age: 77
End: 2022-01-19
Payer: MEDICARE

## 2022-01-19 VITALS
OXYGEN SATURATION: 96 % | TEMPERATURE: 98.3 F | HEART RATE: 80 BPM | BODY MASS INDEX: 23.16 KG/M2 | HEIGHT: 65 IN | RESPIRATION RATE: 14 BRPM | WEIGHT: 139 LBS | SYSTOLIC BLOOD PRESSURE: 94 MMHG | DIASTOLIC BLOOD PRESSURE: 62 MMHG

## 2022-01-19 DIAGNOSIS — R73.01 IMPAIRED FASTING GLUCOSE: ICD-10-CM

## 2022-01-19 DIAGNOSIS — I50.9 CONGESTIVE HEART FAILURE, UNSPECIFIED HF CHRONICITY, UNSPECIFIED HEART FAILURE TYPE (HCC): ICD-10-CM

## 2022-01-19 DIAGNOSIS — I48.0 PAF (PAROXYSMAL ATRIAL FIBRILLATION) (HCC): ICD-10-CM

## 2022-01-19 DIAGNOSIS — I25.10 ATHEROSCLEROSIS OF NATIVE CORONARY ARTERY OF NATIVE HEART WITHOUT ANGINA PECTORIS: Primary | ICD-10-CM

## 2022-01-19 DIAGNOSIS — E78.00 HYPERCHOLESTEROLEMIA: ICD-10-CM

## 2022-01-19 DIAGNOSIS — K21.9 GASTROESOPHAGEAL REFLUX DISEASE WITHOUT ESOPHAGITIS: ICD-10-CM

## 2022-01-19 PROCEDURE — 99214 OFFICE O/P EST MOD 30 MIN: CPT | Performed by: INTERNAL MEDICINE

## 2022-01-19 RX ORDER — METRONIDAZOLE 7.5 MG/G
GEL TOPICAL
COMMUNITY
Start: 2021-12-20

## 2022-01-19 NOTE — PROGRESS NOTES
Assessment/Plan:       Chronic problems are stable  Continue present medications  Continue diet and exercise  Ordered labs for next visit  Quality Measures:   Depression Screening and Follow-up Plan: Patient was screened for depression during today's encounter  They screened negative with a PHQ-2 score of 0  Return in about 4 months (around 5/19/2022) for Recheck  No problem-specific Assessment & Plan notes found for this encounter  Diagnoses and all orders for this visit:    Atherosclerosis of native coronary artery of native heart without angina pectoris    Congestive heart failure, unspecified HF chronicity, unspecified heart failure type (HCC)    PAF (paroxysmal atrial fibrillation) (HCC)    Gastroesophageal reflux disease without esophagitis    Impaired fasting glucose  -     Hemoglobin A1C; Future    Hypercholesterolemia  -     Comprehensive metabolic panel; Future  -     CBC and differential; Future  -     Lipid panel; Future    Other orders  -     metroNIDAZOLE (METROGEL) 0 75 % gel; apply A THIN LAYER topically twice a day for 4 WEEKS FACE AND SCALP DO NOT MOISTURIZE          Subjective:      Patient ID: Cornelius Oakes is a 77 y o  male  Patient comes in today for routine follow-up  He states that he is doing okay  His heart disease is stable  He will be seeing his cardiologist next week  No problems in that regards  No lightheadedness  Feels okay  Stomach is stable  Occasionally has a flare up but nothing like before  Trying to watch his diet  Taking his medicines as directed  Denies any new complaints today  No further additions to his history        ALLERGIES:  No Known Allergies    CURRENT MEDICATIONS:    Current Outpatient Medications:     allopurinol (ZYLOPRIM) 100 mg tablet, Take 1 tablet (100 mg total) by mouth daily, Disp: 90 tablet, Rfl: 3    aspirin 81 mg chewable tablet, Chew 81 mg daily, Disp: , Rfl:     atorvastatin (LIPITOR) 40 mg tablet, Take 40 mg by mouth daily  , Disp: , Rfl:     carvedilol (COREG) 6 25 mg tablet, Take 6 25 mg by mouth 2 (two) times a day, Disp: , Rfl:     clopidogrel (PLAVIX) 75 mg tablet, Take 75 mg by mouth daily, Disp: , Rfl:     colchicine (COLCRYS) 0 6 mg tablet, Take 0 6 mg by mouth daily, Disp: , Rfl:     fludrocortisone (FLORINEF) 0 1 mg tablet, Take 0 1 mg by mouth 2 (two) times a day, Disp: , Rfl:     furosemide (LASIX) 40 mg tablet, Take 40 mg by mouth daily, Disp: , Rfl:     Melatonin 2 5 MG CAPS, Take 5 mg by mouth, Disp: , Rfl:     metroNIDAZOLE (METROGEL) 0 75 % gel, apply A THIN LAYER topically twice a day for 4 WEEKS FACE AND SCALP DO NOT MOISTURIZE, Disp: , Rfl:     Multiple Vitamins-Minerals (PX MENS MULTIVITAMINS) TABS, Take by mouth, Disp: , Rfl:     pantoprazole (PROTONIX) 40 mg tablet, Take 1 tablet (40 mg total) by mouth daily, Disp: 90 tablet, Rfl: 3    sacubitril-valsartan (ENTRESTO) 49-51 MG TABS, Take 1 tablet by mouth 2 (two) times a day, Disp: , Rfl:     spironolactone (ALDACTONE) 25 mg tablet, Take 25 mg by mouth daily, Disp: , Rfl:     warfarin (COUMADIN) 2 5 mg tablet, take 2 tablets by mouth daily EXCEPT 3 TABLETS EVERY THURSDAY OR AS DIRECTED, Disp: , Rfl:     ACTIVE PROBLEM LIST:  Patient Active Problem List   Diagnosis    CAD (coronary atherosclerotic disease)    CHF (congestive heart failure) (HCC)    Chronic back pain    GERD (gastroesophageal reflux disease)    Hypercholesterolemia    Hypertension    [facility]    Peptic ulcer disease    S/P MVR (mitral valve repair)    Polyp of colon    Abnormal RBC indices    Fatigue    Chronic anticoagulation    Pain in gums    Mild protein-calorie malnutrition (HCC)    Idiopathic chronic gout of multiple sites without tophus    Impaired fasting glucose    Anemia due to blood loss, acute    History of normocytic normochromic anemia    Pressure injury of sacral region, unstageable ([facility] [address] )    Ambulatory dysfunction       PAST MEDICAL HISTORY:  Past Medical History:   Diagnosis Date   Heide Allison 6/25/2018    CAD (coronary artery disease)     Cancer (HCC)     prostate    Colon polyp     Coronary artery disease     GERD (gastroesophageal reflux disease)     H/O heart artery stent     x2    Hypercholesteremia     Hypertension     Keratotic lesion     Stroke ([address]     10YRS AGO    Transient cerebral ischemia        PAST SURGICAL HISTORY:  Past Surgical History:   Procedure Laterality Date    BACK SURGERY      CARDIAC SURGERY      mvp repair    CAROTID ENDARTERECTOMY Right 03/2020    CHOLECYSTECTOMY      COLONOSCOPY      ESOPHAGOGASTRODUODENOSCOPY N/A 6/22/2018    Procedure: ESOPHAGOGASTRODUODENOSCOPY (EGD); Surgeon: Rush Allen MD;  Location: [facility];   Service: Gastroenterology    JOINT REPLACEMENT      L hip replacement    ORCHIECTOMY      PROSTATECTOMY      TOTAL HIP ARTHROPLASTY         FAMILY HISTORY:  Family History   Problem Relation Age of Onset    Heart disease Mother     Stroke Mother         CVA    Cancer Father     Hodgkin's lymphoma Father     Lung cancer Father        SOCIAL HISTORY:  Social History     Socioeconomic History    Marital status:      Spouse name: Not on file    Number of children: Not on file    Years of education: Not on file    Highest education level: Not on file   Occupational History    Occupation: retired   Tobacco Use    Smoking status: Former Smoker     Packs/day: 0 25     Quit date: 5/22/2018     Years since quitting: 3 6    Smokeless tobacco: Never Used    Tobacco comment: 4 cig/day   Vaping Use    Vaping Use: Never used   Substance and Sexual Activity    Alcohol use: Never     Alcohol/week: 2 0 standard drinks     Types: 2 Standard drinks or equivalent per week    Drug use: No    Sexual activity: Never     Comment: no high risk sexual behavior   Other Topics Concern    Not on file   Social History Narrative  Not on file     Social Determinants of Health     Financial Resource Strain: Not on file   Food Insecurity: Not on file   Transportation Needs: Not on file   Physical Activity: Not on file   Stress: Not on file   Social Connections: Not on file   Intimate Partner Violence: Not on file   Housing Stability: Not on file       Review of Systems   Respiratory: Negative for shortness of breath  Cardiovascular: Negative for chest pain  Gastrointestinal: Negative for abdominal pain  Objective:  Vitals:    01/19/22 1115   BP: 94/62   BP Location: Left arm   Patient Position: Sitting   Cuff Size: Standard   Pulse: 80   Resp: 14   Temp: 98 3 °F (36 8 °C)   TempSrc: Tympanic   SpO2: 96%   Weight: 63 kg (139 lb)   Height: 5' 5" (1 651 m)     Body mass index is 23 13 kg/m²  Physical Exam  Vitals and nursing note reviewed  Constitutional:       Appearance: He is well-developed  Cardiovascular:      Rate and Rhythm: Normal rate and regular rhythm  Heart sounds: Normal heart sounds  Pulmonary:      Effort: Pulmonary effort is normal       Breath sounds: Normal breath sounds  Abdominal:      Palpations: Abdomen is soft  Tenderness: There is no abdominal tenderness  Neurological:      Mental Status: He is alert and oriented to person, place, and time  Psychiatric:         Mood and Affect: Mood normal          Behavior: Behavior normal            RESULTS:    No results found for this or any previous visit (from the past 1008 hour(s))  This note was created with voice recognition software  Phonic, grammatical and spelling errors may be present within the note as a result

## 2022-01-25 DIAGNOSIS — K21.9 GASTROESOPHAGEAL REFLUX DISEASE WITHOUT ESOPHAGITIS: ICD-10-CM

## 2022-01-25 RX ORDER — PANTOPRAZOLE SODIUM 40 MG/1
40 TABLET, DELAYED RELEASE ORAL DAILY
Qty: 90 TABLET | Refills: 2 | Status: SHIPPED | OUTPATIENT
Start: 2022-01-25

## 2022-05-14 ENCOUNTER — RA CDI HCC (OUTPATIENT)
Dept: OTHER | Facility: HOSPITAL | Age: 77
End: 2022-05-14

## 2022-05-14 NOTE — PROGRESS NOTES
[facility]  coding opportunities     I11 0     Chart Reviewed number of suggestions sent to Provider: 1     Patients Insurance     Medicare Insurance: [employer]

## 2022-05-19 ENCOUNTER — OFFICE VISIT (OUTPATIENT)
Dept: INTERNAL MEDICINE CLINIC | Facility: CLINIC | Age: 77
End: 2022-05-19
Payer: MEDICARE

## 2022-05-19 VITALS
HEIGHT: 65 IN | DIASTOLIC BLOOD PRESSURE: 80 MMHG | HEART RATE: 70 BPM | OXYGEN SATURATION: 95 % | WEIGHT: 148 LBS | TEMPERATURE: 95.9 F | SYSTOLIC BLOOD PRESSURE: 118 MMHG | BODY MASS INDEX: 24.66 KG/M2

## 2022-05-19 DIAGNOSIS — I50.9 CONGESTIVE HEART FAILURE, UNSPECIFIED HF CHRONICITY, UNSPECIFIED HEART FAILURE TYPE (HCC): ICD-10-CM

## 2022-05-19 DIAGNOSIS — J30.0 VASOMOTOR RHINITIS: ICD-10-CM

## 2022-05-19 DIAGNOSIS — I10 PRIMARY HYPERTENSION: ICD-10-CM

## 2022-05-19 DIAGNOSIS — I25.10 ATHEROSCLEROSIS OF NATIVE CORONARY ARTERY OF NATIVE HEART WITHOUT ANGINA PECTORIS: Primary | ICD-10-CM

## 2022-05-19 DIAGNOSIS — K21.9 GASTROESOPHAGEAL REFLUX DISEASE WITHOUT ESOPHAGITIS: ICD-10-CM

## 2022-05-19 PROCEDURE — 99214 OFFICE O/P EST MOD 30 MIN: CPT | Performed by: INTERNAL MEDICINE

## 2022-05-19 RX ORDER — DAPAGLIFLOZIN 10 MG/1
10 TABLET, FILM COATED ORAL DAILY
COMMUNITY
Start: 2022-03-04

## 2022-05-19 RX ORDER — FUROSEMIDE 40 MG/1
40 TABLET ORAL DAILY
COMMUNITY
Start: 2022-03-07

## 2022-05-19 NOTE — PROGRESS NOTES
Assessment/Plan:       Chronic problems appear stable  Continue follow-up with Cardiology  No change in medications  Will order blood work after his next visit  Quality Measures:       Return in about 4 months (around 9/19/2022) for Recheck  No problem-specific Assessment & Plan notes found for this encounter  Diagnoses and all orders for this visit:    Atherosclerosis of native coronary artery of native heart without angina pectoris    Congestive heart failure, unspecified HF chronicity, unspecified heart failure type (HCC)    Gastroesophageal reflux disease without esophagitis    Primary hypertension    Vasomotor rhinitis    Other orders  -     Farxiga 10 MG TABS; Take 10 mg by mouth daily  -     furosemide (LASIX) 40 mg tablet; Take 40 mg by mouth in the morning  Subjective:      Patient ID: Madelyn Castillo is a 77 y o  male  Patient comes in today for routine follow-up  He states he is doing okay he thinks  His heart disease is stable but he continues to follow with Cardiology closely  They have been making some adjustments in his medicines  Feels he is doing okay in that regards  His stomach and reflux are stable  As long as he takes the medicine  Blood pressure is controlled  Sugar remains borderline  He does complain of episodic clear drainage from his nose  Almost like a faucet opens  I explained to him this is probably vasomotor rhinitis  Not good treatments out there        ALLERGIES:  No Known Allergies    CURRENT MEDICATIONS:    Current Outpatient Medications:     allopurinol (ZYLOPRIM) 100 mg tablet, Take 1 tablet (100 mg total) by mouth daily, Disp: 90 tablet, Rfl: 3    atorvastatin (LIPITOR) 40 mg tablet, Take 40 mg by mouth daily  , Disp: , Rfl:     carvedilol (COREG) 6 25 mg tablet, Take 6 25 mg by mouth 2 (two) times a day, Disp: , Rfl:     clopidogrel (PLAVIX) 75 mg tablet, Take 75 mg by mouth daily, Disp: , Rfl:     colchicine (COLCRYS) 0 6 mg tablet, Take 0 6 mg by mouth daily, Disp: , Rfl:     fludrocortisone (FLORINEF) 0 1 mg tablet, Take 0 1 mg by mouth 2 (two) times a day, Disp: , Rfl:     Melatonin 2 5 MG CAPS, Take 5 mg by mouth, Disp: , Rfl:     metroNIDAZOLE (METROGEL) 0 75 % gel, apply A THIN LAYER topically twice a day for 4 WEEKS FACE AND SCALP DO NOT MOISTURIZE, Disp: , Rfl:     Multiple Vitamins-Minerals (PX MENS MULTIVITAMINS) TABS, Take by mouth, Disp: , Rfl:     pantoprazole (PROTONIX) 40 mg tablet, Take 1 tablet (40 mg total) by mouth daily, Disp: 90 tablet, Rfl: 2    sacubitril-valsartan (ENTRESTO) 49-51 MG TABS, Take 1 tablet by mouth 2 (two) times a day, Disp: , Rfl:     spironolactone (ALDACTONE) 25 mg tablet, Take 25 mg by mouth daily, Disp: , Rfl:     warfarin (COUMADIN) 2 5 mg tablet, take 2 tablets by mouth daily EXCEPT 3 TABLETS EVERY THURSDAY OR AS DIRECTED, Disp: , Rfl:     aspirin 81 mg chewable tablet, Chew 81 mg daily (Patient not taking: Reported on 5/19/2022), Disp: , Rfl:     Farxiga 10 MG TABS, Take 10 mg by mouth daily, Disp: , Rfl:     furosemide (LASIX) 40 mg tablet, Take 40 mg by mouth in the morning , Disp: , Rfl:     ACTIVE PROBLEM LIST:  Patient Active Problem List   Diagnosis    CAD (coronary atherosclerotic disease)    [facility]est[facility] failure) [facility]    Chronic back pain    GERD (gastroesophageal reflux disease)    Hypercholesterolemia    Hypertension    PAF (paroxysmal atrial fibrillation) ([facility]    Peptic ulcer disease    S/P MVR (mitral valve repair)    Polyp of colon    Abnormal RBC indices    Fatigue    Chronic anticoagulation    Pain in gums    Mild protein-calorie malnutrition (HCC)    Idiopathic chronic gout of multiple sites without tophus    Impaired fasting glucose    Anemia due to blood loss, acute    History of normocytic normochromic anemia    Pressure injury of sacral region, unstageable ([facility] )    Ambulatory dysfunction    Vasomotor rhinitis       PAST MEDICAL HISTORY:  Past Medical History:   Diagnosis Date    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    CAD (coronary artery disease)     Cancer ([facility] )     prostate    Colon polyp     Coronary artery disease     GERD (gastroesophageal reflux disease)     H/O heart artery stent     x2    Hypercholesteremia     Hypertension     Keratotic lesion     Stroke ([facility] )     10YRS AGO    Transient cerebral ischemia        PAST SURGICAL HISTORY:  Past Surgical History:   Procedure Laterality Date    BACK SURGERY      CARDIAC SURGERY      mvp repair    CAROTID ENDARTERECTOMY Right 03/2020    CHOLECYSTECTOMY      COLONOSCOPY      ESOPHAGOGASTRODUODENOSCOPY N/A 6/22/2018    Procedure: ESOPHAGOGASTRODUODENOSCOPY (EGD); Surgeon: Monica Herron MD;  Location: [facility];   Service: Gastroenterology    JOINT REPLACEMENT      L hip replacement    ORCHIECTOMY      PROSTATECTOMY      TOTAL HIP ARTHROPLASTY         FAMILY HISTORY:  Family History   Problem Relation Age of Onset    Heart disease Mother     Stroke Mother         CVA    Cancer Father     Hodgkin's lymphoma Father     Lung cancer Father        SOCIAL HISTORY:  Social History     Socioeconomic History    Marital status:      Spouse name: Not on file    Number of children: Not on file    Years of education: Not on file    Highest education level: Not on file   Occupational History    Occupation: retired   Tobacco Use    Smoking status: Former Smoker     Packs/day: 0 25     Quit date: 5/22/2018     Years since quitting: 3 9    Smokeless tobacco: Never Used    Tobacco comment: 4 cig/day   Vaping Use    Vaping Use: Never used   Substance and Sexual Activity    Alcohol use: Never     Alcohol/week: 2 0 standard drinks     Types: 2 Standard drinks or equivalent per week    Drug use: No    Sexual activity: Never     Comment: no high risk sexual behavior   Other Topics Concern    Not on file   Social History Narrative    Not on file     Social Determinants of Health     Financial Resource Strain: Not on file   Food Insecurity: Not on file   Transportation Needs: Not on file   Physical Activity: Not on file   Stress: Not on file   Social Connections: Not on file   Intimate Partner Violence: Not on file   Housing Stability: Not on file       Review of Systems   Respiratory: Negative for shortness of breath  Cardiovascular: Negative for chest pain  Gastrointestinal: Negative for abdominal pain  Objective:  Vitals:    05/19/22 1138   BP: 118/80   Pulse: 70   Temp: (!) 95 9 °F (35 5 °C)   TempSrc: Tympanic   SpO2: 95%   Weight: 67 1 kg (148 lb)   Height: 5' 5" (1 651 m)     Body mass index is 24 63 kg/m²  Physical Exam  Vitals and nursing note reviewed  Constitutional:       Appearance: He is well-developed  Cardiovascular:      Rate and Rhythm: Normal rate and regular rhythm  Heart sounds: Normal heart sounds  Pulmonary:      Effort: Pulmonary effort is normal       Breath sounds: Normal breath sounds  Abdominal:      Palpations: Abdomen is soft  Tenderness: There is no abdominal tenderness  Neurological:      Mental Status: He is alert and oriented to person, place, and time  RESULTS:    No results found for this or any previous visit (from the past 1008 hour(s))  This note was created with voice recognition software  Phonic, grammatical and spelling errors may be present within the note as a result

## 2022-06-15 ENCOUNTER — DOCUMENTATION (OUTPATIENT)
Dept: INTERNAL MEDICINE CLINIC | Facility: CLINIC | Age: 77
End: 2022-06-15

## 2022-09-08 LAB — HBA1C MFR BLD HPLC: 6.2 %

## 2022-09-18 ENCOUNTER — TELEPHONE (OUTPATIENT)
Dept: OTHER | Facility: OTHER | Age: 77
End: 2022-09-18

## 2022-09-19 NOTE — TELEPHONE ENCOUNTER
Please call and adjust this patients appt  Looks like an old appt when Dr Reji Robles had late hours

## 2022-09-23 ENCOUNTER — OFFICE VISIT (OUTPATIENT)
Dept: INTERNAL MEDICINE CLINIC | Facility: CLINIC | Age: 77
End: 2022-09-23
Payer: MEDICARE

## 2022-09-23 VITALS
WEIGHT: 144.8 LBS | RESPIRATION RATE: 12 BRPM | OXYGEN SATURATION: 98 % | SYSTOLIC BLOOD PRESSURE: 116 MMHG | HEART RATE: 59 BPM | DIASTOLIC BLOOD PRESSURE: 66 MMHG | BODY MASS INDEX: 24.12 KG/M2 | HEIGHT: 65 IN

## 2022-09-23 DIAGNOSIS — Z00.00 MEDICARE ANNUAL WELLNESS VISIT, SUBSEQUENT: Primary | ICD-10-CM

## 2022-09-23 DIAGNOSIS — L24.9 IRRITANT CONTACT DERMATITIS, UNSPECIFIED TRIGGER: ICD-10-CM

## 2022-09-23 DIAGNOSIS — R73.01 IMPAIRED FASTING GLUCOSE: ICD-10-CM

## 2022-09-23 DIAGNOSIS — I25.10 ATHEROSCLEROSIS OF NATIVE CORONARY ARTERY OF NATIVE HEART WITHOUT ANGINA PECTORIS: ICD-10-CM

## 2022-09-23 DIAGNOSIS — E78.00 HYPERCHOLESTEROLEMIA: ICD-10-CM

## 2022-09-23 DIAGNOSIS — I10 PRIMARY HYPERTENSION: ICD-10-CM

## 2022-09-23 PROBLEM — L89.150 PRESSURE INJURY OF SACRAL REGION, UNSTAGEABLE (HCC): Status: RESOLVED | Noted: 2021-04-28 | Resolved: 2022-09-23

## 2022-09-23 PROBLEM — E44.1 MILD PROTEIN-CALORIE MALNUTRITION (HCC): Status: RESOLVED | Noted: 2018-06-22 | Resolved: 2022-09-23

## 2022-09-23 PROCEDURE — 99214 OFFICE O/P EST MOD 30 MIN: CPT | Performed by: INTERNAL MEDICINE

## 2022-09-23 PROCEDURE — G0439 PPPS, SUBSEQ VISIT: HCPCS | Performed by: INTERNAL MEDICINE

## 2022-09-23 RX ORDER — TRIAMCINOLONE ACETONIDE 5 MG/G
CREAM TOPICAL 2 TIMES DAILY
Qty: 15 G | Refills: 0 | Status: SHIPPED | OUTPATIENT
Start: 2022-09-23

## 2022-09-23 RX ORDER — AZELAIC ACID 0.15 G/G
GEL TOPICAL
COMMUNITY
Start: 2022-09-01

## 2022-09-23 NOTE — PROGRESS NOTES
Assessment and Plan:     Problem List Items Addressed This Visit        Endocrine    Impaired fasting glucose    Relevant Orders    Hemoglobin A1C       Cardiovascular and Mediastinum    CAD (coronary atherosclerotic disease)    Hypertension    Relevant Orders    CBC and differential    Comprehensive metabolic panel       Other    Hypercholesterolemia    Relevant Orders    Lipid panel      Other Visit Diagnoses     Medicare annual wellness visit, subsequent    -  Primary    Irritant contact dermatitis, unspecified trigger        Relevant Medications    Azelaic Acid 15 % cream    triamcinolone (KENALOG) 0 5 % cream       Chronic problems are stable  Continue present medications  Continue diet and exercise  Ordered labs for next visit  Preventive health issues were discussed with patient, and age appropriate screening tests were ordered as noted in patient's After Visit Summary  Personalized health advice and appropriate referrals for health education or preventive services given if needed, as noted in patient's After Visit Summary  History of Present Illness:     Patient presents for a Medicare Wellness Visit     Patient comes in today for routine follow-up and Medicare wellness  He had blood work for his cardiologist at the beginning of the month  He has follow-up with her soon  His heart disease has been stable  Blood pressure is controlled  Blood work shows his sugar remains borderline and stable  Cholesterol is controlled  Taking his medicines as directed  Today at, he is also complaining of a rash on his right forearm  It started does what he described as blisters and was very itchy  He does not remember touching anything  The itching has improved but he was scratching it in now there is a few more lesions but small       Patient Care Team:  Lyle Monroy MD as PCP - [address] [place], [place] [id number] as Physician Assistant (Physician Assistant)     Review of Systems:     Review of Systems Respiratory: Negative for shortness of breath  Cardiovascular: Negative for chest pain  Gastrointestinal: Negative for abdominal pain  Problem List:     Patient Active Problem List   Diagnosis    CAD (coronary atherosclerotic disease)    CHF (congestive heart failure) (HCC)    Chronic back pain    GERD (gastroesophageal reflux disease)    Hypercholesterolemia    Hypertension    PAF (paroxysmal atrial fibrillation) (HCC)    Peptic ulcer disease    S/P MVR (mitral valve repair)    Polyp of colon    Abnormal RBC indices    Fatigue    Chronic anticoagulation    Pain in gums    Idiopathic chronic gout of multiple sites without tophus    Impaired fasting glucose    Anemia due to blood loss, acute    History of normocytic normochromic anemia    Ambulatory dysfunction    Vasomotor rhinitis      Past Medical and Surgical History:     Past Medical History:   Diagnosis Date    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    CAD (coronary artery disease)     [facility] )     prostate    Colon polyp     Coronary artery disease     GERD (gastroesophageal reflux disease)     H/O heart artery stent     x2    Hypercholesteremia     Hypertension     Keratotic lesion     Stroke ([facility] )     10YRS AGO    Transient cerebral ischemia      Past Surgical History:   Procedure Laterality Date    BACK SURGERY      CARDIAC SURGERY      mvp repair    CAROTID ENDARTERECTOMY Right 03/2020    CHOLECYSTECTOMY      COLONOSCOPY      ESOPHAGOGASTRODUODENOSCOPY N/A 6/22/2018    Procedure: ESOPHAGOGASTRODUODENOSCOPY (EGD); Surgeon: Antonio Brown MD;  Location: [facility];   Service: Gastroenterology    JOINT REPLACEMENT      L hip replacement    ORCHIECTOMY      PROSTATECTOMY      TOTAL HIP ARTHROPLASTY        Family History:     Family History   Problem Relation Age of Onset    Heart disease Mother     Stroke Mother         CVA    Cancer Father     Hodgkin's lymphoma Father     Lung cancer Father       Social History:     Social History     Socioeconomic History    Marital status:      Spouse name: None    Number of children: None    Years of education: None    Highest education level: None   Occupational History    Occupation: retired   Tobacco Use    Smoking status: Former Smoker     Packs/day: 0 25     Quit date: 2018     Years since quittin 3    Smokeless tobacco: Never Used    Tobacco comment: 4 cig/day   Vaping Use    Vaping Use: Never used   Substance and Sexual Activity    Alcohol use: Never     Alcohol/week: 2 0 standard drinks     Types: 2 Standard drinks or equivalent per week    Drug use: No    Sexual activity: Not Currently     Comment: no high risk sexual behavior   Other Topics Concern    None   Social History Narrative    None     Social Determinants of Health     Financial Resource Strain: Medium Risk    Difficulty of Paying Living Expenses: Somewhat hard   Food Insecurity: Not on file   Transportation Needs: No Transportation Needs    Lack of Transportation (Medical): No    Lack of Transportation (Non-Medical): No   Physical Activity: Not on file   Stress: Not on file   Social Connections: Not on file   Intimate Partner Violence: Not on file   Housing Stability: Not on file      Medications and Allergies:     Current Outpatient Medications   Medication Sig Dispense Refill    allopurinol (ZYLOPRIM) 100 mg tablet Take 1 tablet (100 mg total) by mouth daily 90 tablet 3    atorvastatin (LIPITOR) 40 mg tablet Take 40 mg by mouth daily        Azelaic Acid 15 % cream apply to 1 thin layer twice a day to face      carvedilol (COREG) 6 25 mg tablet Take 6 25 mg by mouth 2 (two) times a day      clopidogrel (PLAVIX) 75 mg tablet Take 75 mg by mouth daily      Farxiga 10 MG TABS Take 10 mg by mouth daily      furosemide (LASIX) 40 mg tablet Take 40 mg by mouth in the morning        metroNIDAZOLE (METROGEL) 0 75 % gel apply A THIN LAYER topically twice a day for 4 WEEKS FACE AND SCALP DO NOT MOISTURIZE      Multiple Vitamins-Minerals (PX MENS MULTIVITAMINS) TABS Take by mouth      pantoprazole (PROTONIX) 40 mg tablet Take 1 tablet (40 mg total) by mouth daily 90 tablet 2    sacubitril-valsartan (ENTRESTO) 49-51 MG TABS Take 1 tablet by mouth 2 (two) times a day      triamcinolone (KENALOG) 0 5 % cream Apply topically 2 (two) times a day 15 g 0    warfarin (COUMADIN) 2 5 mg tablet take 2 tablets by mouth daily EXCEPT 3 TABLETS EVERY THURSDAY OR AS DIRECTED       No current facility-administered medications for this visit  No Known Allergies   Immunizations:     Immunization History   Administered Date(s) Administered    COVID-19 MODERNA VACC 0 5 ML IM 06/05/2021, 07/03/2021, 06/14/2022    INFLUENZA 08/30/2018, 08/21/2020, 08/31/2021, 09/02/2022    Influenza Split High Dose Preservative Free IM 11/01/2015    Influenza, seasonal, injectable 08/20/2016, 08/30/2017, 09/04/2020    Pneumococcal Conjugate 13-Valent 12/01/2015, 01/09/2016    Pneumococcal Polysaccharide PPV23 06/30/2017    Tdap 07/11/2020    Zoster 05/22/2015    Zoster Vaccine Recombinant 06/10/2020, 11/30/2020    influenza, trivalent, adjuvanted 09/10/2019      Health Maintenance:         Topic Date Due    Colorectal Cancer Screening  02/23/2024    Hepatitis C Screening  Completed     There are no preventive care reminders to display for this patient  Medicare Screening Tests and Risk Assessments:     Anurag Harrell is here for his Subsequent Wellness visit  Health Risk Assessment:   Patient rates overall health as good  Patient feels that their physical health rating is same  Patient is satisfied with their life  Eyesight was rated as same  Hearing was rated as same  Patient feels that their emotional and mental health rating is same  Patients states they are sometimes angry  Patient states they are often unusually tired/fatigued  Pain experienced in the last 7 days has been some   Patient's pain rating has been 5/10  Patient states that he has experienced no weight loss or gain in last 6 months  Depression Screening:   PHQ-2 Score: 2      Fall Risk Screening: In the past year, patient has experienced: no history of falling in past year      Home Safety:  Patient has trouble with stairs inside or outside of their home  Patient has working smoke alarms and has working carbon monoxide detector  Home safety hazards include: none  Nutrition:   Current diet is Regular  Medications:   Patient is currently taking over-the-counter supplements  OTC medications include: see medication list  Patient is able to manage medications  Activities of Daily Living (ADLs)/Instrumental Activities of Daily Living (IADLs):   Walk and transfer into and out of bed and chair?: Yes  Dress and groom yourself?: Yes    Bathe or shower yourself?: Yes    Feed yourself?  Yes  Do your laundry/housekeeping?: Yes  Manage your money, pay your bills and track your expenses?: Yes  Make your own meals?: Yes    Do your own shopping?: Yes    Previous Hospitalizations:   Any hospitalizations or ED visits within the last 12 months?: No      Advance Care Planning:   Living will: Yes    Advanced directive: Yes    Advanced directive counseling given: Yes      Cognitive Screening:   Provider or family/friend/caregiver concerned regarding cognition?: No    PREVENTIVE SCREENINGS      Cardiovascular Screening:    General: Screening Not Indicated and History Lipid Disorder      Diabetes Screening:     General: Screening Current      Colorectal Cancer Screening:     General: Screening Current      Prostate Cancer Screening:    General: Screening Not Indicated      Osteoporosis Screening:    General: Screening Not Indicated      Abdominal Aortic Aneurysm (AAA) Screening:    Risk factors include: tobacco use        General: Screening Not Indicated      Lung Cancer Screening:     General: Screening Not Indicated      Hepatitis C Screening:    General: Screening Current    Screening, Brief Intervention, and Referral to Treatment (SBIRT)    Screening  Typical number of drinks in a day: 0  Typical number of drinks in a week: 0  Interpretation: Low risk drinking behavior  AUDIT-C Screenin) How often did you have a drink containing alcohol in the past year? never  2) How many drinks did you have on a typical day when you were drinking in the past year? 0  3) How often did you have 6 or more drinks on one occasion in the past year? never    AUDIT-C Score: 0  Interpretation: Score 0-3 (male): Negative screen for alcohol misuse    Single Item Drug Screening:  How often have you used an illegal drug (including marijuana) or a prescription medication for non-medical reasons in the past year? never    Single Item Drug Screen Score: 0  Interpretation: Negative screen for possible drug use disorder    Brief Intervention  Alcohol & drug use screenings were reviewed  No concerns regarding substance use disorder identified  No exam data present     Physical Exam:     /66 (BP Location: Left arm, Patient Position: Sitting)   Pulse 59   Resp 12   Ht 5' 5" (1 651 m)   Wt 65 7 kg (144 lb 12 8 oz)   SpO2 98%   BMI 24 10 kg/m²     Physical Exam  Vitals and nursing note reviewed  Cardiovascular:      Rate and Rhythm: Normal rate and regular rhythm  Pulmonary:      Effort: Pulmonary effort is normal       Breath sounds: Normal breath sounds  Abdominal:      General: Abdomen is flat  Tenderness: There is no abdominal tenderness  Musculoskeletal:      Right lower leg: No edema  Left lower leg: No edema  Skin:     Comments:  Area of erythema on the right forearm, linear distribution  No vesicles  Appears to be scabbing over in places  Neurological:      Mental Status: He is alert and oriented to person, place, and time  Psychiatric:         Behavior: Behavior normal          Thought Content:  Thought content normal          Judgment: Judgment normal           Odilia Mcelroy MD

## 2022-10-05 ENCOUNTER — TELEPHONE (OUTPATIENT)
Dept: INTERNAL MEDICINE CLINIC | Facility: CLINIC | Age: 77
End: 2022-10-05

## 2022-10-05 DIAGNOSIS — K21.9 GASTROESOPHAGEAL REFLUX DISEASE WITHOUT ESOPHAGITIS: ICD-10-CM

## 2022-10-05 RX ORDER — PANTOPRAZOLE SODIUM 40 MG/1
40 TABLET, DELAYED RELEASE ORAL DAILY
Qty: 90 TABLET | Refills: 2 | Status: CANCELLED | OUTPATIENT
Start: 2022-10-05

## 2022-10-05 NOTE — TELEPHONE ENCOUNTER
Pt is having trouble getting pantoprozole,  He thinks its a prior auth thing?      [facility]     He says they told him to have his dr call this # to do this med       [address] [facility] [address]

## 2022-10-07 DIAGNOSIS — K21.9 GASTROESOPHAGEAL REFLUX DISEASE WITHOUT ESOPHAGITIS: ICD-10-CM

## 2022-10-07 RX ORDER — PANTOPRAZOLE SODIUM 40 MG/1
40 TABLET, DELAYED RELEASE ORAL DAILY
Qty: 90 TABLET | Refills: 2 | Status: SHIPPED | OUTPATIENT
Start: 2022-10-07

## 2022-10-07 NOTE — TELEPHONE ENCOUNTER
Prior auth completed and refill sent to the pharmacy  Left a message letting the patient know   Approved through 9/7/22-10/7/2023   [id number]  Fep rep jose c       Refill sent to the pharmacy

## 2022-11-06 ENCOUNTER — APPOINTMENT (EMERGENCY)
Dept: CT IMAGING | Facility: HOSPITAL | Age: 77
End: 2022-11-06

## 2022-11-06 ENCOUNTER — HOSPITAL ENCOUNTER (INPATIENT)
Facility: HOSPITAL | Age: 77
LOS: 8 days | Discharge: HOME HEALTH | End: 2022-11-14
Attending: EMERGENCY MEDICINE | Admitting: STUDENT IN AN ORGANIZED HEALTH CARE EDUCATION/TRAINING PROGRAM

## 2022-11-06 DIAGNOSIS — I25.10 ATHEROSCLEROSIS OF NATIVE CORONARY ARTERY OF NATIVE HEART WITHOUT ANGINA PECTORIS: ICD-10-CM

## 2022-11-06 DIAGNOSIS — R41.89 EPISODE OF UNRESPONSIVENESS: ICD-10-CM

## 2022-11-06 DIAGNOSIS — R55 SYNCOPE, UNSPECIFIED SYNCOPE TYPE: ICD-10-CM

## 2022-11-06 DIAGNOSIS — I26.99 PE (PULMONARY THROMBOEMBOLISM) (HCC): Primary | ICD-10-CM

## 2022-11-06 DIAGNOSIS — I69.30 CHRONIC ISCHEMIC RIGHT MCA STROKE: ICD-10-CM

## 2022-11-06 DIAGNOSIS — K63.5 POLYP OF COLON, UNSPECIFIED PART OF COLON, UNSPECIFIED TYPE: ICD-10-CM

## 2022-11-06 DIAGNOSIS — Z79.01 CHRONIC ANTICOAGULATION: ICD-10-CM

## 2022-11-06 DIAGNOSIS — R55 SYNCOPAL EPISODES: ICD-10-CM

## 2022-11-06 DIAGNOSIS — N18.9 CKD (CHRONIC KIDNEY DISEASE): ICD-10-CM

## 2022-11-06 DIAGNOSIS — I50.9 CHF (CONGESTIVE HEART FAILURE) (HCC): ICD-10-CM

## 2022-11-06 DIAGNOSIS — I26.99 ACUTE PULMONARY EMBOLISM WITHOUT ACUTE COR PULMONALE, UNSPECIFIED PULMONARY EMBOLISM TYPE (HCC): ICD-10-CM

## 2022-11-06 DIAGNOSIS — I48.0 PAF (PAROXYSMAL ATRIAL FIBRILLATION) (HCC): ICD-10-CM

## 2022-11-06 LAB
2HR DELTA HS TROPONIN: 3 NG/L
ALBUMIN SERPL BCP-MCNC: 3.9 G/DL (ref 3.5–5)
ALP SERPL-CCNC: 74 U/L (ref 46–116)
ALT SERPL W P-5'-P-CCNC: 26 U/L (ref 12–78)
ANION GAP SERPL CALCULATED.3IONS-SCNC: 7 MMOL/L (ref 4–13)
APTT PPP: 40 SECONDS (ref 23–37)
AST SERPL W P-5'-P-CCNC: 30 U/L (ref 5–45)
ATRIAL RATE: 357 BPM
BACTERIA UR QL AUTO: NORMAL /HPF
BASOPHILS # BLD AUTO: 0.1 THOUSANDS/ÂΜL (ref 0–0.1)
BASOPHILS NFR BLD AUTO: 1 % (ref 0–1)
BILIRUB SERPL-MCNC: 1.03 MG/DL (ref 0.2–1)
BILIRUB UR QL STRIP: NEGATIVE
BUN SERPL-MCNC: 18 MG/DL (ref 5–25)
CALCIUM SERPL-MCNC: 9 MG/DL (ref 8.3–10.1)
CARDIAC TROPONIN I PNL SERPL HS: 13 NG/L
CARDIAC TROPONIN I PNL SERPL HS: 16 NG/L
CHLORIDE SERPL-SCNC: 106 MMOL/L (ref 96–108)
CLARITY UR: CLEAR
CO2 SERPL-SCNC: 30 MMOL/L (ref 21–32)
COLOR UR: ABNORMAL
CREAT SERPL-MCNC: 1.42 MG/DL (ref 0.6–1.3)
EOSINOPHIL # BLD AUTO: 0.34 THOUSAND/ÂΜL (ref 0–0.61)
EOSINOPHIL NFR BLD AUTO: 3 % (ref 0–6)
ERYTHROCYTE [DISTWIDTH] IN BLOOD BY AUTOMATED COUNT: 14.6 % (ref 11.6–15.1)
FLUAV RNA RESP QL NAA+PROBE: NEGATIVE
FLUBV RNA RESP QL NAA+PROBE: NEGATIVE
GFR SERPL CREATININE-BSD FRML MDRD: 47 ML/MIN/1.73SQ M
GLUCOSE SERPL-MCNC: 91 MG/DL (ref 65–140)
GLUCOSE UR STRIP-MCNC: ABNORMAL MG/DL
HCT VFR BLD AUTO: 48.3 % (ref 36.5–49.3)
HGB BLD-MCNC: 15.7 G/DL (ref 12–17)
HGB UR QL STRIP.AUTO: NEGATIVE
IMM GRANULOCYTES # BLD AUTO: 0.03 THOUSAND/UL (ref 0–0.2)
IMM GRANULOCYTES NFR BLD AUTO: 0 % (ref 0–2)
INR PPP: 4.11 (ref 0.84–1.19)
KETONES UR STRIP-MCNC: NEGATIVE MG/DL
LACTATE SERPL-SCNC: 0.9 MMOL/L (ref 0.5–2)
LEUKOCYTE ESTERASE UR QL STRIP: NEGATIVE
LYMPHOCYTES # BLD AUTO: 1.62 THOUSANDS/ÂΜL (ref 0.6–4.47)
LYMPHOCYTES NFR BLD AUTO: 16 % (ref 14–44)
MCH RBC QN AUTO: 35.4 PG (ref 26.8–34.3)
MCHC RBC AUTO-ENTMCNC: 32.5 G/DL (ref 31.4–37.4)
MCV RBC AUTO: 109 FL (ref 82–98)
MONOCYTES # BLD AUTO: 1.54 THOUSAND/ÂΜL (ref 0.17–1.22)
MONOCYTES NFR BLD AUTO: 15 % (ref 4–12)
NEUTROPHILS # BLD AUTO: 6.41 THOUSANDS/ÂΜL (ref 1.85–7.62)
NEUTS SEG NFR BLD AUTO: 65 % (ref 43–75)
NITRITE UR QL STRIP: NEGATIVE
NON-SQ EPI CELLS URNS QL MICRO: NORMAL /HPF
NRBC BLD AUTO-RTO: 0 /100 WBCS
NT-PROBNP SERPL-MCNC: ABNORMAL PG/ML
PH UR STRIP.AUTO: 6 [PH]
PLATELET # BLD AUTO: 235 THOUSANDS/UL (ref 149–390)
PMV BLD AUTO: 9.4 FL (ref 8.9–12.7)
POTASSIUM SERPL-SCNC: 4.5 MMOL/L (ref 3.5–5.3)
PROCALCITONIN SERPL-MCNC: <0.05 NG/ML
PROT SERPL-MCNC: 7.3 G/DL (ref 6.4–8.4)
PROT UR STRIP-MCNC: ABNORMAL MG/DL
PROTHROMBIN TIME: 38.9 SECONDS (ref 11.6–14.5)
QRS AXIS: -21 DEGREES
QRSD INTERVAL: 108 MS
QT INTERVAL: 294 MS
QTC INTERVAL: 415 MS
RBC # BLD AUTO: 4.44 MILLION/UL (ref 3.88–5.62)
RBC #/AREA URNS AUTO: NORMAL /HPF
RSV RNA RESP QL NAA+PROBE: NEGATIVE
SARS-COV-2 RNA RESP QL NAA+PROBE: NEGATIVE
SODIUM SERPL-SCNC: 143 MMOL/L (ref 135–147)
SP GR UR STRIP.AUTO: >=1.05 (ref 1–1.03)
T WAVE AXIS: 202 DEGREES
UROBILINOGEN UR STRIP-ACNC: <2 MG/DL
VENTRICULAR RATE: 120 BPM
WBC # BLD AUTO: 10.04 THOUSAND/UL (ref 4.31–10.16)
WBC #/AREA URNS AUTO: NORMAL /HPF

## 2022-11-06 RX ORDER — SENNOSIDES 8.6 MG
1 TABLET ORAL DAILY
Status: DISCONTINUED | OUTPATIENT
Start: 2022-11-07 | End: 2022-11-14 | Stop reason: HOSPADM

## 2022-11-06 RX ORDER — ONDANSETRON 2 MG/ML
4 INJECTION INTRAMUSCULAR; INTRAVENOUS EVERY 6 HOURS PRN
Status: DISCONTINUED | OUTPATIENT
Start: 2022-11-06 | End: 2022-11-14 | Stop reason: HOSPADM

## 2022-11-06 RX ORDER — METOPROLOL TARTRATE 5 MG/5ML
5 INJECTION INTRAVENOUS ONCE
Status: COMPLETED | OUTPATIENT
Start: 2022-11-06 | End: 2022-11-06

## 2022-11-06 RX ORDER — LORATADINE 10 MG/1
10 TABLET ORAL DAILY
Status: DISCONTINUED | OUTPATIENT
Start: 2022-11-07 | End: 2022-11-06

## 2022-11-06 RX ORDER — HEPARIN SODIUM 10000 [USP'U]/100ML
3-30 INJECTION, SOLUTION INTRAVENOUS
Status: DISCONTINUED | OUTPATIENT
Start: 2022-11-06 | End: 2022-11-13

## 2022-11-06 RX ORDER — ACETAMINOPHEN 325 MG/1
650 TABLET ORAL EVERY 6 HOURS PRN
Status: DISCONTINUED | OUTPATIENT
Start: 2022-11-06 | End: 2022-11-14 | Stop reason: HOSPADM

## 2022-11-06 RX ORDER — FLUTICASONE PROPIONATE 50 MCG
1 SPRAY, SUSPENSION (ML) NASAL DAILY
Status: DISCONTINUED | OUTPATIENT
Start: 2022-11-06 | End: 2022-11-14 | Stop reason: HOSPADM

## 2022-11-06 RX ORDER — LORATADINE 10 MG/1
10 TABLET ORAL DAILY
Status: DISCONTINUED | OUTPATIENT
Start: 2022-11-06 | End: 2022-11-14 | Stop reason: HOSPADM

## 2022-11-06 RX ORDER — FUROSEMIDE 10 MG/ML
40 INJECTION INTRAMUSCULAR; INTRAVENOUS ONCE
Status: COMPLETED | OUTPATIENT
Start: 2022-11-06 | End: 2022-11-06

## 2022-11-06 RX ORDER — CARVEDILOL 3.12 MG/1
6.25 TABLET ORAL 2 TIMES DAILY
Status: DISCONTINUED | OUTPATIENT
Start: 2022-11-06 | End: 2022-11-08

## 2022-11-06 RX ORDER — HEPARIN SODIUM 1000 [USP'U]/ML
5200 INJECTION, SOLUTION INTRAVENOUS; SUBCUTANEOUS
Status: DISCONTINUED | OUTPATIENT
Start: 2022-11-06 | End: 2022-11-13

## 2022-11-06 RX ORDER — CLOPIDOGREL BISULFATE 75 MG/1
75 TABLET ORAL DAILY
Status: DISCONTINUED | OUTPATIENT
Start: 2022-11-07 | End: 2022-11-14 | Stop reason: HOSPADM

## 2022-11-06 RX ORDER — CALCIUM CARBONATE 200(500)MG
1000 TABLET,CHEWABLE ORAL DAILY PRN
Status: DISCONTINUED | OUTPATIENT
Start: 2022-11-06 | End: 2022-11-14 | Stop reason: HOSPADM

## 2022-11-06 RX ORDER — FLUTICASONE PROPIONATE 50 MCG
1 SPRAY, SUSPENSION (ML) NASAL DAILY
Status: DISCONTINUED | OUTPATIENT
Start: 2022-11-07 | End: 2022-11-06

## 2022-11-06 RX ORDER — ATORVASTATIN CALCIUM 40 MG/1
40 TABLET, FILM COATED ORAL
Status: DISCONTINUED | OUTPATIENT
Start: 2022-11-07 | End: 2022-11-14 | Stop reason: HOSPADM

## 2022-11-06 RX ORDER — HEPARIN SODIUM 1000 [USP'U]/ML
2600 INJECTION, SOLUTION INTRAVENOUS; SUBCUTANEOUS
Status: DISCONTINUED | OUTPATIENT
Start: 2022-11-06 | End: 2022-11-13

## 2022-11-06 RX ORDER — HEPARIN SODIUM 1000 [USP'U]/ML
5200 INJECTION, SOLUTION INTRAVENOUS; SUBCUTANEOUS ONCE
Status: COMPLETED | OUTPATIENT
Start: 2022-11-06 | End: 2022-11-06

## 2022-11-06 RX ORDER — SODIUM CHLORIDE 9 MG/ML
3 INJECTION INTRAVENOUS
Status: DISCONTINUED | OUTPATIENT
Start: 2022-11-06 | End: 2022-11-14 | Stop reason: HOSPADM

## 2022-11-06 RX ORDER — DOCUSATE SODIUM 100 MG/1
100 CAPSULE, LIQUID FILLED ORAL 2 TIMES DAILY
Status: DISCONTINUED | OUTPATIENT
Start: 2022-11-06 | End: 2022-11-14 | Stop reason: HOSPADM

## 2022-11-06 RX ORDER — PANTOPRAZOLE SODIUM 40 MG/1
40 TABLET, DELAYED RELEASE ORAL DAILY
Status: DISCONTINUED | OUTPATIENT
Start: 2022-11-07 | End: 2022-11-14 | Stop reason: HOSPADM

## 2022-11-06 RX ADMIN — CARVEDILOL 6.25 MG: 3.12 TABLET, FILM COATED ORAL at 22:19

## 2022-11-06 RX ADMIN — SACUBITRIL AND VALSARTAN 1 TABLET: 49; 51 TABLET, FILM COATED ORAL at 23:39

## 2022-11-06 RX ADMIN — FLUTICASONE PROPIONATE 1 SPRAY: 50 SPRAY, METERED NASAL at 22:20

## 2022-11-06 RX ADMIN — LORATADINE 10 MG: 10 TABLET ORAL at 22:19

## 2022-11-06 RX ADMIN — SODIUM CHLORIDE 500 ML: 0.9 INJECTION, SOLUTION INTRAVENOUS at 17:57

## 2022-11-06 RX ADMIN — FUROSEMIDE 40 MG: 10 INJECTION, SOLUTION INTRAMUSCULAR; INTRAVENOUS at 21:24

## 2022-11-06 RX ADMIN — METOROPROLOL TARTRATE 5 MG: 5 INJECTION, SOLUTION INTRAVENOUS at 20:12

## 2022-11-06 RX ADMIN — IOHEXOL 100 ML: 350 INJECTION, SOLUTION INTRAVENOUS at 19:05

## 2022-11-06 RX ADMIN — HEPARIN SODIUM 5200 UNITS: 1000 INJECTION INTRAVENOUS; SUBCUTANEOUS at 21:27

## 2022-11-06 RX ADMIN — HEPARIN SODIUM 18 UNITS/KG/HR: 10000 INJECTION, SOLUTION INTRAVENOUS at 21:25

## 2022-11-06 NOTE — ED PROVIDER NOTES
History  Chief Complaint   Patient presents with   • Shortness of Breath     Started few weeks ago  Progressively worsening the last few days  Headache and runny nose  SOB while doing ADL's  Also laying down on left side SOB     HPI  68 yo M with PMH CHF, PAF, htn presents with SOB, headaches, runny nose for the past few days  Denies fevers, chills, chest pain  Has been having diarrhea  Denies any weight gain, leg swelling  Recently had diuretics decreased by primary doctor  Prior to Admission Medications   Prescriptions Last Dose Informant Patient Reported? Taking? Azelaic Acid 15 % cream   Yes No   Sig: apply to 1 thin layer twice a day to face   Farxiga 10 MG TABS   Yes No   Sig: Take 10 mg by mouth daily   Multiple Vitamins-Minerals (PX MENS MULTIVITAMINS) TABS  Self Yes No   Sig: Take by mouth   allopurinol (ZYLOPRIM) 100 mg tablet   No No   Sig: Take 1 tablet (100 mg total) by mouth daily   atorvastatin (LIPITOR) 40 mg tablet  Self Yes No   Sig: Take 40 mg by mouth daily     carvedilol (COREG) 6 25 mg tablet   Yes No   Sig: Take 6 25 mg by mouth 2 (two) times a day   clopidogrel (PLAVIX) 75 mg tablet   Yes No   Sig: Take 75 mg by mouth daily   furosemide (LASIX) 40 mg tablet   Yes No   Sig: Take 40 mg by mouth in the morning     metroNIDAZOLE (METROGEL) 0 75 % gel   Yes No   Sig: apply A THIN LAYER topically twice a day for 4 WEEKS FACE AND SCALP DO NOT MOISTURIZE   pantoprazole (PROTONIX) 40 mg tablet   No No   Sig: Take 1 tablet (40 mg total) by mouth daily   sacubitril-valsartan (ENTRESTO) 49-51 MG TABS   Yes No   Sig: Take 1 tablet by mouth 2 (two) times a day   triamcinolone (KENALOG) 0 5 % cream   No No   Sig: Apply topically 2 (two) times a day   warfarin (COUMADIN) 2 5 mg tablet   Yes No   Sig: take 2 tablets by mouth daily EXCEPT 3 TABLETS EVERY THURSDAY OR AS DIRECTED      Facility-Administered Medications: None       Past Medical History:   Diagnosis Date   • 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)     prostate   • Colon polyp    • Coronary artery disease    • GERD (gastroesophageal reflux disease)    • H/O heart artery stent     x2   • Hypercholesteremia    • Hypertension    • Keratotic lesion    • Stroke ([address] )     10YRS AGO   • Transient cerebral ischemia        Past Surgical History:   Procedure Laterality Date   • BACK SURGERY     • CARDIAC SURGERY      mvp repair   • CAROTID ENDARTERECTOMY Right 2020   • CHOLECYSTECTOMY     • COLONOSCOPY     • ESOPHAGOGASTRODUODENOSCOPY N/A 2018    Procedure: ESOPHAGOGASTRODUODENOSCOPY (EGD); Surgeon: Mitch Samuel MD;  Location: [facility]; Service: Gastroenterology   • JOINT REPLACEMENT      L hip replacement   • ORCHIECTOMY     • PROSTATECTOMY     • TOTAL HIP ARTHROPLASTY         Family History   Problem Relation Age of Onset   • Heart disease Mother    • Stroke Mother         CVA   • Cancer Father    • Hodgkin's lymphoma Father    • Lung cancer Father      I have reviewed and agree with the history as documented  E-Cigarette/Vaping   • E-Cigarette Use Never User      E-Cigarette/Vaping Substances   • Nicotine No    • THC No    • CBD No    • Flavoring No    • Other No    • Unknown No      Social History     Tobacco Use   • Smoking status: Former Smoker     Packs/day: 0 25     Quit date: 2018     Years since quittin 4   • Smokeless tobacco: Never Used   • Tobacco comment: 4 cig/day   Vaping Use   • Vaping Use: Never used   Substance Use Topics   • Alcohol use: Never     Alcohol/week: 2 0 standard drinks     Types: 2 Standard drinks or equivalent per week   • Drug use: No       Review of Systems   Constitutional: Negative for chills and fever  HENT: Positive for congestion  Negative for dental problem and ear pain  Eyes: Negative for pain and redness  Respiratory: Positive for shortness of breath  Negative for cough  Cardiovascular: Negative for chest pain and palpitations  Gastrointestinal: Negative for abdominal pain and nausea  Endocrine: Negative for polydipsia and polyphagia  Genitourinary: Negative for dysuria and frequency  Musculoskeletal: Negative for arthralgias and joint swelling  Skin: Negative for color change and rash  Neurological: Positive for headaches  Negative for dizziness  Psychiatric/Behavioral: Negative for behavioral problems and confusion  All other systems reviewed and are negative  Physical Exam  Physical Exam  Vitals and nursing note reviewed  Constitutional:       General: He is not in acute distress  Appearance: He is well-developed  He is not diaphoretic  HENT:      Head: Atraumatic  Right Ear: External ear normal       Left Ear: External ear normal       Nose: Nose normal    Eyes:      Conjunctiva/sclera: Conjunctivae normal       Pupils: Pupils are equal, round, and reactive to light  Neck:      Vascular: No JVD  Cardiovascular:      Rate and Rhythm: Tachycardia present  Rhythm irregular  Heart sounds: Normal heart sounds  No murmur heard  Pulmonary:      Effort: Pulmonary effort is normal  No respiratory distress  Breath sounds: Normal breath sounds  No wheezing  Abdominal:      General: Bowel sounds are normal  There is no distension  Palpations: Abdomen is soft  Tenderness: There is no abdominal tenderness  Musculoskeletal:         General: Normal range of motion  Cervical back: Normal range of motion and neck supple  Skin:     General: Skin is warm and dry  Capillary Refill: Capillary refill takes less than 2 seconds  Neurological:      Mental Status: He is alert and oriented to person, place, and time  Cranial Nerves: No cranial nerve deficit     Psychiatric:         Behavior: Behavior normal          Vital Signs  ED Triage Vitals   Temperature Pulse Respirations Blood Pressure SpO2   11/06/22 1736 11/06/22 1734 11/06/22 1734 11/06/22 1734 11/06/22 1734   97 7 °F (36 5 °C) (!) 121 18 120/81 95 %      Temp Source Heart Rate Source Patient Position - Orthostatic VS BP Location FiO2 (%)   11/06/22 1736 11/06/22 1734 11/06/22 1734 11/06/22 1734 --   Oral Monitor Sitting Left arm       Pain Score       --                  Vitals:    11/06/22 1734 11/06/22 1945 11/06/22 2000   BP: 120/81 132/74 128/91   Pulse: (!) 121 (!) 138 (!) 125   Patient Position - Orthostatic VS: Sitting           Visual Acuity      ED Medications  Medications   sodium chloride (PF) 0 9 % injection 3 mL (has no administration in time range)   furosemide (LASIX) injection 40 mg (has no administration in time range)   heparin (porcine) injection 5,200 Units (has no administration in time range)   heparin (porcine) 25,000 units in 0 45% NaCl 250 mL infusion (premix) (has no administration in time range)   heparin (porcine) injection 5,200 Units (has no administration in time range)   heparin (porcine) injection 2,600 Units (has no administration in time range)   sodium chloride 0 9 % bolus 500 mL (0 mL Intravenous Stopped 11/6/22 1905)   iohexol (OMNIPAQUE) 350 MG/ML injection (SINGLE-DOSE) 100 mL (100 mL Intravenous Given 11/6/22 1905)   metoprolol (LOPRESSOR) injection 5 mg (5 mg Intravenous Given 11/6/22 2012)       Diagnostic Studies  Results Reviewed     Procedure Component Value Units Date/Time    HS Troponin I 2hr [[id number]]  (Normal) Collected: 11/06/22 2019    Lab Status: Final result Specimen: Blood from Arm, Right Updated: 11/06/22 2049     hs TnI 2hr 16 ng/L      Delta 2hr hsTnI 3 ng/L     Urine Microscopic [[id number]]  (Normal) Collected: 11/1946    Lab Status: Final result Specimen: Urine, Clean Catch Updated: 11/06/22 2006     RBC, UA 1-2 /hpf      WBC, UA 1-2 /hpf      Epithelial Cells None Seen /hpf      Bacteria, UA Occasional /hpf     UA w Reflex to Microscopic w Reflex to Culture [[id number]]  (Abnormal) Collected: 11/1946    Lab Status: Final result Specimen: Urine, Clean Catch Updated: 11/06/22 2005     Color, UA Light Yellow     Clarity, UA Clear     Specific Gravity, UA >=1 050     pH, UA 6 0     Leukocytes, UA Negative     Nitrite, UA Negative     Protein, UA Trace mg/dl      Glucose,  (1/2%) mg/dl      Ketones, UA Negative mg/dl      Urobilinogen, UA <2 0 mg/dl      Bilirubin, UA Negative     Occult Blood, UA Negative    HS Troponin I 4hr [[id number]]     Lab Status: No result Specimen: Blood     FLU/RSV/COVID - if FLU/RSV clinically relevant [[id number]]  (Normal) Collected: 11/06/22 1758    Lab Status: Final result Specimen: Nares from Nose Updated: 11/06/22 1900     SARS-CoV-2 Negative     INFLUENZA A PCR Negative     INFLUENZA B PCR Negative     RSV PCR Negative    Narrative:      FOR PEDIATRIC PATIENTS - copy/paste COVID Guidelines URL to browser: [email or web address]    SARS-CoV-2 assay is a Nucleic Acid Amplification assay intended for the  qualitative detection of nucleic acid from SARS-CoV-2 in nasopharyngeal  swabs  Results are for the presumptive identification of SARS-CoV-2 RNA  Positive results are indicative of infection with SARS-CoV-2, the virus  causing COVID-19, but do not rule out bacterial infection or co-infection  with other viruses  Laboratories within the [place] and its  territories are required to report all positive results to the appropriate  public health authorities  Negative results do not preclude SARS-CoV-2  infection and should not be used as the sole basis for treatment or other  patient management decisions  Negative results must be combined with  clinical observations, patient history, and epidemiological information  This test has not been FDA cleared or approved  This test has been authorized by FDA under an Emergency Use Authorization  (EUA)   This test is only authorized for the duration of time the  declaration that circumstances exist justifying the authorization of the  emergency use of an in vitro diagnostic tests for detection of SARS-CoV-2  virus and/or diagnosis of COVID-19 infection under section 564(b)(1) of  the Act, 21 U  S C  092IUK-0(D)(8), unless the authorization is terminated  or revoked sooner  The test has been validated but independent review by FDA  and CLIA is pending  Test performed using [employer] GeneXpert: This RT-PCR assay targets N2,  a region unique to SARS-CoV-2  A conserved region in the E-gene was chosen  for pan-Sarbecovirus detection which includes SARS-CoV-2  According to CMS-2020-01-R, this platform meets the definition of high-throughput technology  Protime-INR [[id number]]  (Abnormal) Collected: 11/06/22 1758    Lab Status: Final result Specimen: Blood from Arm, Right Updated: 11/06/22 1856     Protime 38 9 seconds      INR 4 11    APTT [[id number]]  (Abnormal) Collected: 11/06/22 1758    Lab Status: Final result Specimen: Blood from Arm, Right Updated: 11/06/22 1856     PTT 40 seconds     Procalcitonin [[id number]]  (Normal) Collected: 11/06/22 1758    Lab Status: Final result Specimen: Blood from Arm, Right Updated: 11/06/22 1848     Procalcitonin <0 05 ng/ml     HS Troponin 0hr (reflex protocol) [[id number]]  (Normal) Collected: 11/06/22 1758    Lab Status: Final result Specimen: Blood from Arm, Left Updated: 11/06/22 1845     hs TnI 0hr 13 ng/L     NT-BNP PRO [[id number]]  (Abnormal) Collected: 11/06/22 1758    Lab Status: Final result Specimen: Blood from Arm, Right Updated: 11/06/22 1844     NT-proBNP 10,317 pg/mL     Lactic Acid [[id number]]  (Normal) Collected: 11/06/22 1758    Lab Status: Final result Specimen: Blood from Arm, Left Updated: 11/06/22 1838     LACTIC ACID 0 9 mmol/L     Narrative:      Result may be elevated if tourniquet was used during collection      Comprehensive metabolic panel [[id number]]  (Abnormal) Collected: 11/06/22 1758    Lab Status: Final result Specimen: Blood from Arm, Right Updated: 11/06/22 4070 Sodium 143 mmol/L      Potassium 4 5 mmol/L      Chloride 106 mmol/L      CO2 30 mmol/L      ANION GAP 7 mmol/L      BUN 18 mg/dL      Creatinine 1 42 mg/dL      Glucose 91 mg/dL      Calcium 9 0 mg/dL      AST 30 U/L      ALT 26 U/L      Alkaline Phosphatase 74 U/L      Total Protein 7 3 g/dL      Albumin 3 9 g/dL      Total Bilirubin 1 03 mg/dL      eGFR 47 ml/min/1 73sq m     Narrative:      [place] guidelines for Chronic Kidney Disease (CKD):   •  Stage 1 with normal or high GFR (GFR > 90 mL/min/1 73 square meters)  •  Stage 2 Mild CKD (GFR = 60-89 mL/min/1 73 square meters)  •  Stage 3A Moderate CKD (GFR = 45-59 mL/min/1 73 square meters)  •  Stage 3B Moderate CKD (GFR = 30-44 mL/min/1 73 square meters)  •  Stage 4 Severe CKD (GFR = 15-29 mL/min/1 73 square meters)  •  Stage 5 End Stage CKD (GFR <15 mL/min/1 73 square meters)  Note: GFR calculation is accurate only with a steady state creatinine    CBC and differential [[id number]]  (Abnormal) Collected: 11/06/22 1758    Lab Status: Final result Specimen: Blood from Arm, Left Updated: 11/06/22 1820     WBC 10 04 Thousand/uL      RBC 4 44 Million/uL      Hemoglobin 15 7 g/dL      Hematocrit 48 3 %       fL      MCH 35 4 pg      MCHC 32 5 g/dL      RDW 14 6 %      MPV 9 4 fL      Platelets 139 Thousands/uL      nRBC 0 /100 WBCs      Neutrophils Relative 65 %      Immat GRANS % 0 %      Lymphocytes Relative 16 %      Monocytes Relative 15 %      Eosinophils Relative 3 %      Basophils Relative 1 %      Neutrophils Absolute 6 41 Thousands/µL      Immature Grans Absolute 0 03 Thousand/uL      Lymphocytes Absolute 1 62 Thousands/µL      Monocytes Absolute 1 54 Thousand/µL      Eosinophils Absolute 0 34 Thousand/µL      Basophils Absolute 0 10 Thousands/µL     Blood culture #2 [[id number]] Collected: 11/06/22 1758    Lab Status:  In process Specimen: Blood from Arm, Left Updated: 11/06/22 1816    Blood culture #1 [[id number]] Collected: 11/06/22 1758    Lab Status: In process Specimen: Blood from Arm, Right Updated: 11/06/22 1816                 PE Study with CT abdomen & pelvis with contrast   Final Result by Evelyn Maldonado MD (11/06 2046)      1  Thin nonocclusive pulmonary embolism within a right lower lobe segmental pulmonary artery  2   Measured RV/LV ratio is within normal limits at less than 0 9      3   Pulmonary edema  No focal consolidation  4   Cardiomegaly with evidence of right heart dysfunction  5   Severe atherosclerotic calcifications with moderate to severe stenosis of the origins of the superior mesenteric artery and bilateral renal arteries  Areas of severe moderate to severe stenosis of the common iliac arteries, external iliac arteries    and severe stenosis of the right common femoral artery  6   Other findings as above  I personally discussed this study with Marie Hubbard on 11/6/2022 at 8:45 PM       Workstation performed: [id number]                    Procedures  ECG 12 Lead Documentation Only    Date/Time: 11/6/2022 6:10 PM  Performed by: David Robertson MD  Authorized by: David Robertson MD     Comments:      Afib with RVR rate of 120 normal axis and intervals no acute ST elevations     CriticalCare Time  Performed by: David Robertson MD  Authorized by: David Robertson MD     Critical care provider statement:     Critical care time (minutes):  42    Critical care was necessary to treat or prevent imminent or life-threatening deterioration of the following conditions: PE requiring IV anticoagulation, afib with RVR requiring IV medication      Critical care was time spent personally by me on the following activities:  Discussions with primary provider, evaluation of patient's response to treatment, examination of patient, ordering and performing treatments and interventions, ordering and review of laboratory studies and ordering and review of radiographic studies             ED Course MDM  Patient presents with SOB, runny nose, diarrhea  He is in afib with RVR  Initially gave 500 ml bolus of NS with the concern for possible infectious etiology however no leukocytosis, procal negative, no lactic acidosis and nt pro bnp elevated  CTA PE shows small right sided PE and pulmonary edema  Will treat with heparin and lasix, will admit  Disposition  Final diagnoses:   PE (pulmonary thromboembolism) ([facility] )   [facility]est[facility] failure[facility]     Time reflects when diagnosis was documented in both MDM as applicable and the Disposition within this note     Time User Action Codes Description Comment    11/6/2022  8:52 PM [facility] Add [I26 99] PE (pulmonary thromboembolism) [facility] )     11/6/2022  8:52 PM [facility] Add [I50 9] CHF (congestive heart failure) [facility])       ED Disposition     ED Disposition   Admit    Condition   Stable    Date/Time   Sun Nov 6, 2022  8:52 PM    Comment   Case was discussed with [facility] and the patient's admission status was agreed to be Admission Status: inpatient status to the service of Dr Nallely Mobley   Follow-up Information    None         Patient's Medications   Discharge Prescriptions    No medications on file       No discharge procedures on file      PDMP Review     None          ED Provider  Electronically Signed by           Jamie Roman MD  11/06/22 2055

## 2022-11-07 ENCOUNTER — APPOINTMENT (INPATIENT)
Dept: NON INVASIVE DIAGNOSTICS | Facility: HOSPITAL | Age: 77
End: 2022-11-07

## 2022-11-07 PROBLEM — R65.10 SIRS WITHOUT INFECTION OR ORGAN DYSFUNCTION (HCC): Status: ACTIVE | Noted: 2022-11-07

## 2022-11-07 PROBLEM — I50.43 ACUTE ON CHRONIC COMBINED SYSTOLIC AND DIASTOLIC CONGESTIVE HEART FAILURE (HCC): Status: ACTIVE | Noted: 2017-08-18

## 2022-11-07 LAB
ANION GAP SERPL CALCULATED.3IONS-SCNC: 10 MMOL/L (ref 4–13)
APTT PPP: 174 SECONDS (ref 23–37)
APTT PPP: >210 SECONDS (ref 23–37)
BUN SERPL-MCNC: 17 MG/DL (ref 5–25)
CALCIUM SERPL-MCNC: 9.3 MG/DL (ref 8.3–10.1)
CHLORIDE SERPL-SCNC: 105 MMOL/L (ref 96–108)
CO2 SERPL-SCNC: 28 MMOL/L (ref 21–32)
CREAT SERPL-MCNC: 1.23 MG/DL (ref 0.6–1.3)
ERYTHROCYTE [DISTWIDTH] IN BLOOD BY AUTOMATED COUNT: 14.8 % (ref 11.6–15.1)
GFR SERPL CREATININE-BSD FRML MDRD: 56 ML/MIN/1.73SQ M
GLUCOSE SERPL-MCNC: 91 MG/DL (ref 65–140)
HCT VFR BLD AUTO: 44.2 % (ref 36.5–49.3)
HGB BLD-MCNC: 14.7 G/DL (ref 12–17)
INR PPP: 4.47 (ref 0.84–1.19)
MAGNESIUM SERPL-MCNC: 2.1 MG/DL (ref 1.6–2.6)
MCH RBC QN AUTO: 35.1 PG (ref 26.8–34.3)
MCHC RBC AUTO-ENTMCNC: 33.3 G/DL (ref 31.4–37.4)
MCV RBC AUTO: 106 FL (ref 82–98)
PLATELET # BLD AUTO: 246 THOUSANDS/UL (ref 149–390)
PMV BLD AUTO: 10.7 FL (ref 8.9–12.7)
POTASSIUM SERPL-SCNC: 4.3 MMOL/L (ref 3.5–5.3)
PROTHROMBIN TIME: 41.5 SECONDS (ref 11.6–14.5)
RBC # BLD AUTO: 4.19 MILLION/UL (ref 3.88–5.62)
SODIUM SERPL-SCNC: 143 MMOL/L (ref 135–147)
WBC # BLD AUTO: 8.52 THOUSAND/UL (ref 4.31–10.16)

## 2022-11-07 RX ORDER — SPIRONOLACTONE 25 MG/1
12.5 TABLET ORAL DAILY
Status: DISCONTINUED | OUTPATIENT
Start: 2022-11-07 | End: 2022-11-08

## 2022-11-07 RX ORDER — FUROSEMIDE 10 MG/ML
40 INJECTION INTRAMUSCULAR; INTRAVENOUS
Status: DISCONTINUED | OUTPATIENT
Start: 2022-11-07 | End: 2022-11-08

## 2022-11-07 RX ADMIN — SACUBITRIL AND VALSARTAN 1 TABLET: 49; 51 TABLET, FILM COATED ORAL at 08:26

## 2022-11-07 RX ADMIN — CLOPIDOGREL 75 MG: 75 TABLET, FILM COATED ORAL at 08:18

## 2022-11-07 RX ADMIN — FUROSEMIDE 40 MG: 10 INJECTION, SOLUTION INTRAMUSCULAR; INTRAVENOUS at 10:13

## 2022-11-07 RX ADMIN — PANTOPRAZOLE SODIUM 40 MG: 40 TABLET, DELAYED RELEASE ORAL at 05:27

## 2022-11-07 RX ADMIN — CARVEDILOL 6.25 MG: 3.12 TABLET, FILM COATED ORAL at 17:00

## 2022-11-07 RX ADMIN — CARVEDILOL 6.25 MG: 3.12 TABLET, FILM COATED ORAL at 08:25

## 2022-11-07 RX ADMIN — SPIRONOLACTONE 12.5 MG: 25 TABLET ORAL at 10:30

## 2022-11-07 RX ADMIN — ATORVASTATIN CALCIUM 40 MG: 40 TABLET, FILM COATED ORAL at 17:00

## 2022-11-07 RX ADMIN — DOCUSATE SODIUM 100 MG: 100 CAPSULE, LIQUID FILLED ORAL at 17:00

## 2022-11-07 RX ADMIN — DOCUSATE SODIUM 100 MG: 100 CAPSULE, LIQUID FILLED ORAL at 08:19

## 2022-11-07 RX ADMIN — HEPARIN SODIUM 12 UNITS/KG/HR: 10000 INJECTION, SOLUTION INTRAVENOUS at 22:08

## 2022-11-07 RX ADMIN — SACUBITRIL AND VALSARTAN 1 TABLET: 49; 51 TABLET, FILM COATED ORAL at 17:01

## 2022-11-07 RX ADMIN — SENNOSIDES 8.6 MG: 8.6 TABLET, FILM COATED ORAL at 08:18

## 2022-11-07 RX ADMIN — FUROSEMIDE 40 MG: 10 INJECTION, SOLUTION INTRAMUSCULAR; INTRAVENOUS at 17:01

## 2022-11-07 RX ADMIN — LORATADINE 10 MG: 10 TABLET ORAL at 08:18

## 2022-11-07 RX ADMIN — FLUTICASONE PROPIONATE 1 SPRAY: 50 SPRAY, METERED NASAL at 08:17

## 2022-11-07 NOTE — H&P
[facility]  H&P- Cleave Martha Morris 1945, 69 y o  male MRN: [id number]  Unit/Bed#: ED 08 Encounter: [id number]  Primary Care Provider: Tiffany Rivera MD   Date and time admitted to hospital: 11/6/2022  5:30 PM    * Acute pulmonary embolism without acute cor pulmonale [facility])  Assessment & Plan  · Present on admission- Evidenced by CT imaging  · No heart strain noted on CT   · On coumadin at baseline for prosthetic mitral valve  · Last INR on 10/22 was 2 81, INR today 4 11  · Could be coumadin failure vs transient subtherapeutic INR between 10/22 and today  · Consult cardiology and hematology  · Started on heparin gtt in ED, continue   · Check ECHO- last ECHO in 6/2022 with EF 35%    Acute on chronic congestive heart failure ([facility] [address] )  Assessment & Plan  Wt Readings from Last 3 Encounters:   09/23/22 65 7 kg (144 lb 12 8 oz)   05/19/22 67 1 kg (148 lb)   01/19/22 63 kg (139 lb)     · Present on admission- elevated BNP, vascular congestion on CXR  · Last ECHO in 6/2022 with EF 35%  · Follows with cardiology in [place]  · Given dose of IV lasix in ED, continue  · Monitor lytes, weight, I/O  · Consult cardiology given above     CKD (chronic kidney disease)  Assessment & Plan  Lab Results   Component Value Date    EGFR 47 11/06/2022    EGFR 51 04/18/2019    EGFR 58 12/10/2018    CREATININE 1 42 (H) 11/06/2022    CREATININE 1 38 (H) 04/18/2019    CREATININE 1 24 12/10/2018   · Currently at baseline  · Monitor daily while admitted    S/P MVR (mitral valve repair)  Assessment & Plan  · History of prosthetic Mitral Valve repair  · Follows with cardiology as an outpatient in [place]  · Monitor    PAF (paroxysmal atrial fibrillation) ([facility] )  Assessment & Plan  · History of afib, currently in RVR  · Continue home dose coreg  · Given dose of IV lopressor in ED    CAD (coronary atherosclerotic disease)  Assessment & Plan  · No chest pain  · Continue home meds for now  · Monitor    VTE Pharmacologic Prophylaxis: VTE Score: 7 High Risk (Score >/= 5) - Pharmacological DVT Prophylaxis Ordered: heparin drip  Sequential Compression Devices Ordered  Code Status: Level 1 - Full Code   Discussion with family: Updated  (son) at bedside  Anticipated Length of Stay: Patient will be admitted on an inpatient basis with an anticipated length of stay of greater than 2 midnights secondary to heparin gtt  Total Time for Visit, including Counseling / Coordination of Care: 30 minutes Greater than 50% of this total time spent on direct patient counseling and coordination of care  Chief Complaint: shortness of breath     History of Present Illness:  Jyoti Joshi is a 69 y o  male with a PMH of CHF, Afib, CAD, HTN who presents with shortness of breath  Reports URI symptoms for last 1 month including nasal congestion and cough  Reports history of Mitral valve replacement on coumadin, which he is compliant with  Follows with cardiology in [place]  Came to the ED due to worsening of symptoms  In the ED, noted to have acute PE and admitted to medicine for further management  Review of Systems:  Review of Systems   Constitutional: Negative for chills and fever  HENT: Positive for postnasal drip and sore throat  Negative for ear pain  Eyes: Negative for pain and visual disturbance  Respiratory: Positive for cough and shortness of breath  Cardiovascular: Negative for chest pain and palpitations  Gastrointestinal: Negative for abdominal pain and vomiting  Genitourinary: Negative for dysuria and hematuria  Musculoskeletal: Negative for arthralgias and back pain  Skin: Negative for color change and rash  Neurological: Negative for seizures and syncope  All other systems reviewed and are negative        Past Medical and Surgical History:   Past Medical History:   Diagnosis Date   • 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   • CAD (coronary artery disease)    • Cancer [facility])     prostate   • Colon polyp    • Coronary artery disease • GERD (gastroesophageal reflux disease)    • H/O heart artery stent     x2   • Hypercholesteremia    • Hypertension    • Keratotic lesion    • Stroke ([address] )     10YRS AGO   • Transient cerebral ischemia        Past Surgical History:   Procedure Laterality Date   • BACK SURGERY     • CARDIAC SURGERY      mvp repair   • CAROTID ENDARTERECTOMY Right 03/2020   • CHOLECYSTECTOMY     • COLONOSCOPY     • ESOPHAGOGASTRODUODENOSCOPY N/A 6/22/2018    Procedure: ESOPHAGOGASTRODUODENOSCOPY (EGD); Surgeon: Ever Griffith MD;  Location: [facility]; Service: Gastroenterology   • JOINT REPLACEMENT      L hip replacement   • ORCHIECTOMY     • PROSTATECTOMY     • TOTAL HIP ARTHROPLASTY         Meds/Allergies:  Prior to Admission medications    Medication Sig Start Date End Date Taking? Authorizing Provider   allopurinol (ZYLOPRIM) 100 mg tablet Take 1 tablet (100 mg total) by mouth daily 9/22/21   Nery Martinez MD   atorvastatin (LIPITOR) 40 mg tablet Take 40 mg by mouth daily      Historical Provider, MD   Azelaic Acid 15 % cream apply to 1 thin layer twice a day to face 9/1/22   Historical Provider, MD   carvedilol (COREG) 6 25 mg tablet Take 6 25 mg by mouth 2 (two) times a day 8/8/21   Historical Provider, MD   clopidogrel (PLAVIX) 75 mg tablet Take 75 mg by mouth daily 7/30/21   Historical Provider, MD   Farxiga 10 MG TABS Take 10 mg by mouth daily 3/4/22   Historical Provider, MD   furosemide (LASIX) 40 mg tablet Take 40 mg by mouth in the morning   3/7/22   Historical Provider, MD   metroNIDAZOLE (METROGEL) 0 75 % gel apply A THIN LAYER topically twice a day for 4 WEEKS FACE AND SCALP DO NOT MOISTURIZE 12/20/21   Historical Provider, MD   Multiple Vitamins-Minerals (PX MENS MULTIVITAMINS) TABS Take by mouth    Historical Provider, MD   pantoprazole (PROTONIX) 40 mg tablet Take 1 tablet (40 mg total) by mouth daily 10/7/22   Nery Martinez MD   sacubitril-valsartan (ENTRESTO) 49-51 MG TABS Take 1 tablet by mouth 2 (two) times a day 21   Historical Provider, MD   triamcinolone (KENALOG) 0 5 % cream Apply topically 2 (two) times a day 22   Nery Martinez MD   warfarin (COUMADIN) 2 5 mg tablet take 2 tablets by mouth daily EXCEPT 3 TABLETS EVERY THURSDAY OR AS DIRECTED 21   Historical Provider, MD     I have reviewed home medications with patient personally  Allergies: No Known Allergies    Social History:  Marital Status:    Occupation: na  Patient Pre-hospital Living Situation: Home  Patient Pre-hospital Level of Mobility: walks  Patient Pre-hospital Diet Restrictions: heart healthy diet   Substance Use History:   Social History     Substance and Sexual Activity   Alcohol Use Never   • Alcohol/week: 2 0 standard drinks   • Types: 2 Standard drinks or equivalent per week     Social History     Tobacco Use   Smoking Status Former Smoker   • Packs/day: 0 25   • Quit date: 2018   • Years since quittin 4   Smokeless Tobacco Never Used   Tobacco Comment    4 cig/day     Social History     Substance and Sexual Activity   Drug Use No       Family History:  Family History   Problem Relation Age of Onset   • Heart disease Mother    • Stroke Mother         CVA   • Cancer Father    • Hodgkin's lymphoma Father    • Lung cancer Father        Physical Exam:     Vitals:   Blood Pressure: 140/68 (22)  Pulse: (!) 129 (22)  Temperature: 97 7 °F (36 5 °C) (22)  Temp Source: Oral (22)  Respirations: (!) 26 (22)  SpO2: 91 % (22)    Physical Exam  Constitutional:       General: He is not in acute distress  Appearance: Normal appearance  He is not toxic-appearing  Cardiovascular:      Rate and Rhythm: Tachycardia present  Rhythm irregular  Heart sounds: Normal heart sounds  No murmur heard  Pulmonary:      Effort: Pulmonary effort is normal  No respiratory distress  Breath sounds: Normal breath sounds  No wheezing     Abdominal:      General: Abdomen is flat  There is no distension  Palpations: Abdomen is soft  Tenderness: There is no abdominal tenderness  Neurological:      General: No focal deficit present  Mental Status: He is alert and oriented to person, place, and time  Mental status is at baseline  Motor: No weakness  Additional Data:     Lab Results:  Results from last 7 days   Lab Units 11/06/22  1758   WBC Thousand/uL 10 04   HEMOGLOBIN g/dL 15 7   HEMATOCRIT % 48 3   PLATELETS Thousands/uL 235   NEUTROS PCT % 65   LYMPHS PCT % 16   MONOS PCT % 15*   EOS PCT % 3     Results from last 7 days   Lab Units 11/06/22  1758   SODIUM mmol/L 143   POTASSIUM mmol/L 4 5   CHLORIDE mmol/L 106   CO2 mmol/L 30   BUN mg/dL 18   CREATININE mg/dL 1 42*   ANION GAP mmol/L 7   CALCIUM mg/dL 9 0   ALBUMIN g/dL 3 9   TOTAL BILIRUBIN mg/dL 1 03*   ALK PHOS U/L 74   ALT U/L 26   AST U/L 30   GLUCOSE RANDOM mg/dL 91     Results from last 7 days   Lab Units 11/06/22  1758   INR  4 11*             Results from last 7 days   Lab Units 11/06/22  1758   LACTIC ACID mmol/L 0 9   PROCALCITONIN ng/ml <0 05       Imaging: Reviewed radiology reports from this admission including: cta chest abdomen pelvis  PE Study with CT abdomen & pelvis with contrast   Final Result by Xander Weber MD (11/06 2045)      1  Thin nonocclusive pulmonary embolism within a right lower lobe segmental pulmonary artery  2   Measured RV/LV ratio is within normal limits at less than 0 9      3   Pulmonary edema  No focal consolidation  4   Cardiomegaly with evidence of right heart dysfunction  5   Severe atherosclerotic calcifications with moderate to severe stenosis of the origins of the superior mesenteric artery and bilateral renal arteries  Areas of severe moderate to severe stenosis of the common iliac arteries, external iliac arteries    and severe stenosis of the right common femoral artery  6   Other findings as above        I personally discussed this study with Ledy Bonds on 11/6/2022 at 8:45 PM       Workstation performed: [id number]             EKG and Other Studies Reviewed on Admission:   · EKG: Afib with RVR  ** Please Note: This note has been constructed using a voice recognition system   **

## 2022-11-07 NOTE — ED NOTES
Placed on 2L NC for comfort, pt wheezing and feeling SOB sitting at edge of bed       Ankit Colbert RN  11/06/22 7759

## 2022-11-07 NOTE — ASSESSMENT & PLAN NOTE
· Present on admission- Evidenced by CT imaging  · No heart strain noted on CT   · On coumadin at baseline for prosthetic mitral valve  · Last INR on 10/22 was 2 81, INR today 4 11  · Could be coumadin failure vs transient subtherapeutic INR between 10/22 and today  · Consult cardiology and hematology  · Started on heparin gtt in ED, continue   · Check ECHO- last ECHO in 6/2022 with EF 35%

## 2022-11-07 NOTE — ASSESSMENT & PLAN NOTE
· History of prosthetic Mitral Valve repair  · Follows with cardiology as an outpatient in [address] [place] [address]  · Monitor

## 2022-11-07 NOTE — ASSESSMENT & PLAN NOTE
· History of prosthetic Mitral Valve repair  · Follows with cardiology as an outpatient in [place]  · Monitor

## 2022-11-07 NOTE — ASSESSMENT & PLAN NOTE
Wt Readings from Last 3 Encounters:   09/23/22 65 7 kg (144 lb 12 8 oz)   05/19/22 67 1 kg (148 lb)   01/19/22 63 kg (139 lb)     · Present on admission- elevated BNP, vascular congestion on CXR  · Last ECHO in 6/2022 with EF 35% and global hypokinesis  · Follows with cardiology in [place]  · Given dose of IV lasix in ED, continue  · No weight today, patient negative 1 3 L since admission  · Monitor lytes, weight, I/O  · Cardiology consultation appreciated

## 2022-11-07 NOTE — ASSESSMENT & PLAN NOTE
· History of afib, was in RVR on arrival to ED, currently rate controlled  · Continue home dose coreg  · Given dose of IV lopressor in ED

## 2022-11-07 NOTE — ASSESSMENT & PLAN NOTE
· In the setting of PE/CHF as evidenced by HR  and RR of 22-26     · Now resolved  · Continue to monitor on telemetry  · Monitor CBC

## 2022-11-07 NOTE — ASSESSMENT & PLAN NOTE
· Present on admission- Evidenced by CT imaging  · No heart strain noted on CT   · On coumadin at baseline for prosthetic mitral valve  · Last INR on 10/22 was 2 81, INR on admission 4 11  · Could be coumadin failure vs transient subtherapeutic INR between 10/22 and today  · Consult cardiology and hematology  · Started on heparin gtt in ED, continue   · Check ECHO- last ECHO in 6/2022 with EF 35%

## 2022-11-07 NOTE — PROGRESS NOTES
[facility] [facility]  Progress Note Aby Morris 1945, 69 y o  male MRN: [id number]  Unit/Bed#: ED 08 Encounter: [id number]  Primary Care Provider: Melva Kinney MD   Date and time admitted to hospital: 11/6/2022  5:30 PM    * Acute pulmonary embolism without acute cor pulmonale [facility])  Assessment & Plan  · Present on admission- Evidenced by CT imaging  · No heart strain noted on CT   · On coumadin at baseline for prosthetic mitral valve  · Last INR on 10/22 was 2 81, INR on admission 4 11  · Could be coumadin failure vs transient subtherapeutic INR between 10/22 and today  · Consult cardiology and hematology  · Started on heparin gtt in ED, continue   · Check ECHO- last ECHO in 6/2022 with EF 35%    Acute on chronic combined systolic and diastolic congestive heart failure (HCC)  Assessment & Plan  Wt Readings from Last 3 Encounters:   09/23/22 65 7 kg (144 lb 12 8 oz)   05/19/22 67 1 kg (148 lb)   01/19/22 63 kg (139 lb)     · Present on admission- elevated BNP, vascular congestion on CXR  · Last ECHO in 6/2022 with EF 35% and global hypokinesis  · Follows with cardiology in [place]  · Given dose of IV lasix in ED, continue  · No weight today, patient negative 1 3 L since admission  · Monitor lytes, weight, I/O  · Cardiology consultation appreciated     SIRS without infection or organ dysfunction [facility])  Assessment & Plan  · In the setting of PE/CHF as evidenced by HR  and RR of 22-26     · Now resolved  · Continue to monitor on telemetry  · Monitor CBC    CKD (chronic kidney disease)  Assessment & Plan  Lab Results   Component Value Date    EGFR 56 11/07/2022    EGFR 47 11/06/2022    EGFR 51 04/18/2019    CREATININE 1 23 11/07/2022    CREATININE 1 42 (H) 11/06/2022    CREATININE 1 38 (H) 04/18/2019   · Currently at baseline  · Monitor daily while admitted    S/P MVR (mitral valve repair)  Assessment & Plan  · History of prosthetic Mitral Valve repair  · Follows with cardiology as an outpatient in [place]  · Monitor    PAF (paroxysmal atrial fibrillation) ([facility])  Assessment & Plan  · History of afib, was in RVR on arrival to ED, currently rate controlled  · Continue home dose coreg  · Given dose of IV lopressor in ED    CAD (coronary atherosclerotic disease)  Assessment & Plan  · No chest pain  · Continue home regimen of Plavix, Coreg, and Entresto   · Monitor        VTE Pharmacologic Prophylaxis: VTE Score: 7 High Risk (Score >/= 5) - Pharmacological DVT Prophylaxis Ordered: heparin drip  Sequential Compression Devices Ordered  Patient Centered Rounds: I performed bedside rounds with nursing staff today  Discussions with Specialists or Other Care Team Provider: nursing    Education and Discussions with Family / Patient: Updated  (son) via phone  Time Spent for Care: 20 minutes  More than 50% of total time spent on counseling and coordination of care as described above  Current Length of Stay: 1 day(s)  Current Patient Status: Inpatient   Certification Statement: The patient will continue to require additional inpatient hospital stay due to continued treatement of CHF and workup of PE  Discharge Plan: Anticipate discharge in 48-72 hrs to home  Code Status: Level 1 - Full Code    Subjective: The patient was seen and examined  The patient states his breathing is improved, he is asking when he can go home  Objective:     Vitals:   Temp (24hrs), Av 1 °F (36 7 °C), Min:97 7 °F (36 5 °C), Max:98 6 °F (37 °C)    Temp:  [97 7 °F (36 5 °C)-98 6 °F (37 °C)] 98 °F (36 7 °C)  HR:  [] 95  Resp:  [18-26] 19  BP: (120-140)/(65-91) 120/66  SpO2:  [91 %-98 %] 98 %  There is no height or weight on file to calculate BMI  Input and Output Summary (last 24 hours):      Intake/Output Summary (Last 24 hours) at 2022 1049  Last data filed at 2022 0730  Gross per 24 hour   Intake 500 ml   Output 1800 ml   Net -1300 ml       Physical Exam:   Physical Exam  Vitals and nursing note reviewed  Constitutional:       General: He is awake  Appearance: Normal appearance  Interventions: Nasal cannula in place  Cardiovascular:      Rate and Rhythm: Tachycardia present  Rhythm irregularly irregular  Pulmonary:      Effort: Pulmonary effort is normal       Breath sounds: Decreased breath sounds present  Abdominal:      Palpations: Abdomen is soft  Tenderness: There is no abdominal tenderness  Skin:     General: Skin is warm and dry  Neurological:      General: No focal deficit present  Mental Status: He is alert and oriented to person, place, and time  Psychiatric:         Attention and Perception: Attention normal          Mood and Affect: Mood normal          Speech: Speech normal          Behavior: Behavior is cooperative  Additional Data:     Labs:  Results from last 7 days   Lab Units 11/07/22  0314 11/06/22  1758   WBC Thousand/uL 8 52 10 04   HEMOGLOBIN g/dL 14 7 15 7   HEMATOCRIT % 44 2 48 3   PLATELETS Thousands/uL 246 235   NEUTROS PCT %  --  65   LYMPHS PCT %  --  16   MONOS PCT %  --  15*   EOS PCT %  --  3     Results from last 7 days   Lab Units 11/07/22  0805 11/06/22  1758   SODIUM mmol/L 143 143   POTASSIUM mmol/L 4 3 4 5   CHLORIDE mmol/L 105 106   CO2 mmol/L 28 30   BUN mg/dL 17 18   CREATININE mg/dL 1 23 1 42*   ANION GAP mmol/L 10 7   CALCIUM mg/dL 9 3 9 0   ALBUMIN g/dL  --  3 9   TOTAL BILIRUBIN mg/dL  --  1 03*   ALK PHOS U/L  --  74   ALT U/L  --  26   AST U/L  --  30   GLUCOSE RANDOM mg/dL 91 91     Results from last 7 days   Lab Units 11/07/22  0622   INR  4 47*             Results from last 7 days   Lab Units 11/06/22  1758   LACTIC ACID mmol/L 0 9   PROCALCITONIN ng/ml <0 05       Lines/Drains:  Invasive Devices  Report    Peripheral Intravenous Line  Duration           Peripheral IV 11/06/22 Dorsal (posterior); Left Forearm <1 day    Peripheral IV 11/06/22 Right Antecubital <1 day                  Telemetry:  Telemetry Orders (From admission, onward)             24 Hour Telemetry Monitoring  (ED Bridging Orders Panel)  Continuous x 24 Hours (Telem)        References:    Telemetry Guidelines   Question:  Reason for 24 Hour Telemetry  Answer:  Pulmonary Embolism - 24 hours without resp  compromise, dysrhythmias, hemodynamically stable                 Telemetry Reviewed: Atrial fibrillation  HR averaging 95  Indication for Continued Telemetry Use: Arrthymias requiring medical therapy             Imaging: No pertinent imaging reviewed  Recent Cultures (last 7 days):   Results from last 7 days   Lab Units 11/06/22  1758   BLOOD CULTURE  Received in Microbiology Lab  Culture in Progress  Received in Microbiology Lab  Culture in Progress         Last 24 Hours Medication List:   Current Facility-Administered Medications   Medication Dose Route Frequency Provider Last Rate   • acetaminophen  650 mg Oral Q6H PRN Catalina Bustos MD     • atorvastatin  40 mg Oral Daily With Lily Solorzano MD     • calcium carbonate  1,000 mg Oral Daily PRN Catalina Bustos MD     • carvedilol  6 25 mg Oral BID Catalina Bustos MD     • clopidogrel  75 mg Oral Daily Catalina Bustos MD     • docusate sodium  100 mg Oral BID Catalina Bustos MD     • fluticasone  1 spray Each Nare Daily Catalina Bustos MD     • furosemide  40 mg Intravenous BID (diuretic) Eva Bourne PA-C     • heparin (porcine)  3-30 Units/kg/hr (Order-Specific) Intravenous Titrated Carolin Santillan MD 15 Units/kg/hr (11/07/22 3933)   • heparin (porcine)  2,600 Units Intravenous Q1H PRN Carolin Santillan MD     • heparin (porcine)  5,200 Units Intravenous Q1H PRN Carolin Santillan MD     • loratadine  10 mg Oral Daily Catalina Bustos MD     • ondansetron  4 mg Intravenous Q6H PRN Catalina Bustos MD     • pantoprazole  40 mg Oral Daily Catalina Bustos MD     • sacubitril-valsartan  1 tablet Oral BID Catalina Bustos MD     • senna  1 tablet Oral Daily Rose Penaloza Ginette Barthel, MD     • sodium chloride (PF)  3 mL Intravenous Q1H PRN Judson Powell MD     • spironolactone  12 5 mg Oral Daily Laney Leo PA-C          Today, Patient Was Seen By: Sandy Rocha    **Please Note: This note may have been constructed using a voice recognition system  **

## 2022-11-07 NOTE — ASSESSMENT & PLAN NOTE
Lab Results   Component Value Date    EGFR 47 11/06/2022    EGFR 51 04/18/2019    EGFR 58 12/10/2018    CREATININE 1 42 (H) 11/06/2022    CREATININE 1 38 (H) 04/18/2019    CREATININE 1 24 12/10/2018   · Currently at baseline  · Monitor daily while admitted

## 2022-11-07 NOTE — CONSULTS
Consultation - Cardiology   Mahesh Morris 69 y o  male MRN: [id number]  Unit/Bed#: ED 08 Encounter: [id number]  11/07/22  9:30 AM    Assessment/ Plan:  1  Acute on chronic systolic heart failure, still volume overloaded  Will start on Lasix 40 IV b i d   Continue with daily weights  Salt restriction  Strict I&Os  Net -1 3 L  Restart patient's home Aldactone  His exacerbation is likely secondary to noncompliance with salt intake as well as recent decrease in diuretics  Continue with Lipitor, Coreg, Plavix, Entresto, Aldactone  2  Cardiomyopathy unclear if ischemic or nonischemic  Patient has previously discussed about previously declined it  He states now he would like to further think about it  Continue with all medications  Last echocardiogram done June 2022--EF 35%, global hypokinesis, mild LVH, left atrium moderately enlarged, bioprosthetic aortic valve noted, bioprosthetic mitral valve noted, mild pulmonary hypertension with PA pressure of 35-40mmhg  3  Paroxysmal atrial fibrillation, heart rate stable in the 90s  On Coumadin  Goal INR 2-3     4  New pulmonary embolism, currently on heparin drip  Patient INR on admission was supratherapeutic, 4 1  Mgmt per SLIM    5  CAD with history of stents in the past   Currently without anginal symptoms  Continue with Lipitor, Coreg, Plavix, Entresto, Aldactone, heparin drip  6  History of bioprosthetic aortic valve replacement/bioprosthetic mitral valve replacement  Normal functioning on last echo in June 7  Hypertension stable 120/66  Continue with Lasix, Entresto, Coreg, Aldactone    8  Hyperlipidemia on Lipitor      History of Present Illness   Physician Requesting Consult: Gray Gil MD    Reason for Consult / Principal Problem: chf    HPI: Fernando Tafoya is a 69y o  year old male who presents with shortness of breath for the last 1 week    Patient states about 2 weeks ago his primary cardiologist [address] [place] [address] decreased his water pills and he was taking half a tablet (20mg) every other day  Now thinking back he thinks he may have misinterpreted the instructions  Patient also admits since he was feeling unwell he bought some frozen dinners to eat  He notes he has not been watching his salt intake  He also had canned soup recently  Patient denies any chest pain, chest pressure, chest heaviness  Denies any lower extremity edema  Past medical history:  Hypertension, hyperlipidemia, cardiomyopathy with last known EF around 35% has previously declined AICD, carotid stenosis right side 85%, left side 75% history of right CEA in 2020, PAF on Coumadin, CAD with PTCA to LAD in 1989 and stent placement in 2019, history of bioprosthetic aortic valve replacement, history of bioprosthetic mitral valve replacement which was previously just a mitral valve repair, chronic systolic heart failure    Inpatient consult to Cardiology  Consult performed by: Hoa Jordan PA-C  Consult ordered by: Ave Schilder, MD          EKG: Afib with rvr, anteroseptal infarct, age undetermined, marked st abnormality, possible inferior subendocardial injury      Review of Systems   Constitutional: Negative  Respiratory: Positive for shortness of breath  Cardiovascular: Negative  Neurological: Negative  Hematological: Negative  Psychiatric/Behavioral: Negative  All other systems reviewed and are negative        Historical Information   Past Medical History:   Diagnosis Date   • 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   • CAD (coronary artery disease)    • Cancer [facility])     prostate   • Colon polyp    • Coronary artery disease    • GERD (gastroesophageal reflux disease)    • H/O heart artery stent     x2   • Hypercholesteremia    • Hypertension    • Keratotic lesion    • Stroke ([facility] [address] )     10YRS AGO   • Transient cerebral ischemia      Past Surgical History:   Procedure Laterality Date   • BACK SURGERY     • CARDIAC SURGERY      mvp repair   • CAROTID ENDARTERECTOMY Right 2020   • CHOLECYSTECTOMY     • COLONOSCOPY     • ESOPHAGOGASTRODUODENOSCOPY N/A 2018    Procedure: ESOPHAGOGASTRODUODENOSCOPY (EGD); Surgeon: Blanac Pardo MD;  Location: [facility];   Service: Gastroenterology   • JOINT REPLACEMENT      L hip replacement   • ORCHIECTOMY     • PROSTATECTOMY     • TOTAL HIP ARTHROPLASTY       Social History     Substance and Sexual Activity   Alcohol Use Never   • Alcohol/week: 2 0 standard drinks   • Types: 2 Standard drinks or equivalent per week     Social History     Substance and Sexual Activity   Drug Use No     Social History     Tobacco Use   Smoking Status Former Smoker   • Packs/day: 0 25   • Quit date: 2018   • Years since quittin 4   Smokeless Tobacco Never Used   Tobacco Comment    4 cig/day       Family History:   Family History   Problem Relation Age of Onset   • Heart disease Mother    • Stroke Mother         CVA   • Cancer Father    • Hodgkin's lymphoma Father    • Lung cancer Father        Meds/Allergies   all current active meds have been reviewed and current meds:   Current Facility-Administered Medications   Medication Dose Route Frequency   • acetaminophen (TYLENOL) tablet 650 mg  650 mg Oral Q6H PRN   • atorvastatin (LIPITOR) tablet 40 mg  40 mg Oral Daily With Dinner   • calcium carbonate (TUMS) chewable tablet 1,000 mg  1,000 mg Oral Daily PRN   • carvedilol (COREG) tablet 6 25 mg  6 25 mg Oral BID   • clopidogrel (PLAVIX) tablet 75 mg  75 mg Oral Daily   • docusate sodium (COLACE) capsule 100 mg  100 mg Oral BID   • fluticasone (FLONASE) 50 mcg/act nasal spray 1 spray  1 spray Each Nare Daily   • furosemide (LASIX) injection 40 mg  40 mg Intravenous BID (diuretic)   • heparin (porcine) 25,000 units in 0 45% NaCl 250 mL infusion (premix)  3-30 Units/kg/hr (Order-Specific) Intravenous Titrated   • heparin (porcine) injection 2,600 Units  2,600 Units Intravenous Q1H PRN   • heparin (porcine) injection 5,200 Units  5,200 Units Intravenous Q1H PRN   • loratadine (CLARITIN) tablet 10 mg  10 mg Oral Daily   • ondansetron (ZOFRAN) injection 4 mg  4 mg Intravenous Q6H PRN   • pantoprazole (PROTONIX) EC tablet 40 mg  40 mg Oral Daily   • sacubitril-valsartan (ENTRESTO) 49-51 MG per tablet 1 tablet  1 tablet Oral BID   • senna (SENOKOT) tablet 8 6 mg  1 tablet Oral Daily   • sodium chloride (PF) 0 9 % injection 3 mL  3 mL Intravenous Q1H PRN   • spironolactone (ALDACTONE) tablet 12 5 mg  12 5 mg Oral Daily     No Known Allergies    Objective   Vitals: Blood pressure 120/66, pulse 95, temperature 98 °F (36 7 °C), temperature source Oral, resp  rate 19, SpO2 98 %  , There is no height or weight on file to calculate BMI ,   Orthostatic Blood Pressures    Flowsheet Row Most Recent Value   Blood Pressure 120/66 filed at 11/07/2022 0750   Patient Position - Orthostatic VS Sitting filed at 11/06/2022 2555          Systolic (26IIK), CHH:861 , Min:120 , NPJ:988     Diastolic (93KPP), LMN:33, Min:65, Max:91        Intake/Output Summary (Last 24 hours) at 11/7/2022 0930  Last data filed at 11/7/2022 0730  Gross per 24 hour   Intake 500 ml   Output 1800 ml   Net -1300 ml       Invasive Devices  Report    Peripheral Intravenous Line  Duration           Peripheral IV 11/06/22 Dorsal (posterior); Left Forearm <1 day    Peripheral IV 11/06/22 Right Antecubital <1 day                    Physical Exam:  GEN: Alert and oriented x 3, in no acute distress  Well appearing and well nourished  HEENT: Sclera anicteric, conjunctivae pink, mucous membranes moist  Oropharynx clear  NECK: Supple, no carotid bruits, no significant JVD  Trachea midline, no thyromegaly  HEART: Irregularly irregular normal S1 and S2, no murmurs, clicks, gallops or rubs  PMI nondisplaced, no thrills  LUNGS: decreased breath sounds with bibasilar crackles noted  No increased work of breathing or signs of respiratory distress     ABDOMEN: Soft, nontender, nondistended, normoactive bowel sounds  EXTREMITIES: Skin warm and well perfused, no clubbing, cyanosis, or edema  NEURO: No focal findings  Normal speech  Mood and affect normal    SKIN: Normal without suspicious lesions on exposed skin        Lab Results:     Troponins:   Results from last 7 days   Lab Units 11/06/22  2019 11/06/22  1758   HS TNI 0HR ng/L  --  13   HS TNI 2HR ng/L 16  --    NT-PRO BNP pg/mL  --  10,317*       CBC with diff:   Results from last 7 days   Lab Units 11/07/22  0314 11/06/22  1758   WBC Thousand/uL 8 52 10 04   HEMOGLOBIN g/dL 14 7 15 7   HEMATOCRIT % 44 2 48 3   MCV fL 106* 109*   PLATELETS Thousands/uL 246 235   MCH pg 35 1* 35 4*   MCHC g/dL 33 3 32 5   RDW % 14 8 14 6   MPV fL 10 7 9 4   NRBC AUTO /100 WBCs  --  0         CMP:   Results from last 7 days   Lab Units 11/07/22  0805 11/06/22  1758   POTASSIUM mmol/L 4 3 4 5   CHLORIDE mmol/L 105 106   CO2 mmol/L 28 30   BUN mg/dL 17 18   CREATININE mg/dL 1 23 1 42*   CALCIUM mg/dL 9 3 9 0   AST U/L  --  30   ALT U/L  --  26   ALK PHOS U/L  --  74   EGFR ml/min/1 73sq m 56 47

## 2022-11-07 NOTE — ASSESSMENT & PLAN NOTE
Wt Readings from Last 3 Encounters:   09/23/22 65 7 kg (144 lb 12 8 oz)   05/19/22 67 1 kg (148 lb)   01/19/22 63 kg (139 lb)     · Present on admission- elevated BNP, vascular congestion on CXR  · Last ECHO in 6/2022 with EF 35%  · Follows with cardiology in [place]  · Given dose of IV lasix in ED, continue  · Monitor lytes, weight, I/O  · Consult cardiology given above

## 2022-11-07 NOTE — ASSESSMENT & PLAN NOTE
Lab Results   Component Value Date    EGFR 56 11/07/2022    EGFR 47 11/06/2022    EGFR 51 04/18/2019    CREATININE 1 23 11/07/2022    CREATININE 1 42 (H) 11/06/2022    CREATININE 1 38 (H) 04/18/2019   · Currently at baseline  · Monitor daily while admitted

## 2022-11-08 ENCOUNTER — APPOINTMENT (INPATIENT)
Dept: VASCULAR ULTRASOUND | Facility: HOSPITAL | Age: 77
End: 2022-11-08

## 2022-11-08 PROBLEM — I95.9 HYPOTENSION: Status: ACTIVE | Noted: 2022-11-08

## 2022-11-08 LAB
ANION GAP SERPL CALCULATED.3IONS-SCNC: 9 MMOL/L (ref 4–13)
APTT PPP: 114 SECONDS (ref 23–37)
APTT PPP: 53 SECONDS (ref 23–37)
APTT PPP: 64 SECONDS (ref 23–37)
APTT PPP: 98 SECONDS (ref 23–37)
BUN SERPL-MCNC: 25 MG/DL (ref 5–25)
CALCIUM SERPL-MCNC: 9 MG/DL (ref 8.3–10.1)
CHLORIDE SERPL-SCNC: 102 MMOL/L (ref 96–108)
CO2 SERPL-SCNC: 30 MMOL/L (ref 21–32)
CREAT SERPL-MCNC: 1.44 MG/DL (ref 0.6–1.3)
ERYTHROCYTE [DISTWIDTH] IN BLOOD BY AUTOMATED COUNT: 14.4 % (ref 11.6–15.1)
FOLATE SERPL-MCNC: >20 NG/ML (ref 3.1–17.5)
GFR SERPL CREATININE-BSD FRML MDRD: 46 ML/MIN/1.73SQ M
GLUCOSE SERPL-MCNC: 111 MG/DL (ref 65–140)
HCT VFR BLD AUTO: 47.7 % (ref 36.5–49.3)
HCYS SERPL-SCNC: 13.9 UMOL/L (ref 5.3–14.2)
HGB BLD-MCNC: 15.7 G/DL (ref 12–17)
MCH RBC QN AUTO: 34.6 PG (ref 26.8–34.3)
MCHC RBC AUTO-ENTMCNC: 32.9 G/DL (ref 31.4–37.4)
MCV RBC AUTO: 105 FL (ref 82–98)
PLATELET # BLD AUTO: 198 THOUSANDS/UL (ref 149–390)
PMV BLD AUTO: 9.4 FL (ref 8.9–12.7)
POTASSIUM SERPL-SCNC: 3.5 MMOL/L (ref 3.5–5.3)
RBC # BLD AUTO: 4.54 MILLION/UL (ref 3.88–5.62)
RETICS # AUTO: ABNORMAL 10*3/UL (ref 14356–105094)
RETICS # CALC: 2.95 % (ref 0.37–1.87)
SODIUM SERPL-SCNC: 141 MMOL/L (ref 135–147)
VIT B12 SERPL-MCNC: 614 PG/ML (ref 100–900)
WBC # BLD AUTO: 10.23 THOUSAND/UL (ref 4.31–10.16)

## 2022-11-08 RX ORDER — SPIRONOLACTONE 25 MG/1
12.5 TABLET ORAL DAILY
Status: DISCONTINUED | OUTPATIENT
Start: 2022-11-09 | End: 2022-11-10

## 2022-11-08 RX ORDER — FUROSEMIDE 10 MG/ML
20 INJECTION INTRAMUSCULAR; INTRAVENOUS
Status: DISCONTINUED | OUTPATIENT
Start: 2022-11-08 | End: 2022-11-10

## 2022-11-08 RX ORDER — CARVEDILOL 3.12 MG/1
3.12 TABLET ORAL 2 TIMES DAILY
Status: DISCONTINUED | OUTPATIENT
Start: 2022-11-08 | End: 2022-11-12

## 2022-11-08 RX ADMIN — HEPARIN SODIUM 2600 UNITS: 1000 INJECTION INTRAVENOUS; SUBCUTANEOUS at 10:46

## 2022-11-08 RX ADMIN — FLUTICASONE PROPIONATE 1 SPRAY: 50 SPRAY, METERED NASAL at 08:20

## 2022-11-08 RX ADMIN — DOCUSATE SODIUM 100 MG: 100 CAPSULE, LIQUID FILLED ORAL at 17:53

## 2022-11-08 RX ADMIN — SODIUM CHLORIDE 250 ML: 0.9 INJECTION, SOLUTION INTRAVENOUS at 10:55

## 2022-11-08 RX ADMIN — CLOPIDOGREL 75 MG: 75 TABLET, FILM COATED ORAL at 08:00

## 2022-11-08 RX ADMIN — SACUBITRIL AND VALSARTAN 1 TABLET: 24; 26 TABLET, FILM COATED ORAL at 17:53

## 2022-11-08 RX ADMIN — SENNOSIDES 8.6 MG: 8.6 TABLET, FILM COATED ORAL at 08:18

## 2022-11-08 RX ADMIN — DOCUSATE SODIUM 100 MG: 100 CAPSULE, LIQUID FILLED ORAL at 08:17

## 2022-11-08 RX ADMIN — LORATADINE 10 MG: 10 TABLET ORAL at 08:17

## 2022-11-08 RX ADMIN — PANTOPRAZOLE SODIUM 40 MG: 40 TABLET, DELAYED RELEASE ORAL at 05:20

## 2022-11-08 RX ADMIN — ATORVASTATIN CALCIUM 40 MG: 40 TABLET, FILM COATED ORAL at 17:56

## 2022-11-08 NOTE — ASSESSMENT & PLAN NOTE
· Patient with hypotension with BP of 73/59, this improved with 250 mL fluid bolus  · Cardiology decreased coreg to 3 125 bid, decreased Entresto, and decreased aldactone  Hold parameters adjusted as well     · Continue to monitor blood pressure closely

## 2022-11-08 NOTE — CONSULTS
Medical Oncology/Hematology Consult Note  Vesna Page, male, 69 y o , 1945,  ED 08/ED 08, [id number]     Reason for admission: PE  Reason for consultation: PE while on coumadin      ASSESSMENT AND PLAN:     1  Pulmonary Embolism   • Unclear etiology currently, concern for warfarin failure, on indefinite anticoagulation  • CTA CAP nonocclusive PE RLL segmental pulmonary artery, cardiomegaly, severe atherosclerotic disease, renal cyst  No hepatosplenomegaly  Mediastinal lymph nodes up to 1cm - suspect reactive to PE, no distant lymphadenopathy   • Has been on coumadin since at least 2017  Within therapeutic window of 2-3 for INR per lab review for past year however previously supratherapeutic or subtherapeutic data over past few years   • INR on admission 4 11  • Poor diet past two months  • Appreciate cardiology recommendations, agree with transition to oral anticoagulant when appropriate/prior to discharge     2  Macrocytosis  • No anemia currently, hx of anemia followed in our office thought secondary to valve prior to replacement, since resolved s/p MV, lost to follow up  • Elevated Cr level, normal serum protein, no anemia or hypercalcemia  • Will order B12, Folate, MMA, Homocysteine, copper, retic currently   • F/u outpatient with Alyx Jiménez PA-C    3  Hx Prostate Cancer  • Approximately 2009, s/p prostatectomy did not require chemotherapy or radiation therapy  • Last PSA 04/27/2020 <0 01  • States he had PSA drawn within past few months, will obtain records and bring to office visit did not want repeat lab currently     Patient understands and is in agreement with this plan  Thank you for the opportunity to participate in this patient's care        History of present illness:  Patient is a 69-year-old male with past medical history significant for paroxysmal atrial fibrillation on Coumadin, hypertension, hyperlipidemia, cardiomyopathy, CHF, CKD, s/p mitral valve and aortic valve bioprosthesis, hx prostate cancer approximately 2009 s/p prostatectomy no chemo or RT, presented to emergency department with shortness of breath, headaches, runny nose for the past few days along with diarrhea  Found to have exacerbation of CHF (acute on chronic congestive heart failure), PE  Component 10/13/22 09/08/22 08/19/22 07/28/22 07/08/22 06/23/22   INR 2 8 2 6 2 3 2 6 2 1 3 3     EGD/Colonoscopy 2021: Mostly normal, colonic polyps benign pathology       Review of Systems:   Review of Systems   Constitutional: Positive for appetite change (decreased appetite)  Negative for chills, fatigue, fever and unexpected weight change  HENT: Negative for ear pain and sore throat  Eyes: Negative for pain and visual disturbance  Respiratory: Positive for cough and shortness of breath  Cardiovascular: Negative for chest pain and palpitations  Gastrointestinal: Negative for abdominal pain, blood in stool, constipation, diarrhea, nausea and vomiting  Genitourinary: Negative for dysuria and hematuria  Musculoskeletal: Negative for arthralgias and back pain  Skin: Negative for color change and rash  Neurological: Negative for seizures and syncope  All other systems reviewed and are negative  PHYSICAL EXAM:    BP 92/64 (BP Location: Right arm)   Pulse 80   Temp 98 6 °F (37 °C) (Oral)   Resp 18   SpO2 97%     Physical Exam  Vitals reviewed  Constitutional:       General: He is not in acute distress  Appearance: Normal appearance  He is not ill-appearing  HENT:      Head: Normocephalic and atraumatic  Eyes:      General: No scleral icterus  Cardiovascular:      Rate and Rhythm: Normal rate and regular rhythm  Heart sounds: Normal heart sounds  No murmur heard  Pulmonary:      Effort: Pulmonary effort is normal  No respiratory distress  Comments: On supplemental o2  Abdominal:      General: Bowel sounds are normal  There is no distension  Palpations: Abdomen is soft   There is no mass       Tenderness: There is no abdominal tenderness  Musculoskeletal:      Right lower leg: No edema  Left lower leg: No edema  Lymphadenopathy:      Cervical: No cervical adenopathy  Skin:     General: Skin is warm and dry  Neurological:      Mental Status: He is alert and oriented to person, place, and time  Psychiatric:         Thought Content:  Thought content normal          LABS:     Recent Results (from the past 48 hour(s))   ECG 12 lead    Collection Time: 11/06/22  5:34 PM   Result Value Ref Range    Ventricular Rate 120 BPM    Atrial Rate 357 BPM    KY Interval  ms    QRSD Interval 108 ms    QT Interval 294 ms    QTC Interval 415 ms    P Axis  degrees    QRS Axis -21 degrees    T Wave Axis 202 degrees   CBC and differential    Collection Time: 11/06/22  5:58 PM   Result Value Ref Range    WBC 10 04 4 31 - 10 16 Thousand/uL    RBC 4 44 3 88 - 5 62 Million/uL    Hemoglobin 15 7 12 0 - 17 0 g/dL    Hematocrit 48 3 36 5 - 49 3 %     (H) 82 - 98 fL    MCH 35 4 (H) 26 8 - 34 3 pg    MCHC 32 5 31 4 - 37 4 g/dL    RDW 14 6 11 6 - 15 1 %    MPV 9 4 8 9 - 12 7 fL    Platelets 201 061 - 061 Thousands/uL    nRBC 0 /100 WBCs    Neutrophils Relative 65 43 - 75 %    Immat GRANS % 0 0 - 2 %    Lymphocytes Relative 16 14 - 44 %    Monocytes Relative 15 (H) 4 - 12 %    Eosinophils Relative 3 0 - 6 %    Basophils Relative 1 0 - 1 %    Neutrophils Absolute 6 41 1 85 - 7 62 Thousands/µL    Immature Grans Absolute 0 03 0 00 - 0 20 Thousand/uL    Lymphocytes Absolute 1 62 0 60 - 4 47 Thousands/µL    Monocytes Absolute 1 54 (H) 0 17 - 1 22 Thousand/µL    Eosinophils Absolute 0 34 0 00 - 0 61 Thousand/µL    Basophils Absolute 0 10 0 00 - 0 10 Thousands/µL   Comprehensive metabolic panel    Collection Time: 11/06/22  5:58 PM   Result Value Ref Range    Sodium 143 135 - 147 mmol/L    Potassium 4 5 3 5 - 5 3 mmol/L    Chloride 106 96 - 108 mmol/L    CO2 30 21 - 32 mmol/L    ANION GAP 7 4 - 13 mmol/L    BUN 18 5 - 25 mg/dL    Creatinine 1 42 (H) 0 60 - 1 30 mg/dL    Glucose 91 65 - 140 mg/dL    Calcium 9 0 8 3 - 10 1 mg/dL    AST 30 5 - 45 U/L    ALT 26 12 - 78 U/L    Alkaline Phosphatase 74 46 - 116 U/L    Total Protein 7 3 6 4 - 8 4 g/dL    Albumin 3 9 3 5 - 5 0 g/dL    Total Bilirubin 1 03 (H) 0 20 - 1 00 mg/dL    eGFR 47 ml/min/1 73sq m   Lactic Acid    Collection Time: 11/06/22  5:58 PM   Result Value Ref Range    LACTIC ACID 0 9 0 5 - 2 0 mmol/L   Protime-INR    Collection Time: 11/06/22  5:58 PM   Result Value Ref Range    Protime 38 9 (H) 11 6 - 14 5 seconds    INR 4 11 (H) 0 84 - 1 19   APTT    Collection Time: 11/06/22  5:58 PM   Result Value Ref Range    PTT 40 (H) 23 - 37 seconds   Procalcitonin    Collection Time: 11/06/22  5:58 PM   Result Value Ref Range    Procalcitonin <0 05 <=0 25 ng/ml   Blood culture #1    Collection Time: 11/06/22  5:58 PM    Specimen: Arm, Right; Blood   Result Value Ref Range    Blood Culture No Growth at 24 hrs  Blood culture #2    Collection Time: 11/06/22  5:58 PM    Specimen: Arm, Left; Blood   Result Value Ref Range    Blood Culture No Growth at 24 hrs      HS Troponin 0hr (reflex protocol)    Collection Time: 11/06/22  5:58 PM   Result Value Ref Range    hs TnI 0hr 13 "Refer to ACS Flowchart"- see link ng/L   NT-BNP PRO    Collection Time: 11/06/22  5:58 PM   Result Value Ref Range    NT-proBNP 10,317 (H) <450 pg/mL   FLU/RSV/COVID - if FLU/RSV clinically relevant    Collection Time: 11/06/22  5:58 PM    Specimen: Nose; Nares   Result Value Ref Range    SARS-CoV-2 Negative Negative    INFLUENZA A PCR Negative Negative    INFLUENZA B PCR Negative Negative    RSV PCR Negative Negative   UA w Reflex to Microscopic w Reflex to Culture    Collection Time: 11/06/22  7:46 PM    Specimen: Urine, Clean Catch   Result Value Ref Range    Color, UA Light Yellow     Clarity, UA Clear     Specific Gravity, UA >=1 050 (H) 1 003 - 1 030    pH, UA 6 0 4 5, 5 0, 5 5, 6 0, 6 5, 7 0, 7 5, 8 0 Leukocytes, UA Negative Negative    Nitrite, UA Negative Negative    Protein, UA Trace (A) Negative mg/dl    Glucose,  (1/2%) (A) Negative mg/dl    Ketones, UA Negative Negative mg/dl    Urobilinogen, UA <2 0 <2 0 mg/dl mg/dl    Bilirubin, UA Negative Negative    Occult Blood, UA Negative Negative   Urine Microscopic    Collection Time: 11/06/22  7:46 PM   Result Value Ref Range    RBC, UA 1-2 None Seen, 1-2 /hpf    WBC, UA 1-2 None Seen, 1-2 /hpf    Epithelial Cells None Seen None Seen, Occasional /hpf    Bacteria, UA Occasional None Seen, Occasional /hpf   HS Troponin I 2hr    Collection Time: 11/06/22  8:19 PM   Result Value Ref Range    hs TnI 2hr 16 "Refer to ACS Flowchart"- see link ng/L    Delta 2hr hsTnI 3 <20 ng/L   CBC (With Platelets)    Collection Time: 11/07/22  3:14 AM   Result Value Ref Range    WBC 8 52 4 31 - 10 16 Thousand/uL    RBC 4 19 3 88 - 5 62 Million/uL    Hemoglobin 14 7 12 0 - 17 0 g/dL    Hematocrit 44 2 36 5 - 49 3 %     (H) 82 - 98 fL    MCH 35 1 (H) 26 8 - 34 3 pg    MCHC 33 3 31 4 - 37 4 g/dL    RDW 14 8 11 6 - 15 1 %    Platelets 344 157 - 720 Thousands/uL    MPV 10 7 8 9 - 12 7 fL   APTT    Collection Time: 11/07/22  6:22 AM   Result Value Ref Range    PTT >210 (HH) 23 - 37 seconds   Protime-INR    Collection Time: 11/07/22  6:22 AM   Result Value Ref Range    Protime 41 5 (H) 11 6 - 14 5 seconds    INR 4 47 (H) 0 84 - 7 43   Basic metabolic panel    Collection Time: 11/07/22  8:05 AM   Result Value Ref Range    Sodium 143 135 - 147 mmol/L    Potassium 4 3 3 5 - 5 3 mmol/L    Chloride 105 96 - 108 mmol/L    CO2 28 21 - 32 mmol/L    ANION GAP 10 4 - 13 mmol/L    BUN 17 5 - 25 mg/dL    Creatinine 1 23 0 60 - 1 30 mg/dL    Glucose 91 65 - 140 mg/dL    Calcium 9 3 8 3 - 10 1 mg/dL    eGFR 56 ml/min/1 73sq m   Magnesium    Collection Time: 11/07/22  8:05 AM   Result Value Ref Range    Magnesium 2 1 1 6 - 2 6 mg/dL   APTT    Collection Time: 11/07/22  4:02 PM   Result Value Ref Range     (HH) 23 - 37 seconds   APTT    Collection Time: 11/08/22 12:24 AM   Result Value Ref Range     (H) 23 - 37 seconds   CBC    Collection Time: 11/08/22  5:19 AM   Result Value Ref Range    WBC 10 23 (H) 4 31 - 10 16 Thousand/uL    RBC 4 54 3 88 - 5 62 Million/uL    Hemoglobin 15 7 12 0 - 17 0 g/dL    Hematocrit 47 7 36 5 - 49 3 %     (H) 82 - 98 fL    MCH 34 6 (H) 26 8 - 34 3 pg    MCHC 32 9 31 4 - 37 4 g/dL    RDW 14 4 11 6 - 15 1 %    Platelets 471 222 - 694 Thousands/uL    MPV 9 4 8 9 - 12 7 fL   Basic metabolic panel    Collection Time: 11/08/22  5:19 AM   Result Value Ref Range    Sodium 141 135 - 147 mmol/L    Potassium 3 5 3 5 - 5 3 mmol/L    Chloride 102 96 - 108 mmol/L    CO2 30 21 - 32 mmol/L    ANION GAP 9 4 - 13 mmol/L    BUN 25 5 - 25 mg/dL    Creatinine 1 44 (H) 0 60 - 1 30 mg/dL    Glucose 111 65 - 140 mg/dL    Calcium 9 0 8 3 - 10 1 mg/dL    eGFR 46 ml/min/1 73sq m   APTT    Collection Time: 11/08/22  8:15 AM   Result Value Ref Range    PTT 53 (H) 23 - 37 seconds       PE Study with CT abdomen & pelvis with contrast    Result Date: 11/6/2022  Narrative: CT PULMONARY ANGIOGRAM OF THE CHEST AND CT ABDOMEN AND PELVIS WITH INTRAVENOUS CONTRAST INDICATION:   tachycardia, sob, diarrhea  COMPARISON:  CT of the chest abdomen pelvis on July 19, 2018  TECHNIQUE:  CT examination of the chest, abdomen and pelvis was performed  Thin section CT angiographic technique was used in the chest in order to evaluate for pulmonary embolus and coronal 3D MIP postprocessing was performed on the acquisition scanner  Axial, sagittal, and coronal 2D reformatted images were created from the source data and submitted for interpretation  Radiation dose length product (DLP) for this visit:  989 mGy-cm     This examination, like all CT scans performed in the [facility], was performed utilizing techniques to minimize radiation dose exposure, including the use of iterative reconstruction and automated exposure control  IV Contrast:  100 mL of iohexol (OMNIPAQUE) Enteric Contrast:  Enteric contrast was not administered  FINDINGS: CHEST PULMONARY ARTERIAL TREE:  There is a thin nonocclusive filling defect along the medial wall of a right lower lobe segmental pulmonary artery (series 2, image 153)  LUNGS:  Diffuse septal thickening likely due to pulmonary edema  No focal consolidation  Central airways are patent  PLEURA:  Unremarkable  HEART/AORTA:  The heart is mildly enlarged  Coronary artery calcifications  Status post sternotomy  No pericardial effusion  Reflux of contrast into the hepatic veins and inferior vena cava compatible with right heart dysfunction  No thoracic aortic  aneurysm  Mild atherosclerotic calcifications of the thoracic aorta  MEDIASTINUM AND GUSTAVO:  There are prominent mediastinal lymph nodes, for example a left paratracheal lymph node measures 1 cm in short axis  Subcarinal lymph node measures 1 cm in short axis  CHEST WALL AND LOWER NECK:  Unremarkable  ABDOMEN LIVER/BILIARY TREE:  Unremarkable  GALLBLADDER:  Gallbladder is surgically absent  SPLEEN:  Unremarkable  PANCREAS:  Unremarkable  ADRENAL GLANDS:  Unremarkable  KIDNEYS/URETERS:  One or more simple renal cyst(s) is noted  Severe atrophy of the right kidney  No hydronephrosis  STOMACH AND BOWEL:  No bowel obstruction  APPENDIX:  A normal appendix was visualized  ABDOMINOPELVIC CAVITY:  No ascites  No pneumoperitoneum  No lymphadenopathy  VESSELS:  Severe atherosclerotic calcifications  Moderate stenosis of the origin of the left renal artery  Severe stenosis at the origin of the right renal artery  The common hepatic artery arises directly from the aorta  There is moderate to severe stenosis of the superior mesenteric artery origin  Areas of severe stenosis of the common iliac arteries and moderate stenosis of the external iliac arteries    Severe stenosis of the right common femoral artery  PELVIS REPRODUCTIVE ORGANS:  Unremarkable for patient's age  URINARY BLADDER:  Unremarkable  ABDOMINAL WALL/INGUINAL REGIONS:  Unremarkable  OSSEOUS STRUCTURES:  No acute fracture or destructive osseous lesion  Left hip arthroplasty in place which creates streak artifact limits evaluation of the pelvis  Levoscoliosis of lumbar spine  Degenerative changes the osseous structures  Impression: 1  Thin nonocclusive pulmonary embolism within a right lower lobe segmental pulmonary artery  2   Measured RV/LV ratio is within normal limits at less than 0 9   3   Pulmonary edema  No focal consolidation  4   Cardiomegaly with evidence of right heart dysfunction  5   Severe atherosclerotic calcifications with moderate to severe stenosis of the origins of the superior mesenteric artery and bilateral renal arteries  Areas of severe moderate to severe stenosis of the common iliac arteries, external iliac arteries and severe stenosis of the right common femoral artery  6   Other findings as above  I personally discussed this study with Allyson Brownlee on 11/6/2022 at 8:45 PM  Workstation performed: [id number]         HISTORY:    Past Medical History:   Diagnosis Date   • 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   • CAD (coronary artery disease)    • Cancer [facility])     prostate   • Colon polyp    • Coronary artery disease    • GERD (gastroesophageal reflux disease)    • H/O heart artery stent     x2   • Hypercholesteremia    • Hypertension    • Keratotic lesion    • Stroke ([address] )     10YRS AGO   • Transient cerebral ischemia        Past Surgical History:   Procedure Laterality Date   • BACK SURGERY     • CARDIAC SURGERY      mvp repair   • CAROTID ENDARTERECTOMY Right 03/2020   • CHOLECYSTECTOMY     • COLONOSCOPY     • ESOPHAGOGASTRODUODENOSCOPY N/A 6/22/2018    Procedure: ESOPHAGOGASTRODUODENOSCOPY (EGD); Surgeon: Benjamin Nino MD;  Location: [facility];   Service: Gastroenterology   • JOINT REPLACEMENT      L hip replacement   • ORCHIECTOMY     • PROSTATECTOMY     • TOTAL HIP ARTHROPLASTY         Family History   Problem Relation Age of Onset   • Heart disease Mother    • Stroke Mother         CVA   • Cancer Father    • Hodgkin's lymphoma Father    • Lung cancer Father        Social History     Socioeconomic History   • Marital status:      Spouse name: None   • Number of children: None   • Years of education: None   • Highest education level: None   Occupational History   • Occupation: retired   Tobacco Use   • Smoking status: Former Smoker     Packs/day: 0 25     Quit date: 2018     Years since quittin 4   • Smokeless tobacco: Never Used   • Tobacco comment: 4 cig/day   Vaping Use   • Vaping Use: Never used   Substance and Sexual Activity   • Alcohol use: Never     Alcohol/week: 2 0 standard drinks     Types: 2 Standard drinks or equivalent per week   • Drug use: No   • Sexual activity: Not Currently     Comment: no high risk sexual behavior   Other Topics Concern   • None   Social History Narrative   • None     Social Determinants of Health     Financial Resource Strain: Medium Risk   • Difficulty of Paying Living Expenses: Somewhat hard   Food Insecurity: No Food Insecurity   • Worried About Running Out of Food in the Last Year: Never true   • Ran Out of Food in the Last Year: Never true   Transportation Needs: No Transportation Needs   • Lack of Transportation (Medical): No   • Lack of Transportation (Non-Medical):  No   Physical Activity: Not on file   Stress: Not on file   Social Connections: Not on file   Intimate Partner Violence: Not on file   Housing Stability: Low Risk    • Unable to Pay for Housing in the Last Year: No   • Number of Places Lived in the Last Year: 1   • Unstable Housing in the Last Year: No         Current Facility-Administered Medications:   •  acetaminophen (TYLENOL) tablet 650 mg, 650 mg, Oral, Q6H PRN, Ildefonso Catalan MD  •  atorvastatin (LIPITOR) tablet 40 mg, 40 mg, Oral, Daily With Erica Jolly MD, 40 mg at 11/07/22 1700  •  calcium carbonate (TUMS) chewable tablet 1,000 mg, 1,000 mg, Oral, Daily PRN, Bethany Overton MD  •  carvedilol (COREG) tablet 3 125 mg, 3 125 mg, Oral, BID, Ju Tyson PA-C  •  clopidogrel (PLAVIX) tablet 75 mg, 75 mg, Oral, Daily, Bethany Overton MD, 75 mg at 11/08/22 0800  •  docusate sodium (COLACE) capsule 100 mg, 100 mg, Oral, BID, Bethany Overton MD, 100 mg at 11/08/22 0817  •  fluticasone (FLONASE) 50 mcg/act nasal spray 1 spray, 1 spray, Each Nare, Daily, Bethany Overton MD, 1 spray at 11/08/22 0820  •  furosemide (LASIX) injection 20 mg, 20 mg, Intravenous, BID (diuretic), Ju Tyson PA-C  •  heparin (porcine) 25,000 units in 0 45% NaCl 250 mL infusion (premix), 3-30 Units/kg/hr (Order-Specific), Intravenous, Titrated, Luigi Alvarado MD, Last Rate: 7 2 mL/hr at 11/08/22 0930, 11 Units/kg/hr at 11/08/22 0930  •  heparin (porcine) injection 2,600 Units, 2,600 Units, Intravenous, Q1H PRN, Luigi Alvarado MD, 2,600 Units at 11/08/22 1046  •  heparin (porcine) injection 5,200 Units, 5,200 Units, Intravenous, Q1H PRN, Luigi Alvarado MD  •  loratadine (CLARITIN) tablet 10 mg, 10 mg, Oral, Daily, Bethany Overton MD, 10 mg at 11/08/22 0817  •  ondansetron (ZOFRAN) injection 4 mg, 4 mg, Intravenous, Q6H PRN, Bethany Overton MD  •  pantoprazole (PROTONIX) EC tablet 40 mg, 40 mg, Oral, Daily, Bethany Overton MD, 40 mg at 11/08/22 0520  •  sacubitril-valsartan (ENTRESTO) 24-26 MG per tablet 1 tablet, 1 tablet, Oral, BID, Ju Tyson PA-C  •  senna (SENOKOT) tablet 8 6 mg, 1 tablet, Oral, Daily, Bethany Overton MD, 8 6 mg at 11/08/22 0818  •  sodium chloride (PF) 0 9 % injection 3 mL, 3 mL, Intravenous, Q1H PRN, Luigi Alvarado MD  •  sodium chloride 0 9 % bolus 250 mL, 250 mL, Intravenous, Once, Roxanne Hood PA-C, Last Rate: 125 mL/hr at 11/08/22 1055, 250 mL at 11/08/22 1055  •  [START ON 11/9/2022] spironolactone (ALDACTONE) tablet 12 5 mg, 12 5 mg, Oral, Daily, Ladonna Villagran PA-C    Current Outpatient Medications:   •  allopurinol (ZYLOPRIM) 100 mg tablet, Take 1 tablet (100 mg total) by mouth daily, Disp: 90 tablet, Rfl: 3  •  atorvastatin (LIPITOR) 40 mg tablet, Take 40 mg by mouth daily  , Disp: , Rfl:   •  Azelaic Acid 15 % cream, apply to 1 thin layer twice a day to face, Disp: , Rfl:   •  clopidogrel (PLAVIX) 75 mg tablet, Take 75 mg by mouth daily, Disp: , Rfl:   •  Farxiga 10 MG TABS, Take 10 mg by mouth daily, Disp: , Rfl:   •  furosemide (LASIX) 40 mg tablet, Take 40 mg by mouth in the morning , Disp: , Rfl:   •  metroNIDAZOLE (METROGEL) 0 75 % gel, apply A THIN LAYER topically twice a day for 4 WEEKS FACE AND SCALP DO NOT MOISTURIZE, Disp: , Rfl:   •  Multiple Vitamins-Minerals (PX MENS MULTIVITAMINS) TABS, Take by mouth, Disp: , Rfl:   •  pantoprazole (PROTONIX) 40 mg tablet, Take 1 tablet (40 mg total) by mouth daily, Disp: 90 tablet, Rfl: 2  •  sacubitril-valsartan (ENTRESTO) 49-51 MG TABS, Take 1 tablet by mouth 2 (two) times a day, Disp: , Rfl:   •  triamcinolone (KENALOG) 0 5 % cream, Apply topically 2 (two) times a day, Disp: 15 g, Rfl: 0  •  warfarin (COUMADIN) 2 5 mg tablet, take 2 tablets by mouth daily EXCEPT 3 TABLETS EVERY THURSDAY OR AS DIRECTED, Disp: , Rfl:   •  carvedilol (COREG) 6 25 mg tablet, Take 6 25 mg by mouth 2 (two) times a day, Disp: , Rfl:     (Not in a hospital admission)      No Known Allergies    Labs and pertinent reports reviewed  This note has been generated by voice recognition software system  Therefore, there may be spelling, grammar, and or syntax errors  Please contact if questions arise

## 2022-11-08 NOTE — PROGRESS NOTES
Cardiology Progress Note - Donaldo Morris 69 y o  male MRN: [id number]    Unit/Bed#: ED 08 Encounter: [id number]      Assessment/Plan:  1  Acute on chronic systolic heart failure, appears pretty euvolemic now  Decrease Lasix to 20 IV b i d  Daily weights  Salt restriction  Strict I&Os  Net -1 8 L      2  Hypotension, systolic blood pressure 17T to 90s  Decrease Coreg to 3 125 b i d  given hypotension, decrease Entresto, decrease Aldactone  All blood pressure parameters have been adjusted  3  Cardiomyopathy, unclear if ischemic or nonischemic  Patient has known low EF around 35% back in June 2022  Patient's cardiologist Yesy Arias has previously discussed AICD and patient wanted to think about it  Patient is still not sure he would want to proceed with device implant  4  Paroxysmal atrial fibrillation heart rate stable in the 90s  Continue with Coumadin  Goal INR 2-3    5  New pulmonary embolism, currently on heparin drip  Patient had are on admission was supratherapeutic at 4 1  Management per Aliya [facility] Hospitalist and Hematology    6  CAD with history of stents in the past   Currently without anginal symptoms  Continue with Lipitor, Coreg, Plavix, Entresto, Aldactone    7  History of bioprosthetic aortic valve/bioprosthetic mitral valve replacement  Normal functioning on echo in June 8  Hyperlipidemia on Lipitor      Subjective:   Patient seen and examined  No significant events overnight  Im feeling good  States breathing feels better  Denies chest pain    Objective:     Vitals: Blood pressure (!) 89/53, pulse (!) 112, temperature 98 6 °F (37 °C), temperature source Oral, resp  rate 18, SpO2 96 %  , There is no height or weight on file to calculate BMI ,   Orthostatic Blood Pressures    Flowsheet Row Most Recent Value   Blood Pressure 89/53 filed at 11/08/2022 1323   Patient Position - Orthostatic VS Lying filed at 11/08/2022 1323            Intake/Output Summary (Last 24 hours) at 11/8/2022 1325  Last data filed at 11/7/2022 2111  Gross per 24 hour   Intake --   Output 500 ml   Net -500 ml         Physical Exam:    GEN: Scott Morris appears well, alert and oriented x 3, pleasant and cooperative   HEENT: pupils equal, round, and reactive to light; extraocular muscles intact  NECK: supple, no carotid bruits   HEART: regular rhythm, normal S1 and S2, no murmurs, clicks, gallops or rubs   LUNGS: clear to auscultation bilaterally; no wheezes, rales, or rhonchi   ABDOMEN: normal bowel sounds, soft, no tenderness, no distention  EXTREMITIES: peripheral pulses normal; no clubbing, cyanosis, or edema      Medications:      Current Facility-Administered Medications:   •  acetaminophen (TYLENOL) tablet 650 mg, 650 mg, Oral, Q6H PRN, Canelo Thorpe MD  •  atorvastatin (LIPITOR) tablet 40 mg, 40 mg, Oral, Daily With Marily Michelle MD, 40 mg at 11/07/22 1700  •  calcium carbonate (TUMS) chewable tablet 1,000 mg, 1,000 mg, Oral, Daily PRN, Canelo Thorpe MD  •  carvedilol (COREG) tablet 3 125 mg, 3 125 mg, Oral, BID, Ju Tyson PA-C  •  clopidogrel (PLAVIX) tablet 75 mg, 75 mg, Oral, Daily, Canelo Thorpe MD, 75 mg at 11/08/22 0800  •  docusate sodium (COLACE) capsule 100 mg, 100 mg, Oral, BID, Canelo Thorpe MD, 100 mg at 11/08/22 0817  •  fluticasone (FLONASE) 50 mcg/act nasal spray 1 spray, 1 spray, Each Nare, Daily, Canelo Thorpe MD, 1 spray at 11/08/22 0820  •  furosemide (LASIX) injection 20 mg, 20 mg, Intravenous, BID (diuretic), Ju Tyson PA-C  •  heparin (porcine) 25,000 units in 0 45% NaCl 250 mL infusion (premix), 3-30 Units/kg/hr (Order-Specific), Intravenous, Titrated, Tanisha Ashton MD, Last Rate: 7 2 mL/hr at 11/08/22 0930, 11 Units/kg/hr at 11/08/22 0930  •  heparin (porcine) injection 2,600 Units, 2,600 Units, Intravenous, Q1H PRN, Tanisha Ashton MD, 2,600 Units at 11/08/22 1046  •  heparin (porcine) injection 5,200 Units, 5,200 Units, Intravenous, Q1H PRN, Giancarlo Marin MD  •  loratadine (CLARITIN) tablet 10 mg, 10 mg, Oral, Daily, Dianne Hallman MD, 10 mg at 11/08/22 0817  •  ondansetron (ZOFRAN) injection 4 mg, 4 mg, Intravenous, Q6H PRN, Dianne Hallman MD  •  pantoprazole (PROTONIX) EC tablet 40 mg, 40 mg, Oral, Daily, Dianne Hallman MD, 40 mg at 11/08/22 0520  •  sacubitril-valsartan (ENTRESTO) 24-26 MG per tablet 1 tablet, 1 tablet, Oral, BID, Ju Tyson PA-C  •  senna (SENOKOT) tablet 8 6 mg, 1 tablet, Oral, Daily, Dianne Hallman MD, 8 6 mg at 11/08/22 0818  •  sodium chloride (PF) 0 9 % injection 3 mL, 3 mL, Intravenous, Q1H PRN, Giancarlo Marin MD  •  [START ON 11/9/2022] spironolactone (ALDACTONE) tablet 12 5 mg, 12 5 mg, Oral, Daily, Mahin Dill PA-C    Current Outpatient Medications:   •  allopurinol (ZYLOPRIM) 100 mg tablet, Take 1 tablet (100 mg total) by mouth daily, Disp: 90 tablet, Rfl: 3  •  atorvastatin (LIPITOR) 40 mg tablet, Take 40 mg by mouth daily  , Disp: , Rfl:   •  Azelaic Acid 15 % cream, apply to 1 thin layer twice a day to face, Disp: , Rfl:   •  clopidogrel (PLAVIX) 75 mg tablet, Take 75 mg by mouth daily, Disp: , Rfl:   •  Farxiga 10 MG TABS, Take 10 mg by mouth daily, Disp: , Rfl:   •  furosemide (LASIX) 40 mg tablet, Take 40 mg by mouth in the morning , Disp: , Rfl:   •  metroNIDAZOLE (METROGEL) 0 75 % gel, apply A THIN LAYER topically twice a day for 4 WEEKS FACE AND SCALP DO NOT MOISTURIZE, Disp: , Rfl:   •  Multiple Vitamins-Minerals (PX MENS MULTIVITAMINS) TABS, Take by mouth, Disp: , Rfl:   •  pantoprazole (PROTONIX) 40 mg tablet, Take 1 tablet (40 mg total) by mouth daily, Disp: 90 tablet, Rfl: 2  •  sacubitril-valsartan (ENTRESTO) 49-51 MG TABS, Take 1 tablet by mouth 2 (two) times a day, Disp: , Rfl:   •  triamcinolone (KENALOG) 0 5 % cream, Apply topically 2 (two) times a day, Disp: 15 g, Rfl: 0  •  warfarin (COUMADIN) 2 5 mg tablet, take 2 tablets by mouth daily EXCEPT 3 TABLETS EVERY THURSDAY OR AS DIRECTED, Disp: , Rfl:   •  carvedilol (COREG) 6 25 mg tablet, Take 6 25 mg by mouth 2 (two) times a day, Disp: , Rfl:      Labs & Results:    Results from last 7 days   Lab Units 11/06/22  2019 11/06/22  1758   HS TNI 0HR ng/L  --  13   HS TNI 2HR ng/L 16  --    NT-PRO BNP pg/mL  --  10,317*     Results from last 7 days   Lab Units 11/08/22  0519 11/07/22  0314 11/06/22  1758   WBC Thousand/uL 10 23* 8 52 10 04   HEMOGLOBIN g/dL 15 7 14 7 15 7   HEMATOCRIT % 47 7 44 2 48 3   PLATELETS Thousands/uL 198 246 235         Results from last 7 days   Lab Units 11/08/22  0519 11/07/22  0805 11/06/22  1758   POTASSIUM mmol/L 3 5 4 3 4 5   CHLORIDE mmol/L 102 105 106   CO2 mmol/L 30 28 30   BUN mg/dL 25 17 18   CREATININE mg/dL 1 44* 1 23 1 42*   CALCIUM mg/dL 9 0 9 3 9 0   ALK PHOS U/L  --   --  74   ALT U/L  --   --  26   AST U/L  --   --  30     Results from last 7 days   Lab Units 11/08/22  0815 11/08/22  0024 11/07/22  1602 11/07/22  0622 11/06/22  1758   INR   --   --   --  4 47* 4 11*   PTT seconds 53* 114* 174* >210* 40*     Results from last 7 days   Lab Units 11/07/22  0805   MAGNESIUM mg/dL 2 1

## 2022-11-08 NOTE — PROGRESS NOTES
[id number]  Progress Note Loren Morris 1945, 69 y o  male MRN: [id number]  Unit/Bed#: -02 Encounter: [id number]  Primary Care Provider: Prince Roberto MD   Date and time admitted to hospital: 11/6/2022  5:30 PM    * Acute pulmonary embolism without acute cor pulmonale ([address] )  Assessment & Plan  · Present on admission- Evidenced by CT imaging  · No heart strain noted on CT   · On coumadin at baseline for prosthetic mitral valve  · Last INR on 10/22 was 2 81, INR on admission 4 11  · Could be coumadin failure vs transient subtherapeutic INR between 10/22 and today  · Hematology consultation appreciated- may be due to coumadin failure  · Started on heparin gtt in ED, continue  Will send script to price check eliquis   · Repeat echo, ECHO in 6/2022 with EF 35%  · Venous duplex pending    Acute on chronic combined systolic and diastolic congestive heart failure (HCC)  Assessment & Plan  Wt Readings from Last 3 Encounters:   09/23/22 65 7 kg (144 lb 12 8 oz)   05/19/22 67 1 kg (148 lb)   01/19/22 63 kg (139 lb)     · Present on admission- elevated BNP, vascular congestion on CXR  · Last ECHO in 6/2022 with EF 35% and global hypokinesis  · Follows with cardiology in [place]  · Given dose of IV lasix in ED, continue with hold parameters   · No weight today, patient negative 1 3 L since admission  · Monitor lytes, weight, I/O  · Cardiology consultation appreciated     SIRS without infection or organ dysfunction [facility])  Assessment & Plan  · In the setting of PE/CHF as evidenced by HR  and RR of 22-26  · Now resolved  · Continue to monitor on telemetry  · Monitor CBC    Hypotension  Assessment & Plan  · Patient with hypotension with BP of 73/59, this improved with 250 mL fluid bolus  · Cardiology decreased coreg to 3 125 bid, decreased Entresto, and decreased aldactone  Hold parameters adjusted as well     · Continue to monitor blood pressure closely    CKD (chronic kidney disease)  Assessment & Plan  Lab Results   Component Value Date    EGFR 46 2022    EGFR 56 2022    EGFR 47 2022    CREATININE 1 44 (H) 2022    CREATININE 1 23 2022    CREATININE 1 42 (H) 2022   · Currently at baseline  · Monitor daily while admitted    S/P MVR (mitral valve repair)  Assessment & Plan  · History of prosthetic Mitral Valve repair  · Follows with cardiology as an outpatient in [place]  · Monitor    PAF (paroxysmal atrial fibrillation) ([facility] [address] )  Assessment & Plan  · History of afib, was in RVR on arrival to ED, currently rate controlled  · Continue home dose coreg  · Given dose of IV lopressor in ED    CAD (coronary atherosclerotic disease)  Assessment & Plan  · No chest pain  · Continue home regimen of Plavix  · Coreg and Entresto decreased due to hypotension  · Monitor        VTE Pharmacologic Prophylaxis: VTE Score: 7 High Risk (Score >/= 5) - Pharmacological DVT Prophylaxis Ordered: heparin drip  Sequential Compression Devices Ordered  Patient Centered Rounds: I performed bedside rounds with nursing staff today  Discussions with Specialists or Other Care Team Provider: nursing, hematology      Time Spent for Care: 20 minutes  More than 50% of total time spent on counseling and coordination of care as described above  Current Length of Stay: 2 day(s)  Current Patient Status: Inpatient   Certification Statement: The patient will continue to require additional inpatient hospital stay due to continued treatment and workup of CHF, PE  Discharge Plan: Anticipate discharge in 24-48 hrs to home  Code Status: Level 1 - Full Code    Subjective: The patient was seen and examined  The patient states he feels well, denies any complaints       Objective:     Vitals:   Temp (24hrs), Av 1 °F (36 7 °C), Min:97 5 °F (36 4 °C), Max:98 6 °F (37 °C)    Temp:  [97 5 °F (36 4 °C)-98 6 °F (37 °C)] 98 6 °F (37 °C)  HR:  [] 72  Resp:  [18-28] 21  BP: ()/(52-64) 79/56  SpO2:  [92 %-99 %] 93 %  There is no height or weight on file to calculate BMI  Input and Output Summary (last 24 hours): Intake/Output Summary (Last 24 hours) at 11/8/2022 1651  Last data filed at 11/7/2022 2111  Gross per 24 hour   Intake --   Output 500 ml   Net -500 ml       Physical Exam:   Physical Exam  Vitals and nursing note reviewed  Constitutional:       General: He is awake  Appearance: Normal appearance  Interventions: Nasal cannula in place  Cardiovascular:      Rate and Rhythm: Normal rate  Rhythm irregularly irregular  Pulmonary:      Effort: Pulmonary effort is normal       Breath sounds: Normal breath sounds  Abdominal:      Palpations: Abdomen is soft  Tenderness: There is no abdominal tenderness  Skin:     General: Skin is warm and dry  Neurological:      General: No focal deficit present  Mental Status: He is alert and oriented to person, place, and time  Psychiatric:         Attention and Perception: Attention normal          Mood and Affect: Mood normal          Speech: Speech normal          Behavior: Behavior is cooperative  Additional Data:     Labs:  Results from last 7 days   Lab Units 11/08/22  0519 11/07/22  0314 11/06/22  1758   WBC Thousand/uL 10 23*   < > 10 04   HEMOGLOBIN g/dL 15 7   < > 15 7   HEMATOCRIT % 47 7   < > 48 3   PLATELETS Thousands/uL 198   < > 235   NEUTROS PCT %  --   --  65   LYMPHS PCT %  --   --  16   MONOS PCT %  --   --  15*   EOS PCT %  --   --  3    < > = values in this interval not displayed       Results from last 7 days   Lab Units 11/08/22  0519 11/07/22  0805 11/06/22  1758   SODIUM mmol/L 141   < > 143   POTASSIUM mmol/L 3 5   < > 4 5   CHLORIDE mmol/L 102   < > 106   CO2 mmol/L 30   < > 30   BUN mg/dL 25   < > 18   CREATININE mg/dL 1 44*   < > 1 42*   ANION GAP mmol/L 9   < > 7   CALCIUM mg/dL 9 0   < > 9 0   ALBUMIN g/dL  --   --  3 9   TOTAL BILIRUBIN mg/dL  --   --  1 03*   ALK PHOS U/L  -- --  74   ALT U/L  --   --  26   AST U/L  --   --  30   GLUCOSE RANDOM mg/dL 111   < > 91    < > = values in this interval not displayed  Results from last 7 days   Lab Units 22   INR  4 47*             Results from last 7 days   Lab Units 22  175   LACTIC ACID mmol/L 0 9   PROCALCITONIN ng/ml <0 05       Lines/Drains:  Invasive Devices  Report    Peripheral Intravenous Line  Duration           Peripheral IV 22 Dorsal (posterior); Left Forearm 1 day    Peripheral IV 22 Right Antecubital 1 day                  Telemetry:  Telemetry Orders (From admission, onward)             24 Hour Telemetry Monitoring  (ED Bridging Orders Panel)  Continuous x 24 Hours (Telem)           References:    Telemetry Guidelines   Question:  Reason for 24 Hour Telemetry  Answer:  Pulmonary Embolism - 24 hours without resp  compromise, dysrhythmias, hemodynamically stable                 Telemetry Reviewed: Atrial fibrillation  HR averaging 70  Indication for Continued Telemetry Use: PE             Imaging: No pertinent imaging reviewed  Recent Cultures (last 7 days):   Results from last 7 days   Lab Units 22   BLOOD CULTURE  No Growth at 24 hrs  No Growth at 24 hrs         Last 24 Hours Medication List:   Current Facility-Administered Medications   Medication Dose Route Frequency Provider Last Rate   • acetaminophen  650 mg Oral Q6H PRN Jaime Garza MD     • atorvastatin  40 mg Oral Daily With Babatunde Lu MD     • calcium carbonate  1,000 mg Oral Daily PRN Jaime Garza MD     • carvedilol  3 125 mg Oral BID Sandra Rodriguez PA-C     • clopidogrel  75 mg Oral Daily Jaime Garza MD     • docusate sodium  100 mg Oral BID Jaime Garza MD     • fluticasone  1 spray Each Nare Daily Jaime Garza MD     • furosemide  20 mg Intravenous BID (diuretic) Sandra Rodriguez PA-C     • heparin (porcine)  3-30 Units/kg/hr (Order-Specific) Intravenous Titrated Gopi Amaro Lucien Montana MD 11 Units/kg/hr (11/08/22 0930)   • heparin (porcine)  2,600 Units Intravenous Q1H PRN Brian Harding MD     • heparin (porcine)  5,200 Units Intravenous Q1H PRN Brian Harding MD     • loratadine  10 mg Oral Daily Rola Ayon MD     • ondansetron  4 mg Intravenous Q6H PRN Rola Ayon MD     • pantoprazole  40 mg Oral Daily Rola Ayon MD     • sacubitril-valsartan  1 tablet Oral BID Claire Barker PA-C     • senna  1 tablet Oral Daily Rola Ayon MD     • sodium chloride (PF)  3 mL Intravenous Q1H PRN Brian Harding MD     • [START ON 11/9/2022] spironolactone  12 5 mg Oral Daily Claire Barker PA-C          Today, Patient Was Seen By: Manuela Martinez    **Please Note: This note may have been constructed using a voice recognition system  **

## 2022-11-08 NOTE — PLAN OF CARE
Problem: Potential for Falls  Goal: Patient will remain free of falls  Description: INTERVENTIONS:  - Educate patient/family on patient safety including physical limitations  - Instruct patient to call for assistance with activity   - Consult OT/PT to assist with strengthening/mobility   - Keep Call bell within reach  - Keep bed low and locked with side rails adjusted as appropriate  - Keep care items and personal belongings within reach  - Initiate and maintain comfort rounds  - Make Fall Risk Sign visible to staff  - Offer Toileting every  Hours, in advance of need  - Initiate/Maintain alarm  - Obtain necessary fall risk management equipment:   - Apply yellow socks and bracelet for high fall risk patients  - Consider moving patient to room near nurses station  Outcome: Progressing     Problem: MOBILITY - ADULT  Goal: Maintain or return to baseline ADL function  Description: INTERVENTIONS:  -  Assess patient's ability to carry out ADLs; assess patient's baseline for ADL function and identify physical deficits which impact ability to perform ADLs (bathing, care of mouth/teeth, toileting, grooming, dressing, etc )  - Assess/evaluate cause of self-care deficits   - Assess range of motion  - Assess patient's mobility; develop plan if impaired  - Assess patient's need for assistive devices and provide as appropriate  - Encourage maximum independence but intervene and supervise when necessary  - Involve family in performance of ADLs  - Assess for home care needs following discharge   - Consider OT consult to assist with ADL evaluation and planning for discharge  - Provide patient education as appropriate  Outcome: Progressing  Goal: Maintains/Returns to pre admission functional level  Description: INTERVENTIONS:  - Perform BMAT or MOVE assessment daily    - Set and communicate daily mobility goal to care team and patient/family/caregiver     - Collaborate with rehabilitation services on mobility goals if consulted  - Perform Range of Motion  times a day  - Reposition patient every  hours    - Dangle patient  times a day  - Stand patient  times a day  - Ambulate patient  times a day  - Out of bed to chair  times a day   - Out of bed for meal times a day  - Out of bed for toileting  - Record patient progress and toleration of activity level   Outcome: Progressing     Problem: Prexisting or High Potential for Compromised Skin Integrity  Goal: Skin integrity is maintained or improved  Description: INTERVENTIONS:  - Identify patients at risk for skin breakdown  - Assess and monitor skin integrity  - Assess and monitor nutrition and hydration status  - Monitor labs   - Assess for incontinence   - Turn and reposition patient  - Assist with mobility/ambulation  - Relieve pressure over bony prominences  - Avoid friction and shearing  - Provide appropriate hygiene as needed including keeping skin clean and dry  - Evaluate need for skin moisturizer/barrier cream  - Collaborate with interdisciplinary team   - Patient/family teaching  - Consider wound care consult   Outcome: Progressing     Problem: RESPIRATORY - ADULT  Goal: Achieves optimal ventilation and oxygenation  Description: INTERVENTIONS:  - Assess for changes in respiratory status  - Assess for changes in mentation and behavior  - Position to facilitate oxygenation and minimize respiratory effort  - Oxygen administered by appropriate delivery if ordered  - Initiate smoking cessation education as indicated  - Encourage broncho-pulmonary hygiene including cough, deep breathe, Incentive Spirometry  - Assess the need for suctioning and aspirate as needed  - Assess and instruct to report SOB or any respiratory difficulty  - Respiratory Therapy support as indicated  Outcome: Progressing

## 2022-11-08 NOTE — ASSESSMENT & PLAN NOTE
· Present on admission- Evidenced by CT imaging  · No heart strain noted on CT   · On coumadin at baseline for prosthetic mitral valve  · Last INR on 10/22 was 2 81, INR on admission 4 11  · Could be coumadin failure vs transient subtherapeutic INR between 10/22 and today  · Hematology consultation appreciated- may be due to coumadin failure  · Started on heparin gtt in ED, continue   Will send script to price check [facility]iqu[facility]   · Repeat echo, ECHO in 6/2022 with EF 35%  · Venous duplex pending

## 2022-11-08 NOTE — ASSESSMENT & PLAN NOTE
Lab Results   Component Value Date    EGFR 46 11/08/2022    EGFR 56 11/07/2022    EGFR 47 11/06/2022    CREATININE 1 44 (H) 11/08/2022    CREATININE 1 23 11/07/2022    CREATININE 1 42 (H) 11/06/2022   · Currently at baseline  · Monitor daily while admitted

## 2022-11-08 NOTE — CASE MANAGEMENT
Case Management Assessment & Discharge Planning Note    Patient name Thierno Butler ED 08/ED 08 MRN [id number]  : 1945 Date 2022       Current Admission Date: 2022  Current Admission Diagnosis:Acute pulmonary embolism without acute cor pulmonale [facility])   Patient Active Problem List    Diagnosis Date Noted   • SIRS without infection or organ dysfunction ([facility] ) 2022   • Acute pulmonary embolism without acute cor pulmonale ([place] [address] ) 2022   • CKD (chronic kidney disease) 2022   • Vasomotor rhinitis 2022   • Ambulatory dysfunction 2021   • Anemia due to blood loss, acute 2021   • History of normocytic normochromic anemia 2021   • Impaired fasting glucose 12/10/2018   • Idiopathic chronic gout of multiple sites without tophus 2018   • Pain in gums 2018   • Chronic anticoagulation 2018   • Abnormal RBC indices 06/15/2018   • Fatigue 06/15/2018   • PAF (paroxysmal atrial fibrillation) ([place] [address] ) 2018   • Polyp of colon 2018   • Acute on chronic combined systolic and diastolic congestive heart failure ([place] [address] ) 2017   • CAD (coronary atherosclerotic disease) 2016   • Chronic back pain 2016   • GERD (gastroesophageal reflux disease) 2016   • S/P MVR (mitral valve repair) 10/27/2015   • Peptic ulcer disease 2015   • Hypercholesterolemia 2015   • Hypertension 2015      LOS (days): 2  Geometric Mean LOS (GMLOS) (days): 3 90  Days to GMLOS:2 3     OBJECTIVE:    Risk of Unplanned Readmission Score: 13 76         Current admission status: Inpatient       Preferred Pharmacy:   [address] [place] [address], [address] [place] [address] Rachelle ODONNELL [address]  Phone: [phone] Fax: [phone]    Primary Care Provider: Barb Vitale MD    Primary Insurance: MEDICARE  Secondary Insurance: BLUE CROSS    ASSESSMENT:  Kee 26 Proxies    There are no active Health Care Proxies on file  Advance Directives  Does patient have a [address] [facility] [address]?: Yes (CM met with patient at bedside, CM also spoke with patients son on the phone  Patient and son stated that all legal documentation of AD, LW, POA was done with an )  Does patient have Advance Directives?: Yes (CM met with patient at bedside, CM also spoke with patients son on the phone  Patient and son stated that all legal documentation of AD, LW, POA was done with an )  Advance Directives: Living will, Power of  for health care  Primary Contact: Zeb leblanc (Charlie)         Readmission Root Cause  30 Day Readmission: No    Patient Information  Admitted from[de-identified] Home  Mental Status: Alert  During Assessment patient was accompanied by: Son (Son was on the phone during assessment with patient)  Assessment information provided by[de-identified] Patient, Son (Patients son was on the phone during assessment )  Primary Caregiver: Self  Support Systems: [employer], [facility] of Residence: [place] [address] do you live in?: [address] [place] [address] entry access options   Select all that apply : Garage, Stairs  Number of steps to enter home : One Flight  Do the steps have railings?: Yes  Type of Current Residence: Bi-level  Upon entering residence, is there a bedroom on the main floor (no further steps)?: No  A bedroom is located on the following floor levels of residence (select all that apply):: 2nd Floor  Upon entering residence, is there a bathroom on the main floor (no further steps)?: Yes  Number of steps to 2nd floor from main floor: One Flight  In the last 12 months, was there a time when you were not able to pay the mortgage or rent on time?: No  In the last 12 months, how many places have you lived?: 1  In the last 12 months, was there a time when you did not have a steady place to sleep or slept in a shelter (including now)?: No  Homeless/housing insecurity resource given?: N/A  Living Arrangements: Lives Alone (Son does not live far from patient)  Is patient a ?: No    Activities of Daily Living Prior to Admission  Functional Status: Independent  Completes ADLs independently?: Yes  Ambulates independently?: Yes  Does patient use assisted devices?: No  Does patient currently own DME?: Yes  What DME does the patient currently own?: Bedside [employer]  Does patient have a history of Outpatient Therapy (PT/OT)?: Yes  Does the patient have a history of Short-Term Rehab?: Yes (Patient nor son could remember name of rehab)  Does patient have a history of HHC?: Yes (Patient nor son could remember name of VNA agency)  Does patient currently have [facility]?: No         Patient Information Continued  Income Source: Pension/MCFP  Does patient have prescription coverage?: Yes  Within the past 12 months, you worried that your food would run out before you got the money to buy more : Never true  Within the past 12 months, the food you bought just didn't last and you didn't have money to get more : Never true  Food insecurity resource given?: N/A  Does patient receive dialysis treatments?: No  Does patient have a history of substance abuse?: No  Does patient have a history of Mental Health Diagnosis?: No    PHQ 2/9 Screening   Reviewed PHQ 2/9 Depression Screening Score?: No    Means of Transportation  Means of Transport to Appts[de-identified] Drives Self  In the past 12 months, has lack of transportation kept you from medical appointments or from getting medications?: No  In the past 12 months, has lack of transportation kept you from meetings, work, or from getting things needed for daily living?: No  Was application for public transport provided?: N/A        DISCHARGE DETAILS:    Discharge planning discussed with[de-identified] CM discussed discharge planning with patient at bedside and son over the phone  Freedom of Choice: Yes  Comments - Freedom of Choice: CM discussed freedom of choice based on needs of the patient   Patient and son both agreed that he needed a VNA  CM contacted family/caregiver?: Yes  Were Treatment Team discharge recommendations reviewed with patient/caregiver?: Yes  Did patient/caregiver verbalize understanding of patient care needs?: Yes  Were patient/caregiver advised of the risks associated with not following Treatment Team discharge recommendations?: Yes    Contacts  Patient Contacts: Shaheen Morris  Relationship to Patient[de-identified] Family  Contact Method: Phone  Phone Number: [phone]  Reason/Outcome: Continuity of Care, Referral, Discharge [address]         Is the patient interested in Shila Otero at discharge?: Yes  [address] requested[de-identified] [address] Name[de-identified] Other  [facility] Provider[de-identified] PCP  Home Health Services Needed[de-identified] Strengthening/Theraputic Exercises to Improve Function, Evaluate Functional Status and Safety  Homebound Criteria Met[de-identified] Uses an Assist Device (i e  cane, walker, etc)  Supporting Clincal Findings[de-identified] Limited Endurance, Fatigues Easliy in Short Distances    DME Referral Provided  Referral made for DME?: No    Other Referral/Resources/Interventions Provided:  Interventions: [facility]  Referral Comments: CM sent blank referral for VNA for patient      Would you like to participate in our [address] service program?  : No - Declined    Treatment Team Recommendation: Home with [facility]  Discharge Destination Plan[de-identified] Home with [place] at Discharge : Family           ETA of Transport (Date): 11/08/22

## 2022-11-08 NOTE — ASSESSMENT & PLAN NOTE
· No chest pain  · Continue home regimen of Plavix     · Coreg and Entresto decreased due to hypotension  · Monitor

## 2022-11-08 NOTE — ASSESSMENT & PLAN NOTE
Wt Readings from Last 3 Encounters:   09/23/22 65 7 kg (144 lb 12 8 oz)   05/19/22 67 1 kg (148 lb)   01/19/22 63 kg (139 lb)     · Present on admission- elevated BNP, vascular congestion on CXR  · Last ECHO in 6/2022 with EF 35% and global hypokinesis  · Follows with cardiology in [place]  · Given dose of IV lasix in ED, continue with hold parameters   · No weight today, patient negative 1 3 L since admission  · Monitor lytes, weight, I/O  · Cardiology consultation appreciated

## 2022-11-09 ENCOUNTER — APPOINTMENT (INPATIENT)
Dept: NON INVASIVE DIAGNOSTICS | Facility: HOSPITAL | Age: 77
End: 2022-11-09

## 2022-11-09 LAB
ANION GAP SERPL CALCULATED.3IONS-SCNC: 7 MMOL/L (ref 4–13)
AORTIC ROOT: 3.7 CM
AORTIC VALVE MEAN VELOCITY: 8.7 M/S
APICAL FOUR CHAMBER EJECTION FRACTION: 24 %
APTT PPP: 61 SECONDS (ref 23–37)
ASCENDING AORTA: 3.1 CM
AV LVOT MEAN GRADIENT: 0 MMHG
AV LVOT PEAK GRADIENT: 1 MMHG
AV MEAN GRADIENT: 4 MMHG
AV PEAK GRADIENT: 8 MMHG
BUN SERPL-MCNC: 32 MG/DL (ref 5–25)
CALCIUM SERPL-MCNC: 8.6 MG/DL (ref 8.3–10.1)
CHLORIDE SERPL-SCNC: 104 MMOL/L (ref 96–108)
CO2 SERPL-SCNC: 29 MMOL/L (ref 21–32)
CREAT SERPL-MCNC: 1.3 MG/DL (ref 0.6–1.3)
DOP CALC AO VTI: 24.7 CM
DOP CALC LVOT PEAK VEL VTI: 9.27 CM
DOP CALC LVOT PEAK VEL: 0.57 M/S
ERYTHROCYTE [DISTWIDTH] IN BLOOD BY AUTOMATED COUNT: 14.3 % (ref 11.6–15.1)
FRACTIONAL SHORTENING: 6 (ref 28–44)
GFR SERPL CREATININE-BSD FRML MDRD: 53 ML/MIN/1.73SQ M
GLUCOSE SERPL-MCNC: 112 MG/DL (ref 65–140)
GLUCOSE SERPL-MCNC: 97 MG/DL (ref 65–140)
HCT VFR BLD AUTO: 46 % (ref 36.5–49.3)
HGB BLD-MCNC: 15.3 G/DL (ref 12–17)
INTERVENTRICULAR SEPTUM IN DIASTOLE (PARASTERNAL SHORT AXIS VIEW): 1 CM
INTERVENTRICULAR SEPTUM: 1 CM (ref 0.6–1.1)
LA/AORTA RATIO 2D: 1.22
LAAS-AP2: 30.8 CM2
LAAS-AP4: 27.9 CM2
LEFT ATRIUM SIZE: 4.5 CM
LEFT INTERNAL DIMENSION IN SYSTOLE: 5 CM (ref 2.1–4)
LEFT VENTRICULAR INTERNAL DIMENSION IN DIASTOLE: 5.3 CM (ref 3.5–6)
LEFT VENTRICULAR POSTERIOR WALL IN END DIASTOLE: 1.4 CM
LEFT VENTRICULAR STROKE VOLUME: 20 ML
LVSV (TEICH): 20 ML
MCH RBC QN AUTO: 34.5 PG (ref 26.8–34.3)
MCHC RBC AUTO-ENTMCNC: 33.3 G/DL (ref 31.4–37.4)
MCV RBC AUTO: 104 FL (ref 82–98)
PLATELET # BLD AUTO: 197 THOUSANDS/UL (ref 149–390)
PMV BLD AUTO: 9.1 FL (ref 8.9–12.7)
POTASSIUM SERPL-SCNC: 3.5 MMOL/L (ref 3.5–5.3)
RBC # BLD AUTO: 4.43 MILLION/UL (ref 3.88–5.62)
SL CV PED ECHO LEFT VENTRICLE DIASTOLIC VOLUME (MOD BIPLANE) 2D: 137 ML
SL CV PED ECHO LEFT VENTRICLE SYSTOLIC VOLUME (MOD BIPLANE) 2D: 117 ML
SODIUM SERPL-SCNC: 140 MMOL/L (ref 135–147)
TR MAX PG: 30 MMHG
TR PEAK VELOCITY: 2.7 M/S
TRICUSPID ANNULAR PLANE SYSTOLIC EXCURSION: 1.2 CM
TRICUSPID VALVE PEAK REGURGITATION VELOCITY: 2.72 M/S
WBC # BLD AUTO: 10.12 THOUSAND/UL (ref 4.31–10.16)

## 2022-11-09 RX ORDER — MIDODRINE HYDROCHLORIDE 5 MG/1
2.5 TABLET ORAL
Status: DISCONTINUED | OUTPATIENT
Start: 2022-11-09 | End: 2022-11-10

## 2022-11-09 RX ADMIN — CLOPIDOGREL 75 MG: 75 TABLET, FILM COATED ORAL at 10:26

## 2022-11-09 RX ADMIN — FLUTICASONE PROPIONATE 1 SPRAY: 50 SPRAY, METERED NASAL at 10:25

## 2022-11-09 RX ADMIN — MIDODRINE HYDROCHLORIDE 2.5 MG: 5 TABLET ORAL at 17:27

## 2022-11-09 RX ADMIN — SENNOSIDES 8.6 MG: 8.6 TABLET, FILM COATED ORAL at 10:25

## 2022-11-09 RX ADMIN — ATORVASTATIN CALCIUM 40 MG: 40 TABLET, FILM COATED ORAL at 17:27

## 2022-11-09 RX ADMIN — MIDODRINE HYDROCHLORIDE 2.5 MG: 5 TABLET ORAL at 12:30

## 2022-11-09 RX ADMIN — PANTOPRAZOLE SODIUM 40 MG: 40 TABLET, DELAYED RELEASE ORAL at 06:29

## 2022-11-09 RX ADMIN — LORATADINE 10 MG: 10 TABLET ORAL at 10:25

## 2022-11-09 RX ADMIN — HEPARIN SODIUM 9 UNITS/KG/HR: 10000 INJECTION, SOLUTION INTRAVENOUS at 10:28

## 2022-11-09 RX ADMIN — PERFLUTREN 0.4 ML/MIN: 6.52 INJECTION, SUSPENSION INTRAVENOUS at 10:04

## 2022-11-09 NOTE — CASE MANAGEMENT
Case Management Discharge Planning Note    Patient name Jenn Harrington  Location [address] [id number] MRN [id number]  : 1945 Date 2022       Current Admission Date: 2022  Current Admission Diagnosis:Acute pulmonary embolism without acute cor pulmonale [facility])   Patient Active Problem List    Diagnosis Date Noted   • Hypotension 2022   • SIRS without infection or organ dysfunction ([facility] ) 2022   • Acute pulmonary embolism without acute cor pulmonale ([facility] 75 ) 2022   • CKD (chronic kidney disease) 2022   • Vasomotor rhinitis 2022   • Ambulatory dysfunction 2021   • Anemia due to blood loss, acute 2021   • History of normocytic normochromic anemia 2021   • Impaired fasting glucose 12/10/2018   • Idiopathic chronic gout of multiple sites without tophus 2018   • Pain in gums 2018   • Chronic anticoagulation 2018   • Abnormal RBC indices 06/15/2018   • Fatigue 06/15/2018   • PAF (paroxysmal atrial fibrillation) ([facility] 75 ) 2018   • Polyp of colon 2018   • Acute on chronic combined systolic and diastolic congestive heart failure ([place] [address] ) 2017   • CAD (coronary atherosclerotic disease) 2016   • Chronic back pain 2016   • GERD (gastroesophageal reflux disease) 2016   • S/P MVR (mitral valve repair) 10/27/2015   • Peptic ulcer disease 2015   • Hypercholesterolemia 2015   • Hypertension 2015      LOS (days): 3  Geometric Mean LOS (GMLOS) (days): 3 90  Days to GMLOS:1 4     OBJECTIVE:  Risk of Unplanned Readmission Score: 14 94         Current admission status: Inpatient   Preferred Pharmacy:   [facility] [address], [address] [place] [address] [place] [address]  Phone: [phone] Fax: [phone]    Primary Care Provider: Aziza Wagner MD    Primary Insurance: MEDICARE  Secondary Insurance: BLUE CROSS    DISCHARGE DETAILS:    Discharge planning discussed with[de-identified] Patient's Son  Freedom of Choice: Yes  Comments - Freedom of Choice: CM spoke with patient's son via phone and discussed freedom of choice as it pertains to discharge planning  CM reviewed the three accepting [facility] agencies  Patient's son preferred [facility]  CM contacted family/caregiver?: Yes  Were Treatment Team discharge recommendations reviewed with patient/caregiver?: Yes  Did patient/caregiver verbalize understanding of patient care needs?: Yes  Were patient/caregiver advised of the risks associated with not following Treatment Team discharge recommendations?: Yes    Contacts  Patient Contacts: Guillermo  Relationship to Patient[de-identified] Family  Contact Method: Phone  Phone Number: [phone]  Reason/Outcome: Continuity of Care, Discharge [address]         Is the patient interested in [facility] at discharge?: Yes  [address] requested[de-identified] [address] Name[de-identified] [address] [facility] Provider[de-identified] PCP  Home Health Services Needed[de-identified] Evaluate Functional Status and Safety, Other (comment) (monitor vitals)  Homebound Criteria Met[de-identified] Uses an Assist Device (i e  cane, walker, etc)  Supporting Clincal Findings[de-identified] Fatigues Easliy in [employer], Limited Endurance    DME Referral Provided  Referral made for DME?: No    Other Referral/Resources/Interventions Provided:  Interventions: [facility]  Referral Comments: CM reserved [address] in [place] and updated patient's AVS to reflect the same  Would you like to participate in our [address] service program?  : No - Declined    Treatment Team Recommendation: Home  Discharge Destination Plan[de-identified] Home with [place] at Discharge : Family    IMM Given (Date):: 11/09/22  IMM Given to[de-identified] Family (CM reviewed IMM with patient's son via phone  His son reported understanding of these rights and denied any questions or concerns at this time   CM placed original IMM in medical records )

## 2022-11-09 NOTE — ASSESSMENT & PLAN NOTE
Wt Readings from Last 3 Encounters:   11/09/22 65 3 kg (144 lb)   09/23/22 65 7 kg (144 lb 12 8 oz)   05/19/22 67 1 kg (148 lb)     · Present on admission- elevated BNP, vascular congestion on CXR  · Last ECHO in 6/2022 with EF 35% and global hypokinesis  · Follows with cardiology in [place]  · Given dose of IV lasix in ED, continue with hold parameters   · No weight today, patient negative 1 3 L since admission  · Monitor lytes, weight, I/O  · Cardiology consultation and their input appreciated

## 2022-11-09 NOTE — ASSESSMENT & PLAN NOTE
· History of prosthetic Mitral Valve repair  · Follows with cardiology as an outpatient in [place] [facility]

## 2022-11-09 NOTE — NURSING NOTE
Patient had an episode of lightheadedness and unresponsiveness per echocardiogram technician  RN responded  Patient eyes were open and seemed to be resolving  Patient was alert and oriented  Oxygen measuring at 83% on room air  Heart rate reading 103 on monitor  BP was 91/62  Patient expressed he is starting to feel better lightheadedness is improving  SLIM provider Valdo Cohn made aware

## 2022-11-09 NOTE — PLAN OF CARE
Problem: Potential for Falls  Goal: Patient will remain free of falls  Description: INTERVENTIONS:  - Educate patient/family on patient safety including physical limitations  - Instruct patient to call for assistance with activity   - Consult OT/PT to assist with strengthening/mobility   - Keep Call bell within reach  - Keep bed low and locked with side rails adjusted as appropriate  - Keep care items and personal belongings within reach  - Initiate and maintain comfort rounds  - Make Fall Risk Sign visible to staff  - Offer Toileting every 2 Hours, in advance of need  - Initiate/Maintain bed alarm  - Obtain necessary fall risk management equipment: call bell within reach  - Apply yellow socks and bracelet for high fall risk patients  - Consider moving patient to room near nurses station  Outcome: Progressing     Problem: MOBILITY - ADULT  Goal: Maintain or return to baseline ADL function  Description: INTERVENTIONS:  -  Assess patient's ability to carry out ADLs; assess patient's baseline for ADL function and identify physical deficits which impact ability to perform ADLs (bathing, care of mouth/teeth, toileting, grooming, dressing, etc )  - Assess/evaluate cause of self-care deficits   - Assess range of motion  - Assess patient's mobility; develop plan if impaired  - Assess patient's need for assistive devices and provide as appropriate  - Encourage maximum independence but intervene and supervise when necessary  - Involve family in performance of ADLs  - Assess for home care needs following discharge   - Consider OT consult to assist with ADL evaluation and planning for discharge  - Provide patient education as appropriate  Outcome: Progressing

## 2022-11-09 NOTE — ASSESSMENT & PLAN NOTE
· History of afib, was in RVR on arrival to ED, currently rate controlled  · Continue Coreg at lower dose due to hypotension

## 2022-11-09 NOTE — PLAN OF CARE
Problem: Potential for Falls  Goal: Patient will remain free of falls  Description: INTERVENTIONS:  - Educate patient/family on patient safety including physical limitations  - Instruct patient to call for assistance with activity   - Consult OT/PT to assist with strengthening/mobility   - Keep Call bell within reach  - Keep bed low and locked with side rails adjusted as appropriate  - Keep care items and personal belongings within reach  - Initiate and maintain comfort rounds  - Make Fall Risk Sign visible to staff  - Apply yellow socks and bracelet for high fall risk patients  - Consider moving patient to room near nurses station  Outcome: Progressing     Problem: RESPIRATORY - ADULT  Goal: Achieves optimal ventilation and oxygenation  Description: INTERVENTIONS:  - Assess for changes in respiratory status  - Assess for changes in mentation and behavior  - Position to facilitate oxygenation and minimize respiratory effort  - Oxygen administered by appropriate delivery if ordered  - Initiate smoking cessation education as indicated  - Encourage broncho-pulmonary hygiene including cough, deep breathe, Incentive Spirometry  - Assess the need for suctioning and aspirate as needed  - Assess and instruct to report SOB or any respiratory difficulty  - Respiratory Therapy support as indicated  Outcome: Progressing     Problem: CARDIOVASCULAR - ADULT  Goal: Maintains optimal cardiac output and hemodynamic stability  Description: INTERVENTIONS:  - Monitor I/O, vital signs and rhythm  - Monitor for S/S and trends of decreased cardiac output  - Administer and titrate ordered vasoactive medications to optimize hemodynamic stability  - Assess quality of pulses, skin color and temperature  - Assess for signs of decreased coronary artery perfusion  - Instruct patient to report change in severity of symptoms  Outcome: Progressing  Goal: Absence of cardiac dysrhythmias or at baseline rhythm  Description: INTERVENTIONS:  - Continuous cardiac monitoring, vital signs, obtain 12 lead EKG if ordered  - Administer antiarrhythmic and heart rate control medications as ordered  - Monitor electrolytes and administer replacement therapy as ordered  Outcome: Progressing     Problem: HEMATOLOGIC - ADULT  Goal: Maintains hematologic stability  Description: INTERVENTIONS  - Assess for signs and symptoms of bleeding or hemorrhage  - Monitor labs  - Administer supportive blood products/factors as ordered and appropriate  Outcome: Progressing

## 2022-11-09 NOTE — ASSESSMENT & PLAN NOTE
· In the setting of PE/CHF as evidenced by HR  and RR of 22-26     · Now resolved  · Continue to monitor on telemetry  · Monitor CBC periodically

## 2022-11-09 NOTE — PROGRESS NOTES
Cardiology Progress Note - Jaquan Aliyah Arturo 69 y o  male MRN: [id number]    Unit/Bed#: -02 Encounter: [id number]      Assessment/Plan:  1  Transient episode of lightheadedness/confusion  Seems to be resolved  Continue to monitor  Continue telemetry  Vital signs were stable during the episode  2  Acute on chronic systolic heart failure, appears pretty euvolemic  Continue with Lasix 20 IV b i d  Daily weights  Salt restriction  Strict I&Os  Net -1 4 L      3  Hypotension, blood pressure remains chronically below 100  Will add midodrine 2 5 t i d   Discontinue Entresto  4  Cardiomyopathy unclear if ischemic or nonischemic  Patient with known EF around 35% June 2022, new echocardiogram done results pending  Continue all medications  Patient appears to not be able to get these medications because of low blood pressure  5  Paroxysmal atrial fibrillation, heart rate stable in the 90s to low 100s  Continue with Coumadin  Goal INR 2-3  No INR today  6  CAD with history of stents  Currently without anginal symptoms  Continue medications    7  History of bioprosthetic aortic valve/bioprosthetic mitral valve replacement  Normal function per last echo  New echocardiogram ordered and pending    8  New pulmonary embolism, currently on heparin drip  On admission patient's INR was supratherapeutic at 4 1  Patient will need oral anticoagulation closer to discharge  9  Hyperlipidemia on Lipitor      Subjective:   Patient seen and examined  No significant events overnight  Im feeling ok  Denies chest pain  I had a strange thing happen this am  Pt explains for about 1 minute he had lightheadedness/blurry vision and seemed confused  Transient and now resolved  Objective:     Vitals: Blood pressure 112/63, pulse 102, temperature 97 7 °F (36 5 °C), resp  rate 16, height 5' 5" (1 651 m), weight 65 3 kg (144 lb), SpO2 93 %  , Body mass index is 23 96 kg/m² ,   Orthostatic Blood Pressures Flowsheet Row Most Recent Value   Blood Pressure 112/63 filed at 11/09/2022 0945   Patient Position - Orthostatic VS Lying filed at 11/08/2022 2300            Intake/Output Summary (Last 24 hours) at 11/9/2022 1004  Last data filed at 11/9/2022 8670  Gross per 24 hour   Intake 480 ml   Output 200 ml   Net 280 ml         Physical Exam:    GEN: Scott Morris appears well, alert and oriented x 3, pleasant and cooperative   HEENT: pupils equal, round, and reactive to light; extraocular muscles intact  NECK: supple, no carotid bruits   HEART: regular rhythm, normal S1 and S2, no murmurs, clicks, gallops or rubs   LUNGS: clear to auscultation bilaterally; no wheezes, rales, or rhonchi   ABDOMEN: normal bowel sounds, soft, no tenderness, no distention  EXTREMITIES: peripheral pulses normal; no clubbing, cyanosis, or edema      Medications:      Current Facility-Administered Medications:   •  acetaminophen (TYLENOL) tablet 650 mg, 650 mg, Oral, Q6H PRN, Rola Ayon MD  •  atorvastatin (LIPITOR) tablet 40 mg, 40 mg, Oral, Daily With Indio Penny MD, 40 mg at 11/08/22 1756  •  calcium carbonate (TUMS) chewable tablet 1,000 mg, 1,000 mg, Oral, Daily PRN, Rola Ayon MD  •  carvedilol (COREG) tablet 3 125 mg, 3 125 mg, Oral, BID, Ju Tyson PA-C  •  clopidogrel (PLAVIX) tablet 75 mg, 75 mg, Oral, Daily, Rola Ayon MD, 75 mg at 11/08/22 0800  •  docusate sodium (COLACE) capsule 100 mg, 100 mg, Oral, BID, Rola Ayon MD, 100 mg at 11/08/22 1753  •  fluticasone (FLONASE) 50 mcg/act nasal spray 1 spray, 1 spray, Each Nare, Daily, Rola Ayon MD, 1 spray at 11/08/22 0820  •  furosemide (LASIX) injection 20 mg, 20 mg, Intravenous, BID (diuretic), Ju Tyson PA-C  •  heparin (porcine) 25,000 units in 0 45% NaCl 250 mL infusion (premix), 3-30 Units/kg/hr (Order-Specific), Intravenous, Titrated, Brian Harding MD, Last Rate: 5 9 mL/hr at 11/09/22 0632, 9 Units/kg/hr at 11/09/22 5631  •  heparin (porcine) injection 2,600 Units, 2,600 Units, Intravenous, Q1H PRN, Araceli Zavala MD, 2,600 Units at 11/08/22 1046  •  heparin (porcine) injection 5,200 Units, 5,200 Units, Intravenous, Q1H PRN, Araceli Zavala MD  •  loratadine (CLARITIN) tablet 10 mg, 10 mg, Oral, Daily, Clarice Rossi MD, 10 mg at 11/08/22 0817  •  ondansetron (ZOFRAN) injection 4 mg, 4 mg, Intravenous, Q6H PRN, Clarice Rossi MD  •  pantoprazole (PROTONIX) EC tablet 40 mg, 40 mg, Oral, Daily, Clarice Rossi MD, 40 mg at 11/09/22 3418  •  sacubitril-valsartan (ENTRESTO) 24-26 MG per tablet 1 tablet, 1 tablet, Oral, BID, Tasneem Pulse, PA-C, 1 tablet at 11/08/22 1753  •  senna (SENOKOT) tablet 8 6 mg, 1 tablet, Oral, Daily, Clarice Rossi MD, 8 6 mg at 11/08/22 0818  •  sodium chloride (PF) 0 9 % injection 3 mL, 3 mL, Intravenous, Q1H PRN, Araceli Zavala MD  •  spironolactone (ALDACTONE) tablet 12 5 mg, 12 5 mg, Oral, Daily, Tasneem Martinez, PA-C     Labs & Results:    Results from last 7 days   Lab Units 11/06/22  2019 11/06/22  1758   HS TNI 0HR ng/L  --  13   HS TNI 2HR ng/L 16  --    NT-PRO BNP pg/mL  --  10,317*     Results from last 7 days   Lab Units 11/09/22  0545 11/08/22  0519 11/07/22  0314   WBC Thousand/uL 10 12 10 23* 8 52   HEMOGLOBIN g/dL 15 3 15 7 14 7   HEMATOCRIT % 46 0 47 7 44 2   PLATELETS Thousands/uL 197 198 246         Results from last 7 days   Lab Units 11/09/22  0545 11/08/22  0519 11/07/22  0805 11/06/22  1758   POTASSIUM mmol/L 3 5 3 5 4 3 4 5   CHLORIDE mmol/L 104 102 105 106   CO2 mmol/L 29 30 28 30   BUN mg/dL 32* 25 17 18   CREATININE mg/dL 1 30 1 44* 1 23 1 42*   CALCIUM mg/dL 8 6 9 0 9 3 9 0   ALK PHOS U/L  --   --   --  74   ALT U/L  --   --   --  26   AST U/L  --   --   --  30     Results from last 7 days   Lab Units 11/09/22  0545 11/08/22  2337 11/08/22  1639 11/07/22  1602 11/07/22  0622 11/06/22  1758   INR   --   --   --   --  4 47* 4 11*   PTT seconds 61* 64* 98*   < > >210* 40* < > = values in this interval not displayed       Results from last 7 days   Lab Units 11/07/22  0805   MAGNESIUM mg/dL 2 1

## 2022-11-09 NOTE — ASSESSMENT & PLAN NOTE
Lab Results   Component Value Date    EGFR 53 11/09/2022    EGFR 46 11/08/2022    EGFR 56 11/07/2022    CREATININE 1 30 11/09/2022    CREATININE 1 44 (H) 11/08/2022    CREATININE 1 23 11/07/2022   · Currently at baseline  · Monitor daily while admitted

## 2022-11-09 NOTE — ASSESSMENT & PLAN NOTE
· Present on admission- Evidenced by CT imaging  · No heart strain noted on CT   · On coumadin at baseline for prosthetic mitral valve  · Last INR on 10/22 was 2 81, INR on admission 4 11, increased to 4 47  · Could be coumadin failure vs transient subtherapeutic INR between 10/22 and today  · Hematology consultation appreciated- may be due to coumadin failure  · Started on heparin gtt in ED, continue   Will send script to price check [facility]   · ECHO in 6/2022 with EF 35%, repeat echo by Cardiology  · Venous duplex report pending

## 2022-11-09 NOTE — ASSESSMENT & PLAN NOTE
· No chest pain  · Continue home regimen of Plavix  · Coreg and Entresto decreased due to hypotension    Blood pressure 91/68, pulse (!) 106, temperature 97 9 °F (36 6 °C), resp  rate 18, height 5' 5" (1 651 m), weight 65 3 kg (144 lb), SpO2 92 %

## 2022-11-09 NOTE — PROGRESS NOTES
[address]  Progress Note Sen Morris [id number], 69 y o  male MRN: [id number]  Unit/Bed#: -02 Encounter: [id number]  Primary Care Provider: Natasha Piña MD   Date and time admitted to hospital: 11/6/2022  5:30 PM    Hypotension  Assessment & Plan  · Patient with hypotension with BP of 73/59, this improved with 250 mL fluid bolus  · Cardiology decreased coreg to 3 125 bid, decreased Entresto, and decreased aldactone  Hold parameters adjusted as well  · Continue to monitor blood pressure closely  SIRS without infection or organ dysfunction ([facility] )  Assessment & Plan  · In the setting of PE/CHF as evidenced by HR  and RR of 22-26  · Now resolved  · Continue to monitor on telemetry  · Monitor CBC periodically    CKD (chronic kidney disease)  Assessment & Plan  Lab Results   Component Value Date    EGFR 53 11/09/2022    EGFR 46 11/08/2022    EGFR 56 11/07/2022    CREATININE 1 30 11/09/2022    CREATININE 1 44 (H) 11/08/2022    CREATININE 1 23 11/07/2022   · Currently at baseline  · Monitor daily while admitted      S/P MVR (mitral valve repair)  Assessment & Plan  · History of prosthetic Mitral Valve repair  · Follows with cardiology as an outpatient in [place]    PAF (paroxysmal atrial fibrillation) ([facility] )  Assessment & Plan  · History of afib, was in RVR on arrival to ED, currently rate controlled  · Continue Coreg at lower dose due to hypotension    Acute on chronic combined systolic and diastolic congestive heart failure (HCC)  Assessment & Plan  Wt Readings from Last 3 Encounters:   11/09/22 65 3 kg (144 lb)   09/23/22 65 7 kg (144 lb 12 8 oz)   05/19/22 67 1 kg (148 lb)     · Present on admission- elevated BNP, vascular congestion on CXR  · Last ECHO in 6/2022 with EF 35% and global hypokinesis  · Follows with cardiology in [place]  · Given dose of IV lasix in ED, continue with hold parameters   · No weight today, patient negative 1 3 L since admission  · Monitor lytes, weight, I/O  · Cardiology consultation and their input appreciated  CAD (coronary atherosclerotic disease)  Assessment & Plan  · No chest pain  · Continue home regimen of Plavix  · Coreg and Entresto decreased due to hypotension    Blood pressure 91/68, pulse (!) 106, temperature 97 9 °F (36 6 °C), resp  rate 18, height 5' 5" (1 651 m), weight 65 3 kg (144 lb), SpO2 92 %  * Acute pulmonary embolism without acute cor pulmonale (HCC)  Assessment & Plan  · Present on admission- Evidenced by CT imaging  · No heart strain noted on CT   · On coumadin at baseline for prosthetic mitral valve  · Last INR on 10/22 was 2 81, INR on admission 4 11, increased to 4 47  · Could be coumadin failure vs transient subtherapeutic INR between 10/22 and today  · Hematology consultation appreciated- may be due to coumadin failure  · Started on heparin gtt in ED, continue  Will send script to price check [facility]   · ECHO in 6/2022 with EF 35%, repeat echo by Cardiology  · Venous duplex report pending      TE Pharmacologic Prophylaxis: Heparin Drip, plan is to switch to oral anticoagulation possibly tomorrow    Patient Centered Rounds: I have performed bedside rounds with nursing staff today  Discussions with Specialists or Other Care Team Provider:  Cardiology  Education and Discussions with Family / Patient:  Patient    Current Length of Stay: 3 day(s)  Current Patient Status: Inpatient     Certification Statement: The patient will continue to require additional inpatient hospital stay due to Acute pulmonary embolism without acute cor pulmonale [facility])  Discharge Plan / Estimated Discharge Date:  1-2 days    Code Status: Level 1 - Full Code  ______________________________________________________________________________    Subjective:   Patient seen and examined by me  No chest pain, palpitations or diaphoresis  Patient did have a brief episode of dizziness earlier today  After that he felt better    No fever or chills at this point of time  No nausea or vomiting  Appetite okay  Patient does have generalized weakness    Objective:   Vitals: Blood pressure 91/68, pulse (!) 106, temperature 97 9 °F (36 6 °C), resp  rate 18, height 5' 5" (1 651 m), weight 65 3 kg (144 lb), SpO2 92 %      Physical Exam:   General appearance: alert, appears stated age and cooperative  Constitutional- looks a little weak  HEENT - atraumatic and normocephalic  Neck- supple  Skin - no fresh rash  Extremities no fresh focal deformities  Cardiovascular- S1-S2 heard  Respiratory- bilateral air entry present, no crackles or rhonchi  Skin - no fresh rash  Abdomen - normal bowel sounds present, no rebound tenderness  CNS- No fresh focal deficits  Psych- no acute psychosis     Additional Data:   Labs:  Results from last 7 days   Lab Units 11/09/22 0545 11/08/22 0519 11/07/22  0622 11/07/22  0314 11/06/22  1758   WBC Thousand/uL 10 12 10 23*  --  8 52 10 04   HEMOGLOBIN g/dL 15 3 15 7  --  14 7 15 7   HEMATOCRIT % 46 0 47 7  --  44 2 48 3   MCV fL 104* 105*  --  106* 109*   PLATELETS Thousands/uL 197 198  --  246 235   INR   --   --  4 47*  --  4 11*     Results from last 7 days   Lab Units 11/09/22 0545 11/08/22 0519 11/07/22  0805 11/06/22  1758   SODIUM mmol/L 140 141 143 143   POTASSIUM mmol/L 3 5 3 5 4 3 4 5   CHLORIDE mmol/L 104 102 105 106   CO2 mmol/L 29 30 28 30   ANION GAP mmol/L 7 9 10 7   BUN mg/dL 32* 25 17 18   CREATININE mg/dL 1 30 1 44* 1 23 1 42*   CALCIUM mg/dL 8 6 9 0 9 3 9 0   ALBUMIN g/dL  --   --   --  3 9   TOTAL BILIRUBIN mg/dL  --   --   --  1 03*   ALK PHOS U/L  --   --   --  74   ALT U/L  --   --   --  26   AST U/L  --   --   --  30   EGFR ml/min/1 73sq m 53 46 56 47   GLUCOSE RANDOM mg/dL 112 111 91 91     Results from last 7 days   Lab Units 11/07/22  0805   MAGNESIUM mg/dL 2 1         Results from last 7 days   Lab Units 11/06/22  1758   NT-PRO BNP pg/mL 10,317*      Results from last 7 days   Lab Units 11/06/22  1758   LACTIC ACID mmol/L 0  9   PROCALCITONIN ng/ml <0 05     Results from last 7 days   Lab Units 11/09/22  0957   POC GLUCOSE mg/dl 97             * I Have Reviewed All Lab Data Listed Above  Cultures:   Results from last 7 days   Lab Units 11/06/22  1758   BLOOD CULTURE  No Growth at 48 hrs  No Growth at 48 hrs  INFLUENZA A PCR  Negative     Results from last 7 days   Lab Units 11/06/22  1758   INFLUENZA A PCR  Negative   INFLUENZA B PCR  Negative   RSV PCR  Negative         Imaging:  Imaging Reports Reviewed Today Include:   PE Study with CT abdomen & pelvis with contrast    Result Date: 11/6/2022  Impression: 1  Thin nonocclusive pulmonary embolism within a right lower lobe segmental pulmonary artery  2   Measured RV/LV ratio is within normal limits at less than 0 9   3   Pulmonary edema  No focal consolidation  4   Cardiomegaly with evidence of right heart dysfunction  5   Severe atherosclerotic calcifications with moderate to severe stenosis of the origins of the superior mesenteric artery and bilateral renal arteries  Areas of severe moderate to severe stenosis of the common iliac arteries, external iliac arteries and severe stenosis of the right common femoral artery  6   Other findings as above   I personally discussed this study with Grabiel Zacarias on 11/6/2022 at 8:45 PM  Workstation performed: [id number]     Scheduled Meds:  Current Facility-Administered Medications   Medication Dose Route Frequency Provider Last Rate   • acetaminophen  650 mg Oral Q6H PRN Cherise Carlos MD     • atorvastatin  40 mg Oral Daily With Floresita Marrufo MD     • calcium carbonate  1,000 mg Oral Daily PRN Cherise Carlos MD     • carvedilol  3 125 mg Oral BID Kristin Sahni PA-C     • clopidogrel  75 mg Oral Daily Cherise Carlos MD     • docusate sodium  100 mg Oral BID Cherise Carlos MD     • fluticasone  1 spray Each Nare Daily Cherise Carlos MD     • furosemide  20 mg Intravenous BID (diuretic) Kristin Sahni PA-C • heparin (porcine)  3-30 Units/kg/hr (Order-Specific) Intravenous Titrated Monik Boles MD 9 Units/kg/hr (11/09/22 1028)   • heparin (porcine)  2,600 Units Intravenous Q1H PRN Monik Boles MD     • heparin (porcine)  5,200 Units Intravenous Q1H PRN Monik Boles MD     • loratadine  10 mg Oral Daily Carina Skelton MD     • midodrine  2 5 mg Oral TID AC Ovidio Avila PA-C     • ondansetron  4 mg Intravenous Q6H PRN Carina Skelton MD     • pantoprazole  40 mg Oral Daily Carina Skelton MD     • senna  1 tablet Oral Daily Carina Skelton MD     • sodium chloride (PF)  3 mL Intravenous Q1H PRN Monik Boles MD     • spironolactone  12 5 mg Oral Daily BEN Grant MD  [place] [address] Internal [facility]    ** Please Note: This note has been constructed using a voice recognition system   **

## 2022-11-10 ENCOUNTER — APPOINTMENT (INPATIENT)
Dept: CT IMAGING | Facility: HOSPITAL | Age: 77
End: 2022-11-10

## 2022-11-10 PROBLEM — R41.89 EPISODE OF UNRESPONSIVENESS: Status: ACTIVE | Noted: 2022-11-10

## 2022-11-10 PROBLEM — R55 SYNCOPAL EPISODES: Status: ACTIVE | Noted: 2022-11-10

## 2022-11-10 PROBLEM — R40.4 EPISODE OF UNRESPONSIVENESS: Status: ACTIVE | Noted: 2022-11-10

## 2022-11-10 LAB
ALBUMIN SERPL BCP-MCNC: 3.3 G/DL (ref 3.5–5)
ALP SERPL-CCNC: 67 U/L (ref 46–116)
ALT SERPL W P-5'-P-CCNC: 18 U/L (ref 12–78)
ANION GAP SERPL CALCULATED.3IONS-SCNC: 10 MMOL/L (ref 4–13)
APTT PPP: 127 SECONDS (ref 23–37)
APTT PPP: 37 SECONDS (ref 23–37)
APTT PPP: 57 SECONDS (ref 23–37)
AST SERPL W P-5'-P-CCNC: 25 U/L (ref 5–45)
BILIRUB SERPL-MCNC: 0.84 MG/DL (ref 0.2–1)
BUN SERPL-MCNC: 30 MG/DL (ref 5–25)
CALCIUM ALBUM COR SERPL-MCNC: 9.3 MG/DL (ref 8.3–10.1)
CALCIUM SERPL-MCNC: 8.7 MG/DL (ref 8.3–10.1)
CHLORIDE SERPL-SCNC: 106 MMOL/L (ref 96–108)
CO2 SERPL-SCNC: 25 MMOL/L (ref 21–32)
COPPER SERPL-MCNC: 94 UG/DL (ref 69–132)
CREAT SERPL-MCNC: 1.12 MG/DL (ref 0.6–1.3)
ERYTHROCYTE [DISTWIDTH] IN BLOOD BY AUTOMATED COUNT: 14.3 % (ref 11.6–15.1)
GFR SERPL CREATININE-BSD FRML MDRD: 63 ML/MIN/1.73SQ M
GLUCOSE SERPL-MCNC: 96 MG/DL (ref 65–140)
HCT VFR BLD AUTO: 46.1 % (ref 36.5–49.3)
HGB BLD-MCNC: 15.3 G/DL (ref 12–17)
INR PPP: 1.67 (ref 0.84–1.19)
MAGNESIUM SERPL-MCNC: 1.8 MG/DL (ref 1.6–2.6)
MCH RBC QN AUTO: 34.8 PG (ref 26.8–34.3)
MCHC RBC AUTO-ENTMCNC: 33.2 G/DL (ref 31.4–37.4)
MCV RBC AUTO: 105 FL (ref 82–98)
PLATELET # BLD AUTO: 196 THOUSANDS/UL (ref 149–390)
PMV BLD AUTO: 9.3 FL (ref 8.9–12.7)
POTASSIUM SERPL-SCNC: 3.8 MMOL/L (ref 3.5–5.3)
PROT SERPL-MCNC: 6.4 G/DL (ref 6.4–8.4)
PROTHROMBIN TIME: 19.4 SECONDS (ref 11.6–14.5)
RBC # BLD AUTO: 4.4 MILLION/UL (ref 3.88–5.62)
SODIUM SERPL-SCNC: 141 MMOL/L (ref 135–147)
WBC # BLD AUTO: 8.9 THOUSAND/UL (ref 4.31–10.16)

## 2022-11-10 RX ORDER — MIDODRINE HYDROCHLORIDE 5 MG/1
5 TABLET ORAL
Status: DISCONTINUED | OUTPATIENT
Start: 2022-11-11 | End: 2022-11-14

## 2022-11-10 RX ORDER — WARFARIN SODIUM 3 MG/1
6 TABLET ORAL
Status: COMPLETED | OUTPATIENT
Start: 2022-11-10 | End: 2022-11-10

## 2022-11-10 RX ORDER — FUROSEMIDE 20 MG/1
20 TABLET ORAL DAILY
Status: DISCONTINUED | OUTPATIENT
Start: 2022-11-11 | End: 2022-11-10

## 2022-11-10 RX ADMIN — SENNOSIDES 8.6 MG: 8.6 TABLET, FILM COATED ORAL at 08:52

## 2022-11-10 RX ADMIN — CARVEDILOL 3.12 MG: 3.12 TABLET, FILM COATED ORAL at 08:52

## 2022-11-10 RX ADMIN — CARVEDILOL 3.12 MG: 3.12 TABLET, FILM COATED ORAL at 18:03

## 2022-11-10 RX ADMIN — ATORVASTATIN CALCIUM 40 MG: 40 TABLET, FILM COATED ORAL at 18:03

## 2022-11-10 RX ADMIN — MIDODRINE HYDROCHLORIDE 2.5 MG: 5 TABLET ORAL at 06:52

## 2022-11-10 RX ADMIN — CLOPIDOGREL 75 MG: 75 TABLET, FILM COATED ORAL at 08:52

## 2022-11-10 RX ADMIN — DOCUSATE SODIUM 100 MG: 100 CAPSULE, LIQUID FILLED ORAL at 08:52

## 2022-11-10 RX ADMIN — SPIRONOLACTONE 12.5 MG: 25 TABLET ORAL at 08:52

## 2022-11-10 RX ADMIN — HEPARIN SODIUM 2600 UNITS: 1000 INJECTION INTRAVENOUS; SUBCUTANEOUS at 23:05

## 2022-11-10 RX ADMIN — PANTOPRAZOLE SODIUM 40 MG: 40 TABLET, DELAYED RELEASE ORAL at 06:53

## 2022-11-10 RX ADMIN — HEPARIN SODIUM 5200 UNITS: 1000 INJECTION INTRAVENOUS; SUBCUTANEOUS at 07:55

## 2022-11-10 RX ADMIN — MIDODRINE HYDROCHLORIDE 2.5 MG: 5 TABLET ORAL at 12:00

## 2022-11-10 RX ADMIN — FUROSEMIDE 20 MG: 10 INJECTION, SOLUTION INTRAMUSCULAR; INTRAVENOUS at 08:54

## 2022-11-10 RX ADMIN — FLUTICASONE PROPIONATE 1 SPRAY: 50 SPRAY, METERED NASAL at 12:00

## 2022-11-10 RX ADMIN — LORATADINE 10 MG: 10 TABLET ORAL at 08:52

## 2022-11-10 RX ADMIN — WARFARIN SODIUM 6 MG: 3 TABLET ORAL at 18:02

## 2022-11-10 NOTE — ASSESSMENT & PLAN NOTE
· History of afib, was in RVR on arrival to ED, currently rate controlled  · Continue Coreg at lower dose due to hypotension  · Anticoagulation with Coumadin target INR 2-3  · Coumadin does to be given daily depending on INR

## 2022-11-10 NOTE — CONSULTS
Consultation - Neurology   Trav Matias 69 y o  male MRN: [id number]  Unit/Bed#: -02 Encounter: [id number]      Assessment/Plan     Syncopal episodes  Assessment & Plan   69 y o   male with acute PE, CHF, CKD, AFib, coronary artery disease, tobacco use (1 cigarette per week) who presented to [facility] on 11/06/2022 for shortness of breast for the past few days  Patient found have acute PE on CTA chest/ abdomen/ pelvis with contrast, for which he was admitted to the hospital   Neurology was asked to evaluate the patient on 11/10/2022 by the Cardiology team due to transient episode lightheadedness with possible syncope  Case discussed with internal medicine provider who was present just after episode of syncope on 11/09/2022  Per internal medicine provider, when patient was getting his echo done, the echo technician noted the patient was lightheaded and then lost consciousness for about 10 seconds  Echo technician called for nurse  By the time nurse got to the room, patient was alert and oriented to self, year, and place  Vitals at the time were as follows:  O2 saturation 83% on room air, blood pressure 91/62, blood pressure 103  Internal medicine provider assessed patient and he seemed to be at his baseline compared to previous examinations during hospitalization  Patient was even able to walk in the hallway just after episode  Plan   -recommend CT head  -recommend outpatient EEG  -if any more episodes, patient will need neurology re-evaluation if he is inpatient  If patient has another similar episode and he is outpatient, recommend patient return to ED for evaluation  - Medical management and correction of metabolic and infectious disturbances per primary team   - Avoid CNS altering medications if possible  - Continue supportive care   - Continue to monitor neurologic status   Notify neurology with any changes in exam          Hypotension  Assessment & Plan  -management per primary team and Cardiology team    PAF (paroxysmal atrial fibrillation) [facility])  Assessment & Plan  -management per cardiology team    Acute on chronic combined systolic and diastolic congestive heart failure (HCC)  Assessment & Plan  Wt Readings from Last 3 Encounters:   11/10/22 64 1 kg (141 lb 5 oz)   09/23/22 65 7 kg (144 lb 12 8 oz)   05/19/22 67 1 kg (148 lb)       -management per cardiology team      CAD (coronary atherosclerotic disease)  Assessment & Plan  -management per primary team and Cardiology team    Case and plan discussed with attending neurologist   Please see attending attestation for any further recommendations and/or changes to plan  Eloy Gold will need follow up in 2 to 3 months  with general attending or advance practitioner  He will require a routine EEG within 8 weeks  History of Present Illness     Reason for Consult / Principal Problem: Syncope   Hx and PE limited by: N/A  HPI: Eloy Gold is a 69 y o   male with acute PE, CHF, CKD, AFib, coronary artery disease s/p PTCA LAD 1989 and stent placement in 2019, tobacco use (1 cigarette per week), carotid stenosis s/p R CEA 2/2020, who presented to [facility] on 11/06/2022 for shortness of breast for the past few days  Patient found have acute PE on CTA chest/ abdomen/ pelvis with contrast, for which he was admitted to the hospital   Neurology was asked to evaluate the patient on 11/10/2022 by the Cardiology team due to transient episode lightheadedness with possible syncope  Of note, per chart review, patient with echo showing EF of 35% in June 2022  Most recent echo done inpatient on 11/09/2022 and showed EF 15-20% with severe global hypokinesis  Case discussed with internal medicine provider who was present just after episode of syncope on 11/09/2022    Per internal medicine provider, when patient was getting his echo done, the echo technician noted the patient was lightheaded and then lost consciousness for about 10 seconds  Echo technician called for nurse  By the time nurse got to the room, patient was alert and oriented to self, year, and place  Vitals at the time were as follows:  O2 saturation 83% on room air, blood pressure 91/62, blood pressure 103  Internal medicine provider assessed patient and he seemed to be at his baseline compared to previous examinations during hospitalization  Patient was even able to walk in the hallway just after episode  Patient reports he had an episode of lightheadedness on 11/09/2022  Patient reports that there was a technician in the room and he told the technician that he felt lightheaded when he was trying to scoot up higher into the bed  Just after, he reports they a technician told him that he "passed out ”  Patient reports that there was "no way" that he lost consciousness for greater than 10 seconds  Patient reports that he does not remember losing consciousness but when the technician was talking to him, he was able to answer all of her questions accurately  Patient reports he walked around shortly after the episode and noticed that his pants were wet  When patient returned to his room, he noticed the bed pad was wet as well  Patient denies tongue bite  Patient denies any shaking activity  Patient denies history of seizure  Patient denies history of stroke  Patient is not on any AED medications  Patient reports he has not had any similar episodes to this in the past   Patient reports that he has had similar episodes of lightheadedness in the past while he was fishing  Patient reports when he fishes in the sun, he walks to boat and gets in the boat without difficulty  He reports that while he is in the boat, "the son takes everything out of me" and he needs to lie down on the boat  Patient denies any episodes of losing consciousness while fishing  Patient denies any urinary incontinence while fishing          Inpatient consult to Neurology  Consult performed by: Rosaura Wiseman PA-C  Consult ordered by: Sofiya Hdz PA-C          Review of Systems   Constitutional: Negative for chills and fever  HENT: Positive for congestion  Negative for trouble swallowing  Eyes: Positive for photophobia ("no worse than it usually is  My eyes are always sensitive to the light " )  Negative for visual disturbance  Respiratory: Positive for cough (no worse than baseline ) and shortness of breath (no worse than baseline)  Cardiovascular: Negative for chest pain and palpitations  Gastrointestinal: Negative for nausea and vomiting  Genitourinary: Positive for urgency  Negative for difficulty urinating and dysuria  Musculoskeletal: Positive for back pain  Negative for gait problem  Neurological: Positive for weakness (LLE, chronic due to back issues ) and numbness (LLE, chronic due to back issues )  Negative for dizziness, speech difficulty and headaches  Psychiatric/Behavioral: Negative for suicidal ideas  Denies HI        Historical Information   Past Medical History:   Diagnosis Date   • 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 [address] )     10YRS AGO   • Transient cerebral ischemia      Past Surgical History:   Procedure Laterality Date   • BACK SURGERY     • CARDIAC SURGERY      mvp repair   • CAROTID ENDARTERECTOMY Right 03/2020   • CHOLECYSTECTOMY     • COLONOSCOPY     • ESOPHAGOGASTRODUODENOSCOPY N/A 6/22/2018    Procedure: ESOPHAGOGASTRODUODENOSCOPY (EGD); Surgeon: New Hernandez MD;  Location: [facility];   Service: Gastroenterology   • JOINT REPLACEMENT      L hip replacement   • ORCHIECTOMY     • PROSTATECTOMY     • TOTAL HIP ARTHROPLASTY       Social History   Social History Substance and Sexual Activity   Alcohol Use Never   • Alcohol/week: 2 0 standard drinks   • Types: 2 Standard drinks or equivalent per week     Social History     Substance and Sexual Activity   Drug Use No     E-Cigarette/Vaping   • E-Cigarette Use Never User      E-Cigarette/Vaping Substances   • Nicotine No    • THC No    • CBD No    • Flavoring No    • Other No    • Unknown No      Social History     Tobacco Use   Smoking Status Former Smoker   • Packs/day: 0 25   • Quit date: 2018   • Years since quittin 4   Smokeless Tobacco Never Used   Tobacco Comment    4 cig/day     Family History:   Family History   Problem Relation Age of Onset   • Heart disease Mother    • Stroke Mother         CVA   • Cancer Father    • Hodgkin's lymphoma Father    • Lung cancer Father        Review of previous medical records was  completed       Meds/Allergies   current meds:   Current Facility-Administered Medications   Medication Dose Route Frequency   • acetaminophen (TYLENOL) tablet 650 mg  650 mg Oral Q6H PRN   • atorvastatin (LIPITOR) tablet 40 mg  40 mg Oral Daily With Dinner   • calcium carbonate (TUMS) chewable tablet 1,000 mg  1,000 mg Oral Daily PRN   • carvedilol (COREG) tablet 3 125 mg  3 125 mg Oral BID   • clopidogrel (PLAVIX) tablet 75 mg  75 mg Oral Daily   • docusate sodium (COLACE) capsule 100 mg  100 mg Oral BID   • fluticasone (FLONASE) 50 mcg/act nasal spray 1 spray  1 spray Each Nare Daily   • [START ON 2022] furosemide (LASIX) tablet 20 mg  20 mg Oral Daily   • heparin (porcine) 25,000 units in 0 45% NaCl 250 mL infusion (premix)  3-30 Units/kg/hr (Order-Specific) Intravenous Titrated   • heparin (porcine) injection 2,600 Units  2,600 Units Intravenous Q1H PRN   • heparin (porcine) injection 5,200 Units  5,200 Units Intravenous Q1H PRN   • loratadine (CLARITIN) tablet 10 mg  10 mg Oral Daily   • midodrine (PROAMATINE) tablet 2 5 mg  2 5 mg Oral TID AC   • ondansetron (ZOFRAN) injection 4 mg 4 mg Intravenous Q6H PRN   • pantoprazole (PROTONIX) EC tablet 40 mg  40 mg Oral Daily   • senna (SENOKOT) tablet 8 6 mg  1 tablet Oral Daily   • sodium chloride (PF) 0 9 % injection 3 mL  3 mL Intravenous Q1H PRN   • spironolactone (ALDACTONE) tablet 12 5 mg  12 5 mg Oral Daily   • warfarin (COUMADIN) tablet 6 mg  6 mg Oral Once (warfarin)    and PTA meds:   Prior to Admission Medications   Prescriptions Last Dose Informant Patient Reported? Taking? Azelaic Acid 15 % cream 11/6/2022 at Unknown time  Yes Yes   Sig: apply to 1 thin layer twice a day to face   Farxiga 10 MG TABS 11/6/2022 at Unknown time  Yes Yes   Sig: Take 10 mg by mouth daily   Multiple Vitamins-Minerals (PX MENS MULTIVITAMINS) TABS 11/6/2022 at Unknown time Self Yes Yes   Sig: Take by mouth   allopurinol (ZYLOPRIM) 100 mg tablet 11/6/2022 at Unknown time  No Yes   Sig: Take 1 tablet (100 mg total) by mouth daily   atorvastatin (LIPITOR) 40 mg tablet 11/6/2022 at Unknown time Self Yes Yes   Sig: Take 40 mg by mouth daily     carvedilol (COREG) 6 25 mg tablet   Yes No   Sig: Take 6 25 mg by mouth 2 (two) times a day   clopidogrel (PLAVIX) 75 mg tablet 11/6/2022 at Unknown time  Yes Yes   Sig: Take 75 mg by mouth daily   furosemide (LASIX) 40 mg tablet 11/6/2022 at Unknown time  Yes Yes   Sig: Take 40 mg by mouth in the morning     metroNIDAZOLE (METROGEL) 0 75 % gel 11/6/2022 at Unknown time  Yes Yes   Sig: apply A THIN LAYER topically twice a day for 4 WEEKS FACE AND SCALP DO NOT MOISTURIZE   pantoprazole (PROTONIX) 40 mg tablet 11/6/2022 at Unknown time  No Yes   Sig: Take 1 tablet (40 mg total) by mouth daily   sacubitril-valsartan (ENTRESTO) 49-51 MG TABS 11/6/2022 at Unknown time  Yes Yes   Sig: Take 1 tablet by mouth 2 (two) times a day   triamcinolone (KENALOG) 0 5 % cream 11/6/2022 at Unknown time  No Yes   Sig: Apply topically 2 (two) times a day   warfarin (COUMADIN) 2 5 mg tablet 11/6/2022 at Unknown time  Yes Yes   Sig: take 2 tablets by mouth daily EXCEPT 3 TABLETS EVERY THURSDAY OR AS DIRECTED      Facility-Administered Medications: None       No Known Allergies    Objective   Vitals:Blood pressure 93/63, pulse (!) 119, temperature 97 8 °F (36 6 °C), resp  rate 20, height 5' 5" (1 651 m), weight 64 1 kg (141 lb 5 oz), SpO2 95 %  ,Body mass index is 23 52 kg/m²  Intake/Output Summary (Last 24 hours) at 11/10/2022 1544  Last data filed at 11/10/2022 1438  Gross per 24 hour   Intake 700 ml   Output 1950 ml   Net -1250 ml       Invasive Devices: Invasive Devices  Report    Peripheral Intravenous Line  Duration           Peripheral IV 11/06/22 Dorsal (posterior); Left Forearm 3 days    Peripheral IV 11/06/22 Right Antecubital 3 days                Physical Exam  Vitals and nursing note reviewed  Constitutional:       General: He is not in acute distress  Appearance: He is not diaphoretic  HENT:      Head: Normocephalic and atraumatic  Nose: Congestion present  Mouth/Throat:      Mouth: Mucous membranes are moist    Eyes:      General: No scleral icterus  Right eye: No discharge  Left eye: No discharge  Extraocular Movements: Extraocular movements intact and EOM normal       Conjunctiva/sclera: Conjunctivae normal    Cardiovascular:      Rate and Rhythm: Tachycardia present  Rhythm irregular  Pulmonary:      Effort: Pulmonary effort is normal    Musculoskeletal:      Right lower leg: No edema  Left lower leg: No edema  Neurological:      Mental Status: He is alert  Coordination: Finger-Nose-Finger Test normal       Deep Tendon Reflexes:      Reflex Scores:       Bicep reflexes are 1+ on the right side and 1+ on the left side  Patellar reflexes are 1+ on the right side  Psychiatric:      Comments: Pleasant and cooperative during exam       Neurologic Exam     Mental Status   Follows 2 step commands  Attention: appropriately attends to provider   Concentration: no redirection or reorientation required during exam    Speech: (No dysarthria appreciated on exam  Speech fluent  )  Level of consciousness: alert  Unable to perform simple calculations: Six quarters in 1 dollar 50 cents  Able to name object (glove, glasses, watch)  Able to read  Able to repeat (NIHSS  phrases)  -oriented to self   -oriented to month and year   -Oriented to place "[facility]"     Cranial Nerves     CN II   Visual acuity: (grossly intact)  Right visual field deficit: none  Left visual field deficit: none     CN III, IV, VI   Extraocular motions are normal    Right pupil: Size: 3 mm  Shape: regular  Left pupil: Size: 3 mm  Shape: regular  Nystagmus: none   Conjugate gaze: present (Midline)    CN V   Facial sensation intact  Right facial sensation deficit: none  Left facial sensation deficit: none    CN VII   Facial expression full, symmetric  Right facial weakness: none  Left facial weakness: none    CN VIII   Hearing impaired: audition intact to finger rub bilaterally  CN XI   Right trapezius strength: normal  Left trapezius strength: normal    CN XII   CN XII normal    Tongue: not atrophic  Fasciculations: absent  Tongue deviation: none    Motor Exam   Muscle bulk: normal  Overall muscle tone: normal Strength to confrontation testing:  -Bilateral hand  5/5   -bilateral elbow flexion and extension 5/5   -Bilateral shoulder abduction 5/5   -Bilateral plantar flexion and dorsiflexion 5/5   -bilateral knee flexion and extension 5/5   -right hip flexion 5/5  -left hip flexion 4+/5     Sensory Exam   Right arm light touch: normal  Left arm light touch: normal  Right leg light touch: normal  Left leg light touch: Decreased 50% compared to opposite side in lateral distal lower extremity, decreased 25% compared to opposite side in lateral proximal lower extremity, otherwise intact      Gait, Coordination, and Reflexes     Gait  Gait: (deferred for patient safety)    Coordination   Finger to nose coordination: normal    Reflexes   Right biceps: 1+  Left biceps: 1+  Right patellar: 1+  Left patellar reflex grade: Trace  Lab Results:   CBC:   Results from last 7 days   Lab Units 11/10/22  0550 11/09/22  0545 11/08/22 0519   WBC Thousand/uL 8 90 10 12 10 23*   RBC Million/uL 4 40 4 43 4 54   HEMOGLOBIN g/dL 15 3 15 3 15 7   HEMATOCRIT % 46 1 46 0 47 7   MCV fL 105* 104* 105*   PLATELETS Thousands/uL 196 197 198   , BMP/CMP:   Results from last 7 days   Lab Units 11/10/22  0550 11/09/22  0545 11/08/22  0519 11/07/22  0805 11/06/22  1758   SODIUM mmol/L 141 140 141   < > 143   POTASSIUM mmol/L 3 8 3 5 3 5   < > 4 5   CHLORIDE mmol/L 106 104 102   < > 106   CO2 mmol/L 25 29 30   < > 30   BUN mg/dL 30* 32* 25   < > 18   CREATININE mg/dL 1 12 1 30 1 44*   < > 1 42*   CALCIUM mg/dL 8 7 8 6 9 0   < > 9 0   AST U/L 25  --   --   --  30   ALT U/L 18  --   --   --  26   ALK PHOS U/L 67  --   --   --  74   EGFR ml/min/1 73sq m 63 53 46   < > 47    < > = values in this interval not displayed  Imaging Studies: I have personally reviewed pertinent reports  CTA chest abdomen and pelvis with contrast 11/06/2022, CTA H/N wwo contrast in Care Everywhere 12/11/2019  CTA H/N wwo contrast 12/11/19   "The unenhanced portion of the CT of the head demonstrates evidence of old right frontal and right temporal parietal infarcts  [place] Partially calcified plaque at the level of the right carotid bifurcation extending into the origin of the right internal carotid artery resulting in 85% stenosis at the origin of the internal carotid artery  75% stenosis at the origin of the left internal carotid artery due to coarsely calcified plaque formation  Severe origin stenosis of the left vertebral artery which arises directly from the aorta "    EKG, Pathology, and Other Studies: I have personally reviewed pertinent reports      Echo 11/09/2022  VTE Prophylaxis: Heparin    Code Status: Level 1 - Full Code  Advance Directive and Living Will:      Power of :    POLST:      Counseling / Coordination of Care  Total time spent today 60 minutes  Greater than 50% of total time was spent with the patient and / or family counseling and / or coordination of care  A description of the counseling / coordination of care: : discussion of likely causes of symptoms, recommended workup at this time, recommended treatment at this time, risks if choosing not to undergo further workup/treatment    This note was completed in part utilizing M-Modal Fluency Direct Software  Grammatical errors, random word insertions, spelling mistakes, and incomplete sentences may be an occasional consequence of this system secondary to software limitations, ambient noise, and hardware issues  If you have any questions or concerns about the content, text, or information contained within the body of this dictation, please contact the provider for clarification

## 2022-11-10 NOTE — ASSESSMENT & PLAN NOTE
Lab Results   Component Value Date    EGFR 63 11/10/2022    EGFR 53 11/09/2022    EGFR 46 11/08/2022    CREATININE 1 12 11/10/2022    CREATININE 1 30 11/09/2022    CREATININE 1 44 (H) 11/08/2022   · Currently at baseline  · Monitor periodically

## 2022-11-10 NOTE — PLAN OF CARE
Problem: Prexisting or High Potential for Compromised Skin Integrity  Goal: Skin integrity is maintained or improved  Description: INTERVENTIONS:  - Identify patients at risk for skin breakdown  - Assess and monitor skin integrity  - Assess and monitor nutrition and hydration status  - Monitor labs   - Assess for incontinence   - Turn and reposition patient  - Assist with mobility/ambulation  - Relieve pressure over bony prominences  - Avoid friction and shearing  - Provide appropriate hygiene as needed including keeping skin clean and dry  - Evaluate need for skin moisturizer/barrier cream  - Collaborate with interdisciplinary team   - Patient/family teaching  - Consider wound care consult   Outcome: Progressing     Problem: RESPIRATORY - ADULT  Goal: Achieves optimal ventilation and oxygenation  Description: INTERVENTIONS:  - Assess for changes in respiratory status  - Assess for changes in mentation and behavior  - Position to facilitate oxygenation and minimize respiratory effort  - Oxygen administered by appropriate delivery if ordered  - Initiate smoking cessation education as indicated  - Encourage broncho-pulmonary hygiene including cough, deep breathe, Incentive Spirometry  - Assess the need for suctioning and aspirate as needed  - Assess and instruct to report SOB or any respiratory difficulty  - Respiratory Therapy support as indicated  Outcome: Progressing

## 2022-11-10 NOTE — ASSESSMENT & PLAN NOTE
Wt Readings from Last 3 Encounters:   11/10/22 64 1 kg (141 lb 5 oz)   09/23/22 65 7 kg (144 lb 12 8 oz)   05/19/22 67 1 kg (148 lb)       - Management per cardiology team

## 2022-11-10 NOTE — ASSESSMENT & PLAN NOTE
· No chest pain  Continue home regimen of Plavix  · Coreg and Entresto doses decreased due to hypotension  · Cardiology titrating dose of medications    Blood pressure 109/73, pulse 104, temperature (!) 97 2 °F (36 2 °C), resp  rate 20, height 5' 5" (1 651 m), weight 64 1 kg (141 lb 5 oz), SpO2 95 %

## 2022-11-10 NOTE — ASSESSMENT & PLAN NOTE
· Present on admission- Evidenced by CT imaging  · No heart strain noted on CT   · On coumadin at baseline for prosthetic mitral valve  · Last INR on 10/22 was 2 81, INR on admission 4 11, increased to 4 47  · Could be coumadin failure vs transient subtherapeutic INR between 10/22 and today  · Hematology consultation appreciated- may be due to coumadin failure  · Started on heparin gtt in ED, continue   Will send script to price check eliquis   · ECHO in 6/2022 with EF 35%, repeat echo shows EF of 15-20% with global cardiomyopathy  · Venous duplex negative

## 2022-11-10 NOTE — ASSESSMENT & PLAN NOTE
· In the setting of PE/CHF as evidenced by HR  and RR of 22-26  · Now resolved   Continue to monitor on telemetry  · Monitor CBC periodically

## 2022-11-10 NOTE — PROGRESS NOTES
Cardiology Progress Note - Gene Morris 69 y o  male MRN: [id number]    Unit/Bed#: -02 Encounter: [id number]      Assessment/Plan:  1  Transient episode of lightheadedness/? Syncope/confusion associated with urinary incontinence  Patient states he has had episodes of similar without the incontinence when he goes fishing and relates it to the sunlight  Will ask Neurology to weigh in  Vital signs stable during the episode  No arrhythmias noted on telemetry  2  Acute on chronic systolic heart failure, appears euvolemic  Will switch over to oral Lasix 20 daily  Continue with daily weights  Salt restriction  Strict I&Os  Continue with Lipitor, Coreg, Plavix, Aldactone  3  Hypotension with blood pressures remaining in the low 100s  We added midodrine 2 5 yesterday with good improvement  All blood pressure medications have parameters  4  Cardiomyopathy, unclear if ischemic or nonischemic  Patient with known EF around 35% in June now new echocardiogram shows EF 15-20%, with severe global hypokinesis  Patient has previously been offered AICD but has declined in the past   Follows with cardiology in [place]  Continue with Lipitor, Coreg, Plavix, Aldactone  5  Paroxysmal atrial fibrillation heart rate stable in the 90s to low 100s  Continue with Coumadin  Goal INR 2-3     6  New pulmonary embolism, currently on heparin drip  Anticoagulation to be determined by Hematology if this was a Coumadin failure  Management per [facility]ist and Hematology  Patient's INR was supratherapeutic on admission at 4 1     7  CAD with history of stents, currently without anginal symptoms  Continue medications    8  History of bioprosthetic aortic and mitral valve replacement  Normal function per echo  9  Hyperlipidemia on Lipitor      Subjective:   Patient seen and examined  No significant events overnight  Im feeling good  Denies chest pain      Objective:     Vitals: Blood pressure 109/73, pulse 104, temperature (!) 97 2 °F (36 2 °C), resp  rate 20, height 5' 5" (1 651 m), weight 64 1 kg (141 lb 5 oz), SpO2 95 %  , Body mass index is 23 52 kg/m² ,   Orthostatic Blood Pressures    Flowsheet Row Most Recent Value   Blood Pressure 109/73 filed at 11/10/2022 1047   Patient Position - Orthostatic VS Lying filed at 11/09/2022 1004            Intake/Output Summary (Last 24 hours) at 11/10/2022 1140  Last data filed at 11/10/2022 1001  Gross per 24 hour   Intake 480 ml   Output 1650 ml   Net -1170 ml         Physical Exam:    GEN: Scott Morris appears well, alert and oriented x 3, pleasant and cooperative   HEENT: pupils equal, round, and reactive to light; extraocular muscles intact  NECK: supple, no carotid bruits   HEART: regular rhythm, normal S1 and S2, no murmurs, clicks, gallops or rubs   LUNGS: clear to auscultation bilaterally; no wheezes, rales, or rhonchi   ABDOMEN: normal bowel sounds, soft, no tenderness, no distention  EXTREMITIES: peripheral pulses normal; no clubbing, cyanosis, or edema      Medications:      Current Facility-Administered Medications:   •  acetaminophen (TYLENOL) tablet 650 mg, 650 mg, Oral, Q6H PRN, Dianne Hallman MD  •  atorvastatin (LIPITOR) tablet 40 mg, 40 mg, Oral, Daily With Jennifer Barton MD, 40 mg at 11/09/22 1727  •  calcium carbonate (TUMS) chewable tablet 1,000 mg, 1,000 mg, Oral, Daily PRN, Dianne Hallman MD  •  carvedilol (COREG) tablet 3 125 mg, 3 125 mg, Oral, BID, Ju Tyson PA-C, 3 125 mg at 11/10/22 3392  •  clopidogrel (PLAVIX) tablet 75 mg, 75 mg, Oral, Daily, Dianne Hallman MD, 75 mg at 11/10/22 4128  •  docusate sodium (COLACE) capsule 100 mg, 100 mg, Oral, BID, Dianne Hallman MD, 100 mg at 11/10/22 5269  •  fluticasone (FLONASE) 50 mcg/act nasal spray 1 spray, 1 spray, Each Nare, Daily, Dianne Hallman MD, 1 spray at 11/09/22 1025  •  [START ON 11/11/2022] furosemide (LASIX) tablet 20 mg, 20 mg, Oral, Daily, Senia Gagnon PA-C  •  heparin (porcine) 25,000 units in 0 45% NaCl 250 mL infusion (premix), 3-30 Units/kg/hr (Order-Specific), Intravenous, Titrated, Tyler Franklin MD, Last Rate: 8 5 mL/hr at 11/10/22 0752, 13 Units/kg/hr at 11/10/22 0752  •  heparin (porcine) injection 2,600 Units, 2,600 Units, Intravenous, Q1H PRN, Tyler Franklin MD, 2,600 Units at 11/08/22 1046  •  heparin (porcine) injection 5,200 Units, 5,200 Units, Intravenous, Q1H PRN, Tyler Franklin MD, 5,200 Units at 11/10/22 0755  •  loratadine (CLARITIN) tablet 10 mg, 10 mg, Oral, Daily, Hien Stephens MD, 10 mg at 11/10/22 2214  •  midodrine (PROAMATINE) tablet 2 5 mg, 2 5 mg, Oral, TID AC, Ju Tyson PA-C, 2 5 mg at 11/10/22 6230  •  ondansetron (ZOFRAN) injection 4 mg, 4 mg, Intravenous, Q6H PRN, Hien Stephens MD  •  pantoprazole (PROTONIX) EC tablet 40 mg, 40 mg, Oral, Daily, Hien Stephens MD, 40 mg at 11/10/22 4625  •  senna (SENOKOT) tablet 8 6 mg, 1 tablet, Oral, Daily, Hien Stephens MD, 8 6 mg at 11/10/22 3059  •  sodium chloride (PF) 0 9 % injection 3 mL, 3 mL, Intravenous, Q1H PRN, Tyler Franklin MD  •  spironolactone (ALDACTONE) tablet 12 5 mg, 12 5 mg, Oral, Daily, Ju Tyson PA-C, 12 5 mg at 11/10/22 0830     Labs & Results:    Results from last 7 days   Lab Units 11/06/22  2019 11/06/22  1758   HS TNI 0HR ng/L  --  13   HS TNI 2HR ng/L 16  --    NT-PRO BNP pg/mL  --  10,317*     Results from last 7 days   Lab Units 11/10/22  0550 11/09/22  0545 11/08/22  0519   WBC Thousand/uL 8 90 10 12 10 23*   HEMOGLOBIN g/dL 15 3 15 3 15 7   HEMATOCRIT % 46 1 46 0 47 7   PLATELETS Thousands/uL 196 197 198         Results from last 7 days   Lab Units 11/10/22  0550 11/09/22  0545 11/08/22  0519 11/07/22  0805 11/06/22  1758   POTASSIUM mmol/L 3 8 3 5 3 5   < > 4 5   CHLORIDE mmol/L 106 104 102   < > 106   CO2 mmol/L 25 29 30   < > 30   BUN mg/dL 30* 32* 25   < > 18   CREATININE mg/dL 1 12 1 30 1 44*   < > 1 42*   CALCIUM mg/dL 8 7 8 6 9 0   < > 9 0   ALK PHOS U/L 67  --   --   --  74   ALT U/L 18  --   --   --  26   AST U/L 25  --   --   --  30    < > = values in this interval not displayed  Results from last 7 days   Lab Units 11/10/22  0642 11/10/22  0550 11/09/22  0545 11/08/22  2337 11/07/22  1602 11/07/22  0622 11/06/22  1758   INR   --  1 67*  --   --   --  4 47* 4 11*   PTT seconds 37  --  61* 64*   < > >210* 40*    < > = values in this interval not displayed       Results from last 7 days   Lab Units 11/10/22  0550 11/07/22  0805   MAGNESIUM mg/dL 1 8 2 1

## 2022-11-10 NOTE — ASSESSMENT & PLAN NOTE
69 y o   male with acute PE, CHF, CKD, AFib, coronary artery disease, tobacco use (1 cigarette per week) who presented to [facility] on 11/06/2022 for shortness of breast for the past few days  Patient found have acute PE on CTA chest/ abdomen/ pelvis with contrast, for which he was admitted to the hospital   Neurology was asked to evaluate the patient on 11/10/2022 by the Cardiology team due to transient episode lightheadedness with possible syncope  Case discussed by prior neurology provider with internal medicine provider who was present just after episode of syncope on 11/09/2022  Per internal medicine provider, when patient was getting his echo done, the echo technician noted the patient was lightheaded and then lost consciousness for about 10 seconds  Echo technician called for nurse  By the time nurse got to the room, patient was alert and oriented to self, year, and place  Vitals at the time were as follows:  O2 saturation 83% on room air, blood pressure 91/62, blood pressure 103  Internal medicine provider assessed patient and he seemed to be at his baseline compared to previous examinations during hospitalization  Patient was even able to walk in the hallway just after episode  Patient seen and examined at bedside today  He reports feeling well and denies any recurrence of symptoms and no further episodes of unresponsiveness noted by nursing team  Suspect episode of unresponsiveness was in setting of hypotension, but cannot entirely exclude seizure  Plan   - Recommend outpatient EEG   - If any more episodes, patient will need neurology re-evaluation if he is inpatient    If patient has another similar episode and he is outpatient, recommend patient return to ED for evaluation   - Medical management and correction of metabolic and infectious disturbances per primary team   - Avoid CNS altering medications if possible  - Continue supportive care   - Continue to monitor neurologic status   Notify neurology with any changes in exam

## 2022-11-10 NOTE — PROGRESS NOTES
[facility]  Progress Note Odalys Morris 1945, 69 y o  male MRN: [id number]  Unit/Bed#: -02 Encounter: [id number]  Primary Care Provider: Hubert Hamlin MD   Date and time admitted to hospital: 11/6/2022  5:30 PM    Episode of unresponsiveness  Assessment & Plan  · Patient had an and responsive episode in the early morning of 11/09/2022  · This happened when he was having a doppler study at bedside  · Lasted less than 10 seconds, by the time the nurse and I reach the room, the patient was normal with no postictal confusion  · He was able to walk around  He did have an episode of incontinence along with the episode  · Patient had some prior episodes when fishing but he has never passed out  · Neurology input sought for the same  Seizures very unlikely    Hypotension  Assessment & Plan  · Patient with hypotension with BP of 73/59, this improved with 250 mL fluid bolus  · Cardiology decreased coreg to 3 125 bid, decreased Entresto, and decreased aldactone  Hold parameters adjusted as well  Cardiology adjusting blood pressure medications  · Continue to monitor blood pressure closely  Blood pressure 109/73, pulse 104, temperature (!) 97 2 °F (36 2 °C), resp  rate 20, height 5' 5" (1 651 m), weight 64 1 kg (141 lb 5 oz), SpO2 95 %  SIRS without infection or organ dysfunction ([facility] [address] )  Assessment & Plan  · In the setting of PE/CHF as evidenced by HR  and RR of 22-26  · Now resolved   Continue to monitor on telemetry  · Monitor CBC periodically    CKD (chronic kidney disease)  Assessment & Plan  Lab Results   Component Value Date    EGFR 63 11/10/2022    EGFR 53 11/09/2022    EGFR 46 11/08/2022    CREATININE 1 12 11/10/2022    CREATININE 1 30 11/09/2022    CREATININE 1 44 (H) 11/08/2022   · Currently at baseline  · Monitor periodically    S/P MVR (mitral valve repair)  Assessment & Plan  · History of prosthetic Mitral Valve repair  · Follows with cardiology as an outpatient in [place]  · Coumadin 6 mg given with INR of 1 67 on 11/10/2022  · Continue heparin drip- and plan to discontinue when target INR 2-3  · Will need daily Coumadin orders depending on INR    PAF (paroxysmal atrial fibrillation) (HCC)  Assessment & Plan  · History of afib, was in RVR on arrival to ED, currently rate controlled  · Continue Coreg at lower dose due to hypotension  · Anticoagulation with Coumadin target INR 2-3  · Coumadin does to be given daily depending on INR    Acute on chronic combined systolic and diastolic congestive heart failure (HCC)  Assessment & Plan  Wt Readings from Last 3 Encounters:   11/10/22 64 1 kg (141 lb 5 oz)   09/23/22 65 7 kg (144 lb 12 8 oz)   05/19/22 67 1 kg (148 lb)     · Present on admission- elevated BNP, vascular congestion on CXR  · Last ECHO in 6/2022 with EF 35% and global hypokinesis  · Follows with cardiology in [place]  · Monitor lytes, weight, I/O  · Cardiology consultation and their input appreciated  CAD (coronary atherosclerotic disease)  Assessment & Plan  · No chest pain  Continue home regimen of Plavix  · Coreg and Entresto doses decreased due to hypotension  · Cardiology titrating dose of medications    Blood pressure 109/73, pulse 104, temperature (!) 97 2 °F (36 2 °C), resp  rate 20, height 5' 5" (1 651 m), weight 64 1 kg (141 lb 5 oz), SpO2 95 %  * Acute pulmonary embolism without acute cor pulmonale (HCC)  Assessment & Plan  · Present on admission- Evidenced by CT imaging  · No heart strain noted on CT   · On coumadin at baseline for prosthetic mitral valve  · Last INR on 10/22 was 2 81, INR on admission 4 11, increased to 4 47  · Could be coumadin failure vs transient subtherapeutic INR between 10/22 and today  · Hematology consultation appreciated- may be due to coumadin failure  · Started on heparin gtt in ED, continue   Will send script to price check [facility]iqu[facility]   · ECHO in 6/2022 with EF 35%, repeat echo shows EF of 15-20% with global cardiomyopathy  · Venous duplex negative      TE Pharmacologic Prophylaxis: Heparin Drip and bridging with warfarin    Patient Centered Rounds: I have performed bedside rounds with nursing staff today  Discussions with Specialists or Other Care Team Provider:  Cardiology, Neurology  Education and Discussions with Family / Patient:  Patient    Current Length of Stay: 4 day(s)  Current Patient Status: Inpatient     Certification Statement: The patient will continue to require additional inpatient hospital stay due to Acute pulmonary embolism without acute cor pulmonale [facility])  Discharge Plan / Estimated Discharge Date:  1-2 days    Code Status: Level 1 - Full Code  ______________________________________________________________________________    Subjective:   Patient seen and examined by me  No chest pain, palpitations or diaphoresis at this point of time  Patient does have weakness of the weakness is generalized  No fever or chills at this point of time  Does not have any significant shortness of breath today compared to yesterday but still a little short of breath  Mild dizziness  No further episodes of unresponsiveness    Objective:   Vitals: Blood pressure 109/73, pulse 104, temperature (!) 97 2 °F (36 2 °C), resp  rate 20, height 5' 5" (1 651 m), weight 64 1 kg (141 lb 5 oz), SpO2 95 %      Physical Exam:   General appearance: alert, appears stated age and cooperative  Constitutional- looks a little weak  HEENT - atraumatic and normocephalic  Neck- supple  Skin - no fresh rash  Extremities no fresh focal deformities  Cardiovascular- S1-S2 heard  Respiratory- bilateral air entry present, no crackles or rhonchi  Skin - no fresh rash  Abdomen - normal bowel sounds present, no rebound tenderness  CNS- No fresh focal deficits  Psych- no acute psychosis     Additional Data:   Labs:  Results from last 7 days   Lab Units 11/10/22  0550 11/09/22  0545 11/08/22  0519 11/07/22  0622 11/07/22  0314 11/06/22  1758   WBC Thousand/uL 8 90 10 12 10 23*  --  8 52 10 04   HEMOGLOBIN g/dL 15 3 15 3 15 7  --  14 7 15 7   HEMATOCRIT % 46 1 46 0 47 7  --  44 2 48 3   MCV fL 105* 104* 105*  --  106* 109*   PLATELETS Thousands/uL 196 197 198  --  246 235   INR  1 67*  --   --  4 47*  --  4 11*     Results from last 7 days   Lab Units 11/10/22  0550 11/09/22  0545 11/08/22  0519 11/07/22  0805 11/06/22  1758   SODIUM mmol/L 141 140 141 143 143   POTASSIUM mmol/L 3 8 3 5 3 5 4 3 4 5   CHLORIDE mmol/L 106 104 102 105 106   CO2 mmol/L 25 29 30 28 30   ANION GAP mmol/L 10 7 9 10 7   BUN mg/dL 30* 32* 25 17 18   CREATININE mg/dL 1 12 1 30 1 44* 1 23 1 42*   CALCIUM mg/dL 8 7 8 6 9 0 9 3 9 0   ALBUMIN g/dL 3 3*  --   --   --  3 9   TOTAL BILIRUBIN mg/dL 0 84  --   --   --  1 03*   ALK PHOS U/L 67  --   --   --  74   ALT U/L 18  --   --   --  26   AST U/L 25  --   --   --  30   EGFR ml/min/1 73sq m 63 53 46 56 47   GLUCOSE RANDOM mg/dL 96 112 111 91 91     Results from last 7 days   Lab Units 11/10/22  0550 11/07/22  0805   MAGNESIUM mg/dL 1 8 2 1         Results from last 7 days   Lab Units 11/06/22  1758   NT-PRO BNP pg/mL 10,317*      Results from last 7 days   Lab Units 11/06/22  1758   LACTIC ACID mmol/L 0 9   PROCALCITONIN ng/ml <0 05     Results from last 7 days   Lab Units 11/09/22  0957   POC GLUCOSE mg/dl 97             * I Have Reviewed All Lab Data Listed Above  Cultures:   Results from last 7 days   Lab Units 11/06/22  1758   BLOOD CULTURE  No Growth at 72 hrs  No Growth at 72 hrs  INFLUENZA A PCR  Negative     Results from last 7 days   Lab Units 11/06/22  1758   INFLUENZA A PCR  Negative   INFLUENZA B PCR  Negative   RSV PCR  Negative         Imaging:  Imaging Reports Reviewed Today Include:   PE Study with CT abdomen & pelvis with contrast    Result Date: 11/6/2022  Impression: 1  Thin nonocclusive pulmonary embolism within a right lower lobe segmental pulmonary artery   2   Measured RV/LV ratio is within normal limits at less than 0 9   3   Pulmonary edema  No focal consolidation  4   Cardiomegaly with evidence of right heart dysfunction  5   Severe atherosclerotic calcifications with moderate to severe stenosis of the origins of the superior mesenteric artery and bilateral renal arteries  Areas of severe moderate to severe stenosis of the common iliac arteries, external iliac arteries and severe stenosis of the right common femoral artery  6   Other findings as above   I personally discussed this study with Fabi Dalal on 11/6/2022 at 8:45 PM  Workstation performed: [id number]     Scheduled Meds:  Current Facility-Administered Medications   Medication Dose Route Frequency Provider Last Rate   • acetaminophen  650 mg Oral Q6H PRN Evangelista Gaona MD     • atorvastatin  40 mg Oral Daily With Chris Blake MD     • calcium carbonate  1,000 mg Oral Daily PRN Evangelista Gaona MD     • carvedilol  3 125 mg Oral BID Rylie Abdi PA-C     • clopidogrel  75 mg Oral Daily Evangelista Gaona MD     • docusate sodium  100 mg Oral BID Evangelista Gaona MD     • fluticasone  1 spray Each Nare Daily Evangelista Gaona MD     • [START ON 11/11/2022] furosemide  20 mg Oral Daily Cory MondayBEN     • heparin (porcine)  3-30 Units/kg/hr (Order-Specific) Intravenous Titrated Derek Giordano MD 13 Units/kg/hr (11/10/22 0800)   • heparin (porcine)  2,600 Units Intravenous Q1H PRN Derek Giordano MD     • heparin (porcine)  5,200 Units Intravenous Q1H PRN Derek Giordano MD     • loratadine  10 mg Oral Daily Evangelista Gaona MD     • midodrine  2 5 mg Oral TID AC Rylie Abdi PA-C     • ondansetron  4 mg Intravenous Q6H PRN Evangelista Gaona MD     • pantoprazole  40 mg Oral Daily Evangelista Gaona MD     • senna  1 tablet Oral Daily Evangelista Gaona MD     • sodium chloride (PF)  3 mL Intravenous Q1H PRN Derek Giordano MD     • spironolactone  12 5 mg Oral Daily Rylie Abdi PA-C     • warfarin  6 mg Oral Once (warfarin) MD Kapil Jackson MD [address]    ** Please Note: This note has been constructed using a voice recognition system   **

## 2022-11-10 NOTE — ASSESSMENT & PLAN NOTE
· Patient had an and responsive episode in the early morning of 11/09/2022  · This happened when he was having a doppler study at bedside  · Lasted less than 10 seconds, by the time the nurse and I reach the room, the patient was normal with no postictal confusion  · He was able to walk around  He did have an episode of incontinence along with the episode  · Patient had some prior episodes when fishing but he has never passed out  · Neurology input sought for the same    Seizures very unlikely

## 2022-11-10 NOTE — ASSESSMENT & PLAN NOTE
· History of prosthetic Mitral Valve repair  · Follows with cardiology as an outpatient in [place]  · Coumadin 6 mg given with INR of 1 67 on 11/10/2022  · Continue heparin drip- and plan to discontinue when target INR 2-3      · Will need daily Coumadin orders depending on INR

## 2022-11-10 NOTE — ASSESSMENT & PLAN NOTE
Wt Readings from Last 3 Encounters:   11/10/22 64 1 kg (141 lb 5 oz)   09/23/22 65 7 kg (144 lb 12 8 oz)   05/19/22 67 1 kg (148 lb)     · Present on admission- elevated BNP, vascular congestion on CXR  · Last ECHO in 6/2022 with EF 35% and global hypokinesis  · Follows with cardiology in [place]  · Monitor lytes, weight, I/O  · Cardiology consultation and their input appreciated

## 2022-11-10 NOTE — ASSESSMENT & PLAN NOTE
· Patient with hypotension with BP of 73/59, this improved with 250 mL fluid bolus  · Cardiology decreased coreg to 3 125 bid, decreased Entresto, and decreased aldactone  Hold parameters adjusted as well  Cardiology adjusting blood pressure medications  · Continue to monitor blood pressure closely  Blood pressure 109/73, pulse 104, temperature (!) 97 2 °F (36 2 °C), resp  rate 20, height 5' 5" (1 651 m), weight 64 1 kg (141 lb 5 oz), SpO2 95 %

## 2022-11-11 ENCOUNTER — APPOINTMENT (INPATIENT)
Dept: RADIOLOGY | Facility: HOSPITAL | Age: 77
End: 2022-11-11

## 2022-11-11 PROBLEM — I69.30 CHRONIC ISCHEMIC RIGHT MCA STROKE: Status: ACTIVE | Noted: 2022-11-11

## 2022-11-11 PROBLEM — Z86.73 CHRONIC ISCHEMIC RIGHT MCA STROKE: Status: ACTIVE | Noted: 2022-11-11

## 2022-11-11 LAB
ANION GAP SERPL CALCULATED.3IONS-SCNC: 10 MMOL/L (ref 4–13)
APTT PPP: 58 SECONDS (ref 23–37)
APTT PPP: 91 SECONDS (ref 23–37)
APTT PPP: 99 SECONDS (ref 23–37)
BACTERIA UR QL AUTO: NORMAL /HPF
BILIRUB UR QL STRIP: NEGATIVE
BUN SERPL-MCNC: 29 MG/DL (ref 5–25)
CALCIUM SERPL-MCNC: 8.7 MG/DL (ref 8.3–10.1)
CHLORIDE SERPL-SCNC: 105 MMOL/L (ref 96–108)
CLARITY UR: CLEAR
CO2 SERPL-SCNC: 25 MMOL/L (ref 21–32)
COLOR UR: YELLOW
CREAT SERPL-MCNC: 1.06 MG/DL (ref 0.6–1.3)
ERYTHROCYTE [DISTWIDTH] IN BLOOD BY AUTOMATED COUNT: 14.3 % (ref 11.6–15.1)
GFR SERPL CREATININE-BSD FRML MDRD: 67 ML/MIN/1.73SQ M
GLUCOSE SERPL-MCNC: 98 MG/DL (ref 65–140)
GLUCOSE UR STRIP-MCNC: NEGATIVE MG/DL
HCT VFR BLD AUTO: 46.7 % (ref 36.5–49.3)
HGB BLD-MCNC: 15.2 G/DL (ref 12–17)
HGB UR QL STRIP.AUTO: NEGATIVE
INR PPP: 1.52 (ref 0.84–1.19)
KETONES UR STRIP-MCNC: NEGATIVE MG/DL
LACTATE SERPL-SCNC: 1.2 MMOL/L (ref 0.5–2)
LEUKOCYTE ESTERASE UR QL STRIP: NEGATIVE
MCH RBC QN AUTO: 34.5 PG (ref 26.8–34.3)
MCHC RBC AUTO-ENTMCNC: 32.5 G/DL (ref 31.4–37.4)
MCV RBC AUTO: 106 FL (ref 82–98)
NITRITE UR QL STRIP: NEGATIVE
NON-SQ EPI CELLS URNS QL MICRO: NORMAL /HPF
PH UR STRIP.AUTO: 6 [PH]
PLATELET # BLD AUTO: 180 THOUSANDS/UL (ref 149–390)
PMV BLD AUTO: 9.4 FL (ref 8.9–12.7)
POTASSIUM SERPL-SCNC: 4 MMOL/L (ref 3.5–5.3)
PROT UR STRIP-MCNC: NEGATIVE MG/DL
PROTHROMBIN TIME: 18 SECONDS (ref 11.6–14.5)
RBC # BLD AUTO: 4.4 MILLION/UL (ref 3.88–5.62)
RBC #/AREA URNS AUTO: NORMAL /HPF
SODIUM SERPL-SCNC: 140 MMOL/L (ref 135–147)
SP GR UR STRIP.AUTO: 1.02 (ref 1–1.03)
UROBILINOGEN UR STRIP-ACNC: <2 MG/DL
WBC # BLD AUTO: 8.99 THOUSAND/UL (ref 4.31–10.16)
WBC #/AREA URNS AUTO: NORMAL /HPF

## 2022-11-11 RX ORDER — WARFARIN SODIUM 4 MG/1
8 TABLET ORAL
Status: COMPLETED | OUTPATIENT
Start: 2022-11-11 | End: 2022-11-11

## 2022-11-11 RX ADMIN — LORATADINE 10 MG: 10 TABLET ORAL at 08:21

## 2022-11-11 RX ADMIN — DOCUSATE SODIUM 100 MG: 100 CAPSULE, LIQUID FILLED ORAL at 17:04

## 2022-11-11 RX ADMIN — HEPARIN SODIUM 2600 UNITS: 1000 INJECTION INTRAVENOUS; SUBCUTANEOUS at 14:47

## 2022-11-11 RX ADMIN — FLUTICASONE PROPIONATE 1 SPRAY: 50 SPRAY, METERED NASAL at 08:21

## 2022-11-11 RX ADMIN — MIDODRINE HYDROCHLORIDE 5 MG: 5 TABLET ORAL at 12:16

## 2022-11-11 RX ADMIN — MIDODRINE HYDROCHLORIDE 5 MG: 5 TABLET ORAL at 06:08

## 2022-11-11 RX ADMIN — WARFARIN SODIUM 8 MG: 4 TABLET ORAL at 17:04

## 2022-11-11 RX ADMIN — HEPARIN SODIUM 10.6 UNITS/KG/HR: 10000 INJECTION, SOLUTION INTRAVENOUS at 23:00

## 2022-11-11 RX ADMIN — CARVEDILOL 3.12 MG: 3.12 TABLET, FILM COATED ORAL at 08:21

## 2022-11-11 RX ADMIN — CARVEDILOL 3.12 MG: 3.12 TABLET, FILM COATED ORAL at 17:04

## 2022-11-11 RX ADMIN — PANTOPRAZOLE SODIUM 40 MG: 40 TABLET, DELAYED RELEASE ORAL at 06:08

## 2022-11-11 RX ADMIN — ATORVASTATIN CALCIUM 40 MG: 40 TABLET, FILM COATED ORAL at 16:54

## 2022-11-11 RX ADMIN — HEPARIN SODIUM 12 UNITS/KG/HR: 10000 INJECTION, SOLUTION INTRAVENOUS at 00:04

## 2022-11-11 RX ADMIN — CLOPIDOGREL 75 MG: 75 TABLET, FILM COATED ORAL at 08:21

## 2022-11-11 RX ADMIN — MIDODRINE HYDROCHLORIDE 5 MG: 5 TABLET ORAL at 15:01

## 2022-11-11 NOTE — ASSESSMENT & PLAN NOTE
No chest pain  Continue home regimen of Plavix  · Coreg and Entresto doses decreased due to hypotension  · Cardiology titrating dose of medications    Blood pressure 109/72, pulse 98, temperature 97 5 °F (36 4 °C), resp  rate 20, height 5' 5" (1 651 m), weight 64 1 kg (141 lb 5 oz), SpO2 93 %

## 2022-11-11 NOTE — PROGRESS NOTES
General Cardiology   Progress Note   Ewa Morris 69 y o  male MRN: [id number]  Unit/Bed#: -01 Encounter: [id number]    Assessment/Plan:    1  Acute on chronic systolic heart failure   -patient currently euvolemic  -continue p o  Lasix, carvedilol  -strict I&Os and daily weights  -recommend 2000 mg sodium restricted diet  -TTE performed yesterday revealed severely reduced systolic function with an EF of 15-20% with severe global hypokinesis    2  Transient episode of lightheadedness with possible syncope  -evaluated by Neurology, plan for outpatient follow-up with EEG  -continue to monitor on telemetry for possible arrhythmogenic source  -further care per primary team    3  Hypotension  -blood pressure currently in the low 100s  -continue to hold Entresto and spironolactone, consider reintroducing if blood pressure improves  -continue midodrine  -continue to monitor blood pressures closely    4  Cardiomyopathy with an EF of 15-20%, type unclear  -TTE performed yesterday revealed systolic function of 07-87%, mild concentric hypertrophy, severe global hypokinesis and normal diastolic function  -patient prior with a history of an EF of 35%  -has previously declined ICD  -will follow up with primary cardiologist in [place] for further discussion  -continue carvedilol   -continue to monitor for arrhythmias on telemetry    5  Acute pulmonary embolus  -currently on heparin drip and bridging to warfarin  -care per [facility]    6  Paroxysmal atrial fibrillation  -heart rates currently well controlled  -continue heparin drip with bridging to warfarin for anticoagulation  -continue carvedilol for rate control    7  History of CAD with remote PCI  -patient currently denies chest pain, shortness of breath or other anginal equivalent  -continue atorvastatin, carvedilol, Plavix  -recommend cardiac diet    8   History of bioprosthetic aortic and mitral valve replacements  -both noted to be well seated on recent TTE  -continue outpatient follow-up    9  Hyperlipidemia  -continue atorvastatin    Subjective:   Patient seen and examined  No significant events since the last encounter  REVIEW OF SYSTEMS:  Constitutional:  Denies fever or chills   Eyes:  Denies change in visual acuity   HENT:  Denies nasal congestion or sore throat   Respiratory:  Denies cough, orthopnea, PND or shortness of breath   Cardiovascular:  Denies chest pain, palpitations or edema   GI:  Denies abdominal pain, nausea, vomiting, bloody stools, constipation or diarrhea   :  Denies dysuria, frequency, difficulty in urination or nocturia  Musculoskeletal:  Denies back pain or joint pain   Neurologic:  Denies headache, focal weakness or sensory changes   Endocrine:  Denies polyuria or polydipsia   Lymphatic:  Denies swollen glands   Psychiatric:  Denies depression or anxiety     Objective:   Vitals:  Vitals:    11/11/22 0722   BP: 109/72   Pulse: 98   Resp:    Temp: 97 5 °F (36 4 °C)   SpO2: 93%       Body mass index is 23 52 kg/m²  Systolic (99OWT), AUU:410 , Min:93 , DXL:239     Diastolic (38CTS), GEU:51, Min:63, Max:85      Intake/Output Summary (Last 24 hours) at 11/11/2022 1003  Last data filed at 11/10/2022 1756  Gross per 24 hour   Intake 460 ml   Output 700 ml   Net -240 ml     Weight (last 2 days)     Date/Time Weight    11/10/22 0600 64 1 (141 32)    11/09/22 0945 65 3 (144)          Telemetry Review: No significant arrhythmias seen on telemetry review  PHYSICAL EXAMS  General:  Patient is not in acute distress, laying in the bed comfortably, awake  Head: Normocephalic, Atraumatic     HEENT: White sclera, pink conjunctiva  Neck:  Supple, no neck vein distention  Respiratory: clear to auscultation   Cardiovascular: S1-S2 normal, no murmurs, thrills, gallops, rubs, regular rhythm  GI:  Abdomen soft, nontender, non-distended  Extremities: No edema, normal pulses  Integument:  No skin rashes or ulceration  Neurologic:  Patient is awake alert, responding to command, oriented to person, place and time    LABORATORY RESULTS:      CBC with diff:   Results from last 7 days   Lab Units 11/11/22  0546 11/10/22  0550 11/09/22  0545 11/07/22  0314 11/06/22  1758   WBC Thousand/uL 8 99 8 90 10 12   < > 10 04   HEMOGLOBIN g/dL 15 2 15 3 15 3   < > 15 7   HEMATOCRIT % 46 7 46 1 46 0   < > 48 3   MCV fL 106* 105* 104*   < > 109*   PLATELETS Thousands/uL 180 196 197   < > 235   MCH pg 34 5* 34 8* 34 5*   < > 35 4*   MCHC g/dL 32 5 33 2 33 3   < > 32 5   RDW % 14 3 14 3 14 3   < > 14 6   MPV fL 9 4 9 3 9 1   < > 9 4   NRBC AUTO /100 WBCs  --   --   --   --  0    < > = values in this interval not displayed  CMP:  Results from last 7 days   Lab Units 11/11/22  0538 11/10/22  0550 11/09/22  0545 11/07/22  0805 11/06/22  1758   POTASSIUM mmol/L 4 0 3 8 3 5   < > 4 5   CHLORIDE mmol/L 105 106 104   < > 106   CO2 mmol/L 25 25 29   < > 30   BUN mg/dL 29* 30* 32*   < > 18   CREATININE mg/dL 1 06 1 12 1 30   < > 1 42*   CALCIUM mg/dL 8 7 8 7 8 6   < > 9 0   AST U/L  --  25  --   --  30   ALT U/L  --  18  --   --  26   ALK PHOS U/L  --  67  --   --  74   EGFR ml/min/1 73sq m 67 63 53   < > 47    < > = values in this interval not displayed         BMP:  Results from last 7 days   Lab Units 11/11/22  0538 11/10/22  0550 11/09/22  0545   POTASSIUM mmol/L 4 0 3 8 3 5   CHLORIDE mmol/L 105 106 104   CO2 mmol/L 25 25 29   BUN mg/dL 29* 30* 32*   CREATININE mg/dL 1 06 1 12 1 30   CALCIUM mg/dL 8 7 8 7 8 6         Results from last 7 days   Lab Units 11/06/22  1758   NT-PRO BNP pg/mL 10,317*      Results from last 7 days   Lab Units 11/10/22  0550 11/07/22  0805   MAGNESIUM mg/dL 1 8 2 1             Results from last 7 days   Lab Units 11/11/22  0546 11/10/22  0550 11/07/22  0622 11/06/22  1758   INR  1 52* 1 67* 4 47* 4 11*       Lipid Profile:   No results found for: CHOL  Lab Results   Component Value Date    HDL 34 (L) 12/10/2018 HDL 45 2018     Lab Results   Component Value Date    LDLCALC 46 12/10/2018    LDLCALC 53 2018     Lab Results   Component Value Date    TRIG 119 12/10/2018    TRIG 106 2018       Cardiac testing:   Results for orders placed during the hospital encounter of 18    Echo complete with contrast if indicated    Narrative  [address] [address]  [phone]    Transthoracic Echocardiogram  2D, M-mode, Doppler, and Color Doppler    Study date:  2018    Patient: Mat Keller  MR number: [id number]  Account number: [de-identified]  : 1945  Age: 71 years  Gender: Male  Status: Inpatient  Location: Bedside  Height: 66 in  Weight: 153 lb  BP: 144/ 60 mmHg    Indications: Coronary Artery Disease    Diagnoses: I25 10 - Atherosclerotic heart disease of native coronary artery without angina pectoris    Sonographer:  RACHANA Lubin,RDCS  Interpreting Physician:  Gama Lema MD  Referring Physician:  Lindsay Baumann MD  Group:  [facility]    SUMMARY    LEFT VENTRICLE:  Ejection fraction was estimated to be 55 %  There were no regional wall motion abnormalities  Concentric hypertrophy was present  RIGHT VENTRICLE:  The ventricle was mildly dilated  Estimated peak pressure was at least 70 mmHg c/w severe pulmonary hypertension  LEFT ATRIUM:  The atrium was mildly to moderately dilated  RIGHT ATRIUM:  The atrium was dilated  MITRAL VALVE:  There was moderate to marked annular calcification  There was moderate calcification of MV leaflets  Vegetation can not be ruled out with certainty  Consider GUY if clinically indicated  There was moderate to severe regurgitation  Patient has history of MV repair  AORTIC VALVE:  There was mild to moderate stenosis  There was mild to moderate regurgitation  TRICUSPID VALVE:  There was mild regurgitation      HISTORY: PRIOR HISTORY: Hyperlipidemia, Atrial Fibrillation, Transient Ischemic Attack, Hypertension, Acute Renal Failure, S/P Stent, Mitral Valve Repair    PROCEDURE: The procedure was performed at the bedside  This was a routine study  The transthoracic approach was used  The study included complete 2D imaging, M-mode, complete spectral Doppler, and color Doppler  The heart rate was 77 bpm,  at the start of the study  Images were obtained from the parasternal, apical, subcostal, and suprasternal notch acoustic windows  Intravenous contrast ( 0 6ml Definity used in NSS) was administered to opacify the left ventricle  Image  quality was adequate  LEFT VENTRICLE: Size was normal  Ejection fraction was estimated to be 55 %  There were no regional wall motion abnormalities  Concentric hypertrophy was present  RIGHT VENTRICLE: The ventricle was mildly dilated  Systolic function was normal  Wall thickness was normal  DOPPLER: Estimated peak pressure was at least 70 mmHg c/w severe pulmonary hypertension  LEFT ATRIUM: The atrium was mildly to moderately dilated  RIGHT ATRIUM: The atrium was dilated  MITRAL VALVE: There was moderate to marked annular calcification  There was moderate calcification of MV leaflets  Vegetation can not be ruled out with certainty  Consider GUY if clinically indicated  DOPPLER: There was moderate to severe  regurgitation  Patient has history of MV repair  AORTIC VALVE: The valve was probably trileaflet  Leaflets exhibited mild to moderate calcification  DOPPLER: There was mild to moderate stenosis  There was mild to moderate regurgitation  TRICUSPID VALVE: The valve structure was normal  There was normal leaflet separation  DOPPLER: The transtricuspid velocity was within the normal range  There was no evidence for stenosis  There was mild regurgitation  PULMONIC VALVE: Leaflets exhibited normal thickness, no calcification, and normal cuspal separation   DOPPLER: The transpulmonic velocity was within the normal range  There was no regurgitation  PERICARDIUM: There was no pericardial effusion  The pericardium was normal in appearance  AORTA: The root exhibited normal size  SYSTEM MEASUREMENT TABLES    2D  %FS: 32 3 %  Ao Diam: 3 7 cm  EDV(Teich): 122 6 ml  EF Biplane: 46 %  EF(Teich): 60 3 %  ESV(Teich): 48 7 ml  IVSd: 1 3 cm  LA Area: 28 7 cm2  LA Diam: 3 3 cm  LVEDV MOD A2C: 147 ml  LVEDV MOD A4C: 149 7 ml  LVEDV MOD BP: 148 1 ml  LVEF MOD A2C: 39 7 %  LVEF MOD A4C: 52 3 %  LVESV MOD A2C: 88 6 ml  LVESV MOD A4C: 71 4 ml  LVESV MOD BP: 80 ml  LVIDd: 5 1 cm  LVIDs: 3 4 cm  LVLd A2C: 8 8 cm  LVLd A4C: 8 9 cm  LVLs A2C: 8 cm  LVLs A4C: 8 1 cm  LVOT Diam: 2 1 cm  LVPWd: 1 2 cm  RA Area: 21 3 cm2  RVIDd: 3 4 cm  SV MOD A2C: 58 4 ml  SV MOD A4C: 78 4 ml  SV(Teich): 73 9 ml    CW  AR Dec Davidson: 2 7 m/s2  AR Dec Time: 1294 ms  AR PHT: 375 3 ms  AR Vmax: 3 5 m/s  AR maxP 1 mmHg  AV Env  Ti: 253 5 ms  AV VTI: 45 1 cm  AV Vmax: 2 8 m/s  AV Vmean: 1 8 m/s  AV maxP 4 mmHg  AV meanPG: 15 3 mmHg  HR: 71 5 BPM  MR VTI: 150 1 cm  MR Vmax: 5 6 m/s  MR Vmean: 4 4 m/s  MR maxP 7 mmHg  MR meanP 5 mmHg  MV VTI: 65 cm  MV Vmax: 2 2 m/s  MV Vmean: 1 2 m/s  MV maxP 4 mmHg  MV meanP 2 mmHg  TR Vmax: 4 2 m/s  TR maxP 6 mmHg    MM  TAPSE: 2 1 cm    PW  RADHA (VTI): 1 7 cm2  RADHA Vmax: 1 6 cm2  E': 0 m/s  E/E': 48 8  LVOT Env  Ti: 267 7 ms  LVOT VTI: 21 8 cm  LVOT Vmax: 1 3 m/s  LVOT Vmean: 0 8 m/s  LVOT maxP 8 mmHg  LVOT meanPG: 3 2 mmHg  LVSV Dopp: 76 8 ml  MV A Naun: 1 6 m/s  MV Dec Davidson: 4 8 m/s2  MV DecT: 353 4 ms  MV E Naun: 1 7 m/s  MV E/A Ratio: 1 1  MV PHT: 102 5 ms  MVA (VTI): 1 2 cm2  MVA By PHT: 2 1 cm2    [facility] Accredited Echocardiography Laboratory    Prepared and electronically signed by    Santino Saravia MD  Signed 2018 16:40:44    Results for orders placed during the hospital encounter of 18    GUY    Narrative  [address] [facility] [address] [address]  [phone]    Transesophageal Echocardiogram  2D, Doppler, and Color Doppler    Study date:  2018    Patient: Maya Izaguirre  MR number: [id number]  Account number: [de-identified]  : 1945  Age: 71 years  Gender: Male  Status: Inpatient  Location: Cath lab  Height: 66 in  Weight: 153 lb  BP: 109/ 52 mmHg    Indications: Mitral Valve Disease    Diagnoses: I34 9 - Nonrheumatic mitral valve disorder, unspecified    Sonographer:  Jovani Guillory, BS,RDCS  Interpreting Physician:  Elena Joshi MD  Referring Physician:  Elena Joshi MD  Group:  [facility]    SUMMARY    LEFT VENTRICLE:  Ejection fraction was estimated to be 55 %  Abnormal septal motion noted  Concentric hypertrophy was present  LEFT ATRIUM:  The atrium was mildly to moderately dilated  ATRIAL SEPTUM:  Contrast injection was performed  suggestion of a small PFO with late apperance of bubbles crossing from right to left side  RIGHT ATRIUM:  The atrium was mildly dilated  MITRAL VALVE:  There was mild to moderate annular calcification  There is an echo density attached to the anterior mitral leaflet measuring 0 7X0 6cm likely c/w vegetation  There is restriction of the mobility of the posterior mitral leaflet  There was moderate to severe regurgitation  Patient is s/p mitral valve repair in   AORTIC VALVE:  There was mild regurgitation  TRICUSPID VALVE:  There was moderate regurgitation  AORTA:  There was moderately severe atheroma  HISTORY: PRIOR HISTORY: Mitral Valve Repair, Coronary Artery Disease, Hyperlipidemia, Hypertension, Atrial Fibrillation, Congestive Heart Failure, Fatigue, Prostate Cancer, Stent, Stroke, Transient Ischemic Attack    PROCEDURE: The procedure was performed in the catheterization laboratory  This was a routine study   The risks and alternatives of the procedure were explained to the patient and informed consent was obtained  The transesophageal approach  was used  The study included complete 2D imaging, limited spectral Doppler, and color Doppler  The heart rate was 68 bpm, at the start of the study  An adult omniplane probe was inserted by the attending cardiologist  Intubated with ease  One intubation attempt(s)  There was no blood detected on the probe  There were no complications during the procedure  MEDICATIONS: Benzocaine spray, topical to oropharynx, prior to procedure  Sedation administered by anesthesia team     LEFT VENTRICLE: Size was normal  Ejection fraction was estimated to be 55 %  Abnormal septal motion noted  Concentric hypertrophy was present  RIGHT VENTRICLE: The size was normal  Systolic function was normal  Wall thickness was normal     LEFT ATRIUM: The atrium was mildly to moderately dilated  No thrombus was identified  ATRIAL SEPTUM: Contrast injection was performed  suggestion of a small PFO with late apperance of bubbles crossing from right to left side  RIGHT ATRIUM: The atrium was mildly dilated  MITRAL VALVE: There was mild to moderate annular calcification  There is an echo density attached to the anterior mitral leaflet measuring 0 7X0 6cm likely c/w vegetation  There is restriction of the mobility of the posterior mitral  leaflet  DOPPLER: There was moderate to severe regurgitation  Patient is s/p mitral valve repair in 2014  AORTIC VALVE: The valve was trileaflet  Leaflets exhibited mild calcification  DOPPLER: There was mild regurgitation  TRICUSPID VALVE: The valve structure was normal  There was normal leaflet separation  There was no echocardiographic evidence of vegetation  DOPPLER: There was moderate regurgitation  PULMONIC VALVE: Leaflets exhibited normal thickness, no calcification, and normal cuspal separation  There was no echocardiographic evidence of vegetation  PERICARDIUM: There was no pericardial effusion   The pericardium was normal in appearance  AORTA: The root exhibited normal size  There was no atheroma  There was moderately severe atheroma  There was no evidence for dissection  There was no evidence for aneurysm  [address] Accredited Echocardiography Laboratory    Prepared and electronically signed by    Hannah Schreiber MD  Signed [id number] 15:23:48    No results found for this or any previous visit  No valid procedures specified  No results found for this or any previous visit  Meds/Allergies   all current active meds have been reviewed  Medications Prior to Admission   Medication   • allopurinol (ZYLOPRIM) 100 mg tablet   • atorvastatin (LIPITOR) 40 mg tablet   • Azelaic Acid 15 % cream   • clopidogrel (PLAVIX) 75 mg tablet   • Farxiga 10 MG TABS   • furosemide (LASIX) 40 mg tablet   • metroNIDAZOLE (METROGEL) 0 75 % gel   • Multiple Vitamins-Minerals (PX MENS MULTIVITAMINS) TABS   • pantoprazole (PROTONIX) 40 mg tablet   • sacubitril-valsartan (ENTRESTO) 49-51 MG TABS   • triamcinolone (KENALOG) 0 5 % cream   • warfarin (COUMADIN) 2 5 mg tablet   • carvedilol (COREG) 6 25 mg tablet       heparin (porcine), 3-30 Units/kg/hr (Order-Specific), Last Rate: 10 Units/kg/hr (11/11/22 0650)          ** Please Note: Dragon 360 Dictation voice to text software may have been used in the creation of this document   **

## 2022-11-11 NOTE — ASSESSMENT & PLAN NOTE
Patient had an and responsive episode in the early morning of 11/09/2022  · This happened when he was having a doppler study at bedside  · Lasted less than 10 seconds, no postictal state  · He was able to walk around  He did have an episode of incontinence along with the episode  · Patient had some prior episodes when fishing but he has never passed out  · Neurology input sought for the same    Seizures very unlikely  · Recommended outpatient EEG

## 2022-11-11 NOTE — ASSESSMENT & PLAN NOTE
History of prosthetic Mitral Valve repair  · Follows with cardiology as an outpatient in [place]  · Coumadin 6 mg given with INR of 1 67 on 11/10/2022  · Continue heparin drip- and plan to discontinue when target INR 2-3      · Will give coumadin 8 mg tonight  · Will need daily Coumadin orders depending on INR

## 2022-11-11 NOTE — ASSESSMENT & PLAN NOTE
Lab Results   Component Value Date    EGFR 67 11/11/2022    EGFR 63 11/10/2022    EGFR 53 11/09/2022    CREATININE 1 06 11/11/2022    CREATININE 1 12 11/10/2022    CREATININE 1 30 11/09/2022   · Currently at baseline  · Monitor periodically

## 2022-11-11 NOTE — ASSESSMENT & PLAN NOTE
Present on admission- Evidenced by CT imaging  · No heart strain noted  · On coumadin at baseline for prosthetic mitral valve  · Last INR on 10/22 was 2 81, INR on admission 4 11, increased to 4 47  · Could be coumadin failure vs transient subtherapeutic INR between 10/22 and today  · Hematology consultation appreciated- may be due to coumadin failure  · Started on heparin gtt in ED, continue    · ECHO in 6/2022 with EF 35%, repeat echo shows EF of 15-20% with global cardiomyopathy  · Venous duplex negative

## 2022-11-11 NOTE — ASSESSMENT & PLAN NOTE
In the setting of PE/CHF as evidenced by HR  and RR of 22-26  · Now resolved   Continue to monitor on telemetry  · Monitor CBC periodically

## 2022-11-11 NOTE — ASSESSMENT & PLAN NOTE
· Patient with hypotension with BP of 73/59, this improved with 250 mL fluid bolus  · Cardiology decreased coreg to 3 125 bid, decreased Entresto, and decreased aldactone  Hold parameters adjusted as well  · Midodrine increased, Cardiology adjusting blood pressure medications  · Continue to monitor blood pressure closely  Blood pressure 109/72, pulse 98, temperature 97 5 °F (36 4 °C), resp  rate 20, height 5' 5" (1 651 m), weight 64 1 kg (141 lb 5 oz), SpO2 93 %

## 2022-11-11 NOTE — PROGRESS NOTES
Progress Note - Neurology   Jenelle Maged Morris 69 y o  male MRN: [id number]  Unit/Bed#: -01 Encounter: [id number]      Assessment/Plan     Syncopal episodes  Assessment & Plan   69 y o   male with acute PE, CHF, CKD, AFib, coronary artery disease, tobacco use (1 cigarette per week) who presented to [facility] on 11/06/2022 for shortness of breast for the past few days  Patient found have acute PE on CTA chest/ abdomen/ pelvis with contrast, for which he was admitted to the hospital   Neurology was asked to evaluate the patient on 11/10/2022 by the Cardiology team due to transient episode lightheadedness with possible syncope  Case discussed by prior neurology provider with internal medicine provider who was present just after episode of syncope on 11/09/2022  Per internal medicine provider, when patient was getting his echo done, the echo technician noted the patient was lightheaded and then lost consciousness for about 10 seconds  Echo technician called for nurse  By the time nurse got to the room, patient was alert and oriented to self, year, and place  Vitals at the time were as follows:  O2 saturation 83% on room air, blood pressure 91/62, blood pressure 103  Internal medicine provider assessed patient and he seemed to be at his baseline compared to previous examinations during hospitalization  Patient was even able to walk in the hallway just after episode  Patient seen and examined at bedside today  He reports feeling well and denies any recurrence of symptoms and no further episodes of unresponsiveness noted by nursing team  Suspect episode of unresponsiveness was in setting of hypotension, but cannot entirely exclude seizure  Plan   - Recommend outpatient EEG   - If any more episodes, patient will need neurology re-evaluation if he is inpatient    If patient has another similar episode and he is outpatient, recommend patient return to ED for evaluation   - Medical management and correction of metabolic and infectious disturbances per primary team   - Avoid CNS altering medications if possible  - Continue supportive care   - Continue to monitor neurologic status  Notify neurology with any changes in exam          Acute on chronic combined systolic and diastolic congestive heart failure (HCC)  Assessment & Plan  Wt Readings from Last 3 Encounters:   11/10/22 64 1 kg (141 lb 5 oz)   09/23/22 65 7 kg (144 lb 12 8 oz)   05/19/22 67 1 kg (148 lb)       - Management per cardiology team       PAF (paroxysmal atrial fibrillation) ([facility] [address] )  Assessment & Plan  - Management per cardiology team     Hypotension  Assessment & Plan  - Management per primary team and Cardiology team     * Acute pulmonary embolism without acute cor pulmonale ([facility] [address] )  Assessment & Plan  - On IV Heparin and being transitioned to oral Coumadin  CAD (coronary atherosclerotic disease)  Assessment & Plan  - Management per primary team and Cardiology team     Chronic ischemic right MCA stroke  Assessment & Plan  - Noted on [facility] completed 11/10/2022  - Per discussion with patient at bedside, no known history of stroke but reports "TIA" x 3 in the past that he did not seek medical care for  He reports symptoms including inability to speak during one episode and inability to use L arm during another episode  - Would continue on heparin bridge to oral anticoagulation as per medicine team    - Continue on atorvastatin 40 mg QPM          Scott Morris will need follow up in 8-10 weeks with general attending or advance practitioner  He will require a routine EEG within 2 weeks  Subjective:   Patient reports he is feeling well and would like to be discharged from the hospital soon  He notes he has not had any further episodes of unresponsiveness since yesterday and denies any new or worsening symptoms       ROS:  History obtained from the patient  General ROS: negative for - chills, fatigue or fever  Ophthalmic ROS: negative for - blurry vision, decreased vision or double vision  Respiratory ROS: positive for - shortness of breath  Cardiovascular ROS: negative for - chest pain  Gastrointestinal ROS: negative for - abdominal pain or nausea/vomiting  Musculoskeletal ROS: negative for - gait disturbance or muscular weakness  Neurological ROS: negative for - confusion, dizziness, gait disturbance, headaches, numbness/tingling, seizures, speech problems, visual changes or weakness    Medications  Scheduled Meds:  Current Facility-Administered Medications   Medication Dose Route Frequency Provider Last Rate   • acetaminophen  650 mg Oral Q6H PRN Pauleen Cooks, MD     • atorvastatin  40 mg Oral Daily With Faisal Weinstein MD     • calcium carbonate  1,000 mg Oral Daily PRN Pauleen Cooks, MD     • carvedilol  3 125 mg Oral BID Paola Temple PA-C     • clopidogrel  75 mg Oral Daily Pauleen Cooks, MD     • docusate sodium  100 mg Oral BID Pauleen Cooks, MD     • fluticasone  1 spray Each Nare Daily Pauleen Cooks, MD     • heparin (porcine)  3-30 Units/kg/hr (Order-Specific) Intravenous Titrated Michael Ram MD 10 Units/kg/hr (11/11/22 3082)   • heparin (porcine)  2,600 Units Intravenous Q1H PRN Michael Ram MD     • heparin (porcine)  5,200 Units Intravenous Q1H PRN Michael Ram MD     • loratadine  10 mg Oral Daily Pauleen Cooks, MD     • midodrine  5 mg Oral TID AC Sarath Hdz MD     • ondansetron  4 mg Intravenous Q6H PRN Pauleen Cooks, MD     • pantoprazole  40 mg Oral Daily Pauleen Cooks, MD     • senna  1 tablet Oral Daily Pauleen Cooks, MD     • sodium chloride (PF)  3 mL Intravenous Q1H PRN Michael Ram MD       Continuous Infusions:heparin (porcine), 3-30 Units/kg/hr (Order-Specific), Last Rate: 10 Units/kg/hr (11/11/22 0650)      PRN Meds: •  acetaminophen  •  calcium carbonate  •  heparin (porcine)  •  heparin (porcine)  •  ondansetron  •  sodium chloride (PF)    Vitals: Blood pressure 109/72, pulse 98, temperature 97 5 °F (36 4 °C), resp  rate 20, height 5' 5" (1 651 m), weight 64 1 kg (141 lb 5 oz), SpO2 93 %  ,Body mass index is 23 52 kg/m²  Physical Exam: /72   Pulse 98   Temp 97 5 °F (36 4 °C)   Resp 20   Ht 5' 5" (1 651 m)   Wt 64 1 kg (141 lb 5 oz)   SpO2 93%   BMI 23 52 kg/m²   General appearance: alert and oriented, in no acute distress  Head: Normocephalic, without obvious abnormality, atraumatic  Eyes: negative findings: lids and lashes normal, conjunctivae and sclerae normal, pupils equal, round, reactive to light and accomodation and visual fields full to confrontation  Lungs: clear to auscultation bilaterally  Heart: regular rate and rhythm  Abdomen: soft, non-tender; bowel sounds normal; no masses,  no organomegaly  Extremities: extremities normal, warm and well-perfused; no cyanosis, clubbing, or edema  Skin: Skin color, texture, turgor normal  No rashes or lesions  Neurologic: Mental status: Alert, oriented, thought content appropriate  Cranial nerves: II: visual field normal, II: pupils equal, round, reactive to light and accommodation, III,VII: ptosis no ptosis noted, III,IV,VI: extraocular muscles extra-ocular motions intact, V: facial light touch sensation normal bilaterally, VII: upper facial muscle function normal bilaterally, VII: lower facial muscle function normal bilaterally, XII: tongue strength normal  Sensory: Grossly intact to crude touch  Motor: Motor strength 5/5 B/L UE and LE except for L hip flexion 4+/5    Coordination: finger to nose normal bilaterally    Labs  Recent Results (from the past 24 hour(s))   APTT    Collection Time: 11/10/22  1:36 PM   Result Value Ref Range     (HH) 23 - 37 seconds   APTT    Collection Time: 11/10/22  9:59 PM   Result Value Ref Range    PTT 57 (H) 23 - 37 seconds   Basic metabolic panel    Collection Time: 11/11/22  5:38 AM   Result Value Ref Range    Sodium 140 135 - 147 mmol/L    Potassium 4 0 3 5 - 5 3 mmol/L    Chloride 105 96 - 108 mmol/L    CO2 25 21 - 32 mmol/L    ANION GAP 10 4 - 13 mmol/L    BUN 29 (H) 5 - 25 mg/dL    Creatinine 1 06 0 60 - 1 30 mg/dL    Glucose 98 65 - 140 mg/dL    Calcium 8 7 8 3 - 10 1 mg/dL    eGFR 67 ml/min/1 73sq m   Protime-INR    Collection Time: 11/11/22  5:46 AM   Result Value Ref Range    Protime 18 0 (H) 11 6 - 14 5 seconds    INR 1 52 (H) 0 84 - 1 19   CBC    Collection Time: 11/11/22  5:46 AM   Result Value Ref Range    WBC 8 99 4 31 - 10 16 Thousand/uL    RBC 4 40 3 88 - 5 62 Million/uL    Hemoglobin 15 2 12 0 - 17 0 g/dL    Hematocrit 46 7 36 5 - 49 3 %     (H) 82 - 98 fL    MCH 34 5 (H) 26 8 - 34 3 pg    MCHC 32 5 31 4 - 37 4 g/dL    RDW 14 3 11 6 - 15 1 %    Platelets 443 504 - 961 Thousands/uL    MPV 9 4 8 9 - 12 7 fL   APTT    Collection Time: 11/11/22  5:46 AM   Result Value Ref Range    PTT 99 (H) 23 - 37 seconds     Imaging   Personally reviewed images and reports in PACs  CTH- No acute intracranial abnormality  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  Chronic microangiopathic changes  Mild pansinus mucosal thickening  VTE Prophylaxis: Heparin    Counseling / Coordination of Care  Total time spent today 38 minutes  Greater than 50% of total time was spent with the patient and / or family counseling and / or coordination of care  A description of the counseling / coordination of care: Time spent discussing results of [address] with patient at bedside  Patient was not previously aware of having a stroke but reports he did have several "TIAs" which he did not seek medical care for  Discussed that [address] demonstrates a chronic appearing R MCA territory infarct  Infarct appears embolic and suspect this was in setting of known atrial fibrillation  Would continue with management as per medicine and cardiology teams

## 2022-11-11 NOTE — PROGRESS NOTES
[facility]  Progress Note Dubruna Morris [birth year], 69 y o  male MRN: [id number]  Unit/Bed#: -01 Encounter: [id number]  Primary Care Provider: Odilia Mcelroy MD   Date and time admitted to hospital: 11/6/2022  5:30 PM    Episode of unresponsiveness  Assessment & Plan  Patient had an and responsive episode in the early morning of 11/09/2022  · This happened when he was having a doppler study at bedside  · Lasted less than 10 seconds, no postictal state  · He was able to walk around  He did have an episode of incontinence along with the episode  · Patient had some prior episodes when fishing but he has never passed out  · Neurology input sought for the same  Seizures very unlikely  · Recommended outpatient EEG    Hypotension  Assessment & Plan  · Patient with hypotension with BP of 73/59, this improved with 250 mL fluid bolus  · Cardiology decreased coreg to 3 125 bid, decreased Entresto, and decreased aldactone  Hold parameters adjusted as well  · Midodrine increased, Cardiology adjusting blood pressure medications  · Continue to monitor blood pressure closely  Blood pressure 109/72, pulse 98, temperature 97 5 °F (36 4 °C), resp  rate 20, height 5' 5" (1 651 m), weight 64 1 kg (141 lb 5 oz), SpO2 93 %  SIRS without infection or organ dysfunction ([facility] [address] )  Assessment & Plan  In the setting of PE/CHF as evidenced by HR  and RR of 22-26  · Now resolved   Continue to monitor on telemetry  · Monitor CBC periodically    CKD (chronic kidney disease)  Assessment & Plan  Lab Results   Component Value Date    EGFR 67 11/11/2022    EGFR 63 11/10/2022    EGFR 53 11/09/2022    CREATININE 1 06 11/11/2022    CREATININE 1 12 11/10/2022    CREATININE 1 30 11/09/2022   · Currently at baseline  · Monitor periodically    S/P MVR (mitral valve repair)  Assessment & Plan  History of prosthetic Mitral Valve repair  · Follows with cardiology as an outpatient in [place]  · Coumadin 6 mg given with INR of 1 67 on 11/10/2022  · Continue heparin drip- and plan to discontinue when target INR 2-3  · Will give coumadin 8 mg tonight  · Will need daily Coumadin orders depending on INR    PAF (paroxysmal atrial fibrillation) (HCC)  Assessment & Plan  · History of afib, was in RVR on arrival to ED, currently rate controlled  · Continue Coreg at lower dose due to hypotension  · Anticoagulation with Coumadin target INR 2-3  · Coumadin does to be given daily depending on INR    Acute on chronic combined systolic and diastolic congestive heart failure (HCC)  Assessment & Plan  Wt Readings from Last 3 Encounters:   11/10/22 64 1 kg (141 lb 5 oz)   09/23/22 65 7 kg (144 lb 12 8 oz)   05/19/22 67 1 kg (148 lb)     · Present on admission- elevated BNP, vascular congestion on CXR  · Last ECHO in 6/2022 with EF 35% and global hypokinesis  · Follows with cardiology in [place]  · Monitor lytes, weight, I/O  · Cardiology consultation and their input appreciated  CAD (coronary atherosclerotic disease)  Assessment & Plan  No chest pain  Continue home regimen of Plavix  · Coreg and Entresto doses decreased due to hypotension  · Cardiology titrating dose of medications    Blood pressure 109/72, pulse 98, temperature 97 5 °F (36 4 °C), resp  rate 20, height 5' 5" (1 651 m), weight 64 1 kg (141 lb 5 oz), SpO2 93 %  * Acute pulmonary embolism without acute cor pulmonale (HCC)  Assessment & Plan  Present on admission- Evidenced by CT imaging  · No heart strain noted  · On coumadin at baseline for prosthetic mitral valve  · Last INR on 10/22 was 2 81, INR on admission 4 11, increased to 4 47  · Could be coumadin failure vs transient subtherapeutic INR between 10/22 and today  · Hematology consultation appreciated- may be due to coumadin failure  · Started on heparin gtt in ED, continue    · ECHO in 6/2022 with EF 35%, repeat echo shows EF of 15-20% with global cardiomyopathy  · Venous duplex negative        VTE Pharmacologic Prophylaxis: VTE Score: 7 High Risk (Score >/= 5) - Pharmacological DVT Prophylaxis Ordered: heparin drip  Sequential Compression Devices Ordered  Patient Centered Rounds: I performed bedside rounds with nursing staff today  Discussions with Specialists or Other Care Team Provider: cardiology    Education and Discussions with Patient    Time Spent for Care: 60 minutes  More than 50% of total time spent on counseling and coordination of care as described above  Current Length of Stay: 5 day(s)  Current Patient Status: Inpatient   Certification Statement: The patient will continue to require additional inpatient hospital stay due to Subtherapeutic INR, bridging to warfarin in setting of acute PE  Discharge Plan: Anticipate discharge in 24-48 hrs to discharge location to be determined pending rehab evaluations  Code Status: Level 1 - Full Code    Subjective:   Patient seen and examined at bedside  Karrie Nissen Has no acute complaints for eager to go home  No acute signs of bleeding  Denies nausea, vomiting, diarrhea constipation, fevers or chills  All other review of systems negative at this time  Objective:     Vitals:   Temp (24hrs), Av 5 °F (36 4 °C), Min:97 3 °F (36 3 °C), Max:97 8 °F (36 6 °C)    Temp:  [97 3 °F (36 3 °C)-97 8 °F (36 6 °C)] 97 5 °F (36 4 °C)  HR:  [] 98  BP: ()/(63-85) 109/72  SpO2:  [93 %-95 %] 93 %  Body mass index is 23 52 kg/m²  Input and Output Summary (last 24 hours): Intake/Output Summary (Last 24 hours) at 2022 0821  Last data filed at 11/10/2022 1756  Gross per 24 hour   Intake 460 ml   Output 1000 ml   Net -540 ml       Physical Exam:   Physical Exam  Vitals reviewed  Constitutional:       General: He is not in acute distress  Appearance: He is well-developed  He is not diaphoretic  HENT:      Head: Normocephalic and atraumatic  Mouth/Throat:      Pharynx: No oropharyngeal exudate  Eyes:      General: No scleral icterus       Extraocular Movements: Extraocular movements intact  Conjunctiva/sclera: Conjunctivae normal    Neck:      Vascular: No JVD  Trachea: No tracheal deviation  Cardiovascular:      Rate and Rhythm: Normal rate and regular rhythm  Heart sounds: No murmur heard  No friction rub  No gallop  Pulmonary:      Effort: Pulmonary effort is normal  No respiratory distress  Breath sounds: No stridor  No wheezing  Abdominal:      General: There is no distension  Palpations: Abdomen is soft  There is no mass  Tenderness: There is no abdominal tenderness  There is no right CVA tenderness or left CVA tenderness  Musculoskeletal:         General: No tenderness  Normal range of motion  Right lower leg: No edema  Left lower leg: No edema  Skin:     General: Skin is warm and dry  Coloration: Skin is not pale  Findings: No erythema  Neurological:      Mental Status: He is alert and oriented to person, place, and time  Psychiatric:         Behavior: Behavior normal          Thought Content: Thought content normal           Additional Data:     Labs:  Results from last 7 days   Lab Units 11/11/22  0546 11/07/22  0314 11/06/22  1758   WBC Thousand/uL 8 99   < > 10 04   HEMOGLOBIN g/dL 15 2   < > 15 7   HEMATOCRIT % 46 7   < > 48 3   PLATELETS Thousands/uL 180   < > 235   NEUTROS PCT %  --   --  65   LYMPHS PCT %  --   --  16   MONOS PCT %  --   --  15*   EOS PCT %  --   --  3    < > = values in this interval not displayed       Results from last 7 days   Lab Units 11/11/22  0538 11/10/22  0550   SODIUM mmol/L 140 141   POTASSIUM mmol/L 4 0 3 8   CHLORIDE mmol/L 105 106   CO2 mmol/L 25 25   BUN mg/dL 29* 30*   CREATININE mg/dL 1 06 1 12   ANION GAP mmol/L 10 10   CALCIUM mg/dL 8 7 8 7   ALBUMIN g/dL  --  3 3*   TOTAL BILIRUBIN mg/dL  --  0 84   ALK PHOS U/L  --  67   ALT U/L  --  18   AST U/L  --  25   GLUCOSE RANDOM mg/dL 98 96     Results from last 7 days   Lab Units 11/11/22  0546   INR 1 52*     Results from last 7 days   Lab Units 11/09/22  0957   POC GLUCOSE mg/dl 97         Results from last 7 days   Lab Units 11/06/22  1758   LACTIC ACID mmol/L 0 9   PROCALCITONIN ng/ml <0 05       Lines/Drains:  Invasive Devices  Report    Peripheral Intravenous Line  Duration           Peripheral IV 11/06/22 Dorsal (posterior); Left Forearm 4 days                  Telemetry:  Telemetry Orders (From admission, onward)             24 Hour Telemetry Monitoring  Continuous x 24 Hours (Telem)        References:    Telemetry Guidelines   Question:  Reason for 24 Hour Telemetry  Answer:  Pulmonary Embolism - 24 hours without resp  compromise, dysrhythmias, hemodynamically stable                 Telemetry Reviewed: Normal Sinus Rhythm  Indication for Continued Telemetry Use: Arrthymias requiring medical therapy             Imaging: No pertinent imaging reviewed  Recent Cultures (last 7 days):   Results from last 7 days   Lab Units 11/06/22  1758   BLOOD CULTURE  No Growth After 4 Days  No Growth After 4 Days         Last 24 Hours Medication List:   Current Facility-Administered Medications   Medication Dose Route Frequency Provider Last Rate   • acetaminophen  650 mg Oral Q6H PRN Sergio Perales MD     • atorvastatin  40 mg Oral Daily With Erin Centeno MD     • calcium carbonate  1,000 mg Oral Daily PRN Sergio Perales MD     • carvedilol  3 125 mg Oral BID Laney Leo PA-C     • clopidogrel  75 mg Oral Daily Sergio Perales MD     • docusate sodium  100 mg Oral BID Sergio Perales MD     • fluticasone  1 spray Each Nare Daily Sergio Perales MD     • heparin (porcine)  3-30 Units/kg/hr (Order-Specific) Intravenous Titrated Judson Powell MD 10 Units/kg/hr (11/11/22 6648)   • heparin (porcine)  2,600 Units Intravenous Q1H PRN Judson Powell MD     • heparin (porcine)  5,200 Units Intravenous Q1H PRN Judson Powell MD     • loratadine  10 mg Oral Daily Sergio Perales MD     • midodrine  5 mg Oral TID AC Benny Velasco MD     • ondansetron  4 mg Intravenous Q6H PRN Marisol Serrano MD     • pantoprazole  40 mg Oral Daily Marisol Serrano MD     • senna  1 tablet Oral Daily Marisol Serrano MD     • sodium chloride (PF)  3 mL Intravenous Q1H PRN Sj Hayward MD     • warfarin  8 mg Oral Once (warfarin) Ana Maria Alvares DO          Today, Patient Was Seen By: Ana Maria Alvares    **Please Note: This note may have been constructed using a voice recognition system  **

## 2022-11-11 NOTE — CASE MANAGEMENT
Case Management Discharge Planning Note    Patient name Deepti Rangel  Location /-59 MRN [id number]  : 1945 Date 2022       Current Admission Date: 2022  Current Admission Diagnosis:Acute pulmonary embolism without acute cor pulmonale [facility])   Patient Active Problem List    Diagnosis Date Noted   • Chronic ischemic right MCA stroke 2022   • Syncopal episodes 11/10/2022   • Episode of unresponsiveness 11/10/2022   • Hypotension 2022   • SIRS without infection or organ dysfunction ([facility] [address] ) 2022   • Acute pulmonary embolism without acute cor pulmonale ([facility] ) 2022   • CKD (chronic kidney disease) 2022   • Vasomotor rhinitis 2022   • Ambulatory dysfunction 2021   • Anemia due to blood loss, acute 2021   • History of normocytic normochromic anemia 2021   • Impaired fasting glucose 12/10/2018   • Idiopathic chronic gout of multiple sites without tophus 2018   • Pain in gums 2018   • Chronic anticoagulation 2018   • Abnormal RBC indices 06/15/2018   • Fatigue 06/15/2018   • PAF (paroxysmal atrial fibrillation) ([facility] [address] ) 2018   • Polyp of colon 2018   • Acute on chronic combined systolic and diastolic congestive heart failure ([facility] [address] ) 2017   • CAD (coronary atherosclerotic disease) 2016   • Chronic back pain 2016   • GERD (gastroesophageal reflux disease) 2016   • S/P MVR (mitral valve repair) 10/27/2015   • Peptic ulcer disease 2015   • Hypercholesterolemia 2015   • Hypertension 2015      LOS (days): 5  Geometric Mean LOS (GMLOS) (days): 3 90  Days to GMLOS:-0 7     OBJECTIVE:  Risk of Unplanned Readmission Score: 15 53      Current admission status: Inpatient   Preferred Pharmacy:   [facility] [address] [facility] [address] [place] [address]  Phone: [phone] Fax: [phone]    Primary Care Provider: Lyle Monroy, MD    Primary Insurance: MEDICARE  Secondary Insurance: BLUE CROSS    DISCHARGE DETAILS:    Discharge planning discussed with[de-identified] pt at bedside  [place] of Choice: Yes  Comments - [place] of Choice: CM met w/ pt at bedside to introduce self/role  IMM reviewed and copy provided  CM spoke about Moy White already accept for SN for [facility] and offered to order PT/OT as well,as patient has been admitted for a few days and complained to [facility] re: weakness yesterday  He's receptive to therapy at home  He asked about a dietitan consult, as he was eating a lot of soup and deli meat at home, as it was easy to make when he wasn't feeling well, but knows this sodium isnt' good for him  CM relayed to [facility] and they agreed to place the order  Patient stated his son really wanted to speak with CM but asked CM not to call as he's on conference calls  CM left her phone number and patient agreed to pass it along  CM contacted family/caregiver?: No- see comments (at pt's request)  Were Treatment Team discharge recommendations reviewed with patient/caregiver?: Yes  Did patient/caregiver verbalize understanding of patient care needs?: Yes  Were patient/caregiver advised of the risks associated with not following Treatment Team discharge recommendations?: Yes     Jaquan Kingston requested[de-identified] Nursing, Physical Therapy, Occupational Therapy  Home Health Services Needed[de-identified] Evaluate Functional Status and Safety, Gait/ADL Training, Heart Failure Management, Strengthening/Theraputic Exercises to Improve Function     Other Referral/Resources/Interventions Provided:  Interventions: [facility]  Referral Comments: CM updated [facility] via [facility] re: dc in 24-48 hrs  CM let them know the referral will be for SN/PT/OT          Discharge Destination Plan[de-identified] Home with [place] at Discharge : Family        IMM Given (Date):: 11/11/22  IMM Given to[de-identified] Patient (verbally reviewed w/ pt at bedside-copy provided and original placed for scanning to medical records)

## 2022-11-11 NOTE — ASSESSMENT & PLAN NOTE
- Noted on [facility] completed 11/10/2022  - Per discussion with patient at bedside, no known history of stroke but reports "TIA" x 3 in the past that he did not seek medical care for  He reports symptoms including inability to speak during one episode and inability to use L arm during another episode    - Would continue on heparin bridge to oral anticoagulation as per medicine team    - Continue on atorvastatin 40 mg QPM

## 2022-11-12 LAB
ANION GAP SERPL CALCULATED.3IONS-SCNC: 11 MMOL/L (ref 4–13)
APTT PPP: 72 SECONDS (ref 23–37)
APTT PPP: 75 SECONDS (ref 23–37)
BACTERIA BLD CULT: NORMAL
BACTERIA BLD CULT: NORMAL
BUN SERPL-MCNC: 28 MG/DL (ref 5–25)
CALCIUM SERPL-MCNC: 8.9 MG/DL (ref 8.3–10.1)
CHLORIDE SERPL-SCNC: 106 MMOL/L (ref 96–108)
CO2 SERPL-SCNC: 24 MMOL/L (ref 21–32)
CREAT SERPL-MCNC: 1.09 MG/DL (ref 0.6–1.3)
ERYTHROCYTE [DISTWIDTH] IN BLOOD BY AUTOMATED COUNT: 14.2 % (ref 11.6–15.1)
GFR SERPL CREATININE-BSD FRML MDRD: 65 ML/MIN/1.73SQ M
GLUCOSE SERPL-MCNC: 102 MG/DL (ref 65–140)
HCT VFR BLD AUTO: 43.9 % (ref 36.5–49.3)
HGB BLD-MCNC: 14.4 G/DL (ref 12–17)
INR PPP: 1.51 (ref 0.84–1.19)
MCH RBC QN AUTO: 33.6 PG (ref 26.8–34.3)
MCHC RBC AUTO-ENTMCNC: 32.8 G/DL (ref 31.4–37.4)
MCV RBC AUTO: 103 FL (ref 82–98)
PLATELET # BLD AUTO: 183 THOUSANDS/UL (ref 149–390)
PMV BLD AUTO: 9.6 FL (ref 8.9–12.7)
POTASSIUM SERPL-SCNC: 3.9 MMOL/L (ref 3.5–5.3)
PROTHROMBIN TIME: 17.9 SECONDS (ref 11.6–14.5)
RBC # BLD AUTO: 4.28 MILLION/UL (ref 3.88–5.62)
SODIUM SERPL-SCNC: 141 MMOL/L (ref 135–147)
WBC # BLD AUTO: 8.62 THOUSAND/UL (ref 4.31–10.16)

## 2022-11-12 RX ORDER — WARFARIN SODIUM 5 MG/1
10 TABLET ORAL
Status: COMPLETED | OUTPATIENT
Start: 2022-11-12 | End: 2022-11-12

## 2022-11-12 RX ORDER — CARVEDILOL 6.25 MG/1
6.25 TABLET ORAL 2 TIMES DAILY
Status: DISCONTINUED | OUTPATIENT
Start: 2022-11-12 | End: 2022-11-14 | Stop reason: HOSPADM

## 2022-11-12 RX ADMIN — ATORVASTATIN CALCIUM 40 MG: 40 TABLET, FILM COATED ORAL at 15:54

## 2022-11-12 RX ADMIN — MIDODRINE HYDROCHLORIDE 5 MG: 5 TABLET ORAL at 15:54

## 2022-11-12 RX ADMIN — CLOPIDOGREL 75 MG: 75 TABLET, FILM COATED ORAL at 10:34

## 2022-11-12 RX ADMIN — WARFARIN SODIUM 10 MG: 5 TABLET ORAL at 17:11

## 2022-11-12 RX ADMIN — MIDODRINE HYDROCHLORIDE 5 MG: 5 TABLET ORAL at 05:48

## 2022-11-12 RX ADMIN — HEPARIN SODIUM 10.6 UNITS/KG/HR: 10000 INJECTION, SOLUTION INTRAVENOUS at 15:01

## 2022-11-12 RX ADMIN — LORATADINE 10 MG: 10 TABLET ORAL at 10:34

## 2022-11-12 RX ADMIN — FLUTICASONE PROPIONATE 1 SPRAY: 50 SPRAY, METERED NASAL at 10:34

## 2022-11-12 RX ADMIN — DOCUSATE SODIUM 100 MG: 100 CAPSULE, LIQUID FILLED ORAL at 10:34

## 2022-11-12 RX ADMIN — CARVEDILOL 6.25 MG: 6.25 TABLET, FILM COATED ORAL at 17:11

## 2022-11-12 RX ADMIN — MIDODRINE HYDROCHLORIDE 5 MG: 5 TABLET ORAL at 10:35

## 2022-11-12 RX ADMIN — HEPARIN SODIUM 10.6 UNITS/KG/HR: 10000 INJECTION, SOLUTION INTRAVENOUS at 07:01

## 2022-11-12 RX ADMIN — SENNOSIDES 8.6 MG: 8.6 TABLET, FILM COATED ORAL at 10:34

## 2022-11-12 RX ADMIN — PANTOPRAZOLE SODIUM 40 MG: 40 TABLET, DELAYED RELEASE ORAL at 05:48

## 2022-11-12 NOTE — PLAN OF CARE
Problem: Prexisting or High Potential for Compromised Skin Integrity  Goal: Skin integrity is maintained or improved  Description: INTERVENTIONS:  - Identify patients at risk for skin breakdown  - Assess and monitor skin integrity  - Assess and monitor nutrition and hydration status  - Monitor labs   - Assess for incontinence   - Turn and reposition patient  - Assist with mobility/ambulation  - Relieve pressure over bony prominences  - Avoid friction and shearing  - Provide appropriate hygiene as needed including keeping skin clean and dry  - Evaluate need for skin moisturizer/barrier cream  - Collaborate with interdisciplinary team   - Patient/family teaching  - Consider wound care consult   Outcome: Progressing     Problem: CARDIOVASCULAR - ADULT  Goal: Maintains optimal cardiac output and hemodynamic stability  Description: INTERVENTIONS:  - Monitor I/O, vital signs and rhythm  - Monitor for S/S and trends of decreased cardiac output  - Administer and titrate ordered vasoactive medications to optimize hemodynamic stability  - Assess quality of pulses, skin color and temperature  - Assess for signs of decreased coronary artery perfusion  - Instruct patient to report change in severity of symptoms  Outcome: Progressing     Problem: CARDIOVASCULAR - ADULT  Goal: Absence of cardiac dysrhythmias or at baseline rhythm  Description: INTERVENTIONS:  - Continuous cardiac monitoring, vital signs, obtain 12 lead EKG if ordered  - Administer antiarrhythmic and heart rate control medications as ordered  - Monitor electrolytes and administer replacement therapy as ordered  Outcome: Progressing

## 2022-11-12 NOTE — ASSESSMENT & PLAN NOTE
· Patient with hypotension with BP of 73/59, this improved with 250 mL fluid bolus  · Cardiology decreased coreg to 3 125 bid, decreased Entresto, and decreased aldactone  Hold parameters adjusted as well  · Midodrine increased, Cardiology adjusting blood pressure medications  · Continue to monitor blood pressure closely    Blood pressure 113/74, pulse (!) 110, temperature 97 6 °F (36 4 °C), resp  rate 20, height 5' 5" (1 651 m), weight 63 3 kg (139 lb 8 8 oz), SpO2 92 %

## 2022-11-12 NOTE — ASSESSMENT & PLAN NOTE
History of prosthetic Mitral Valve repair  · Follows with cardiology as an outpatient in [place]  · Continue heparin drip- and plan to discontinue when target INR 2-3      · Will give coumadin 10 mg tonight  · Will need daily Coumadin orders depending on INR

## 2022-11-12 NOTE — PLAN OF CARE
Problem: Potential for Falls  Goal: Patient will remain free of falls  Description: INTERVENTIONS:  - Educate patient/family on patient safety including physical limitations  - Instruct patient to call for assistance with activity   - Consult OT/PT to assist with strengthening/mobility   - Keep Call bell within reach  - Keep bed low and locked with side rails adjusted as appropriate  - Keep care items and personal belongings within reach  - Initiate and maintain comfort rounds  - Make Fall Risk Sign visible to staff  - Offer Toileting every 2 Hours, in advance of need  - Initiate/Maintain Bed alarm  - Obtain necessary fall risk management equipment: Bed alarm  - Apply yellow socks and bracelet for high fall risk patients  - Consider moving patient to room near nurses station  Outcome: Progressing     Problem: MOBILITY - ADULT  Goal: Maintain or return to baseline ADL function  Description: INTERVENTIONS:  -  Assess patient's ability to carry out ADLs; assess patient's baseline for ADL function and identify physical deficits which impact ability to perform ADLs (bathing, care of mouth/teeth, toileting, grooming, dressing, etc )  - Assess/evaluate cause of self-care deficits   - Assess range of motion  - Assess patient's mobility; develop plan if impaired  - Assess patient's need for assistive devices and provide as appropriate  - Encourage maximum independence but intervene and supervise when necessary  - Involve family in performance of ADLs  - Assess for home care needs following discharge   - Consider OT consult to assist with ADL evaluation and planning for discharge  - Provide patient education as appropriate  Outcome: Progressing  Goal: Maintains/Returns to pre admission functional level  Description: INTERVENTIONS:  - Perform BMAT or MOVE assessment daily    - Set and communicate daily mobility goal to care team and patient/family/caregiver     - Collaborate with rehabilitation services on mobility goals if consulted  - Perform Range of Motion 4 times a day  - Reposition patient every 2 hours    - Dangle patient 4 times a day  - Stand patient 4 times a day  - Ambulate patient 4 times a day  - Out of bed to chair 3 times a day   - Out of bed for meals 3 times a day  - Out of bed for toileting  - Record patient progress and toleration of activity level   Outcome: Progressing     Problem: Prexisting or High Potential for Compromised Skin Integrity  Goal: Skin integrity is maintained or improved  Description: INTERVENTIONS:  - Identify patients at risk for skin breakdown  - Assess and monitor skin integrity  - Assess and monitor nutrition and hydration status  - Monitor labs   - Assess for incontinence   - Turn and reposition patient  - Assist with mobility/ambulation  - Relieve pressure over bony prominences  - Avoid friction and shearing  - Provide appropriate hygiene as needed including keeping skin clean and dry  - Evaluate need for skin moisturizer/barrier cream  - Collaborate with interdisciplinary team   - Patient/family teaching  - Consider wound care consult   Outcome: Progressing     Problem: RESPIRATORY - ADULT  Goal: Achieves optimal ventilation and oxygenation  Description: INTERVENTIONS:  - Assess for changes in respiratory status  - Assess for changes in mentation and behavior  - Position to facilitate oxygenation and minimize respiratory effort  - Oxygen administered by appropriate delivery if ordered  - Initiate smoking cessation education as indicated  - Encourage broncho-pulmonary hygiene including cough, deep breathe, Incentive Spirometry  - Assess the need for suctioning and aspirate as needed  - Assess and instruct to report SOB or any respiratory difficulty  - Respiratory Therapy support as indicated  Outcome: Progressing     Problem: CARDIOVASCULAR - ADULT  Goal: Maintains optimal cardiac output and hemodynamic stability  Description: INTERVENTIONS:  - Monitor I/O, vital signs and rhythm  - Monitor for S/S and trends of decreased cardiac output  - Administer and titrate ordered vasoactive medications to optimize hemodynamic stability  - Assess quality of pulses, skin color and temperature  - Assess for signs of decreased coronary artery perfusion  - Instruct patient to report change in severity of symptoms  Outcome: Progressing  Goal: Absence of cardiac dysrhythmias or at baseline rhythm  Description: INTERVENTIONS:  - Continuous cardiac monitoring, vital signs, obtain 12 lead EKG if ordered  - Administer antiarrhythmic and heart rate control medications as ordered  - Monitor electrolytes and administer replacement therapy as ordered  Outcome: Progressing     Problem: HEMATOLOGIC - ADULT  Goal: Maintains hematologic stability  Description: INTERVENTIONS  - Assess for signs and symptoms of bleeding or hemorrhage  - Monitor labs  - Administer supportive blood products/factors as ordered and appropriate  Outcome: Progressing

## 2022-11-12 NOTE — ASSESSMENT & PLAN NOTE
Wt Readings from Last 3 Encounters:   11/12/22 63 3 kg (139 lb 8 8 oz)   09/23/22 65 7 kg (144 lb 12 8 oz)   05/19/22 67 1 kg (148 lb)     · Present on admission- elevated BNP, vascular congestion on CXR  · Last ECHO in 6/2022 with EF 35% and global hypokinesis  · Follows with primarily with his cardiology in [address] [place] [address]  · Monitor lytes, weight, I/O  · Cardiology consultation and their input appreciated

## 2022-11-12 NOTE — ASSESSMENT & PLAN NOTE
Patient had an and responsive episode in the early morning of 11/09/2022  · This happened when he was having a doppler study at bedside  · Lasted less than 10 seconds, no postictal state  · He was able to walk around  He did have an episode of incontinence along with the episode  · Patient had some prior episodes when fishing but he has never passed out  · Neurology input sought for the same  Seizures very unlikely    CT head shows no acute infarct but possibility of an old infarct  · Recommended outpatient EEG

## 2022-11-12 NOTE — PROGRESS NOTES
General Cardiology   Progress Note   Alok Morris 69 y o  male MRN: [id number]  Unit/Bed#: -01 Encounter: [id number]    Assessment/Plan:    1  Acute on chronic systolic heart failure   -patient currently euvolemic  -continue p o  Lasix, carvedilol  -strict I&Os and daily weights  -recommend 2000 mg sodium restricted diet  -TTE performed revealed severely reduced systolic function with an EF of 15-20% with severe global hypokinesis     2  Transient episode of lightheadedness with possible syncope  -evaluated by Neurology, plan for outpatient follow-up with EEG  -continue to monitor on telemetry for possible arrhythmogenic source  -further care per primary team     3  Hypotension  -blood pressure currently in the low 100s  -continue to hold Entresto and spironolactone, consider reintroducing if blood pressure improves  -continue midodrine  -continue to monitor blood pressures closely     4  Cardiomyopathy with an EF of 15-20%, type unclear  -TTE performed revealed systolic function of 19-77%, mild concentric hypertrophy, severe global hypokinesis and normal diastolic function  -patient prior with a history of an EF of 35%  -has previously declined ICD  -will follow up with primary cardiologist in [place] for further discussion  -continue carvedilol   -continue to monitor for arrhythmias on telemetry     5  Acute pulmonary embolus  -currently on heparin drip and bridging to warfarin, INR 1 5 today  -care per [facility]     6  Paroxysmal atrial fibrillation  -heart rates tachycardic at times  -increase carvedilol to 6 25 mg b i d   -continue heparin drip with bridging to warfarin for anticoagulation, INR 1 5 today, goal INR 2-3    7  History of CAD with remote PCI  -patient currently denies chest pain, shortness of breath or other anginal equivalent  -continue atorvastatin, carvedilol, Plavix  -recommend cardiac diet     8   History of bioprosthetic aortic and mitral valve replacements  -both noted to be well seated on recent TTE  -continue outpatient follow-up     9  Hyperlipidemia  -continue atorvastatin      Subjective:   Patient seen and examined  No significant events since the last encounter  REVIEW OF SYSTEMS:  Constitutional:  Denies fever or chills   Eyes:  Denies change in visual acuity   HENT:  Denies nasal congestion or sore throat   Respiratory:  Denies cough, orthopnea, PND or shortness of breath   Cardiovascular:  Denies chest pain, palpitations or edema   GI:  Denies abdominal pain, nausea, vomiting, bloody stools, constipation or diarrhea   :  Denies dysuria, frequency, difficulty in urination or nocturia  Musculoskeletal:  Denies back pain or joint pain   Neurologic:  Denies headache, focal weakness or sensory changes   Endocrine:  Denies polyuria or polydipsia   Lymphatic:  Denies swollen glands   Psychiatric:  Denies depression or anxiety     Objective:   Vitals:  Vitals:    11/12/22 1119   BP: 109/72   Pulse: 100   Resp: 18   Temp: (!) 97 4 °F (36 3 °C)   SpO2: 94%       Body mass index is 23 22 kg/m²  Systolic (29YSX), GENEVA:326 , Min:91 , MZI:858     Diastolic (76MXC), GOY:06, Min:68, Max:80      Intake/Output Summary (Last 24 hours) at 11/12/2022 1206  Last data filed at 11/12/2022 1113  Gross per 24 hour   Intake 711 74 ml   Output 800 ml   Net -88 26 ml     Weight (last 2 days)     Date/Time Weight    11/12/22 0600 63 3 (139 55)    11/10/22 0600 64 1 (141 32)          Telemetry Review: No significant arrhythmias seen on telemetry review  PHYSICAL EXAMS  General:  Patient is not in acute distress, laying in the bed comfortably, awake  Head: Normocephalic, Atraumatic     HEENT: White sclera, pink conjunctiva  Neck:  Supple, no neck vein distention  Respiratory: clear to auscultation   Cardiovascular: S1-S2 normal, no murmurs, thrills, gallops, rubs, regular rhythm  GI:  Abdomen soft, nontender, non-distended  Extremities: No edema, normal pulses  Integument:  No skin rashes or ulceration  Neurologic:  Patient is awake alert, responding to command, oriented to person, place and time    LABORATORY RESULTS:      CBC with diff:   Results from last 7 days   Lab Units 11/12/22 0445 11/11/22 0546 11/10/22  0550 11/07/22  0314 11/06/22  1758   WBC Thousand/uL 8 62 8 99 8 90   < > 10 04   HEMOGLOBIN g/dL 14 4 15 2 15 3   < > 15 7   HEMATOCRIT % 43 9 46 7 46 1   < > 48 3   MCV fL 103* 106* 105*   < > 109*   PLATELETS Thousands/uL 183 180 196   < > 235   MCH pg 33 6 34 5* 34 8*   < > 35 4*   MCHC g/dL 32 8 32 5 33 2   < > 32 5   RDW % 14 2 14 3 14 3   < > 14 6   MPV fL 9 6 9 4 9 3   < > 9 4   NRBC AUTO /100 WBCs  --   --   --   --  0    < > = values in this interval not displayed  CMP:  Results from last 7 days   Lab Units 11/12/22 0445 11/11/22  0538 11/10/22  0550 11/07/22  0805 11/06/22  1758   POTASSIUM mmol/L 3 9 4 0 3 8   < > 4 5   CHLORIDE mmol/L 106 105 106   < > 106   CO2 mmol/L 24 25 25   < > 30   BUN mg/dL 28* 29* 30*   < > 18   CREATININE mg/dL 1 09 1 06 1 12   < > 1 42*   CALCIUM mg/dL 8 9 8 7 8 7   < > 9 0   AST U/L  --   --  25  --  30   ALT U/L  --   --  18  --  26   ALK PHOS U/L  --   --  67  --  74   EGFR ml/min/1 73sq m 65 67 63   < > 47    < > = values in this interval not displayed         BMP:  Results from last 7 days   Lab Units 11/12/22 0445 11/11/22  0538 11/10/22  0550   POTASSIUM mmol/L 3 9 4 0 3 8   CHLORIDE mmol/L 106 105 106   CO2 mmol/L 24 25 25   BUN mg/dL 28* 29* 30*   CREATININE mg/dL 1 09 1 06 1 12   CALCIUM mg/dL 8 9 8 7 8 7         Results from last 7 days   Lab Units 11/06/22  1758   NT-PRO BNP pg/mL 10,317*      Results from last 7 days   Lab Units 11/10/22  0550 11/07/22  0805   MAGNESIUM mg/dL 1 8 2 1             Results from last 7 days   Lab Units 11/12/22  0446 11/11/22  0546 11/10/22  0550 11/07/22  0622 11/06/22  1758   INR  1 51* 1 52* 1 67* 4 47* 4 11*       Lipid Profile:   No results found for: CHOL  Lab Results   Component Value Date    HDL 34 (L) 12/10/2018    HDL 45 2018     Lab Results   Component Value Date    LDLCALC 46 12/10/2018    LDLCALC 53 2018     Lab Results   Component Value Date    TRIG 119 12/10/2018    TRIG 106 2018       Cardiac testing:   Results for orders placed during the hospital encounter of 18    Echo complete with contrast if indicated    Narrative  [address] [place], [place] [id number] [phone]    Transthoracic Echocardiogram  2D, M-mode, Doppler, and Color Doppler    Study date:  2018    Patient: Wilfred Kim  MR number: [id number]  Account number: [de-identified]  : 1945  Age: 71 years  Gender: Male  Status: Inpatient  Location: Bedside  Height: 66 in  Weight: 153 lb  BP: 144/ 60 mmHg    Indications: Coronary Artery Disease    Diagnoses: I25 10 - Atherosclerotic heart disease of native coronary artery without angina pectoris    Sonographer:  RACHANA Barraza,RDCS  Interpreting Physician:  Betsy Emmanuel MD  Referring Physician:  Humberto Hicks MD  Group:  [facility]    SUMMARY    LEFT VENTRICLE:  Ejection fraction was estimated to be 55 %  There were no regional wall motion abnormalities  Concentric hypertrophy was present  RIGHT VENTRICLE:  The ventricle was mildly dilated  Estimated peak pressure was at least 70 mmHg c/w severe pulmonary hypertension  LEFT ATRIUM:  The atrium was mildly to moderately dilated  RIGHT ATRIUM:  The atrium was dilated  MITRAL VALVE:  There was moderate to marked annular calcification  There was moderate calcification of MV leaflets  Vegetation can not be ruled out with certainty  Consider GUY if clinically indicated  There was moderate to severe regurgitation  Patient has history of MV repair  AORTIC VALVE:  There was mild to moderate stenosis  There was mild to moderate regurgitation      TRICUSPID VALVE:  There was mild regurgitation  HISTORY: PRIOR HISTORY: Hyperlipidemia, Atrial Fibrillation, Transient Ischemic Attack, Hypertension, Acute Renal Failure, S/P Stent, Mitral Valve Repair    PROCEDURE: The procedure was performed at the bedside  This was a routine study  The transthoracic approach was used  The study included complete 2D imaging, M-mode, complete spectral Doppler, and color Doppler  The heart rate was 77 bpm,  at the start of the study  Images were obtained from the parasternal, apical, subcostal, and suprasternal notch acoustic windows  Intravenous contrast ( 0 6ml Definity used in NSS) was administered to opacify the left ventricle  Image  quality was adequate  LEFT VENTRICLE: Size was normal  Ejection fraction was estimated to be 55 %  There were no regional wall motion abnormalities  Concentric hypertrophy was present  RIGHT VENTRICLE: The ventricle was mildly dilated  Systolic function was normal  Wall thickness was normal  DOPPLER: Estimated peak pressure was at least 70 mmHg c/w severe pulmonary hypertension  LEFT ATRIUM: The atrium was mildly to moderately dilated  RIGHT ATRIUM: The atrium was dilated  MITRAL VALVE: There was moderate to marked annular calcification  There was moderate calcification of MV leaflets  Vegetation can not be ruled out with certainty  Consider GUY if clinically indicated  DOPPLER: There was moderate to severe  regurgitation  Patient has history of MV repair  AORTIC VALVE: The valve was probably trileaflet  Leaflets exhibited mild to moderate calcification  DOPPLER: There was mild to moderate stenosis  There was mild to moderate regurgitation  TRICUSPID VALVE: The valve structure was normal  There was normal leaflet separation  DOPPLER: The transtricuspid velocity was within the normal range  There was no evidence for stenosis  There was mild regurgitation      PULMONIC VALVE: Leaflets exhibited normal thickness, no calcification, and normal cuspal separation  DOPPLER: The transpulmonic velocity was within the normal range  There was no regurgitation  PERICARDIUM: There was no pericardial effusion  The pericardium was normal in appearance  AORTA: The root exhibited normal size  SYSTEM MEASUREMENT TABLES    2D  %FS: 32 3 %  Ao Diam: 3 7 cm  EDV(Teich): 122 6 ml  EF Biplane: 46 %  EF(Teich): 60 3 %  ESV(Teich): 48 7 ml  IVSd: 1 3 cm  LA Area: 28 7 cm2  LA Diam: 3 3 cm  LVEDV MOD A2C: 147 ml  LVEDV MOD A4C: 149 7 ml  LVEDV MOD BP: 148 1 ml  LVEF MOD A2C: 39 7 %  LVEF MOD A4C: 52 3 %  LVESV MOD A2C: 88 6 ml  LVESV MOD A4C: 71 4 ml  LVESV MOD BP: 80 ml  LVIDd: 5 1 cm  LVIDs: 3 4 cm  LVLd A2C: 8 8 cm  LVLd A4C: 8 9 cm  LVLs A2C: 8 cm  LVLs A4C: 8 1 cm  LVOT Diam: 2 1 cm  LVPWd: 1 2 cm  RA Area: 21 3 cm2  RVIDd: 3 4 cm  SV MOD A2C: 58 4 ml  SV MOD A4C: 78 4 ml  SV(Teich): 73 9 ml    CW  AR Dec [place]: 2 7 m/s2  AR Dec Time: 1294 ms  AR PHT: 375 3 ms  AR Vmax: 3 5 m/s  AR maxP 1 mmHg  AV Env  Ti: 253 5 ms  AV VTI: 45 1 cm  AV Vmax: 2 8 m/s  AV Vmean: 1 8 m/s  AV maxP 4 mmHg  AV meanPG: 15 3 mmHg  HR: 71 5 BPM  MR VTI: 150 1 cm  MR Vmax: 5 6 m/s  MR Vmean: 4 4 m/s  MR maxP 7 mmHg  MR meanP 5 mmHg  MV VTI: 65 cm  MV Vmax: 2 2 m/s  MV Vmean: 1 2 m/s  MV maxP 4 mmHg  MV meanP 2 mmHg  TR Vmax: 4 2 m/s  TR maxP 6 mmHg    MM  TAPSE: 2 1 cm    PW  RADHA (VTI): 1 7 cm2  RADHA Vmax: 1 6 cm2  E': 0 m/s  E/E': 48 8  LVOT Env  Ti: 267 7 ms  LVOT VTI: 21 8 cm  LVOT Vmax: 1 3 m/s  LVOT Vmean: 0 8 m/s  LVOT maxP 8 mmHg  LVOT meanPG: 3 2 mmHg  LVSV Dopp: 76 8 ml  MV A Naun: 1 6 m/s  MV Dec [place]: 4 8 m/s2  MV DecT: 353 4 ms  MV E Naun: 1 7 m/s  MV E/A Ratio: 1 1  MV PHT: 102 5 ms  MVA (VTI): 1 2 cm2  MVA By PHT: 2 1 cm2    [facility]    Prepared and electronically signed by    Timothy Johnson MD  Signed 2018 16:40:44    Results for orders placed during the hospital encounter of 18    GUY    Narrative  [address] [place], [place] [address] [phone]    Transesophageal Echocardiogram  2D, Doppler, and Color Doppler    Study date:  2018    Patient: Mikel Barrow  MR number: [id number]  Account number: [de-identified]  : 1945  Age: 71 years  Gender: Male  Status: Inpatient  Location: Cath lab  Height: 66 in  Weight: 153 lb  BP: 109/ 52 mmHg    Indications: Mitral Valve Disease    Diagnoses: I34 9 - Nonrheumatic mitral valve disorder, unspecified    Sonographer:  RACHANA Vance,RDCS  Interpreting Physician:  Manjit Corbin MD  Referring Physician:  Manjit Corbin MD  Group:  [facility]    SUMMARY    LEFT VENTRICLE:  Ejection fraction was estimated to be 55 %  Abnormal septal motion noted  Concentric hypertrophy was present  LEFT ATRIUM:  The atrium was mildly to moderately dilated  ATRIAL SEPTUM:  Contrast injection was performed  suggestion of a small PFO with late apperance of bubbles crossing from right to left side  RIGHT ATRIUM:  The atrium was mildly dilated  MITRAL VALVE:  There was mild to moderate annular calcification  There is an echo density attached to the anterior mitral leaflet measuring 0 7X0 6cm likely c/w vegetation  There is restriction of the mobility of the posterior mitral leaflet  There was moderate to severe regurgitation  Patient is s/p mitral valve repair in   AORTIC VALVE:  There was mild regurgitation  TRICUSPID VALVE:  There was moderate regurgitation  AORTA:  There was moderately severe atheroma  HISTORY: PRIOR HISTORY: Mitral Valve Repair, Coronary Artery Disease, Hyperlipidemia, Hypertension, Atrial Fibrillation, Congestive Heart Failure, Fatigue, Prostate Cancer, Stent, Stroke, Transient Ischemic Attack    PROCEDURE: The procedure was performed in the catheterization laboratory  This was a routine study   The risks and alternatives of the procedure were explained to the patient and informed consent was obtained  The transesophageal approach  was used  The study included complete 2D imaging, limited spectral Doppler, and color Doppler  The heart rate was 68 bpm, at the start of the study  An adult omniplane probe was inserted by the attending cardiologist  Intubated with ease  One intubation attempt(s)  There was no blood detected on the probe  There were no complications during the procedure  MEDICATIONS: Benzocaine spray, topical to oropharynx, prior to procedure  Sedation administered by anesthesia team     LEFT VENTRICLE: Size was normal  Ejection fraction was estimated to be 55 %  Abnormal septal motion noted  Concentric hypertrophy was present  RIGHT VENTRICLE: The size was normal  Systolic function was normal  Wall thickness was normal     LEFT ATRIUM: The atrium was mildly to moderately dilated  No thrombus was identified  ATRIAL SEPTUM: Contrast injection was performed  suggestion of a small PFO with late apperance of bubbles crossing from right to left side  RIGHT ATRIUM: The atrium was mildly dilated  MITRAL VALVE: There was mild to moderate annular calcification  There is an echo density attached to the anterior mitral leaflet measuring 0 7X0 6cm likely c/w vegetation  There is restriction of the mobility of the posterior mitral  leaflet  DOPPLER: There was moderate to severe regurgitation  Patient is s/p mitral valve repair in 2014  AORTIC VALVE: The valve was trileaflet  Leaflets exhibited mild calcification  DOPPLER: There was mild regurgitation  TRICUSPID VALVE: The valve structure was normal  There was normal leaflet separation  There was no echocardiographic evidence of vegetation  DOPPLER: There was moderate regurgitation  PULMONIC VALVE: Leaflets exhibited normal thickness, no calcification, and normal cuspal separation  There was no echocardiographic evidence of vegetation      PERICARDIUM: There was no pericardial effusion  The pericardium was normal in appearance  AORTA: The root exhibited normal size  There was no atheroma  There was moderately severe atheroma  There was no evidence for dissection  There was no evidence for aneurysm  [address] Accredited Echocardiography Laboratory    Prepared and electronically signed by    Tone Ace MD  Signed [id number] 15:23:48    No results found for this or any previous visit  No valid procedures specified  No results found for this or any previous visit  Meds/Allergies   all current active meds have been reviewed  Medications Prior to Admission   Medication   • allopurinol (ZYLOPRIM) 100 mg tablet   • atorvastatin (LIPITOR) 40 mg tablet   • Azelaic Acid 15 % cream   • clopidogrel (PLAVIX) 75 mg tablet   • Farxiga 10 MG TABS   • furosemide (LASIX) 40 mg tablet   • metroNIDAZOLE (METROGEL) 0 75 % gel   • Multiple Vitamins-Minerals (PX MENS MULTIVITAMINS) TABS   • pantoprazole (PROTONIX) 40 mg tablet   • sacubitril-valsartan (ENTRESTO) 49-51 MG TABS   • triamcinolone (KENALOG) 0 5 % cream   • warfarin (COUMADIN) 2 5 mg tablet   • carvedilol (COREG) 6 25 mg tablet       heparin (porcine), 3-30 Units/kg/hr (Order-Specific), Last Rate: 10 6 Units/kg/hr (11/12/22 1205)          ** Please Note: Dragon 360 Dictation voice to text software may have been used in the creation of this document   **

## 2022-11-12 NOTE — ASSESSMENT & PLAN NOTE
Lab Results   Component Value Date    EGFR 65 11/12/2022    EGFR 67 11/11/2022    EGFR 63 11/10/2022    CREATININE 1 09 11/12/2022    CREATININE 1 06 11/11/2022    CREATININE 1 12 11/10/2022   · Currently at baseline  · Monitor BMP periodically

## 2022-11-12 NOTE — ASSESSMENT & PLAN NOTE
· History of afib, was in RVR on arrival to ED, currently rate controlled  · Continue Coreg at lower dose due to hypotension  · Anticoagulation with Coumadin target INR 2-3  · Coumadin dose to be given daily depending on INR  · Given that INR is around 1 5 and he got 8 mg of Coumadin on 11/11/2022, does of 10 mg of Coumadin being given today    Coumadin level being titrated daily

## 2022-11-12 NOTE — PROGRESS NOTES
[address] [facility]  Progress Note Loren Morris [birth year], 69 y o  male MRN: [id number]  Unit/Bed#: -01 Encounter: [id number]  Primary Care Provider: Prince Roberto MD   Date and time admitted to hospital: 11/6/2022  5:30 PM    Chronic ischemic right MCA stroke  Assessment & Plan  · Old stroke  · Continue anticoagulation  · Neurology input appreciated    Episode of unresponsiveness  Assessment & Plan  Patient had an and responsive episode in the early morning of 11/09/2022  · This happened when he was having a doppler study at bedside  · Lasted less than 10 seconds, no postictal state  · He was able to walk around  He did have an episode of incontinence along with the episode  · Patient had some prior episodes when fishing but he has never passed out  · Neurology input sought for the same  Seizures very unlikely  CT head shows no acute infarct but possibility of an old infarct  · Recommended outpatient EEG    Hypotension  Assessment & Plan  · Patient with hypotension with BP of 73/59, this improved with 250 mL fluid bolus  · Cardiology decreased coreg to 3 125 bid, decreased Entresto, and decreased aldactone  Hold parameters adjusted as well  · Midodrine increased, Cardiology adjusting blood pressure medications  · Continue to monitor blood pressure closely    Blood pressure 113/74, pulse (!) 110, temperature 97 6 °F (36 4 °C), resp  rate 20, height 5' 5" (1 651 m), weight 63 3 kg (139 lb 8 8 oz), SpO2 92 %  SIRS without infection or organ dysfunction ([facility] [address] )  Assessment & Plan  In the setting of PE/CHF as evidenced by HR  and RR of 22-26  · Now resolved  Continue to monitor on telemetry  · Monitor CBC periodically      CKD (chronic kidney disease)  Assessment & Plan  Lab Results   Component Value Date    EGFR 65 11/12/2022    EGFR 67 11/11/2022    EGFR 63 11/10/2022    CREATININE 1 09 11/12/2022    CREATININE 1 06 11/11/2022    CREATININE 1 12 11/10/2022   · Currently at baseline  · Monitor BMP periodically    S/P MVR (mitral valve repair)  Assessment & Plan  History of prosthetic Mitral Valve repair  · Follows with cardiology as an outpatient in [address] [place] [address]  · Continue heparin drip- and plan to discontinue when target INR 2-3  · Will give coumadin 10 mg tonight  · Will need daily Coumadin orders depending on INR    PAF (paroxysmal atrial fibrillation) (HCC)  Assessment & Plan  · History of afib, was in RVR on arrival to ED, currently rate controlled  · Continue Coreg at lower dose due to hypotension  · Anticoagulation with Coumadin target INR 2-3  · Coumadin dose to be given daily depending on INR  · Given that INR is around 1 5 and he got 8 mg of Coumadin on 11/11/2022, does of 10 mg of Coumadin being given today  Coumadin level being titrated daily    Acute on chronic combined systolic and diastolic congestive heart failure (HCC)  Assessment & Plan  Wt Readings from Last 3 Encounters:   11/12/22 63 3 kg (139 lb 8 8 oz)   09/23/22 65 7 kg (144 lb 12 8 oz)   05/19/22 67 1 kg (148 lb)     · Present on admission- elevated BNP, vascular congestion on CXR  · Last ECHO in 6/2022 with EF 35% and global hypokinesis  · Follows with primarily with his cardiology in [address] [place] [address]  · Monitor lytes, weight, I/O  · Cardiology consultation and their input appreciated  CAD (coronary atherosclerotic disease)  Assessment & Plan  No chest pain  Continue home regimen of Plavix  · Coreg and Entresto doses decreased due to hypotension  · Cardiology titrating dose of medications  Blood pressure 113/74, pulse (!) 110, temperature 97 6 °F (36 4 °C), resp  rate 20, height 5' 5" (1 651 m), weight 63 3 kg (139 lb 8 8 oz), SpO2 92 %          * Acute pulmonary embolism without acute cor pulmonale (HCC)  Assessment & Plan  Present on admission- Evidenced by CT imaging  · No heart strain noted  · On coumadin at baseline for prosthetic mitral valve  · Last INR on 10/22 was 2 81, INR on admission 4 11, increased to 4 47  · Could be coumadin failure vs transient subtherapeutic INR between 10/22 and today  · Hematology consultation appreciated- may be due to coumadin failure  · Started on heparin gtt in ED, continue  · ECHO in 6/2022 with EF 35%, repeat echo shows EF of 15-20% with global cardiomyopathy  · Venous duplex negative  TE Pharmacologic Prophylaxis: Heparin    Patient Centered Rounds: I have performed bedside rounds with nursing staff today  Discussions with Specialists or Other Care Team Provider:  Care team  Education and Discussions with Family / Patient:  Patient    Current Length of Stay: 6 day(s)  Current Patient Status: Inpatient     Certification Statement: The patient will continue to require additional inpatient hospital stay due to Acute pulmonary embolism without acute cor pulmonale [facility])  Discharge Plan / Estimated Discharge Date:  1-2 days    Code Status: Level 1 - Full Code  ______________________________________________________________________________    Subjective:   Patient seen and examined by me  No chest pain, palpitations or diaphoresis at this point of time  Patient does have weakness of the weakness is generalized  Does not have any shortness of breath  Continues to be on heparin drip  INR is subtherapeutic  No cough, fever or chills at this point of time  No further episodes of syncope or unresponsiveness or any focal weakness    Objective:   Vitals: Blood pressure 113/74, pulse (!) 110, temperature 97 6 °F (36 4 °C), resp  rate 20, height 5' 5" (1 651 m), weight 63 3 kg (139 lb 8 8 oz), SpO2 92 %      Physical Exam:   General appearance: alert, appears stated age and cooperative  Constitutional- looks a little weak  HEENT - atraumatic and normocephalic  Neck- supple  Skin - no fresh rash  Extremities no fresh focal deformities  Cardiovascular- S1-S2 heard  Respiratory- bilateral air entry present, no crackles or rhonchi  Skin - no fresh rash  Abdomen - normal bowel sounds present, no rebound tenderness  CNS- No fresh focal deficits  Psych- no acute psychosis     Additional Data:   Labs:  Results from last 7 days   Lab Units 11/12/22  0446 11/12/22  0445 11/11/22  0546 11/10/22  0550 11/09/22  0545 11/08/22  0519 11/07/22  0622 11/07/22  0314 11/06/22  1758   WBC Thousand/uL  --  8 62 8 99 8 90 10 12 10 23*  --  8 52 10 04   HEMOGLOBIN g/dL  --  14 4 15 2 15 3 15 3 15 7  --  14 7 15 7   HEMATOCRIT %  --  43 9 46 7 46 1 46 0 47 7  --  44 2 48 3   MCV fL  --  103* 106* 105* 104* 105*  --  106* 109*   PLATELETS Thousands/uL  --  183 180 196 197 198  --  246 235   INR  1 51*  --  1 52* 1 67*  --   --  4 47*  --  4 11*     Results from last 7 days   Lab Units 11/12/22 0445 11/11/22  0538 11/10/22  0550 11/09/22  0545 11/08/22  0519 11/07/22  0805 11/06/22  1758   SODIUM mmol/L 141 140 141 140 141 143 143   POTASSIUM mmol/L 3 9 4 0 3 8 3 5 3 5 4 3 4 5   CHLORIDE mmol/L 106 105 106 104 102 105 106   CO2 mmol/L 24 25 25 29 30 28 30   ANION GAP mmol/L 11 10 10 7 9 10 7   BUN mg/dL 28* 29* 30* 32* 25 17 18   CREATININE mg/dL 1 09 1 06 1 12 1 30 1 44* 1 23 1 42*   CALCIUM mg/dL 8 9 8 7 8 7 8 6 9 0 9 3 9 0   ALBUMIN g/dL  --   --  3 3*  --   --   --  3 9   TOTAL BILIRUBIN mg/dL  --   --  0 84  --   --   --  1 03*   ALK PHOS U/L  --   --  67  --   --   --  74   ALT U/L  --   --  18  --   --   --  26   AST U/L  --   --  25  --   --   --  30   EGFR ml/min/1 73sq m 65 67 63 53 46 56 47   GLUCOSE RANDOM mg/dL 102 98 96 112 111 91 91     Results from last 7 days   Lab Units 11/10/22  0550 11/07/22  0805   MAGNESIUM mg/dL 1 8 2 1         Results from last 7 days   Lab Units 11/06/22  1758   NT-PRO BNP pg/mL 10,317*      Results from last 7 days   Lab Units 11/11/22  1635 11/06/22  1758   LACTIC ACID mmol/L 1 2 0 9   PROCALCITONIN ng/ml  --  <0 05     Results from last 7 days   Lab Units 11/09/22  0957   POC GLUCOSE mg/dl 97             * I Have Reviewed All Lab Data Listed Above      Cultures:   Results from last 7 days   Lab Units 11/06/22  1758   BLOOD CULTURE  No Growth After 5 Days  No Growth After 5 Days  INFLUENZA A PCR  Negative     Results from last 7 days   Lab Units 11/06/22  1758   INFLUENZA A PCR  Negative   INFLUENZA B PCR  Negative   RSV PCR  Negative         Imaging:  Imaging Reports Reviewed Today Include:   CT head wo contrast    Result Date: 11/10/2022  Impression: 1  No acute intracranial abnormality  2   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  3   Chronic microangiopathic ischemic changes  4   Mild pansinus mucosal thickening  Workstation performed: [id number]     PE Study with CT abdomen & pelvis with contrast    Result Date: 11/6/2022  Impression: 1  Thin nonocclusive pulmonary embolism within a right lower lobe segmental pulmonary artery  2   Measured RV/LV ratio is within normal limits at less than 0 9   3   Pulmonary edema  No focal consolidation  4   Cardiomegaly with evidence of right heart dysfunction  5   Severe atherosclerotic calcifications with moderate to severe stenosis of the origins of the superior mesenteric artery and bilateral renal arteries  Areas of severe moderate to severe stenosis of the common iliac arteries, external iliac arteries and severe stenosis of the right common femoral artery  6   Other findings as above   I personally discussed this study with Rayne Jacobs on 11/6/2022 at 8:45 PM  Workstation performed: [id number]     Scheduled Meds:  Current Facility-Administered Medications   Medication Dose Route Frequency Provider Last Rate   • acetaminophen  650 mg Oral Q6H PRN Joni Carlson MD     • atorvastatin  40 mg Oral Daily With Corey Barrientos MD     • calcium carbonate  1,000 mg Oral Daily PRN Joni Carlson MD     • carvedilol  3 125 mg Oral BID Luz Marina Hall PA-C     • clopidogrel  75 mg Oral Daily Joni Carlson MD     • docusate sodium  100 mg Oral BID Joni Carlson MD     • fluticasone  1 spray Each Nare Daily Renie Najjar, MD     • heparin (porcine)  3-30 Units/kg/hr (Order-Specific) Intravenous Titrated Parul Cintron MD 10 6 Units/kg/hr (11/12/22 0701)   • heparin (porcine)  2,600 Units Intravenous Q1H PRN Parul Cintron MD     • heparin (porcine)  5,200 Units Intravenous Q1H PRN Parul Cintron MD     • loratadine  10 mg Oral Daily Renie Najjar, MD     • midodrine  5 mg Oral TID AC Otto Hillman MD     • ondansetron  4 mg Intravenous Q6H PRN Renie Najjar, MD     • pantoprazole  40 mg Oral Daily Renie Najjar, MD     • senna  1 tablet Oral Daily Renie Najjar, MD     • sodium chloride (PF)  3 mL Intravenous Q1H PRN Parul Cintron MD     • warfarin  10 mg Oral Once (warfarin) MD Chon Mars MD  [place] [address] Internal Medicine    ** Please Note: This note has been constructed using a voice recognition system   **

## 2022-11-12 NOTE — ASSESSMENT & PLAN NOTE
No chest pain  Continue home regimen of Plavix  · Coreg and Entresto doses decreased due to hypotension  · Cardiology titrating dose of medications  Blood pressure 113/74, pulse (!) 110, temperature 97 6 °F (36 4 °C), resp  rate 20, height 5' 5" (1 651 m), weight 63 3 kg (139 lb 8 8 oz), SpO2 92 %

## 2022-11-13 LAB
APTT PPP: 73 SECONDS (ref 23–37)
INR PPP: 2.17 (ref 0.84–1.19)
PROTHROMBIN TIME: 23.7 SECONDS (ref 11.6–14.5)

## 2022-11-13 RX ORDER — WARFARIN SODIUM 7.5 MG/1
7.5 TABLET ORAL
Status: DISCONTINUED | OUTPATIENT
Start: 2022-11-13 | End: 2022-11-14

## 2022-11-13 RX ADMIN — CARVEDILOL 6.25 MG: 6.25 TABLET, FILM COATED ORAL at 09:14

## 2022-11-13 RX ADMIN — WARFARIN SODIUM 7.5 MG: 7.5 TABLET ORAL at 18:30

## 2022-11-13 RX ADMIN — MIDODRINE HYDROCHLORIDE 5 MG: 5 TABLET ORAL at 05:45

## 2022-11-13 RX ADMIN — CARVEDILOL 6.25 MG: 6.25 TABLET, FILM COATED ORAL at 18:30

## 2022-11-13 RX ADMIN — MIDODRINE HYDROCHLORIDE 5 MG: 5 TABLET ORAL at 16:55

## 2022-11-13 RX ADMIN — SENNOSIDES 8.6 MG: 8.6 TABLET, FILM COATED ORAL at 09:14

## 2022-11-13 RX ADMIN — CLOPIDOGREL 75 MG: 75 TABLET, FILM COATED ORAL at 09:14

## 2022-11-13 RX ADMIN — MIDODRINE HYDROCHLORIDE 5 MG: 5 TABLET ORAL at 11:48

## 2022-11-13 RX ADMIN — ACETAMINOPHEN 650 MG: 325 TABLET ORAL at 10:04

## 2022-11-13 RX ADMIN — ATORVASTATIN CALCIUM 40 MG: 40 TABLET, FILM COATED ORAL at 16:55

## 2022-11-13 RX ADMIN — FLUTICASONE PROPIONATE 1 SPRAY: 50 SPRAY, METERED NASAL at 09:14

## 2022-11-13 RX ADMIN — LORATADINE 10 MG: 10 TABLET ORAL at 09:14

## 2022-11-13 RX ADMIN — PANTOPRAZOLE SODIUM 40 MG: 40 TABLET, DELAYED RELEASE ORAL at 05:45

## 2022-11-13 NOTE — ASSESSMENT & PLAN NOTE
· Being followed by cardiology    Carvedilol had to be increased for better rate control of atrial fibrillation  · Midodrine added

## 2022-11-13 NOTE — PROGRESS NOTES
General Cardiology   Progress Note   Lupis Morris 69 y o  male MRN: [id number]  Unit/Bed#: -01 Encounter: [id number]    Assessment/Plan:    1  Acute on chronic systolic heart failure   -patient currently euvolemic  -continue p o   Lasix, carvedilol  -strict I&Os and daily weights  -recommend 2000 mg sodium restricted diet  -TTE performed revealed severely reduced systolic function with an EF of 15-20% with severe global hypokinesis     2  Transient episode of lightheadedness with possible syncope  -evaluated by Neurology, plan for outpatient follow-up with EEG  -continue to monitor on telemetry for possible arrhythmogenic source  -further care per primary team     3  Hypotension  -blood pressure has greatly improved to the 120s this morning  -if it continues to stay elevated, reintroduce Entresto and spironolactone   -continue midodrine  -continue to monitor blood pressures closely     4  Cardiomyopathy with an EF of 15-20%, type unclear  -TTE performed revealed systolic function of 94-02%, mild concentric hypertrophy, severe global hypokinesis and normal diastolic function  -patient prior with a history of an EF of 35%  -has previously declined ICD  -will follow up with primary cardiologist in [place] for further discussion  -continue carvedilol   -continue to monitor for arrhythmias on telemetry     5  Acute pulmonary embolus  -currently on heparin drip while bridging to warfarin  -care per [facility] Hematology     6  Paroxysmal atrial fibrillation  -heart rates continue to be periodically elevated  -continue carvedilol to 6 25 mg b i d   -continue heparin drip with bridging to warfarin for anticoagulation, goal INR 2-3     7  History of CAD with remote PCI  -patient currently denies chest pain, shortness of breath or other anginal equivalent  -continue atorvastatin, carvedilol, Plavix  -recommend cardiac diet     8  History of bioprosthetic aortic and mitral valve replacements  -both noted to be well seated on recent TTE  -continue outpatient follow-up     9  Hyperlipidemia  -continue atorvastatin        Subjective:   Patient seen and examined  No significant events since the last encounter  REVIEW OF SYSTEMS:  Constitutional:  Denies fever or chills   Eyes:  Denies change in visual acuity   HENT:  Denies nasal congestion or sore throat   Respiratory:  Denies cough, orthopnea, PND or shortness of breath   Cardiovascular:  Denies chest pain, palpitations or edema   GI:  Denies abdominal pain, nausea, vomiting, bloody stools, constipation or diarrhea   :  Denies dysuria, frequency, difficulty in urination or nocturia  Musculoskeletal:  Denies back pain or joint pain   Neurologic:  Denies headache, focal weakness or sensory changes   Endocrine:  Denies polyuria or polydipsia   Lymphatic:  Denies swollen glands   Psychiatric:  Denies depression or anxiety     Objective:   Vitals:  Vitals:    22 0735   BP: 127/73   Pulse: (!) 114   Resp: 13   Temp: (!) 97 3 °F (36 3 °C)   SpO2: 92%       Body mass index is 23 22 kg/m²  Systolic (63VUV), XNZ:218 , Min:109 , JRM:619     Diastolic (52DWK), GH, Min:63, Max:98      Intake/Output Summary (Last 24 hours) at 2022 0909  Last data filed at 2022 0549  Gross per 24 hour   Intake 660 ml   Output 320 ml   Net 340 ml     Weight (last 2 days)     Date/Time Weight    22 0600 63 3 (139 55)    22 0600 63 3 (139 55)          Telemetry Review: No significant arrhythmias seen on telemetry review  PHYSICAL EXAMS  General:  Patient is not in acute distress, laying in the bed comfortably, awake  Head: Normocephalic, Atraumatic     HEENT: White sclera, pink conjunctiva  Neck:  Supple, no neck vein distention  Respiratory: clear to auscultation   Cardiovascular: S1-S2 normal, no murmurs, thrills, gallops, rubs, regular rhythm  GI:  Abdomen soft, nontender, non-distended  Extremities: No edema, normal pulses  Integument:  No skin rashes or ulceration  Neurologic:  Patient is awake alert, responding to command, oriented to person, place and time    LABORATORY RESULTS:      CBC with diff:   Results from last 7 days   Lab Units 11/12/22 0445 11/11/22 0546 11/10/22  0550 11/07/22  0314 11/06/22  1758   WBC Thousand/uL 8 62 8 99 8 90   < > 10 04   HEMOGLOBIN g/dL 14 4 15 2 15 3   < > 15 7   HEMATOCRIT % 43 9 46 7 46 1   < > 48 3   MCV fL 103* 106* 105*   < > 109*   PLATELETS Thousands/uL 183 180 196   < > 235   MCH pg 33 6 34 5* 34 8*   < > 35 4*   MCHC g/dL 32 8 32 5 33 2   < > 32 5   RDW % 14 2 14 3 14 3   < > 14 6   MPV fL 9 6 9 4 9 3   < > 9 4   NRBC AUTO /100 WBCs  --   --   --   --  0    < > = values in this interval not displayed  CMP:  Results from last 7 days   Lab Units 11/12/22 0445 11/11/22  0538 11/10/22  0550 11/07/22  0805 11/06/22  1758   POTASSIUM mmol/L 3 9 4 0 3 8   < > 4 5   CHLORIDE mmol/L 106 105 106   < > 106   CO2 mmol/L 24 25 25   < > 30   BUN mg/dL 28* 29* 30*   < > 18   CREATININE mg/dL 1 09 1 06 1 12   < > 1 42*   CALCIUM mg/dL 8 9 8 7 8 7   < > 9 0   AST U/L  --   --  25  --  30   ALT U/L  --   --  18  --  26   ALK PHOS U/L  --   --  67  --  74   EGFR ml/min/1 73sq m 65 67 63   < > 47    < > = values in this interval not displayed         BMP:  Results from last 7 days   Lab Units 11/12/22 0445 11/11/22  0538 11/10/22  0550   POTASSIUM mmol/L 3 9 4 0 3 8   CHLORIDE mmol/L 106 105 106   CO2 mmol/L 24 25 25   BUN mg/dL 28* 29* 30*   CREATININE mg/dL 1 09 1 06 1 12   CALCIUM mg/dL 8 9 8 7 8 7         Results from last 7 days   Lab Units 11/06/22  1758   NT-PRO BNP pg/mL 10,317*      Results from last 7 days   Lab Units 11/10/22  0550 11/07/22  0805   MAGNESIUM mg/dL 1 8 2 1             Results from last 7 days   Lab Units 11/13/22  0549 11/12/22  0446 11/11/22  0546 11/10/22  0550 11/07/22  0622 11/06/22  1758   INR  2 17* 1 51* 1 52* 1 67* 4 47* 4 11*       Lipid Profile:   No results found for: CHOL  Lab Results   Component Value Date    HDL 34 (L) 12/10/2018    HDL 45 2018     Lab Results   Component Value Date    LDLCALC 46 12/10/2018    LDLCALC 53 2018     Lab Results   Component Value Date    TRIG 119 12/10/2018    TRIG 106 2018       Cardiac testing:   Results for orders placed during the hospital encounter of 18    Echo complete with contrast if indicated    Narrative  [address] [place] [id number] [phone]    Transthoracic Echocardiogram  2D, M-mode, Doppler, and Color Doppler    Study date:  2018    Patient: Fermín Hardin  MR number: [id number]  Account number: [de-identified]  : 1945  Age: 71 years  Gender: Male  Status: Inpatient  Location: Bedside  Height: 66 in  Weight: 153 lb  BP: 144/ 60 mmHg    Indications: Coronary Artery Disease    Diagnoses: I25 10 - Atherosclerotic heart disease of native coronary artery without angina pectoris    Sonographer:  Erling Burkitt, BS,RDCS  Interpreting Physician:  Guy Rivera MD  Referring Physician:  Ramya Conner MD  Group:  [facility]    SUMMARY    LEFT VENTRICLE:  Ejection fraction was estimated to be 55 %  There were no regional wall motion abnormalities  Concentric hypertrophy was present  RIGHT VENTRICLE:  The ventricle was mildly dilated  Estimated peak pressure was at least 70 mmHg c/w severe pulmonary hypertension  LEFT ATRIUM:  The atrium was mildly to moderately dilated  RIGHT ATRIUM:  The atrium was dilated  MITRAL VALVE:  There was moderate to marked annular calcification  There was moderate calcification of MV leaflets  Vegetation can not be ruled out with certainty  Consider GUY if clinically indicated  There was moderate to severe regurgitation  Patient has history of MV repair  AORTIC VALVE:  There was mild to moderate stenosis    There was mild to moderate regurgitation  TRICUSPID VALVE:  There was mild regurgitation  HISTORY: PRIOR HISTORY: Hyperlipidemia, Atrial Fibrillation, Transient Ischemic Attack, Hypertension, Acute Renal Failure, S/P Stent, Mitral Valve Repair    PROCEDURE: The procedure was performed at the bedside  This was a routine study  The transthoracic approach was used  The study included complete 2D imaging, M-mode, complete spectral Doppler, and color Doppler  The heart rate was 77 bpm,  at the start of the study  Images were obtained from the parasternal, apical, subcostal, and suprasternal notch acoustic windows  Intravenous contrast ( 0 6ml Definity used in NSS) was administered to opacify the left ventricle  Image  quality was adequate  LEFT VENTRICLE: Size was normal  Ejection fraction was estimated to be 55 %  There were no regional wall motion abnormalities  Concentric hypertrophy was present  RIGHT VENTRICLE: The ventricle was mildly dilated  Systolic function was normal  Wall thickness was normal  DOPPLER: Estimated peak pressure was at least 70 mmHg c/w severe pulmonary hypertension  LEFT ATRIUM: The atrium was mildly to moderately dilated  RIGHT ATRIUM: The atrium was dilated  MITRAL VALVE: There was moderate to marked annular calcification  There was moderate calcification of MV leaflets  Vegetation can not be ruled out with certainty  Consider GUY if clinically indicated  DOPPLER: There was moderate to severe  regurgitation  Patient has history of MV repair  AORTIC VALVE: The valve was probably trileaflet  Leaflets exhibited mild to moderate calcification  DOPPLER: There was mild to moderate stenosis  There was mild to moderate regurgitation  TRICUSPID VALVE: The valve structure was normal  There was normal leaflet separation  DOPPLER: The transtricuspid velocity was within the normal range  There was no evidence for stenosis  There was mild regurgitation      PULMONIC VALVE: Leaflets exhibited normal thickness, no calcification, and normal cuspal separation  DOPPLER: The transpulmonic velocity was within the normal range  There was no regurgitation  PERICARDIUM: There was no pericardial effusion  The pericardium was normal in appearance  AORTA: The root exhibited normal size  SYSTEM MEASUREMENT TABLES    2D  %FS: 32 3 %  Ao Diam: 3 7 cm  EDV(Teich): 122 6 ml  EF Biplane: 46 %  EF(Teich): 60 3 %  ESV(Teich): 48 7 ml  IVSd: 1 3 cm  LA Area: 28 7 cm2  LA Diam: 3 3 cm  LVEDV MOD A2C: 147 ml  LVEDV MOD A4C: 149 7 ml  LVEDV MOD BP: 148 1 ml  LVEF MOD A2C: 39 7 %  LVEF MOD A4C: 52 3 %  LVESV MOD A2C: 88 6 ml  LVESV MOD A4C: 71 4 ml  LVESV MOD BP: 80 ml  LVIDd: 5 1 cm  LVIDs: 3 4 cm  LVLd A2C: 8 8 cm  LVLd A4C: 8 9 cm  LVLs A2C: 8 cm  LVLs A4C: 8 1 cm  LVOT Diam: 2 1 cm  LVPWd: 1 2 cm  RA Area: 21 3 cm2  RVIDd: 3 4 cm  SV MOD A2C: 58 4 ml  SV MOD A4C: 78 4 ml  SV(Teich): 73 9 ml    CW  AR Dec [place]: 2 7 m/s2  AR Dec Time: 1294 ms  AR PHT: 375 3 ms  AR Vmax: 3 5 m/s  AR maxP 1 mmHg  AV Env  Ti: 253 5 ms  AV VTI: 45 1 cm  AV Vmax: 2 8 m/s  AV Vmean: 1 8 m/s  AV maxP 4 mmHg  AV meanPG: 15 3 mmHg  HR: 71 5 BPM  MR VTI: 150 1 cm  MR Vmax: 5 6 m/s  MR Vmean: 4 4 m/s  MR maxP 7 mmHg  MR meanP 5 mmHg  MV VTI: 65 cm  MV Vmax: 2 2 m/s  MV Vmean: 1 2 m/s  MV maxP 4 mmHg  MV meanP 2 mmHg  TR Vmax: 4 2 m/s  TR maxP 6 mmHg    MM  TAPSE: 2 1 cm    PW  RADHA (VTI): 1 7 cm2  RADHA Vmax: 1 6 cm2  E': 0 m/s  E/E': 48 8  LVOT Env  Ti: 267 7 ms  LVOT VTI: 21 8 cm  LVOT Vmax: 1 3 m/s  LVOT Vmean: 0 8 m/s  LVOT maxP 8 mmHg  LVOT meanPG: 3 2 mmHg  LVSV Dopp: 76 8 ml  MV A Naun: 1 6 m/s  MV Dec [place]: 4 8 m/s2  MV DecT: 353 4 ms  MV E Naun: 1 7 m/s  MV E/A Ratio: 1 1  MV PHT: 102 5 ms  MVA (VTI): 1 2 cm2  MVA By PHT: 2 1 cm2    [employer] Accredited Echocardiography Laboratory    Prepared and electronically signed by    Leticia Millan MD  Signed 2018 16:40:44    Results for orders placed during the hospital encounter of 18    GUY    Narrative  [address] [place] [address]  [phone]    Transesophageal Echocardiogram  2D, Doppler, and Color Doppler    Study date:  2018    Patient: Мария Evans  MR number: [id number]  Account number: [de-identified]  : 1945  Age: 71 years  Gender: Male  Status: Inpatient  Location: Cath lab  Height: 66 in  Weight: 153 lb  BP: 109/ 52 mmHg    Indications: Mitral Valve Disease    Diagnoses: I34 9 - Nonrheumatic mitral valve disorder, unspecified    Sonographer:  Christiano Torres BS,RDCS  Interpreting Physician:  Leticia Millan MD  Referring Physician:  Leticia Millan MD  Group:  [facility]    SUMMARY    LEFT VENTRICLE:  Ejection fraction was estimated to be 55 %  Abnormal septal motion noted  Concentric hypertrophy was present  LEFT ATRIUM:  The atrium was mildly to moderately dilated  ATRIAL SEPTUM:  Contrast injection was performed  suggestion of a small PFO with late apperance of bubbles crossing from right to left side  RIGHT ATRIUM:  The atrium was mildly dilated  MITRAL VALVE:  There was mild to moderate annular calcification  There is an echo density attached to the anterior mitral leaflet measuring 0 7X0 6cm likely c/w vegetation  There is restriction of the mobility of the posterior mitral leaflet  There was moderate to severe regurgitation  Patient is s/p mitral valve repair in   AORTIC VALVE:  There was mild regurgitation  TRICUSPID VALVE:  There was moderate regurgitation  AORTA:  There was moderately severe atheroma  HISTORY: PRIOR HISTORY: Mitral Valve Repair, Coronary Artery Disease, Hyperlipidemia, Hypertension, Atrial Fibrillation, Congestive Heart Failure, Fatigue, Prostate Cancer, Stent, Stroke, Transient Ischemic Attack    PROCEDURE: The procedure was performed in the catheterization laboratory   This was a routine study  The risks and alternatives of the procedure were explained to the patient and informed consent was obtained  The transesophageal approach  was used  The study included complete 2D imaging, limited spectral Doppler, and color Doppler  The heart rate was 68 bpm, at the start of the study  An adult omniplane probe was inserted by the attending cardiologist  Intubated with ease  One intubation attempt(s)  There was no blood detected on the probe  There were no complications during the procedure  MEDICATIONS: Benzocaine spray, topical to oropharynx, prior to procedure  Sedation administered by anesthesia team     LEFT VENTRICLE: Size was normal  Ejection fraction was estimated to be 55 %  Abnormal septal motion noted  Concentric hypertrophy was present  RIGHT VENTRICLE: The size was normal  Systolic function was normal  Wall thickness was normal     LEFT ATRIUM: The atrium was mildly to moderately dilated  No thrombus was identified  ATRIAL SEPTUM: Contrast injection was performed  suggestion of a small PFO with late apperance of bubbles crossing from right to left side  RIGHT ATRIUM: The atrium was mildly dilated  MITRAL VALVE: There was mild to moderate annular calcification  There is an echo density attached to the anterior mitral leaflet measuring 0 7X0 6cm likely c/w vegetation  There is restriction of the mobility of the posterior mitral  leaflet  DOPPLER: There was moderate to severe regurgitation  Patient is s/p mitral valve repair in 2014  AORTIC VALVE: The valve was trileaflet  Leaflets exhibited mild calcification  DOPPLER: There was mild regurgitation  TRICUSPID VALVE: The valve structure was normal  There was normal leaflet separation  There was no echocardiographic evidence of vegetation  DOPPLER: There was moderate regurgitation  PULMONIC VALVE: Leaflets exhibited normal thickness, no calcification, and normal cuspal separation   There was no echocardiographic evidence of vegetation  PERICARDIUM: There was no pericardial effusion  The pericardium was normal in appearance  AORTA: The root exhibited normal size  There was no atheroma  There was moderately severe atheroma  There was no evidence for dissection  There was no evidence for aneurysm  [address] Accredited Echocardiography Laboratory    Prepared and electronically signed by    Glorious Carrel, MD  Signed [id number] 15:23:48    No results found for this or any previous visit  No valid procedures specified  No results found for this or any previous visit  Meds/Allergies   all current active meds have been reviewed  Medications Prior to Admission   Medication   • allopurinol (ZYLOPRIM) 100 mg tablet   • atorvastatin (LIPITOR) 40 mg tablet   • Azelaic Acid 15 % cream   • clopidogrel (PLAVIX) 75 mg tablet   • Farxiga 10 MG TABS   • furosemide (LASIX) 40 mg tablet   • metroNIDAZOLE (METROGEL) 0 75 % gel   • Multiple Vitamins-Minerals (PX MENS MULTIVITAMINS) TABS   • pantoprazole (PROTONIX) 40 mg tablet   • sacubitril-valsartan (ENTRESTO) 49-51 MG TABS   • triamcinolone (KENALOG) 0 5 % cream   • warfarin (COUMADIN) 2 5 mg tablet   • carvedilol (COREG) 6 25 mg tablet       heparin (porcine), 3-30 Units/kg/hr (Order-Specific), Last Rate: 10 6 Units/kg/hr (11/12/22 1501)          ** Please Note: Dragon 360 Dictation voice to text software may have been used in the creation of this document   **

## 2022-11-13 NOTE — ASSESSMENT & PLAN NOTE
· Atrial fibrillation with tachycardia  · Carvedilol increased by cardiology to 6 25 mg b i d   Continue monitoring on telemetry due to persistent tachycardia  · INR now therapeutic, will discontinue heparin infusion and continue warfarin but at 7 5 mg    Results from last 7 days   Lab Units 11/13/22  0549 11/12/22  0446 11/11/22  0546 11/10/22  0550 11/07/22  0622 11/06/22  1758   INR  2 17* 1 51* 1 52* 1 67* 4 47* 4 11*

## 2022-11-13 NOTE — ASSESSMENT & PLAN NOTE
Wt Readings from Last 3 Encounters:   11/13/22 63 3 kg (139 lb 8 8 oz)   09/23/22 65 7 kg (144 lb 12 8 oz)   05/19/22 67 1 kg (148 lb)     Results from last 7 days   Lab Units 11/06/22  1758   NT-PRO BNP pg/mL 10,317*     · Followed by cardiology  Further diuresis held due to hypotension  · Follows with Dr Charan Sam in [place]  · Worsening dysfunction now down to 15-20%  Entresto held due to hypotension    Patient declined ICD for now

## 2022-11-13 NOTE — ASSESSMENT & PLAN NOTE
Lab Results   Component Value Date    EGFR 65 11/12/2022    EGFR 67 11/11/2022    EGFR 63 11/10/2022    CREATININE 1 09 11/12/2022    CREATININE 1 06 11/11/2022    CREATININE 1 12 11/10/2022     · Stable at baseline

## 2022-11-13 NOTE — PROGRESS NOTES
[facility]  Progress Note Hema Morris [birth year], 69 y o  male MRN: [id number]  Unit/Bed#: -01 Encounter: [id number]  Primary Care Provider: Joanne Gomez MD   Date and time admitted to hospital: 11/6/2022  5:30 PM    * Acute pulmonary embolism without acute cor pulmonale [facility])  Assessment & Plan  · Presentation to hospital for recurrent falls found to have pulmonary embolism with supratherapeutic INR  · Patient was seen by Hematology with recommendations to continue warfarin  PAF (paroxysmal atrial fibrillation) ([facility])  Assessment & Plan  · Atrial fibrillation with tachycardia  · Carvedilol increased by cardiology to 6 25 mg b i d  Continue monitoring on telemetry due to persistent tachycardia  · INR now therapeutic, will discontinue heparin infusion and continue warfarin but at 7 5 mg    Results from last 7 days   Lab Units 11/13/22  0549 11/12/22  0446 11/11/22  0546 11/10/22  0550 11/07/22  0622 11/06/22  1758   INR  2 17* 1 51* 1 52* 1 67* 4 47* 4 11*       Episode of unresponsiveness  Assessment & Plan  · Episode of decreased responsiveness 11/9/2022  · Seen by neurology  Outpatient EEG recommended    Hypotension  Assessment & Plan  · Being followed by cardiology  Carvedilol had to be increased for better rate control of atrial fibrillation  · Midodrine added    CKD (chronic kidney disease)  Assessment & Plan  Lab Results   Component Value Date    EGFR 65 11/12/2022    EGFR 67 11/11/2022    EGFR 63 11/10/2022    CREATININE 1 09 11/12/2022    CREATININE 1 06 11/11/2022    CREATININE 1 12 11/10/2022     · Stable at baseline    Acute on chronic combined systolic and diastolic congestive heart failure [facility])  Assessment & Plan  Wt Readings from Last 3 Encounters:   11/13/22 63 3 kg (139 lb 8 8 oz)   09/23/22 65 7 kg (144 lb 12 8 oz)   05/19/22 67 1 kg (148 lb)     Results from last 7 days   Lab Units 11/06/22  1758   NT-PRO BNP pg/mL 10,317*     · Followed by cardiology  Further diuresis held due to hypotension  · Follows with Dr Pipe Lopez in [place]  · Worsening dysfunction now down to 15-20%  Entresto held due to hypotension  Patient declined ICD for now    CAD (coronary atherosclerotic disease)  Assessment & Plan  · CAD with remote history of PCI  No chest pain  · Continue aspirin and clopidogrel        VTE Pharmacologic Prophylaxis: VTE Score: 7 High Risk (Score >/= 5) - Pharmacological DVT Prophylaxis Ordered: warfarin (Coumadin)  Sequential Compression Devices Ordered  Patient Centered Rounds: I have performed bedside rounds with nursing staff today  Discussions with Specialists or Other Care Team Provider:  Cardiology    Education and Discussions with Family / Patient: Updated  (son) via phone  Time Spent for Care: 30 mins  More than 50% of total time spent on counseling and coordination of care as described above  Current Length of Stay: 7 day(s)  Current Patient Status: Inpatient   Certification Statement: The patient will continue to require additional inpatient hospital stay due to persistent tachycardia  Discharge Plan / Estimated Discharge Date: Anticipate discharge in 24-48 hrs to home with home services  (when cleared by cardiology)    Code Status: Level 1 - Full Code      Subjective:   Patient seen and examined  No new complaints  Feeling pretty good  Objective:   Vitals: Blood pressure 127/73, pulse (!) 114, temperature (!) 97 3 °F (36 3 °C), resp  rate 13, height 5' 5" (1 651 m), weight 63 3 kg (139 lb 8 8 oz), SpO2 92 %  Intake/Output Summary (Last 24 hours) at 11/13/2022 1111  Last data filed at 11/13/2022 0549  Gross per 24 hour   Intake 660 ml   Output 320 ml   Net 340 ml       Physical Exam  Vitals reviewed  Constitutional:       General: He is not in acute distress  Appearance: Normal appearance  HENT:      Head: Atraumatic  Eyes:      Extraocular Movements: Extraocular movements intact     Cardiovascular: Rate and Rhythm: Rhythm irregular  Heart sounds: Normal heart sounds  Pulmonary:      Effort: Pulmonary effort is normal       Breath sounds: Decreased breath sounds present  No wheezing  Abdominal:      Palpations: Abdomen is soft  Tenderness: There is no abdominal tenderness  There is no rebound  Musculoskeletal:         General: No swelling or tenderness  Skin:     General: Skin is warm and dry  Neurological:      General: No focal deficit present  Mental Status: He is alert  Cranial Nerves: No cranial nerve deficit  Psychiatric:         Mood and Affect: Mood normal        Additional Data:   Labs:  Results from last 7 days   Lab Units 11/13/22  0549 11/12/22 0446 11/12/22  0445 11/11/22  0546 11/10/22  0550   WBC Thousand/uL  --   --  8 62 8 99 8 90   HEMOGLOBIN g/dL  --   --  14 4 15 2 15 3   PLATELETS Thousands/uL  --   --  183 180 196   MCV fL  --   --  103* 106* 105*   INR  2 17* 1 51*  --  1 52* 1 67*     Results from last 7 days   Lab Units 11/12/22 0445 11/11/22  0538 11/10/22  0550 11/07/22  0805 11/06/22  1758   SODIUM mmol/L 141 140 141   < > 143   POTASSIUM mmol/L 3 9 4 0 3 8   < > 4 5   CHLORIDE mmol/L 106 105 106   < > 106   CO2 mmol/L 24 25 25   < > 30   ANION GAP mmol/L 11 10 10   < > 7   BUN mg/dL 28* 29* 30*   < > 18   CREATININE mg/dL 1 09 1 06 1 12   < > 1 42*   CALCIUM mg/dL 8 9 8 7 8 7   < > 9 0   ALBUMIN g/dL  --   --  3 3*  --  3 9   TOTAL BILIRUBIN mg/dL  --   --  0 84  --  1 03*   ALK PHOS U/L  --   --  67  --  74   ALT U/L  --   --  18  --  26   AST U/L  --   --  25  --  30   EGFR ml/min/1 73sq m 65 67 63   < > 47   GLUCOSE RANDOM mg/dL 102 98 96   < > 91    < > = values in this interval not displayed       Results from last 7 days   Lab Units 11/10/22  0550 11/07/22  0805   MAGNESIUM mg/dL 1 8 2 1         Results from last 7 days   Lab Units 11/06/22  2019 11/06/22  1758   HS TNI 0HR ng/L  --  13   HS TNI 2HR ng/L 16  --      Results from last 7 days Lab Units 11/06/22  1758   NT-PRO BNP pg/mL 10,317*      Results from last 7 days   Lab Units 11/11/22  1635 11/06/22  1758   LACTIC ACID mmol/L 1 2 0 9   PROCALCITONIN ng/ml  --  <0 05     Results from last 7 days   Lab Units 11/09/22  0957   POC GLUCOSE mg/dl 97             * I Have Reviewed All Lab Data Listed Above  Cultures:   Results from last 7 days   Lab Units 11/06/22  1758   BLOOD CULTURE  No Growth After 5 Days  No Growth After 5 Days  INFLUENZA A PCR  Negative       Results from last 7 days   Lab Units 11/06/22  1758   SARS-COV-2  Negative   INFLUENZA A PCR  Negative   INFLUENZA B PCR  Negative   RSV PCR  Negative           Lines/Drains:  Invasive Devices  Report    Peripheral Intravenous Line  Duration           Peripheral IV 11/11/22 Left;Ventral (anterior) Forearm 2 days              Telemetry:   Telemetry Orders (From admission, onward)             48 Hour Telemetry Monitoring  Continuous x 48 hours        References:    Telemetry Guidelines   Question:  Reason for 48 Hour Telemetry  Answer:  Acute Decompensated CHF (continuous diuretic infusion or total diuretic dose > 200 mg daily, associated electrolyte derangement, ionotropic drip, history of ventricular arrhythmia, or new EF <35%)                  Imaging:  Imaging Reports Reviewed Today Include:   XR chest portable    Result Date: 11/12/2022  Impression: No acute cardiopulmonary disease  Workstation performed: [id number]     CT head wo contrast    Result Date: 11/10/2022  Impression: 1  No acute intracranial abnormality  2   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  3   Chronic microangiopathic ischemic changes  4   Mild pansinus mucosal thickening  Workstation performed: [id number]     PE Study with CT abdomen & pelvis with contrast    Result Date: 11/6/2022  Impression: 1  Thin nonocclusive pulmonary embolism within a right lower lobe segmental pulmonary artery   2   Measured RV/LV ratio is within normal limits at less than 0 9   3   Pulmonary edema  No focal consolidation  4   Cardiomegaly with evidence of right heart dysfunction  5   Severe atherosclerotic calcifications with moderate to severe stenosis of the origins of the superior mesenteric artery and bilateral renal arteries  Areas of severe moderate to severe stenosis of the common iliac arteries, external iliac arteries and severe stenosis of the right common femoral artery  6   Other findings as above  I personally discussed this study with Odalis Davila on 11/6/2022 at 8:45 PM  Workstation performed: [id number]       Scheduled Meds:  Current Facility-Administered Medications   Medication Dose Route Frequency Provider Last Rate   • acetaminophen  650 mg Oral Q6H PRN Pauleen Cooks, MD     • atorvastatin  40 mg Oral Daily With Faisal Weinstein MD     • calcium carbonate  1,000 mg Oral Daily PRN Pauleen Cooks, MD     • carvedilol  6 25 mg Oral  [facility] PkDOMINGO bhagatNP     • clopidogrel  75 mg Oral Daily Pauleen Cooks, MD     • docusate sodium  100 mg Oral BID Pauleen Cooks, MD     • fluticasone  1 spray Each Nare Daily Pauleen Cooks, MD     • heparin (porcine)  3-30 Units/kg/hr (Order-Specific) Intravenous Titrated Michael Ram MD 10 6 Units/kg/hr (11/12/22 1501)   • heparin (porcine)  2,600 Units Intravenous Q1H PRN Michael Ram MD     • heparin (porcine)  5,200 Units Intravenous Q1H PRN Michael Ram MD     • loratadine  10 mg Oral Daily Pauleen Cooks, MD     • midodrine  5 mg Oral TID AC Sarath Hdz MD     • ondansetron  4 mg Intravenous Q6H PRN Pauleen Cooks, MD     • pantoprazole  40 mg Oral Daily Pauleen Cooks, MD     • senna  1 tablet Oral Daily Pauleen Cooks, MD     • sodium chloride (PF)  3 mL Intravenous Q1H PRN Michael Ram MD         Today, Patient Was Seen By: Brooke Yun DO    ** Please Note: Dictation voice to text software may have been used in the creation of this document   **

## 2022-11-13 NOTE — ASSESSMENT & PLAN NOTE
· Presentation to hospital for recurrent falls found to have pulmonary embolism with supratherapeutic INR  · Patient was seen by Hematology with recommendations to continue warfarin

## 2022-11-14 ENCOUNTER — APPOINTMENT (INPATIENT)
Dept: NEUROLOGY | Facility: HOSPITAL | Age: 77
End: 2022-11-14

## 2022-11-14 VITALS
WEIGHT: 139.77 LBS | OXYGEN SATURATION: 92 % | SYSTOLIC BLOOD PRESSURE: 137 MMHG | HEIGHT: 65 IN | HEART RATE: 99 BPM | TEMPERATURE: 97.4 F | BODY MASS INDEX: 23.29 KG/M2 | RESPIRATION RATE: 17 BRPM | DIASTOLIC BLOOD PRESSURE: 94 MMHG

## 2022-11-14 LAB
ALBUMIN SERPL BCP-MCNC: 3.3 G/DL (ref 3.5–5)
ALP SERPL-CCNC: 68 U/L (ref 46–116)
ALT SERPL W P-5'-P-CCNC: 26 U/L (ref 12–78)
ANION GAP SERPL CALCULATED.3IONS-SCNC: 8 MMOL/L (ref 4–13)
AST SERPL W P-5'-P-CCNC: 21 U/L (ref 5–45)
BILIRUB SERPL-MCNC: 0.5 MG/DL (ref 0.2–1)
BUN SERPL-MCNC: 24 MG/DL (ref 5–25)
CALCIUM ALBUM COR SERPL-MCNC: 9.5 MG/DL (ref 8.3–10.1)
CALCIUM SERPL-MCNC: 8.9 MG/DL (ref 8.3–10.1)
CHLORIDE SERPL-SCNC: 108 MMOL/L (ref 96–108)
CO2 SERPL-SCNC: 25 MMOL/L (ref 21–32)
CREAT SERPL-MCNC: 1.01 MG/DL (ref 0.6–1.3)
ERYTHROCYTE [DISTWIDTH] IN BLOOD BY AUTOMATED COUNT: 14.2 % (ref 11.6–15.1)
GFR SERPL CREATININE-BSD FRML MDRD: 71 ML/MIN/1.73SQ M
GLUCOSE SERPL-MCNC: 98 MG/DL (ref 65–140)
HCT VFR BLD AUTO: 44.9 % (ref 36.5–49.3)
HGB BLD-MCNC: 14.6 G/DL (ref 12–17)
INR PPP: 2.96 (ref 0.84–1.19)
MCH RBC QN AUTO: 34.2 PG (ref 26.8–34.3)
MCHC RBC AUTO-ENTMCNC: 32.5 G/DL (ref 31.4–37.4)
MCV RBC AUTO: 105 FL (ref 82–98)
PLATELET # BLD AUTO: 180 THOUSANDS/UL (ref 149–390)
PMV BLD AUTO: 9.7 FL (ref 8.9–12.7)
POTASSIUM SERPL-SCNC: 4.1 MMOL/L (ref 3.5–5.3)
PROT SERPL-MCNC: 6.6 G/DL (ref 6.4–8.4)
PROTHROMBIN TIME: 30.2 SECONDS (ref 11.6–14.5)
RBC # BLD AUTO: 4.27 MILLION/UL (ref 3.88–5.62)
SODIUM SERPL-SCNC: 141 MMOL/L (ref 135–147)
WBC # BLD AUTO: 9.54 THOUSAND/UL (ref 4.31–10.16)

## 2022-11-14 RX ORDER — MIDODRINE HYDROCHLORIDE 5 MG/1
2.5 TABLET ORAL
Status: DISCONTINUED | OUTPATIENT
Start: 2022-11-14 | End: 2022-11-14 | Stop reason: HOSPADM

## 2022-11-14 RX ORDER — WARFARIN SODIUM 5 MG/1
5 TABLET ORAL
Qty: 30 TABLET | Refills: 0 | Status: SHIPPED | OUTPATIENT
Start: 2022-11-14

## 2022-11-14 RX ORDER — MIDODRINE HYDROCHLORIDE 2.5 MG/1
2.5 TABLET ORAL
Qty: 90 TABLET | Refills: 0 | Status: SHIPPED | OUTPATIENT
Start: 2022-11-14 | End: 2022-12-14

## 2022-11-14 RX ORDER — FLUTICASONE PROPIONATE 50 MCG
1 SPRAY, SUSPENSION (ML) NASAL DAILY
Qty: 11.1 ML | Refills: 0 | Status: SHIPPED | OUTPATIENT
Start: 2022-11-15

## 2022-11-14 RX ORDER — WARFARIN SODIUM 5 MG/1
5 TABLET ORAL
Status: DISCONTINUED | OUTPATIENT
Start: 2022-11-14 | End: 2022-11-14 | Stop reason: HOSPADM

## 2022-11-14 RX ADMIN — LORATADINE 10 MG: 10 TABLET ORAL at 09:51

## 2022-11-14 RX ADMIN — SACUBITRIL AND VALSARTAN 1 TABLET: 24; 26 TABLET, FILM COATED ORAL at 12:28

## 2022-11-14 RX ADMIN — PANTOPRAZOLE SODIUM 40 MG: 40 TABLET, DELAYED RELEASE ORAL at 05:42

## 2022-11-14 RX ADMIN — MIDODRINE HYDROCHLORIDE 5 MG: 5 TABLET ORAL at 05:43

## 2022-11-14 RX ADMIN — CARVEDILOL 6.25 MG: 6.25 TABLET, FILM COATED ORAL at 09:51

## 2022-11-14 RX ADMIN — FLUTICASONE PROPIONATE 1 SPRAY: 50 SPRAY, METERED NASAL at 09:52

## 2022-11-14 RX ADMIN — MIDODRINE HYDROCHLORIDE 2.5 MG: 5 TABLET ORAL at 12:28

## 2022-11-14 RX ADMIN — CLOPIDOGREL 75 MG: 75 TABLET, FILM COATED ORAL at 09:50

## 2022-11-14 NOTE — PLAN OF CARE
Problem: Potential for Falls  Goal: Patient will remain free of falls  Description: INTERVENTIONS:  - Educate patient/family on patient safety including physical limitations  - Instruct patient to call for assistance with activity   - Consult OT/PT to assist with strengthening/mobility   - Keep Call bell within reach  - Keep bed low and locked with side rails adjusted as appropriate  - Keep care items and personal belongings within reach  - Initiate and maintain comfort rounds  - Make Fall Risk Sign visible to staff  - Offer Toileting every 2 Hours, in advance of need  - Initiate/Maintain Bed alarm  - Obtain necessary fall risk management equipment: Bed alarm  - Apply yellow socks and bracelet for high fall risk patients  - Consider moving patient to room near nurses station  11/14/2022 0150 by Luba Jack RN  Outcome: Progressing  11/14/2022 0150 by Luba Jack RN  Outcome: Progressing     Problem: MOBILITY - ADULT  Goal: Maintain or return to baseline ADL function  Description: INTERVENTIONS:  -  Assess patient's ability to carry out ADLs; assess patient's baseline for ADL function and identify physical deficits which impact ability to perform ADLs (bathing, care of mouth/teeth, toileting, grooming, dressing, etc )  - Assess/evaluate cause of self-care deficits   - Assess range of motion  - Assess patient's mobility; develop plan if impaired  - Assess patient's need for assistive devices and provide as appropriate  - Encourage maximum independence but intervene and supervise when necessary  - Involve family in performance of ADLs  - Assess for home care needs following discharge   - Consider OT consult to assist with ADL evaluation and planning for discharge  - Provide patient education as appropriate  11/14/2022 0150 by Luba Jack RN  Outcome: Progressing  11/14/2022 0150 by Luba Jack RN  Outcome: Progressing  Goal: Maintains/Returns to pre admission functional level  Description: INTERVENTIONS:  - Perform BMAT or MOVE assessment daily    - Set and communicate daily mobility goal to care team and patient/family/caregiver  - Collaborate with rehabilitation services on mobility goals if consulted  - Perform Range of Motion 4 times a day  - Reposition patient every 2 hours    - Dangle patient 4 times a day  - Stand patient 4 times a day  - Ambulate patient 4 times a day  - Out of bed to chair 3 times a day   - Out of bed for meals 3 times a day  - Out of bed for toileting  - Record patient progress and toleration of activity level   11/14/2022 0150 by Mary Jo Grewal RN  Outcome: Progressing  11/14/2022 0150 by Mary Jo Grewal RN  Outcome: Progressing     Problem: Prexisting or High Potential for Compromised Skin Integrity  Goal: Skin integrity is maintained or improved  Description: INTERVENTIONS:  - Identify patients at risk for skin breakdown  - Assess and monitor skin integrity  - Assess and monitor nutrition and hydration status  - Monitor labs   - Assess for incontinence   - Turn and reposition patient  - Assist with mobility/ambulation  - Relieve pressure over bony prominences  - Avoid friction and shearing  - Provide appropriate hygiene as needed including keeping skin clean and dry  - Evaluate need for skin moisturizer/barrier cream  - Collaborate with interdisciplinary team   - Patient/family teaching  - Consider wound care consult   11/14/2022 0150 by Mary Jo Grewal RN  Outcome: Progressing  11/14/2022 0150 by Mary Jo Grewal RN  Outcome: Progressing     Problem: RESPIRATORY - ADULT  Goal: Achieves optimal ventilation and oxygenation  Description: INTERVENTIONS:  - Assess for changes in respiratory status  - Assess for changes in mentation and behavior  - Position to facilitate oxygenation and minimize respiratory effort  - Oxygen administered by appropriate delivery if ordered  - Initiate smoking cessation education as indicated  - Encourage broncho-pulmonary hygiene including cough, deep breathe, Incentive Spirometry  - Assess the need for suctioning and aspirate as needed  - Assess and instruct to report SOB or any respiratory difficulty  - Respiratory Therapy support as indicated  11/14/2022 0150 by Neelima Mathias RN  Outcome: Progressing  11/14/2022 0150 by Neelima Mathias RN  Outcome: Progressing     Problem: HEMATOLOGIC - ADULT  Goal: Maintains hematologic stability  Description: INTERVENTIONS  - Assess for signs and symptoms of bleeding or hemorrhage  - Monitor labs  - Administer supportive blood products/factors as ordered and appropriate  11/14/2022 0150 by Neelima Mathias RN  Outcome: Progressing  11/14/2022 0150 by Neelima Mathias RN  Outcome: Progressing

## 2022-11-14 NOTE — CASE MANAGEMENT
Case Management Progress Note    Patient name Jovi Morris  Location [address] 318/-[id number] MRN [id number]  : 1945 Date 2022       LOS (days): 8  Geometric Mean LOS (GMLOS) (days): 3 90  Days to GMLOS:-3 7        OBJECTIVE:        Current admission status: Inpatient  Preferred Pharmacy:   [address] [place] [address], [address] [place] [address] Rachelle [place]ONNELL [address]  Phone: [phone] Fax: [phone]    Primary Care Provider: Elmira Toure MD    Primary Insurance: MEDICARE  Secondary Insurance: BLUE CROSS    PROGRESS NOTE:  CM received call from patient's son, asking for dc update  He is planning to come pick patient up at dc  CM relayed to SLIM who reached out via phone and discussed plan for anticipated dc this afternoon  BECKY reached out to [facility] via [address] to inform them of dc today  CM met w/ patient at bedside to review IMM and he is happy to dc home once cleared  He already has his copy from Friday; CM signed and placed copy for scanning to medical records  CM Dept will continue to follow

## 2022-11-14 NOTE — DISCHARGE SUMMARY
[facility]  Discharge Note Loren Morris [birth year], 69 y o  male MRN: [id number]  Unit/Bed#: -01 Encounter: [id number]  Primary Care Provider: Prince Roberto MD   Date and time admitted to hospital: 11/6/2022  5:30 PM     Admitting Provider:  Cristina Olmedo MD  Discharge Provider:  Omid Ghotra MD  Admission Date: 11/6/2022       Discharge Date: 11/14/22   LOS: 8  Primary Care Physician at Discharge: Prince Roberto [phone]    HOSPITAL COURSE:  Trav Matias is a 69 y o  male who presented with acute pulmonary embolism and hypotension  Patient was treated for the same with heparin drip and then transitioned to Coumadin  Coumadin 5 mg at discharge daily with monitoring of INR with target of 2-3  Because the hypotension his medications were reduced  Patient then was discharged on carvedilol 6 25 mg p o  B i d  And Entresto 04/20/2026 at discharge  Lasix was held  Patient told to follow-up with his cardiologist in [place] or our cardiologist in [facility]  Patient verbalized understanding  He is discharged with home health services    REASON FOR ADMISSION/ ADMISSION DIAGNOSES    DISCHARGE DIAGNOSES    Acute pulmonary embolism without acute cor pulmonale (HCC)  Assessment & Plan  · Presentation to hospital for recurrent falls found to have pulmonary embolism with supratherapeutic INR  · Patient was seen by Hematology with recommendations to continue warfarin      PAF (paroxysmal atrial fibrillation) (HCC)  Assessment & Plan  · Atrial fibrillation with tachycardia  · Carvedilol increased by cardiology to 6 25 mg b i d  Continue monitoring on telemetry due to persistent tachycardia  · Warfarin 5 mg p o   Daily at discharge               Results from last 7 days   Lab Units 11/13/22  0549 11/12/22  0446 11/11/22  0546 11/10/22  0550 11/07/22  0622 11/06/22  1758   INR   2 17* 1 51* 1 52* 1 67* 4 47* 4 11*         Episode of unresponsiveness  Assessment & Plan  · Episode of decreased responsiveness 11/9/2022  · Seen by neurology  EEG done and results pending     Hypotension  Assessment & Plan  · Being followed by cardiology  Carvedilol had to be increased for better rate control of atrial fibrillation  · Midodrine added to discharge medications at 2 5 mg 3 times a day     CKD (chronic kidney disease)  Assessment & Plan        Lab Results   Component Value Date     EGFR 65 11/12/2022     EGFR 67 11/11/2022     EGFR 63 11/10/2022     CREATININE 1 09 11/12/2022     CREATININE 1 06 11/11/2022     CREATININE 1 12 11/10/2022      · Stable at baseline     Acute on chronic combined systolic and diastolic congestive heart failure (HCC)  Assessment & Plan    · Followed by cardiology  Further diuresis held due to hypotension  · Follows with Dr Heidy Alberto in [place]  · Worsening dysfunction now down to 15-20%  Entresto held due to hypotension  Patient declined ICD for now  · Restarted on Entresto at a lower dose of 24 26 at discharge  · Hold off Lasix due to hypotension     CAD (coronary atherosclerotic disease)  Assessment & Plan  · CAD with remote history of PCI  No chest pain  · Continue aspirin and clopidogrel        CONSULTING PROVIDERS   IP CONSULT TO CARDIOLOGY  IP CONSULT TO HEMATOLOGY  IP CONSULT TO NEUROLOGY  IP CONSULT TO NUTRITION SERVICES    PROCEDURES PERFORMED  * No surgery found *    RADIOLOGY RESULTS  XR chest portable    Result Date: 11/12/2022  Impression: No acute cardiopulmonary disease  Workstation performed: [id number]     CT head wo contrast    Result Date: 11/10/2022  Impression: 1  No acute intracranial abnormality  2   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  3   Chronic microangiopathic ischemic changes  4   Mild pansinus mucosal thickening  Workstation performed: [id number]     PE Study with CT abdomen & pelvis with contrast    Result Date: 11/6/2022  Impression: 1    Thin nonocclusive pulmonary embolism within a right lower lobe segmental pulmonary artery  2   Measured RV/LV ratio is within normal limits at less than 0 9   3   Pulmonary edema  No focal consolidation  4   Cardiomegaly with evidence of right heart dysfunction  5   Severe atherosclerotic calcifications with moderate to severe stenosis of the origins of the superior mesenteric artery and bilateral renal arteries  Areas of severe moderate to severe stenosis of the common iliac arteries, external iliac arteries and severe stenosis of the right common femoral artery  6   Other findings as above   I personally discussed this study with Estella Rock on 11/6/2022 at 8:45 PM  Workstation performed: [id number]       LABS  Results from last 7 days   Lab Units 11/14/22 0447 11/13/22  0549 11/12/22  0446 11/12/22  0445 11/11/22  0546 11/10/22  0550 11/09/22  0545 11/08/22  0519   WBC Thousand/uL 9 54  --   --  8 62 8 99 8 90 10 12 10 23*   HEMOGLOBIN g/dL 14 6  --   --  14 4 15 2 15 3 15 3 15 7   HEMATOCRIT % 44 9  --   --  43 9 46 7 46 1 46 0 47 7   MCV fL 105*  --   --  103* 106* 105* 104* 105*   PLATELETS Thousands/uL 180  --   --  183 180 196 197 198   INR  2 96* 2 17* 1 51*  --  1 52* 1 67*  --   --      Results from last 7 days   Lab Units 11/14/22 0447 11/12/22 0445 11/11/22  0538 11/10/22  0550 11/09/22  0545 11/08/22  0519   SODIUM mmol/L 141 141 140 141 140 141   POTASSIUM mmol/L 4 1 3 9 4 0 3 8 3 5 3 5   CHLORIDE mmol/L 108 106 105 106 104 102   CO2 mmol/L 25 24 25 25 29 30   BUN mg/dL 24 28* 29* 30* 32* 25   CREATININE mg/dL 1 01 1 09 1 06 1 12 1 30 1 44*   CALCIUM mg/dL 8 9 8 9 8 7 8 7 8 6 9 0   ALBUMIN g/dL 3 3*  --   --  3 3*  --   --    TOTAL BILIRUBIN mg/dL 0 50  --   --  0 84  --   --    ALK PHOS U/L 68  --   --  67  --   --    ALT U/L 26  --   --  18  --   --    AST U/L 21  --   --  25  --   --    EGFR ml/min/1 73sq m 71 65 67 63 53 46   GLUCOSE RANDOM mg/dL 98 102 98 96 112 111                  Results from last 7 days   Lab Units 11/09/22  0957   POC GLUCOSE mg/dl 97             Results from last 7 days   Lab Units 11/11/22  1635   LACTIC ACID mmol/L 1 2           Cultures:   Results from last 7 days   Lab Units 11/11/22  1635   COLOR UA  Yellow   CLARITY UA  Clear   SPEC GRAV UA  1 017   PH UA  6 0   LEUKOCYTES UA  Negative   NITRITE UA  Negative   GLUCOSE UA mg/dl Negative   KETONES UA mg/dl Negative   BILIRUBIN UA  Negative   BLOOD UA  Negative      Results from last 7 days   Lab Units 11/11/22  1635   RBC UA /hpf 1-2   WBC UA /hpf None Seen   EPITHELIAL CELLS WET PREP /hpf Occasional   BACTERIA UA /hpf None Seen            PHYSICAL EXAM:  Vitals:   Blood Pressure: 137/94 (11/14/22 1422)  Pulse: 99 (11/14/22 1422)  Temperature: (!) 97 4 °F (36 3 °C) (11/14/22 0745)  Temp Source: Oral (11/08/22 9847)  Respirations: 17 (11/14/22 1422)  Height: 5' 5" (165 1 cm) (11/09/22 0945)  Weight - Scale: 63 4 kg (139 lb 12 4 oz) (11/14/22 0600)  SpO2: 92 % (11/14/22 1422)    General appearance: alert, appears stated age and cooperative  HEENT - atraumatic and normocephalic  Neck- supple  Skin - no fresh rash  Extremities no fresh focal deformities  Cardiovascular- S1-S2 heard  Respiratory- bilateral air entry present, no crackles or rhonchi  Skin - no fresh rash  Abdomen - normal bowel sounds present, no rebound tenderness  CNS- No fresh focal deficits  Psych- no acute psychosis     Planned Re-admission:  No  Discharge Disposition: Home with [facility]    Test Results Pending at Discharge:   Pending Labs     Order Current Status    Methylmalonic acid, serum In process      Incidental findings:  None    Medications   · Summary of Medication Adjustments made as a result of this hospitalization:  Lasix stop Entresto dose reduced to 04/20/2026 Coreg does 6 25 p o  B i d  Midodrine 2 5 mg p o  3 times a day  · Medication Dosing Tapers - Please refer to Discharge Medication List for details on any medication dosing tapers (if applicable to patient)    · Discharge Medication List: See after visit summary for reconciled discharge medications  Diet restrictions:  Heart healthy diet        Diet Orders   (From admission, onward)             Start     Ordered    11/07/22 0910  Diet Cardiovascular; Sodium 2 GM  Diet effective now        References:    Nutrtion Support Algorithm Enteral vs  Parenteral   Question Answer Comment   Diet Type Cardiovascular    Cardiac Sodium 2 GM    RD to adjust diet per protocol? Yes        11/07/22 0909              Activity restrictions: No strenuous activity  Discharge Condition: fair    Outpatient Follow-Up and Discharge Instructions  See after visit summary section titled Discharge Instructions for information provided to patient and family  Code Status: Level 1 - Full Code  Discharge Statement   I spent 35 minutes discharging the patient  This time was spent on the day of discharge  Greater than 50% of total time was spent with the patient and / or family counseling and / or coordination of care  Abeba Sheppard MD  [place] [phone] [facility]    ** Please Note: This note has been constructed using a voice recognition system   **

## 2022-11-14 NOTE — PLAN OF CARE
Problem: Potential for Falls  Goal: Patient will remain free of falls  Description: INTERVENTIONS:  - Educate patient/family on patient safety including physical limitations  - Instruct patient to call for assistance with activity   - Consult OT/PT to assist with strengthening/mobility   - Keep Call bell within reach  - Keep bed low and locked with side rails adjusted as appropriate  - Keep care items and personal belongings within reach  - Initiate and maintain comfort rounds  - Make Fall Risk Sign visible to staff  - Apply yellow socks and bracelet for high fall risk patients  - Consider moving patient to room near nurses station  Outcome: Progressing     Problem: MOBILITY - ADULT  Goal: Maintain or return to baseline ADL function  Description: INTERVENTIONS:  -  Assess patient's ability to carry out ADLs; assess patient's baseline for ADL function and identify physical deficits which impact ability to perform ADLs (bathing, care of mouth/teeth, toileting, grooming, dressing, etc )  - Assess/evaluate cause of self-care deficits   - Assess range of motion  - Assess patient's mobility; develop plan if impaired  - Assess patient's need for assistive devices and provide as appropriate  - Encourage maximum independence but intervene and supervise when necessary  - Involve family in performance of ADLs  - Assess for home care needs following discharge   - Consider OT consult to assist with ADL evaluation and planning for discharge  - Provide patient education as appropriate  Outcome: Progressing     Problem: RESPIRATORY - ADULT  Goal: Achieves optimal ventilation and oxygenation  Description: INTERVENTIONS:  - Assess for changes in respiratory status  - Assess for changes in mentation and behavior  - Position to facilitate oxygenation and minimize respiratory effort  - Oxygen administered by appropriate delivery if ordered  - Initiate smoking cessation education as indicated  - Encourage broncho-pulmonary hygiene including cough, deep breathe, Incentive Spirometry  - Assess the need for suctioning and aspirate as needed  - Assess and instruct to report SOB or any respiratory difficulty  - Respiratory Therapy support as indicated  Outcome: Progressing     Problem: CARDIOVASCULAR - ADULT  Goal: Maintains optimal cardiac output and hemodynamic stability  Description: INTERVENTIONS:  - Monitor I/O, vital signs and rhythm  - Monitor for S/S and trends of decreased cardiac output  - Administer and titrate ordered vasoactive medications to optimize hemodynamic stability  - Assess quality of pulses, skin color and temperature  - Assess for signs of decreased coronary artery perfusion  - Instruct patient to report change in severity of symptoms  Outcome: Progressing  Goal: Absence of cardiac dysrhythmias or at baseline rhythm  Description: INTERVENTIONS:  - Continuous cardiac monitoring, vital signs, obtain 12 lead EKG if ordered  - Administer antiarrhythmic and heart rate control medications as ordered  - Monitor electrolytes and administer replacement therapy as ordered  Outcome: Progressing     Problem: HEMATOLOGIC - ADULT  Goal: Maintains hematologic stability  Description: INTERVENTIONS  - Assess for signs and symptoms of bleeding or hemorrhage  - Monitor labs  - Administer supportive blood products/factors as ordered and appropriate  Outcome: Progressing

## 2022-11-15 ENCOUNTER — TRANSITIONAL CARE MANAGEMENT (OUTPATIENT)
Dept: INTERNAL MEDICINE CLINIC | Facility: CLINIC | Age: 77
End: 2022-11-15

## 2022-11-15 LAB — METHYLMALONATE SERPL-SCNC: 564 NMOL/L (ref 0–378)

## 2022-11-18 ENCOUNTER — RA CDI HCC (OUTPATIENT)
Dept: OTHER | Facility: HOSPITAL | Age: 77
End: 2022-11-18

## 2022-11-18 NOTE — PROGRESS NOTES
[facility]  coding opportunities        I11 0  Chart Reviewed number of suggestions sent to Provider: 1     Patients Insurance     Medicare Insurance: [employer]

## 2022-11-22 ENCOUNTER — TELEPHONE (OUTPATIENT)
Dept: HEMATOLOGY ONCOLOGY | Facility: CLINIC | Age: 77
End: 2022-11-22

## 2022-11-22 NOTE — TELEPHONE ENCOUNTER
Appointment Cancellation Or Reschedule     Person calling in Patient    If other than patient calling, are they listed on the communication consent form? na   Provider Dayo   Office Visit Date and Time 11/29/22@ 1:30pm   [facility] Visit [facility]   Did patient want to reschedule their office appointment? If so, when was it scheduled to? No   No No   Do you need STAR set up for your new appointment? If yes, please send to "PATIENT RIDESHARE" pool for STAR rescheduling No   If you are cancelling appointment, can we notify STAR to cancel ride? If yes, please send to "PATIENT RIDESHARE" pool for STAR to cancel service Na   Is this patient calling to reschedule an infusion appointment? No   When is their next infusion appointment? Na   Is this patient a Chemo patient? No   Reason for Cancellation or Reschedule Patient states he will follow up with his Cardiologist      If the patient is a treatment patient, please route this to the office nurse  If the patient is not on treatment, please route to the office MA  If the patient is a surgical oncology patient, please route to surg/onc clinical pool

## 2022-11-22 NOTE — TELEPHONE ENCOUNTER
Phoned patient to discuss Terrea Pleva recommendations requiring B12 injections  Patient aware that he would need to be seen office prior to provider being able provide treatment for this  Informed patient that his family doctor would also be an option for him to receive the B12 injections  Patient stated " he is seeing his family doctor tomorrow" and will discuss with him  Patient will call to reschedule if needed or advised by family physician  No further questions or concerns from patient  Patient appreciative of call

## 2022-11-22 NOTE — TELEPHONE ENCOUNTER
Patient needs B12 injections  He has not seen us in over a year  He needs to come in our office before I provide treatment for this  However, if he would like to be seen by his PCP instead for this that is also another option  Please inform patient   Thank you

## 2022-11-23 ENCOUNTER — TELEPHONE (OUTPATIENT)
Dept: HEMATOLOGY ONCOLOGY | Facility: CLINIC | Age: 77
End: 2022-11-23

## 2022-11-23 NOTE — TELEPHONE ENCOUNTER
Appointment Cancellation Or Reschedule     Person calling In self   If other than patient calling, are they listed on the communication consent form? self   Provider Nicolette Daniels   Office Visit Date and Time 11/29   Office Visit Location [facility]   Did patient want to reschedule their office appointment? If so, when was it scheduled to? Yes, 11/29 1:30PM   Did you have STAR scheduled for this appointment? no   Do you need STAR set up for your new appointment? If yes, please send to "PATIENT RIDESHARE" pool for STAR rescheduling no   If you are cancelling appointment, can we notify STAR to cancel ride? If yes, please send to "PATIENT RIDESHARE" pool for STAR to cancel service no   Is this patient calling to reschedule an infusion appointment? no   When is their next infusion appointment? no   Is this patient a Chemo patient? no   Reason for Cancellation or Reschedule sched conflict     If the patient is a treatment patient, please route this to the office nurse  If the patient is not on treatment, please route to the office MA  If the patient is a surgical oncology patient, please route to surg/onc clinical pool

## 2022-11-25 ENCOUNTER — OFFICE VISIT (OUTPATIENT)
Dept: INTERNAL MEDICINE CLINIC | Facility: CLINIC | Age: 77
End: 2022-11-25

## 2022-11-25 VITALS
HEART RATE: 39 BPM | OXYGEN SATURATION: 98 % | HEIGHT: 65 IN | TEMPERATURE: 96.8 F | DIASTOLIC BLOOD PRESSURE: 60 MMHG | WEIGHT: 140.5 LBS | SYSTOLIC BLOOD PRESSURE: 90 MMHG | RESPIRATION RATE: 16 BRPM | BODY MASS INDEX: 23.41 KG/M2

## 2022-11-25 DIAGNOSIS — I25.10 ATHEROSCLEROSIS OF NATIVE CORONARY ARTERY OF NATIVE HEART WITHOUT ANGINA PECTORIS: ICD-10-CM

## 2022-11-25 DIAGNOSIS — I50.43 ACUTE ON CHRONIC COMBINED SYSTOLIC AND DIASTOLIC CONGESTIVE HEART FAILURE (HCC): ICD-10-CM

## 2022-11-25 DIAGNOSIS — I48.0 PAF (PAROXYSMAL ATRIAL FIBRILLATION) (HCC): ICD-10-CM

## 2022-11-25 DIAGNOSIS — Z79.01 CHRONIC ANTICOAGULATION: ICD-10-CM

## 2022-11-25 DIAGNOSIS — I26.99 ACUTE PULMONARY EMBOLISM WITHOUT ACUTE COR PULMONALE, UNSPECIFIED PULMONARY EMBOLISM TYPE (HCC): Primary | ICD-10-CM

## 2022-11-25 RX ORDER — SPIRONOLACTONE 25 MG/1
12.5 TABLET ORAL DAILY
COMMUNITY

## 2022-11-25 RX ORDER — BIMATOPROST 0.01 %
DROPS OPHTHALMIC (EYE)
COMMUNITY
Start: 2022-11-22

## 2022-11-25 RX ORDER — CLINDAMYCIN PHOSPHATE 10 MG/G
GEL TOPICAL
COMMUNITY
Start: 2022-11-18

## 2022-11-25 RX ORDER — FUROSEMIDE 40 MG/1
40 TABLET ORAL DAILY
COMMUNITY

## 2022-11-25 NOTE — PROGRESS NOTES
Assessment & Plan     1  Acute pulmonary embolism without acute cor pulmonale, unspecified pulmonary embolism type ([facility] )    2  Acute on chronic combined systolic and diastolic congestive heart failure (HCC)    3  Atherosclerosis of native coronary artery of native heart without angina pectoris    4  PAF (paroxysmal atrial fibrillation) ([facility] )    5  Chronic anticoagulation  appears stable at this point  Follow-up with cardiology as planned  Told him to monitor his heart rate and blood pressure after  He may need to lower his carvedilol further  Continue other medications  Subjective     Transitional Care Management Review:   Alejandro Nash is a 69 y o  male here for TCM follow up  During the TCM phone call patient stated:  TCM Call     Date and time call was made  11/15/2022 10:54 AM    Hospital care reviewed  Records reviewed    Patient was hospitialized at  [facility]    Date of Admission  11/06/22    Date of discharge  11/14/22    Diagnosis  Acute pulmonary embolism without acute cor pulmonale    Disposition  Home    Were the patients medications reviewed and updated  Yes    Current Symptoms  None      TCM Call     Post hospital issues  None    Should patient be enrolled in anticoag monitoring? No    Scheduled for follow up? Yes    Did you obtain your prescribed medications  Yes    Do you need help managing your prescriptions or medications  No    Is transportation to your appointment needed  No    I have advised the patient to call PCP with any new or worsening symptoms  roshan poole [facility]    Living Arrangements  Family members    Support System  None    Are you recieving any outpatient services  No    Are you recieving home care services  No    Are you using any community resources  No    Current waiver services  No    Have you fallen in the last 12 months  No    Interperter language line needed  No    Counseling  Patient          Patient comes in today for hospital follow-up    He had gone in with what sounded like a brief syncope  He was found to have a pulmonary embolism  His INR was actually high  Adjustments were made in his medicine and he was stabilized rather quickly  He was seen by several consultants  He does have follow-up with his cardiologist and also Hematology  He states he has done fine since discharge  Heart rate was low here but he feels fine  He has been checking his blood pressure at home and states the numbers were better there  He has visiting nurses  Hospital records were reviewed  Review of Systems   Respiratory: Negative for shortness of breath  Cardiovascular: Negative for chest pain  Gastrointestinal: Negative for abdominal pain  Objective     BP 90/60 (BP Location: Left arm, Patient Position: Sitting, Cuff Size: Adult)   Pulse (!) 39   Temp (!) 96 8 °F (36 °C) (Tympanic)   Resp 16   Ht 5' 5" (1 651 m)   Wt 63 7 kg (140 lb 8 oz)   SpO2 98%   BMI 23 38 kg/m²      Physical Exam  Vitals and nursing note reviewed  Constitutional:       Appearance: Normal appearance  Cardiovascular:      Rate and Rhythm: Normal rate and regular rhythm  Pulmonary:      Effort: Pulmonary effort is normal       Breath sounds: Normal breath sounds  Neurological:      Mental Status: He is alert and oriented to person, place, and time     Psychiatric:         Mood and Affect: Mood normal          Behavior: Behavior normal        Darlyn Cowden, MD

## 2022-11-29 ENCOUNTER — OFFICE VISIT (OUTPATIENT)
Dept: HEMATOLOGY ONCOLOGY | Facility: CLINIC | Age: 77
End: 2022-11-29

## 2022-11-29 VITALS
BODY MASS INDEX: 22.99 KG/M2 | RESPIRATION RATE: 16 BRPM | OXYGEN SATURATION: 97 % | WEIGHT: 138 LBS | HEART RATE: 101 BPM | SYSTOLIC BLOOD PRESSURE: 112 MMHG | HEIGHT: 65 IN | DIASTOLIC BLOOD PRESSURE: 52 MMHG

## 2022-11-29 DIAGNOSIS — D75.89 MACROCYTOSIS: ICD-10-CM

## 2022-11-29 DIAGNOSIS — R79.89 HIGH SERUM METHYLMALONIC ACID: Primary | ICD-10-CM

## 2022-11-29 DIAGNOSIS — D51.8 OTHER VITAMIN B12 DEFICIENCY ANEMIA: ICD-10-CM

## 2022-11-29 DIAGNOSIS — D52.0 DIETARY FOLATE DEFICIENCY ANEMIA: ICD-10-CM

## 2022-11-29 DIAGNOSIS — I26.99 OTHER ACUTE PULMONARY EMBOLISM WITHOUT ACUTE COR PULMONALE (HCC): ICD-10-CM

## 2022-11-29 NOTE — PROGRESS NOTES
HEMATOLOGY CLINIC NOTE    Primary Care Provider: Dennys Mendes MD  Referring Provider:    MRN: [id number]  : 1945    Assessment / Plan:   1  High serum methylmalonic acid  2  Macrocytosis  This is a 69year old male who recently was hospitalized for PE  He was previously seen in our office for macrocytic anemia which was thought to be chronic disease related, mitral valve related  He was lost to follow-up in our clinic  His anemia has now resolved  However, he still has chronic macrocytosis in isolation  He had workup in the hospital including B12, MMA which showed that MMA was elevated in the 500s despite normal B12  Ideally, patient should be started on vitamin B12 1000 mcg once weekly + B12 1000mcg once daily  However, patient has been inundated with doctor's appointments after his hospitalization  Prefers to not have to have B12 injections in the infusion center  He would like to proceed 1st with vitamin B12 1000 mcg once daily oral supplement  We will repeat B12, MMA in 2 months  We will also obtain other workup as below  He denies any bleeding  Reticulocyte count was elevated  We will check for hemolysis versus bleeding as below  - CBC and differential; Future  - Haptoglobin; Future  - Direct antiglobulin test; Future  - Vitamin B12; Future  - Homocysteine, serum; Future  - Methylmalonic acid, serum; Future  - Occult Blood, Fecal Immunochemical; Future    3  Other acute pulmonary embolism without acute cor pulmonale ([facility] [address] )  Patient had a recent pulmonary embolism as described in HPI below  Patient will continue Coumadin for now  He will be on this lifelong to be managed by Cardiology  No need for additional testing such a thrombosis panel testing as this will not  at this time      · Discussion of decision making    I personally reviewed the following lab results, the image studies, pathology, other specialty/physicians consult notes and recommendations, and outside medical records from [facility]  I had a lengthy discussion with the patient and shared the work-up findings  I spent 30 minutes reviewing the records (labs, clinician notes, outside records, medical history, ordering medicine/tests/procedures, interpreting the imaging/labs previously done) and coordination of care as well as direct time with the patient today, of which greater than 50% of the time was spent in counseling and coordination of care with the patient/family  · Plan/Labs  · Patient to take vitamin B12 1000 mcg once daily with repeat testing in 2 months as well as other workup as above  · Continue to follow-up with cardiology regarding recent PE + Coumadin management  Follow Up: 2 months     All questions were answered to the patient's satisfaction during this encounter  The patient knows the contact information for our office and knows to reach out for any relevant concerns related to this encounter  They are to call for any temperature 100 4 or higher, new symptoms including but not restricted to shaking chills, decreased appetite, nausea, vomiting, diarrhea, increased fatigue, shortness of breath or chest pain, confusion, and not feeling the strength to come to the clinic  For all other listed problems and medical diagnosis in their chart - they are managed by PCP and/or other specialists, which the patient acknowledges  Thank you very much for your consultation and making us a part of this patient's care  We are continuing to follow closely with you  Please do not hesitate to reach out to me with any additional questions or concerns  Reason for visit:       Chief Complaint   Patient presents with   • Follow-up     History of Hematology Illness:     Julia Lugo is a 69 y o  male presents for a follow up  1  Pulmonary Embolism  - 11/2022:  Patient had CTA showing thin nonocclusive pulmonary embolism within a right lower lobe segmental pulmonary artery    RV/LV ratio was within normal limits  Venous Doppler was negative for DVT  Unclear etiology  There was concern for warfarin failure as patient was already on this for AFib  Patient to stay on lifelong anticoagulation for AFib  Cardiologist managing warfarin at this time  2  Macrocytosis  - patient did have anemia previously which was thought to be secondary to CHF, valve prior to replacement  Anemia has now resolved; however, macrocytosis is still present   - B12 normal at 329, folic acid normal   MMA elevated at 564  Homocystine normal   Copper normal   Retic elevated absolute count 136,000, pct at 2 95%   - CBC = WBC 9 54, Hgb 14 6, , PLT 180K, no diff done  3  History of Prostate Cancer   Patient has a history of prostate cancer from approximately 12 years ago  Patient states he had a prostatectomy and follows a urologist yearly  Patient most recent PSA 4/27/2020 <0 01  Pt states he has had more recent PSA which I cannot view         4  Lifelong anticoagulation due to Afib   Patient on coumadin for Afib  This is managed by cardiologist         Interval History:   3/8/2021: Patient came in for follow up  Patient has fatigue likely due to history of CHF, valvular disease, anemia can contribute to this too  Patient appears pale  No PICA  Patient will undergo open heart surgery due to valvular disease soon  Patient denies dark stools, bleeding in stools or urine, constitutional symptoms like sudden weight loss, fevers, chills, night sweats, lumps, bumps, enlarged lymph nodes  Patient does have maculopapular rash to posterior trunk  Patient denies new detergents, pets, clothing, lotions, shampoos, conditioners  Pruritus present, wakes patient at night  Suggested OTC Claritin or Zyrtec  Patient to see PCP Dr Verner Mares next week  11/29/2022:  Patient came in for follow-up  Recently hospitalized for pulmonary embolism  Was also found to have elevated MMA in setting of macrocytosis    He otherwise feels fine without any bleeding seen anywhere, chest pain, shortness of breath, abdominal pain, constitutional symptoms  Taking blood thinner as prescribed by cardiologist     Problem list:       Patient Active Problem List   Diagnosis   • CAD (coronary atherosclerotic disease)   • Acute on chronic combined systolic and diastolic congestive heart failure ([facility])   • Chronic back pain   • GERD (gastroesophageal reflux disease)   • Hypercholesterolemia   • Hypertension   • PAF (paroxysmal atrial fibrillation) ([facility])   • Peptic ulcer disease   • S/P MVR (mitral valve repair)   • Polyp of colon   • Abnormal RBC indices   • Fatigue   • Chronic anticoagulation   • Pain in gums   • Idiopathic chronic gout of multiple sites without tophus   • Impaired fasting glucose   • Anemia due to blood loss, acute   • History of normocytic normochromic anemia   • Ambulatory dysfunction   • Vasomotor rhinitis   • Acute pulmonary embolism without acute cor pulmonale ([facility])   • CKD (chronic kidney disease)   • SIRS without infection or organ dysfunction ([facility])   • Hypotension   • Syncopal episodes   • Episode of unresponsiveness   • Chronic ischemic right MCA stroke       REVIEW OF SYMPTOMS:   Review of Systems   Constitutional: Negative for activity change, appetite change, chills, diaphoresis, fatigue, fever and unexpected weight change  HENT: Negative for nosebleeds  Respiratory: Negative for apnea, cough, chest tightness and shortness of breath  Cardiovascular: Negative for chest pain, palpitations and leg swelling  Gastrointestinal: Negative for abdominal distention, abdominal pain, anal bleeding, blood in stool, constipation, diarrhea, nausea and vomiting  Endocrine: Negative for cold intolerance  Genitourinary: Negative for hematuria  Skin: Negative for color change, pallor, rash and wound  Neurological: Negative for dizziness, weakness, light-headedness and headaches  Hematological: Negative for adenopathy   Does not bruise/bleed easily  PHYSICAL EXAMINATION:     Vital Signs:   /52   Pulse 101   Resp 16   Ht 5' 5" (1 651 m)   Wt 62 6 kg (138 lb)   SpO2 97%   BMI 22 96 kg/m²   Body surface area is 1 69 meters squared  Ht Readings from Last 8 Encounters:   11/29/22 5' 5" (1 651 m)   11/25/22 5' 5" (1 651 m)   11/09/22 5' 5" (1 651 m)   09/23/22 5' 5" (1 651 m)   05/19/22 5' 5" (1 651 m)   01/19/22 5' 5" (1 651 m)   09/22/21 5' 5" (1 651 m)   05/19/21 5' 5" (1 651 m)       Wt Readings from Last 8 Encounters:   11/29/22 62 6 kg (138 lb)   11/25/22 63 7 kg (140 lb 8 oz)   11/14/22 63 4 kg (139 lb 12 4 oz)   09/23/22 65 7 kg (144 lb 12 8 oz)   05/19/22 67 1 kg (148 lb)   01/19/22 63 kg (139 lb)   09/22/21 63 8 kg (140 lb 9 6 oz)   05/19/21 62 1 kg (137 lb)          Physical Exam  Constitutional:       General: He is not in acute distress  Appearance: Normal appearance  He is normal weight  He is not ill-appearing, toxic-appearing or diaphoretic  HENT:      Head: Normocephalic and atraumatic  Eyes:      General: No scleral icterus  Extraocular Movements: Extraocular movements intact  Conjunctiva/sclera: Conjunctivae normal    Cardiovascular:      Rate and Rhythm: Normal rate and regular rhythm  Pulmonary:      Effort: Pulmonary effort is normal  No respiratory distress  Abdominal:      Tenderness: There is no abdominal tenderness  Musculoskeletal:      Cervical back: Normal range of motion and neck supple  Right lower leg: No edema  Left lower leg: No edema  Lymphadenopathy:      Cervical: No cervical adenopathy  Skin:     General: Skin is warm and dry  Coloration: Skin is not jaundiced or pale  Findings: No bruising, erythema, lesion or rash  Neurological:      General: No focal deficit present  Mental Status: He is alert and oriented to person, place, and time  Mental status is at baseline  Motor: No weakness     Psychiatric:         Mood and Affect: Mood normal          Behavior: Behavior normal          Thought Content: Thought content normal          Judgment: Judgment normal        Reviewed historical information  PAST MEDICAL HISTORY:    Past Medical History:   Diagnosis Date   • 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   • CAD (coronary artery disease)    • Cancer [facility])     prostate   • Colon polyp    • Coronary artery disease    • GERD (gastroesophageal reflux disease)    • H/O heart artery stent     x2   • Hypercholesteremia    • Hypertension    • Keratotic lesion    • Stroke ([facility] [address] )     10YRS AGO   • Transient cerebral ischemia        PAST SURGICAL HISTORY:    Past Surgical History:   Procedure Laterality Date   • BACK SURGERY     • CARDIAC SURGERY      mvp repair   • CAROTID ENDARTERECTOMY Right 03/2020   • CHOLECYSTECTOMY     • COLONOSCOPY     • ESOPHAGOGASTRODUODENOSCOPY N/A 6/22/2018    Procedure: ESOPHAGOGASTRODUODENOSCOPY (EGD); Surgeon: Danyelle Ny MD;  Location: [facility];   Service: Gastroenterology   • JOINT REPLACEMENT      L hip replacement   • ORCHIECTOMY     • PROSTATECTOMY     • TOTAL HIP ARTHROPLASTY           CURRENT MEDICATIONS:     Current Outpatient Medications:   •  allopurinol (ZYLOPRIM) 100 mg tablet, Take 1 tablet (100 mg total) by mouth daily, Disp: 90 tablet, Rfl: 3  •  atorvastatin (LIPITOR) 40 mg tablet, Take 40 mg by mouth daily  , Disp: , Rfl:   •  Azelaic Acid 15 % cream, apply to 1 thin layer twice a day to face, Disp: , Rfl:   •  carvedilol (COREG) 6 25 mg tablet, Take 3 125 mg by mouth 2 (two) times a day, Disp: , Rfl:   •  clindamycin (CLINDAGEL) 1 % gel, APPLY 1 THIN LAYER TOPICALLY TO FACE IN THE  MORNING, Disp: , Rfl:   •  clopidogrel (PLAVIX) 75 mg tablet, Take 75 mg by mouth daily, Disp: , Rfl:   •  Farxiga 10 MG TABS, Take 10 mg by mouth daily, Disp: , Rfl:   •  fluticasone (FLONASE) 50 mcg/act nasal spray, 1 spray into each nostril daily Do not start before November 15, 2022 , Disp: 11 1 mL, Rfl: 0  •  furosemide (LASIX) 40 mg tablet, Take 40 mg by mouth daily PT REPORTS : 1/2 TABLET EVERY OTHER DAY (MW), Disp: , Rfl:   •  Lumigan 0 01 % ophthalmic drops, instill 1 drop into both eyes once daily at bedtime, Disp: , Rfl:   •  midodrine (PROAMATINE) 2 5 mg tablet, Take 1 tablet (2 5 mg total) by mouth 3 (three) times a day before meals (Patient taking differently: Take 2 5 mg by mouth 2 (two) times a day), Disp: 90 tablet, Rfl: 0  •  Multiple Vitamins-Minerals (PX MENS MULTIVITAMINS) TABS, Take by mouth, Disp: , Rfl:   •  pantoprazole (PROTONIX) 40 mg tablet, Take 1 tablet (40 mg total) by mouth daily, Disp: 90 tablet, Rfl: 2  •  sacubitril-valsartan (ENTRESTO) 24-26 MG TABS, Take 1 tablet by mouth 2 (two) times a day (Patient taking differently: Take 0 5 tablets by mouth 2 (two) times a day), Disp: 60 tablet, Rfl: 0  •  spironolactone (ALDACTONE) 25 mg tablet, Take 12 5 mg by mouth daily 1/2 TABLET EVERY OTHER DAY (, , Sa, Nick), Disp: , Rfl:   •  triamcinolone (KENALOG) 0 5 % cream, Apply topically 2 (two) times a day, Disp: 15 g, Rfl: 0  •  warfarin (COUMADIN) 5 mg tablet, Take 1 tablet (5 mg total) by mouth daily Coumadin 5 mg p o  Daily- Target INR 2-3 (Patient taking differently: Take 5 mg by mouth daily M & Thurs 2 5MG;  All Other Days 5MG), Disp: 30 tablet, Rfl: 0    Tobacco Use   • Sm  Social History     Tobacco Use   • Smoking status: Former     Packs/day: 0 25     Types: Cigarettes     Quit date: 2018     Years since quittin 5   • Smokeless tobacco: Never   • Tobacco comments:     4 cig/day   Vaping Use   • Vaping Use: Never used   Substance Use Topics   • Alcohol use: Never     Alcohol/week: 2 0 standard drinks     Types: 2 Standard drinks or equivalent per week   • Drug use: No   oking status: Former Smoker     Packs/day: 0 25     Quit date: 2018     Years since quittin 7   • Smokeless tobacco: Never Used   • Tobacco comment: 4 cig/day   Substance Use Topics   • Alcohol use: Never     Alcohol/week: 2 0 standard drinks     Types: 2 Standard drinks or equivalent per week     Frequency: Never   • Drug use: No      Problem Relation Age of Onset   • Heart disease Mother    • Stroke Mother         CVA   • Cancer Father    • Hodgkin's lymphoma Father    • Lung cancer Father        ALLERGIES:  No Known Allergies    Lab Re  Lab Results   Component Value Date    WBC 9 54 2022    HGB 14 6 2022    HCT 44 9 2022     (H) 2022     2022   sults   Component Value Date    WBC 10 43 (H) 12/10/2018    HGB 11 8 (L) 12/10/2018    HCT 40 2 12/10/2018    MCV 92 12/10/2018     12/10/2018      Component Value Date    SODIUM 141 2022    K 4 1 2022     2022    CO2 25 2022    AGAP 8 2022    BUN 24 2022    CREATININE 1 01 2022    GLUC 98 2022    GLUF 105 (H) 2019    CALCIUM 8 9 2022    AST 21 2022    ALT 26 2022    ALKPHOS 68 2022    TP 6 6 2022    TBILI 0 50 2022    EGFR 71 2022       IMAGING:  EEG Routine and awake  Routine EEG    Patient Name:  Jane Padilla  MRN: [id number]   :  1945 File #: NAYAN # [id number]   Age: 69 y o  Ordering Provider: Cheo Harper PA-C    Study Start-End: 2022 [id number]                    Study type: Routine EEG    ICD 10 diagnosis: Transient neurological symptoms R29 818    -------------------------------------------------  Patient History:  Jane Padilla is a 69 y o  male with a PMH of CHF, AFib, who was   admitted on  for short of breath  Neurology was consulted due to   concern for seizure  EEG ordered to evaluate for seizure tendency      Relevant medications include:   No AEDs    Sedation: No  Intubated: No  Paralyzed: No    -------------------------------------------------  32 channel digital recording with electrodes placed according to the   International 10-20 system with continuous video, ECG, EOG and the   possible addition of T1/T2 electrodes  This study was monitored by a   monitoring technologist   The physician interpreting the study had access   to the data throughout the recording  The recording was technically   satisfactory  -------------------------------------------------  Description:   Background: The background organization was normal  During waking, there were clearly   defined anterior-posterior voltage and frequency gradients and a well   formed 10 Hz symmetric posterior dominant rhythm  The predominant awake background activity was generalized irregular alpha   range activity  Drowsiness was appreciated with typical generalized theta range activity  Sleep: The sleep background had well-developed sleep spindles and K-complexes   that were bilaterally symmetrical and synchronous, indicative of stage 2   sleep  Reactivity: The background was reactive to external stimuli  Photic stimulation led to   symmetric photic driving  Hyperventilation was deferred  Interictal abnormalities: None    Rhythmic/Periodic patterns: None    Seizures: None    Events:   No push buttons were activated  Other findings:  Samples of the single channel ECG demonstrated an irregular rhythm and   rare PVCs  -------------------------------------------------    EEG impression: This was a normal awake, drowsy and sleep routine EEG   recording    Clinical Interpretation: This was a normal awake, drowsy and sleep routine EEG study  No   electrographic seizures, interictal epileptiform discharges (seizure   tendency) or focal slowing were seen       Eliu Sheehan MD   [address] [facility] [place]

## 2022-11-30 ENCOUNTER — TELEPHONE (OUTPATIENT)
Dept: NEUROLOGY | Facility: CLINIC | Age: 77
End: 2022-11-30

## 2022-11-30 NOTE — TELEPHONE ENCOUNTER
SLMO/SYNCOPAL EPISODES, CHRONIC ISCHEMIC RIGHT MCA STROKES          NOTES: Aneesh Lock will need follow up in 8-10 weeks with general attending or advance practitioner  He will require a routine EEG within 2 weeks          Called and spoke w/aptient  He does not want to schedule an appointment  He is seeing 9 different doctors, and has too much going on to commit to an appointment  He is fully aware of what the appointment would be following up on  He is also fully aware, that he has a year from discharge to schedule an hfu, after that it would be a new patient appointment  Removing from list at this time

## 2023-02-01 ENCOUNTER — OFFICE VISIT (OUTPATIENT)
Dept: HEMATOLOGY ONCOLOGY | Facility: CLINIC | Age: 78
End: 2023-02-01

## 2023-02-01 VITALS
OXYGEN SATURATION: 97 % | TEMPERATURE: 97.9 F | HEART RATE: 114 BPM | SYSTOLIC BLOOD PRESSURE: 112 MMHG | BODY MASS INDEX: 22.16 KG/M2 | HEIGHT: 65 IN | RESPIRATION RATE: 16 BRPM | WEIGHT: 133 LBS | DIASTOLIC BLOOD PRESSURE: 60 MMHG

## 2023-02-01 DIAGNOSIS — D75.89 MACROCYTOSIS: ICD-10-CM

## 2023-02-01 DIAGNOSIS — I26.99 OTHER ACUTE PULMONARY EMBOLISM WITHOUT ACUTE COR PULMONALE (HCC): ICD-10-CM

## 2023-02-01 DIAGNOSIS — R79.89 HIGH SERUM METHYLMALONIC ACID: Primary | ICD-10-CM

## 2023-02-01 NOTE — PROGRESS NOTES
HEMATOLOGY CLINIC NOTE    Primary Care Provider: Marilee Harmon MD  Referring Provider:    MRN: [id number]  : 1945    Assessment / Plan:   1  High serum methylmalonic acid  2  Macrocytosis  This is a 69year old male who was previously seen in our office for macrocytic anemia which was thought to be chronic disease related, mitral valve related  He was lost to follow-up in our clinic  His anemia has now resolved  However, he still has chronic macrocytosis in isolation  He had workup in the hospital including B12, MMA which showed that MMA was elevated in the 500s despite normal B12  He was recommended B12 1000mcg once weekly x 4 with oral B12 1000mcg once daily  He preferred to try oral B12 instead of INJ first due to being inundated with doctor office appts  Recent labs 2023 are as follows --> BWC 8 8, Hgb 16 6, , PLT 236K, diff unrevealing  Haptoglobin 90  FOBT normal  B12 was 1405  No MMA done  Patient still has slight macrocytosis  However, he does not want to undergo additional testing  He also would like to follow up with PCP and if counts significantly worsen, he will follow up with us  He can hold off on additional B12 at this time  F/U PRN  3  Other acute pulmonary embolism without acute cor pulmonale ([address] )  Patient had a recent pulmonary embolism as described in HPI below  Patient will continue Coumadin for now  He will be on this lifelong to be managed by Cardiology  No need for additional testing such a thrombosis panel testing as this will not  at this time  · Discussion of decision making    I personally reviewed the following lab results, the image studies, pathology, other specialty/physicians consult notes and recommendations, and outside medical records from [address] [place] [address]  I had a lengthy discussion with the patient and shared the work-up findings   I spent 15 minutes reviewing the records (labs, clinician notes, outside records, medical history, ordering medicine/tests/procedures, interpreting the imaging/labs previously done) and coordination of care as well as direct time with the patient today, of which greater than 50% of the time was spent in counseling and coordination of care with the patient/family  · Plan/Labs  · Stop oral B12  See discussion above  Patient to F/U with PCP regarding labs  Follow Up: PRN    All questions were answered to the patient's satisfaction during this encounter  The patient knows the contact information for our office and knows to reach out for any relevant concerns related to this encounter  They are to call for any temperature 100 4 or higher, new symptoms including but not restricted to shaking chills, decreased appetite, nausea, vomiting, diarrhea, increased fatigue, shortness of breath or chest pain, confusion, and not feeling the strength to come to the clinic  For all other listed problems and medical diagnosis in their chart - they are managed by PCP and/or other specialists, which the patient acknowledges  Thank you very much for your consultation and making us a part of this patient's care  We are continuing to follow closely with you  Please do not hesitate to reach out to me with any additional questions or concerns  Reason for visit:       Chief Complaint   Patient presents with   • Follow-up     History of Hematology Illness:     Adelita Robb is a 69 y o  male presents for a follow up  1  Pulmonary Embolism  - 11/2022:  Patient had CTA showing thin nonocclusive pulmonary embolism within a right lower lobe segmental pulmonary artery  RV/LV ratio was within normal limits  Venous Doppler was negative for DVT  Unclear etiology  There was concern for warfarin failure as patient was already on this for AFib  Patient to stay on lifelong anticoagulation for AFib  Cardiologist managing warfarin at this time      2  Macrocytosis  - patient did have anemia previously which was thought to be secondary to CHF, valve prior to replacement  Anemia has now resolved; however, macrocytosis is still present   - B12 normal at 032, folic acid normal   MMA elevated at 564  Homocystine normal   Copper normal   Retic elevated absolute count 136,000, pct at 2 95%   - CBC = WBC 9 54, Hgb 14 6, , PLT 180K, no diff done  3  History of Prostate Cancer   Patient has a history of prostate cancer from approximately 12 years ago  Patient states he had a prostatectomy and follows a urologist yearly  Patient most recent PSA 4/27/2020 <0 01  Pt states he has had more recent PSA which I cannot view         4  Lifelong anticoagulation due to Afib   Patient on coumadin for Afib  This is managed by cardiologist         Interval History:   3/8/2021: Patient came in for follow up  Patient has fatigue likely due to history of CHF, valvular disease, anemia can contribute to this too  Patient appears pale  No PICA  Patient will undergo open heart surgery due to valvular disease soon  Patient denies dark stools, bleeding in stools or urine, constitutional symptoms like sudden weight loss, fevers, chills, night sweats, lumps, bumps, enlarged lymph nodes  Patient does have maculopapular rash to posterior trunk  Patient denies new detergents, pets, clothing, lotions, shampoos, conditioners  Pruritus present, wakes patient at night  Suggested OTC Claritin or Zyrtec  Patient to see PCP Dr Di Martin next week  11/29/2022:  Patient came in for follow-up  Recently hospitalized for pulmonary embolism  Was also found to have elevated MMA in setting of macrocytosis  He otherwise feels fine without any bleeding seen anywhere, chest pain, shortness of breath, abdominal pain, constitutional symptoms  Taking blood thinner as prescribed by cardiologist     2/1/2023: Patient came in for follow up  Patient is s/p b12 injection and oral B12  He is here to review labs  Feels fine without any complaints   No bleeding, new CP or SOB     Problem list:       Patient Active Problem List   Diagnosis   • CAD (coronary atherosclerotic disease)   • Acute on chronic combined systolic and diastolic congestive heart failure (HCC)   • Chronic back pain   • GERD (gastroesophageal reflux disease)   • Hypercholesterolemia   • Hypertension   • PAF (paroxysmal atrial fibrillation) ([facility])   • Peptic ulcer disease   • S/P MVR (mitral valve repair)   • Polyp of colon   • Abnormal RBC indices   • Fatigue   • Chronic anticoagulation   • Pain in gums   • Idiopathic chronic gout of multiple sites without tophus   • Impaired fasting glucose   • Anemia due to blood loss, acute   • History of normocytic normochromic anemia   • Ambulatory dysfunction   • Vasomotor rhinitis   • Acute pulmonary embolism without acute cor pulmonale ([facility])   • CKD (chronic kidney disease)   • SIRS without infection or organ dysfunction ([facility])   • Hypotension   • Syncopal episodes   • Episode of unresponsiveness   • Chronic ischemic right MCA stroke       REVIEW OF SYMPTOMS:   Review of Systems   Constitutional: Negative for activity change, appetite change, chills, diaphoresis, fatigue, fever and unexpected weight change  HENT: Negative for nosebleeds  Respiratory: Negative for apnea, cough, chest tightness and shortness of breath  Cardiovascular: Negative for chest pain, palpitations and leg swelling  Gastrointestinal: Negative for abdominal distention, abdominal pain, anal bleeding, blood in stool, constipation, diarrhea, nausea and vomiting  Endocrine: Negative for cold intolerance  Genitourinary: Negative for hematuria  Skin: Negative for color change, pallor, rash and wound  Neurological: Negative for dizziness, weakness, light-headedness and headaches  Hematological: Negative for adenopathy  Does not bruise/bleed easily         PHYSICAL EXAMINATION:     Vital Signs:   /52   Pulse 101   Resp 16   Ht 5' 5" (1 651 m)   Wt 62 6 kg (138 lb)   SpO2 97%   BMI 22 96 kg/m²   Body surface area is 1 69 meters squared  Ht Readings from Last 8 Encounters:   11/29/22 5' 5" (1 651 m)   11/25/22 5' 5" (1 651 m)   11/09/22 5' 5" (1 651 m)   09/23/22 5' 5" (1 651 m)   05/19/22 5' 5" (1 651 m)   01/19/22 5' 5" (1 651 m)   09/22/21 5' 5" (1 651 m)   05/19/21 5' 5" (1 651 m)       Wt Readings from Last 8 Encounters:   11/29/22 62 6 kg (138 lb)   11/25/22 63 7 kg (140 lb 8 oz)   11/14/22 63 4 kg (139 lb 12 4 oz)   09/23/22 65 7 kg (144 lb 12 8 oz)   05/19/22 67 1 kg (148 lb)   01/19/22 63 kg (139 lb)   09/22/21 63 8 kg (140 lb 9 6 oz)   05/19/21 62 1 kg (137 lb)          Physical Exam  Constitutional:       General: He is not in acute distress  Appearance: Normal appearance  He is normal weight  He is not ill-appearing, toxic-appearing or diaphoretic  HENT:      Head: Normocephalic and atraumatic  Eyes:      General: No scleral icterus  Extraocular Movements: Extraocular movements intact  Conjunctiva/sclera: Conjunctivae normal    Cardiovascular:      Rate and Rhythm: Normal rate and regular rhythm  Pulmonary:      Effort: Pulmonary effort is normal  No respiratory distress  Abdominal:      Tenderness: There is no abdominal tenderness  Musculoskeletal:      Cervical back: Normal range of motion and neck supple  Right lower leg: No edema  Left lower leg: No edema  Lymphadenopathy:      Cervical: No cervical adenopathy  Skin:     General: Skin is warm and dry  Coloration: Skin is not jaundiced or pale  Findings: No bruising, erythema, lesion or rash  Neurological:      General: No focal deficit present  Mental Status: He is alert and oriented to person, place, and time  Mental status is at baseline  Motor: No weakness  Psychiatric:         Mood and Affect: Mood normal          Behavior: Behavior normal          Thought Content: Thought content normal          Judgment: Judgment normal        Reviewed historical information  PAST MEDICAL HISTORY:    Past Medical History:   Diagnosis Date   • 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   • CAD (coronary artery disease)    • Cancer [facility])     prostate   • Colon polyp    • Coronary artery disease    • GERD (gastroesophageal reflux disease)    • H/O heart artery stent     x2   • Hypercholesteremia    • Hypertension    • Keratotic lesion    • Stroke ([address] )     10YRS AGO   • Transient cerebral ischemia        PAST SURGICAL HISTORY:    Past Surgical History:   Procedure Laterality Date   • BACK SURGERY     • CARDIAC SURGERY      mvp repair   • CAROTID ENDARTERECTOMY Right 03/2020   • CHOLECYSTECTOMY     • COLONOSCOPY     • ESOPHAGOGASTRODUODENOSCOPY N/A 6/22/2018    Procedure: ESOPHAGOGASTRODUODENOSCOPY (EGD); Surgeon: Fidencio Eisenberg MD;  Location: [facility];   Service: Gastroenterology   • JOINT REPLACEMENT      L hip replacement   • ORCHIECTOMY     • PROSTATECTOMY     • TOTAL HIP ARTHROPLASTY           CURRENT MEDICATIONS:     Current Outpatient Medications:   •  allopurinol (ZYLOPRIM) 100 mg tablet, Take 1 tablet (100 mg total) by mouth daily, Disp: 90 tablet, Rfl: 3  •  atorvastatin (LIPITOR) 40 mg tablet, Take 40 mg by mouth daily  , Disp: , Rfl:   •  Azelaic Acid 15 % cream, apply to 1 thin layer twice a day to face, Disp: , Rfl:   •  carvedilol (COREG) 6 25 mg tablet, Take 3 125 mg by mouth 2 (two) times a day, Disp: , Rfl:   •  clindamycin (CLINDAGEL) 1 % gel, APPLY 1 THIN LAYER TOPICALLY TO FACE IN THE  MORNING, Disp: , Rfl:   •  clopidogrel (PLAVIX) 75 mg tablet, Take 75 mg by mouth daily, Disp: , Rfl:   •  Farxiga 10 MG TABS, Take 10 mg by mouth daily, Disp: , Rfl:   •  fluticasone (FLONASE) 50 mcg/act nasal spray, 1 spray into each nostril daily Do not start before November 15, 2022 , Disp: 11 1 mL, Rfl: 0  •  furosemide (LASIX) 40 mg tablet, Take 40 mg by mouth daily PT REPORTS : 1/2 TABLET EVERY OTHER DAY (MWF), Disp: , Rfl:   •  Lumigan 0 01 % ophthalmic drops, instill 1 drop into both eyes once daily at bedtime, Disp: , Rfl:   •  midodrine (PROAMATINE) 2 5 mg tablet, Take 1 tablet (2 5 mg total) by mouth 3 (three) times a day before meals (Patient taking differently: Take 2 5 mg by mouth 2 (two) times a day), Disp: 90 tablet, Rfl: 0  •  Multiple Vitamins-Minerals (PX MENS MULTIVITAMINS) TABS, Take by mouth, Disp: , Rfl:   •  pantoprazole (PROTONIX) 40 mg tablet, Take 1 tablet (40 mg total) by mouth daily, Disp: 90 tablet, Rfl: 2  •  sacubitril-valsartan (ENTRESTO) 24-26 MG TABS, Take 1 tablet by mouth 2 (two) times a day (Patient taking differently: Take 0 5 tablets by mouth 2 (two) times a day), Disp: 60 tablet, Rfl: 0  •  spironolactone (ALDACTONE) 25 mg tablet, Take 12 5 mg by mouth daily 1/2 TABLET EVERY OTHER DAY (, , , ), Disp: , Rfl:   •  triamcinolone (KENALOG) 0 5 % cream, Apply topically 2 (two) times a day, Disp: 15 g, Rfl: 0  •  warfarin (COUMADIN) 5 mg tablet, Take 1 tablet (5 mg total) by mouth daily Coumadin 5 mg p o  Daily- Target INR 2-3 (Patient taking differently: Take 5 mg by mouth daily M & Thurs 2 5MG;  All Other Days 5MG), Disp: 30 tablet, Rfl: 0    Tobacco Use   • Sm  Social History     Tobacco Use   • Smoking status: Former     Packs/day: 0 25     Types: Cigarettes     Quit date: 2018     Years since quittin 5   • Smokeless tobacco: Never   • Tobacco comments:     4 cig/day   Vaping Use   • Vaping Use: Never used   Substance Use Topics   • Alcohol use: Never     Alcohol/week: 2 0 standard drinks     Types: 2 Standard drinks or equivalent per week   • Drug use: No   oking status: Former Smoker     Packs/day: 0 25     Quit date: 2018     Years since quittin 7   • Smokeless tobacco: Never Used   • Tobacco comment: 4 cig/day   Substance Use Topics   • Alcohol use: Never     Alcohol/week: 2 0 standard drinks     Types: 2 Standard drinks or equivalent per week     Frequency: Never   • Drug use: No      Problem Relation Age of Onset   • Heart disease Mother • Stroke Mother         CVA   • Cancer Father    • Hodgkin's lymphoma Father    • Lung cancer Father        ALLERGIES:  No Known Allergies    Lab Re  Lab Results   Component Value Date    WBC 9 54 2022    HGB 14 6 2022    HCT 44 9 2022     (H) 2022     2022   sults   Component Value Date    WBC 10 43 (H) 12/10/2018    HGB 11 8 (L) 12/10/2018    HCT 40 2 12/10/2018    MCV 92 12/10/2018     12/10/2018      Component Value Date    SODIUM 141 2022    K 4 1 2022     2022    CO2 25 2022    AGAP 8 2022    BUN 24 2022    CREATININE 1 01 2022    GLUC 98 2022    GLUF 105 (H) 2019    CALCIUM 8 9 2022    AST 21 2022    ALT 26 2022    ALKPHOS 68 2022    TP 6 6 2022    TBILI 0 50 2022    EGFR 71 2022       IMAGING:  EEG Routine and awake  Routine EEG    Patient Name:  Talita Montes  MRN: [id number]   :  1945 File #: NAYAN # [id number]   Age: 69 y o  Ordering Provider: Violette Gomez PA-C    Study Start-End: 2022 [id number]                    Study type: Routine EEG    ICD 10 diagnosis: Transient neurological symptoms R29 818    -------------------------------------------------  Patient History:  Talita Montes is a 69 y o  male with a PMH of CHF, AFib, who was   admitted on  for short of breath  Neurology was consulted due to   concern for seizure  EEG ordered to evaluate for seizure tendency  Relevant medications include:   No AEDs    Sedation: No  Intubated: No  Paralyzed: No    -------------------------------------------------  32 channel digital recording with electrodes placed according to the   International 10-20 system with continuous video, ECG, EOG and the   possible addition of T1/T2 electrodes  This study was monitored by a   monitoring technologist   The physician interpreting the study had access   to the data throughout the recording     The recording was technically   satisfactory  -------------------------------------------------  Description:   Background: The background organization was normal  During waking, there were clearly   defined anterior-posterior voltage and frequency gradients and a well   formed 10 Hz symmetric posterior dominant rhythm  The predominant awake background activity was generalized irregular alpha   range activity  Drowsiness was appreciated with typical generalized theta range activity  Sleep: The sleep background had well-developed sleep spindles and K-complexes   that were bilaterally symmetrical and synchronous, indicative of stage 2   sleep  Reactivity: The background was reactive to external stimuli  Photic stimulation led to   symmetric photic driving  Hyperventilation was deferred  Interictal abnormalities: None    Rhythmic/Periodic patterns: None    Seizures: None    Events:   No push buttons were activated  Other findings:  Samples of the single channel ECG demonstrated an irregular rhythm and   rare PVCs  -------------------------------------------------    EEG impression: This was a normal awake, drowsy and sleep routine EEG   recording    Clinical Interpretation: This was a normal awake, drowsy and sleep routine EEG study  No   electrographic seizures, interictal epileptiform discharges (seizure   tendency) or focal slowing were seen       Brady Wilhelm MD   [address] [facility] [place]

## 2023-02-24 ENCOUNTER — OFFICE VISIT (OUTPATIENT)
Dept: INTERNAL MEDICINE CLINIC | Facility: CLINIC | Age: 78
End: 2023-02-24

## 2023-02-24 VITALS
TEMPERATURE: 98.4 F | HEIGHT: 65 IN | BODY MASS INDEX: 22.66 KG/M2 | HEART RATE: 102 BPM | RESPIRATION RATE: 16 BRPM | DIASTOLIC BLOOD PRESSURE: 68 MMHG | OXYGEN SATURATION: 95 % | SYSTOLIC BLOOD PRESSURE: 94 MMHG | WEIGHT: 136 LBS

## 2023-02-24 DIAGNOSIS — R71.8 ABNORMAL RBC INDICES: Primary | ICD-10-CM

## 2023-02-24 DIAGNOSIS — R32 URINARY INCONTINENCE, UNSPECIFIED TYPE: ICD-10-CM

## 2023-02-24 DIAGNOSIS — I48.0 PAF (PAROXYSMAL ATRIAL FIBRILLATION) (HCC): ICD-10-CM

## 2023-02-24 DIAGNOSIS — I50.43 ACUTE ON CHRONIC COMBINED SYSTOLIC AND DIASTOLIC CONGESTIVE HEART FAILURE (HCC): ICD-10-CM

## 2023-02-24 DIAGNOSIS — I25.10 ATHEROSCLEROSIS OF NATIVE CORONARY ARTERY OF NATIVE HEART WITHOUT ANGINA PECTORIS: ICD-10-CM

## 2023-02-24 NOTE — PROGRESS NOTES
Assessment/Plan:     Unclear etiology for the urine symptoms  Suggested stopping his Khadijah Stapler for a week  We will get urinalysis and culture to rule out infection  Based on those 2 "tests",  will make further conditions from there  Continue follow-up with cardiology  Quality Measures:       Return in about 4 months (around 6/24/2023) for Recheck  No problem-specific Assessment & Plan notes found for this encounter  Diagnoses and all orders for this visit:    Abnormal RBC indices    Urinary incontinence, unspecified type  -     Urine culture; Future  -     Urinalysis with microscopic; Future    Atherosclerosis of native coronary artery of native heart without angina pectoris    PAF (paroxysmal atrial fibrillation) ([facility])    Acute on chronic combined systolic and diastolic congestive heart failure (HCC)        Subjective:      Patient ID: Eloy Gold is a 69 y o  male  Patient comes in today for follow-up  He states he has been having trouble with urinary incontinence  But only at night  He notes that cardiology started him on Khadijah Stapler but not just recently  He is also still on his water pills  He notes no burning  No rash  He does have a history of prostate cancer  He feels his heart disease has been stable  He continues to follow with cardiology  He does see them next month        ALLERGIES:  No Known Allergies    CURRENT MEDICATIONS:    Current Outpatient Medications:   •  allopurinol (ZYLOPRIM) 100 mg tablet, Take 1 tablet (100 mg total) by mouth daily, Disp: 90 tablet, Rfl: 3  •  atorvastatin (LIPITOR) 40 mg tablet, Take 40 mg by mouth daily  , Disp: , Rfl:   •  Azelaic Acid 15 % cream, apply to 1 thin layer twice a day to face, Disp: , Rfl:   •  clindamycin (CLINDAGEL) 1 % gel, APPLY 1 THIN LAYER TOPICALLY TO FACE IN THE  MORNING, Disp: , Rfl:   •  clopidogrel (PLAVIX) 75 mg tablet, Take 75 mg by mouth daily, Disp: , Rfl:   •  Farxiga 10 MG TABS, Take 10 mg by mouth daily, Disp: , Rfl:   •  fluticasone (FLONASE) 50 mcg/act nasal spray, 1 spray into each nostril daily Do not start before November 15, 2022 , Disp: 11 1 mL, Rfl: 0  •  furosemide (LASIX) 40 mg tablet, Take 40 mg by mouth daily PT REPORTS : 1/2 TABLET EVERY OTHER DAY (MW), Disp: , Rfl:   •  Lumigan 0 01 % ophthalmic drops, instill 1 drop into both eyes once daily at bedtime, Disp: , Rfl:   •  midodrine (PROAMATINE) 2 5 mg tablet, Take 1 tablet (2 5 mg total) by mouth 3 (three) times a day before meals (Patient taking differently: Take 2 5 mg by mouth 2 (two) times a day), Disp: 90 tablet, Rfl: 0  •  Multiple Vitamins-Minerals (PX MENS MULTIVITAMINS) TABS, Take by mouth, Disp: , Rfl:   •  pantoprazole (PROTONIX) 40 mg tablet, Take 1 tablet (40 mg total) by mouth daily, Disp: 90 tablet, Rfl: 2  •  sacubitril-valsartan (ENTRESTO) 24-26 MG TABS, Take 1 tablet by mouth 2 (two) times a day (Patient taking differently: Take 0 5 tablets by mouth 2 (two) times a day), Disp: 60 tablet, Rfl: 0  •  spironolactone (ALDACTONE) 25 mg tablet, Take 12 5 mg by mouth daily 1/2 TABLET EVERY OTHER DAY (Tu, Th, Sa, Su), Disp: , Rfl:   •  triamcinolone (KENALOG) 0 5 % cream, Apply topically 2 (two) times a day, Disp: 15 g, Rfl: 0  •  warfarin (COUMADIN) 5 mg tablet, Take 1 tablet (5 mg total) by mouth daily Coumadin 5 mg p o  Daily- Target INR 2-3 (Patient taking differently: Take 5 mg by mouth daily M & Thurs 2 5MG;  All Other Days 5MG), Disp: 30 tablet, Rfl: 0  •  carvedilol (COREG) 6 25 mg tablet, Take 3 125 mg by mouth 2 (two) times a day (Patient not taking: Reported on 2/24/2023), Disp: , Rfl:     ACTIVE PROBLEM LIST:  Patient Active Problem List   Diagnosis   • CAD (coronary atherosclerotic disease)   • Acute on chronic combined systolic and diastolic congestive heart failure (HCC)   • Chronic back pain   • GERD (gastroesophageal reflux disease)   • Hypercholesterolemia   • Hypertension   • [facility]   • Peptic ulcer disease   • S/P MVR (mitral valve repair)   • Polyp of colon   • Abnormal RBC indices   • Fatigue   • Chronic anticoagulation   • Pain in gums   • Idiopathic chronic gout of multiple sites without tophus   • Impaired fasting glucose   • Anemia due to blood loss, acute   • History of normocytic normochromic anemia   • Ambulatory dysfunction   • Vasomotor rhinitis   • Acute pulmonary embolism without acute cor pulmonale (HCC)   • CKD (chronic kidney disease)   • SIRS without infection or organ dysfunction (HCC)   • Hypotension   • Syncopal episodes   • Episode of unresponsiveness   • Chronic ischemic right MCA stroke       PAST MEDICAL HISTORY:  Past Medical History:   Diagnosis Date   • 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   • CAD (coronary artery disease)    • Cancer (HCC)     prostate   • Colon polyp    • Coronary artery disease    • GERD (gastroesophageal reflux disease)    • H/O heart artery stent     x2   • Hypercholesteremia    • Hypertension    • Keratotic lesion    • Stroke ([facility] )     10YRS AGO   • Transient cerebral ischemia        PAST SURGICAL HISTORY:  Past Surgical History:   Procedure Laterality Date   • BACK SURGERY     • CARDIAC SURGERY      mvp repair   • CAROTID ENDARTERECTOMY Right 03/2020   • CHOLECYSTECTOMY     • COLONOSCOPY     • ESOPHAGOGASTRODUODENOSCOPY N/A 6/22/2018    Procedure: ESOPHAGOGASTRODUODENOSCOPY (EGD); Surgeon: New Hernandez MD;  Location: [facility];   Service: Gastroenterology   • JOINT REPLACEMENT      L hip replacement   • ORCHIECTOMY     • PROSTATECTOMY     • TOTAL HIP ARTHROPLASTY         FAMILY HISTORY:  Family History   Problem Relation Age of Onset   • Heart disease Mother    • Stroke Mother         CVA   • Cancer Father    • Hodgkin's lymphoma Father    • Lung cancer Father        SOCIAL HISTORY:  Social History     Socioeconomic History   • Marital status:      Spouse name: Not on file   • Number of children: Not on file   • Years of education: Not on file   • Highest education level: Not on file   Occupational History   • Occupation: retired   Tobacco Use   • Smoking status: Former     Packs/day: 0 25     Types: Cigarettes     Quit date: 2018     Years since quittin 7   • Smokeless tobacco: Never   • Tobacco comments:     4 cig/day   Vaping Use   • Vaping Use: Never used   Substance and Sexual Activity   • Alcohol use: Never     Alcohol/week: 2 0 standard drinks     Types: 2 Standard drinks or equivalent per week   • Drug use: No   • Sexual activity: Not Currently     Comment: no high risk sexual behavior   Other Topics Concern   • Not on file   Social History Narrative   • Not on file     Social Determinants of Health     Financial Resource Strain: Medium Risk   • Difficulty of Paying Living Expenses: Somewhat hard   Food Insecurity: No Food Insecurity   • Worried About Running Out of Food in the Last Year: Never true   • Ran Out of Food in the Last Year: Never true   Transportation Needs: No Transportation Needs   • Lack of Transportation (Medical): No   • Lack of Transportation (Non-Medical): No   Physical Activity: Not on file   Stress: Not on file   Social Connections: Not on file   Intimate Partner Violence: Not on file   Housing Stability: Low Risk    • Unable to Pay for Housing in the Last Year: No   • Number of Places Lived in the Last Year: 1   • Unstable Housing in the Last Year: No       Review of Systems   Respiratory: Negative for shortness of breath  Cardiovascular: Negative for chest pain  Gastrointestinal: Negative for abdominal pain  Genitourinary: Positive for difficulty urinating  Objective:  Vitals:    23 1054   BP: 94/68   BP Location: Left arm   Patient Position: Sitting   Cuff Size: Standard   Pulse: 102   Resp: 16   Temp: 98 4 °F (36 9 °C)   TempSrc: Tympanic   SpO2: 95%   Weight: 61 7 kg (136 lb)   Height: 5' 5" (1 651 m)     Body mass index is 22 63 kg/m²  Physical Exam  Vitals and nursing note reviewed  Constitutional:       Appearance: He is well-developed  Cardiovascular:      Rate and Rhythm: Normal rate and regular rhythm  Heart sounds: Normal heart sounds  Pulmonary:      Effort: Pulmonary effort is normal       Breath sounds: Normal breath sounds  Abdominal:      Palpations: Abdomen is soft  Tenderness: There is no abdominal tenderness  Neurological:      Mental Status: He is alert and oriented to person, place, and time  RESULTS:    No results found for this or any previous visit (from the past 1008 hour(s))  This note was created with voice recognition software  Phonic, grammatical and spelling errors may be present within the note as a result

## 2023-06-26 ENCOUNTER — OFFICE VISIT (OUTPATIENT)
Dept: INTERNAL MEDICINE CLINIC | Facility: CLINIC | Age: 78
End: 2023-06-26
Payer: MEDICARE

## 2023-06-26 VITALS
SYSTOLIC BLOOD PRESSURE: 104 MMHG | WEIGHT: 133.6 LBS | TEMPERATURE: 97.2 F | DIASTOLIC BLOOD PRESSURE: 64 MMHG | OXYGEN SATURATION: 95 % | HEART RATE: 37 BPM | HEIGHT: 65 IN | BODY MASS INDEX: 22.26 KG/M2 | RESPIRATION RATE: 14 BRPM

## 2023-06-26 DIAGNOSIS — I10 PRIMARY HYPERTENSION: ICD-10-CM

## 2023-06-26 DIAGNOSIS — R21 RASH: ICD-10-CM

## 2023-06-26 DIAGNOSIS — I25.10 ATHEROSCLEROSIS OF NATIVE CORONARY ARTERY OF NATIVE HEART WITHOUT ANGINA PECTORIS: Primary | ICD-10-CM

## 2023-06-26 DIAGNOSIS — Z79.01 CHRONIC ANTICOAGULATION: ICD-10-CM

## 2023-06-26 DIAGNOSIS — I50.43 ACUTE ON CHRONIC COMBINED SYSTOLIC AND DIASTOLIC CONGESTIVE HEART FAILURE (HCC): ICD-10-CM

## 2023-06-26 PROCEDURE — 99214 OFFICE O/P EST MOD 30 MIN: CPT | Performed by: INTERNAL MEDICINE

## 2023-06-26 RX ORDER — AMIODARONE HYDROCHLORIDE 200 MG/1
200 TABLET ORAL 2 TIMES DAILY WITH MEALS
COMMUNITY
Start: 2023-04-19 | End: 2023-06-26 | Stop reason: ALTCHOICE

## 2023-06-26 RX ORDER — VIBEGRON 75 MG/1
75 TABLET, FILM COATED ORAL
COMMUNITY
Start: 2023-03-09 | End: 2023-06-26 | Stop reason: ALTCHOICE

## 2023-06-26 RX ORDER — TRIAMCINOLONE ACETONIDE 5 MG/G
CREAM TOPICAL 3 TIMES DAILY
Qty: 30 G | Refills: 1 | Status: SHIPPED | OUTPATIENT
Start: 2023-06-26

## 2023-06-26 NOTE — PROGRESS NOTES
Assessment/Plan:     Continue current medications and follow-up with cardiology  That appears to be his biggest issue at this point  He has lots of blood work from them now  We will monitor  Quality Measures:       Return in about 6 months (around 12/26/2023) for Regular visit and Medicare Wellness  No problem-specific Assessment & Plan notes found for this encounter  Diagnoses and all orders for this visit:    Atherosclerosis of native coronary artery of native heart without angina pectoris    Acute on chronic combined systolic and diastolic congestive heart failure (HCC)    Primary hypertension    Chronic anticoagulation    Rash  -     triamcinolone (KENALOG) 0 5 % cream; Apply topically 3 (three) times a day    Other orders  -     Discontinue: amiodarone 200 mg tablet; Take 200 mg by mouth 2 (two) times a day with meals (Patient not taking: Reported on 6/26/2023)  -     Discontinue: Vibegron (Gemtesa) 75 MG TABS; Take 75 mg by mouth (Patient not taking: Reported on 6/26/2023)          Subjective:      Patient ID: Merna Davidson is a 69 y o  male  Patient comes in today for routine follow-up  He states he has been quite busy with cardiology  He is now on Coumadin  Reports they are trying to treat at clot on one of his heart valves so they can do another procedure  But he needs to be stable on the Coumadin for a month  His heart rate is low today but he states he feels fine  He has had multiple medication changes recently with cardiology  Lots of labs  But presently for he feels okay  Taking his medicine as directed  He does have a rash on his elbows that he is asking for a cream   He tried over-the-counter stuff with no relief        ALLERGIES:  No Known Allergies    CURRENT MEDICATIONS:    Current Outpatient Medications:   •  atorvastatin (LIPITOR) 40 mg tablet, Take 40 mg by mouth daily  , Disp: , Rfl:   •  Azelaic Acid 15 % cream, apply to 1 thin layer twice a day to face, Disp: , Rfl:   •  clindamycin (CLINDAGEL) 1 % gel, APPLY 1 THIN LAYER TOPICALLY TO FACE IN THE  MORNING, Disp: , Rfl:   •  clopidogrel (PLAVIX) 75 mg tablet, Take 75 mg by mouth daily, Disp: , Rfl:   •  Farxiga 10 MG TABS, Take 10 mg by mouth daily, Disp: , Rfl:   •  fluticasone (FLONASE) 50 mcg/act nasal spray, 1 spray into each nostril daily Do not start before November 15, 2022 , Disp: 11 1 mL, Rfl: 0  •  furosemide (LASIX) 40 mg tablet, Take 40 mg by mouth daily PT REPORTS : 1/2 TABLET EVERY OTHER DAY (MWF), Disp: , Rfl:   •  Lumigan 0 01 % ophthalmic drops, instill 1 drop into both eyes once daily at bedtime, Disp: , Rfl:   •  midodrine (PROAMATINE) 2 5 mg tablet, Take 1 tablet (2 5 mg total) by mouth 3 (three) times a day before meals (Patient taking differently: Take 2 5 mg by mouth 2 (two) times a day), Disp: 90 tablet, Rfl: 0  •  Multiple Vitamins-Minerals (PX MENS MULTIVITAMINS) TABS, Take by mouth, Disp: , Rfl:   •  pantoprazole (PROTONIX) 40 mg tablet, Take 1 tablet (40 mg total) by mouth daily, Disp: 90 tablet, Rfl: 2  •  sacubitril-valsartan (ENTRESTO) 24-26 MG TABS, Take 1 tablet by mouth 2 (two) times a day (Patient taking differently: Take 0 5 tablets by mouth 2 (two) times a day), Disp: 60 tablet, Rfl: 0  •  spironolactone (ALDACTONE) 25 mg tablet, Take 12 5 mg by mouth daily 1/2 TABLET EVERY OTHER DAY (Tu, Th, Sa, Nick), Disp: , Rfl:   •  triamcinolone (KENALOG) 0 5 % cream, Apply topically 3 (three) times a day, Disp: 30 g, Rfl: 1  •  warfarin (COUMADIN) 5 mg tablet, Take 1 tablet (5 mg total) by mouth daily Coumadin 5 mg p o  Daily- Target INR 2-3 (Patient taking differently: Take 5 mg by mouth daily M & Thurs 2 5MG;  All Other Days 5MG), Disp: 30 tablet, Rfl: 0    ACTIVE PROBLEM LIST:  Patient Active Problem List   Diagnosis   • CAD (coronary atherosclerotic disease)   • Acute on chronic combined systolic and diastolic congestive heart failure (HCC)   • Chronic back pain   • GERD (gastroesophageal reflux disease)   • Hypercholesterolemia   • Hypertension   • PAF (paroxysmal atrial fibrillation) (HCC)   • Peptic ulcer disease   • S/P MVR (mitral valve repair)   • Polyp of colon   • Abnormal RBC indices   • Fatigue   • Chronic anticoagulation   • Pain in gums   • Idiopathic chronic gout of multiple sites without tophus   • Impaired fasting glucose   • Anemia due to blood loss, acute   • History of normocytic normochromic anemia   • Ambulatory dysfunction   • Vasomotor rhinitis   • Acute pulmonary embolism without acute cor pulmonale (HCC)   • CKD (chronic kidney disease)   • SIRS without infection or organ dysfunction (HCC)   • Hypotension   • Syncopal episodes   • Episode of unresponsiveness   • Chronic ischemic right MCA stroke       PAST MEDICAL HISTORY:  Past Medical History:   Diagnosis Date   • 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   • CAD (coronary artery disease)    • Cancer (HCC)     prostate   • Colon polyp    • Coronary artery disease    • GERD (gastroesophageal reflux disease)    • H/O heart artery stent     x2   • Hypercholesteremia    • Hypertension    • Keratotic lesion    • Stroke ([facility] )     10YRS AGO   • Transient cerebral ischemia        PAST SURGICAL HISTORY:  Past Surgical History:   Procedure Laterality Date   • BACK SURGERY     • CARDIAC SURGERY      mvp repair   • CAROTID ENDARTERECTOMY Right 03/2020   • CHOLECYSTECTOMY     • COLONOSCOPY     • ESOPHAGOGASTRODUODENOSCOPY N/A 6/22/2018    Procedure: ESOPHAGOGASTRODUODENOSCOPY (EGD); Surgeon: Gilbert Werner MD;  Location: [facility];   Service: Gastroenterology   • JOINT REPLACEMENT      L hip replacement   • ORCHIECTOMY     • PROSTATECTOMY     • TOTAL HIP ARTHROPLASTY         FAMILY HISTORY:  Family History   Problem Relation Age of Onset   • Heart disease Mother    • Stroke Mother         CVA   • Cancer Father    • Hodgkin's lymphoma Father    • Lung cancer Father        SOCIAL HISTORY:  Social History     Socioeconomic History   • Marital status:  Spouse name: Not on file   • Number of children: Not on file   • Years of education: Not on file   • Highest education level: Not on file   Occupational History   • Occupation: retired   Tobacco Use   • Smoking status: Former     Packs/day: 0 25     Types: Cigarettes     Quit date: 2018     Years since quittin 0   • Smokeless tobacco: Never   • Tobacco comments:     4 cig/day   Vaping Use   • Vaping Use: Never used   Substance and Sexual Activity   • Alcohol use: Never     Alcohol/week: 2 0 standard drinks of alcohol     Types: 2 Standard drinks or equivalent per week   • Drug use: No   • Sexual activity: Not Currently     Comment: no high risk sexual behavior   Other Topics Concern   • Not on file   Social History Narrative   • Not on file     Social Determinants of Health     Financial Resource Strain: Medium Risk (2022)    Overall Financial Resource Strain (CARDIA)    • Difficulty of Paying Living Expenses: Somewhat hard   Food Insecurity: No Food Insecurity (2022)    Hunger Vital Sign    • Worried About Running Out of Food in the Last Year: Never true    • Ran Out of Food in the Last Year: Never true   Transportation Needs: No Transportation Needs (2022)    PRAPARE - Transportation    • Lack of Transportation (Medical): No    • Lack of Transportation (Non-Medical): No   Physical Activity: Not on file   Stress: Not on file   Social Connections: Not on file   Intimate Partner Violence: Not on file   Housing Stability: Low Risk  (2022)    Housing Stability Vital Sign    • Unable to Pay for Housing in the Last Year: No    • Number of Places Lived in the Last Year: 1    • Unstable Housing in the Last Year: No       Review of Systems   Constitutional: Negative for fatigue  Respiratory: Negative for shortness of breath  Cardiovascular: Negative for chest pain and palpitations  Gastrointestinal: Negative for abdominal pain           Objective:  Vitals:    23 1114   BP: 104/64 "  BP Location: Left arm   Patient Position: Sitting   Cuff Size: Adult   Pulse: (!) 37   Resp: 14   Temp: (!) 97 2 °F (36 2 °C)   TempSrc: Tympanic   SpO2: 95%   Weight: 60 6 kg (133 lb 9 6 oz)   Height: 5' 5\" (1 651 m)     Body mass index is 22 23 kg/m²  Physical Exam  Vitals and nursing note reviewed  Constitutional:       Appearance: He is well-developed  Cardiovascular:      Rate and Rhythm: Normal rate and regular rhythm  Heart sounds: Normal heart sounds  Pulmonary:      Effort: Pulmonary effort is normal       Breath sounds: Normal breath sounds  Abdominal:      Palpations: Abdomen is soft  Tenderness: There is no abdominal tenderness  Musculoskeletal:      Right lower leg: No edema  Left lower leg: No edema  Skin:     Findings: Rash present  Neurological:      Mental Status: He is alert and oriented to person, place, and time  Psychiatric:         Mood and Affect: Mood normal          Behavior: Behavior normal            RESULTS:    In chart    This note was created with voice recognition software  Phonic, grammatical and spelling errors may be present within the note as a result      "

## 2023-08-21 DIAGNOSIS — K21.9 GASTROESOPHAGEAL REFLUX DISEASE WITHOUT ESOPHAGITIS: ICD-10-CM

## 2023-08-21 RX ORDER — PANTOPRAZOLE SODIUM 40 MG/1
40 TABLET, DELAYED RELEASE ORAL DAILY
Qty: 90 TABLET | Refills: 2 | Status: SHIPPED | OUTPATIENT
Start: 2023-08-21

## 2023-10-03 ENCOUNTER — RA CDI HCC (OUTPATIENT)
Dept: OTHER | Facility: HOSPITAL | Age: 78
End: 2023-10-03

## 2023-10-03 NOTE — PROGRESS NOTES
[address] [facility] coding opportunities     I11.0 and I42.9     Chart Reviewed number of suggestions sent to Provider: 2     Patients Insurance     Medicare Insurance: Medicare

## 2023-10-09 ENCOUNTER — HOSPITAL ENCOUNTER (OUTPATIENT)
Dept: RADIOLOGY | Facility: HOSPITAL | Age: 78
Discharge: HOME | End: 2023-10-09
Payer: MEDICARE

## 2023-10-09 ENCOUNTER — OFFICE VISIT (OUTPATIENT)
Dept: INTERNAL MEDICINE CLINIC | Facility: CLINIC | Age: 78
End: 2023-10-09
Payer: MEDICARE

## 2023-10-09 VITALS
BODY MASS INDEX: 21.83 KG/M2 | TEMPERATURE: 97.6 F | WEIGHT: 131 LBS | OXYGEN SATURATION: 100 % | HEART RATE: 65 BPM | DIASTOLIC BLOOD PRESSURE: 66 MMHG | SYSTOLIC BLOOD PRESSURE: 124 MMHG | HEIGHT: 65 IN

## 2023-10-09 DIAGNOSIS — M25.522 BILATERAL ELBOW JOINT PAIN: ICD-10-CM

## 2023-10-09 DIAGNOSIS — M25.521 BILATERAL ELBOW JOINT PAIN: Primary | ICD-10-CM

## 2023-10-09 DIAGNOSIS — M54.50 ACUTE LEFT-SIDED LOW BACK PAIN WITHOUT SCIATICA: ICD-10-CM

## 2023-10-09 DIAGNOSIS — M25.522 BILATERAL ELBOW JOINT PAIN: Primary | ICD-10-CM

## 2023-10-09 DIAGNOSIS — M25.521 BILATERAL ELBOW JOINT PAIN: ICD-10-CM

## 2023-10-09 PROCEDURE — 72100 X-RAY EXAM L-S SPINE 2/3 VWS: CPT

## 2023-10-09 PROCEDURE — 99214 OFFICE O/P EST MOD 30 MIN: CPT | Performed by: INTERNAL MEDICINE

## 2023-10-09 PROCEDURE — 73080 X-RAY EXAM OF ELBOW: CPT

## 2023-10-09 NOTE — PROGRESS NOTES
Assessment/Plan:     Not sure what is going on with his elbows. We will get x-rays to see if there is significant arthritis. Based on what dermatology has told him, may need to have him checked by Ortho? .  Because of the fall, we will also add x-rays of the lower back. Probably more of the discomfort is from bruising because he is on a blood thinner. Quality Measures:       Return if symptoms worsen or fail to improve. No problem-specific Assessment & Plan notes found for this encounter. Diagnoses and all orders for this visit:    Bilateral elbow joint pain  -     XR elbow 3+ vw left; Future  -     XR elbow 3+ vw right; Future    Acute left-sided low back pain without sciatica  -     XR spine lumbar 2 or 3 views injury; Future          Subjective:      Patient ID: David Martin is a 69 y.o. male. Patient comes in today because he has been following with dermatology and they told him his elbow rash might be from arthritis. He states the cream they gave him did nothing. He has no swelling there. But it is tender. He also then reports he had a fall and he has some pain in his left lower back. No LOC. No trouble walking. Just sore. This has been for 6 days.       ALLERGIES:  No Known Allergies    CURRENT MEDICATIONS:    Current Outpatient Medications:   •  atorvastatin (LIPITOR) 40 mg tablet, Take 40 mg by mouth daily  , Disp: , Rfl:   •  Azelaic Acid 15 % cream, apply to 1 thin layer twice a day to face, Disp: , Rfl:   •  clindamycin (CLINDAGEL) 1 % gel, APPLY 1 THIN LAYER TOPICALLY TO FACE IN THE  MORNING, Disp: , Rfl:   •  clopidogrel (PLAVIX) 75 mg tablet, Take 75 mg by mouth daily, Disp: , Rfl:   •  Farxiga 10 MG TABS, Take 10 mg by mouth daily, Disp: , Rfl:   •  fluticasone (FLONASE) 50 mcg/act nasal spray, 1 spray into each nostril daily Do not start before November 15, 2022., Disp: 11.1 mL, Rfl: 0  •  furosemide (LASIX) 40 mg tablet, Take 40 mg by mouth daily PT REPORTS : 1/2 TABLET EVERY OTHER DAY ([facility]), Disp: , Rfl:   •  Lumigan 0.01 % ophthalmic drops, instill 1 drop into both eyes once daily at bedtime, Disp: , Rfl:   •  midodrine (PROAMATINE) 2.5 mg tablet, Take 1 tablet (2.5 mg total) by mouth 3 (three) times a day before meals (Patient taking differently: Take 2.5 mg by mouth 2 (two) times a day), Disp: 90 tablet, Rfl: 0  •  Multiple Vitamins-Minerals (PX MENS MULTIVITAMINS) TABS, Take by mouth, Disp: , Rfl:   •  pantoprazole (PROTONIX) 40 mg tablet, Take 1 tablet (40 mg total) by mouth daily, Disp: 90 tablet, Rfl: 2  •  sacubitril-valsartan (ENTRESTO) 24-26 MG TABS, Take 1 tablet by mouth 2 (two) times a day (Patient taking differently: Take 0.5 tablets by mouth 2 (two) times a day), Disp: 60 tablet, Rfl: 0  •  spironolactone (ALDACTONE) 25 mg tablet, Take 12.5 mg by mouth daily 1/2 TABLET EVERY OTHER DAY (Tu, Th, Sa, Su), Disp: , Rfl:   •  triamcinolone (KENALOG) 0.5 % cream, Apply topically 3 (three) times a day, Disp: 30 g, Rfl: 1  •  warfarin (COUMADIN) 5 mg tablet, Take 1 tablet (5 mg total) by mouth daily Coumadin 5 mg p.o. Daily- Target INR 2-3 (Patient taking differently: Take 5 mg by mouth daily M & Thurs 2.5MG;  All Other Days 5MG), Disp: 30 tablet, Rfl: 0    ACTIVE PROBLEM LIST:  Patient Active Problem List   Diagnosis   • CAD (coronary atherosclerotic disease)   • Acute on chronic combined systolic and diastolic congestive heart failure (HCC)   • Chronic back pain   • GERD (gastroesophageal reflux disease)   • Hypercholesterolemia   • Hypertension   • PAF (paroxysmal atrial fibrillation) (HCC)   • Peptic ulcer disease   • S/P MVR (mitral valve repair)   • Polyp of colon   • Abnormal RBC indices   • Fatigue   • Chronic anticoagulation   • Pain in gums   • Idiopathic chronic gout of multiple sites without tophus   • Impaired fasting glucose   • Anemia due to blood loss, acute   • History of normocytic normochromic anemia   • Ambulatory dysfunction   • Vasomotor rhinitis   • Acute pulmonary embolism without acute cor pulmonale (HCC)   • CKD (chronic kidney disease)   • SIRS without infection or organ dysfunction (HCC)   • Hypotension   • Syncopal episodes   • Episode of unresponsiveness   • Chronic ischemic right MCA stroke       PAST MEDICAL HISTORY:  Past Medical History:   Diagnosis Date   • Anaplasmosis 2018   • CAD (coronary artery disease)    • Cancer (HCC)     prostate   • Colon polyp    • Coronary artery disease    • GERD (gastroesophageal reflux disease)    • H/O heart artery stent     x2   • Hypercholesteremia    • Hypertension    • Keratotic lesion    • Stroke ([address])     10YRS AGO   • Transient cerebral ischemia        PAST SURGICAL HISTORY:  Past Surgical History:   Procedure Laterality Date   • BACK SURGERY     • CARDIAC SURGERY      mvp repair   • CAROTID ENDARTERECTOMY Right 2020   • CHOLECYSTECTOMY     • COLONOSCOPY     • ESOPHAGOGASTRODUODENOSCOPY N/A 2018    Procedure: ESOPHAGOGASTRODUODENOSCOPY (EGD); Surgeon: Julieta Bustamante MD;  Location: [facility];   Service: Gastroenterology   • JOINT REPLACEMENT      L hip replacement   • ORCHIECTOMY     • PROSTATECTOMY     • TOTAL HIP ARTHROPLASTY         FAMILY HISTORY:  Family History   Problem Relation Age of Onset   • Heart disease Mother    • Stroke Mother         CVA   • Cancer Father    • Hodgkin's lymphoma Father    • Lung cancer Father        SOCIAL HISTORY:  Social History     Socioeconomic History   • Marital status:      Spouse name: Not on file   • Number of children: Not on file   • Years of education: Not on file   • Highest education level: Not on file   Occupational History   • Occupation: retired   Tobacco Use   • Smoking status: Former     Packs/day: 0.25     Types: Cigarettes     Quit date: 2018     Years since quittin.3   • Smokeless tobacco: Never   • Tobacco comments:     4 cig/day   Vaping Use   • Vaping Use: Never used   Substance and Sexual Activity   • Alcohol use: Never     Alcohol/week: 2.0 standard drinks of alcohol     Types: 2 Standard drinks or equivalent per week   • Drug use: No   • Sexual activity: Not Currently     Comment: no high risk sexual behavior   Other Topics Concern   • Not on file   Social History Narrative   • Not on file     Social Determinants of Health     Financial Resource Strain: Medium Risk (9/23/2022)    Overall Financial Resource Strain (CARDIA)    • Difficulty of Paying Living Expenses: Somewhat hard   Food Insecurity: No Food Insecurity (11/8/2022)    Hunger Vital Sign    • Worried About Running Out of Food in the Last Year: Never true    • Ran Out of Food in the Last Year: Never true   Transportation Needs: No Transportation Needs (11/8/2022)    PRAPARE - Transportation    • Lack of Transportation (Medical): No    • Lack of Transportation (Non-Medical): No   Physical Activity: Not on file   Stress: Not on file   Social Connections: Not on file   Intimate Partner Violence: Not on file   Housing Stability: Low Risk  (11/8/2022)    Housing Stability Vital Sign    • Unable to Pay for Housing in the Last Year: No    • Number of Places Lived in the Last Year: 1    • Unstable Housing in the Last Year: No       Review of Systems   Respiratory: Negative for shortness of breath. Cardiovascular: Negative for chest pain. Gastrointestinal: Negative for abdominal pain. Objective:  Vitals:    10/09/23 1016   BP: 124/66   Pulse: 65   Temp: 97.6 °F (36.4 °C)   SpO2: 100%   Weight: 59.4 kg (131 lb)   Height: 5' 5" (1.651 m)     Body mass index is 21.8 kg/m². Physical Exam  Vitals and nursing note reviewed. Musculoskeletal:      Comments: Lower back is tender left lumbar region. There is some bruising there as well. No discrete mass to suggest hematoma. Skin:     Comments: Both elbows are erythematous with healing sores. There is no swelling, nothing to suggest bursitis.            RESULTS:  Hemoglobin A1C   Date/Time Value Ref Range Status 09/08/2022 10:22 AM 6.2 (H) <5.7 % Final     Comment:     Reference Range  Non-diabetic                     <5.7  Pre-diabetic                     5.7-6.4  Diabetic                         >=6.5  ADA target for diabetic control  <=7     Cholesterol   Date/Time Value Ref Range Status   12/10/2018 11:06  50 - 200 mg/dL Final     Comment:       Cholesterol:       Desirable         <200 mg/dl       Borderline         200-239 mg/dl       High              >239            Triglycerides   Date/Time Value Ref Range Status   12/10/2018 11:06  <=150 mg/dL Final     Comment:       Triglyceride:     Normal          <150 mg/dl     Borderline High 150-199 mg/dl     High            200-499 mg/dl        Very High       >499 mg/dl    Specimen collection should occur prior to N-Acetylcysteine or Metamizole administration due to the potential for falsely depressed results. HDL, Direct   Date/Time Value Ref Range Status   12/10/2018 11:06 AM 34 (L) 40 - 60 mg/dL Final     Comment:       HDL Cholesterol:       High    >60 mg/dL       Low     <41 mg/dL  Specimen collection should occur prior to Metamizole administration due to the potential for falsley depressed results. LDL Calculated   Date/Time Value Ref Range Status   12/10/2018 11:06 AM 46 0 - 100 mg/dL Final     Comment:       LDL Cholesterol:     Optimal           <100 mg/dl     Near Optimal      100-129 mg/dl     Above Optimal       Borderline High 130-159 mg/dl       High            160-189 mg/dl       Very High       >189 mg/dl         This screening LDL is a calculated result. It does not have the accuracy of the Direct Measured LDL in the monitoring of patients with hyperlipidemia and/or statin therapy. Direct Measure LDL (MAN939) must be ordered separately in these patients.      Hemoglobin   Date/Time Value Ref Range Status   11/14/2022 04:47 AM 14.6 12.0 - 17.0 g/dL Final     Hematocrit   Date/Time Value Ref Range Status   11/14/2022 04:47 AM 44.9 36.5 - 49.3 % Final     Platelets   Date/Time Value Ref Range Status   11/14/2022 04:47  149 - 390 Thousands/uL Final     TSH 3RD GENERATON   Date/Time Value Ref Range Status   12/10/2018 11:06 AM 2.049 0.358 - 3.740 uIU/mL Final     Comment:     Using supplements with high doses of biotin 20 to more than 300 times greater than the adequate daily intake for adults of 30 mcg/day as established by the [employer], can cause falsely depress results. Sodium   Date/Time Value Ref Range Status   11/14/2022 04:47  135 - 147 mmol/L Final     BUN   Date/Time Value Ref Range Status   11/14/2022 04:47 AM 24 5 - 25 mg/dL Final     Creatinine   Date/Time Value Ref Range Status   11/14/2022 04:47 AM 1.01 0.60 - 1.30 mg/dL Final     Comment:     Standardized to IDMS reference method      In chart    This note was created with voice recognition software. Phonic, grammatical and spelling errors may be present within the note as a result.

## 2023-10-13 ENCOUNTER — TELEPHONE (OUTPATIENT)
Dept: INTERNAL MEDICINE CLINIC | Facility: CLINIC | Age: 78
End: 2023-10-13

## 2023-10-13 NOTE — TELEPHONE ENCOUNTER
Pt has a lot of pain in back,   had fallen,  waiting on xrays     Can we suggest anything or send anything in?      [facility] if so

## 2023-10-17 ENCOUNTER — TELEPHONE (OUTPATIENT)
Dept: INTERNAL MEDICINE CLINIC | Facility: CLINIC | Age: 78
End: 2023-10-17

## 2023-10-17 NOTE — TELEPHONE ENCOUNTER
Patient called to get the results of his xrays that he had done on the 9th of October. I did explain that they have not been resulted yet. Patient said that he can't believe that it is taking this long to get them read. Is there any way that we can move this along any quicker? Please advise. [facility]

## 2023-10-18 ENCOUNTER — TELEPHONE (OUTPATIENT)
Dept: INTERNAL MEDICINE CLINIC | Facility: CLINIC | Age: 78
End: 2023-10-18

## 2023-10-18 DIAGNOSIS — M54.50 ACUTE LEFT-SIDED LOW BACK PAIN WITHOUT SCIATICA: Primary | ICD-10-CM

## 2023-10-18 NOTE — TELEPHONE ENCOUNTER
----- Message from Sania Waggoner MD sent at 10/18/2023  3:31 PM EDT -----  Call patient, the back x-ray shows a lot of arthritis but no acute injury such as a fracture. The elbows show some arthritis but nothing severe. No acute injuries.   It does look like you injured the right elbow in the past.  If symptoms persist, next step would be an orthopedic referral.  (Please place that if required)

## 2023-10-20 ENCOUNTER — TELEPHONE (OUTPATIENT)
Dept: INTERNAL MEDICINE CLINIC | Facility: CLINIC | Age: 78
End: 2023-10-20

## 2023-10-20 NOTE — TELEPHONE ENCOUNTER
Pt had a ortho referral, but they said for his back and he was under impression it was for his elbows? Pt is confused so wants to make sure is ortho where he goes for elbow, for back for both?

## 2023-10-24 ENCOUNTER — OFFICE VISIT (OUTPATIENT)
Dept: OBGYN CLINIC | Facility: CLINIC | Age: 78
End: 2023-10-24
Payer: MEDICARE

## 2023-10-24 VITALS
DIASTOLIC BLOOD PRESSURE: 71 MMHG | HEIGHT: 65 IN | HEART RATE: 84 BPM | BODY MASS INDEX: 21.83 KG/M2 | SYSTOLIC BLOOD PRESSURE: 130 MMHG | WEIGHT: 131 LBS

## 2023-10-24 DIAGNOSIS — M70.22 OLECRANON BURSITIS OF BOTH ELBOWS: ICD-10-CM

## 2023-10-24 DIAGNOSIS — M51.36 DEGENERATIVE DISC DISEASE, LUMBAR: ICD-10-CM

## 2023-10-24 DIAGNOSIS — M70.21 OLECRANON BURSITIS OF BOTH ELBOWS: ICD-10-CM

## 2023-10-24 DIAGNOSIS — M47.816 LUMBAR FACET ARTHROPATHY: Primary | ICD-10-CM

## 2023-10-24 PROCEDURE — 20605 DRAIN/INJ JOINT/BURSA W/O US: CPT | Performed by: FAMILY MEDICINE

## 2023-10-24 PROCEDURE — 99203 OFFICE O/P NEW LOW 30 MIN: CPT | Performed by: FAMILY MEDICINE

## 2023-10-24 RX ORDER — TRIAMCINOLONE ACETONIDE 40 MG/ML
20 INJECTION, SUSPENSION INTRA-ARTICULAR; INTRAMUSCULAR
Status: COMPLETED | OUTPATIENT
Start: 2023-10-24 | End: 2023-10-24

## 2023-10-24 RX ORDER — BUPIVACAINE HYDROCHLORIDE 2.5 MG/ML
0.5 INJECTION, SOLUTION INFILTRATION; PERINEURAL
Status: COMPLETED | OUTPATIENT
Start: 2023-10-24 | End: 2023-10-24

## 2023-10-24 RX ADMIN — BUPIVACAINE HYDROCHLORIDE 0.5 ML: 2.5 INJECTION, SOLUTION INFILTRATION; PERINEURAL at 17:15

## 2023-10-24 RX ADMIN — TRIAMCINOLONE ACETONIDE 20 MG: 40 INJECTION, SUSPENSION INTRA-ARTICULAR; INTRAMUSCULAR at 17:15

## 2023-10-24 NOTE — LETTER
October 24, 2023     [place], [address]  [place]    Patient: Aleida Morris   YOB: 1945   Date of Visit: 10/24/2023       Dear Dr. Domingo Estimable: Thank you for referring Ankita Fulton to me for evaluation. Below are my notes for this consultation. If you have questions, please do not hesitate to call me. I look forward to following your patient along with you. Sincerely,        Glory Bird, DO        CC: No Recipients    ADRIA Leon, DO  10/24/2023  6:37 PM  Sign when Signing Visit  Assessment/Plan:  Assessment/Plan  Diagnoses and all orders for this visit:    Lumbar facet arthropathy  -     Ambulatory Referral to Orthopedic Surgery  -     MRI lumbar spine wo contrast; Future  -     Ambulatory referral to Spine & Pain Management; Future    Degenerative disc disease, lumbar  -     MRI lumbar spine wo contrast; Future    Olecranon bursitis of both elbows  -     Medium joint arthrocentesis: bilateral olecranon bursa      78-year male with low back pain many years duration, pain both elbows more than few months duration. Discussed with patient imaging studies, impression, plan. X-rays lumbar spine noted for multilevel degenerative changes with disc base narrowing and levocurvature. X-rays both elbows are unremarkable for acute osseous abnormality. Physical exam both elbows noted for posterior soft tissue hyperemia with scab measuring approximately 0.5 cm. He has tenderness upon palpation olecranon process both elbows. He has intact motion strength both elbows. He has normal sensation and radial pulse both upper extremities. Clinical impression is that he may have olecranon bursitis. I discussed treatment option of steroid injection to which he agreed. I administered mixture 0.5 cc 0.25% bupivacaine and 0.5 cc Kenalog olecranon bursa each elbow without complication.   Regards to lumbar spine he would like to avoid surgery and he has not improved with previous formal therapy. I will refer him for MRI of the lumbar spine to evaluate for disc pathology, stenosis, and nerve impingement as more invasive management may warranted. I will refer him to pain management for further evaluation and treatment. He will follow-up as needed. Subjective:   Patient ID: Phyllis Pineda is a 69 y.o. male. Chief Complaint   Patient presents with   • Left Elbow - Pain   • Spine - Pain        67-year-old male presents for evaluation low back pain manage duration and pain both elbows more than few months duration. He reports have been diagnosed arthritis in the spine. He states he was treated with formal therapy years ago but it did not help with symptoms. He had worsening of pain few weeks ago from a fall injury. He has pain described as generalized and low back, none radiating, worse with activity, and improved with resting. He had x-rays done which were unremarkable and noted for degenerative changes. He has also been experiencing pain and bruising at the posterior aspect both elbows more than few months. He has pain described localized to posterior aspect both elbows, none radiating, worse with direct pressure on the elbows, associated with swelling and scabbing, and improved with avoiding pressure on the area. He was seen by primary care physician and advised on topical steroid. He states he was seen by dermatologist regarding scabs, but no invasive management was warranted. He had follow-up with primary care physician, was referred for x-rays, and referred to orthopedic care. Elbow Pain  This is a new problem. The current episode started more than 1 month ago. The problem occurs daily. The problem has been unchanged. Associated symptoms include arthralgias and joint swelling. Pertinent negatives include no abdominal pain, chest pain, chills, fever, numbness, rash, sore throat or weakness. Exacerbated by: Direct pressure.  He has tried rest and position changes for the symptoms. The treatment provided mild relief. 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 He has No Known Allergies. .    Review of Systems   Constitutional:  Negative for chills and fever. HENT:  Negative for sore throat. Eyes:  Negative for visual disturbance. Respiratory:  Negative for shortness of breath. Cardiovascular:  Negative for chest pain. Gastrointestinal:  Negative for abdominal pain. Genitourinary:  Negative for flank pain. Musculoskeletal:  Positive for arthralgias and joint swelling. Skin:  Negative for rash and wound. Neurological:  Negative for weakness and numbness. Hematological:  Bruises/bleeds easily. Psychiatric/Behavioral:  Negative for self-injury. Objective:  Vitals:    10/24/23 1658   BP: 130/71   Pulse: 84   Weight: 59.4 kg (131 lb)   Height: 5' 5" (1.651 m)      Right Hand Exam     Range of Motion   The patient has normal right wrist ROM. Muscle Strength   The patient has normal right wrist strength. Other   Sensation: normal  Pulse: present      Left Hand Exam     Range of Motion   The patient has normal left wrist ROM. Muscle Strength   The patient has normal left wrist strength. Other   Sensation: normal  Pulse: present      Right Elbow Exam     Tenderness   Right elbow tenderness location: Olecranon process. Range of Motion   The patient has normal right elbow ROM. Left Elbow Exam     Tenderness   Left elbow tenderness location: Olecranon process. Range of Motion   The patient has normal left elbow ROM.           Strength/Myotome Testing     Left Wrist/Hand   Normal wrist strength    Right Wrist/Hand   Normal wrist strength      Physical Exam  Vitals and nursing note reviewed. Constitutional:       Appearance: Normal appearance. He is well-developed. He is not ill-appearing or diaphoretic. HENT:      Head: Normocephalic and atraumatic. Right Ear: External ear normal.      Left Ear: External ear normal.   Eyes:      Conjunctiva/sclera: Conjunctivae normal.   Neck:      Trachea: No tracheal deviation. Cardiovascular:      Rate and Rhythm: Normal rate. Pulmonary:      Effort: Pulmonary effort is normal. No respiratory distress. Abdominal:      General: There is no distension. Musculoskeletal:         General: Swelling and tenderness present. No deformity or signs of injury. Skin:     General: Skin is warm and dry. Coloration: Skin is not jaundiced or pale. Neurological:      Mental Status: He is alert and oriented to person, place, and time. Psychiatric:         Behavior: Behavior normal.         Thought Content: Thought content normal.         Judgment: Judgment normal.         I have personally reviewed pertinent films in PACS and my interpretation is  . No acute osseous evaluate both elbows. Right elbow chronic avulsion fragment. Left elbow calcification of the posterior soft tissue. Medium joint arthrocentesis: bilateral olecranon bursa  Universal Protocol:  Consent: Verbal consent obtained. Risks and benefits: risks, benefits and alternatives were discussed  Consent given by: patient  Time out: Immediately prior to procedure a "time out" was called to verify the correct patient, procedure, equipment, support staff and site/side marked as required.   Patient understanding: patient states understanding of the procedure being performed  Patient consent: the patient's understanding of the procedure matches consent given  Procedure consent: procedure consent matches procedure scheduled  Relevant documents: relevant documents present and verified  Test results: test results available and properly labeled  Site marked: the operative site was marked  Radiology Images displayed and confirmed.  If images not available, report reviewed: imaging studies available  Required items: required blood products, implants, devices, and special equipment available  Patient identity confirmed: verbally with patient  Supporting Documentation  Indications: pain and joint swelling   Procedure Details  Location: elbow - bilateral olecranon bursa  Preparation: Patient was prepped and draped in the usual sterile fashion  Needle size: 27 G  Ultrasound guidance: no  Approach: posterolateral    Medications (Right): 0.5 mL bupivacaine 0.25 %; 20 mg triamcinolone acetonide 40 mg/mLMedications (Left): 0.5 mL bupivacaine 0.25 %; 20 mg triamcinolone acetonide 40 mg/mL   Patient tolerance: patient tolerated the procedure well with no immediate complications  Dressing:  Sterile dressing applied

## 2023-10-24 NOTE — PROGRESS NOTES
Assessment/Plan:  Assessment/Plan   Diagnoses and all orders for this visit:    Lumbar facet arthropathy  -     Ambulatory Referral to Orthopedic Surgery  -     MRI lumbar spine wo contrast; Future  -     Ambulatory referral to Spine & Pain Management; Future    Degenerative disc disease, lumbar  -     MRI lumbar spine wo contrast; Future    Olecranon bursitis of both elbows  -     Medium joint arthrocentesis: bilateral olecranon bursa      78-year male with low back pain many years duration, pain both elbows more than few months duration. Discussed with patient imaging studies, impression, plan. X-rays lumbar spine noted for multilevel degenerative changes with disc base narrowing and levocurvature. X-rays both elbows are unremarkable for acute osseous abnormality. Physical exam both elbows noted for posterior soft tissue hyperemia with scab measuring approximately 0.5 cm. He has tenderness upon palpation olecranon process both elbows. He has intact motion strength both elbows. He has normal sensation and radial pulse both upper extremities. Clinical impression is that he may have olecranon bursitis. I discussed treatment option of steroid injection to which he agreed. I administered mixture 0.5 cc 0.25% bupivacaine and 0.5 cc Kenalog olecranon bursa each elbow without complication. Regards to lumbar spine he would like to avoid surgery and he has not improved with previous formal therapy. I will refer him for MRI of the lumbar spine to evaluate for disc pathology, stenosis, and nerve impingement as more invasive management may warranted. I will refer him to pain management for further evaluation and treatment. He will follow-up as needed. Subjective:   Patient ID: Abigail Kehr is a 69 y.o. male.   Chief Complaint   Patient presents with    Left Elbow - Pain    Spine - Pain        16-year-old male presents for evaluation low back pain manage duration and pain both elbows more than few months duration. He reports have been diagnosed arthritis in the spine. He states he was treated with formal therapy years ago but it did not help with symptoms. He had worsening of pain few weeks ago from a fall injury. He has pain described as generalized and low back, none radiating, worse with activity, and improved with resting. He had x-rays done which were unremarkable and noted for degenerative changes. He has also been experiencing pain and bruising at the posterior aspect both elbows more than few months. He has pain described localized to posterior aspect both elbows, none radiating, worse with direct pressure on the elbows, associated with swelling and scabbing, and improved with avoiding pressure on the area. He was seen by primary care physician and advised on topical steroid. He states he was seen by dermatologist regarding scabs, but no invasive management was warranted. He had follow-up with primary care physician, was referred for x-rays, and referred to orthopedic care. Elbow Pain  This is a new problem. The current episode started more than 1 month ago. The problem occurs daily. The problem has been unchanged. Associated symptoms include arthralgias and joint swelling. Pertinent negatives include no abdominal pain, chest pain, chills, fever, numbness, rash, sore throat or weakness. Exacerbated by: Direct pressure. He has tried rest and position changes for the symptoms. The treatment provided mild relief. 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 He has No Known Allergies. .    Review of Systems   Constitutional:  Negative for chills and fever. HENT:  Negative for sore throat. Eyes:  Negative for visual disturbance. Respiratory:  Negative for shortness of breath. Cardiovascular:  Negative for chest pain. Gastrointestinal:  Negative for abdominal pain. Genitourinary:  Negative for flank pain. Musculoskeletal:  Positive for arthralgias and joint swelling. Skin:  Negative for rash and wound. Neurological:  Negative for weakness and numbness. Hematological:  Bruises/bleeds easily. Psychiatric/Behavioral:  Negative for self-injury. Objective:  Vitals:    10/24/23 1658   BP: 130/71   Pulse: 84   Weight: 59.4 kg (131 lb)   Height: 5' 5" (1.651 m)      Right Hand Exam     Range of Motion   The patient has normal right wrist ROM. Muscle Strength   The patient has normal right wrist strength. Other   Sensation: normal  Pulse: present      Left Hand Exam     Range of Motion   The patient has normal left wrist ROM. Muscle Strength   The patient has normal left wrist strength. Other   Sensation: normal  Pulse: present      Right Elbow Exam     Tenderness   Right elbow tenderness location: Olecranon process. Range of Motion   The patient has normal right elbow ROM. Left Elbow Exam     Tenderness   Left elbow tenderness location: Olecranon process. Range of Motion   The patient has normal left elbow ROM. Strength/Myotome Testing     Left Wrist/Hand   Normal wrist strength    Right Wrist/Hand   Normal wrist strength      Physical Exam  Vitals and nursing note reviewed. Constitutional:       Appearance: Normal appearance. He is well-developed. He is not ill-appearing or diaphoretic. HENT:      Head: Normocephalic and atraumatic. Right Ear: External ear normal.      Left Ear: External ear normal.   Eyes:      Conjunctiva/sclera: Conjunctivae normal.   Neck:      Trachea: No tracheal deviation. Cardiovascular:      Rate and Rhythm: Normal rate.    Pulmonary:      Effort: Pulmonary effort is normal. No respiratory distress. Abdominal:      General: There is no distension. Musculoskeletal:         General: Swelling and tenderness present. No deformity or signs of injury. Skin:     General: Skin is warm and dry. Coloration: Skin is not jaundiced or pale. Neurological:      Mental Status: He is alert and oriented to person, place, and time. Psychiatric:         Behavior: Behavior normal.         Thought Content: Thought content normal.         Judgment: Judgment normal.         I have personally reviewed pertinent films in PACS and my interpretation is  . No acute osseous evaluate both elbows. Right elbow chronic avulsion fragment. Left elbow calcification of the posterior soft tissue. Medium joint arthrocentesis: bilateral olecranon bursa  Universal Protocol:  Consent: Verbal consent obtained. Risks and benefits: risks, benefits and alternatives were discussed  Consent given by: patient  Time out: Immediately prior to procedure a "time out" was called to verify the correct patient, procedure, equipment, support staff and site/side marked as required. Patient understanding: patient states understanding of the procedure being performed  Patient consent: the patient's understanding of the procedure matches consent given  Procedure consent: procedure consent matches procedure scheduled  Relevant documents: relevant documents present and verified  Test results: test results available and properly labeled  Site marked: the operative site was marked  Radiology Images displayed and confirmed.  If images not available, report reviewed: imaging studies available  Required items: required blood products, implants, devices, and special equipment available  Patient identity confirmed: verbally with patient  Supporting Documentation  Indications: pain and joint swelling   Procedure Details  Location: elbow - bilateral olecranon bursa  Preparation: Patient was prepped and draped in the usual sterile fashion  Needle size: 27 G  Ultrasound guidance: no  Approach: posterolateral    Medications (Right): 0.5 mL bupivacaine 0.25 %; 20 mg triamcinolone acetonide 40 mg/mLMedications (Left): 0.5 mL bupivacaine 0.25 %; 20 mg triamcinolone acetonide 40 mg/mL   Patient tolerance: patient tolerated the procedure well with no immediate complications  Dressing:  Sterile dressing applied

## 2023-11-01 ENCOUNTER — HOSPITAL ENCOUNTER (OUTPATIENT)
Dept: MRI IMAGING | Facility: HOSPITAL | Age: 78
Discharge: HOME | End: 2023-11-01
Attending: FAMILY MEDICINE
Payer: MEDICARE

## 2023-11-01 DIAGNOSIS — M51.36 DEGENERATIVE DISC DISEASE, LUMBAR: ICD-10-CM

## 2023-11-01 DIAGNOSIS — M47.816 LUMBAR FACET ARTHROPATHY: ICD-10-CM

## 2023-11-01 PROCEDURE — G1004 CDSM NDSC: HCPCS

## 2023-11-01 PROCEDURE — 72148 MRI LUMBAR SPINE W/O DYE: CPT

## 2023-11-09 NOTE — PROGRESS NOTES
Assessment:  1. Chronic left-sided low back pain without sciatica    2. Lumbar facet arthropathy        Plan:  Orders Placed This Encounter   Procedures    [place] spine and pain procedure     Standing Status:   Future     Standing Expiration Date:   11/13/2027     Order Specific Question:   Reason for Exam:     Answer:   Left L2-3 and L3-4 mbb #1     Order Specific Question:   Anticoagulant hold needed? Answer:   No       No orders of the defined types were placed in this encounter. My impressions and treatment recommendations were discussed in detail with the patient, who verbalized understanding and had no further questions. This is a 67-year-old male presents her office chief complaint of left upper lumbar pain. He has notable history of severe scoliosis with the apex in the upper lumbar region. He has tenderness palpation of the left lumbar paraspinal region and around roughly the left L2-3 and L3-4 facet joints. Discussed left L2-3 and L3-4 facet medial branch block followed by radiofrequency ablation of greater than 80% relief. Patient does not have any radicular symptoms with pain radiating down the leg. However does have notable bilateral foot dorsiflexion weakness. He again does not have pain shooting down the lower extremities. We will first treat him for axial back pain. He has trialed conservative treatment in the form of opioid medications, patch treatments, acetaminophen. He is relatively contra kidney from NSAIDs due to history of Plavix usage and stomach ulcer. Given exhaustion of conservative treatments, feel that it is appropriate to move forward with MBB. [place] Prescription Drug Monitoring Program report was reviewed and was appropriate     Complete risks and benefits including bleeding, infection, tissue reaction, nerve injury and allergic reaction were discussed. The approach was demonstrated using models and literature was provided.  Verbal and written consent was obtained. Discharge instructions were provided. I personally saw and examined the patient and I agree with the above discussed plan of care. History of Present Illness:    Sergio Hernandez is a 69 y.o. male who presents to [address] [place] [address] [facility] for initial evaluation of the above stated pain complaints. The patient has a past medical and chronic pain history as outlined in the assessment section. He was referred by ADRI[facility] Hernandez [facility], [address] [place] [address]  [facility],  [address] [place] [address] . Patient is here with chief complaint of primarily left-sided low back pain worsening over the last year. Patient is retired but worked at a golf course. Pain is moderate to severe over the past 1. Nearly constant. Worse in the afternoon. Pressure-like, throbbing. Subjective weakness of the lower extremities. Denies bowel bladder incontinence or saddle anesthesia. Uses a cane or walker to ambulate at times. He did have a fall and his symptoms have exacerbated over the last 3-4 months. Pain does not travel into the legs. Mostly in the back. Pain is increased with standing, sitting, walking, exercise. No change with relaxation, coughing, sneezing. Past medical history includes CAD, depression, high cholesterol, prostate cancer, stomach ulcers. No relief with surgery, physical therapy, exercise. Smokes tobacco, half pack per day for 15 years. No marijuana. No alcohol. Not allergic to latex or contrast dye. In the past has been on oxycodone, Demerol, lidocaine patch. Currently using acetaminophen. All with very little relief. Review of Systems:    Review of Systems   Constitutional:  Negative for unexpected weight change. Respiratory:  Positive for shortness of breath. Musculoskeletal:  Positive for gait problem.            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    CAD (coronary artery disease)     Cancer (HCC)     prostate Colon polyp     Coronary artery disease     GERD (gastroesophageal reflux disease)     H/O heart artery stent     x2    Hypercholesteremia     Hypertension     Keratotic lesion     Stroke ([address])     10YRS AGO    Transient cerebral ischemia        Past Surgical History:   Procedure Laterality Date    BACK SURGERY      CARDIAC SURGERY      mvp repair    CAROTID ENDARTERECTOMY Right 2020    CHOLECYSTECTOMY      COLONOSCOPY      ESOPHAGOGASTRODUODENOSCOPY N/A 2018    Procedure: ESOPHAGOGASTRODUODENOSCOPY (EGD); Surgeon: Karlene Lamb MD;  Location: [facility];   Service: Gastroenterology    JOINT REPLACEMENT      L hip replacement    ORCHIECTOMY      PROSTATECTOMY      TOTAL HIP ARTHROPLASTY         Family History   Problem Relation Age of Onset    Heart disease Mother     Stroke Mother         CVA    Cancer Father     Hodgkin's lymphoma Father     Lung cancer Father        Social History     Occupational History    Occupation: retired   Tobacco Use    Smoking status: Former     Packs/day: 0.25     Types: Cigarettes     Quit date: 2018     Years since quittin.4    Smokeless tobacco: Never    Tobacco comments:     4 cig/day   Vaping Use    Vaping Use: Never used   Substance and Sexual Activity    Alcohol use: Never     Alcohol/week: 2.0 standard drinks of alcohol     Types: 2 Standard drinks or equivalent per week    Drug use: No    Sexual activity: Not Currently     Comment: no high risk sexual behavior         Current Outpatient Medications:     atorvastatin (LIPITOR) 40 mg tablet, Take 40 mg by mouth daily  , Disp: , Rfl:     Azelaic Acid 15 % cream, apply to 1 thin layer twice a day to face, Disp: , Rfl:     clindamycin (CLINDAGEL) 1 % gel, APPLY 1 THIN LAYER TOPICALLY TO FACE IN THE  MORNING, Disp: , Rfl:     clopidogrel (PLAVIX) 75 mg tablet, Take 75 mg by mouth daily, Disp: , Rfl:     Farxiga 10 MG TABS, Take 10 mg by mouth daily, Disp: , Rfl:     furosemide (LASIX) 40 mg tablet, Take 40 mg by mouth daily PT REPORTS : 1/2 TABLET EVERY OTHER DAY (MW), Disp: , Rfl:     Lumigan 0.01 % ophthalmic drops, instill 1 drop into both eyes once daily at bedtime, Disp: , Rfl:     midodrine (PROAMATINE) 2.5 mg tablet, Take 1 tablet (2.5 mg total) by mouth 3 (three) times a day before meals (Patient taking differently: Take 2.5 mg by mouth 2 (two) times a day), Disp: 90 tablet, Rfl: 0    Multiple Vitamins-Minerals (PX MENS MULTIVITAMINS) TABS, Take by mouth, Disp: , Rfl:     pantoprazole (PROTONIX) 40 mg tablet, Take 1 tablet (40 mg total) by mouth daily, Disp: 90 tablet, Rfl: 2    sacubitril-valsartan (ENTRESTO) 24-26 MG TABS, Take 1 tablet by mouth 2 (two) times a day (Patient taking differently: Take 0.5 tablets by mouth 2 (two) times a day), Disp: 60 tablet, Rfl: 0    spironolactone (ALDACTONE) 25 mg tablet, Take 12.5 mg by mouth daily 1/2 TABLET EVERY OTHER DAY (Tu, Th, Sa, Su), Disp: , Rfl:     triamcinolone (KENALOG) 0.5 % cream, Apply topically 3 (three) times a day, Disp: 30 g, Rfl: 1    warfarin (COUMADIN) 5 mg tablet, Take 1 tablet (5 mg total) by mouth daily Coumadin 5 mg p.o. Daily- Target INR 2-3 (Patient taking differently: Take 5 mg by mouth daily M & Thurs & Sat 5MG  Tues & Wed & Fri & Sun 2.5MG), Disp: 30 tablet, Rfl: 0    fluticasone (FLONASE) 50 mcg/act nasal spray, 1 spray into each nostril daily Do not start before November 15, 2022. (Patient not taking: Reported on 11/13/2023), Disp: 11.1 mL, Rfl: 0    No Known Allergies    Physical Exam:    /64   Pulse 78   Ht 5' 5" (1.651 m)   Wt 59.2 kg (130 lb 9.6 oz)   BMI 21.73 kg/m²     Constitutional: normal, well developed, well nourished, alert, in no distress and non-toxic and no overt pain behavior.   Eyes: anicteric  HEENT: grossly intact  Neck: supple, symmetric, trachea midline and no masses   Pulmonary:even and unlabored  Cardiovascular:No edema or pitting edema present  Skin:Normal without rashes or lesions and well hydrated  Psychiatric:Mood and affect appropriate  Neurologic:Cranial Nerves II-XII grossly intact  Musculoskeletal:normal    Lumbar Spine Exam    Appearance:  Normal lordosis  Palpation/Tenderness:  Tender to palpation in the left lumbar paraspinal region roughly at apex of scoliosis. Sensory:  no sensory deficits noted  Range of Motion:  Pain with lumbar extension  Motor Strength:  Left hip flexion:  5/5  Left hip extension:  5/5  Right hip flexion:  5/5  Right hip extension:  5/5  Left knee flexion:  5/5  Left knee extension:  5/5  Right knee flexion:  5/5  Right knee extension:  5/5  Left foot dorsiflexion:  4/5  Left foot plantar flexion:  5/5  Right foot dorsiflexion:  4/5  Right foot plantar flexion:  5/5  Reflexes:  Left Patellar:  2+   Right Patellar:  2+   Left Achilles:  2+   Right Achilles:  2+     Imaging    MRI LUMBAR SPINE WITHOUT CONTRAST  11/1/23     INDICATION: M47.816: Spondylosis without myelopathy or radiculopathy, lumbar region  M51.36: Other intervertebral disc degeneration, lumbar region. COMPARISON:  None. TECHNIQUE:  Multiplanar, multisequence imaging of the lumbar spine was performed. .        IMAGE QUALITY:  Diagnostic     FINDINGS:     VERTEBRAL BODIES:  There are 5 lumbar type vertebral bodies. Severe levolumbar scoliosis. Normal marrow signal is identified within the visualized bony structures. No discrete marrow lesion. SACRUM:  Normal signal within the sacrum. No evidence of insufficiency or stress fracture. DISTAL CORD AND CONUS:  Normal size and signal within the distal cord and conus. PARASPINAL SOFT TISSUES:  Paraspinal soft tissues are unremarkable. LOWER THORACIC DISC SPACES: Disc bulge at T10-T11 causing mild spinal canal stenosis. Disc bulge at T11/T12. Disc bulge at T12-L1 causing minimal spinal canal stenosis. LUMBAR DISC SPACES:     Postsurgical changes of laminectomy at L4-L5 and L5-S1. L1-L2: Disc desiccation with mild disc bulge. L2-L3: Disc desiccation with facet arthrosis causing mild right neuroforaminal narrowing. L3-L4: Disc desiccation with mild disc bulge. Facet arthrosis causing mild right neuroforaminal narrowing. L4-L5: Disc bulge and bilateral facet arthrosis causing severe right neuroforaminal narrowing and severe left neuroforaminal narrowing. L5-S1: Disc desiccation with mild disc bulge and facet arthrosis causing left neuroforaminal narrowing. OTHER FINDINGS: Left lower pole renal cyst. Atrophic right kidney. IMPRESSION:     Levolumbar scoliosis with degenerative changes as described above. LUMBAR SPINE  10/9/23     INDICATION:   M54.50: Low back pain, unspecified. COMPARISON:  None     VIEWS:  XR SPINE LUMBAR 2 OR 3 VIEWS INJURY  Images: 3     FINDINGS:     There are 5 non rib bearing lumbar vertebral bodies. There is no evidence of acute fracture or destructive osseous lesion. Severe scoliotic deformity is noted. Alignment is otherwise unremarkable. Intervertebral disc space narrowing present at all levels with endplate osteophytes and facet arthropathy. The pedicles appear intact. Vascular calcifications present. Left hip arthroplasty hardware is partially visualized. IMPRESSION:     Multilevel degenerative changes of the lumbar spine with levoscoliosis.   [place] spine and pain procedure    (Results Pending)       Orders Placed This Encounter   Procedures    [place] spine and pain procedure

## 2023-11-09 NOTE — H&P (VIEW-ONLY)
Assessment:  1. Chronic left-sided low back pain without sciatica    2. Lumbar facet arthropathy        Plan:  Orders Placed This Encounter   Procedures    FL spine and pain procedure     Standing Status:   Future     Standing Expiration Date:   11/13/2027     Order Specific Question:   Reason for Exam:     Answer:   Left L2-3 and L3-4 mbb #1     Order Specific Question:   Anticoagulant hold needed? Answer:   No       No orders of the defined types were placed in this encounter. My impressions and treatment recommendations were discussed in detail with the patient, who verbalized understanding and had no further questions. This is a 45-year-old male presents her office chief complaint of left upper lumbar pain. He has notable history of severe scoliosis with the apex in the upper lumbar region. He has tenderness palpation of the left lumbar paraspinal region and around roughly the left L2-3 and L3-4 facet joints. Discussed left L2-3 and L3-4 facet medial branch block followed by radiofrequency ablation of greater than 80% relief. Patient does not have any radicular symptoms with pain radiating down the leg. However does have notable bilateral foot dorsiflexion weakness. He again does not have pain shooting down the lower extremities. We will first treat him for axial back pain. He has trialed conservative treatment in the form of opioid medications, patch treatments, acetaminophen. He is relatively contra kidney from NSAIDs due to history of Plavix usage and stomach ulcer. Given exhaustion of conservative treatments, feel that it is appropriate to move forward with MBB. [place] Prescription Drug Monitoring Program report was reviewed and was appropriate     Complete risks and benefits including bleeding, infection, tissue reaction, nerve injury and allergic reaction were discussed. The approach was demonstrated using models and literature was provided.  Verbal and written consent was obtained. Discharge instructions were provided. I personally saw and examined the patient and I agree with the above discussed plan of care. History of Present Illness:    Lizzie Owen is a 69 y.o. male who presents to [address] [place] [address] [facility] for initial evaluation of the above stated pain complaints. The patient has a past medical and chronic pain history as outlined in the assessment section. He was referred by [facility], [address] [place] [address]  [facility],  [address] [place] [address] . Patient is here with chief complaint of primarily left-sided low back pain worsening over the last year. Patient is retired but worked at a golf course. Pain is moderate to severe over the past 1. Nearly constant. Worse in the afternoon. Pressure-like, throbbing. Subjective weakness of the lower extremities. Denies bowel bladder incontinence or saddle anesthesia. Uses a cane or walker to ambulate at times. He did have a fall and his symptoms have exacerbated over the last 3-4 months. Pain does not travel into the legs. Mostly in the back. Pain is increased with standing, sitting, walking, exercise. No change with relaxation, coughing, sneezing. Past medical history includes CAD, depression, high cholesterol, prostate cancer, stomach ulcers. No relief with surgery, physical therapy, exercise. Smokes tobacco, half pack per day for 15 years. No marijuana. No alcohol. Not allergic to latex or contrast dye. In the past has been on oxycodone, Demerol, lidocaine patch. Currently using acetaminophen. All with very little relief. Review of Systems:    Review of Systems   Constitutional:  Negative for unexpected weight change. Respiratory:  Positive for shortness of breath. Musculoskeletal:  Positive for gait problem.            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    CAD (coronary artery disease)     Cancer (HCC)     prostate Colon polyp     Coronary artery disease     GERD (gastroesophageal reflux disease)     H/O heart artery stent     x2    Hypercholesteremia     Hypertension     Keratotic lesion     Stroke ([address])     10YRS AGO    Transient cerebral ischemia        Past Surgical History:   Procedure Laterality Date    BACK SURGERY      CARDIAC SURGERY      mvp repair    CAROTID ENDARTERECTOMY Right 2020    CHOLECYSTECTOMY      COLONOSCOPY      ESOPHAGOGASTRODUODENOSCOPY N/A 2018    Procedure: ESOPHAGOGASTRODUODENOSCOPY (EGD); Surgeon: Yuriy Mahoney MD;  Location: [facility];   Service: Gastroenterology    JOINT REPLACEMENT      L hip replacement    ORCHIECTOMY      PROSTATECTOMY      TOTAL HIP ARTHROPLASTY         Family History   Problem Relation Age of Onset    Heart disease Mother     Stroke Mother         CVA    Cancer Father     Hodgkin's lymphoma Father     Lung cancer Father        Social History     Occupational History    Occupation: retired   Tobacco Use    Smoking status: Former     Packs/day: 0.25     Types: Cigarettes     Quit date: 2018     Years since quittin.4    Smokeless tobacco: Never    Tobacco comments:     4 cig/day   Vaping Use    Vaping Use: Never used   Substance and Sexual Activity    Alcohol use: Never     Alcohol/week: 2.0 standard drinks of alcohol     Types: 2 Standard drinks or equivalent per week    Drug use: No    Sexual activity: Not Currently     Comment: no high risk sexual behavior         Current Outpatient Medications:     atorvastatin (LIPITOR) 40 mg tablet, Take 40 mg by mouth daily  , Disp: , Rfl:     Azelaic Acid 15 % cream, apply to 1 thin layer twice a day to face, Disp: , Rfl:     clindamycin (CLINDAGEL) 1 % gel, APPLY 1 THIN LAYER TOPICALLY TO FACE IN THE  MORNING, Disp: , Rfl:     clopidogrel (PLAVIX) 75 mg tablet, Take 75 mg by mouth daily, Disp: , Rfl:     Farxiga 10 MG TABS, Take 10 mg by mouth daily, Disp: , Rfl:     furosemide (LASIX) 40 mg tablet, Take 40 mg by mouth daily PT REPORTS : 1/2 TABLET EVERY OTHER DAY (MW), Disp: , Rfl:     Lumigan 0.01 % ophthalmic drops, instill 1 drop into both eyes once daily at bedtime, Disp: , Rfl:     midodrine (PROAMATINE) 2.5 mg tablet, Take 1 tablet (2.5 mg total) by mouth 3 (three) times a day before meals (Patient taking differently: Take 2.5 mg by mouth 2 (two) times a day), Disp: 90 tablet, Rfl: 0    Multiple Vitamins-Minerals (PX MENS MULTIVITAMINS) TABS, Take by mouth, Disp: , Rfl:     pantoprazole (PROTONIX) 40 mg tablet, Take 1 tablet (40 mg total) by mouth daily, Disp: 90 tablet, Rfl: 2    sacubitril-valsartan (ENTRESTO) 24-26 MG TABS, Take 1 tablet by mouth 2 (two) times a day (Patient taking differently: Take 0.5 tablets by mouth 2 (two) times a day), Disp: 60 tablet, Rfl: 0    spironolactone (ALDACTONE) 25 mg tablet, Take 12.5 mg by mouth daily 1/2 TABLET EVERY OTHER DAY (Tu, Th, Sa, Su), Disp: , Rfl:     triamcinolone (KENALOG) 0.5 % cream, Apply topically 3 (three) times a day, Disp: 30 g, Rfl: 1    warfarin (COUMADIN) 5 mg tablet, Take 1 tablet (5 mg total) by mouth daily Coumadin 5 mg p.o. Daily- Target INR 2-3 (Patient taking differently: Take 5 mg by mouth daily M & Thurs & Sat 5MG  Tues & Wed & Fri & Sun 2.5MG), Disp: 30 tablet, Rfl: 0    fluticasone (FLONASE) 50 mcg/act nasal spray, 1 spray into each nostril daily Do not start before November 15, 2022. (Patient not taking: Reported on 11/13/2023), Disp: 11.1 mL, Rfl: 0    No Known Allergies    Physical Exam:    /64   Pulse 78   Ht 5' 5" (1.651 m)   Wt 59.2 kg (130 lb 9.6 oz)   BMI 21.73 kg/m²     Constitutional: normal, well developed, well nourished, alert, in no distress and non-toxic and no overt pain behavior.   Eyes: anicteric  HEENT: grossly intact  Neck: supple, symmetric, trachea midline and no masses   Pulmonary:even and unlabored  Cardiovascular:No edema or pitting edema present  Skin:Normal without rashes or lesions and well hydrated  Psychiatric:Mood and affect appropriate  Neurologic:Cranial Nerves II-XII grossly intact  Musculoskeletal:normal    Lumbar Spine Exam    Appearance:  Normal lordosis  Palpation/Tenderness:  Tender to palpation in the left lumbar paraspinal region roughly at apex of scoliosis. Sensory:  no sensory deficits noted  Range of Motion:  Pain with lumbar extension  Motor Strength:  Left hip flexion:  5/5  Left hip extension:  5/5  Right hip flexion:  5/5  Right hip extension:  5/5  Left knee flexion:  5/5  Left knee extension:  5/5  Right knee flexion:  5/5  Right knee extension:  5/5  Left foot dorsiflexion:  4/5  Left foot plantar flexion:  5/5  Right foot dorsiflexion:  4/5  Right foot plantar flexion:  5/5  Reflexes:  Left Patellar:  2+   Right Patellar:  2+   Left Achilles:  2+   Right Achilles:  2+     Imaging    MRI LUMBAR SPINE WITHOUT CONTRAST  11/1/23     INDICATION: M47.816: Spondylosis without myelopathy or radiculopathy, lumbar region  M51.36: Other intervertebral disc degeneration, lumbar region. COMPARISON:  None. TECHNIQUE:  Multiplanar, multisequence imaging of the lumbar spine was performed. .        IMAGE QUALITY:  Diagnostic     FINDINGS:     VERTEBRAL BODIES:  There are 5 lumbar type vertebral bodies. Severe levolumbar scoliosis. Normal marrow signal is identified within the visualized bony structures. No discrete marrow lesion. SACRUM:  Normal signal within the sacrum. No evidence of insufficiency or stress fracture. DISTAL CORD AND CONUS:  Normal size and signal within the distal cord and conus. PARASPINAL SOFT TISSUES:  Paraspinal soft tissues are unremarkable. LOWER THORACIC DISC SPACES: Disc bulge at T10-T11 causing mild spinal canal stenosis. Disc bulge at T11/T12. Disc bulge at T12-L1 causing minimal spinal canal stenosis. LUMBAR DISC SPACES:     Postsurgical changes of laminectomy at L4-L5 and L5-S1. L1-L2: Disc desiccation with mild disc bulge. L2-L3: Disc desiccation with facet arthrosis causing mild right neuroforaminal narrowing. L3-L4: Disc desiccation with mild disc bulge. Facet arthrosis causing mild right neuroforaminal narrowing. L4-L5: Disc bulge and bilateral facet arthrosis causing severe right neuroforaminal narrowing and severe left neuroforaminal narrowing. L5-S1: Disc desiccation with mild disc bulge and facet arthrosis causing left neuroforaminal narrowing. OTHER FINDINGS: Left lower pole renal cyst. Atrophic right kidney. IMPRESSION:     Levolumbar scoliosis with degenerative changes as described above. LUMBAR SPINE  10/9/23     INDICATION:   M54.50: Low back pain, unspecified. COMPARISON:  None     VIEWS:  XR SPINE LUMBAR 2 OR 3 VIEWS INJURY  Images: 3     FINDINGS:     There are 5 non rib bearing lumbar vertebral bodies. There is no evidence of acute fracture or destructive osseous lesion. Severe scoliotic deformity is noted. Alignment is otherwise unremarkable. Intervertebral disc space narrowing present at all levels with endplate osteophytes and facet arthropathy. The pedicles appear intact. Vascular calcifications present. Left hip arthroplasty hardware is partially visualized. IMPRESSION:     Multilevel degenerative changes of the lumbar spine with levoscoliosis.   FL spine and pain procedure    (Results Pending)       Orders Placed This Encounter   Procedures    FL spine and pain procedure

## 2023-11-13 ENCOUNTER — TELEPHONE (OUTPATIENT)
Dept: PAIN MEDICINE | Facility: CLINIC | Age: 78
End: 2023-11-13

## 2023-11-13 ENCOUNTER — CONSULT (OUTPATIENT)
Dept: PAIN MEDICINE | Facility: CLINIC | Age: 78
End: 2023-11-13
Payer: MEDICARE

## 2023-11-13 VITALS
SYSTOLIC BLOOD PRESSURE: 108 MMHG | HEIGHT: 65 IN | WEIGHT: 130.6 LBS | BODY MASS INDEX: 21.76 KG/M2 | DIASTOLIC BLOOD PRESSURE: 64 MMHG | HEART RATE: 78 BPM

## 2023-11-13 DIAGNOSIS — M54.50 CHRONIC LEFT-SIDED LOW BACK PAIN WITHOUT SCIATICA: Primary | ICD-10-CM

## 2023-11-13 DIAGNOSIS — G89.29 CHRONIC LEFT-SIDED LOW BACK PAIN WITHOUT SCIATICA: Primary | ICD-10-CM

## 2023-11-13 DIAGNOSIS — M41.9 SCOLIOSIS OF LUMBAR SPINE, UNSPECIFIED SCOLIOSIS TYPE: ICD-10-CM

## 2023-11-13 DIAGNOSIS — M47.816 LUMBAR FACET ARTHROPATHY: ICD-10-CM

## 2023-11-13 PROCEDURE — 99204 OFFICE O/P NEW MOD 45 MIN: CPT | Performed by: STUDENT IN AN ORGANIZED HEALTH CARE EDUCATION/TRAINING PROGRAM

## 2023-11-13 NOTE — TELEPHONE ENCOUNTER
Patient was scheduled while in the office, all pre procedure instructions were reviewed. Per patient he is not on pain medication but he was advised about the hold on pain medications.

## 2023-11-13 NOTE — PATIENT INSTRUCTIONS
Lumbar Facet Block   WHAT YOU NEED TO KNOW:   A lumbar facet block is a procedure used to decrease inflammation in your lower spine. Medicines are injected at facet joints in your lower back. Facet joints are found at the back of each vertebrae. DISCHARGE INSTRUCTIONS:   Call your local emergency number (911 in the [address]) if:   You have sudden chest pain or trouble breathing. Call your doctor if:   You have a severe headache. Your feet are numb, tingly, cool, or pale. You have a fever or chills. Your procedure site is red, swollen, or draining pus. You have nausea or are vomiting. You have questions or concerns about your condition or care. Self-care:   Do not take pain medicines until directed. Your healthcare provider will tell you when to start using your usual pain medicines again. This will help him or her see if the facet block worked. Return to your daily activities as directed. Your provider may tell you to rest or do light activities the day of your procedure. You may be able to return to normal activities the next day. Keep a pain record, if directed. You may be asked to keep a pain record for a few days. This will help your provider see if the facet block worked. Include when you have pain, and how severe it is. Include anything that relieves the pain or makes it worse, such as certain movements. Bring the record with you to all follow-up visits. Go to physical therapy as directed. A physical therapist teaches you exercises to help improve movement and strength, and to decrease pain. Follow up with your doctor as directed:  Write down your questions so you remember to ask them during your visits. © Copyright [employer] 2023 Information is for End User's use only and may not be sold, redistributed or otherwise used for commercial purposes. The above information is an  only.  It is not intended as medical advice for individual conditions or treatments. Talk to your doctor, nurse or pharmacist before following any medical regimen to see if it is safe and effective for you.

## 2023-12-06 ENCOUNTER — TELEPHONE (OUTPATIENT)
Dept: RADIOLOGY | Facility: CLINIC | Age: 78
End: 2023-12-06

## 2023-12-06 NOTE — TELEPHONE ENCOUNTER
TERRELL  Moved up 12/19 appt to 12/7 opening  Pt states he was told he did not need to have a  when scheduling  Advised pt our policy is to have a .  Pt is a left sided lumbar MBB and is willing to wait around post procedure, if needed

## 2023-12-07 ENCOUNTER — HOSPITAL ENCOUNTER (OUTPATIENT)
Dept: RADIOLOGY | Facility: CLINIC | Age: 78
End: 2023-12-07
Payer: MEDICARE

## 2023-12-07 ENCOUNTER — TELEPHONE (OUTPATIENT)
Dept: RADIOLOGY | Facility: CLINIC | Age: 78
End: 2023-12-07

## 2023-12-07 VITALS
OXYGEN SATURATION: 96 % | DIASTOLIC BLOOD PRESSURE: 76 MMHG | RESPIRATION RATE: 20 BRPM | HEART RATE: 81 BPM | SYSTOLIC BLOOD PRESSURE: 115 MMHG

## 2023-12-07 DIAGNOSIS — M47.816 LUMBAR FACET ARTHROPATHY: ICD-10-CM

## 2023-12-07 PROCEDURE — 64493 INJ PARAVERT F JNT L/S 1 LEV: CPT | Performed by: STUDENT IN AN ORGANIZED HEALTH CARE EDUCATION/TRAINING PROGRAM

## 2023-12-07 PROCEDURE — 64494 INJ PARAVERT F JNT L/S 2 LEV: CPT | Performed by: STUDENT IN AN ORGANIZED HEALTH CARE EDUCATION/TRAINING PROGRAM

## 2023-12-07 RX ORDER — BUPIVACAINE HCL/PF 2.5 MG/ML
1.5 VIAL (ML) INJECTION ONCE
Status: COMPLETED | OUTPATIENT
Start: 2023-12-07 | End: 2023-12-07

## 2023-12-07 RX ADMIN — Medication 1.5 ML: at 15:09

## 2023-12-07 NOTE — DISCHARGE INSTR - LAB

## 2023-12-07 NOTE — INTERVAL H&P NOTE
Update: (This section must be completed if the H&P was completed greater than 24 hrs to procedure or admission)    H&P reviewed. After examining the patient, I find no changed to the H&P since it had been written. Patient re-evaluated.  Accept as history and physical.    Andrei Galvin MD/December 7, 2023/3:00 PM

## 2023-12-08 DIAGNOSIS — M47.816 LUMBAR FACET ARTHROPATHY: Primary | ICD-10-CM

## 2023-12-08 DIAGNOSIS — M51.36 DEGENERATIVE DISC DISEASE, LUMBAR: ICD-10-CM

## 2023-12-08 NOTE — TELEPHONE ENCOUNTER
Please schedule L L2-3, L3-4 MBB #2    S/w pt. States % relief til 830PM after procedure. States put pain diary in the mail today.   Advised [facility] will reach out to schedule next step

## 2023-12-12 ENCOUNTER — DOCUMENTATION (OUTPATIENT)
Dept: PAIN MEDICINE | Facility: CLINIC | Age: 78
End: 2023-12-12

## 2023-12-12 ENCOUNTER — OFFICE VISIT (OUTPATIENT)
Age: 78
End: 2023-12-12
Payer: MEDICARE

## 2023-12-12 VITALS
SYSTOLIC BLOOD PRESSURE: 116 MMHG | DIASTOLIC BLOOD PRESSURE: 72 MMHG | HEART RATE: 97 BPM | BODY MASS INDEX: 21.47 KG/M2 | OXYGEN SATURATION: 97 % | TEMPERATURE: 96.9 F | WEIGHT: 129 LBS | RESPIRATION RATE: 22 BRPM

## 2023-12-12 DIAGNOSIS — R31.9 HEMATURIA, UNSPECIFIED TYPE: Primary | ICD-10-CM

## 2023-12-12 DIAGNOSIS — I48.91 ATRIAL FIBRILLATION, UNSPECIFIED TYPE (HCC): ICD-10-CM

## 2023-12-12 LAB
GLUCOSE SERPL-MCNC: 110 MG/DL (ref 65–140)
SL AMB  POCT GLUCOSE, UA: 250
SL AMB LEUKOCYTE ESTERASE,UA: ABNORMAL
SL AMB POCT BILIRUBIN,UA: ABNORMAL
SL AMB POCT BLOOD,UA: ABNORMAL
SL AMB POCT CLARITY,UA: YELLOW
SL AMB POCT COLOR,UA: CLEAR
SL AMB POCT KETONES,UA: ABNORMAL
SL AMB POCT NITRITE,UA: ABNORMAL
SL AMB POCT PH,UA: 5
SL AMB POCT SPECIFIC GRAVITY,UA: 1.01
SL AMB POCT URINE PROTEIN: ABNORMAL
SL AMB POCT UROBILINOGEN: 0.2

## 2023-12-12 PROCEDURE — 81002 URINALYSIS NONAUTO W/O SCOPE: CPT | Performed by: EMERGENCY MEDICINE

## 2023-12-12 PROCEDURE — 99213 OFFICE O/P EST LOW 20 MIN: CPT | Performed by: EMERGENCY MEDICINE

## 2023-12-12 PROCEDURE — G0463 HOSPITAL OUTPT CLINIC VISIT: HCPCS | Performed by: EMERGENCY MEDICINE

## 2023-12-12 PROCEDURE — 82948 REAGENT STRIP/BLOOD GLUCOSE: CPT | Performed by: EMERGENCY MEDICINE

## 2023-12-12 RX ORDER — MIDODRINE HYDROCHLORIDE 5 MG/1
5 TABLET ORAL
COMMUNITY
Start: 2023-11-13

## 2023-12-12 RX ORDER — WARFARIN SODIUM 2.5 MG/1
TABLET ORAL
COMMUNITY
Start: 2023-10-20

## 2023-12-12 NOTE — PROGRESS NOTES
[facility]        NAME: Phyllis Pineda is a 69 y.o. male  : 1945    MRN: [id number]  DATE: 2023  TIME: 2:07 PM    Assessment and Plan   Atrial fibrillation, unspecified type ([address]) [I48.91]  1. Atrial fibrillation, unspecified type ([address])  Transfer to other facility    CANCELED: Transfer to other facility    with AVR      2. Hematuria, unspecified type          EKG shows a rapid A-fib with ST depression    Urine dip shows moderate blood; no leukocytes or nitrites    Patient Instructions     Patient Instructions    Go directly to the ER  - you are in a rapid Afib      Follow up with PCP in 3-5 days. Proceed to  ER if symptoms worsen. Chief Complaint     Chief Complaint   Patient presents with    Possible UTI     Symptoms started 3-4 days ago. C/o incontinence and frequent urination. Patient also mention SOB after walking. History of Present Illness       81-year-old white male with a chief complaint of getting up to urinate several times per night over the past 3 days. Pt. States he is also SOB after he gets up. Patient states he called his cardiologist and he told him to come to our facility. When patient is brought back to the room patient is short of breath and heart rate was irregular. I ordered an EKG and found patient to be in a rapid A-fib. Pt. Has a hx of stents in his heart, and an MI.  Pt. Is on coumadin        Review of Systems   Review of Systems   Constitutional:  Negative for diaphoresis, fatigue and fever. HENT:  Negative for congestion, ear pain, nosebleeds and sore throat. Eyes:  Negative for photophobia, pain, discharge and visual disturbance. Respiratory:  Positive for shortness of breath. Negative for cough, choking, chest tightness and wheezing. Cardiovascular:  Negative for chest pain and palpitations. Gastrointestinal:  Negative for abdominal distention, abdominal pain, diarrhea and vomiting.    Genitourinary:  Positive for dysuria, frequency, hematuria and urgency. Negative for flank pain. Musculoskeletal:  Negative for back pain, gait problem and joint swelling. Skin:  Negative for color change and rash. Neurological:  Negative for dizziness, syncope and headaches. Psychiatric/Behavioral:  Negative for behavioral problems and confusion. The patient is not nervous/anxious. All other systems reviewed and are negative. Current Medications       Current Outpatient Medications:     midodrine (PROAMATINE) 5 mg tablet, Take 5 mg by mouth 3 (three) times a day before meals, Disp: , Rfl:     warfarin (COUMADIN) 2.5 mg tablet, as directed take 1 tablet ON TUESDAY,WEDNESDAY,FRIDAY,SATURDAY,GARCIA. ..  (REFER TO PRESCRIPTION NOTES). , Disp: , Rfl:     atorvastatin (LIPITOR) 40 mg tablet, Take 40 mg by mouth daily  , Disp: , Rfl:     Azelaic Acid 15 % cream, apply to 1 thin layer twice a day to face, Disp: , Rfl:     clindamycin (CLINDAGEL) 1 % gel, APPLY 1 THIN LAYER TOPICALLY TO FACE IN THE  MORNING, Disp: , Rfl:     clopidogrel (PLAVIX) 75 mg tablet, Take 75 mg by mouth daily, Disp: , Rfl:     Farxiga 10 MG TABS, Take 10 mg by mouth daily, Disp: , Rfl:     fluticasone (FLONASE) 50 mcg/act nasal spray, 1 spray into each nostril daily Do not start before November 15, 2022.  (Patient not taking: Reported on 11/13/2023), Disp: 11.1 mL, Rfl: 0    furosemide (LASIX) 40 mg tablet, Take 40 mg by mouth daily PT REPORTS : 1/2 TABLET EVERY OTHER DAY (MWF), Disp: , Rfl:     Lumigan 0.01 % ophthalmic drops, instill 1 drop into both eyes once daily at bedtime, Disp: , Rfl:     midodrine (PROAMATINE) 2.5 mg tablet, Take 1 tablet (2.5 mg total) by mouth 3 (three) times a day before meals (Patient taking differently: Take 2.5 mg by mouth 2 (two) times a day), Disp: 90 tablet, Rfl: 0    Multiple Vitamins-Minerals (PX MENS MULTIVITAMINS) TABS, Take by mouth, Disp: , Rfl:     pantoprazole (PROTONIX) 40 mg tablet, Take 1 tablet (40 mg total) by mouth daily, Disp: 90 tablet, Rfl: 2    sacubitril-valsartan (ENTRESTO) 24-26 MG TABS, Take 1 tablet by mouth 2 (two) times a day (Patient taking differently: Take 0.5 tablets by mouth 2 (two) times a day), Disp: 60 tablet, Rfl: 0    spironolactone (ALDACTONE) 25 mg tablet, Take 12.5 mg by mouth daily 1/2 TABLET EVERY OTHER DAY (Tu, Th, Sa, Su), Disp: , Rfl:     triamcinolone (KENALOG) 0.5 % cream, Apply topically 3 (three) times a day, Disp: 30 g, Rfl: 1    warfarin (COUMADIN) 5 mg tablet, Take 1 tablet (5 mg total) by mouth daily Coumadin 5 mg p.o. Daily- Target INR 2-3 (Patient taking differently: Take 5 mg by mouth daily M & Thurs & Sat 5MG  Tues & Wed & Fri & Sun 2.5MG), Disp: 30 tablet, Rfl: 0    Current Allergies     Allergies as of 12/12/2023    (No Known Allergies)            The following portions of the patient's history were reviewed and updated as appropriate: allergies, current medications, past family history, past medical history, past social history, past surgical history and problem list.     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    CAD (coronary artery disease)     Cancer ([facility])     prostate    Colon polyp     Coronary artery disease     GERD (gastroesophageal reflux disease)     H/O heart artery stent     x2    Hypercholesteremia     Hypertension     Keratotic lesion     Stroke ([address]     10YRS AGO    Transient cerebral ischemia        Past Surgical History:   Procedure Laterality Date    BACK SURGERY      CARDIAC SURGERY      mvp repair    CAROTID ENDARTERECTOMY Right 03/2020    CHOLECYSTECTOMY      COLONOSCOPY      ESOPHAGOGASTRODUODENOSCOPY N/A 6/22/2018    Procedure: ESOPHAGOGASTRODUODENOSCOPY (EGD); Surgeon: Sofiya Jones MD;  Location: [facility];   Service: Gastroenterology    JOINT REPLACEMENT      L hip replacement    ORCHIECTOMY      PROSTATECTOMY      TOTAL HIP ARTHROPLASTY         Family History   Problem Relation Age of Onset    Heart disease Mother     Stroke Mother         CVA    Cancer Father     Hodgkin's lymphoma Father     Lung cancer Father          Medications have been verified. Objective   /72   Pulse 97   Temp (!) 96.9 °F (36.1 °C)   Resp 22   Wt 58.5 kg (129 lb)   SpO2 97%   BMI 21.47 kg/m²        Physical Exam     Physical Exam  Vitals and nursing note reviewed. Constitutional:       General: He is in acute distress. Appearance: Normal appearance. He is not ill-appearing, toxic-appearing or diaphoretic. Comments: 52-year-old white male lying on the stretcher who is short of breath when talking to me. HENT:      Head: Normocephalic and atraumatic. Nose: Nose normal.      Mouth/Throat:      Mouth: Mucous membranes are moist.      Pharynx: Oropharynx is clear. No oropharyngeal exudate or posterior oropharyngeal erythema. Eyes:      Extraocular Movements: Extraocular movements intact. Pupils: Pupils are equal, round, and reactive to light. Neck:      Vascular: No carotid bruit. Cardiovascular:      Rate and Rhythm: Tachycardia present. Rhythm irregular. Pulses: Normal pulses. Pulmonary:      Comments: Patient is short of breath especially when he exerts himself or talks  Abdominal:      General: Abdomen is flat. Bowel sounds are normal. There is no distension. Palpations: Abdomen is soft. There is no mass. Tenderness: There is no abdominal tenderness. There is no right CVA tenderness, left CVA tenderness, guarding or rebound. Hernia: No hernia is present. Musculoskeletal:         General: No swelling, tenderness, deformity or signs of injury. Normal range of motion. Cervical back: Normal range of motion and neck supple. No rigidity. No muscular tenderness. Right lower leg: No edema. Left lower leg: No edema. Lymphadenopathy:      Cervical: No cervical adenopathy. Skin:     General: Skin is warm and dry. Coloration: Skin is pale. Skin is not jaundiced.       Findings: No bruising, erythema, lesion or rash. Neurological:      General: No focal deficit present. Mental Status: He is alert and oriented to person, place, and time. Cranial Nerves: No cranial nerve deficit. Motor: No weakness.    Psychiatric:         Mood and Affect: Mood normal.

## 2023-12-28 ENCOUNTER — OFFICE VISIT (OUTPATIENT)
Dept: WOUND CARE | Facility: CLINIC | Age: 78
End: 2023-12-28
Payer: MEDICARE

## 2023-12-28 VITALS
RESPIRATION RATE: 18 BRPM | BODY MASS INDEX: 20.09 KG/M2 | SYSTOLIC BLOOD PRESSURE: 104 MMHG | WEIGHT: 125 LBS | TEMPERATURE: 96.7 F | HEIGHT: 66 IN | HEART RATE: 86 BPM | DIASTOLIC BLOOD PRESSURE: 63 MMHG

## 2023-12-28 DIAGNOSIS — M70.22 OLECRANON BURSITIS OF BOTH ELBOWS: ICD-10-CM

## 2023-12-28 DIAGNOSIS — M70.21 OLECRANON BURSITIS OF BOTH ELBOWS: ICD-10-CM

## 2023-12-28 DIAGNOSIS — L89.013 PRESSURE INJURY OF RIGHT ELBOW, STAGE 3 (HCC): ICD-10-CM

## 2023-12-28 DIAGNOSIS — L89.023 PRESSURE INJURY OF LEFT ELBOW, STAGE 3 (HCC): Primary | ICD-10-CM

## 2023-12-28 PROCEDURE — 99213 OFFICE O/P EST LOW 20 MIN: CPT | Performed by: STUDENT IN AN ORGANIZED HEALTH CARE EDUCATION/TRAINING PROGRAM

## 2023-12-28 RX ORDER — LIDOCAINE 40 MG/G
CREAM TOPICAL ONCE
Status: COMPLETED | OUTPATIENT
Start: 2023-12-28 | End: 2023-12-28

## 2023-12-28 RX ADMIN — LIDOCAINE: 40 CREAM TOPICAL at 14:32

## 2023-12-28 NOTE — PATIENT INSTRUCTIONS
Orders Placed This Encounter   Procedures    Wound cleansing and dressings     Left and right elbow wounds:    Wash your hands with soap and water.  Remove old dressing, discard into plastic bag and place in trash.  Cleanse the wound with unscented soap (such as plain white Dove soap) and warm water (can use new wash cloth) prior to applying a clean dressing. Do not use tissue or cotton balls. Do not scrub the wound. Pat dry using gauze. **NO HYDROGEN PEROXIDE, NO LOOFAHS, NO RUBBING ALCOHOL**  Shower yes   Apply skin prep (3M Cavilon package) to skin surrounding wound  Apply hydrocolloid (square tan dressing) to the left and right elbows wound.   Secure with ACE wrap   Change dressing every 3 days      Increase your protein intake with each meal. Try meat, poultry, fish, seafood, legumes, lentils, tofu, protein supplements.    Please call the [facility] Monday through Friday between the hours of 8:00 AM and 4:30 PM at [phone] if you have any questions, if you experience any major changes in your wound(s) or for any signs or symptoms of infection such as fever; changes in the redness, swelling, drainage, or odor of your wound. After hours, weekends or holidays please contact your primary care physician or go to the hospital emergency room.          Standing Status:   Future     Standing Expiration Date:   12/28/2024

## 2023-12-28 NOTE — PROGRESS NOTES
Patient ID: Scott Morris is a 78 y.o. male Date of Birth 1945       Chief Complaint   Patient presents with    New Patient Visit     Son Scott present. Bilateral elbow wounds open for past 3-4 months.        Allergies:  Patient has no known allergies.    Diagnosis:   Diagnosis ICD-10-CM Associated Orders   1. Pressure injury of left elbow, stage 3 ([facility])  L89.023 Wound cleansing and dressings     lidocaine (LMX) 4 % cream     Debridement Pressure Injury (stage 3) Left;Posterior Elbow      2. Pressure injury of right elbow, stage 3 ([facility])  L89.013 Wound cleansing and dressings     lidocaine (LMX) 4 % cream     Debridement Pressure Injury (stage 3) Posterior;Right Elbow      3. Olecranon bursitis of both elbows  M70.21     M70.22            Assessment  & Plan:    Initial evaluation of bilateral elbow wounds. Appear to be d/t pressure. Stage 3. There is dried exudate present over fibrinous wound bases. Minimal drainage given dried out nature of wound beds. There is surrounding hyperpigmentation but no obvious erythema to indicate acute infection. Olecranon bursitis present bilaterally which further supports pressure etiology.   Selective debridement, as below.   Hydrocolloid to wound beds with skin prep to periwound. Change every three days or sooner if drainage spreads to the edge of the bandage. May shower and cleanse wounds with gentle soap and water on days of dressing changes.   Avoid excess pressure to the site of the elbows.  ACE wrap used on bilateral elbows given olecranon bursitis. 2+ radial and ulnar pulses bilaterally. Should wrap feel too tight or hands become painful/numb/change colors the ACE wraps should be removed immediately.   Obtain 3-4 servings of protein daily for wound healing.   Instructed to monitor for any changes including redness or swelling surrounding the wound, increased drainage or pain as well as fevers or chills.    F/u in one week. Pt would prefer [facility] d/t location.  Instructed to call if any questions or concerns arise in meantime.            Subjective:   12/28/23: 79 y/o M with PMHx of GERD, peptic ulcer, CAD, atrial fibrillation, CHF, hx of PE currently anticoagulated, CKD, recent ICD placement on 12/18/23, presents for evaluation of wounds on his bilateral elbows that have been ongoing for approximately four months now. He states that he is unsure of how these initially began. Has been using hydrogen peroxide on them. Was recently hospitalized for acute on chronic heart failure at which time his elbows were assessed in the hospital and he was prescribed Santyl which he has been using on the bilateral elbow wounds daily. He denies any significant pressure on his elbows during the day but does have difficulty moving around at night and will use his elbows in bed to reposition. Notes 6/10 soreness/pain when pressure is applied to his elbows but pain is not constant. Is currently residing at [facility]. [facility] personal care home on the independent living side. Obtains protein in his diet. Has smoking history but quit multiple years ago. No hx of DM.           The following portions of the patient's history were reviewed and updated as appropriate:   Patient Active Problem List   Diagnosis    CAD (coronary atherosclerotic disease)    Acute on chronic combined systolic and diastolic congestive heart failure (HCC)    Chronic back pain    GERD (gastroesophageal reflux disease)    Hypercholesterolemia    Hypertension    PAF (paroxysmal atrial fibrillation) (HCC)    Peptic ulcer disease    S/P MVR (mitral valve repair)    Polyp of colon    Abnormal RBC indices    Fatigue    Chronic anticoagulation    Pain in gums    Idiopathic chronic gout of multiple sites without tophus    Impaired fasting glucose    Anemia due to blood loss, acute    History of normocytic normochromic anemia    Ambulatory dysfunction    Vasomotor rhinitis    Acute pulmonary embolism without acute cor pulmonale ([facility])     CKD (chronic kidney disease)    SIRS without infection or organ dysfunction (HCC)    Hypotension    Syncopal episodes    Episode of unresponsiveness    Chronic ischemic right MCA stroke     Past Medical History:   Diagnosis Date    Anaplasmosis 2018    CAD (coronary artery disease)     Cancer (HCC)     prostate    Colon polyp     Coronary artery disease     GERD (gastroesophageal reflux disease)     H/O heart artery stent     x2    Hypercholesteremia     Hypertension     Keratotic lesion     Stroke (HCC)     10YRS AGO    Transient cerebral ischemia      Past Surgical History:   Procedure Laterality Date    BACK SURGERY      CARDIAC SURGERY      mvp repair    CAROTID ENDARTERECTOMY Right 2020    CHOLECYSTECTOMY      COLONOSCOPY      ESOPHAGOGASTRODUODENOSCOPY N/A 2018    Procedure: ESOPHAGOGASTRODUODENOSCOPY (EGD);  Surgeon: Sunny Rodrigues III, MD;  Location: [facility];  Service: Gastroenterology    JOINT REPLACEMENT      L hip replacement    ORCHIECTOMY      PROSTATECTOMY      TOTAL HIP ARTHROPLASTY       Family History   Problem Relation Age of Onset    Heart disease Mother     Stroke Mother         CVA    Cancer Father     Hodgkin's lymphoma Father     Lung cancer Father      Social History     Socioeconomic History    Marital status:      Spouse name: None    Number of children: None    Years of education: None    Highest education level: None   Occupational History    Occupation: retired   Tobacco Use    Smoking status: Former     Current packs/day: 0.00     Types: Cigarettes     Quit date: 2018     Years since quittin.6    Smokeless tobacco: Never    Tobacco comments:     4 cig/day   Vaping Use    Vaping status: Never Used   Substance and Sexual Activity    Alcohol use: Never     Alcohol/week: 2.0 standard drinks of alcohol     Types: 2 Standard drinks or equivalent per week    Drug use: No    Sexual activity: Not Currently     Comment: no high risk sexual behavior   Other Topics  Concern    None   Social History Narrative    None     Social Determinants of Health     Financial Resource Strain: Low Risk  (12/19/2023)    Received from [facility] [place]    Overall Financial Resource Strain (CARDIA)     Difficulty of Paying Living Expenses: Not hard at all   Food Insecurity: No Food Insecurity (12/19/2023)    Received from [facility] [place]    Hunger Vital Sign     Worried About Running Out of Food in the Last Year: Never true     Ran Out of Food in the Last Year: Never true   Transportation Needs: No Transportation Needs (12/19/2023)    Received from [facility] [place]    PRAPARE - Transportation     Lack of Transportation (Medical): No     Lack of Transportation (Non-Medical): No   Physical Activity: Not on file   Stress: Not on file   Social Connections: Not on file   Intimate Partner Violence: Unknown (12/12/2023)    Received from [facility]    Humiliation, Afraid, Rape, and Kick questionnaire     Fear of Current or Ex-Partner: Patient refused     Emotionally Abused: Patient refused     Physically Abused: Patient refused     Sexually Abused: Patient refused   Housing Stability: High Risk (12/19/2023)    Received from [facility] [place]    Housing Stability Vital Sign     Unable to Pay for Housing in the Last Year: Yes     Number of Places Lived in the Last Year: 1     Unstable Housing in the Last Year: No       Current Outpatient Medications:     atorvastatin (LIPITOR) 40 mg tablet, Take 40 mg by mouth daily  , Disp: , Rfl:     Azelaic Acid 15 % cream, apply to 1 thin layer twice a day to face, Disp: , Rfl:     clindamycin (CLINDAGEL) 1 % gel, APPLY 1 THIN LAYER TOPICALLY TO FACE IN THE  MORNING, Disp: , Rfl:     clopidogrel (PLAVIX) 75 mg tablet, Take 75 mg by mouth daily, Disp: , Rfl:     Farxiga 10 MG TABS, Take 10 mg by mouth daily, Disp: , Rfl:     fluticasone (FLONASE) 50 mcg/act nasal spray, 1 spray into each nostril daily Do not start before November 15, 2022. (Patient not  "taking: Reported on 11/13/2023), Disp: 11.1 mL, Rfl: 0    furosemide (LASIX) 40 mg tablet, Take 40 mg by mouth daily PT REPORTS : 1/2 TABLET EVERY OTHER DAY ([facility]), Disp: , Rfl:     Lumigan 0.01 % ophthalmic drops, instill 1 drop into both eyes once daily at bedtime, Disp: , Rfl:     midodrine (PROAMATINE) 2.5 mg tablet, Take 1 tablet (2.5 mg total) by mouth 3 (three) times a day before meals (Patient taking differently: Take 2.5 mg by mouth 2 (two) times a day), Disp: 90 tablet, Rfl: 0    midodrine (PROAMATINE) 5 mg tablet, Take 5 mg by mouth 3 (three) times a day before meals, Disp: , Rfl:     Multiple Vitamins-Minerals (PX MENS MULTIVITAMINS) TABS, Take by mouth, Disp: , Rfl:     pantoprazole (PROTONIX) 40 mg tablet, Take 1 tablet (40 mg total) by mouth daily, Disp: 90 tablet, Rfl: 2    sacubitril-valsartan (ENTRESTO) 24-26 MG TABS, Take 1 tablet by mouth 2 (two) times a day (Patient taking differently: Take 0.5 tablets by mouth 2 (two) times a day), Disp: 60 tablet, Rfl: 0    spironolactone (ALDACTONE) 25 mg tablet, Take 12.5 mg by mouth daily 1/2 TABLET EVERY OTHER DAY (Tu, Th, Sa, Nick), Disp: , Rfl:     triamcinolone (KENALOG) 0.5 % cream, Apply topically 3 (three) times a day, Disp: 30 g, Rfl: 1    warfarin (COUMADIN) 2.5 mg tablet, as directed take 1 tablet ON TUESDAY,WEDNESDAY,FRIDAY,SATURDAY,NICK...  (REFER TO PRESCRIPTION NOTES)., Disp: , Rfl:     warfarin (COUMADIN) 5 mg tablet, Take 1 tablet (5 mg total) by mouth daily Coumadin 5 mg p.o. Daily- [facility] INR 2-3 (Patient taking differently: Take 5 mg by mouth daily M & Thurs & Sat 5MG  Tues & Wed & Fri & Sun 2.5MG), Disp: 30 tablet, Rfl: 0  No current facility-administered medications for this visit.    Review of Systems   Constitutional:  Negative for fever.   Skin:  Positive for wound (bilateral elbows).   Psychiatric/Behavioral:  Negative for agitation.          Objective:  /63   Pulse 86   Temp (!) 96.7 °F (35.9 °C)   Resp 18   Ht 5' 6\" (1.676 m) "   Wt 56.7 kg (125 lb)   BMI 20.18 kg/m²   Pain Score: 0-No pain     Physical Exam  Vitals reviewed.   Constitutional:       Appearance: He is normal weight.   Pulmonary:      Effort: Pulmonary effort is normal. No respiratory distress.   Musculoskeletal:      Right elbow: Swelling present. Tenderness present in olecranon process.      Left elbow: Swelling present. Tenderness present in olecranon process.   Skin:     Findings: Wound present.             Comments: Bilateral elbow wounds. Appear to be d/t pressure. Stage 3. There is dried exudate present over fibrinous wound bases. Minimal drainage given dried out nature of wound beds. There is surrounding hyperpigmentation but no obvious erythema to indicate acute infection. Olecranon bursitis present bilaterally   Neurological:      Mental Status: He is alert.   Psychiatric:         Mood and Affect: Mood normal.           Wound 12/28/23 Pressure Injury Elbow Posterior;Right (Active)   Wound Image   12/28/23 1421   Wound Description Dry;Yellow 12/28/23 1425   Stephani-wound Assessment Dry;Pink 12/28/23 1425   Wound Length (cm) 0.3 cm 12/28/23 1425   Wound Width (cm) 0.3 cm 12/28/23 1425   Wound Depth (cm) 0.1 cm 12/28/23 1425   Wound Surface Area (cm^2) 0.09 cm^2 12/28/23 1425   Wound Volume (cm^3) 0.009 cm^3 12/28/23 1425   Calculated Wound Volume (cm^3) 0.01 cm^3 12/28/23 1425   Drainage Amount scant 12/28/23 1425   Drainage Description Serous/blood 12/28/23 1425   Non-staged Wound Description Full thickness 12/28/23 1425   Patient Tolerance Tolerated well 12/28/23 1425   Dressing Status Other (Comment) 12/28/23 1425       Wound 12/28/23 Pressure Injury Elbow Left;Posterior (Active)   Wound Image   12/28/23 1422   Wound Description Dry;Yellow;Tan 12/28/23 1424   Stephani-wound Assessment Dry;Intact 12/28/23 1424   Wound Length (cm) 0.5 cm 12/28/23 1424   Wound Width (cm) 0.6 cm 12/28/23 1424   Wound Depth (cm) 0.1 cm 12/28/23 1424   Wound Surface Area (cm^2) 0.3 cm^2  "12/28/23 1424   Wound Volume (cm^3) 0.03 cm^3 12/28/23 1424   Calculated Wound Volume (cm^3) 0.03 cm^3 12/28/23 1424   Drainage Amount scant 12/28/23 1424   Drainage Description Serous/blood 12/28/23 1424   Non-staged Wound Description Full thickness 12/28/23 1424   Patient Tolerance Tolerated well 12/28/23 1424   Dressing Status Other (Comment) 12/28/23 1424                Debridement   Wound 12/28/23 Pressure Injury Elbow Posterior;Right    Universal Protocol:  Consent: Verbal consent obtained.  Consent given by: patient  Time out: Immediately prior to procedure a \"time out\" was called to verify the correct patient, procedure, equipment, support staff and site/side marked as required.  Patient understanding: patient states understanding of the procedure being performed  Patient identity confirmed: verbally with patient    Debridement Details  Performed by: PA  Debridement type: selective  Pain control: lidocaine 4%      Post-debridement measurements  Length (cm): 0.5  Width (cm): 0.6  Depth (cm): 0.2  Percent debrided: 100%  Surface Area (cm^2): 0.3  Area Debrided (cm^2): 0.3  Volume (cm^3): 0.06    Devitalized tissue debrided: exudate  Instrument(s) utilized: blade and forceps  Bleeding: small  Hemostasis obtained with: pressure  Response to treatment: procedure was tolerated well    Debridement   Wound 12/28/23 Pressure Injury Elbow Left;Posterior    Universal Protocol:  Consent: Verbal consent obtained.  Consent given by: patient  Time out: Immediately prior to procedure a \"time out\" was called to verify the correct patient, procedure, equipment, support staff and site/side marked as required.  Patient understanding: patient states understanding of the procedure being performed  Patient identity confirmed: verbally with patient    Debridement Details  Performed by: PA  Debridement type: selective  Pain control: lidocaine 4%      Post-debridement measurements  Length (cm): 0.5  Width (cm): 0.6  Depth (cm): " 0.2  Percent debrided: 100%  Surface Area (cm^2): 0.3  Area Debrided (cm^2): 0.3  Volume (cm^3): 0.06    Devitalized tissue debrided: exudate  Instrument(s) utilized: blade and forceps  Bleeding: medium  Hemostasis obtained with: pressure  Response to treatment: procedure was tolerated well  Debridement Comments: One silver nitrate stick used for hemostasis                 Wound Instructions:  Orders Placed This Encounter   Procedures    Wound cleansing and dressings     Left and right elbow wounds:    Wash your hands with soap and water.  Remove old dressing, discard into plastic bag and place in trash.  Cleanse the wound with unscented soap (such as plain white Dove soap) and warm water (can use new wash cloth) prior to applying a clean dressing. Do not use tissue or cotton balls. Do not scrub the wound. Pat dry using gauze. **NO HYDROGEN PEROXIDE, NO LOOFAHS, NO RUBBING ALCOHOL**  Shower yes   Apply skin prep (3M Cavilon package) to skin surrounding wound  Apply hydrocolloid (square tan dressing) to the left and right elbows wound.   Secure with ACE wrap   Change dressing every 3 days      Increase your protein intake with each meal. Try meat, poultry, fish, seafood, legumes, lentils, tofu, protein supplements.    Please call the [facility] Monday through Friday between the hours of 8:00 AM and 4:30 PM at [phone] if you have any questions, if you experience any major changes in your wound(s) or for any signs or symptoms of infection such as fever; changes in the redness, swelling, drainage, or odor of your wound. After hours, weekends or holidays please contact your primary care physician or go to the hospital emergency room.          Standing Status:   Future     Standing Expiration Date:   12/28/2024    Debridement Pressure Injury (stage 3) Posterior;Right Elbow     This order was created via procedure documentation    Debridement Pressure Injury (stage 3) Left;Posterior Elbow     This order was  "created via procedure documentation       Cally Collins, PA-C    Portions of the record may have been created with voice recognition software. Occasional wrong word or \"sound alike\" substitutions may have occurred due to the inherent limitations of voice recognition software. Read the chart carefully and recognize, using context, where substitutions have occurred.    "

## 2024-01-05 ENCOUNTER — HOSPITAL ENCOUNTER (INPATIENT)
Facility: HOSPITAL | Age: 79
LOS: 1 days | Discharge: HOME | DRG: 308 | End: 2024-01-06
Attending: EMERGENCY MEDICINE | Admitting: FAMILY MEDICINE
Payer: MEDICARE

## 2024-01-05 ENCOUNTER — APPOINTMENT (EMERGENCY)
Dept: RADIOLOGY | Facility: HOSPITAL | Age: 79
DRG: 308 | End: 2024-01-05
Payer: MEDICARE

## 2024-01-05 DIAGNOSIS — R53.1 GENERALIZED WEAKNESS: ICD-10-CM

## 2024-01-05 DIAGNOSIS — U07.1 COVID: Primary | ICD-10-CM

## 2024-01-05 DIAGNOSIS — R53.83 FATIGUE: ICD-10-CM

## 2024-01-05 DIAGNOSIS — I95.9 HYPOTENSION: ICD-10-CM

## 2024-01-05 LAB
2HR DELTA HS TROPONIN: -5 NG/L
4HR DELTA HS TROPONIN: -1 NG/L
ALBUMIN SERPL BCP-MCNC: 3.7 G/DL (ref 3.5–5)
ALP SERPL-CCNC: 88 U/L (ref 34–104)
ALT SERPL W P-5'-P-CCNC: 37 U/L (ref 7–52)
ANION GAP SERPL CALCULATED.3IONS-SCNC: 7 MMOL/L
ANISOCYTOSIS BLD QL SMEAR: ABNORMAL
APTT PPP: 37 SECONDS (ref 23–37)
AST SERPL W P-5'-P-CCNC: 47 U/L (ref 13–39)
ATRIAL RATE: 394 BPM
BASOPHILS # BLD MANUAL: 0.08 THOUSAND/UL (ref 0–0.1)
BASOPHILS NFR MAR MANUAL: 1 % (ref 0–1)
BILIRUB SERPL-MCNC: 1.06 MG/DL (ref 0.2–1)
BNP SERPL-MCNC: 533 PG/ML (ref 0–100)
BUN SERPL-MCNC: 35 MG/DL (ref 5–25)
CALCIUM SERPL-MCNC: 8.7 MG/DL (ref 8.4–10.2)
CARDIAC TROPONIN I PNL SERPL HS: 22 NG/L
CARDIAC TROPONIN I PNL SERPL HS: 26 NG/L
CARDIAC TROPONIN I PNL SERPL HS: 27 NG/L
CHLORIDE SERPL-SCNC: 106 MMOL/L (ref 96–108)
CO2 SERPL-SCNC: 25 MMOL/L (ref 21–32)
CREAT SERPL-MCNC: 1.19 MG/DL (ref 0.6–1.3)
EOSINOPHIL # BLD MANUAL: 0.71 THOUSAND/UL (ref 0–0.4)
EOSINOPHIL NFR BLD MANUAL: 9 % (ref 0–6)
ERYTHROCYTE [DISTWIDTH] IN BLOOD BY AUTOMATED COUNT: 15.3 % (ref 11.6–15.1)
FLUAV RNA RESP QL NAA+PROBE: NEGATIVE
FLUBV RNA RESP QL NAA+PROBE: NEGATIVE
GFR SERPL CREATININE-BSD FRML MDRD: 58 ML/MIN/1.73SQ M
GLUCOSE SERPL-MCNC: 89 MG/DL (ref 65–140)
HCT VFR BLD AUTO: 46.6 % (ref 36.5–49.3)
HGB BLD-MCNC: 15.3 G/DL (ref 12–17)
INR PPP: 1.73 (ref 0.84–1.19)
LYMPHOCYTES # BLD AUTO: 0.71 THOUSAND/UL (ref 0.6–4.47)
LYMPHOCYTES # BLD AUTO: 9 % (ref 14–44)
MACROCYTES BLD QL AUTO: ABNORMAL
MCH RBC QN AUTO: 35.3 PG (ref 26.8–34.3)
MCHC RBC AUTO-ENTMCNC: 32.8 G/DL (ref 31.4–37.4)
MCV RBC AUTO: 107 FL (ref 82–98)
MONOCYTES # BLD AUTO: 0.94 THOUSAND/UL (ref 0–1.22)
MONOCYTES NFR BLD: 12 % (ref 4–12)
NEUTROPHILS # BLD MANUAL: 5.42 THOUSAND/UL (ref 1.85–7.62)
NEUTS BAND NFR BLD MANUAL: 1 % (ref 0–8)
NEUTS SEG NFR BLD AUTO: 68 % (ref 43–75)
PLATELET # BLD AUTO: 126 THOUSANDS/UL (ref 149–390)
PLATELET BLD QL SMEAR: ABNORMAL
PMV BLD AUTO: 9.5 FL (ref 8.9–12.7)
POTASSIUM SERPL-SCNC: 5 MMOL/L (ref 3.5–5.3)
PROT SERPL-MCNC: 6.4 G/DL (ref 6.4–8.4)
PROTHROMBIN TIME: 21 SECONDS (ref 11.6–14.5)
QRS AXIS: -58 DEGREES
QRSD INTERVAL: 126 MS
QT INTERVAL: 382 MS
QTC INTERVAL: 435 MS
RBC # BLD AUTO: 4.34 MILLION/UL (ref 3.88–5.62)
RBC MORPH BLD: PRESENT
RSV RNA RESP QL NAA+PROBE: NEGATIVE
SARS-COV-2 RNA RESP QL NAA+PROBE: POSITIVE
SODIUM SERPL-SCNC: 138 MMOL/L (ref 135–147)
T WAVE AXIS: 122 DEGREES
VENTRICULAR RATE: 78 BPM
WBC # BLD AUTO: 7.86 THOUSAND/UL (ref 4.31–10.16)

## 2024-01-05 PROCEDURE — XW033E5 INTRODUCTION OF REMDESIVIR ANTI-INFECTIVE INTO PERIPHERAL VEIN, PERCUTANEOUS APPROACH, NEW TECHNOLOGY GROUP 5: ICD-10-PCS | Performed by: STUDENT IN AN ORGANIZED HEALTH CARE EDUCATION/TRAINING PROGRAM

## 2024-01-05 PROCEDURE — 99223 1ST HOSP IP/OBS HIGH 75: CPT | Performed by: FAMILY MEDICINE

## 2024-01-05 PROCEDURE — 71046 X-RAY EXAM CHEST 2 VIEWS: CPT

## 2024-01-05 PROCEDURE — 0241U HB NFCT DS VIR RESP RNA 4 TRGT: CPT | Performed by: EMERGENCY MEDICINE

## 2024-01-05 PROCEDURE — 85610 PROTHROMBIN TIME: CPT | Performed by: EMERGENCY MEDICINE

## 2024-01-05 PROCEDURE — 85027 COMPLETE CBC AUTOMATED: CPT | Performed by: EMERGENCY MEDICINE

## 2024-01-05 PROCEDURE — 85007 BL SMEAR W/DIFF WBC COUNT: CPT | Performed by: EMERGENCY MEDICINE

## 2024-01-05 PROCEDURE — 99285 EMERGENCY DEPT VISIT HI MDM: CPT

## 2024-01-05 PROCEDURE — 36415 COLL VENOUS BLD VENIPUNCTURE: CPT | Performed by: EMERGENCY MEDICINE

## 2024-01-05 PROCEDURE — 99285 EMERGENCY DEPT VISIT HI MDM: CPT | Performed by: EMERGENCY MEDICINE

## 2024-01-05 PROCEDURE — 93005 ELECTROCARDIOGRAM TRACING: CPT

## 2024-01-05 PROCEDURE — 84484 ASSAY OF TROPONIN QUANT: CPT | Performed by: EMERGENCY MEDICINE

## 2024-01-05 PROCEDURE — 83880 ASSAY OF NATRIURETIC PEPTIDE: CPT | Performed by: EMERGENCY MEDICINE

## 2024-01-05 PROCEDURE — 96360 HYDRATION IV INFUSION INIT: CPT

## 2024-01-05 PROCEDURE — 85730 THROMBOPLASTIN TIME PARTIAL: CPT | Performed by: EMERGENCY MEDICINE

## 2024-01-05 PROCEDURE — 80053 COMPREHEN METABOLIC PANEL: CPT | Performed by: EMERGENCY MEDICINE

## 2024-01-05 RX ORDER — WARFARIN SODIUM 5 MG/1
5 TABLET ORAL
Status: DISCONTINUED | OUTPATIENT
Start: 2024-01-06 | End: 2024-01-06 | Stop reason: HOSPADM

## 2024-01-05 RX ORDER — SPIRONOLACTONE 25 MG/1
12.5 TABLET ORAL DAILY
Status: DISCONTINUED | OUTPATIENT
Start: 2024-01-06 | End: 2024-01-06 | Stop reason: HOSPADM

## 2024-01-05 RX ORDER — CLOPIDOGREL BISULFATE 75 MG/1
75 TABLET ORAL DAILY
Status: DISCONTINUED | OUTPATIENT
Start: 2024-01-06 | End: 2024-01-06 | Stop reason: HOSPADM

## 2024-01-05 RX ORDER — PANTOPRAZOLE SODIUM 40 MG/1
40 TABLET, DELAYED RELEASE ORAL DAILY
Status: DISCONTINUED | OUTPATIENT
Start: 2024-01-06 | End: 2024-01-06 | Stop reason: HOSPADM

## 2024-01-05 RX ORDER — CLINDAMYCIN PHOSPHATE 10 MG/G
GEL TOPICAL 2 TIMES DAILY
Status: DISCONTINUED | OUTPATIENT
Start: 2024-01-06 | End: 2024-01-06 | Stop reason: HOSPADM

## 2024-01-05 RX ORDER — DEXAMETHASONE SODIUM PHOSPHATE 10 MG/ML
6 INJECTION, SOLUTION INTRAMUSCULAR; INTRAVENOUS EVERY 24 HOURS
Qty: 10 ML | Refills: 0 | Status: DISCONTINUED | OUTPATIENT
Start: 2024-01-06 | End: 2024-01-06 | Stop reason: HOSPADM

## 2024-01-05 RX ORDER — BIMATOPROST 0.3 MG/ML
1 SOLUTION/ DROPS OPHTHALMIC DAILY
Status: DISCONTINUED | OUTPATIENT
Start: 2024-01-06 | End: 2024-01-06 | Stop reason: HOSPADM

## 2024-01-05 RX ORDER — MIDODRINE HYDROCHLORIDE 5 MG/1
5 TABLET ORAL
Status: DISCONTINUED | OUTPATIENT
Start: 2024-01-06 | End: 2024-01-06 | Stop reason: HOSPADM

## 2024-01-05 RX ORDER — ACETAMINOPHEN 325 MG/1
650 TABLET ORAL EVERY 6 HOURS PRN
Status: DISCONTINUED | OUTPATIENT
Start: 2024-01-05 | End: 2024-01-06 | Stop reason: HOSPADM

## 2024-01-05 RX ORDER — ONDANSETRON 2 MG/ML
4 INJECTION INTRAMUSCULAR; INTRAVENOUS EVERY 6 HOURS PRN
Status: DISCONTINUED | OUTPATIENT
Start: 2024-01-05 | End: 2024-01-06 | Stop reason: HOSPADM

## 2024-01-05 RX ORDER — FUROSEMIDE 20 MG/1
20 TABLET ORAL EVERY OTHER DAY
Status: DISCONTINUED | OUTPATIENT
Start: 2024-01-06 | End: 2024-01-06 | Stop reason: HOSPADM

## 2024-01-05 RX ORDER — ATORVASTATIN CALCIUM 40 MG/1
40 TABLET, FILM COATED ORAL DAILY
Status: DISCONTINUED | OUTPATIENT
Start: 2024-01-06 | End: 2024-01-06 | Stop reason: HOSPADM

## 2024-01-05 RX ADMIN — SODIUM CHLORIDE 500 ML: 0.9 INJECTION, SOLUTION INTRAVENOUS at 18:26

## 2024-01-05 NOTE — DISCHARGE INSTRUCTIONS
Please follow up PCP. Recommend tylenol 650 mg and ibuprofen 600 mg every 6 hours as needed for pain. Please return for severe chest pain, significant shortness of breath, severely worsening symptoms, or any other concerning signs or symptoms. Please refer to the following documents for additional instructions and return precautions.

## 2024-01-05 NOTE — ED PROVIDER NOTES
History  Chief Complaint   Patient presents with    Weakness - Generalized     Pt c/o weakness x4-5 days. Admits to decreased appetite and cough x3 weeks, denies fevers. Had bypass surgery before yessy      78-year-old male history of CAD, stroke, hypertension on Plavix and warfarin presenting with generalized weakness.  Patient reports a nonproductive cough ongoing for the past few weeks and now generalized weakness for the past several days.  Patient reports recent CABG and pacemaker placement and recently started overactive bladder medication and Coreg several days ago.  Denies any chest pain.  Patient reports baseline shortness of breath and dyspnea.  Denies any orthopnea.  Denies any lower extremity swelling.  Denies any other complaints.  Chart reviewed.    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 Taking?   Azelaic Acid 15 % cream  Self Yes No   Sig: apply to 1 thin layer twice a day to face   Farxiga 10 MG TABS  Self Yes No   Sig: Take 10 mg by mouth daily   Lumigan 0.01 % ophthalmic drops  Self Yes No   Sig: instill 1 drop into both eyes once daily at bedtime   Multiple Vitamins-Minerals (PX MENS MULTIVITAMINS) TABS  Self Yes No   Sig: Take by mouth   atorvastatin (LIPITOR) 40 mg tablet  Self Yes No   Sig: Take 40 mg by mouth daily     clindamycin (CLINDAGEL) 1 % gel  Self Yes No   Sig: APPLY 1 THIN LAYER TOPICALLY TO FACE IN THE  MORNING   clopidogrel  (PLAVIX) 75 mg tablet  Self Yes No   Sig: Take 75 mg by mouth daily   fluticasone (FLONASE) 50 mcg/act nasal spray  Self No No   Si spray into each nostril daily Do not start before November 15, 2022.   Patient not taking: Reported on 2023   furosemide (LASIX) 40 mg tablet  Self Yes No   Sig: Take 40 mg by mouth daily PT REPORTS : 1/2 TABLET EVERY OTHER DAY (MWF)   midodrine (PROAMATINE) 2.5 mg tablet   No No   Sig: Take 1 tablet (2.5 mg total) by mouth 3 (three) times a day before meals   Patient taking differently: Take 2.5 mg by mouth 2 (two) times a day   midodrine (PROAMATINE) 5 mg tablet   Yes No   Sig: Take 5 mg by mouth 3 (three) times a day before meals   pantoprazole (PROTONIX) 40 mg tablet  Self No No   Sig: Take 1 tablet (40 mg total) by mouth daily   sacubitril-valsartan (ENTRESTO) 24-26 MG TABS   No No   Sig: Take 1 tablet by mouth 2 (two) times a day   Patient taking differently: Take 0.5 tablets by mouth 2 (two) times a day   spironolactone (ALDACTONE) 25 mg tablet  Self Yes No   Sig: Take 12.5 mg by mouth daily 1/2 TABLET EVERY OTHER DAY (, , , Nick)   triamcinolone (KENALOG) 0.5 % cream  Self No No   Sig: Apply topically 3 (three) times a day   warfarin (COUMADIN) 2.5 mg tablet   Yes No   Sig: as directed take 1 tablet ON TUESDAY,WEDNESDAY,FRIDAY,SATURDAY,NICK...  (REFER TO PRESCRIPTION NOTES).   warfarin (COUMADIN) 5 mg tablet  Self No No   Sig: Take 1 tablet (5 mg total) by mouth daily Coumadin 5 mg p.o. Daily- Target INR 2-3   Patient taking differently: Take 5 mg by mouth daily  & urs & Sat 5MG  Tues & Wed & Fri & Sun 2.5MG      Facility-Administered Medications: None       Past Medical History:   Diagnosis Date    Anaplasmosis 2018    CAD (coronary artery disease)     Cancer (HCC)     prostate    Colon polyp     Coronary artery disease     GERD (gastroesophageal reflux disease)     H/O heart artery stent     x2    Hypercholesteremia     Hypertension     Keratotic lesion      Stroke (HCC)     10YRS AGO    Transient cerebral ischemia        Past Surgical History:   Procedure Laterality Date    BACK SURGERY      CARDIAC PACEMAKER PLACEMENT      CARDIAC SURGERY      mvp repair    CAROTID ENDARTERECTOMY Right 2020    CHOLECYSTECTOMY      COLONOSCOPY      ESOPHAGOGASTRODUODENOSCOPY N/A 2018    Procedure: ESOPHAGOGASTRODUODENOSCOPY (EGD);  Surgeon: Sunny Rodrigues III, MD;  Location: [facility];  Service: Gastroenterology    JOINT REPLACEMENT      L hip replacement    ORCHIECTOMY      PROSTATECTOMY      TOTAL HIP ARTHROPLASTY         Family History   Problem Relation Age of Onset    Heart disease Mother     Stroke Mother         CVA    Cancer Father     Hodgkin's lymphoma Father     Lung cancer Father      I have reviewed and agree with the history as documented.    E-Cigarette/Vaping    E-Cigarette Use Never User      E-Cigarette/Vaping Substances    Nicotine No     THC No     CBD No     Flavoring No     Other No     Unknown No      Social History     Tobacco Use    Smoking status: Every Day     Current packs/day: 0.00     Types: Cigarettes     Last attempt to quit: 2018     Years since quittin.6    Smokeless tobacco: Never    Tobacco comments:     2 cig/day   Vaping Use    Vaping status: Never Used   Substance Use Topics    Alcohol use: Never     Alcohol/week: 2.0 standard drinks of alcohol     Types: 2 Standard drinks or equivalent per week    Drug use: No       Review of Systems   Constitutional:  Positive for fatigue. Negative for appetite change, chills, diaphoresis, fever and unexpected weight change.   HENT:  Negative for congestion and rhinorrhea.    Eyes:  Negative for photophobia and visual disturbance.   Respiratory:  Positive for cough. Negative for chest tightness and shortness of breath.    Cardiovascular:  Negative for chest pain, palpitations and leg swelling.   Gastrointestinal:  Negative for abdominal distention, abdominal pain, blood in stool,  constipation, diarrhea, nausea and vomiting.   Genitourinary:  Negative for dysuria and hematuria.   Musculoskeletal:  Negative for back pain, joint swelling, neck pain and neck stiffness.   Skin:  Negative for color change, pallor, rash and wound.   Neurological:  Positive for weakness. Negative for dizziness, syncope, light-headedness and headaches.   Psychiatric/Behavioral:  Negative for agitation.    All other systems reviewed and are negative.      Physical Exam  Physical Exam  Vitals and nursing note reviewed.   Constitutional:       General: He is not in acute distress.     Appearance: Normal appearance. He is well-developed. He is not ill-appearing, toxic-appearing or diaphoretic.   HENT:      Head: Normocephalic and atraumatic.      Nose: Nose normal. No congestion or rhinorrhea.      Mouth/Throat:      Mouth: Mucous membranes are moist.      Pharynx: Oropharynx is clear. No oropharyngeal exudate or posterior oropharyngeal erythema.   Eyes:      General: No scleral icterus.        Right eye: No discharge.         Left eye: No discharge.      Extraocular Movements: Extraocular movements intact.      Conjunctiva/sclera: Conjunctivae normal.      Pupils: Pupils are equal, round, and reactive to light.   Neck:      Vascular: No JVD.      Trachea: No tracheal deviation.      Comments: Supple. Normal range of motion.   Cardiovascular:      Rate and Rhythm: Normal rate and regular rhythm.      Heart sounds: Normal heart sounds. No murmur heard.     No friction rub. No gallop.      Comments: Normal rate and regular rhythm  Pulmonary:      Effort: Pulmonary effort is normal. No respiratory distress.      Breath sounds: No stridor. No wheezing or rales.      Comments: Decreased bibasilar breath sounds  Chest:      Chest wall: No tenderness.   Abdominal:      General: Bowel sounds are normal. There is no distension.      Palpations: Abdomen is soft.      Tenderness: There is no abdominal tenderness. There is no right  CVA tenderness, left CVA tenderness, guarding or rebound.      Comments: Soft, nontender, nondistended.  Normal bowel sounds throughout   Musculoskeletal:         General: No swelling, tenderness, deformity or signs of injury. Normal range of motion.      Cervical back: Normal range of motion and neck supple. No rigidity. No muscular tenderness.      Right lower leg: No edema.      Left lower leg: No edema.   Lymphadenopathy:      Cervical: No cervical adenopathy.   Skin:     General: Skin is warm and dry.      Coloration: Skin is not pale.      Findings: No erythema or rash.   Neurological:      General: No focal deficit present.      Mental Status: He is alert. Mental status is at baseline.      Sensory: No sensory deficit.      Motor: No weakness or abnormal muscle tone.      Coordination: Coordination normal.      Gait: Gait normal.      Comments: Alert.  Strength and sensation grossly intact.  Ambulatory without difficulty at baseline.    Psychiatric:         Behavior: Behavior normal.         Thought Content: Thought content normal.         Vital Signs  ED Triage Vitals   Temperature Pulse Respirations Blood Pressure SpO2   01/05/24 1546 01/05/24 1548 01/05/24 1548 01/05/24 1548 01/05/24 1548   (!) 97 °F (36.1 °C) 83 20 104/53 96 %      Temp Source Heart Rate Source Patient Position - Orthostatic VS BP Location FiO2 (%)   01/05/24 2134 01/05/24 2134 01/05/24 1548 01/05/24 1548 --   Oral Monitor Sitting Left arm       Pain Score       01/05/24 1715       No Pain           Vitals:    01/05/24 1930 01/05/24 2030 01/05/24 2100 01/05/24 2134   BP: 107/66 96/54 105/72 108/64   Pulse: 84 83 79 81   Patient Position - Orthostatic VS:    Sitting         Visual Acuity      ED Medications  Medications   clindamycin (CLINDAGEL) 1 % gel (has no administration in time range)   atorvastatin (LIPITOR) tablet 40 mg (has no administration in time range)   clopidogrel (PLAVIX) tablet 75 mg (has no administration in time range)    furosemide (LASIX) tablet 20 mg (has no administration in time range)   bimatoprost (LUMIGAN) 0.03 % ophthalmic drops 1 drop (has no administration in time range)   midodrine (PROAMATINE) tablet 5 mg (has no administration in time range)   multivitamin-minerals (CENTRUM) tablet 1 tablet (has no administration in time range)   pantoprazole (PROTONIX) EC tablet 40 mg (has no administration in time range)   spironolactone (ALDACTONE) tablet 12.5 mg (has no administration in time range)   warfarin (COUMADIN) tablet 5 mg (has no administration in time range)   acetaminophen (TYLENOL) tablet 650 mg (has no administration in time range)   ondansetron (ZOFRAN) injection 4 mg (has no administration in time range)   dexamethasone (PF) (DECADRON) injection 6 mg (6 mg Intravenous Given 1/6/24 0032)   remdesivir (Veklury) 200 mg in sodium chloride 0.9 % 290 mL IVPB (200 mg Intravenous New Bag 1/6/24 0033)     Followed by   remdesivir (Veklury) 100 mg in sodium chloride 0.9 % 270 mL IVPB (has no administration in time range)   sodium chloride 0.9 % bolus 500 mL (0 mL Intravenous Stopped 1/5/24 1926)       Diagnostic Studies  Results Reviewed       Procedure Component Value Units Date/Time    HS Troponin I 4hr [[id number]]  (Normal) Collected: 01/05/24 2145    Lab Status: Final result Specimen: Blood from Hand, Right Updated: 01/05/24 2227     hs TnI 4hr 26 ng/L      Delta 4hr hsTnI -1 ng/L     HS Troponin I 2hr [[id number]]  (Normal) Collected: 01/05/24 1945    Lab Status: Final result Specimen: Blood from Hand, Left Updated: 01/05/24 2021     hs TnI 2hr 22 ng/L      Delta 2hr hsTnI -5 ng/L     FLU/RSV/COVID - if FLU/RSV clinically relevant [[id number]]  (Abnormal) Collected: 01/05/24 1719    Lab Status: Final result Specimen: Nares from Nose Updated: 01/05/24 1809     SARS-CoV-2 Positive     INFLUENZA A PCR Negative     INFLUENZA B PCR Negative     RSV PCR Negative    Narrative:      FOR PEDIATRIC PATIENTS - copy/paste COVID  Guidelines URL to browser: [email or web address]    SARS-CoV-2 assay is a Nucleic Acid Amplification assay intended for the  qualitative detection of nucleic acid from SARS-CoV-2 in nasopharyngeal  swabs. Results are for the presumptive identification of SARS-CoV-2 RNA.    Positive results are indicative of infection with SARS-CoV-2, the virus  causing COVID-19, but do not rule out bacterial infection or co-infection  with other viruses. Laboratories within the [place] and its  territories are required to report all positive results to the appropriate  public health authorities. Negative results do not preclude SARS-CoV-2  infection and should not be used as the sole basis for treatment or other  patient management decisions. Negative results must be combined with  clinical observations, patient history, and epidemiological information.  This test has not been FDA cleared or approved.    This test has been authorized by FDA under an Emergency Use Authorization  (EUA). This test is only authorized for the duration of time the  declaration that circumstances exist justifying the authorization of the  emergency use of an in vitro diagnostic tests for detection of SARS-CoV-2  virus and/or diagnosis of COVID-19 infection under section 564(b)(1) of  the Act, 21 U.S.C. 360bbb-3(b)(1), unless the authorization is terminated  or revoked sooner. The test has been validated but independent review by FDA  and CLIA is pending.    Test performed using [employer] Gene[employer]pert: This RT-PCR assay targets N2,  a region unique to SARS-CoV-2. A conserved region in the E-gene was chosen  for pan-Sarbecovirus detection which includes SARS-CoV-2.    According to CMS-2020-01-R, this platform meets the definition of high-throughput technology.    RBC Morphology Reflex Test [[id number]] Collected: 01/05/24 8008    Lab Status: Final result Specimen: Blood from Arm, Left Updated: 01/05/24  1802    B-Type Natriuretic Peptide(BNP) [[id number]]  (Abnormal) Collected: 01/05/24 1719    Lab Status: Final result Specimen: Blood from Arm, Left Updated: 01/05/24 1758      pg/mL     HS Troponin 0hr (reflex protocol) [[id number]]  (Normal) Collected: 01/05/24 1719    Lab Status: Final result Specimen: Blood from Arm, Left Updated: 01/05/24 1758     hs TnI 0hr 27 ng/L     Comprehensive metabolic panel [[id number]]  (Abnormal) Collected: 01/05/24 1719    Lab Status: Final result Specimen: Blood from Arm, Left Updated: 01/05/24 1752     Sodium 138 mmol/L      Potassium 5.0 mmol/L      Chloride 106 mmol/L      CO2 25 mmol/L      ANION GAP 7 mmol/L      BUN 35 mg/dL      Creatinine 1.19 mg/dL      Glucose 89 mg/dL      Calcium 8.7 mg/dL      AST 47 U/L      ALT 37 U/L      Alkaline Phosphatase 88 U/L      Total Protein 6.4 g/dL      Albumin 3.7 g/dL      Total Bilirubin 1.06 mg/dL      eGFR 58 ml/min/1.73sq m     Narrative:      National Kidney Disease Foundation guidelines for Chronic Kidney Disease (CKD):     Stage 1 with normal or high GFR (GFR > 90 mL/min/1.73 square meters)    Stage 2 Mild CKD (GFR = 60-89 mL/min/1.73 square meters)    Stage 3A Moderate CKD (GFR = 45-59 mL/min/1.73 square meters)    Stage 3B Moderate CKD (GFR = 30-44 mL/min/1.73 square meters)    Stage 4 Severe CKD (GFR = 15-29 mL/min/1.73 square meters)    Stage 5 End Stage CKD (GFR <15 mL/min/1.73 square meters)  Note: GFR calculation is accurate only with a steady state creatinine    CBC and differential [[id number]]  (Abnormal) Collected: 01/05/24 1719    Lab Status: Final result Specimen: Blood from Arm, Left Updated: 01/05/24 1751     WBC 7.86 Thousand/uL      RBC 4.34 Million/uL      Hemoglobin 15.3 g/dL      Hematocrit 46.6 %       fL      MCH 35.3 pg      MCHC 32.8 g/dL      RDW 15.3 %      MPV 9.5 fL      Platelets 126 Thousands/uL     Narrative:      This is an appended report.  These results have been appended to a  previously verified report.    Manual Differential(PHLEBS Do Not Order) [[id number]]  (Abnormal) Collected: 01/05/24 1719    Lab Status: Final result Specimen: Blood from Arm, Left Updated: 01/05/24 1751     Segmented % 68 %      Bands % 1 %      Lymphocytes % 9 %      Monocytes % 12 %      Eosinophils, % 9 %      Basophils % 1 %      Absolute Neutrophils 5.42 Thousand/uL      Lymphocytes Absolute 0.71 Thousand/uL      Monocytes Absolute 0.94 Thousand/uL      Eosinophils Absolute 0.71 Thousand/uL      Basophils Absolute 0.08 Thousand/uL      Total Counted --     RBC Morphology Present     Platelet Estimate Borderline     Anisocytosis 1+     Macrocytes 1+    Protime-INR [[id number]]  (Abnormal) Collected: 01/05/24 1719    Lab Status: Final result Specimen: Blood from Arm, Left Updated: 01/05/24 1750     Protime 21.0 seconds      INR 1.73    APTT [[id number]]  (Normal) Collected: 01/05/24 1719    Lab Status: Final result Specimen: Blood from Arm, Left Updated: 01/05/24 1750     PTT 37 seconds                    XR chest 2 views    (Results Pending)              Procedures  Procedures         ED Course             HEART Risk Score      Flowsheet Row Most Recent Value   Heart Score Risk Calculator    History 0 Filed at: 01/05/2024 1839   ECG 1 Filed at: 01/05/2024 1839   Age 2 Filed at: 01/05/2024 1839   Risk Factors 2 Filed at: 01/05/2024 1839   Troponin 1 Filed at: 01/05/2024 1839   HEART Score 6 Filed at: 01/05/2024 1839                          SBIRT 22yo+      Flowsheet Row Most Recent Value   Initial Alcohol Screen: US AUDIT-C     1. How often do you have a drink containing alcohol? 0 Filed at: 01/05/2024 1733   2. How many drinks containing alcohol do you have on a typical day you are drinking?  0 Filed at: 01/05/2024 1733   3b. FEMALE Any Age, or MALE 65+: How often do you have 4 or more drinks on one occassion? 0 Filed at: 01/05/2024 1733   Audit-C Score 0 Filed at: 01/05/2024 1733   ENRIQUETA: How many times in the  past year have you...    Used an illegal drug or used a prescription medication for non-medical reasons? Never Filed at: 01/05/2024 1730                      Medical Decision Making  78-year-old male history of CAD, stroke, hypertension on Plavix and warfarin presenting with generalized weakness.  Progressively worsening generalized weakness in setting of cough and recent new medications.  Possibly multifactorial.  Plan for cardiac evaluation including EKG and troponin plus BMP.  Basic labs.  Chest x-ray.  Viral testing.  Reassess.    EKG interpreted by me with ventricularly paced rhythm and nonspecific ST abnormality.  Labs interpreted by me notable for troponin of 27.  Normal delta.  Blood pressure somewhat improved with fluids.  Patient ambulated and oxygen saturation dropped to 82%.  Saturation remains mid 90s on room air at rest.  Given COVID and decreased ambulatory SpO2, I suspect likely contributing to patient's general weakness.  Plan for further evaluation and management patient.  Admitted.    Amount and/or Complexity of Data Reviewed  Labs: ordered.  Radiology: ordered.    Risk  Decision regarding hospitalization.             Disposition  Final diagnoses:   Generalized weakness   Fatigue   COVID   Hypotension     Time reflects when diagnosis was documented in both MDM as applicable and the Disposition within this note       Time User Action Codes Description Comment    1/5/2024  4:49 PM Erne, Zenon Add [R53.1] Generalized weakness     1/5/2024  4:49 PM Erne, Zenon Add [R53.83] Fatigue     1/5/2024  6:34 PM Erne, Zenon Add [U07.1] COVID     1/5/2024  6:34 PM Erne, Zenon Add [I95.9] Hypotension     1/5/2024  6:34 PM Erne, Zenon Modify [R53.1] Generalized weakness     1/5/2024  6:34 PM Erne, Zenon Modify [U07.1] COVID           ED Disposition       ED Disposition   Admit    Condition   Stable    Date/Time   Fri Jan 5, 2024 2025    Comment   Case was discussed with JESUS and the patient's admission status was  agreed to be Admission Status: inpatient status to the service of Dr. Kurtz .               Follow-up Information       Follow up With Specialties Details Why Contact Info    Lenin Hardy MD Internal Medicine Schedule an appointment as soon as possible for a visit in 1 week  [id number]  [facility] [address] [phone]              Current Discharge Medication List        CONTINUE these medications which have NOT CHANGED    Details   atorvastatin (LIPITOR) 40 mg tablet Take 40 mg by mouth daily        Azelaic Acid 15 % cream apply to 1 thin layer twice a day to face      clindamycin (CLINDAGEL) 1 % gel APPLY 1 THIN LAYER TOPICALLY TO FACE IN THE  MORNING      clopidogrel (PLAVIX) 75 mg tablet Take 75 mg by mouth daily      Farxiga 10 MG TABS Take 10 mg by mouth daily      fluticasone (FLONASE) 50 mcg/act nasal spray 1 spray into each nostril daily Do not start before November 15, 2022.  Qty: 11.1 mL, Refills: 0    Associated Diagnoses: PE (pulmonary thromboembolism) ([facility]); CHF (congestive heart failure) ([facility]); Syncopal episodes; CKD (chronic kidney disease)      furosemide (LASIX) 40 mg tablet Take 40 mg by mouth daily PT REPORTS : 1/2 TABLET EVERY OTHER DAY (MWF)      Lumigan 0.01 % ophthalmic drops instill 1 drop into both eyes once daily at bedtime      midodrine (PROAMATINE) 5 mg tablet Take 5 mg by mouth 3 (three) times a day before meals      Multiple Vitamins-Minerals (PX MENS MULTIVITAMINS) TABS Take by mouth      pantoprazole (PROTONIX) 40 mg tablet Take 1 tablet (40 mg total) by mouth daily  Qty: 90 tablet, Refills: 2    Associated Diagnoses: Gastroesophageal reflux disease without esophagitis      sacubitril-valsartan (ENTRESTO) 24-26 MG TABS Take 1 tablet by mouth 2 (two) times a day  Qty: 60 tablet, Refills: 0    Associated Diagnoses: PE (pulmonary thromboembolism) (HCC); CHF (congestive heart failure) ([facility]); Syncopal episodes; CKD (chronic kidney disease)      spironolactone (ALDACTONE) 25 mg  tablet Take 12.5 mg by mouth daily 1/2 TABLET EVERY OTHER DAY (Tu, Th, Sa, Su)      triamcinolone (KENALOG) 0.5 % cream Apply topically 3 (three) times a day  Qty: 30 g, Refills: 1    Associated Diagnoses: Rash      !! warfarin (COUMADIN) 2.5 mg tablet as directed take 1 tablet ON TUESDAY,WEDNESDAY,FRIDAY,SATURDAY,SU...  (REFER TO PRESCRIPTION NOTES).      !! warfarin (COUMADIN) 5 mg tablet Take 1 tablet (5 mg total) by mouth daily Coumadin 5 mg p.o. Daily- Target INR 2-3  Qty: 30 tablet, Refills: 0    Associated Diagnoses: PE (pulmonary thromboembolism) (HCC); CHF (congestive heart failure) (HCC); Syncopal episodes; CKD (chronic kidney disease)       !! - Potential duplicate medications found. Please discuss with provider.          No discharge procedures on file.    PDMP Review         Value Time User    PDMP Reviewed  Yes 11/14/2022  2:35 PM Abilio Benton MD            ED Provider  Electronically Signed by             Zenon Umanzor MD  01/06/24 0056

## 2024-01-05 NOTE — ED NOTES
Pt is hypotensive and placed in Trendelenburg. Provider aware.     Estrellita Galicia RN  01/05/24 4097

## 2024-01-06 VITALS
HEART RATE: 95 BPM | BODY MASS INDEX: 20.16 KG/M2 | HEIGHT: 66 IN | SYSTOLIC BLOOD PRESSURE: 120 MMHG | DIASTOLIC BLOOD PRESSURE: 70 MMHG | WEIGHT: 125.44 LBS | OXYGEN SATURATION: 95 % | RESPIRATION RATE: 18 BRPM | TEMPERATURE: 98.7 F

## 2024-01-06 LAB
ALBUMIN SERPL BCP-MCNC: 3.6 G/DL (ref 3.5–5)
ALP SERPL-CCNC: 93 U/L (ref 34–104)
ALT SERPL W P-5'-P-CCNC: 34 U/L (ref 7–52)
ANION GAP SERPL CALCULATED.3IONS-SCNC: 9 MMOL/L
AST SERPL W P-5'-P-CCNC: 29 U/L (ref 13–39)
BILIRUB SERPL-MCNC: 0.79 MG/DL (ref 0.2–1)
BUN SERPL-MCNC: 31 MG/DL (ref 5–25)
CALCIUM SERPL-MCNC: 8.8 MG/DL (ref 8.4–10.2)
CHLORIDE SERPL-SCNC: 110 MMOL/L (ref 96–108)
CO2 SERPL-SCNC: 20 MMOL/L (ref 21–32)
CREAT SERPL-MCNC: 0.92 MG/DL (ref 0.6–1.3)
GFR SERPL CREATININE-BSD FRML MDRD: 79 ML/MIN/1.73SQ M
GLUCOSE SERPL-MCNC: 126 MG/DL (ref 65–140)
INR PPP: 1.81 (ref 0.84–1.19)
MAGNESIUM SERPL-MCNC: 1.9 MG/DL (ref 1.9–2.7)
POTASSIUM SERPL-SCNC: 4.3 MMOL/L (ref 3.5–5.3)
PROT SERPL-MCNC: 6.3 G/DL (ref 6.4–8.4)
PROTHROMBIN TIME: 21.8 SECONDS (ref 11.6–14.5)
SODIUM SERPL-SCNC: 139 MMOL/L (ref 135–147)

## 2024-01-06 PROCEDURE — 80053 COMPREHEN METABOLIC PANEL: CPT | Performed by: FAMILY MEDICINE

## 2024-01-06 PROCEDURE — 85610 PROTHROMBIN TIME: CPT | Performed by: FAMILY MEDICINE

## 2024-01-06 PROCEDURE — 83735 ASSAY OF MAGNESIUM: CPT | Performed by: FAMILY MEDICINE

## 2024-01-06 PROCEDURE — 99239 HOSP IP/OBS DSCHRG MGMT >30: CPT | Performed by: STUDENT IN AN ORGANIZED HEALTH CARE EDUCATION/TRAINING PROGRAM

## 2024-01-06 RX ADMIN — CLOPIDOGREL 75 MG: 75 TABLET ORAL at 09:54

## 2024-01-06 RX ADMIN — Medication 1 TABLET: at 09:53

## 2024-01-06 RX ADMIN — ATORVASTATIN CALCIUM 40 MG: 40 TABLET, FILM COATED ORAL at 09:53

## 2024-01-06 RX ADMIN — REMDESIVIR 200 MG: 100 INJECTION, POWDER, LYOPHILIZED, FOR SOLUTION INTRAVENOUS at 00:33

## 2024-01-06 RX ADMIN — SPIRONOLACTONE 12.5 MG: 25 TABLET ORAL at 09:53

## 2024-01-06 RX ADMIN — BIMATOPROST 1 DROP: 0.3 SOLUTION/ DROPS OPHTHALMIC at 09:59

## 2024-01-06 RX ADMIN — DEXAMETHASONE SODIUM PHOSPHATE 6 MG: 10 INJECTION, SOLUTION INTRAMUSCULAR; INTRAVENOUS at 00:32

## 2024-01-06 RX ADMIN — FUROSEMIDE 20 MG: 20 TABLET ORAL at 09:53

## 2024-01-06 RX ADMIN — PANTOPRAZOLE SODIUM 40 MG: 40 TABLET, DELAYED RELEASE ORAL at 09:54

## 2024-01-06 RX ADMIN — MIDODRINE HYDROCHLORIDE 5 MG: 5 TABLET ORAL at 09:53

## 2024-01-06 NOTE — PLAN OF CARE
Problem: PAIN - ADULT  Goal: Verbalizes/displays adequate comfort level or baseline comfort level  Description: Interventions:  - Encourage patient to monitor pain and request assistance  - Assess pain using appropriate pain scale  - Administer analgesics based on type and severity of pain and evaluate response  - Implement non-pharmacological measures as appropriate and evaluate response  - Consider cultural and social influences on pain and pain management  - Notify physician/advanced practitioner if interventions unsuccessful or patient reports new pain  Outcome: Progressing     Problem: INFECTION - ADULT  Goal: Absence or prevention of progression during hospitalization  Description: INTERVENTIONS:  - Assess and monitor for signs and symptoms of infection  - Monitor lab/diagnostic results  - Monitor all insertion sites, i.e. indwelling lines, tubes, and drains  - Monitor endotracheal if appropriate and nasal secretions for changes in amount and color  - [place] appropriate cooling/warming therapies per order  - Administer medications as ordered  - Instruct and encourage patient and family to use good hand hygiene technique  - Identify and instruct in appropriate isolation precautions for identified infection/condition  Outcome: Progressing     Problem: SAFETY ADULT  Goal: Patient will remain free of falls  Description: INTERVENTIONS:  - Educate patient/family on patient safety including physical limitations  - Instruct patient to call for assistance with activity   - Consult OT/PT to assist with strengthening/mobility   - Keep Call bell within reach  - Keep bed low and locked with side rails adjusted as appropriate  - Keep care items and personal belongings within reach  - Initiate and maintain comfort rounds  - Make Fall Risk Sign visible to staff  - Offer Toileting every 2 Hours, in advance of need  - Initiate/Maintain bed alarm  - Obtain necessary fall risk management equipment:   - Apply yellow socks and  bracelet for high fall risk patients  - Consider moving patient to room near nurses station  Outcome: Progressing  Goal: Maintain or return to baseline ADL function  Description: INTERVENTIONS:  -  Assess patient's ability to carry out ADLs; assess patient's baseline for ADL function and identify physical deficits which impact ability to perform ADLs (bathing, care of mouth/teeth, toileting, grooming, dressing, etc.)  - Assess/evaluate cause of self-care deficits   - Assess range of motion  - Assess patient's mobility; develop plan if impaired  - Assess patient's need for assistive devices and provide as appropriate  - Encourage maximum independence but intervene and supervise when necessary  - Involve family in performance of ADLs  - Assess for home care needs following discharge   - Consider OT consult to assist with ADL evaluation and planning for discharge  - Provide patient education as appropriate  Outcome: Progressing  Goal: Maintains/Returns to pre admission functional level  Description: INTERVENTIONS:  - Perform AM-PAC 6 Click Basic Mobility/ Daily Activity assessment daily.  - Set and communicate daily mobility goal to care team and patient/family/caregiver.   - Collaborate with rehabilitation services on mobility goals if consulted  - Perform Range of Motion 3 times a day.  - Reposition patient every 2 hours.  - Dangle patient 3 times a day  - Stand patient 3 times a day  - Ambulate patient 3 times a day  - Out of bed to chair 3 times a day   - Out of bed for meals 3 times a day  - Out of bed for toileting  - Record patient progress and toleration of activity level   Outcome: Progressing     Problem: DISCHARGE PLANNING  Goal: Discharge to home or other facility with appropriate resources  Description: INTERVENTIONS:  - Identify barriers to discharge w/patient and caregiver  - Arrange for needed discharge resources and transportation as appropriate  - Identify discharge learning needs (meds, wound care,  etc.)  - Arrange for interpretive services to assist at discharge as needed  - Refer to Case Management Department for coordinating discharge planning if the patient needs post-hospital services based on physician/advanced practitioner order or complex needs related to functional status, cognitive ability, or social support system  Outcome: Progressing     Problem: Knowledge Deficit  Goal: Patient/family/caregiver demonstrates understanding of disease process, treatment plan, medications, and discharge instructions  Description: Complete learning assessment and assess knowledge base.  Interventions:  - Provide teaching at level of understanding  - Provide teaching via preferred learning methods  Outcome: Progressing     Problem: Nutrition/Hydration-ADULT  Goal: Nutrient/Hydration intake appropriate for improving, restoring or maintaining nutritional needs  Description: Monitor and assess patient's nutrition/hydration status for malnutrition. Collaborate with interdisciplinary team and initiate plan and interventions as ordered.  Monitor patient's weight and dietary intake as ordered or per policy. Utilize nutrition screening tool and intervene as necessary. Determine patient's food preferences and provide high-protein, high-caloric foods as appropriate.     INTERVENTIONS:  - Monitor oral intake, urinary output, labs, and treatment plans  - Assess nutrition and hydration status and recommend course of action  - Evaluate amount of meals eaten  - Assist patient with eating if necessary   - Allow adequate time for meals  - Recommend/ encourage appropriate diets, oral nutritional supplements, and vitamin/mineral supplements  - Order, calculate, and assess calorie counts as needed  - Recommend, monitor, and adjust tube feedings and TPN/PPN based on assessed needs  - Assess need for intravenous fluids  - Provide specific nutrition/hydration education as appropriate  - Include patient/family/caregiver in decisions related  to nutrition  Outcome: Progressing     Problem: RESPIRATORY - ADULT  Goal: Achieves optimal ventilation and oxygenation  Description: INTERVENTIONS:  - Assess for changes in respiratory status  - Assess for changes in mentation and behavior  - Position to facilitate oxygenation and minimize respiratory effort  - Oxygen administered by appropriate delivery if ordered  - Initiate smoking cessation education as indicated  - Encourage broncho-pulmonary hygiene including cough, deep breathe, Incentive Spirometry  - Assess the need for suctioning and aspirate as needed  - Assess and instruct to report SOB or any respiratory difficulty  - Respiratory Therapy support as indicated  Outcome: Progressing

## 2024-01-06 NOTE — H&P
[facility]  H&P  Name: Scott Morris 78 y.o. male I MRN: [id number]  Unit/Bed#: ED 17 I Date of Admission: 1/5/2024   Date of Service: 1/5/2024 I Hospital Day: 0      Assessment/Plan   * COVID  Assessment & Plan  Symptoms have been going on for 4 to 5 days given his multiple risk factors and hypoxia with walking and I'm going to put him on dexamethasone and did offer remdesivir for which I will start however the patient is significantly improved tomorrow and does not require oxygen this can be discontinued    Chronic ischemic right MCA stroke  Assessment & Plan  Stable with minimal residual effects.  Patient's INR is subtherapeutic Shonda given 5 mg tonight.  The patient takes 5 mg on Monday Thursdays and Saturdays and takes 2.5 mg every other day    PAF (paroxysmal atrial fibrillation) (HCC)  Assessment & Plan  INR is slightly subtherapeutic.  Him to give the patient 5 mg tonight and check another INR tomorrow    Hypertension  Assessment & Plan  Patient is actually more hypotensive so he is on midodrine.    Hypercholesterolemia  Assessment & Plan  Continue statin    CAD (coronary atherosclerotic disease)  Assessment & Plan  Patient with a history of recent CABG.  Stable       VTE Pharmacologic Prophylaxis:   High Risk (Score >/= 5) - Pharmacological DVT Prophylaxis Ordered: warfarin (Coumadin). Sequential Compression Devices Ordered.  Code Status: Prior full code  Discussion with family: Patient declined call to .     Anticipated Length of Stay: Patient will be admitted on an inpatient basis with an anticipated length of stay of greater than 2 midnights secondary to having acute hypoxemic respiratory failure with multiple risk factors being COVID-positive.    Total Time Spent on Date of Encounter in care of patient: 60 mins. This time was spent on one or more of the following: performing physical exam; counseling and coordination of care; obtaining or reviewing history;  documenting in the medical record; reviewing/ordering tests, medications or procedures; communicating with other healthcare professionals and discussing with patient's family/caregivers.    Chief Complaint: Shortness of breath and fatigue    History of Present Illness:  Scott Morris is a 78 y.o. male with a PMH of coronary disease, recent pacemaker, chronic CHF who presents with shortness of breath and fatigue.  This is a very pleasant 78-year-old male patient came with shortness of breath and fatigue.  The patient states he been having a cough for the past couple of weeks however he has become more short of breath for the past 4 to 5 days and had extreme fatigue starting yesterday.  Patient was otherwise doing okay.  He had a pacemaker placed last week.  Has had a decreased appetite.  The thing that concerns him most is the weakness.  Patient's been having a nonproductive cough.  Denies any pillow orthopnea or weight gain..    Review of Systems:  Review of Systems   Constitutional:  Positive for activity change, appetite change, chills and fatigue.   Respiratory:  Positive for cough and shortness of breath.    Neurological:  Positive for weakness.   All other systems reviewed and are negative.      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    CAD (coronary artery disease)     Cancer (HCC)     prostate    Colon polyp     Coronary artery disease     GERD (gastroesophageal reflux disease)     H/O heart artery stent     x2    Hypercholesteremia     Hypertension     Keratotic lesion     Stroke (HCC)     10YRS AGO    Transient cerebral ischemia        Past Surgical History:   Procedure Laterality Date    BACK SURGERY      CARDIAC PACEMAKER PLACEMENT      CARDIAC SURGERY      mvp repair    CAROTID ENDARTERECTOMY Right 03/2020    CHOLECYSTECTOMY      COLONOSCOPY      ESOPHAGOGASTRODUODENOSCOPY N/A 06/22/2018    Procedure: ESOPHAGOGASTRODUODENOSCOPY (EGD);   Surgeon: Sunny Rodrigues III, MD;  Location: [facility];  Service: Gastroenterology    JOINT REPLACEMENT      L hip replacement    ORCHIECTOMY      PROSTATECTOMY      TOTAL HIP ARTHROPLASTY         Meds/Allergies:  Prior to Admission medications    Medication Sig Start Date End Date Taking? Authorizing Provider   atorvastatin (LIPITOR) 40 mg tablet Take 40 mg by mouth daily      Historical Provider, MD   Azelaic Acid 15 % cream apply to 1 thin layer twice a day to face 9/1/22   Historical Provider, MD   clindamycin (CLINDAGEL) 1 % gel APPLY 1 THIN LAYER TOPICALLY TO FACE IN THE  MORNING 11/18/22   Historical Provider, MD   clopidogrel (PLAVIX) 75 mg tablet Take 75 mg by mouth daily 7/30/21   Historical Provider, MD   Farxiga 10 MG TABS Take 10 mg by mouth daily 3/4/22   Historical Provider, MD   fluticasone (FLONASE) 50 mcg/act nasal spray 1 spray into each nostril daily Do not start before November 15, 2022.  Patient not taking: Reported on 11/13/2023 11/15/22   Abilio Benton MD   furosemide (LASIX) 40 mg tablet Take 40 mg by mouth daily PT REPORTS : 1/2 TABLET EVERY OTHER DAY (MWF)    Historical Provider, MD [place] 0.01 % ophthalmic drops instill 1 drop into both eyes once daily at bedtime 11/22/22   Historical Provider, MD   midodrine (PROAMATINE) 2.5 mg tablet Take 1 tablet (2.5 mg total) by mouth 3 (three) times a day before meals  Patient taking differently: Take 2.5 mg by mouth 2 (two) times a day 11/14/22 11/13/23  Abilio Benton MD   midodrine (PROAMATINE) 5 mg tablet Take 5 mg by mouth 3 (three) times a day before meals 11/13/23   Historical Provider, MD   Multiple Vitamins-Minerals (PX MENS MULTIVITAMINS) TABS Take by mouth    Historical Provider, MD   pantoprazole (PROTONIX) 40 mg tablet Take 1 tablet (40 mg total) by mouth daily 8/21/23   Lenin Hardy MD   sacubitril-valsartan (ENTRESTO) 24-26 MG TABS Take 1 tablet by mouth 2 (two) times a day  Patient taking differently: Take 0.5  tablets by mouth 2 (two) times a day 22  Abilio Benton MD   spironolactone (ALDACTONE) 25 mg tablet Take 12.5 mg by mouth daily 1/2 TABLET EVERY OTHER DAY (, , , Garcia)    Historical Provider, MD   triamcinolone (KENALOG) 0.5 % cream Apply topically 3 (three) times a day 23   Lenin Hardy MD   warfarin (COUMADIN) 2.5 mg tablet as directed take 1 tablet ON TUESDAY,WEDNESDAY,FRIDAY,SATURDAY,GARCIA...  (REFER TO PRESCRIPTION NOTES). 10/20/23   Historical Provider, MD   warfarin (COUMADIN) 5 mg tablet Take 1 tablet (5 mg total) by mouth daily Coumadin 5 mg p.o. Daily- Target INR 2-3  Patient taking differently: Take 5 mg by mouth daily M & Thurs & Sat 5MG  Tues & Wed & Fri & Sun 2.5MG 22   Abilio Benton MD   apixaban (Eliquis) 5 mg Take 2 tablets (10 mg total) by mouth 2 (two) times a day for 7 days, THEN 1 tablet (5 mg total) 2 (two) times a day for 23 days. 22  Roel Pineda PA-C     I have reviewed home medications with patient personally.    Allergies: No Known Allergies    Social History:  Marital Status:    Occupation: Retired  Patient Pre-hospital Living Situation: Home  Patient Pre-hospital Level of Mobility: walks  Patient Pre-hospital Diet Restrictions: None  Substance Use History:   Social History     Substance and Sexual Activity   Alcohol Use Never    Alcohol/week: 2.0 standard drinks of alcohol    Types: 2 Standard drinks or equivalent per week     Social History     Tobacco Use   Smoking Status Former    Current packs/day: 0.00    Types: Cigarettes    Quit date: 2018    Years since quittin.6   Smokeless Tobacco Never   Tobacco Comments    4 cig/day     Social History     Substance and Sexual Activity   Drug Use No       Family History:  Family History   Problem Relation Age of Onset    Heart disease Mother     Stroke Mother         CVA    Cancer Father     Hodgkin's lymphoma Father     Lung cancer Father        Physical Exam:      Vitals:   Blood Pressure: 105/72 (01/05/24 2100)  Pulse: 79 (01/05/24 2100)  Temperature: (!) 97 °F (36.1 °C) (01/05/24 1546)  Respirations: 18 (01/05/24 2100)  SpO2: 94 % (01/05/24 2100)    Physical Exam   General Appearance:    Alert, cooperative, no distress, appears stated age   Head:    Normocephalic, without obvious abnormality, atraumatic   Eyes:    PERRL, conjunctiva/corneas clear, EOM's intact,             Nose:   Nares normal, septum midline, mucosa normal   Throat:   Lips, mucosa, and tongue normal; teeth and gums normal   Neck:   Supple, symmetrical, no adenopathy;        thyroid:  No enlargement/tenderness/nodules; no carotid    bruit or JVD   Back:     Symmetric, no curvature, ROM normal, no CVA tenderness   Lungs:   Mild diffuse expiratory wheezes       Heart:  Systolic ejection murmur   Abdomen:     Soft, non-tender, bowel sounds active all four quadrants,     no masses, no organomegaly           Extremities:   Extremities normal, atraumatic, no cyanosis or edema   Pulses:   2+ and symmetric all extremities   Skin:   Skin color, texture, turgor normal, no rashes or lesions   Lymph nodes:   Cervical, supraclavicular, and axillary nodes normal   Neurologic:   CNII-XII intact. Normal strength, sensation and reflexes       throughout         Additional Data:     Lab Results:  Results from last 7 days   Lab Units 01/05/24  1719   WBC Thousand/uL 7.86   HEMOGLOBIN g/dL 15.3   HEMATOCRIT % 46.6   PLATELETS Thousands/uL 126*   BANDS PCT % 1   LYMPHO PCT % 9*   MONO PCT % 12   EOS PCT % 9*     Results from last 7 days   Lab Units 01/05/24  1719   SODIUM mmol/L 138   POTASSIUM mmol/L 5.0   CHLORIDE mmol/L 106   CO2 mmol/L 25   BUN mg/dL 35*   CREATININE mg/dL 1.19   ANION GAP mmol/L 7   CALCIUM mg/dL 8.7   ALBUMIN g/dL 3.7   TOTAL BILIRUBIN mg/dL 1.06*   ALK PHOS U/L 88   ALT U/L 37   AST U/L 47*   GLUCOSE RANDOM mg/dL 89     Results from last 7 days   Lab Units 01/05/24  1719   INR  1.73*                    Lines/Drains:  Invasive Devices       Peripheral Intravenous Line  Duration             Peripheral IV 01/05/24 Dorsal (posterior);Left Hand <1 day                        Imaging: Reviewed radiology reports from this admission including: chest xray  XR chest 2 views    (Results Pending)       EKG and Other Studies Reviewed on Admission:   EKG: No EKG obtained.    ** Please Note: This note has been constructed using a voice recognition system. **

## 2024-01-06 NOTE — ASSESSMENT & PLAN NOTE
INR is slightly subtherapeutic.  Him to give the patient 5 mg tonight and check another INR tomorrow

## 2024-01-06 NOTE — PLAN OF CARE
Problem: SAFETY ADULT  Goal: Patient will remain free of falls  Description: INTERVENTIONS:  - Educate patient/family on patient safety including physical limitations  - Instruct patient to call for assistance with activity   - Consult OT/PT to assist with strengthening/mobility   - Keep Call bell within reach  - Keep bed low and locked with side rails adjusted as appropriate  - Keep care items and personal belongings within reach  - Initiate and maintain comfort rounds  - Make Fall Risk Sign visible to staff  - Apply yellow socks and bracelet for high fall risk patients  - Consider moving patient to room near nurses station  Outcome: Progressing     Problem: RESPIRATORY - ADULT  Goal: Achieves optimal ventilation and oxygenation  Description: INTERVENTIONS:  - Assess for changes in respiratory status  - Assess for changes in mentation and behavior  - Position to facilitate oxygenation and minimize respiratory effort  - Oxygen administered by appropriate delivery if ordered  - Initiate smoking cessation education as indicated  - Encourage broncho-pulmonary hygiene including cough, deep breathe, Incentive Spirometry  - Assess the need for suctioning and aspirate as needed  - Assess and instruct to report SOB or any respiratory difficulty  - Respiratory Therapy support as indicated  Outcome: Progressing

## 2024-01-06 NOTE — ED NOTES
Pt was ambulated with dynamap and pulse ox. Pt was 103/45 blood pressure, 96 bp and 85%o2.    Pt was ambulated a second time a minute later to double check o2 and pt was again between 82%-85% o2,  Once patient is back in bed, his o2 rises back to 95%  Provider made aware     Estrellita Galicia RN  01/05/24 1957

## 2024-01-06 NOTE — ASSESSMENT & PLAN NOTE
Symptoms have been going on for 4 to 5 days given his multiple risk factors and hypoxia with walking and Love put him on dexamethasone and did offer remdesivir for which I will start however the patient is significantly improved tomorrow and does not require oxygen this can be discontinued

## 2024-01-06 NOTE — ASSESSMENT & PLAN NOTE
Stable with minimal residual effects.  Patient's INR is subtherapeutic Shonda given 5 mg tonight.  The patient takes 5 mg on Monday Thursdays and Saturdays and takes 2.5 mg every other day

## 2024-01-06 NOTE — DISCHARGE INSTR - AVS FIRST PAGE
Your testing showed that you are positive for COVID-19, which was likely the source for your symptoms.  Please follow-up with your primary care physician for further care regarding this.    Your INR was noted to be slightly low at 1.81 on 1/6/2024.  Please take 5 mg of warfarin daily and contact your anticoagulation clinic for repeat testing and further titration.

## 2024-01-06 NOTE — DISCHARGE SUMMARY
[facility]  Discharge- Scott Morris 1945, 78 y.o. male MRN: [id number]  Unit/Bed#: [facility] Encounter: [id number]  Primary Care Provider: Lenin Hardy MD   Date and time admitted to hospital: 1/5/2024  4:11 PM    Chronic ischemic right MCA stroke  Assessment & Plan  Stable with minimal residual effects.  Patient's INR is subtherapeutic Shonda given 5 mg tonight.  The patient takes 5 mg on Monday Thursdays and Saturdays and takes 2.5 mg every other day    PAF (paroxysmal atrial fibrillation) ([facility])  Assessment & Plan  INR is slightly subtherapeutic.  Recommended 5 mg warfarin daily and repeat INR outpatient    Hypertension  Assessment & Plan  Patient is actually more hypotensive so he is on midodrine.    Hypercholesterolemia  Assessment & Plan  Continue statin    CAD (coronary atherosclerotic disease)  Assessment & Plan  Patient with a history of recent CABG.  Stable    * COVID  Assessment & Plan  Symptoms have been going on for 4 to 5 days given his multiple risk factors and reported hypoxia with walking in ED.  Initially put on remdesivir (met criteria for prophylaxis) and dexamethasone.    Ultimately did not have any hypoxia with ambulation did not require supplemental oxygen, so discontinued therapies prior to discharge.    Patient initially ordered for PT/OT evaluation, however he declined stating that he would not accept services regardless of the recommendation.        Medical Problems       Resolved Problems  Date Reviewed: 1/5/2024   None       Discharging Physician / Practitioner: Erick Newman MD  PCP: Lenin Hardy MD  Admission Date:   Admission Orders (From admission, onward)       Ordered        01/05/24 2025  INPATIENT ADMISSION  Once                          Discharge Date: 01/06/24    Consultations During Hospital Stay:  None    Procedures Performed:   None    Significant Findings / Test Results:   None    Incidental Findings:   None    Test Results Pending at  "Discharge (will require follow up):   None     Outpatient Tests Requested:  Repeat INR within 3 to 4 days    Complications: None    Reason for Admission: Shortness of breath    Hospital Course:   Scott Morris is a 78 y.o. male patient who originally presented to the hospital on 1/5/2024 due to shortness of breath.  He was found to have COVID-19 infection.  He did not require supplemental oxygen.  He was reported to have hypoxia on ambulation in the ED.  However, on reassessment on the medicine floor, he was not hypoxic with exertion.  He was initially managed with remdesivir and dexamethasone, however both were discontinued as symptoms resolved and he was not hypoxic.    Of note, INR was slightly subtherapeutic at 1.8.  Recommended that he take 5 mg warfarin daily and call his anticoagulation clinic for repeat INR.    Please see above list of diagnoses and related plan for additional information.     The patient, initially admitted to the hospital as inpatient, was discharged earlier than expected given the following: Resolution of symptoms earlier than anticipated and patient requesting discharge.    Condition at Discharge: good    Discharge Day Visit / Exam:   Subjective: Patient reports he feels well today.  Denies shortness of breath or chest pain.  He is requesting discharge home.  Vitals: Blood Pressure: 120/70 (01/06/24 0746)  Pulse: 95 (01/06/24 0746)  Temperature: 98.7 °F (37.1 °C) (01/06/24 1012)  Temp Source: Rectal (01/06/24 1012)  Respirations: 18 (01/05/24 2134)  Height: 5' 6\" (167.6 cm) (01/05/24 2134)  Weight - Scale: 56.9 kg (125 lb 7.1 oz) (01/05/24 2134)  SpO2: 95 % (01/06/24 0746)  Exam:   Physical Exam  Vitals and nursing note reviewed.   Constitutional:       General: He is not in acute distress.     Appearance: He is well-developed.   HENT:      Head: Normocephalic and atraumatic.   Eyes:      Conjunctiva/sclera: Conjunctivae normal.      Pupils: Pupils are equal, round, and reactive to " light.   Cardiovascular:      Rate and Rhythm: Normal rate and regular rhythm.      Heart sounds: No murmur heard.  Pulmonary:      Effort: Pulmonary effort is normal. No respiratory distress.      Breath sounds: Normal breath sounds. No wheezing or rales.   Abdominal:      General: Abdomen is flat. Bowel sounds are normal. There is no distension.      Palpations: Abdomen is soft.      Tenderness: There is no abdominal tenderness. There is no guarding or rebound.   Musculoskeletal:         General: No swelling. Normal range of motion.      Cervical back: Normal range of motion.   Skin:     General: Skin is warm and dry.      Capillary Refill: Capillary refill takes less than 2 seconds.   Neurological:      General: No focal deficit present.      Mental Status: He is alert. Mental status is at baseline.   Psychiatric:         Mood and Affect: Mood normal.          Discussion with Family: Patient declined call to .     Discharge instructions/Information to patient and family:   See after visit summary for information provided to patient and family.      Provisions for Follow-Up Care:  See after visit summary for information related to follow-up care and any pertinent home health orders.      Mobility at time of Discharge:   Basic Mobility Inpatient Raw Score: 24  JH-HLM Goal: 8: Walk 250 feet or more  JH-HLM Achieved: 6: Walk 10 steps or more  HLM Goal NOT achieved. Continue to encourage mobility in post discharge setting.     Disposition:   Home    Planned Readmission: None     Discharge Statement:  I spent 35 minutes discharging the patient. This time was spent on the day of discharge. I had direct contact with the patient on the day of discharge. Greater than 50% of the total time was spent examining patient, answering all patient questions, arranging and discussing plan of care with patient as well as directly providing post-discharge instructions.  Additional time then spent on discharge  activities.    Discharge Medications:  See after visit summary for reconciled discharge medications provided to patient and/or family.      **Please Note: This note may have been constructed using a voice recognition system**

## 2024-01-06 NOTE — ASSESSMENT & PLAN NOTE
Symptoms have been going on for 4 to 5 days given his multiple risk factors and reported hypoxia with walking in ED.  Initially put on remdesivir (met criteria for prophylaxis) and dexamethasone.    Ultimately did not have any hypoxia with ambulation did not require supplemental oxygen, so discontinued therapies prior to discharge.    Patient initially ordered for PT/OT evaluation, however he declined stating that he would not accept services regardless of the recommendation.

## 2024-01-08 ENCOUNTER — TRANSITIONAL CARE MANAGEMENT (OUTPATIENT)
Dept: INTERNAL MEDICINE CLINIC | Facility: CLINIC | Age: 79
End: 2024-01-08

## 2024-01-12 ENCOUNTER — RA CDI HCC (OUTPATIENT)
Dept: OTHER | Facility: HOSPITAL | Age: 79
End: 2024-01-12

## 2024-01-12 NOTE — PROGRESS NOTES
HCC coding opportunities          Chart Reviewed number of suggestions sent to Provider: 2   I11.0 and I42.9  Patients Insurance     Medicare Insurance: Medicare

## 2024-01-17 ENCOUNTER — TELEPHONE (OUTPATIENT)
Dept: PAIN MEDICINE | Facility: CLINIC | Age: 79
End: 2024-01-17

## 2024-01-17 NOTE — TELEPHONE ENCOUNTER
Caller: Scott    Doctor: Sonal    Reason for call: patient calling to reschedule procedure from 1/9 please call him back later     Call back#: [phone]

## 2024-01-18 ENCOUNTER — OFFICE VISIT (OUTPATIENT)
Dept: INTERNAL MEDICINE CLINIC | Facility: CLINIC | Age: 79
End: 2024-01-18
Payer: MEDICARE

## 2024-01-18 VITALS
DIASTOLIC BLOOD PRESSURE: 72 MMHG | BODY MASS INDEX: 19.96 KG/M2 | HEIGHT: 66 IN | TEMPERATURE: 96.8 F | WEIGHT: 124.2 LBS | RESPIRATION RATE: 18 BRPM | SYSTOLIC BLOOD PRESSURE: 104 MMHG

## 2024-01-18 DIAGNOSIS — U07.1 COVID: Primary | ICD-10-CM

## 2024-01-18 PROBLEM — R40.4 EPISODE OF UNRESPONSIVENESS: Status: RESOLVED | Noted: 2022-11-10 | Resolved: 2024-01-18

## 2024-01-18 PROBLEM — R65.10 SIRS WITHOUT INFECTION OR ORGAN DYSFUNCTION (HCC): Status: RESOLVED | Noted: 2022-11-07 | Resolved: 2024-01-18

## 2024-01-18 PROBLEM — R55 SYNCOPAL EPISODES: Status: RESOLVED | Noted: 2022-11-10 | Resolved: 2024-01-18

## 2024-01-18 PROCEDURE — 99495 TRANSJ CARE MGMT MOD F2F 14D: CPT | Performed by: INTERNAL MEDICINE

## 2024-01-18 RX ORDER — DIGOXIN 125 MCG
125 TABLET ORAL DAILY
COMMUNITY

## 2024-01-18 RX ORDER — CARVEDILOL 6.25 MG/1
TABLET ORAL
COMMUNITY
Start: 2024-01-17

## 2024-01-18 NOTE — PROGRESS NOTES
Assessment & Plan     1. COVID    He appears to be fully over his COVID.  Would resume regular follow-up but if he stays with the personal care facility doctor, he will let us know.     Subjective     Transitional Care Management Review:   Scott Morris is a 78 y.o. male here for TCM follow up.     During the TCM phone call patient stated:  TCM Call     Date and time call was made  1/8/2024  7:51 AM    Hospital care reviewed  Records reviewed    Patient was hospitialized at  [facility]    Date of Admission  01/01/24    Date of discharge  01/05/24    Diagnosis  COVID    Disposition  Home    Were the patients medications reviewed and updated  Yes    Current Symptoms  None      TCM Call     Post hospital issues  None    Should patient be enrolled in anticoag monitoring?  No    Scheduled for follow up?  Yes    Did you obtain your prescribed medications  Yes    Do you need help managing your prescriptions or medications  No    Is transportation to your appointment needed  No    I have advised the patient to call PCP with any new or worsening symptoms  Clair Stevens, Practice Coordinator    Living Arrangements  Family members    Support System  None    Are you recieving any outpatient services  No    Are you recieving home care services  No    Are you using any community resources  No    Current waiver services  No    Have you fallen in the last 12 months  No    Interperter language line needed  No    Counseling  Patient        Patient was in today for hospital follow-up.  He was admitted with shortness of breath and found to be COVID-positive.  He did not feel too bad but thought it was his heart.  They kept him because of his significant heart disease.  He actually improved rather quickly and was discharged earlier than expected.  Has done fine with his breathing since discharge.  No lingering effects from the COVID.  He does report that he has since moved into a personal care facility and they are  "encouraging him to follow-up with their facility doctor which would make it easier for him.  He is not sure what he wants he wants to do yet.  He does have follow-up with his cardiologist tomorrow although that may be canceled because of the snowstorm.      Review of Systems   Respiratory:  Negative for shortness of breath.    Cardiovascular:  Negative for chest pain.   Gastrointestinal:  Negative for abdominal pain.       Objective     /72 (BP Location: Left arm, Patient Position: Sitting, Cuff Size: Standard)   Temp (!) 96.8 °F (36 °C) (Tympanic)   Resp 18   Ht 5' 6\" (1.676 m)   Wt 56.3 kg (124 lb 3.2 oz)   BMI 20.05 kg/m²      Physical Exam  Medications have been reviewed by provider in current encounter    Lenin Hardy MD         "

## 2024-01-22 ENCOUNTER — APPOINTMENT (EMERGENCY)
Dept: CT IMAGING | Facility: HOSPITAL | Age: 79
DRG: 557 | End: 2024-01-22
Payer: MEDICARE

## 2024-01-22 ENCOUNTER — OFFICE VISIT (OUTPATIENT)
Dept: WOUND CARE | Facility: CLINIC | Age: 79
DRG: 557 | End: 2024-01-22
Payer: MEDICARE

## 2024-01-22 ENCOUNTER — HOSPITAL ENCOUNTER (INPATIENT)
Facility: HOSPITAL | Age: 79
LOS: 2 days | Discharge: HOME HEALTH | DRG: 557 | End: 2024-01-24
Attending: EMERGENCY MEDICINE | Admitting: INTERNAL MEDICINE
Payer: MEDICARE

## 2024-01-22 VITALS
HEART RATE: 85 BPM | TEMPERATURE: 95.7 F | RESPIRATION RATE: 20 BRPM | SYSTOLIC BLOOD PRESSURE: 117 MMHG | DIASTOLIC BLOOD PRESSURE: 72 MMHG

## 2024-01-22 DIAGNOSIS — M70.20 OLECRANON BURSITIS: ICD-10-CM

## 2024-01-22 DIAGNOSIS — L03.114 CELLULITIS OF LEFT ELBOW: Primary | ICD-10-CM

## 2024-01-22 DIAGNOSIS — L89.013 PRESSURE INJURY OF RIGHT ELBOW, STAGE 3 (HCC): ICD-10-CM

## 2024-01-22 DIAGNOSIS — L89.023 PRESSURE INJURY OF LEFT ELBOW, STAGE 3 (HCC): Primary | ICD-10-CM

## 2024-01-22 DIAGNOSIS — M71.122 SEPTIC OLECRANON BURSITIS OF LEFT ELBOW: ICD-10-CM

## 2024-01-22 PROBLEM — I50.22 CHRONIC SYSTOLIC (CONGESTIVE) HEART FAILURE (HCC): Status: ACTIVE | Noted: 2024-01-22

## 2024-01-22 LAB
2HR DELTA HS TROPONIN: 5 NG/L
4HR DELTA HS TROPONIN: 5 NG/L
ALBUMIN SERPL BCP-MCNC: 4 G/DL (ref 3.5–5)
ALP SERPL-CCNC: 98 U/L (ref 34–104)
ALT SERPL W P-5'-P-CCNC: 22 U/L (ref 7–52)
ANION GAP SERPL CALCULATED.3IONS-SCNC: 8 MMOL/L
APTT PPP: 40 SECONDS (ref 23–37)
AST SERPL W P-5'-P-CCNC: 21 U/L (ref 13–39)
BACTERIA UR QL AUTO: ABNORMAL /HPF
BASOPHILS # BLD AUTO: 0.08 THOUSANDS/ÂΜL (ref 0–0.1)
BASOPHILS NFR BLD AUTO: 1 % (ref 0–1)
BILIRUB SERPL-MCNC: 1.28 MG/DL (ref 0.2–1)
BILIRUB UR QL STRIP: NEGATIVE
BUN SERPL-MCNC: 22 MG/DL (ref 5–25)
CALCIUM SERPL-MCNC: 9.4 MG/DL (ref 8.4–10.2)
CARDIAC TROPONIN I PNL SERPL HS: 17 NG/L
CARDIAC TROPONIN I PNL SERPL HS: 22 NG/L
CARDIAC TROPONIN I PNL SERPL HS: 22 NG/L
CHLORIDE SERPL-SCNC: 105 MMOL/L (ref 96–108)
CLARITY UR: CLEAR
CO2 SERPL-SCNC: 28 MMOL/L (ref 21–32)
COLOR UR: YELLOW
CREAT SERPL-MCNC: 1.02 MG/DL (ref 0.6–1.3)
CRP SERPL QL: 14.6 MG/L
EOSINOPHIL # BLD AUTO: 0.22 THOUSAND/ÂΜL (ref 0–0.61)
EOSINOPHIL NFR BLD AUTO: 2 % (ref 0–6)
ERYTHROCYTE [DISTWIDTH] IN BLOOD BY AUTOMATED COUNT: 15 % (ref 11.6–15.1)
ERYTHROCYTE [SEDIMENTATION RATE] IN BLOOD: 8 MM/HOUR (ref 0–19)
GFR SERPL CREATININE-BSD FRML MDRD: 70 ML/MIN/1.73SQ M
GLUCOSE SERPL-MCNC: 87 MG/DL (ref 65–140)
GLUCOSE UR STRIP-MCNC: ABNORMAL MG/DL
HCT VFR BLD AUTO: 48 % (ref 36.5–49.3)
HGB BLD-MCNC: 16 G/DL (ref 12–17)
HGB UR QL STRIP.AUTO: NEGATIVE
IMM GRANULOCYTES # BLD AUTO: 0.05 THOUSAND/UL (ref 0–0.2)
IMM GRANULOCYTES NFR BLD AUTO: 0 % (ref 0–2)
INR PPP: 2.67 (ref 0.84–1.19)
KETONES UR STRIP-MCNC: ABNORMAL MG/DL
LACTATE SERPL-SCNC: 1 MMOL/L (ref 0.5–2)
LEUKOCYTE ESTERASE UR QL STRIP: ABNORMAL
LYMPHOCYTES # BLD AUTO: 0.84 THOUSANDS/ÂΜL (ref 0.6–4.47)
LYMPHOCYTES NFR BLD AUTO: 7 % (ref 14–44)
MCH RBC QN AUTO: 35 PG (ref 26.8–34.3)
MCHC RBC AUTO-ENTMCNC: 33.3 G/DL (ref 31.4–37.4)
MCV RBC AUTO: 105 FL (ref 82–98)
MONOCYTES # BLD AUTO: 0.91 THOUSAND/ÂΜL (ref 0.17–1.22)
MONOCYTES NFR BLD AUTO: 8 % (ref 4–12)
NEUTROPHILS # BLD AUTO: 9.71 THOUSANDS/ÂΜL (ref 1.85–7.62)
NEUTS SEG NFR BLD AUTO: 82 % (ref 43–75)
NITRITE UR QL STRIP: NEGATIVE
NON-SQ EPI CELLS URNS QL MICRO: ABNORMAL /HPF
NRBC BLD AUTO-RTO: 0 /100 WBCS
PH UR STRIP.AUTO: 6 [PH]
PLATELET # BLD AUTO: 147 THOUSANDS/UL (ref 149–390)
PMV BLD AUTO: 9.2 FL (ref 8.9–12.7)
POTASSIUM SERPL-SCNC: 3.8 MMOL/L (ref 3.5–5.3)
PROCALCITONIN SERPL-MCNC: <0.05 NG/ML
PROT SERPL-MCNC: 6.8 G/DL (ref 6.4–8.4)
PROT UR STRIP-MCNC: ABNORMAL MG/DL
PROTHROMBIN TIME: 29.3 SECONDS (ref 11.6–14.5)
RBC # BLD AUTO: 4.57 MILLION/UL (ref 3.88–5.62)
RBC #/AREA URNS AUTO: ABNORMAL /HPF
SODIUM SERPL-SCNC: 141 MMOL/L (ref 135–147)
SP GR UR STRIP.AUTO: >=1.05 (ref 1–1.03)
UROBILINOGEN UR STRIP-ACNC: <2 MG/DL
WBC # BLD AUTO: 11.81 THOUSAND/UL (ref 4.31–10.16)
WBC #/AREA URNS AUTO: ABNORMAL /HPF

## 2024-01-22 PROCEDURE — 93005 ELECTROCARDIOGRAM TRACING: CPT

## 2024-01-22 PROCEDURE — 96367 TX/PROPH/DG ADDL SEQ IV INF: CPT

## 2024-01-22 PROCEDURE — 85730 THROMBOPLASTIN TIME PARTIAL: CPT | Performed by: EMERGENCY MEDICINE

## 2024-01-22 PROCEDURE — 84145 PROCALCITONIN (PCT): CPT | Performed by: EMERGENCY MEDICINE

## 2024-01-22 PROCEDURE — 87205 SMEAR GRAM STAIN: CPT | Performed by: ORTHOPAEDIC SURGERY

## 2024-01-22 PROCEDURE — 11042 DBRDMT SUBQ TIS 1ST 20SQCM/<: CPT | Performed by: ORTHOPAEDIC SURGERY

## 2024-01-22 PROCEDURE — 99214 OFFICE O/P EST MOD 30 MIN: CPT | Performed by: ORTHOPAEDIC SURGERY

## 2024-01-22 PROCEDURE — 81001 URINALYSIS AUTO W/SCOPE: CPT | Performed by: EMERGENCY MEDICINE

## 2024-01-22 PROCEDURE — 96365 THER/PROPH/DIAG IV INF INIT: CPT

## 2024-01-22 PROCEDURE — 87186 SC STD MICRODIL/AGAR DIL: CPT | Performed by: ORTHOPAEDIC SURGERY

## 2024-01-22 PROCEDURE — 99223 1ST HOSP IP/OBS HIGH 75: CPT | Performed by: INTERNAL MEDICINE

## 2024-01-22 PROCEDURE — 87081 CULTURE SCREEN ONLY: CPT | Performed by: INTERNAL MEDICINE

## 2024-01-22 PROCEDURE — 85025 COMPLETE CBC W/AUTO DIFF WBC: CPT | Performed by: EMERGENCY MEDICINE

## 2024-01-22 PROCEDURE — 83605 ASSAY OF LACTIC ACID: CPT | Performed by: EMERGENCY MEDICINE

## 2024-01-22 PROCEDURE — 85610 PROTHROMBIN TIME: CPT | Performed by: EMERGENCY MEDICINE

## 2024-01-22 PROCEDURE — 36415 COLL VENOUS BLD VENIPUNCTURE: CPT | Performed by: EMERGENCY MEDICINE

## 2024-01-22 PROCEDURE — 84484 ASSAY OF TROPONIN QUANT: CPT | Performed by: EMERGENCY MEDICINE

## 2024-01-22 PROCEDURE — 99222 1ST HOSP IP/OBS MODERATE 55: CPT | Performed by: PHYSICIAN ASSISTANT

## 2024-01-22 PROCEDURE — 80053 COMPREHEN METABOLIC PANEL: CPT | Performed by: EMERGENCY MEDICINE

## 2024-01-22 PROCEDURE — G1004 CDSM NDSC: HCPCS

## 2024-01-22 PROCEDURE — 86140 C-REACTIVE PROTEIN: CPT | Performed by: EMERGENCY MEDICINE

## 2024-01-22 PROCEDURE — 73201 CT UPPER EXTREMITY W/DYE: CPT

## 2024-01-22 PROCEDURE — 87070 CULTURE OTHR SPECIMN AEROBIC: CPT | Performed by: ORTHOPAEDIC SURGERY

## 2024-01-22 PROCEDURE — 87077 CULTURE AEROBIC IDENTIFY: CPT | Performed by: ORTHOPAEDIC SURGERY

## 2024-01-22 PROCEDURE — 99285 EMERGENCY DEPT VISIT HI MDM: CPT | Performed by: EMERGENCY MEDICINE

## 2024-01-22 PROCEDURE — 87040 BLOOD CULTURE FOR BACTERIA: CPT | Performed by: EMERGENCY MEDICINE

## 2024-01-22 PROCEDURE — 85652 RBC SED RATE AUTOMATED: CPT | Performed by: EMERGENCY MEDICINE

## 2024-01-22 PROCEDURE — 99284 EMERGENCY DEPT VISIT MOD MDM: CPT

## 2024-01-22 PROCEDURE — 99213 OFFICE O/P EST LOW 20 MIN: CPT | Performed by: ORTHOPAEDIC SURGERY

## 2024-01-22 RX ORDER — NICOTINE 21 MG/24HR
1 PATCH, TRANSDERMAL 24 HOURS TRANSDERMAL DAILY
Status: DISCONTINUED | OUTPATIENT
Start: 2024-01-23 | End: 2024-01-24 | Stop reason: HOSPADM

## 2024-01-22 RX ORDER — MIDODRINE HYDROCHLORIDE 5 MG/1
2.5 TABLET ORAL 2 TIMES DAILY
Status: DISCONTINUED | OUTPATIENT
Start: 2024-01-22 | End: 2024-01-24 | Stop reason: HOSPADM

## 2024-01-22 RX ORDER — CARVEDILOL 3.12 MG/1
3.12 TABLET ORAL 2 TIMES DAILY WITH MEALS
Status: DISCONTINUED | OUTPATIENT
Start: 2024-01-22 | End: 2024-01-24 | Stop reason: HOSPADM

## 2024-01-22 RX ORDER — FUROSEMIDE 40 MG/1
40 TABLET ORAL DAILY
Status: DISCONTINUED | OUTPATIENT
Start: 2024-01-23 | End: 2024-01-23

## 2024-01-22 RX ORDER — LIDOCAINE 40 MG/G
CREAM TOPICAL ONCE
Status: DISCONTINUED | OUTPATIENT
Start: 2024-01-22 | End: 2024-01-22

## 2024-01-22 RX ORDER — WARFARIN SODIUM 2.5 MG/1
5 TABLET ORAL SEE ADMIN INSTRUCTIONS
Status: DISCONTINUED | OUTPATIENT
Start: 2024-01-22 | End: 2024-01-22

## 2024-01-22 RX ORDER — CARVEDILOL 3.12 MG/1
6.25 TABLET ORAL 2 TIMES DAILY WITH MEALS
Status: DISCONTINUED | OUTPATIENT
Start: 2024-01-22 | End: 2024-01-22

## 2024-01-22 RX ORDER — WARFARIN SODIUM 2.5 MG/1
2.5 TABLET ORAL
Status: DISCONTINUED | OUTPATIENT
Start: 2024-01-23 | End: 2024-01-24 | Stop reason: HOSPADM

## 2024-01-22 RX ORDER — SPIRONOLACTONE 25 MG/1
12.5 TABLET ORAL DAILY
Status: DISCONTINUED | OUTPATIENT
Start: 2024-01-23 | End: 2024-01-23

## 2024-01-22 RX ORDER — VANCOMYCIN HYDROCHLORIDE 750 MG/150ML
15 INJECTION, SOLUTION INTRAVENOUS ONCE
Status: COMPLETED | OUTPATIENT
Start: 2024-01-22 | End: 2024-01-22

## 2024-01-22 RX ORDER — VANCOMYCIN HYDROCHLORIDE 1 G/200ML
1000 INJECTION, SOLUTION INTRAVENOUS EVERY 24 HOURS
Status: DISCONTINUED | OUTPATIENT
Start: 2024-01-23 | End: 2024-01-24

## 2024-01-22 RX ORDER — BIMATOPROST 0.3 MG/ML
1 SOLUTION/ DROPS OPHTHALMIC DAILY
Status: DISCONTINUED | OUTPATIENT
Start: 2024-01-22 | End: 2024-01-24 | Stop reason: HOSPADM

## 2024-01-22 RX ORDER — HYDROMORPHONE HCL/PF 1 MG/ML
0.5 SYRINGE (ML) INJECTION EVERY 6 HOURS PRN
Status: DISCONTINUED | OUTPATIENT
Start: 2024-01-22 | End: 2024-01-24 | Stop reason: HOSPADM

## 2024-01-22 RX ORDER — ATORVASTATIN CALCIUM 40 MG/1
40 TABLET, FILM COATED ORAL DAILY
Status: DISCONTINUED | OUTPATIENT
Start: 2024-01-23 | End: 2024-01-24 | Stop reason: HOSPADM

## 2024-01-22 RX ORDER — CLOPIDOGREL BISULFATE 75 MG/1
75 TABLET ORAL DAILY
Status: DISCONTINUED | OUTPATIENT
Start: 2024-01-23 | End: 2024-01-24 | Stop reason: HOSPADM

## 2024-01-22 RX ORDER — DIGOXIN 125 MCG
125 TABLET ORAL DAILY
Status: DISCONTINUED | OUTPATIENT
Start: 2024-01-23 | End: 2024-01-24 | Stop reason: HOSPADM

## 2024-01-22 RX ORDER — CEFEPIME HYDROCHLORIDE 2 G/50ML
2000 INJECTION, SOLUTION INTRAVENOUS ONCE
Status: COMPLETED | OUTPATIENT
Start: 2024-01-22 | End: 2024-01-22

## 2024-01-22 RX ORDER — ACETAMINOPHEN 325 MG/1
650 TABLET ORAL EVERY 6 HOURS PRN
Status: DISCONTINUED | OUTPATIENT
Start: 2024-01-22 | End: 2024-01-24 | Stop reason: HOSPADM

## 2024-01-22 RX ORDER — WARFARIN SODIUM 5 MG/1
5 TABLET ORAL
Status: DISCONTINUED | OUTPATIENT
Start: 2024-01-25 | End: 2024-01-24 | Stop reason: HOSPADM

## 2024-01-22 RX ADMIN — BIMATOPROST 1 DROP: 0.3 SOLUTION/ DROPS OPHTHALMIC at 18:00

## 2024-01-22 RX ADMIN — SODIUM CHLORIDE 500 ML: 0.9 INJECTION, SOLUTION INTRAVENOUS at 12:45

## 2024-01-22 RX ADMIN — IOHEXOL 100 ML: 350 INJECTION, SOLUTION INTRAVENOUS at 13:38

## 2024-01-22 RX ADMIN — MIDODRINE HYDROCHLORIDE 2.5 MG: 5 TABLET ORAL at 21:02

## 2024-01-22 RX ADMIN — LIDOCAINE 1 APPLICATION: 40 CREAM TOPICAL at 10:29

## 2024-01-22 RX ADMIN — Medication 2.5 MG: at 21:02

## 2024-01-22 RX ADMIN — VANCOMYCIN HYDROCHLORIDE 750 MG: 750 INJECTION, SOLUTION INTRAVENOUS at 13:18

## 2024-01-22 RX ADMIN — CARVEDILOL 3.12 MG: 3.12 TABLET, FILM COATED ORAL at 18:42

## 2024-01-22 RX ADMIN — CEFEPIME HYDROCHLORIDE 2000 MG: 2 INJECTION, SOLUTION INTRAVENOUS at 12:46

## 2024-01-22 NOTE — ASSESSMENT & PLAN NOTE
Wt Readings from Last 3 Encounters:   01/18/24 56.3 kg (124 lb 3.2 oz)   01/05/24 56.9 kg (125 lb 7.1 oz)   12/28/23 56.7 kg (125 lb)         Hx of HFrEF 20% echo Dec 2023  As per cardiology notes previously has declined vest and ICD, aware of risk of arrhytmias  Euvolemic  Continue Entresto, carvedilol, spironolactone, Lasix  Monitor volume status

## 2024-01-22 NOTE — ED PROVIDER NOTES
History  Chief Complaint   Patient presents with    Wound Check     Sent by wound care for b/l elbow wound infection      79 y/o male presents to the ED for L elbow redness, wound, and swelling. Patient states that he has had wounds to his bilateral elbows x months. States that it has gotten worse on the L elbow over the last few days. He states that he follows with wound care, who he saw today, and was sent here for IV abx. He denies any fever, cough, chest pain, abd pain, n/v, d/c, or urinary symptoms. No other complaints.       History provided by:  Patient      Prior to Admission Medications   Prescriptions Last Dose Informant Patient Reported? Taking?   Azelaic Acid 15 % cream  Self Yes No   Sig: apply to 1 thin layer twice a day to face   Farxiga 10 MG TABS  Self Yes No   Sig: Take 10 mg by mouth daily   Lumigan 0.01 % ophthalmic drops  Self Yes No   Sig: instill 1 drop into both eyes once daily at bedtime   Multiple Vitamins-Minerals (PX MENS MULTIVITAMINS) TABS  Self Yes No   Sig: Take by mouth   atorvastatin (LIPITOR) 40 mg tablet  Self Yes No   Sig: Take 40 mg by mouth daily     carvedilol (COREG) 6.25 mg tablet   Yes No   clindamycin (CLINDAGEL) 1 % gel  Self Yes No   Sig: APPLY 1 THIN LAYER TOPICALLY TO FACE IN THE  MORNING   clopidogrel (PLAVIX) 75 mg tablet  Self Yes No   Sig: Take 75 mg by mouth daily   digoxin (LANOXIN) 0.125 mg tablet   Yes No   Sig: Take 125 mcg by mouth daily   furosemide (LASIX) 40 mg tablet  Self Yes No   Sig: Take 40 mg by mouth daily PT REPORTS : 1/2 TABLET EVERY OTHER DAY (MWF)   midodrine (PROAMATINE) 2.5 mg tablet   No No   Sig: Take 1 tablet (2.5 mg total) by mouth 3 (three) times a day before meals   Patient taking differently: Take 2.5 mg by mouth 2 (two) times a day   midodrine (PROAMATINE) 5 mg tablet   Yes No   Sig: Take 5 mg by mouth 3 (three) times a day before meals   pantoprazole (PROTONIX) 40 mg tablet  Self No No   Sig: Take 1 tablet (40 mg total) by mouth daily    sacubitril-valsartan (ENTRESTO) 24-26 MG TABS   No No   Sig: Take 1 tablet by mouth 2 (two) times a day   Patient taking differently: Take 0.5 tablets by mouth 2 (two) times a day   spironolactone (ALDACTONE) 25 mg tablet  Self Yes No   Sig: Take 12.5 mg by mouth daily 1/2 TABLET EVERY OTHER DAY (Tu, Th, Sa, Su)   triamcinolone (KENALOG) 0.5 % cream  Self No No   Sig: Apply topically 3 (three) times a day   warfarin (COUMADIN) 2.5 mg tablet   Yes No   Sig: as directed take 1 tablet ON TUESDAY,WEDNESDAY,FRIDAY,SATURDAY,SU...  (REFER TO PRESCRIPTION NOTES).   warfarin (COUMADIN) 5 mg tablet  Self No No   Sig: Take 1 tablet (5 mg total) by mouth daily Coumadin 5 mg p.o. 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    CAD (coronary artery disease)     Cancer (HCC)     prostate    Colon polyp     Coronary artery disease     GERD (gastroesophageal reflux disease)     H/O heart artery stent     x2    Hypercholesteremia     Hypertension     Keratotic lesion     Stroke (HCC)     10YRS AGO    Transient cerebral ischemia        Past Surgical History:   Procedure Laterality Date    BACK SURGERY      CARDIAC PACEMAKER PLACEMENT      CARDIAC SURGERY      mvp repair    CAROTID ENDARTERECTOMY Right 03/2020    CHOLECYSTECTOMY      COLONOSCOPY      ESOPHAGOGASTRODUODENOSCOPY N/A 06/22/2018    Procedure: ESOPHAGOGASTRODUODENOSCOPY (EGD);  Surgeon: Sunny Rodrigues III, MD;  Location: [facility];  Service: Gastroenterology    JOINT REPLACEMENT      L hip replacement    ORCHIECTOMY      PROSTATECTOMY      TOTAL HIP ARTHROPLASTY         Family History   Problem Relation Age of Onset    Heart disease Mother     Stroke Mother         CVA    Cancer Father      Hodgkin's lymphoma Father     Lung cancer Father      I have reviewed and agree with the history as documented.    E-Cigarette/Vaping    E-Cigarette Use Never User      E-Cigarette/Vaping Substances    Nicotine No     THC No     CBD No     Flavoring No     Other No     Unknown No      Social History     Tobacco Use    Smoking status: Every Day     Current packs/day: 0.00     Types: Cigarettes     Last attempt to quit: 2018     Years since quittin.6    Smokeless tobacco: Never    Tobacco comments:     2 cig/day   Vaping Use    Vaping status: Never Used   Substance Use Topics    Alcohol use: Never     Alcohol/week: 2.0 standard drinks of alcohol     Types: 2 Standard drinks or equivalent per week    Drug use: No       Review of Systems   Constitutional:  Negative for chills and fever.   HENT:  Negative for congestion, ear pain and sore throat.    Eyes:  Negative for pain and visual disturbance.   Respiratory:  Negative for cough, shortness of breath and wheezing.    Cardiovascular:  Negative for chest pain and leg swelling.   Gastrointestinal:  Negative for abdominal pain, diarrhea, nausea and vomiting.   Genitourinary:  Negative for dysuria, frequency, hematuria and urgency.   Musculoskeletal:  Negative for neck pain and neck stiffness.   Skin:  Positive for wound. Negative for rash.   Neurological:  Negative for weakness, numbness and headaches.   Psychiatric/Behavioral:  Negative for agitation and confusion.    All other systems reviewed and are negative.      Physical Exam  Physical Exam  Vitals and nursing note reviewed.   Constitutional:       Appearance: He is well-developed.   HENT:      Head: Normocephalic and atraumatic.   Eyes:      Pupils: Pupils are equal, round, and reactive to light.   Cardiovascular:      Rate and Rhythm: Normal rate and regular rhythm.   Pulmonary:      Effort: Pulmonary effort is normal.      Breath sounds: Normal breath sounds.   Abdominal:      General: Bowel sounds are  normal.      Palpations: Abdomen is soft.   Musculoskeletal:         General: Normal range of motion.      Cervical back: Normal range of motion and neck supple.   Skin:     General: Skin is warm and dry.      Comments: L elbow- wound with some bleeding. Diffuse swelling and redness around to elbow extending to the mid forearm and mid humerus. NV intact distally.    Neurological:      General: No focal deficit present.      Mental Status: He is alert and oriented to person, place, and time.      Comments: No focal deficits         Vital Signs  ED Triage Vitals   Temperature Pulse Respirations Blood Pressure SpO2   01/22/24 1011 01/22/24 1011 01/22/24 1011 01/22/24 1011 01/22/24 1011   98.2 °F (36.8 °C) 94 16 121/66 98 %      Temp src Heart Rate Source Patient Position - Orthostatic VS BP Location FiO2 (%)   -- 01/22/24 1228 01/22/24 1228 01/22/24 1228 --    Monitor Lying Right arm       Pain Score       --                  Vitals:    01/22/24 1011 01/22/24 1228 01/22/24 1400   BP: 121/66 98/53 135/80   Pulse: 94 75 89   Patient Position - Orthostatic VS:  Lying Lying         Visual Acuity      ED Medications  Medications   spironolactone (ALDACTONE) tablet 12.5 mg (has no administration in time range)   atorvastatin (LIPITOR) tablet 40 mg (has no administration in time range)   carvedilol (COREG) tablet 6.25 mg (has no administration in time range)   clopidogrel (PLAVIX) tablet 75 mg (has no administration in time range)   digoxin (LANOXIN) tablet 125 mcg (has no administration in time range)   furosemide (LASIX) tablet 40 mg (has no administration in time range)   bimatoprost (LUMIGAN) 0.03 % ophthalmic drops 1 drop (has no administration in time range)   midodrine (PROAMATINE) tablet 2.5 mg (has no administration in time range)   sacubitril-valsartan (ENTRESTO) 24-26 MG per tablet 0.5 tablet (has no administration in time range)   acetaminophen (TYLENOL) tablet 650 mg (has no administration in time range)    nicotine (NICODERM CQ) 21 mg/24 hr TD 24 hr patch 1 patch (has no administration in time range)   warfarin (COUMADIN) tablet 5 mg (has no administration in time range)   warfarin (COUMADIN) tablet 2.5 mg (has no administration in time range)   vancomycin (VANCOCIN) IVPB (premix in dextrose) 1,000 mg 200 mL (has no administration in time range)   vancomycin (VANCOCIN) IVPB (premix in dextrose) 750 mg 150 mL (0 mg Intravenous Stopped 1/22/24 1418)   cefepime (MAXIPIME) IVPB (premix in dextrose) 2,000 mg 50 mL (0 mg Intravenous Stopped 1/22/24 1316)   sodium chloride 0.9 % bolus 500 mL (0 mL Intravenous Stopped 1/22/24 1445)   iohexol (OMNIPAQUE) 350 MG/ML injection (MULTI-DOSE) 100 mL (100 mL Intravenous Given 1/22/24 1338)       Diagnostic Studies  Results Reviewed       Procedure Component Value Units Date/Time    MRSA culture [[id number]]     Lab Status: No result Specimen: Nares     HS Troponin I 2hr [[id number]]  (Normal) Collected: 01/22/24 1433    Lab Status: Final result Specimen: Blood from Arm, Right Updated: 01/22/24 1504     hs TnI 2hr 22 ng/L      Delta 2hr hsTnI 5 ng/L     Urine Microscopic [[id number]]  (Abnormal) Collected: 01/22/24 1432    Lab Status: Final result Specimen: Urine, Clean Catch Updated: 01/22/24 1446     RBC, UA 2-4 /hpf      WBC, UA None Seen /hpf      Epithelial Cells Occasional /hpf      Bacteria, UA None Seen /hpf     UA w Reflex to Microscopic w Reflex to Culture [[id number]]  (Abnormal) Collected: 01/22/24 1432    Lab Status: Final result Specimen: Urine, Clean Catch Updated: 01/22/24 1445     Color, UA Yellow     Clarity, UA Clear     Specific Gravity, UA >=1.050     pH, UA 6.0     Leukocytes, UA Elevated glucose may cause decreased leukocyte values. See urine microscopic for UWBC result     Nitrite, UA Negative     Protein, UA Trace mg/dl      Glucose, UA >=1000 (1%) mg/dl      Ketones, UA Trace mg/dl      Urobilinogen, UA <2.0 mg/dl      Bilirubin, UA Negative     Occult Blood,  UA Negative    HS Troponin I 4hr [[id number]]     Lab Status: No result Specimen: Blood     Procalcitonin [[id number]]  (Normal) Collected: 01/22/24 1237    Lab Status: Final result Specimen: Blood from Arm, Right Updated: 01/22/24 1324     Procalcitonin <0.05 ng/ml     HS Troponin 0hr (reflex protocol) [[id number]]  (Normal) Collected: 01/22/24 1240    Lab Status: Final result Specimen: Blood from Arm, Right Updated: 01/22/24 1322     hs TnI 0hr 17 ng/L     Lactic acid [[id number]]  (Normal) Collected: 01/22/24 1237    Lab Status: Final result Specimen: Blood from Arm, Right Updated: 01/22/24 1319     LACTIC ACID 1.0 mmol/L     Narrative:      Result may be elevated if tourniquet was used during collection.    Comprehensive metabolic panel [[id number]]  (Abnormal) Collected: 01/22/24 1237    Lab Status: Final result Specimen: Blood from Arm, Right Updated: 01/22/24 1316     Sodium 141 mmol/L      Potassium 3.8 mmol/L      Chloride 105 mmol/L      CO2 28 mmol/L      ANION GAP 8 mmol/L      BUN 22 mg/dL      Creatinine 1.02 mg/dL      Glucose 87 mg/dL      Calcium 9.4 mg/dL      AST 21 U/L      ALT 22 U/L      Alkaline Phosphatase 98 U/L      Total Protein 6.8 g/dL      Albumin 4.0 g/dL      Total Bilirubin 1.28 mg/dL      eGFR 70 ml/min/1.73sq m     Narrative:      [employer] guidelines for Chronic Kidney Disease (CKD):     Stage 1 with normal or high GFR (GFR > 90 mL/min/1.73 square meters)    Stage 2 Mild CKD (GFR = 60-89 mL/min/1.73 square meters)    Stage 3A Moderate CKD (GFR = 45-59 mL/min/1.73 square meters)    Stage 3B Moderate CKD (GFR = 30-44 mL/min/1.73 square meters)    Stage 4 Severe CKD (GFR = 15-29 mL/min/1.73 square meters)    Stage 5 End Stage CKD (GFR <15 mL/min/1.73 square meters)  Note: GFR calculation is accurate only with a steady state creatinine    C-reactive protein [[id number]]  (Abnormal) Collected: 01/22/24 1237    Lab Status: Final result Specimen: Blood from Arm, Right  Updated: 01/22/24 1316     CRP 14.6 mg/L     Protime-INR [[id number]]  (Abnormal) Collected: 01/22/24 1237    Lab Status: Final result Specimen: Blood from Arm, Right Updated: 01/22/24 1314     Protime 29.3 seconds      INR 2.67    APTT [[id number]]  (Abnormal) Collected: 01/22/24 1237    Lab Status: Final result Specimen: Blood from Arm, Right Updated: 01/22/24 1314     PTT 40 seconds     Sedimentation rate, automated [[id number]]  (Normal) Collected: 01/22/24 1237    Lab Status: Final result Specimen: Blood from Arm, Right Updated: 01/22/24 1259     Sed Rate 8 mm/hour     CBC and differential [[id number]]  (Abnormal) Collected: 01/22/24 1237    Lab Status: Final result Specimen: Blood from Arm, Right Updated: 01/22/24 1255     WBC 11.81 Thousand/uL      RBC 4.57 Million/uL      Hemoglobin 16.0 g/dL      Hematocrit 48.0 %       fL      MCH 35.0 pg      MCHC 33.3 g/dL      RDW 15.0 %      MPV 9.2 fL      Platelets 147 Thousands/uL      nRBC 0 /100 WBCs      Neutrophils Relative 82 %      Immat GRANS % 0 %      Lymphocytes Relative 7 %      Monocytes Relative 8 %      Eosinophils Relative 2 %      Basophils Relative 1 %      Neutrophils Absolute 9.71 Thousands/µL      Immature Grans Absolute 0.05 Thousand/uL      Lymphocytes Absolute 0.84 Thousands/µL      Monocytes Absolute 0.91 Thousand/µL      Eosinophils Absolute 0.22 Thousand/µL      Basophils Absolute 0.08 Thousands/µL     Blood culture #2 [[id number]] Collected: 01/22/24 1237    Lab Status: In process Specimen: Blood from Arm, Right Updated: 01/22/24 1250    Blood culture #1 [[id number]] Collected: 01/22/24 1237    Lab Status: In process Specimen: Blood from Arm, Left Updated: 01/22/24 1250                   CT upper extremity w contrast left   Final Result by Geremias Cisneros MD (01/22 1510)      Olecranon bursitis containing locules of gas; septic bursitis should be considered in the absence of recent aspiration/intervention.      No CT evidence of  osteomyelitis.      The study was marked in EPIC for immediate notification.      Workstation performed: [id number]                    Procedures  Procedures         ED Course  ED Course as of 01/22/24 1704   Mon Jan 22, 2024   1513 Delta 2hr hsTnI: 5   1530 Tiger text sent to [facility] for admission                                SBIRT 22yo+      Flowsheet Row Most Recent Value   Initial Alcohol Screen: US AUDIT-C     1. How often do you have a drink containing alcohol? 0 Filed at: 01/22/2024 1012   2. How many drinks containing alcohol do you have on a typical day you are drinking?  0 Filed at: 01/22/2024 1012   3a. Male UNDER 65: How often do you have five or more drinks on one occasion? 0 Filed at: 01/22/2024 1012   3b. FEMALE Any Age, or MALE 65+: How often do you have 4 or more drinks on one occassion? 0 Filed at: 01/22/2024 1012   Audit-C Score 0 Filed at: 01/22/2024 1012   ENRIQUETA: How many times in the past year have you...    Used an illegal drug or used a prescription medication for non-medical reasons? Never Filed at: 01/22/2024 1012                      Medical Decision Making  77 y/o male with L elbow infection- will get labs, UA, trop/ EKG, blood cultures, and ct scan. Will give IV abx and admit.     Amount and/or Complexity of Data Reviewed  Labs: ordered. Decision-making details documented in ED Course.  Radiology: ordered.    Risk  Prescription drug management.  Decision regarding hospitalization.             Disposition  Final diagnoses:   Cellulitis of left elbow     Time reflects when diagnosis was documented in both MDM as applicable and the Disposition within this note       Time User Action Codes Description Comment    1/22/2024  3:41 PM Summer Henriquez Add [L03.114] Cellulitis of left elbow     1/22/2024  4:01 PM Rafa Santana Add [M70.20] Olecranon bursitis           ED Disposition       ED Disposition   Admit    Condition   Stable    Date/Time   Mon Jan 22, 2024 1541    Comment   Case was  discussed with [facility] and the patient's admission status was agreed to be Admission Status: inpatient status to the service of Dr. Santana .               Follow-up Information    None         Patient's Medications   Discharge Prescriptions    No medications on file       No discharge procedures on file.    PDMP Review         Value Time User    PDMP Reviewed  Yes 11/14/2022  2:35 PM Abilio Benton MD            ED Provider  Electronically Signed by             Summer Henriquez DO  01/22/24 1412

## 2024-01-22 NOTE — PROGRESS NOTES
Patient ID: Scott Morris is a 78 y.o. male Date of Birth 1945       Chief Complaint   Patient presents with    Follow Up Wound Care Visit     Bilateral elbow wounds       Allergies:  Patient has no known allergies.    Diagnosis:   Diagnosis ICD-10-CM Associated Orders   1. Pressure injury of left elbow, stage 3 (Formerly [facility])  L89.023 lidocaine (LMX) 4 % cream     Wound off loading     Wound miscellaneous orders     Wound cleansing and dressings     Wound cleansing and dressings     Wound home care     Wound culture and Gram stain     Debridement      2. Pressure injury of right elbow, stage 3 (Formerly [facility])  L89.013 lidocaine (LMX) 4 % cream     Wound off loading     Wound miscellaneous orders     Wound cleansing and dressings     Wound cleansing and dressings     Wound home care     Debridement           Assessment and Plan :  Initial evaluation of bilateral elbow wounds. Right elbow stage 3 pressure wound is slowly healing. Left  stage 3 elbow pressure wound with signs and symptoms of infection. Periwound is erythematous, edematous and warm. Wound bed is Tender to palpation with moderate mixed purulent karlo drainage. Circumferential undermining appreciated post-debridement with a 3.5 cm depth from 12-12 o'clock. Pt encouraged to go to ER for possible IV antibiotics, x-ray and further assessment.  Cultures collected f/u results.  Debrided as below.   Counseled on smoking cessation  F/u one week after ER visit.    Subjective:   12/28/23: 79 y/o M with PMHx of GERD, peptic ulcer, CAD, atrial fibrillation, CHF, hx of PE currently anticoagulated, CKD, recent ICD placement on 12/18/23, presents for evaluation of wounds on his bilateral elbows that have been ongoing for approximately four months now. He states that he is unsure of how these initially began. Has been using hydrogen peroxide on them. Was recently hospitalized for acute on chronic heart failure at which time his elbows were assessed in the hospital and he was  prescribed Santyl which he has been using on the bilateral elbow wounds daily. He denies any significant pressure on his elbows during the day but does have difficulty moving around at night and will use his elbows in bed to reposition. Notes 6/10 soreness/pain when pressure is applied to his elbows but pain is not constant. Is currently residing at Mrs. Rojo's personal care home on the independent living side. Obtains protein in his diet. Has smoking history but quit multiple years ago. No hx of DM.     1/22/24: Presents for initial eval of bilateral stage 3 elbow pressure wounds. Pt states he has been feeling ill over the last 5 days with increasing pain, redness and swelling on Left elbow wound. Pt admits weakness, lightheadedness, fatigue and lack of appetite.  Pt has been applying hydrocolloid on the wound beds.  No diabetes.  Smokes 2 cigarettes daily. No ETOH or drug use. Pt denies any sob, N/V, CP, fever or chills.          The following portions of the patient's history were reviewed and updated as appropriate:   Patient Active Problem List   Diagnosis    CAD (coronary atherosclerotic disease)    Acute on chronic combined systolic and diastolic congestive heart failure (HCC)    Chronic back pain    GERD (gastroesophageal reflux disease)    Hypercholesterolemia    Hypertension    PAF (paroxysmal atrial fibrillation) (HCC)    Peptic ulcer disease    S/P MVR (mitral valve repair)    Polyp of colon    Abnormal RBC indices    Fatigue    Chronic anticoagulation    Pain in gums    Idiopathic chronic gout of multiple sites without tophus    Impaired fasting glucose    Anemia due to blood loss, acute    History of normocytic normochromic anemia    Ambulatory dysfunction    Vasomotor rhinitis    Acute pulmonary embolism without acute cor pulmonale (HCC)    CKD (chronic kidney disease)    Hypotension    Chronic ischemic right MCA stroke     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    CAD  (coronary artery disease)     Cancer (HCC)     prostate    Colon polyp     Coronary artery disease     GERD (gastroesophageal reflux disease)     H/O heart artery stent     x2    Hypercholesteremia     Hypertension     Keratotic lesion     Stroke (HCC)     10YRS AGO    Transient cerebral ischemia      Past Surgical History:   Procedure Laterality Date    BACK SURGERY      CARDIAC PACEMAKER PLACEMENT      CARDIAC SURGERY      mvp repair    CAROTID ENDARTERECTOMY Right 2020    CHOLECYSTECTOMY      COLONOSCOPY      ESOPHAGOGASTRODUODENOSCOPY N/A 2018    Procedure: ESOPHAGOGASTRODUODENOSCOPY (EGD);  Surgeon: Sunny Rodrigues III, MD;  Location: [facility];  Service: Gastroenterology    JOINT REPLACEMENT      L hip replacement    ORCHIECTOMY      PROSTATECTOMY      TOTAL HIP ARTHROPLASTY       Family History   Problem Relation Age of Onset    Heart disease Mother     Stroke Mother         CVA    Cancer Father     Hodgkin's lymphoma Father     Lung cancer Father      Social History     Socioeconomic History    Marital status:      Spouse name: None    Number of children: None    Years of education: None    Highest education level: None   Occupational History    Occupation: retired   Tobacco Use    Smoking status: Every Day     Current packs/day: 0.00     Types: Cigarettes     Last attempt to quit: 2018     Years since quittin.6    Smokeless tobacco: Never    Tobacco comments:     2 cig/day   Vaping Use    Vaping status: Never Used   Substance and Sexual Activity    Alcohol use: Never     Alcohol/week: 2.0 standard drinks of alcohol     Types: 2 Standard drinks or equivalent per week    Drug use: No    Sexual activity: Not Currently     Comment: no high risk sexual behavior   Other Topics Concern    None   Social History Narrative    None     Social Determinants of Health     Financial Resource Strain: Low Risk  (2023)    Received from [facility] Financial Resource Strain  (CARDIA)     Difficulty of Paying Living Expenses: Not hard at all   Food Insecurity: No Food Insecurity (12/19/2023)    Received from [facility]    Hunger Vital Sign     Worried About Running Out of Food in the Last Year: Never true     Ran Out of Food in the Last Year: Never true   Transportation Needs: No Transportation Needs (12/19/2023)    Received from [facility]    PRAPARE - Transportation     Lack of Transportation (Medical): No     Lack of Transportation (Non-Medical): No   Physical Activity: Not on file   Stress: Not on file   Social Connections: Not on file   Intimate Partner Violence: Unknown (12/12/2023)    Received from [facility]    Humiliation, Afraid, Rape, and Kick questionnaire     Fear of Current or Ex-Partner: Patient refused     Emotionally Abused: Patient refused     Physically Abused: Patient refused     Sexually Abused: Patient refused   Housing Stability: High Risk (12/19/2023)    Received from [facility]    Housing Stability Vital Sign     Unable to Pay for Housing in the Last Year: Yes     Number of Places Lived in the Last Year: 1     Unstable Housing in the Last Year: No       Current Outpatient Medications:     atorvastatin (LIPITOR) 40 mg tablet, Take 40 mg by mouth daily  , Disp: , Rfl:     Azelaic Acid 15 % cream, apply to 1 thin layer twice a day to face, Disp: , Rfl:     carvedilol (COREG) 6.25 mg tablet, , Disp: , Rfl:     clindamycin (CLINDAGEL) 1 % gel, APPLY 1 THIN LAYER TOPICALLY TO FACE IN THE  MORNING, Disp: , Rfl:     clopidogrel (PLAVIX) 75 mg tablet, Take 75 mg by mouth daily, Disp: , Rfl:     digoxin (LANOXIN) 0.125 mg tablet, Take 125 mcg by mouth daily, Disp: , Rfl:     Farxiga 10 MG TABS, Take 10 mg by mouth daily, Disp: , Rfl:     furosemide (LASIX) 40 mg tablet, Take 40 mg by mouth daily PT REPORTS : 1/2 TABLET EVERY OTHER DAY (MWF), Disp: , Rfl:     Lumigan 0.01 % ophthalmic drops, instill 1 drop into both eyes once daily at  bedtime, Disp: , Rfl:     midodrine (PROAMATINE) 2.5 mg tablet, Take 1 tablet (2.5 mg total) by mouth 3 (three) times a day before meals (Patient taking differently: Take 2.5 mg by mouth 2 (two) times a day), Disp: 90 tablet, Rfl: 0    midodrine (PROAMATINE) 5 mg tablet, Take 5 mg by mouth 3 (three) times a day before meals, Disp: , Rfl:     Multiple Vitamins-Minerals (PX MENS MULTIVITAMINS) TABS, Take by mouth, Disp: , Rfl:     pantoprazole (PROTONIX) 40 mg tablet, Take 1 tablet (40 mg total) by mouth daily, Disp: 90 tablet, Rfl: 2    sacubitril-valsartan (ENTRESTO) 24-26 MG TABS, Take 1 tablet by mouth 2 (two) times a day (Patient taking differently: Take 0.5 tablets by mouth 2 (two) times a day), Disp: 60 tablet, Rfl: 0    spironolactone (ALDACTONE) 25 mg tablet, Take 12.5 mg by mouth daily 1/2 TABLET EVERY OTHER DAY (Tu, Th, Sa, Su), Disp: , Rfl:     triamcinolone (KENALOG) 0.5 % cream, Apply topically 3 (three) times a day, Disp: 30 g, Rfl: 1    warfarin (COUMADIN) 2.5 mg tablet, as directed take 1 tablet ON TUESDAY,WEDNESDAY,FRIDAY,SATURDAY,GARCIA...  (REFER TO PRESCRIPTION NOTES)., Disp: , Rfl:     warfarin (COUMADIN) 5 mg tablet, Take 1 tablet (5 mg total) by mouth daily Coumadin 5 mg p.o. Daily- Target INR 2-3 (Patient taking differently: Take 5 mg by mouth daily M & Thurs & Sat 5MG  Tues & Wed & Fri & Sun 2.5MG), Disp: 30 tablet, Rfl: 0    Current Facility-Administered Medications:     lidocaine (LMX) 4 % cream, , Topical, Once, Bonita Ayala PA-C    Review of Systems   Constitutional:  Positive for appetite change (decreased) and fatigue. Negative for chills, fever and unexpected weight change.   HENT:  Positive for postnasal drip. Negative for congestion and hearing loss.    Respiratory:  Negative for cough and shortness of breath.    Cardiovascular:  Negative for leg swelling.   Skin:  Positive for wound. Negative for rash.   Neurological:  Positive for light-headedness. Negative for dizziness and  numbness.   Hematological:  Does not bruise/bleed easily.   Psychiatric/Behavioral: Negative.           Objective:  /72   Pulse 85   Temp (!) 95.7 °F (35.4 °C)   Resp 20   Pain Score:   5     Physical Exam  Vitals reviewed.   Constitutional:       Appearance: He is normal weight.   Pulmonary:      Effort: Pulmonary effort is normal. No respiratory distress.   Musculoskeletal:      Right elbow: No swelling. Tenderness (mild) present in olecranon process.      Left elbow: Swelling present. Tenderness (moderate pain) present in olecranon process.   Skin:     Findings: Wound (bilateral elbows) present.             Comments: 1. Adherent slough on wound bed.   2. Erythematous, edematous and warm periwound. Wound bed is Tender to palpation with Adherent yellow/fibrinous slough with moderate mixed purulent karlo drainage.  Circumferential undermining of 3.5cm depth from 12-12 o'clock, wound appears infected. Cultures collected.   Neurological:      Mental Status: He is alert.   Psychiatric:         Mood and Affect: Mood normal.               Wound 12/28/23 Pressure Injury Elbow Posterior;Right (Active)   Wound Image Images linked 01/22/24 0936   Wound Description Dry;Yellow;Pink 01/22/24 0929   Pressure Injury Stage Stage 3 01/22/24 0929   Stephani-wound Assessment Dry;Pink 01/22/24 0929   Wound Length (cm) 0.3 cm 01/22/24 0929   Wound Width (cm) 0.4 cm 01/22/24 0929   Wound Depth (cm) 0.2 cm 01/22/24 0929   Wound Surface Area (cm^2) 0.12 cm^2 01/22/24 0929   Wound Volume (cm^3) 0.024 cm^3 01/22/24 0929   Calculated Wound Volume (cm^3) 0.02 cm^3 01/22/24 0929   Change in Wound Size % -100 01/22/24 0929   Drainage Amount Moderate 01/22/24 0929   Drainage Description Serosanguineous 01/22/24 0929   Non-staged Wound Description Full thickness 01/22/24 0929   Dressing Status Intact 01/22/24 0929       Wound 12/28/23 Pressure Injury Elbow Left;Posterior (Active)   Wound Image Images linked 01/22/24 0950   Wound Description  "Dry;Yellow;Tan;Pink 01/22/24 0928   Pressure Injury Stage Stage 3 01/22/24 0928   Stephani-wound Assessment Erythema 01/22/24 0928   Wound Length (cm) 0.3 cm 01/22/24 0928   Wound Width (cm) 0.6 cm 01/22/24 0928   Wound Depth (cm) 0.2 cm 01/22/24 0928   Wound Surface Area (cm^2) 0.18 cm^2 01/22/24 0928   Wound Volume (cm^3) 0.036 cm^3 01/22/24 0928   Calculated Wound Volume (cm^3) 0.04 cm^3 01/22/24 0928   Change in Wound Size % -33.33 01/22/24 0928   Drainage Amount Moderate 01/22/24 0928   Drainage Description Serosanguineous 01/22/24 0928   Non-staged Wound Description Full thickness 01/22/24 0928   Dressing Status Intact 01/22/24 0928       Debridement   Wound 12/28/23 Pressure Injury Elbow Posterior;Right    Universal Protocol:  Consent: Verbal consent obtained. Written consent obtained.  Risks and benefits: risks, benefits and alternatives were discussed  Consent given by: patient  Time out: Immediately prior to procedure a \"time out\" was called to verify the correct patient, procedure, equipment, support staff and site/side marked as required.  Patient understanding: patient states understanding of the procedure being performed  Patient identity confirmed: verbally with patient    Debridement Details  Performed by: PA  Debridement type: surgical  Level of debridement: subcutaneous tissue  Pain control: lidocaine 4%      Post-debridement measurements  Length (cm): 0.3  Width (cm): 0.4  Depth (cm): 0.3  Percent debrided: 100%  Surface Area (cm^2): 0.12  Area Debrided (cm^2): 0.12  Volume (cm^3): 0.04    Tissue and other material debrided: subcutaneous tissue  Devitalized tissue debrided: biofilm, exudate, fibrin and slough  Instrument(s) utilized: curette  Bleeding: small  Hemostasis obtained with: pressure  Procedural pain (0-10): 0  Post-procedural pain: 0   Response to treatment: procedure was tolerated well    Debridement   Wound 12/28/23 Pressure Injury Elbow Left;Posterior    Universal Protocol:  Consent: " "Verbal consent obtained. Written consent obtained.  Risks and benefits: risks, benefits and alternatives were discussed  Consent given by: patient  Time out: Immediately prior to procedure a \"time out\" was called to verify the correct patient, procedure, equipment, support staff and site/side marked as required.  Patient understanding: patient states understanding of the procedure being performed  Patient identity confirmed: verbally with patient    Debridement Details  Performed by: PA  Debridement type: surgical  Level of debridement: subcutaneous tissue  Pain control: lidocaine 4%      Post-debridement measurements  Length (cm): 0.3  Width (cm): 0.6  Depth (cm): 3.5  Percent debrided: 100%  Surface Area (cm^2): 0.18  Area Debrided (cm^2): 0.18  Volume (cm^3): 0.63    Tissue and other material debrided: subcutaneous tissue  Devitalized tissue debrided: biofilm, exudate, fibrin and slough  Instrument(s) utilized: curette, forceps and blade  Bleeding: small  Hemostasis obtained with: pressure  Procedural pain (0-10): 0  Post-procedural pain: 0   Response to treatment: procedure was tolerated well                       Wound Instructions:  Orders Placed This Encounter   Procedures    Wound off loading     Off-loading Instructions:    Keep weight and pressure off wound at all times.     Standing Status:   Future     Standing Expiration Date:   1/22/2025    Wound miscellaneous orders     Smoking Cessation recommended for wound care.     Standing Status:   Future     Standing Expiration Date:   1/22/2025    Wound cleansing and dressings     Right Elbow wound:     Wash your hands with soap and water.  Remove old dressing, discard into plastic bag and place in trash.  Cleanse the wound with prophase prior to applying a clean dressing. Do not use tissue or cotton balls. Do not scrub the wound. Pat dry using gauze.  Shower no     Gently pack open wound with silver alginate.    Cover and Secure with bordered foam  Change " "dressing 3 times weekly.     The above was completed today at the wound center.     Standing Status:   Future     Standing Expiration Date:   1/22/2025    Wound cleansing and dressings     Left Elbow wound:     Culture taken today.   Wound cleansed with dakins solution.   Clean dressing applied.     It is recommended you be seen in the emergency room today. Secondary to increased fatigue, loss of appetite, increased pain in wound and signs of deep infection.     Standing Status:   Future     Standing Expiration Date:   1/22/2025    Wound home care     Referral sent to [facility] for wound care.     Standing Status:   Future     Standing Expiration Date:   1/22/2025    Debridement     This order was created via procedure documentation    Debridement     This order was created via procedure documentation    Wound culture and Gram stain     Order Specific Question:   Release to patient through Mychart     Answer:   Herbert Ayala PA-C, [facility]      Portions of the record may have been created with voice recognition software. Occasional wrong word or \"sound alike\" substitutions may have occurred due to the inherent limitations of voice recognition software. Read the chart carefully and recognize, using context, where substitutions have occurred.    "

## 2024-01-22 NOTE — ASSESSMENT & PLAN NOTE
"79yo M with hx of elbow wounds presents with worsening pain, swelling and tenderness in L elbow in particular  X ray on admission shows - \" Olecranon bursitis containing locules of gas; septic bursitis should be considered in the absence of recent aspiration/intervention. / No CT evidence of osteomyelitis. \"  The gas component is likely because of the wound, patient is otherwise not toxic enough to suggest necrotizing infection at this point    Start antibiotic therapy with Vancomycin for now  Orthopedics consult  Wound care consult    "

## 2024-01-22 NOTE — CONSULTS
Orthopedics   Scott Morris 78 y.o. male MRN: [id number]  Unit/Bed#: [facility]      Chief Complaint:   Left elbow pain    HPI:  78 y.o. male with a significant past medical history of coronary artery disease, prostate cancer, GERD, hypercholesterolemia, hypertension, currently complaining of left elbow pain.  Patient states that his symptoms have been ongoing the past 4 months in duration with no injury of note.  Patient states that his symptoms began as a result of resting his elbows on the hospital bed for a prolonged period of time.  Patient states he began to experience swelling at that time.  Patient was seen and evaluated according to the patient by dermatology, his family doctor, an orthopedist outside of [facility] who provided minimal treatment for the patient according to him.  Patient was seen and evaluated at wound care today secondary to increased symptoms over the past few days and was sent to the ER for further evaluation and treatment of presumed olecranon bursitis.  Patient states that he continues to have pain on the posterior aspect of the elbow that becomes worse with direct contact.  Patient states that his pain is localized to the elbow and does not extend into the arm.  Patient denies any fevers or chills.  Patient denies any new or worsening shortness of breath or chest tightness.  Patient denies any prior history of elbow pain or injuries prior to 4 months ago.  Patient denies any surgical intervention to the elbow.  Patient offers no other complaints at this time.  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     CAD (coronary artery disease)     Cancer (HCC)     prostate    Colon polyp     Coronary artery disease     GERD (gastroesophageal reflux disease)     H/O heart artery stent     x2    Hypercholesteremia     Hypertension     Keratotic lesion     Stroke (HCC)     10YRS AGO    Transient cerebral ischemia        Past Surgical History:   Past Surgical History:   Procedure Laterality Date    BACK SURGERY      CARDIAC PACEMAKER PLACEMENT      CARDIAC SURGERY      mvp repair    CAROTID ENDARTERECTOMY Right 2020    CHOLECYSTECTOMY      COLONOSCOPY      ESOPHAGOGASTRODUODENOSCOPY N/A 2018    Procedure: ESOPHAGOGASTRODUODENOSCOPY (EGD);  Surgeon: Sunny Rodrigues III, MD;  Location: [facility];  Service: Gastroenterology    JOINT REPLACEMENT      L hip replacement    ORCHIECTOMY      PROSTATECTOMY      TOTAL HIP ARTHROPLASTY         Family History:  Family history reviewed and non-contributory  Family History   Problem Relation Age of Onset    Heart disease Mother     Stroke Mother         CVA    Cancer Father     Hodgkin's lymphoma Father     Lung cancer Father        Social History:  Social History     Socioeconomic History    Marital status:      Spouse name: Not on file    Number of children: Not on file    Years of education: Not on file    Highest education level: Not on file   Occupational History    Occupation: retired   Tobacco Use    Smoking status: Every Day     Current packs/day: 0.00     Types: Cigarettes     Last attempt to quit: 2018     Years since quittin.6    Smokeless tobacco: Never    Tobacco comments:     2 cig/day   Vaping Use    Vaping status: Never Used   Substance and Sexual Activity    Alcohol use: Never     Alcohol/week: 2.0 standard drinks of alcohol     Types: 2 Standard drinks or equivalent per week    Drug use: No    Sexual activity: Not Currently     Comment: no high risk sexual behavior   Other Topics Concern    Not on file   Social History Narrative    Not on file      Social Determinants of Health     Financial Resource Strain: Low Risk  (12/19/2023)    Received from [facility]    Overall Financial Resource Strain (CARDIA)     Difficulty of Paying Living Expenses: Not hard at all   Food Insecurity: No Food Insecurity (12/19/2023)    Received from [facility]    Hunger Vital Sign     Worried About Running Out of Food in the Last Year: Never true     Ran Out of Food in the Last Year: Never true   Transportation Needs: No Transportation Needs (12/19/2023)    Received from [facility]    PRAPARE - Transportation     Lack of Transportation (Medical): No     Lack of Transportation (Non-Medical): No   Physical Activity: Not on file   Stress: Not on file   Social Connections: Not on file   Intimate Partner Violence: Unknown (12/12/2023)    Received from [facility]    Humiliation, Afraid, Rape, and Kick questionnaire     Fear of Current or Ex-Partner: Patient refused     Emotionally Abused: Patient refused     Physically Abused: Patient refused     Sexually Abused: Patient refused   Housing Stability: High Risk (12/19/2023)    Received from [facility]    Housing Stability Vital Sign     Unable to Pay for Housing in the Last Year: Yes     Number of Places Lived in the Last Year: 1     Unstable Housing in the Last Year: No       Allergies:   No Known Allergies        Labs:  0   Lab Value Date/Time    HCT 48.0 01/22/2024 1237    HCT 46.6 01/05/2024 1719    HCT 44.9 11/14/2022 0447    HGB 16.0 01/22/2024 1237    HGB 15.3 01/05/2024 1719    HGB 14.6 11/14/2022 0447    INR 2.67 (H) 01/22/2024 1237    WBC 11.81 (H) 01/22/2024 1237    WBC 7.86 01/05/2024 1719    WBC 9.54 11/14/2022 0447    ESR 8 01/22/2024 1237    CRP 14.6 (H) 01/22/2024 1237       Meds:    Current Facility-Administered Medications:     acetaminophen (TYLENOL) tablet 650 mg, 650 mg, Oral, Q6H PRN, Rafa Jackson MD    [START ON 1/23/2024] atorvastatin (LIPITOR) tablet 40 mg, 40  mg, Oral, Daily, Rafa Jackson MD    bimatoprost (LUMIGAN) 0.03 % ophthalmic drops 1 drop, 1 drop, Ophthalmic, Daily, Rafa Jackson MD, 1 drop at 01/22/24 1800    carvedilol (COREG) tablet 3.125 mg, 3.125 mg, Oral, BID With Meals, Rafa Jackson MD    [START ON 1/23/2024] clopidogrel (PLAVIX) tablet 75 mg, 75 mg, Oral, Daily, Rafa Jackson MD    [START ON 1/23/2024] digoxin (LANOXIN) tablet 125 mcg, 125 mcg, Oral, Daily, Rafa Jackson MD    [START ON 1/23/2024] furosemide (LASIX) tablet 40 mg, 40 mg, Oral, Daily, Rafa Jackson MD    midodrine (PROAMATINE) tablet 2.5 mg, 2.5 mg, Oral, BID, Rafa Jackson MD    [START ON 1/23/2024] nicotine (NICODERM CQ) 21 mg/24 hr TD 24 hr patch 1 patch, 1 patch, Transdermal, Daily, Rafa Jackson MD    sacubitril-valsartan (ENTRESTO) 24-26 MG per tablet 0.5 tablet, 0.5 tablet, Oral, BID, Rafa Jackson MD    [START ON 1/23/2024] spironolactone (ALDACTONE) tablet 12.5 mg, 12.5 mg, Oral, Daily, Rafa Jackson MD    [START ON 1/23/2024] vancomycin (VANCOCIN) IVPB (premix in dextrose) 1,000 mg 200 mL, 1,000 mg, Intravenous, Q24H, Rafa Jackson MD    [START ON 1/23/2024] warfarin (COUMADIN) tablet 2.5 mg, 2.5 mg, Oral, Once per day on Sunday Tuesday Wednesday Friday, Rafa Jackson MD    [START ON 1/25/2024] warfarin (COUMADIN) tablet 5 mg, 5 mg, Oral, Once per day on Monday Thursday Saturday, Rafa Jackson MD    Current Outpatient Medications:     atorvastatin (LIPITOR) 40 mg tablet, Take 40 mg by mouth daily  , Disp: , Rfl:     Azelaic Acid 15 % cream, apply to 1 thin layer twice a day to face, Disp: , Rfl:     carvedilol (COREG) 6.25 mg tablet, , Disp: , Rfl:     clindamycin (CLINDAGEL) 1 % gel, APPLY 1 THIN LAYER TOPICALLY TO FACE IN THE  MORNING, Disp: , Rfl:     clopidogrel (PLAVIX) 75 mg tablet, Take 75 mg by mouth daily, Disp: , Rfl:      "digoxin (LANOXIN) 0.125 mg tablet, Take 125 mcg by mouth daily, Disp: , Rfl:     Farxiga 10 MG TABS, Take 10 mg by mouth daily, Disp: , Rfl:     furosemide (LASIX) 40 mg tablet, Take 40 mg by mouth daily PT REPORTS : 1/2 TABLET EVERY OTHER DAY (MW), Disp: , Rfl:     Lumigan 0.01 % ophthalmic drops, instill 1 drop into both eyes once daily at bedtime, Disp: , Rfl:     midodrine (PROAMATINE) 2.5 mg tablet, Take 1 tablet (2.5 mg total) by mouth 3 (three) times a day before meals (Patient taking differently: Take 2.5 mg by mouth 2 (two) times a day), Disp: 90 tablet, Rfl: 0    midodrine (PROAMATINE) 5 mg tablet, Take 5 mg by mouth 3 (three) times a day before meals, Disp: , Rfl:     Multiple Vitamins-Minerals (PX MENS MULTIVITAMINS) TABS, Take by mouth, Disp: , Rfl:     pantoprazole (PROTONIX) 40 mg tablet, Take 1 tablet (40 mg total) by mouth daily, Disp: 90 tablet, Rfl: 2    sacubitril-valsartan (ENTRESTO) 24-26 MG TABS, Take 1 tablet by mouth 2 (two) times a day (Patient taking differently: Take 0.5 tablets by mouth 2 (two) times a day), Disp: 60 tablet, Rfl: 0    spironolactone (ALDACTONE) 25 mg tablet, Take 12.5 mg by mouth daily 1/2 TABLET EVERY OTHER DAY (Tu, Th, Sa, Su), Disp: , Rfl:     triamcinolone (KENALOG) 0.5 % cream, Apply topically 3 (three) times a day, Disp: 30 g, Rfl: 1    warfarin (COUMADIN) 2.5 mg tablet, as directed take 1 tablet ON TUESDAY,WEDNESDAY,FRIDAY,SATURDAY,GARCIA...  (REFER TO PRESCRIPTION NOTES)., Disp: , Rfl:     warfarin (COUMADIN) 5 mg tablet, Take 1 tablet (5 mg total) by mouth daily Coumadin 5 mg p.o. Daily- Target INR 2-3 (Patient taking differently: Take 5 mg by mouth daily M & Thurs & Sat 5MG  Tues & Wed & Fri & Sun 2.5MG), Disp: 30 tablet, Rfl: 0    Blood Culture:   Lab Results   Component Value Date    BLOODCX No Growth After 5 Days. 11/06/2022    BLOODCX No Growth After 5 Days. 11/06/2022       Wound Culture:   No results found for: \"WOUNDCULT\"    Ins and Outs:  I/O last 24 " hours:  In: 700 [IV Piggyback:700]  Out: -           Physical Exam:   BP 99/74 (BP Location: Right arm)   Pulse 90   Temp 98.2 °F (36.8 °C)   Resp 22   SpO2 97%   Gen: No acute distress, resting comfortably in bed  HEENT: Eyes clear, moist mucus membranes, hearing intact  Respiratory: No audible wheezing or stridor  Cardiovascular: Well Perfused peripherally, 2+ distal pulse  Abdomen: nondistended, no peritoneal signs  Musculoskeletal: left upper extremity  Patient resting comfortably in hospital bed in no apparent distress  Skin  : Large area of erythema extending from the posterior aspect of the elbow into the entirety of the forearm region.  Small subcentimeter incision noted on the posterior aspect of the elbow with active bloody drainage present.  Minimal fluctuance appreciated at the time.  TTP : Tenderness palpation noted over the incision site and posterior elbow.  No other bony or soft tissue tenderness to palpation noted at this time.  Full active & passive ROM at the shoulder, elbow, wrist with minimal pain  SILT m/r/u.   Motor intact ain/pin/m/r/u  2+ radial and ulnar pulse  Musculature is soft and compressible, no pain with passive stretch      Tertiary: no tenderness over all other joints/long bones as except already stated.      Radiology:   I personally reviewed the films.  CT upper extremity w contrast left   IMPRESSION:  Olecranon bursitis containing locules of gas; septic bursitis should be considered in the absence of recent aspiration/intervention    _*_*_*_*_*_*_*_*_*_*_*_*_*_*_*_*_*_*_*_*_*_*_*_*_*_*_*_*_*_*_*_*_*_*_*_*_*_*_*_*_*    Assessment:  78 y.o.male with presumed left septic olecranon bursitis, treated with clinic incision and drainage      Plan:   NWB left upper extremity  Change dressings as needed  Given that olecranon bursa is open and actively draining, will hold off on attempted aspiration of area at this time  Will trial patient on IV antibiotics for improvement.  Discussed  "possibility of surgical intervention in the near future pending symptoms  Antibiotics per primary team  Pain control per primary team  Dispo: Ortho will follow.  See above for further recommendations.  No plans on aspiration at this time.  If symptoms persist with IV antibiotics/symptoms become worse, will consider OR for formal incision and drainage in future.  Will reevaluate patient in morning.               Marcelino Lombardo PA-C                             Portions of the record may have been created with voice recognition software.  Occasional wrong word or \"sound a like\" substitutions may have occurred due to the inherent limitations of voice recognition software.  Read the chart carefully and recognize, using context, where substitutions have occurred.    "

## 2024-01-22 NOTE — PROGRESS NOTES
Scott Morris is a 78 y.o. male who is currently ordered Vancomycin IV with management by the Pharmacy Consult service.  Relevant clinical data and objective / subjective history reviewed.  Vancomycin Assessment:  Indication and Goal AUC/Trough: Bone/joint infection (goal -600, trough >10)  Clinical Status: new  Micro:   pend  Renal Function:  SCr: 1.02 mg/dL  CrCl: 48.3 mL/min  Renal replacement: Not on dialysis  Days of Therapy: 1  Current Dose:  750mg x1  Vancomycin Plan:  New Dosing q24h  Estimated AUC: 437   Estimated Trough: 12.7 mcg/mL  Next Level:  0600  Renal Function Monitoring: Daily BMP and UOP  Pharmacy will continue to follow closely for s/sx of nephrotoxicity, infusion reactions and appropriateness of therapy.  BMP and CBC will be ordered per protocol. We will continue to follow the patient’s culture results and clinical progress daily.    Sunny Meza, Pharmacist

## 2024-01-22 NOTE — PATIENT INSTRUCTIONS
Orders Placed This Encounter   Procedures    Wound off loading     Off-loading Instructions:    Keep weight and pressure off wound at all times.     Standing Status:   Future     Standing Expiration Date:   1/22/2025    Wound miscellaneous orders     Smoking Cessation recommended for wound care.     Standing Status:   Future     Standing Expiration Date:   1/22/2025    Wound cleansing and dressings     Right Elbow wound:     Wash your hands with soap and water.  Remove old dressing, discard into plastic bag and place in trash.  Cleanse the wound with prophase prior to applying a clean dressing. Do not use tissue or cotton balls. Do not scrub the wound. Pat dry using gauze.  Shower no     Gently pack open wound with silver alginate.    Cover and Secure with bordered foam  Change dressing 3 times weekly.     The above was completed today at the wound center.     Standing Status:   Future     Standing Expiration Date:   1/22/2025    Wound cleansing and dressings     Left Elbow wound:     Culture taken today.   Wound cleansed with dakins solution.   Clean dressing applied.     It is recommended you be seen in the emergency room today. Secondary to increased fatigue, loss of appetite, increased pain in wound and signs of deep infection.     Standing Status:   Future     Standing Expiration Date:   1/22/2025    Wound home care     Referral sent to [facility] for wound care.     Standing Status:   Future     Standing Expiration Date:   1/22/2025

## 2024-01-22 NOTE — H&P
"[facility]  H&P  Name: Scott Morris 78 y.o. male I MRN: [id number]  Unit/Bed#: ED 15 I Date of Admission: 1/22/2024   Date of Service: 1/22/2024 I Hospital Day: 0      Assessment/Plan   * Olecranon bursitis  Assessment & Plan  77yo M with hx of elbow wounds presents with worsening pain, swelling and tenderness in L elbow in particular  X ray on admission shows - \" Olecranon bursitis containing locules of gas; septic bursitis should be considered in the absence of recent aspiration/intervention. / No CT evidence of osteomyelitis. \"  The gas component is likely because of the wound, patient is otherwise not toxic enough to suggest necrotizing infection at this point    Start antibiotic therapy with Vancomycin for now  Orthopedics consult  Wound care consult      Chronic systolic (congestive) heart failure (HCC)  Assessment & Plan  Wt Readings from Last 3 Encounters:   01/18/24 56.3 kg (124 lb 3.2 oz)   01/05/24 56.9 kg (125 lb 7.1 oz)   12/28/23 56.7 kg (125 lb)         Hx of HFrEF 20% echo Dec 2023  As per cardiology notes previously has declined vest and ICD, aware of risk of arrhytmias  Euvolemic  Continue Entresto, carvedilol, spironolactone, Lasix  Monitor volume status      PAF (paroxysmal atrial fibrillation) ([facility])  Assessment & Plan    Rate controlled  Continue carvedilol, digoxin and warfarin    Hypertension  Assessment & Plan  Continue carvedilol, lasix, aldactone  Monitor BP    Hypercholesterolemia  Assessment & Plan  Continue statin    GERD (gastroesophageal reflux disease)  Assessment & Plan  Continue PPI    CAD (coronary atherosclerotic disease)  Assessment & Plan  Stable, denies CP           VTE Prophylaxis: Warfarin (Coumadin)  / sequential compression device   Code Status: FC  POLST: POLST form is not discussed and not completed at this time.  Discussion with family: Patient and son over the phone    Anticipated Length of Stay:  Patient will be admitted on an Inpatient " basis with an anticipated length of stay of  at least 2 midnights.   Justification for Hospital Stay: olecranon bursitis    Total Time for Visit, including Counseling / Coordination of Care: 90 minutes.  Greater than 50% of this total time spent on direct patient counseling and coordination of care.    Chief Complaint:   Pain in L elbow    History of Present Illness:    Scott Morris is a 78 y.o. male who presents with pain in L elbow.  The patient does have b/l wounds in elbows which he follows with wound care. Wound care team noted worsening redness on L elbow and sent him here for evaluation.  He endorses worsening pain, swelling and redness in the elbow. Denies any new trauma, animal bite. Does mention chills.    Review of Systems:    Review of Systems   Musculoskeletal:  Positive for joint swelling.   All other systems reviewed and are negative.      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    CAD (coronary artery disease)     Cancer (HCC)     prostate    Colon polyp     Coronary artery disease     GERD (gastroesophageal reflux disease)     H/O heart artery stent     x2    Hypercholesteremia     Hypertension     Keratotic lesion     Stroke (HCC)     10YRS AGO    Transient cerebral ischemia        Past Surgical History:   Procedure Laterality Date    BACK SURGERY      CARDIAC PACEMAKER PLACEMENT      CARDIAC SURGERY      mvp repair    CAROTID ENDARTERECTOMY Right 03/2020    CHOLECYSTECTOMY      COLONOSCOPY      ESOPHAGOGASTRODUODENOSCOPY N/A 06/22/2018    Procedure: ESOPHAGOGASTRODUODENOSCOPY (EGD);  Surgeon: Sunny Rodrigues III, MD;  Location: [facility];  Service: Gastroenterology    JOINT REPLACEMENT      L hip replacement    ORCHIECTOMY      PROSTATECTOMY      TOTAL HIP ARTHROPLASTY         Meds/Allergies:    Prior to Admission medications    Medication Sig Start Date End Date Taking? Authorizing Provider   atorvastatin (LIPITOR) 40 mg tablet  Take 40 mg by mouth daily      Historical Provider, MD   Azelaic Acid 15 % cream apply to 1 thin layer twice a day to face 9/1/22   Historical Provider, MD   carvedilol (COREG) 6.25 mg tablet  1/17/24   Historical Provider, MD   clindamycin (CLINDAGEL) 1 % gel APPLY 1 THIN LAYER TOPICALLY TO FACE IN THE  MORNING 11/18/22   Historical Provider, MD   clopidogrel (PLAVIX) 75 mg tablet Take 75 mg by mouth daily 7/30/21   Historical Provider, MD   digoxin (LANOXIN) 0.125 mg tablet Take 125 mcg by mouth daily    Historical Provider, MD   Farxiga 10 MG TABS Take 10 mg by mouth daily 3/4/22   Historical Provider, MD   furosemide (LASIX) 40 mg tablet Take 40 mg by mouth daily PT REPORTS : 1/2 TABLET EVERY OTHER DAY (MWF)    Historical Provider, MD [place] 0.01 % ophthalmic drops instill 1 drop into both eyes once daily at bedtime 11/22/22   Historical Provider, MD   midodrine (PROAMATINE) 2.5 mg tablet Take 1 tablet (2.5 mg total) by mouth 3 (three) times a day before meals  Patient taking differently: Take 2.5 mg by mouth 2 (two) times a day 11/14/22 11/13/23  Abilio Benton MD   midodrine (PROAMATINE) 5 mg tablet Take 5 mg by mouth 3 (three) times a day before meals 11/13/23   Historical Provider, MD   Multiple Vitamins-Minerals (PX MENS MULTIVITAMINS) TABS Take by mouth    Historical Provider, MD   pantoprazole (PROTONIX) 40 mg tablet Take 1 tablet (40 mg total) by mouth daily 8/21/23   Lenin Hardy MD   sacubitril-valsartan (ENTRESTO) 24-26 MG TABS Take 1 tablet by mouth 2 (two) times a day  Patient taking differently: Take 0.5 tablets by mouth 2 (two) times a day 11/14/22 11/13/23  Abilio Benton MD   spironolactone (ALDACTONE) 25 mg tablet Take 12.5 mg by mouth daily 1/2 TABLET EVERY OTHER DAY (Tu, Th, Sa, Nick)    Historical Provider, MD   triamcinolone (KENALOG) 0.5 % cream Apply topically 3 (three) times a day 6/26/23   Lenin Hardy MD   warfarin (COUMADIN) 2.5 mg tablet as directed take 1 tablet ON  TUESDAY,WEDNESDAY,FRIDAY,SATURDAY,GARCIA...  (REFER TO PRESCRIPTION NOTES). 10/20/23   Historical Provider, MD   warfarin (COUMADIN) 5 mg tablet Take 1 tablet (5 mg total) by mouth daily Coumadin 5 mg p.o. Daily- Target INR 2-3  Patient taking differently: Take 5 mg by mouth daily M & Thurs & Sat 5MG  Tues & Wed & Fri & Sun 2.5MG 22   Abilio Benton MD   apixaban (Eliquis) 5 mg Take 2 tablets (10 mg total) by mouth 2 (two) times a day for 7 days, THEN 1 tablet (5 mg total) 2 (two) times a day for 23 days. 22  BEN Jones have reviewed home medications with patient personally.    Allergies: No Known Allergies    Social History:     Marital Status:      Substance Use History:   Social History     Substance and Sexual Activity   Alcohol Use Never    Alcohol/week: 2.0 standard drinks of alcohol    Types: 2 Standard drinks or equivalent per week     Social History     Tobacco Use   Smoking Status Every Day    Current packs/day: 0.00    Types: Cigarettes    Last attempt to quit: 2018    Years since quittin.6   Smokeless Tobacco Never   Tobacco Comments    2 cig/day     Social History     Substance and Sexual Activity   Drug Use No       Family History:    non-contributory    Physical Exam:     Vitals:   Blood Pressure: 135/80 (24 1400)  Pulse: 89 (24 1400)  Temperature: 98.2 °F (36.8 °C) (24 1011)  Respirations: 16 (24 1011)  SpO2: 98 % (24 1400)    Physical Exam  Vitals and nursing note reviewed.   Constitutional:       Appearance: Normal appearance.   HENT:      Head: Normocephalic and atraumatic.      Right Ear: External ear normal.      Left Ear: External ear normal.      Nose: Nose normal. No congestion or rhinorrhea.      Mouth/Throat:      Mouth: Mucous membranes are moist.      Pharynx: Oropharynx is clear. No oropharyngeal exudate or posterior oropharyngeal erythema.   Eyes:      General: No scleral icterus.        Right eye: No  discharge.         Left eye: No discharge.      Pupils: Pupils are equal, round, and reactive to light.   Neck:      Vascular: No carotid bruit.   Cardiovascular:      Rate and Rhythm: Normal rate and regular rhythm.      Pulses: Normal pulses.      Heart sounds: No murmur heard.     No friction rub. No gallop.   Pulmonary:      Effort: Pulmonary effort is normal. No respiratory distress.      Breath sounds: Normal breath sounds. No stridor. No wheezing, rhonchi or rales.   Abdominal:      General: Abdomen is flat. Bowel sounds are normal. There is no distension.      Palpations: Abdomen is soft. There is no mass.      Tenderness: There is no abdominal tenderness. There is no guarding or rebound.      Hernia: No hernia is present.   Musculoskeletal:         General: No swelling, tenderness, deformity or signs of injury. Normal range of motion.      Cervical back: Normal range of motion. No rigidity. No muscular tenderness.      Comments: Ulcers in bl elbows     Lymphadenopathy:      Cervical: No cervical adenopathy.   Skin:     General: Skin is warm and dry.      Capillary Refill: Capillary refill takes less than 2 seconds.      Coloration: Skin is not jaundiced or pale.      Findings: Erythema present. No bruising.      Comments: Erythema and swelling noted at and around the L elbow extending to the forearm anteriorly, the area is warm and tender to palpation   Neurological:      General: No focal deficit present.      Mental Status: He is alert and oriented to person, place, and time. Mental status is at baseline.      Cranial Nerves: No cranial nerve deficit.      Sensory: No sensory deficit.      Motor: No weakness.      Coordination: Coordination normal.      Deep Tendon Reflexes: Reflexes normal.   Psychiatric:         Mood and Affect: Mood normal.         Behavior: Behavior normal.         Thought Content: Thought content normal.         Judgment: Judgment normal.           Additional Data:     Lab Results: I  have personally reviewed pertinent reports.      Results from last 7 days   Lab Units 01/22/24  1237   WBC Thousand/uL 11.81*   HEMOGLOBIN g/dL 16.0   HEMATOCRIT % 48.0   PLATELETS Thousands/uL 147*   NEUTROS PCT % 82*   LYMPHS PCT % 7*   MONOS PCT % 8   EOS PCT % 2     Results from last 7 days   Lab Units 01/22/24  1237   SODIUM mmol/L 141   POTASSIUM mmol/L 3.8   CHLORIDE mmol/L 105   CO2 mmol/L 28   BUN mg/dL 22   CREATININE mg/dL 1.02   ANION GAP mmol/L 8   CALCIUM mg/dL 9.4   ALBUMIN g/dL 4.0   TOTAL BILIRUBIN mg/dL 1.28*   ALK PHOS U/L 98   ALT U/L 22   AST U/L 21   GLUCOSE RANDOM mg/dL 87     Results from last 7 days   Lab Units 01/22/24  1237   INR  2.67*             Results from last 7 days   Lab Units 01/22/24  1237   LACTIC ACID mmol/L 1.0   PROCALCITONIN ng/ml <0.05       Imaging: I have personally reviewed pertinent reports.      CT upper extremity w contrast left   Final Result by Geremias Cisneros MD (01/22 1510)      Olecranon bursitis containing locules of gas; septic bursitis should be considered in the absence of recent aspiration/intervention.      No CT evidence of osteomyelitis.      The study was marked in EPIC for immediate notification.      Workstation performed: [id number]           [employer] / [email or web address] Records Reviewed: Yes     ** Please Note: This note has been constructed using a voice recognition system. **

## 2024-01-23 PROBLEM — A48.0 GAS GANGRENE (HCC): Status: ACTIVE | Noted: 2024-01-23

## 2024-01-23 PROBLEM — L89.003: Status: ACTIVE | Noted: 2024-01-23

## 2024-01-23 PROBLEM — Z72.0 TOBACCO USE: Status: ACTIVE | Noted: 2024-01-23

## 2024-01-23 PROBLEM — Z95.2 H/O MITRAL VALVE REPLACEMENT: Status: ACTIVE | Noted: 2024-01-23

## 2024-01-23 LAB
ALBUMIN SERPL BCP-MCNC: 3.5 G/DL (ref 3.5–5)
ALP SERPL-CCNC: 78 U/L (ref 34–104)
ALT SERPL W P-5'-P-CCNC: 15 U/L (ref 7–52)
ANION GAP SERPL CALCULATED.3IONS-SCNC: 4 MMOL/L
AST SERPL W P-5'-P-CCNC: 17 U/L (ref 13–39)
ATRIAL RATE: 117 BPM
BILIRUB SERPL-MCNC: 1.01 MG/DL (ref 0.2–1)
BUN SERPL-MCNC: 19 MG/DL (ref 5–25)
CALCIUM SERPL-MCNC: 8.6 MG/DL (ref 8.4–10.2)
CHLORIDE SERPL-SCNC: 107 MMOL/L (ref 96–108)
CO2 SERPL-SCNC: 29 MMOL/L (ref 21–32)
CREAT SERPL-MCNC: 1.03 MG/DL (ref 0.6–1.3)
ERYTHROCYTE [DISTWIDTH] IN BLOOD BY AUTOMATED COUNT: 14.9 % (ref 11.6–15.1)
GFR SERPL CREATININE-BSD FRML MDRD: 69 ML/MIN/1.73SQ M
GLUCOSE SERPL-MCNC: 89 MG/DL (ref 65–140)
HCT VFR BLD AUTO: 43.6 % (ref 36.5–49.3)
HGB BLD-MCNC: 14.4 G/DL (ref 12–17)
INR PPP: 2.69 (ref 0.84–1.19)
MCH RBC QN AUTO: 34.7 PG (ref 26.8–34.3)
MCHC RBC AUTO-ENTMCNC: 33 G/DL (ref 31.4–37.4)
MCV RBC AUTO: 105 FL (ref 82–98)
PLATELET # BLD AUTO: 136 THOUSANDS/UL (ref 149–390)
PMV BLD AUTO: 9 FL (ref 8.9–12.7)
POTASSIUM SERPL-SCNC: 3.8 MMOL/L (ref 3.5–5.3)
PROT SERPL-MCNC: 5.9 G/DL (ref 6.4–8.4)
PROTHROMBIN TIME: 29.6 SECONDS (ref 11.6–14.5)
QRS AXIS: -63 DEGREES
QRSD INTERVAL: 154 MS
QT INTERVAL: 406 MS
QTC INTERVAL: 499 MS
RBC # BLD AUTO: 4.15 MILLION/UL (ref 3.88–5.62)
SODIUM SERPL-SCNC: 140 MMOL/L (ref 135–147)
T WAVE AXIS: 179 DEGREES
VENTRICULAR RATE: 91 BPM
WBC # BLD AUTO: 8.79 THOUSAND/UL (ref 4.31–10.16)

## 2024-01-23 PROCEDURE — 99232 SBSQ HOSP IP/OBS MODERATE 35: CPT | Performed by: PHYSICIAN ASSISTANT

## 2024-01-23 PROCEDURE — 85610 PROTHROMBIN TIME: CPT | Performed by: INTERNAL MEDICINE

## 2024-01-23 PROCEDURE — 80053 COMPREHEN METABOLIC PANEL: CPT | Performed by: INTERNAL MEDICINE

## 2024-01-23 PROCEDURE — 36415 COLL VENOUS BLD VENIPUNCTURE: CPT | Performed by: INTERNAL MEDICINE

## 2024-01-23 PROCEDURE — 99223 1ST HOSP IP/OBS HIGH 75: CPT | Performed by: INTERNAL MEDICINE

## 2024-01-23 PROCEDURE — 85027 COMPLETE CBC AUTOMATED: CPT | Performed by: INTERNAL MEDICINE

## 2024-01-23 RX ORDER — SPIRONOLACTONE 25 MG/1
12.5 TABLET ORAL
Status: DISCONTINUED | OUTPATIENT
Start: 2024-01-24 | End: 2024-01-24 | Stop reason: HOSPADM

## 2024-01-23 RX ORDER — DOCUSATE SODIUM 100 MG/1
100 CAPSULE, LIQUID FILLED ORAL 2 TIMES DAILY
Status: DISCONTINUED | OUTPATIENT
Start: 2024-01-23 | End: 2024-01-24 | Stop reason: HOSPADM

## 2024-01-23 RX ORDER — FUROSEMIDE 40 MG/1
40 TABLET ORAL
Status: DISCONTINUED | OUTPATIENT
Start: 2024-01-25 | End: 2024-01-24 | Stop reason: HOSPADM

## 2024-01-23 RX ADMIN — CLOPIDOGREL 75 MG: 75 TABLET ORAL at 10:41

## 2024-01-23 RX ADMIN — BIMATOPROST 1 DROP: 0.3 SOLUTION/ DROPS OPHTHALMIC at 21:44

## 2024-01-23 RX ADMIN — DOCUSATE SODIUM 100 MG: 100 CAPSULE, LIQUID FILLED ORAL at 18:11

## 2024-01-23 RX ADMIN — NICOTINE 1 PATCH: 21 PATCH, EXTENDED RELEASE TRANSDERMAL at 10:40

## 2024-01-23 RX ADMIN — SACUBITRIL AND VALSARTAN 0.5 TABLET: 24; 26 TABLET, FILM COATED ORAL at 21:44

## 2024-01-23 RX ADMIN — CARVEDILOL 3.12 MG: 3.12 TABLET, FILM COATED ORAL at 10:41

## 2024-01-23 RX ADMIN — DIGOXIN 125 MCG: 0.12 TABLET ORAL at 10:40

## 2024-01-23 RX ADMIN — CARVEDILOL 3.12 MG: 3.12 TABLET, FILM COATED ORAL at 18:11

## 2024-01-23 RX ADMIN — MIDODRINE HYDROCHLORIDE 2.5 MG: 5 TABLET ORAL at 10:40

## 2024-01-23 RX ADMIN — VANCOMYCIN HYDROCHLORIDE 1000 MG: 1 INJECTION, SOLUTION INTRAVENOUS at 23:27

## 2024-01-23 RX ADMIN — ATORVASTATIN CALCIUM 40 MG: 40 TABLET, FILM COATED ORAL at 21:44

## 2024-01-23 RX ADMIN — MIDODRINE HYDROCHLORIDE 2.5 MG: 5 TABLET ORAL at 21:43

## 2024-01-23 RX ADMIN — VANCOMYCIN HYDROCHLORIDE 1000 MG: 1 INJECTION, SOLUTION INTRAVENOUS at 01:12

## 2024-01-23 RX ADMIN — Medication 2.5 MG: at 21:48

## 2024-01-23 RX ADMIN — Medication 2.5 MG: at 14:47

## 2024-01-23 NOTE — ASSESSMENT & PLAN NOTE
Wt Readings from Last 3 Encounters:   01/18/24 56.3 kg (124 lb 3.2 oz)   01/05/24 56.9 kg (125 lb 7.1 oz)   12/28/23 56.7 kg (125 lb)     Hx of HFrEF 20% via ECHO in 12/2023   As per cardiology notes previously has declined vest and ICD, aware of risk of arrhytmias  Clinically euvolemic  Continue Entresto, carvedilol, spironolactone, Lasix  Monitor volume status

## 2024-01-23 NOTE — CONSULTS
Consultation - Infectious Disease   Scott Morris 78 y.o. male MRN: [id number]  Unit/Bed#: -01 Encounter: [id number]      IMPRESSION & RECOMMENDATIONS:   1.  Septic olecranon bursitis.  Patient has had atraumatic ulcerations on his bilateral elbows for the last few months and in the last week had progressing redness, swelling on the left.  Was seen at wound care and small incision was made and cultures obtained.  Patient is fortunately hemodynamically stable.  CT imaging consistent with bursitis.  Orthopedic evaluation reviewed.  Cultures pending.  Suspect secondary infection of underlying inflammatory bursitis.  Continue vancomycin  Pharmacy consult for vancomycin monitoring  Repeat CBC tomorrow to assess treatment response  Repeat chemistry tomorrow for drug dosing  Follow-up pending cultures to adjust therapy  Follow-up pending blood cultures particularly given #5  Ongoing follow-up by orthopedics  Trend fever curve/vitals  Anticipate 2 weeks of antibiotic therapy  Hopeful transition to oral antibiotic pending cultures  Additional supportive cares per primary  Additional interventions pending clinical course    2.  Bilateral elbow chronic ulcers, POA.  Patient reports that these ulcerations have been present for few months and previous irritation persistent.  Suspected underlying inflammatory bursitis and now with complications above  Continue local wound care  Recommend follow-up with wound care center  Ongoing followed by orthopedics  Antibiotics as above  Monitor exam closely    3.  Leukocytosis.  Mild elevation in white count noted on presentation.  Likely related to #1.  Has since downtrended.  Antibiotics as above  Repeat CBC tomorrow  Trend fever curve/WBC    4.  Chronic kidney disease stage III.  Documented as having baseline chronic kidney disease.  Creatinine clearance calculated.  Does affect antibiotic dosing  Antibiotic as above  Pharmacy consulted for vancomycin monitoring  Repeat  chemistry tomorrow  Will dose adjust as needed  Fluid hydration per primary    5.  MV/AV replacement, pacemaker and A-fib on anticoagulation.  Fortunately hemodynamically stable and does not appear systemically ill.  He is on anticoagulation which can interact with certain antibiotics  Will need to review for drug interactions with anticoagulation  Follow-up pending blood cultures  Antibiotics as above    Above plan discussed in detail with the patient and with his son over the phone  Above plan discussed in detail with primary service attending who is in agreement with ongoing antibiotic and following up cultures to adjust therapy.    ID consult service will continue to follow.    I have spent a total time of 80 minutes on 01/23/24 in caring for this patient including Diagnostic results, Patient and family education, Impressions, Documenting in the medical record, Reviewing / ordering tests, medicine, procedures  , Obtaining or reviewing history  , and Communicating with other healthcare professionals .     HISTORY OF PRESENT ILLNESS:  Reason for Consult: Olecranon bursitis    HPI: Scott Morris is a 78 y.o. year old male with bioprosthetic MVR and AVR, coronary artery disease status post stenting, atrial fibrillation on anticoagulation, chronic heart failure, previous back surgery, hyperlipidemia, hypertension.  Patient also has a pacemaker in place.  Patient was sent in after wound care evaluation yesterday.  He has been having bilateral elbow wounds for the last 4 months.  He is uncertain how the initially began and there is no definitive traumatic event.  Patient reports that he was seeing his primary and at 1 point did see an orthopedic surgeon but then eventually was transitioned over to wound care.  He was providing local wound care to the site.  On presentation yesterday he was having increasing pain and redness particularly of the left elbow and was just generally feeling unwell.  He denied having any  nausea, vomiting, chest pain or shortness of breath.  At the wound care appointment the left elbow was concerning for acute signs of infection with periwound erythema and edema and the wound bed was tender with moderate drainage.  The area was opened slightly and evaluated for tracking.  Cultures were collected from the site.Patient otherwise has been afebrile.  Mild leukocytosis on presentation.  Wound cultures pending so far.  Blood cultures pending.  MRSA culture pending.  CT of the upper extremity was done with contrast which showed olecranon bursitis but no evidence of osteomyelitis.  Orthopedics evaluated the patient.  Overall low suspicion for septic joint based on exam.  Currently no plans for trip to the OR.  Was able to review with the patient at bedside plans for ongoing antibiotic and following up cultures.  Patient requested and we contacted his son at bedside.  He reviewed with us the timeline given the chronic and persistent nature.  I discussed the infection itself was likely acute and that I anticipate a total of 2 weeks of antibiotic.  Additional questions were answered.  We are consulted for further assistance with management.    REVIEW OF SYSTEMS:  A complete 12 point system-based review of systems is negative other than that noted in the HPI.    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    CAD (coronary artery disease)     Cancer (HCC)     prostate    Colon polyp     Coronary artery disease     GERD (gastroesophageal reflux disease)     H/O heart artery stent     x2    Hypercholesteremia     Hypertension     Keratotic lesion     Stroke (HCC)     10YRS AGO    Transient cerebral ischemia      Past Surgical History:   Procedure Laterality Date    BACK SURGERY      CARDIAC PACEMAKER PLACEMENT      CARDIAC SURGERY      mvp repair    CAROTID ENDARTERECTOMY Right 03/2020    CHOLECYSTECTOMY      COLONOSCOPY      ESOPHAGOGASTRODUODENOSCOPY N/A 06/22/2018     Procedure: ESOPHAGOGASTRODUODENOSCOPY (EGD);  Surgeon: Sunny Rodrigues III, MD;  Location: [facility];  Service: Gastroenterology    JOINT REPLACEMENT      L hip replacement    ORCHIECTOMY      PROSTATECTOMY      TOTAL HIP ARTHROPLASTY         FAMILY HISTORY:  Non-contributory    SOCIAL HISTORY:  Social History   Social History     Substance and Sexual Activity   Alcohol Use Never    Alcohol/week: 2.0 standard drinks of alcohol    Types: 2 Standard drinks or equivalent per week     Social History     Substance and Sexual Activity   Drug Use No     Social History     Tobacco Use   Smoking Status Every Day    Current packs/day: 0.00    Types: Cigarettes    Last attempt to quit: 2018    Years since quittin.6   Smokeless Tobacco Never   Tobacco Comments    2 cig/day       ALLERGIES:  No Known Allergies    MEDICATIONS:  All current active medications have been reviewed.    PHYSICAL EXAM:  Temp:  [95.7 °F (35.4 °C)-98.2 °F (36.8 °C)] 97.5 °F (36.4 °C)  HR:  [75-97] 97  Resp:  [15-22] 18  BP: ()/(53-80) 137/73  SpO2:  [93 %-99 %] 93 %  Temp (24hrs), Av.1 °F (36.2 °C), Min:95.7 °F (35.4 °C), Max:98.2 °F (36.8 °C)  Current: Temperature: 97.5 °F (36.4 °C)    Intake/Output Summary (Last 24 hours) at 2024 0855  Last data filed at 2024 0222  Gross per 24 hour   Intake 900 ml   Output --   Net 900 ml       General Appearance:  Appearing well, nontoxic, and in no distress; able to provide detailed history.   Head:  Normocephalic, without obvious abnormality, atraumatic   Eyes:  Conjunctiva pink and sclera anicteric, both eyes   Nose: Nares normal, mucosa normal, no drainage   Throat: Oropharynx moist without lesions   Neck: Supple, symmetrical, no adenopathy, no tenderness/mass/nodules   Back:   Symmetric, no curvature, ROM normal, no CVA tenderness   Lungs:   Clear to auscultation bilaterally, respirations unlabored   Chest Wall:  No tenderness or deformity   Heart:  RRR; no murmur, rub or gallop  noted   Abdomen:   Soft, non-tender, non-distended, positive bowel sounds    Extremities: No cyanosis, clubbing or edema; patient appears to have full range of motion of both of his elbows/forearms   Skin: Left elbow with mild surrounding erythema, minimal warmth.  Area of drainage noted.  Minimal swelling.  Patient also has a small wound on the opposite elbow without any surrounding cellulitic changes.   Lymph nodes: Cervical, supraclavicular nodes normal   Neurologic: Alert and oriented times 3, extremity strength 5/5 and symmetric       LABS, IMAGING, & OTHER STUDIES:  In completing this consult I have performed an extensive review of the medical records in epic including review of the notes, radiographs, and laboratory results as detailed below.     Lab Results:  I have personally reviewed pertinent labs.  Comments/Interpretations: White blood cell count has downtrended.  Platelets mildly low on this admission.    Results from last 7 days   Lab Units 01/23/24  0603 01/22/24  1237   WBC Thousand/uL 8.79 11.81*   HEMOGLOBIN g/dL 14.4 16.0   PLATELETS Thousands/uL 136* 147*     Results from last 7 days   Lab Units 01/23/24  0603 01/22/24  1237   POTASSIUM mmol/L 3.8 3.8   CHLORIDE mmol/L 107 105   CO2 mmol/L 29 28   BUN mg/dL 19 22   CREATININE mg/dL 1.03 1.02   EGFR ml/min/1.73sq m 69 70   CALCIUM mg/dL 8.6 9.4   AST U/L 17 21   ALT U/L 15 22   ALK PHOS U/L 78 98     Results from last 7 days   Lab Units 01/22/24  1237 01/22/24  1030   BLOOD CULTURE  Received in Microbiology Lab. Culture in Progress.  Received in Microbiology Lab. Culture in Progress.  --    GRAM STAIN RESULT   --  1+ Polys*  Rare Disintegrating polys*  Rare Gram positive cocci in pairs*       Imaging Studies:   I have personally reviewed pertinent imaging study reports and images in PACS.  Comments/Interpretations:  CT imaging of the arm reviewed without any bony changes at the elbow.   images also reviewed and patient has a prosthetic hip  in place along with pacemaker and sternotomy wires.  No other devices noted or spinal hardware.    Other Studies:   I have personally reviewed other pertinent reports as below.  Records in CareEverywhere: Recent admission in Care Everywhere reviewed from December 2023 with placement of his ICD.  Recent cardiology visit reviewed which documents previous valve replacements    Nursing home/EMS Records: None    Current/Prior Cultures: No recent positive blood cultures in our system.  Current blood cultures pending.  Wound cultures pending. no recent significant cultures in Care Everywhere.

## 2024-01-23 NOTE — PROGRESS NOTES
Scott Morris is a 78 y.o. male who is currently ordered Vancomycin IV with management by the Pharmacy Consult service.  Relevant clinical data and objective / subjective history reviewed.  Vancomycin Assessment:  Indication and Goal AUC/Trough: Bone/joint infection (goal -600, trough >10)  Clinical Status: stable  Micro:       Renal Function:  SCr: 1.03 mg/dL  CrCl: 42 mL/min  Renal replacement: Not on dialysis  Days of Therapy: 2  Current Dose: 1000mg IV q24h  Vancomycin Plan:  New Dosing: continue 1000mg IV q24h  Estimated AUC: 462 mcg*hr/mL  Estimated Trough: 13.4 mcg/mL  Next Level: 1/25 AM draw  Renal Function Monitoring: Daily BMP and UOP  Pharmacy will continue to follow closely for s/sx of nephrotoxicity, infusion reactions and appropriateness of therapy.  BMP and CBC will be ordered per protocol. We will continue to follow the patient’s culture results and clinical progress daily.    Tammy Devries, Pharmacist

## 2024-01-23 NOTE — ASSESSMENT & PLAN NOTE
Stable with minimal residual effects  INR therapeutic  Continue Plavix and statin; promote BP control

## 2024-01-23 NOTE — ASSESSMENT & PLAN NOTE
Hx of HFrEF 20% via ECHO in 12/2023   S/p recent ICD PPM placement 2/2 to bradycardia   Clinically euvolemic  Continue Entresto, carvedilol, spironolactone, Lasix

## 2024-01-23 NOTE — ASSESSMENT & PLAN NOTE
S/p redo bio MVR (mosaic #29), AVR (#25 Inspiris) 3/19/2021;  Prior MV ring annuloplasty, MAZE,ONEYDA ligation7/29/2014 for MVP MR, ruptured cord of A2 scallop     Recent Labs     01/22/24  1237 01/23/24  0603   INR 2.67* 2.69*

## 2024-01-23 NOTE — ASSESSMENT & PLAN NOTE
POA, in setting of olecranon bursitis evidenced by CT scan results   Known to wound care as an outpatient  CT of LUE:   Olecranon bursitis containing locules of gas; septic bursitis should be considered in the absence of recent aspiration/intervention   No CT evidence of osteomyelitis  Gas component likely 2/2 olecranon bursa open/actively draining  Patient is nontoxic otherwise to suggest a necrotizing infection  Ortho following  W/ persistent symptoms, patient may need to go to OR for formal I&d   ID consulted; appreciate recommendations  Continue Vanco   Continue local wound care  Analgesia  PEDRO SIFUENTES

## 2024-01-23 NOTE — ASSESSMENT & PLAN NOTE
Patient presented to the ED with complaints of worsening left elbow pain and tenderness.  CT Left Upper Extremity (1/22/24): Olecranon bursitis containing locules of gas; septic bursitis should be considered in the absence of recent aspiration/intervention. No CT evidence of osteomyelitis.  Orthopedic surgery consult, appreciate input  No surgical intervention recommended at this time  Continue with antibiotics, follow up with PCP OP.  ID Consult, appreciate input  Recommending PO Keflex 500 mg every 6 hours, treat through 2/6

## 2024-01-23 NOTE — PROGRESS NOTES
Progress Note - Orthopedics   Scott Morris 78 y.o. male MRN: [id number]  Unit/Bed#: -01      Subjective:    78 y.o.male currently admitted secondary to left olecranon bursitis.  Patient states that he continues to have pain throughout the posterior aspect of his left elbow.  Patient has been receiving IV antibiotics since admission to the hospital.  Patient denies any fevers any chills.  Patient denies any shortness of breath chest tightness chest pain.  Patient denies any nausea vomiting diarrhea.  Patient denies any dizziness lightheadedness.   Patient denies any new or worsening symptoms in his arm.  Patient offers no other complaints at this time    Labs:  0   Lab Value Date/Time    HCT 43.6 01/23/2024 0603    HCT 48.0 01/22/2024 1237    HCT 46.6 01/05/2024 1719    HGB 14.4 01/23/2024 0603    HGB 16.0 01/22/2024 1237    HGB 15.3 01/05/2024 1719    INR 2.69 (H) 01/23/2024 0603    WBC 8.79 01/23/2024 0603    WBC 11.81 (H) 01/22/2024 1237    WBC 7.86 01/05/2024 1719    ESR 8 01/22/2024 1237    CRP 14.6 (H) 01/22/2024 1237       Meds:    Current Facility-Administered Medications:     acetaminophen (TYLENOL) tablet 650 mg, 650 mg, Oral, Q6H PRN, Rafa Jackson MD    atorvastatin (LIPITOR) tablet 40 mg, 40 mg, Oral, Daily, Rafa Jackson MD    bimatoprost (LUMIGAN) 0.03 % ophthalmic drops 1 drop, 1 drop, Ophthalmic, Daily, Rafa Jackson MD, 1 drop at 01/22/24 1800    carvedilol (COREG) tablet 3.125 mg, 3.125 mg, Oral, BID With Meals, Rafa Jackson MD, 3.125 mg at 01/22/24 1842    clopidogrel (PLAVIX) tablet 75 mg, 75 mg, Oral, Daily, Rafa Jackson MD    digoxin (LANOXIN) tablet 125 mcg, 125 mcg, Oral, Daily, Rafa Jackson MD    furosemide (LASIX) tablet 40 mg, 40 mg, Oral, Daily, Rafa Jackson MD    HYDROmorphone (DILAUDID) injection 0.5 mg, 0.5 mg, Intravenous, Q6H PRN, Rafa Jackson MD    midodrine (PROAMATINE) tablet  "2.5 mg, 2.5 mg, Oral, BID, Rafa Jackson MD, 2.5 mg at 01/22/24 2102    nicotine (NICODERM CQ) 21 mg/24 hr TD 24 hr patch 1 patch, 1 patch, Transdermal, Daily, Rafa Jackson MD    oxyCODONE (ROXICODONE) split tablet 2.5 mg, 2.5 mg, Oral, Q6H PRN, Rafa Jackson MD, 2.5 mg at 01/22/24 2102    sacubitril-valsartan (ENTRESTO) 24-26 MG per tablet 0.5 tablet, 0.5 tablet, Oral, BID, Rafa Jackson MD    spironolactone (ALDACTONE) tablet 12.5 mg, 12.5 mg, Oral, Daily, Rafa Jackson MD    vancomycin (VANCOCIN) IVPB (premix in dextrose) 1,000 mg 200 mL, 1,000 mg, Intravenous, Q24H, Rafa Jackson MD, Stopped at 01/23/24 0222    warfarin (COUMADIN) tablet 2.5 mg, 2.5 mg, Oral, Once per day on Sunday Tuesday Wednesday Friday, Rafa Jackson MD    [START ON 1/25/2024] warfarin (COUMADIN) tablet 5 mg, 5 mg, Oral, Once per day on Monday Thursday Saturday, Rafa Jackson MD    Blood Culture:   Lab Results   Component Value Date    BLOODCX Received in Microbiology Lab. Culture in Progress. 01/22/2024    BLOODCX Received in Microbiology Lab. Culture in Progress. 01/22/2024       Wound Culture:   No results found for: \"WOUNDCULT\"    Ins and Outs:  I/O last 24 hours:  In: 900 [IV Piggyback:900]  Out: -     Physical:  Vitals:    01/23/24 0819   BP: 137/73   Pulse: 97   Resp: 18   Temp: 97.5 °F (36.4 °C)   SpO2: 93%     Musculoskeletal: left Upper Extremity  Patient resting comfortably in hospital bed in no apparent distress  Skin : Persistent erythema noted on the posterior aspect of the elbow extending down the entirety of the forearm.  Small opening present in the posterior aspect of the elbow producing bloody discharge.   Dressing : Ace wrap clean dry and intact.  Mild bloody drainage on 4 x 4 present  TTP  : Tenderness palpation noted to the posterior aspect of the left elbow but no other bony or soft tissue tenderness to palpation noted at this " "time.  Sensation intact to median/radial/ulnar nerve distribution   Motor intact anterior interosseous nerve/posterior interosseous nerve/median/radial/ulnar nerve distributions  2+ radial pulse  Digits warm and well perfused  Capillary refill < 2 seconds      Assessment:    78 y.o.male currently admitted secondary to septic left olecranon bursitis, treated with bedside I&D and wound care 1/22/24.       Plan:  NWB LUE   Change dressings as needed   4x4, ace wrap   Will hold off on surgical intervention at this time for continued conservative treatment   Continue IV ABX   Pain control per primary team   DVT ppx  :  per primary team   Dispo: Ortho will follow.   See above for additional details. Will continue to monitor symptoms closely. Discussed possibility of surgical intervention in the future pending symptoms.           Marcelino Lombardo PA-C                    Portions of the record may have been created with voice recognition software.  Occasional wrong word or \"sound a like\" substitutions may have occurred due to the inherent limitations of voice recognition software.  Read the chart carefully and recognize, using context, where substitutions have occurred.    "

## 2024-01-23 NOTE — ASSESSMENT & PLAN NOTE
Stage 3, b/l, POA   Was being treated by wound care as an outpatient  See management above for left olecranon bursitis

## 2024-01-23 NOTE — CASE MANAGEMENT
Case Management Assessment & Discharge Planning Note    Patient name Scott Morris  Location /-01 MRN [id number]  : 1945 Date 2024       Current Admission Date: 2024  Current Admission Diagnosis:Olecranon bursitis   Patient Active Problem List    Diagnosis Date Noted    Olecranon bursitis 2024    Chronic systolic (congestive) heart failure (HCC) 2024    Chronic ischemic right MCA stroke 2022    Hypotension 2022    Acute pulmonary embolism without acute cor pulmonale (HCC) 2022    CKD (chronic kidney disease) 2022    Vasomotor rhinitis 2022    Ambulatory dysfunction 2021    Anemia due to blood loss, acute 2021    History of normocytic normochromic anemia 2021    Impaired fasting glucose 12/10/2018    Idiopathic chronic gout of multiple sites without tophus 2018    Pain in gums 2018    Chronic anticoagulation 2018    Abnormal RBC indices 06/15/2018    Fatigue 06/15/2018    PAF (paroxysmal atrial fibrillation) (HCC) 2018    Polyp of colon 2018    Acute on chronic combined systolic and diastolic congestive heart failure (HCC) 2017    CAD (coronary atherosclerotic disease) 2016    Chronic back pain 2016    GERD (gastroesophageal reflux disease) 2016    S/P MVR (mitral valve repair) 10/27/2015    Peptic ulcer disease 2015    Hypercholesterolemia 2015    Hypertension 2015      LOS (days): 1  Geometric Mean LOS (GMLOS) (days): 3.1  Days to GMLOS:2.3     OBJECTIVE:  PATIENT READMITTED TO HOSPITAL  Risk of Unplanned Readmission Score: 14.98         Current admission status: Inpatient       Preferred Pharmacy:   [id number] - [place] - [address]  [address]  [place] [address]  Phone: [phone] Fax: [phone]    Primary Care Provider: Lenin Hardy MD    Primary Insurance: MEDICARE  Secondary Insurance: BLUE  CROSS    ASSESSMENT:  Active Health Care Proxies       Scott Morris Health Care Representative - Son   Primary Phone: [phone] (Mobile)  Home Phone: [phone]                 Advance Directives  Does patient have a Health Care POA?: Yes  Does patient have Advance Directives?: Yes  Advance Directives: Power of  for health care  Primary Contact: Scott Morris (Son)  [phone]         Readmission Root Cause  30 Day Readmission: Yes  Who directed you to return to the hospital?: Specialist ([facility])  Did you understand whom to contact if you had questions or problems?: Yes  Did you get your prescriptions before you left the hospital?: Yes  Were you able to get your prescriptions filled when you left the hospital?: Yes  Did you take your medications as prescribed?: Yes  Were you able to get to your follow-up appointments?: Yes  During previous admission, was a post-acute recommendation made?: No  Patient was readmitted due to: Olecranon bursitis  Action Plan: TBD    Patient Information  Admitted from:: Other (comment) ( independent living)  Mental Status: Alert  During Assessment patient was accompanied by: Not accompanied during assessment  Assessment information provided by:: Patient  Support Systems: Self, Son  County of Residence: [place]  What city do you live in?: [place]  Home entry access options. Select all that apply.: No steps to enter home  Type of Current Residence: Apartment ([facility])  Upon entering residence, is there a bedroom on the main floor (no further steps)?: Yes  Upon entering residence, is there a bathroom on the main floor (no further steps)?: Yes  Living Arrangements: Lives Alone  Is patient a ?: No    Activities of Daily Living Prior to Admission  Functional Status: Independent  Completes ADLs independently?: Yes  Ambulates independently?: Yes  Does patient use assisted devices?: No  Does patient currently own DME?:  Yes  What DME does the patient currently own?: Straight Cane, Walker, Shower Chair  Does patient have a history of Outpatient Therapy (PT/OT)?: No  Does the patient have a history of Short-Term Rehab?: No  Does patient have a history of HHC?: Yes ([facility])  Does patient currently have HHC?: No         Patient Information Continued  Income Source: Pension/[employer]  Does patient have prescription coverage?: Yes  Does patient receive dialysis treatments?: No         Means of Transportation  Means of Transport to Appts:: Drives Self      Housing Stability: Low Risk  (1/23/2024)    Housing Stability Vital Sign     Unable to Pay for Housing in the Last Year: No     Number of Places Lived in the Last Year: 1     Unstable Housing in the Last Year: No   Recent Concern: Housing Stability - High Risk (12/19/2023)    Received from [facility]    Housing Stability Vital Sign     Unable to Pay for Housing in the Last Year: Yes     Number of Places Lived in the Last Year: 1     Unstable Housing in the Last Year: No   Food Insecurity: No Food Insecurity (1/23/2024)    Hunger Vital Sign     Worried About Running Out of Food in the Last Year: Never true     Ran Out of Food in the Last Year: Never true   Transportation Needs: No Transportation Needs (1/23/2024)    PRAPARE - Transportation     Lack of Transportation (Medical): No     Lack of Transportation (Non-Medical): No   Utilities: Not At Risk (1/23/2024)    [facility] Utilities     Threatened with loss of utilities: No       DISCHARGE DETAILS:    Discharge planning discussed with:: Pt at bedside  [place] of Choice: Yes  Comments - Freedom of Choice: CM met with pt at bedside and introduced self/role. Pt is alert and oriented x3 able to amke his needs known and encouraged to do so. FOC discussed at [facility]. Pt states he is independent with all of his ADLs and IADLs.  Pt is aware and encouraged to seek CM for any questions or concerns. CM conitnues to follow.  CM contacted  family/caregiver?: Yes  Were Treatment Team discharge recommendations reviewed with patient/caregiver?: Yes  Did patient/caregiver verbalize understanding of patient care needs?: Yes  Were patient/caregiver advised of the risks associated with not following Treatment Team discharge recommendations?: Yes    Contacts  Patient Contacts: Guillermo  Relationship to Patient:: Family  Contact Method: Phone  Phone Number: [phone]  Reason/Outcome: Continuity of Care, Discharge Planning, Emergency Contact, Referral    Requested Home Health Care         Is the patient interested in HHC at discharge?: No    DME Referral Provided  Referral made for DME?: No    Other Referral/Resources/Interventions Provided:  Interventions: None Indicated  Referral Comments: No referrals made at this time    Would you like to participate in our [facility] service program?  : No - Declined    Treatment Team Recommendation: Assisted Living  Discharge Destination Plan:: Assisted Living  Transport at Discharge : Family, Self

## 2024-01-23 NOTE — PLAN OF CARE
Problem: INFECTION - ADULT  Goal: Absence or prevention of progression during hospitalization  Description: INTERVENTIONS:  - Assess and monitor for signs and symptoms of infection  - Monitor lab/diagnostic results  - Monitor all insertion sites, i.e. indwelling lines, tubes, and drains  - Monitor endotracheal if appropriate and nasal secretions for changes in amount and color  - [place] appropriate cooling/warming therapies per order  - Administer medications as ordered  - Instruct and encourage patient and family to use good hand hygiene technique  - Identify and instruct in appropriate isolation precautions for identified infection/condition  Outcome: Progressing  Goal: Absence of fever/infection during neutropenic period  Description: INTERVENTIONS:  - Monitor WBC    Outcome: Progressing     Problem: PAIN - ADULT  Goal: Verbalizes/displays adequate comfort level or baseline comfort level  Description: Interventions:  - Encourage patient to monitor pain and request assistance  - Assess pain using appropriate pain scale  - Administer analgesics based on type and severity of pain and evaluate response  - Implement non-pharmacological measures as appropriate and evaluate response  - Consider cultural and social influences on pain and pain management  - Notify physician/advanced practitioner if interventions unsuccessful or patient reports new pain  Outcome: Progressing

## 2024-01-23 NOTE — ASSESSMENT & PLAN NOTE
Recent Labs     01/22/24  1237 01/23/24  0603   INR 2.67* 2.69*     Rate controlled  Continue carvedilol, digoxin and warfarin, therapeutic  [id number] = 6

## 2024-01-23 NOTE — PROGRESS NOTES
[facility]  Progress Note  Name: Scott Morris I  MRN: [id number]  Unit/Bed#: -01 I Date of Admission: 1/22/2024   Date of Service: 1/23/2024 I Hospital Day: 1    Assessment/Plan   * Olecranon bursitis  Assessment & Plan  POA, in setting of olecranon bursitis evidenced by CT scan results   Known to wound care as an outpatient  CT of LUE:   Olecranon bursitis containing locules of gas; septic bursitis should be considered in the absence of recent aspiration/intervention   No CT evidence of osteomyelitis  Gas component likely 2/2 olecranon bursa open/actively draining  Patient is nontoxic otherwise to suggest a necrotizing infection  Ortho following  W/ persistent symptoms, patient may need to go to OR for formal I&d   ID consulted; appreciate recommendations  F/U Bcx; f/u wound cx   Continue Vanco   Continue local wound care  Analgesia  LUE NWB     Pressure injury of elbow, stage 3 (HCC)  Assessment & Plan  Stage 3, b/l, POA   Was being treated by wound care as an outpatient  See management above for left olecranon bursitis    Chronic systolic (congestive) heart failure (HCC)  Assessment & Plan  Wt Readings from Last 3 Encounters:   01/18/24 56.3 kg (124 lb 3.2 oz)   01/05/24 56.9 kg (125 lb 7.1 oz)   12/28/23 56.7 kg (125 lb)     Hx of HFrEF 20% via ECHO in 12/2023   S/p recent ICD PPM placement 2/2 to bradycardia   Clinically euvolemic  Continue Entresto, carvedilol, spironolactone, Lasix  Monitor volume status      PAF (paroxysmal atrial fibrillation) (HCC)  Assessment & Plan  Recent Labs     01/22/24  1237 01/23/24  0603   INR 2.67* 2.69*       Rate controlled  Continue carvedilol, digoxin and warfarin, therapeutic  [id number] = 6    CAD (coronary atherosclerotic disease)  Assessment & Plan  PTCA LAD 1989 & stent placement 2/2019   Stable, denies CP  Continue Lipitor, Coreg, Plavix, and Entresto    Chronic ischemic right MCA stroke  Assessment & Plan  Stable with minimal  residual effects  INR therapeutic  Continue Plavix and statin; promote BP control    H/O mitral valve replacement  Assessment & Plan  S/p redo bio MVR (mosaic #29), AVR (#25 Inspiris) 3/19/2021;  Prior MV ring annuloplasty, MAZE,ONEYDA ligation7/29/2014 for MVP MR, ruptured cord of A2 scallop     Recent Labs     01/22/24  1237 01/23/24  0603   INR 2.67* 2.69*         Tobacco use  Assessment & Plan  Continue nicotine patch    Hypertension  Assessment & Plan  Continue carvedilol, lasix, aldactone, Entresto w/ BP holds   Monitor BP; SBP range 110-140    Hypercholesterolemia  Assessment & Plan  Continue statin    GERD (gastroesophageal reflux disease)  Assessment & Plan  Continue PPI  Asymptomatic; tolerating p.o. intake           VTE Pharmacologic Prophylaxis:   High Risk (Score >/= 5) - Pharmacological DVT Prophylaxis Ordered: warfarin (Coumadin). Sequential Compression Devices Ordered.    Mobility:      HLM Goal achieved. Continue to encourage appropriate mobility.    Patient Centered Rounds: Reached out to nursing staff via [facility] [place] text; nurse not available on floor when I was present  Discussions with Specialists or Other Care Team Provider: Orthopedics and ID following    Education and Discussions with Family / Patient: Updated  (son) via phone.    Total Time Spent on Date of Encounter in care of patient: 40 mins. This time was spent on one or more of the following: performing physical exam; counseling and coordination of care; obtaining or reviewing history; documenting in the medical record; reviewing/ordering tests, medications or procedures; communicating with other healthcare professionals and discussing with patient's family/caregivers.    Current Length of Stay: 1 day(s)  Current Patient Status: Inpatient   Certification Statement: The patient will continue to require additional inpatient hospital stay due to acute olecranon bursitis with suspected superimposed infection  Discharge Plan: Anticipate  discharge in 48 hrs to home with home services.    Code Status: Level 1 - Full Code    Subjective:   Patient is eating breakfast at time of exam.  His appetite is good.  He had a recent bowel movement.  He denies dysuria.  He is without chest pain, shortness of breath, nausea, vomiting, abdominal pain, or diaphoresis.  His left elbow pain is well-controlled.    Objective:     Vitals:   Temp (24hrs), Av.5 °F (36.4 °C), Min:97.5 °F (36.4 °C), Max:97.5 °F (36.4 °C)    Temp:  [97.5 °F (36.4 °C)] 97.5 °F (36.4 °C)  HR:  [75-97] 85  Resp:  [15-22] 18  BP: ()/(53-80) 108/68  SpO2:  [93 %-99 %] 93 %  There is no height or weight on file to calculate BMI.     Input and Output Summary (last 24 hours):     Intake/Output Summary (Last 24 hours) at 2024 1127  Last data filed at 2024 0222  Gross per 24 hour   Intake 900 ml   Output --   Net 900 ml       Physical Exam:   Physical Exam  Constitutional:       Comments: Pleasant, somewhat frail   HENT:      Head: Normocephalic and atraumatic.      Right Ear: External ear normal.      Left Ear: External ear normal.      Nose: Nose normal.      Mouth/Throat:      Mouth: Mucous membranes are moist.      Pharynx: Oropharynx is clear.   Eyes:      Extraocular Movements: Extraocular movements intact.      Conjunctiva/sclera: Conjunctivae normal.   Cardiovascular:      Rate and Rhythm: Normal rate. Rhythm irregular.   Pulmonary:      Effort: Pulmonary effort is normal.      Breath sounds: Normal breath sounds.   Abdominal:      General: Abdomen is flat. Bowel sounds are normal.      Palpations: Abdomen is soft.   Musculoskeletal:         General: Normal range of motion.      Cervical back: Normal range of motion.      Comments: Passive range of motion of left elbow present; left upper extremity neurovascularly intact   Skin:     Comments: Bilateral elbows with clean/dry/intact dressings; Ace wrap on the left elbow   Neurological:      General: No focal deficit present.       Mental Status: He is alert. Mental status is at baseline.   Psychiatric:         Mood and Affect: Mood normal.         Thought Content: Thought content normal.         Judgment: Judgment normal.          Additional Data:     Labs:  Results from last 7 days   Lab Units 01/23/24  0603 01/22/24  1237   WBC Thousand/uL 8.79 11.81*   HEMOGLOBIN g/dL 14.4 16.0   HEMATOCRIT % 43.6 48.0   PLATELETS Thousands/uL 136* 147*   NEUTROS PCT %  --  82*   LYMPHS PCT %  --  7*   MONOS PCT %  --  8   EOS PCT %  --  2     Results from last 7 days   Lab Units 01/23/24  0603   SODIUM mmol/L 140   POTASSIUM mmol/L 3.8   CHLORIDE mmol/L 107   CO2 mmol/L 29   BUN mg/dL 19   CREATININE mg/dL 1.03   ANION GAP mmol/L 4   CALCIUM mg/dL 8.6   ALBUMIN g/dL 3.5   TOTAL BILIRUBIN mg/dL 1.01*   ALK PHOS U/L 78   ALT U/L 15   AST U/L 17   GLUCOSE RANDOM mg/dL 89     Results from last 7 days   Lab Units 01/23/24  0603   INR  2.69*             Results from last 7 days   Lab Units 01/22/24  1237   LACTIC ACID mmol/L 1.0   PROCALCITONIN ng/ml <0.05       Lines/Drains:  Invasive Devices       Peripheral Intravenous Line  Duration             Peripheral IV 01/22/24 Right Antecubital <1 day                          Imaging: Reviewed radiology reports from this admission including: CT of left upper extremity    Recent Cultures (last 7 days):   Results from last 7 days   Lab Units 01/22/24  1237 01/22/24  1030   BLOOD CULTURE  Received in Microbiology Lab. Culture in Progress.  Received in Microbiology Lab. Culture in Progress.  --    GRAM STAIN RESULT   --  1+ Polys*  Rare Disintegrating polys*  Rare Gram positive cocci in pairs*       Last 24 Hours Medication List:   Current Facility-Administered Medications   Medication Dose Route Frequency Provider Last Rate    acetaminophen  650 mg Oral Q6H PRN Rafa Jackson MD      atorvastatin  40 mg Oral Daily Rafa Jackson MD      bimatoprost  1 drop Ophthalmic Daily Rafa valenzuela  MD Bran      carvedilol  3.125 mg Oral BID With Meals Rafa Jackson MD      clopidogrel  75 mg Oral Daily Rafa Jackson MD      digoxin  125 mcg Oral Daily Rafa Jackson MD      furosemide  40 mg Oral Daily Rafa Jackson MD      HYDROmorphone  0.5 mg Intravenous Q6H PRN Rafa Jackson MD      midodrine  2.5 mg Oral BID Rafa Jackson MD      nicotine  1 patch Transdermal Daily Rafa Jackson MD      oxyCODONE  2.5 mg Oral Q6H PRN Rafa Jackson MD      sacubitril-valsartan  0.5 tablet Oral BID Rafa Jackson MD      spironolactone  12.5 mg Oral Daily Rafa Jackson MD      vancomycin  1,000 mg Intravenous Q24H Rafa Jackson MD Stopped (01/23/24 0222)    warfarin  2.5 mg Oral Once per day on Sunday Tuesday Wednesday Friday Rafa Jackson MD      [START ON 1/25/2024] warfarin  5 mg Oral Once per day on Monday Thursday Saturday Rafa Jackson MD          Today, Patient Was Seen By: Carrie Latif PA-C    **Please Note: This note may have been constructed using a voice recognition system.**

## 2024-01-24 ENCOUNTER — HOME HEALTH ADMISSION (OUTPATIENT)
Dept: HOME HEALTH SERVICES | Facility: HOME HEALTHCARE | Age: 79
End: 2024-01-24
Payer: MEDICARE

## 2024-01-24 VITALS
SYSTOLIC BLOOD PRESSURE: 117 MMHG | RESPIRATION RATE: 18 BRPM | OXYGEN SATURATION: 96 % | DIASTOLIC BLOOD PRESSURE: 61 MMHG | HEART RATE: 77 BPM | WEIGHT: 123.9 LBS | HEIGHT: 66 IN | BODY MASS INDEX: 19.91 KG/M2 | TEMPERATURE: 98 F

## 2024-01-24 LAB
ALBUMIN SERPL BCP-MCNC: 3.3 G/DL (ref 3.5–5)
ALP SERPL-CCNC: 74 U/L (ref 34–104)
ALT SERPL W P-5'-P-CCNC: 14 U/L (ref 7–52)
ANION GAP SERPL CALCULATED.3IONS-SCNC: 5 MMOL/L
AST SERPL W P-5'-P-CCNC: 16 U/L (ref 13–39)
BACTERIA WND AEROBE CULT: ABNORMAL
BACTERIA WND AEROBE CULT: ABNORMAL
BASOPHILS # BLD AUTO: 0.09 THOUSANDS/ÂΜL (ref 0–0.1)
BASOPHILS NFR BLD AUTO: 1 % (ref 0–1)
BILIRUB SERPL-MCNC: 0.63 MG/DL (ref 0.2–1)
BUN SERPL-MCNC: 17 MG/DL (ref 5–25)
CALCIUM ALBUM COR SERPL-MCNC: 9.4 MG/DL (ref 8.3–10.1)
CALCIUM SERPL-MCNC: 8.8 MG/DL (ref 8.4–10.2)
CHLORIDE SERPL-SCNC: 107 MMOL/L (ref 96–108)
CO2 SERPL-SCNC: 27 MMOL/L (ref 21–32)
CREAT SERPL-MCNC: 0.85 MG/DL (ref 0.6–1.3)
EOSINOPHIL # BLD AUTO: 0.32 THOUSAND/ÂΜL (ref 0–0.61)
EOSINOPHIL NFR BLD AUTO: 4 % (ref 0–6)
ERYTHROCYTE [DISTWIDTH] IN BLOOD BY AUTOMATED COUNT: 14.8 % (ref 11.6–15.1)
GFR SERPL CREATININE-BSD FRML MDRD: 84 ML/MIN/1.73SQ M
GLUCOSE SERPL-MCNC: 107 MG/DL (ref 65–140)
GRAM STN SPEC: ABNORMAL
HCT VFR BLD AUTO: 41 % (ref 36.5–49.3)
HGB BLD-MCNC: 13.8 G/DL (ref 12–17)
IMM GRANULOCYTES # BLD AUTO: 0.03 THOUSAND/UL (ref 0–0.2)
IMM GRANULOCYTES NFR BLD AUTO: 0 % (ref 0–2)
INR PPP: 1.95 (ref 0.84–1.19)
LYMPHOCYTES # BLD AUTO: 0.94 THOUSANDS/ÂΜL (ref 0.6–4.47)
LYMPHOCYTES NFR BLD AUTO: 12 % (ref 14–44)
MCH RBC QN AUTO: 34.7 PG (ref 26.8–34.3)
MCHC RBC AUTO-ENTMCNC: 33.7 G/DL (ref 31.4–37.4)
MCV RBC AUTO: 103 FL (ref 82–98)
MONOCYTES # BLD AUTO: 1.14 THOUSAND/ÂΜL (ref 0.17–1.22)
MONOCYTES NFR BLD AUTO: 15 % (ref 4–12)
MRSA NOSE QL CULT: NORMAL
NEUTROPHILS # BLD AUTO: 5.36 THOUSANDS/ÂΜL (ref 1.85–7.62)
NEUTS SEG NFR BLD AUTO: 68 % (ref 43–75)
NRBC BLD AUTO-RTO: 0 /100 WBCS
PLATELET # BLD AUTO: 120 THOUSANDS/UL (ref 149–390)
PMV BLD AUTO: 9.2 FL (ref 8.9–12.7)
POTASSIUM SERPL-SCNC: 3.8 MMOL/L (ref 3.5–5.3)
PROT SERPL-MCNC: 5.7 G/DL (ref 6.4–8.4)
PROTHROMBIN TIME: 23.1 SECONDS (ref 11.6–14.5)
RBC # BLD AUTO: 3.98 MILLION/UL (ref 3.88–5.62)
SODIUM SERPL-SCNC: 139 MMOL/L (ref 135–147)
WBC # BLD AUTO: 7.88 THOUSAND/UL (ref 4.31–10.16)

## 2024-01-24 PROCEDURE — 99233 SBSQ HOSP IP/OBS HIGH 50: CPT | Performed by: INTERNAL MEDICINE

## 2024-01-24 PROCEDURE — 99232 SBSQ HOSP IP/OBS MODERATE 35: CPT | Performed by: PHYSICIAN ASSISTANT

## 2024-01-24 PROCEDURE — 80053 COMPREHEN METABOLIC PANEL: CPT | Performed by: INTERNAL MEDICINE

## 2024-01-24 PROCEDURE — 85610 PROTHROMBIN TIME: CPT | Performed by: PHYSICIAN ASSISTANT

## 2024-01-24 PROCEDURE — 99239 HOSP IP/OBS DSCHRG MGMT >30: CPT | Performed by: NURSE PRACTITIONER

## 2024-01-24 PROCEDURE — 85025 COMPLETE CBC W/AUTO DIFF WBC: CPT | Performed by: INTERNAL MEDICINE

## 2024-01-24 RX ORDER — CEPHALEXIN 500 MG/1
500 CAPSULE ORAL EVERY 6 HOURS SCHEDULED
Status: DISCONTINUED | OUTPATIENT
Start: 2024-01-25 | End: 2024-01-24 | Stop reason: HOSPADM

## 2024-01-24 RX ORDER — OXYCODONE HYDROCHLORIDE 5 MG/1
2.5 TABLET ORAL EVERY 6 HOURS PRN
Qty: 10 TABLET | Refills: 0 | Status: SHIPPED | OUTPATIENT
Start: 2024-01-24 | End: 2024-01-29

## 2024-01-24 RX ORDER — CEFTRIAXONE 2 G/50ML
2000 INJECTION, SOLUTION INTRAVENOUS EVERY 24 HOURS
Status: DISCONTINUED | OUTPATIENT
Start: 2024-01-24 | End: 2024-01-24

## 2024-01-24 RX ORDER — CEPHALEXIN 500 MG/1
500 CAPSULE ORAL EVERY 6 HOURS SCHEDULED
Qty: 52 CAPSULE | Refills: 0 | Status: SHIPPED | OUTPATIENT
Start: 2024-01-25 | End: 2024-02-07

## 2024-01-24 RX ADMIN — SPIRONOLACTONE 12.5 MG: 25 TABLET ORAL at 08:53

## 2024-01-24 RX ADMIN — CARVEDILOL 3.12 MG: 3.12 TABLET, FILM COATED ORAL at 08:52

## 2024-01-24 RX ADMIN — CLOPIDOGREL 75 MG: 75 TABLET ORAL at 08:52

## 2024-01-24 RX ADMIN — DIGOXIN 125 MCG: 0.12 TABLET ORAL at 08:51

## 2024-01-24 RX ADMIN — SACUBITRIL AND VALSARTAN 0.5 TABLET: 24; 26 TABLET, FILM COATED ORAL at 08:54

## 2024-01-24 RX ADMIN — CEFTRIAXONE 2000 MG: 2 INJECTION, SOLUTION INTRAVENOUS at 08:55

## 2024-01-24 RX ADMIN — NICOTINE 1 PATCH: 21 PATCH, EXTENDED RELEASE TRANSDERMAL at 08:54

## 2024-01-24 RX ADMIN — MIDODRINE HYDROCHLORIDE 2.5 MG: 5 TABLET ORAL at 08:52

## 2024-01-24 NOTE — MALNUTRITION/BMI
This medical record reflects one or more clinical indicators suggestive of malnutrition.    Malnutrition Findings:   Adult Malnutrition type: Acute illness  Adult Degree of Malnutrition: Malnutrition of mild degree  Malnutrition Characteristics: Fat loss, Muscle loss      360 Statement: Related to acute illness as evidenced by moderate fat loss (orbitals) and mild muscle wasting (clavicles). Treated with diet.      See Nutrition note dated 1/24/2024 for additional details.  Completed nutrition assessment is viewable in the nutrition documentation.

## 2024-01-24 NOTE — PROGRESS NOTES
Progress Note - Orthopedics   Scott Morris 78 y.o. male MRN: [id number]  Unit/Bed#: -01      Subjective:    78 y.o.male with bilateral elbow pain, Left > Right. Today, patient reports he is feeling much better than initial admission date. He admits he does not have pain with ROM or to touch. He has maintained dressings, but admits his hand looks swollen. He denies fevers or chills.     Labs:  0   Lab Value Date/Time    HCT 41.0 01/24/2024 0522    HCT 43.6 01/23/2024 0603    HCT 48.0 01/22/2024 1237    HGB 13.8 01/24/2024 0522    HGB 14.4 01/23/2024 0603    HGB 16.0 01/22/2024 1237    INR 1.95 (H) 01/24/2024 0522    WBC 7.88 01/24/2024 0522    WBC 8.79 01/23/2024 0603    WBC 11.81 (H) 01/22/2024 1237    ESR 8 01/22/2024 1237    CRP 14.6 (H) 01/22/2024 1237       Meds:    Current Facility-Administered Medications:     acetaminophen (TYLENOL) tablet 650 mg, 650 mg, Oral, Q6H PRN, Rafa Jackson MD    atorvastatin (LIPITOR) tablet 40 mg, 40 mg, Oral, Daily, Rafa Jackson MD, 40 mg at 01/23/24 2144    bimatoprost (LUMIGAN) 0.03 % ophthalmic drops 1 drop, 1 drop, Ophthalmic, Daily, Rafa Jackson MD, 1 drop at 01/23/24 2144    carvedilol (COREG) tablet 3.125 mg, 3.125 mg, Oral, BID With Meals, Rafa Jackson MD, 3.125 mg at 01/24/24 0852    [START ON 1/25/2024] cephalexin (KEFLEX) capsule 500 mg, 500 mg, Oral, Q6H ANGELA, Dee Blair MD    clopidogrel (PLAVIX) tablet 75 mg, 75 mg, Oral, Daily, Rafa Jackson MD, 75 mg at 01/24/24 0852    digoxin (LANOXIN) tablet 125 mcg, 125 mcg, Oral, Daily, Rafa Jackson MD, 125 mcg at 01/24/24 0851    docusate sodium (COLACE) capsule 100 mg, 100 mg, Oral, BID, Carrie Latif PA-C, 100 mg at 01/23/24 1811    [START ON 1/25/2024] furosemide (LASIX) tablet 40 mg, 40 mg, Oral, Once per day on Tuesday Thursday Saturday, Carrie Latif PA-C    HYDROmorphone (DILAUDID) injection 0.5 mg, 0.5 mg, Intravenous, Q6H PRN, Rafa  Gasper Jackson MD    midodrine (PROAMATINE) tablet 2.5 mg, 2.5 mg, Oral, BID, Rafa Jackson MD, 2.5 mg at 01/24/24 0852    nicotine (NICODERM CQ) 21 mg/24 hr TD 24 hr patch 1 patch, 1 patch, Transdermal, Daily, Rafa Jackson MD, 1 patch at 01/24/24 0854    oxyCODONE (ROXICODONE) split tablet 2.5 mg, 2.5 mg, Oral, Q6H PRN, Rafa Jackson MD, 2.5 mg at 01/23/24 2148    sacubitril-valsartan (ENTRESTO) 24-26 MG per tablet 0.5 tablet, 0.5 tablet, Oral, BID, Rafa Jackson MD, 0.5 tablet at 01/24/24 0854    spironolactone (ALDACTONE) tablet 12.5 mg, 12.5 mg, Oral, Once per day on Sunday Monday Wednesday Friday, Carrie Latif PA-C, 12.5 mg at 01/24/24 0853    warfarin (COUMADIN) tablet 2.5 mg, 2.5 mg, Oral, Once per day on Sunday Tuesday Wednesday Friday, Rafa Jackson MD    [START ON 1/25/2024] warfarin (COUMADIN) tablet 5 mg, 5 mg, Oral, Once per day on Monday Thursday Saturday, Rafa Jackson MD    Blood Culture:   Lab Results   Component Value Date    BLOODCX No Growth at 24 hrs. 01/22/2024    BLOODCX No Growth at 24 hrs. 01/22/2024       Wound Culture:   Lab Results   Component Value Date    WOUNDCULT 3+ Growth of Klebsiella pneumoniae (A) 01/22/2024    WOUNDCULT 3+ Growth of 01/22/2024       Ins and Outs:  I/O last 24 hours:  In: 780 [P.O.:780]  Out: -           Physical:  Vitals:    01/24/24 0850   BP: 117/61   Pulse: 77   Resp: 18   Temp:    SpO2:      Musculoskeletal:   Left elbow  Skin with mild erythema surrounding small 2mm wound at olecranon. No bogginess noted, compared to yesterday exam. Mild swelling throughout the upper extremity secondary to compressive dressing at elbow  Dressing with improved drainage on dressings, decreased from yesterday.   Able to express minimal serous drainage from wound over olecranon  No tenderness over olecranon  Sensation intact to median/radial/ulnar nerve distribution   Motor intact anterior interosseous  nerve/posterior interosseous nerve/median/radial/ulnar nerve distributions  2+ radial pulse  ROM 0 to approximately 140 degrees without pain  Digits warm and well perfused  Capillary refill < 2 seconds    Right elbow  Skin without erythema surrounding 2mm wound on olecranon  No drainage noted  No tenderness   Full AROM  Full composite fist    Assessment:    78 y.o.male bilateral elbow pain, Left > Right, improving with antibiotics     Plan:  WBAT bilateral upper extremities   PT/OT  Pain control  DVT ppx- as per primary team  Continue antibiotics as per primary team, consideration of transition to oral at this time.  Dispo: No orthopaedic surgical intervention at this time as patient is clinically improving. No further orthopedic intervention at this time. If additional questions or concerns, please reach out.     Patsy Bhardwaj PA-C

## 2024-01-24 NOTE — DISCHARGE INSTRUCTIONS
Elbow Bursitis   WHAT YOU NEED TO KNOW:   What is elbow bursitis?  Elbow bursitis is inflammation of the bursa in your elbow. The bursa is a fluid-filled sac that acts as a cushion between a bone and a tendon. A tendon is a cord of strong tissue that connects muscles to bones. The bursa is located right under the point of your elbow.       What increases my risk for elbow bursitis?   An injury, such as a fall    Overuse of your elbow, such as when you play tennis, vacuum, or swing a hammer    Pressure, such as when you lean on your elbow    Bacterial infection    Medical conditions, such as rheumatoid arthritis or gout    What are the signs and symptoms of elbow bursitis?   Pain or tenderness when you touch or move your elbow    Redness or swelling on or around the point of your elbow    Decreased movement of your elbow    Warmth of the skin over your elbow    A grating or grinding sound or feeling when you move your elbow    How is elbow bursitis diagnosed?  Your healthcare provider will examine your elbow and ask about your injury or activities. You may need any of the following:  Blood tests  may be used to check for signs of infection. Healthcare providers may also check for diseases that may be causing your bursitis, such as rheumatoid arthritis.    X-ray or MRI  pictures are used to check your elbow. You may be given contrast liquid to help your elbow show up better in the pictures. Tell the healthcare provider if you have ever had an allergic reaction to contrast liquid. Do not enter the MRI room with anything metal. Metal can cause serious injury. Tell the healthcare provider if you have any metal in or on your body.    A sample of fluid  from your elbow may tested for signs of infection.    How is elbow bursitis treated?   Medicines:      NSAIDs , such as ibuprofen, help decrease swelling, pain, and fever. NSAIDs can cause stomach bleeding or kidney problems in certain people. If you take blood thinner  medicine, always ask your healthcare provider if NSAIDs are safe for you. Always read the medicine label and follow directions.    Aspirin  helps relieve pain and swelling. Take aspirin exactly as directed by your healthcare provider.    Antibiotics  help fight an infection caused by bacteria.    Steroids  help decrease pain and swelling. Steroid injections are given directly into the painful area. Steroid pills may be given for a short time to relieve acute pain.    Fluid aspiration  is a procedure to remove fluid from the area. This may be done before you have a steroid injection.    Surgery  may be needed to remove your bursa or part of your elbow bone. Surgery is only done when other treatments do not work.    How can I manage elbow bursitis?   Rest your elbow as much as possible to decrease pain and swelling.  Slowly start to do more each day. Return to your daily activities as directed. Do not  bend your elbow all the way or lift anything heavy while your elbow is healing. An occupational therapist can help you find ways to keep your elbow rested that will help with the type of work you do. For example, arm rests that do not touch the elbow can make it easier to work at a computer.    Use an elbow pad while your elbow heals.  An elbow pad will cushion and protect your elbow. Your healthcare provider can tell you the kind of elbow pad to use. He or she can also tell you how long to wear it each day, and for how many days to use it.    Apply ice to help decrease swelling and pain.  Use an ice pack, or put crushed ice in a plastic bag. Cover the bag with a towel before you place it on your elbow. Apply ice for 15 to 20 minutes, 3 to 4 times each day, as directed.    Use compression to help decrease swelling.  Healthcare providers may wrap your arm with tape or an elastic bandage. Loosen the elastic bandage if you start to lose feeling in your fingers.         Elevate (raise) your elbow above the level of your  heart as often as you can.  This will help decrease swelling and pain. Prop your elbow on pillows or blankets to keep it elevated comfortably.         Go to physical therapy, if directed.  A physical therapist teaches you exercises to help improve movement and strength, and to decrease pain.    How can I prevent elbow bursitis?   Avoid injury and pressure to your elbows.  Wear elbow pads or protectors when you play sports. Do not lean on your elbows or clench your fists. Do not tightly  small items, such as tools or pens.    Stretch, warm up, and cool down.  Always stretch and do warmup and cool-down exercises before and after you exercise. This will help loosen your muscles and decrease stress on your elbows. Rest between workouts.    When should I call my doctor?   Your pain and swelling increase.    Your symptoms do not improve after 10 days of treatment.    You have a fever.    You have questions or concerns about your condition or care.    CARE AGREEMENT:   You have the right to help plan your care. Learn about your health condition and how it may be treated. Discuss treatment options with your healthcare providers to decide what care you want to receive. You always have the right to refuse treatment. The above information is an  only. It is not intended as medical advice for individual conditions or treatments. Talk to your doctor, nurse or pharmacist before following any medical regimen to see if it is safe and effective for you.  © Copyright [employer] 2023 Information is for End User's use only and may not be sold, redistributed or otherwise used for commercial purposes.  Elbow Bursitis Exercises   AMBULATORY CARE:   Elbow bursitis exercises  help decrease pain and swelling. They also help increase the movement and strength of your elbow. You will need to start with range-of-motion exercises. When you can do these exercises without pain, you will move to strength exercises. Your healthcare  provider or physical therapist will show you how to do movement and strength exercises. He or she will tell you how often to do the exercises.  Call your doctor or physical therapist if:   You have a fever or chills.    The skin around your elbow is red or warm.    Your pain and swelling increase.    Your symptoms do not improve after 10 days of treatment.    You have questions or concerns about elbow bursitis exercises.    Exercises to increase range of motion:   Finger extensions:  Hold the fingertips of your injured arm close together with your fingers and thumb straight. Put a rubber band around the outside of your fingertips and thumb. Spread your fingers apart and then slowly bring them together without letting the rubber band fall off. Repeat 40 times.         :  Hold a soft rubber ball or tennis ball in your hand. Squeeze the ball as hard as you can and hold this position. Ask your healthcare provider how long to hold this position. Repeat this exercise as directed by your healthcare provider.    Wrist flexor stretch:  Hold your arm straight out in front of you with your palm facing down. Use your other hand to grasp your fingers. Keep your elbow straight and slowly bend your hand back. Your fingertips should point up and your palm should face away from you. Do this until you feel a stretch in the top of your wrist. Hold for 10 seconds. Repeat 5 times.         Wrist extensor stretch:  This stretch is the opposite of the wrist flexor stretch. Hold your arm straight out in front of you with your palm facing down. Use your other hand to grasp your fingers. Keep your elbow straight and slowly bend your hand down. Your fingertips should point down and your palm should face you. Do this until you feel a stretch in your wrist. Hold for 10 seconds. Repeat 5 times.         Elbow flexor stretch:  Bend your elbow, and keep your palm facing toward you. Your healthcare provider or physical therapist will tell you how  far to bend your elbow. You may injure your elbow if you fully bend it too early. Use your other hand to press gently on the back of your forearm so your arm moves toward you. Do this until you feel a stretch in the back of your upper arm. Hold for 15 to 30 seconds. Repeat 5 times.         Elbow extension stretch:  This exercise is the opposite of the elbow flexor stretch. Sit in a chair with your arm resting on your thigh. Hold your wrist with the other hand. Slowly straighten your arm so it is extended as far as possible. Keep holding your wrist as you move your arm slowly back to the starting position. Repeat 5 times.       Exercises to increase strength:  Your healthcare provider or physical therapist will tell you how much weight to use.  Wrist curls:  Sit in a chair with your forearm resting on your thigh or on a table. Hold a dumbbell with your palm facing up. Bend your wrist up and then slowly lower it down. Repeat 20 times.         Forearm rotation:  Sit in a chair with your forearm resting on your thigh or on a table. Hold a dumbbell with your palm facing up. Slowly turn your forearm until your palm faces down. Then slowly return to the starting position. Repeat 20 times.         Biceps curls:  Place your hand under your injured elbow for support. Turn your palm so that it faces up and hold a weight in your hand. Slowly bend and straighten your elbow. Repeat 30 times.       Follow up with your doctor or physical therapist as directed:  Write down your questions so you remember to ask them during your visits.  © Copyright [employer] 2023 Information is for End User's use only and may not be sold, redistributed or otherwise used for commercial purposes.  The above information is an  only. It is not intended as medical advice for individual conditions or treatments. Talk to your doctor, nurse or pharmacist before following any medical regimen to see if it is safe and effective for you.

## 2024-01-24 NOTE — WOUND OSTOMY CARE
Progress Note - Wound   Scott Morris 78 y.o. male MRN: [id number]  Unit/Bed#: -01 Encounter: [id number]      Assessment:     Patient admitted to [facility] due to olecranon bursitis.  Patient with history of CAD, GERD, HTN, HLD, Afib, CHF, MVR and CVA. Wound care consulted for bilateral elbow wounds. Patient followed by outpatient wound care center, with ortho and ID consulted for this admission as well. Patient is alert and oriented times four, agreeable to assessment. Patient in bed with bilateral elbows elevated on pillows. Patient is continent of bowel and bladder, and able to turn with assist of one for assessment, and is an assist for care.     Pressure Injury - Right Elbow Unstageable - POA - this is a full thickness round wound, wound bed is covered 100% by well adhered yellow slough. Stephani wound is pink, dry, swollen and fragile. True depth of wound is unknown due to devitalized tissue present. No open aspects or drainage noted.     Pressure injury - Left Elbow Unstageable - POA - this is a full thickness round wound. Wound bed is covered 100% in mobile yellow slough. Stephani wound is pink, fragile, macerated and swollen. Moderate amount of serosanguinous drainage present. True depth of wound is unknown due to devitalized tissue present.     Bilateral heels, hips, buttock and sacrum - pink, dry, intact, blanchable    Educated patient on importance of frequent offloading of pressure via turning, repositioning, and weight-shifting.     No induration, fluctuance, odor, warmth, or purulence noted to the above noted wounds. New dressings applied. Patient tolerated well, reports mild pain to the wounds. Primary nurse aware of plan of care. See flow sheets for more detailed assessment findings. Will follow along.     Skin care plans:   1-Hydraguard to bilateral sacrum, buttock and heels BID and PRN   2-Elevate heels to offload pressure.   3-Ehob cushion in chair when out of bed.   4-Moisturize skin daily with  skin nourishing cream.   5-Turn/reposition q2h for pressure re-distribution on skin   6- Left elbow - Irrigate with NSS. Pat dry. Apply calcium alginate with silver over wound bed, cover with ABD and wrap with ronnie. Change daily or PRN for soilage.   7- Right elbow - irrigate with NSS. Pat dry. Apply silicone bordered foam. Flavio T, peel back to check skin integrity q shift. Change q 3 days.   8 - Schedule Follow-up Outpatient Wound Appointment.       Wound 12/28/23 Pressure Injury Elbow Posterior;Right (Active)   Wound Image   01/24/24 0859   Wound Description Slough;Yellow;Brown 01/24/24 0859   Pressure Injury Stage U 01/24/24 0859   Stephani-wound Assessment Pink;Swelling 01/24/24 0859   Wound Length (cm) 0.4 cm 01/24/24 0859   Wound Width (cm) 0.5 cm 01/24/24 0859   Wound Depth (cm) 0.2 cm 01/24/24 0859   Wound Surface Area (cm^2) 0.2 cm^2 01/24/24 0859   Wound Volume (cm^3) 0.04 cm^3 01/24/24 0859   Calculated Wound Volume (cm^3) 0.04 cm^3 01/24/24 0859   Change in Wound Size % -300 01/24/24 0859   Drainage Amount Moderate 01/24/24 0859   Drainage Description Serosanguineous 01/24/24 0859   Non-staged Wound Description Full thickness 01/24/24 0859   Dressing Calcium Alginate with Silver;ABD;Dry dressing 01/24/24 0859   Wound packed? No 01/24/24 0859   Packing- # removed 0 01/24/24 0859   Packing- # inserted 0 01/24/24 0859   Dressing Changed New 01/24/24 0859   Patient Tolerance Tolerated well 01/24/24 0859   Dressing Status Clean;Dry;Intact 01/24/24 0859       Wound 12/28/23 Pressure Injury Elbow Left;Posterior (Active)   Wound Image   01/24/24 0858   Wound Description Slough;Yellow 01/24/24 0858   Pressure Injury Stage U 01/24/24 0858   Stephani-wound Assessment Pink;Fragile 01/24/24 0858   Wound Length (cm) 0.5 cm 01/24/24 0858   Wound Width (cm) 1 cm 01/24/24 0858   Wound Depth (cm) 0.2 cm 01/24/24 0858   Wound Surface Area (cm^2) 0.5 cm^2 01/24/24 0858   Wound Volume (cm^3) 0.1 cm^3 01/24/24 0858   Calculated  Wound Volume (cm^3) 0.1 cm^3 01/24/24 0858   Change in Wound Size % -233.33 01/24/24 0858   Drainage Amount None 01/24/24 0858   Drainage Description Serosanguineous 01/22/24 0928   Non-staged Wound Description Full thickness 01/24/24 0858   Treatments Irrigation with NSS;Site care 01/24/24 0858   Dressing Foam, Silicon (eg. Allevyn, etc) 01/24/24 0858   Wound packed? No 01/24/24 0858   Packing- # removed 0 01/24/24 0858   Packing- # inserted 0 01/24/24 0858   Dressing Changed New 01/24/24 0858   Patient Tolerance Tolerated well 01/24/24 0858   Dressing Status Clean;Dry;Intact 01/24/24 0858       Call or tigertext with any questions  Wound Care will continue to follow while inpatient.    Carrie Solomon BSN RN CCRN  Wound and Ostomy Care   Assessed with Delia Rea BSN RN CWON

## 2024-01-24 NOTE — PROGRESS NOTES
Patient:    MRN:  [id number]    Tia Request ID:  [id number]    Level of care reserved:  Home Health Agency    Partner Reserved:  [facility], [place] [address] [phone]    Clinical needs requested:    Geography searched:  [phone]    Start of Service:    Request sent:  11:39am EST on 1/24/2024 by Marisabel Bergman    Partner reserved:  12:05pm EST on 1/24/2024 by Marisabel Bergman    Choice list shared:  11:56am EST on 1/24/2024 by Marisabel Bergman

## 2024-01-24 NOTE — CONSULTS
Vancomycin IV Pharmacy-to-Dose Consultation    Scott Morris is a 78 y.o. male who was receiving Vancomycin IV with management by the Pharmacy Consult service for treatment of Bone/joint infection (goal -600, trough >10).    The patient's Vancomycin therapy has been completed / discontinued. Thank you for allowing us to take part in this patient's care. Pharmacy will sign-off now; please call or re-consult if there are any questions.      Tammy Devries, Pharmacist

## 2024-01-24 NOTE — DISCHARGE SUMMARY
[facility]  Discharge- Scott Morris 1945, 78 y.o. male MRN: [id number]  Unit/Bed#: MS [facility] Encounter: [id number]  Primary Care Provider: Lenin Hardy MD   Date and time admitted to hospital: 1/22/2024 10:54 AM    * Olecranon bursitis  Assessment & Plan  Patient presented to the ED with complaints of worsening left elbow pain and tenderness.  CT Left Upper Extremity (1/22/24): Olecranon bursitis containing locules of gas; septic bursitis should be considered in the absence of recent aspiration/intervention. No CT evidence of osteomyelitis.  Orthopedic surgery consult, appreciate input  No surgical intervention recommended at this time  Continue with antibiotics, follow up with PCP OP.  ID Consult, appreciate input  Recommending PO Keflex 500 mg every 6 hours, treat through 2/6    H/O mitral valve replacement  Assessment & Plan  S/p redo bio MVR (mosaic #29), AVR (#25 Inspiris) 3/19/2021;  Prior MV ring annuloplasty, MAZE,ONEYDA ligation7/29/2014 for MVP MR, ruptured cord of A2 scallop     Recent Labs     01/22/24  1237 01/23/24  0603   INR 2.67* 2.69*         Tobacco use  Assessment & Plan  Continue nicotine patch    Pressure injury of elbow, stage 3 (HCC)  Assessment & Plan  Stage 3, b/l, POA   Was being treated by wound care as an outpatient  See management above for left olecranon bursitis    Chronic systolic (congestive) heart failure (HCC)  Assessment & Plan  Hx of HFrEF 20% via ECHO in 12/2023   S/p recent ICD PPM placement 2/2 to bradycardia   Clinically euvolemic  Continue Entresto, carvedilol, spironolactone, Lasix    Chronic ischemic right MCA stroke  Assessment & Plan  Stable with minimal residual effects  INR therapeutic  Continue Plavix and statin; promote BP control    PAF (paroxysmal atrial fibrillation) (HCC)  Assessment & Plan  Continue home medications    Hypertension  Assessment & Plan  Stable  Continue home medication regimen    Hypercholesterolemia  Assessment &  Plan  Continue statin    GERD (gastroesophageal reflux disease)  Assessment & Plan  Continue PPI  Asymptomatic; tolerating p.o. intake    CAD (coronary atherosclerotic disease)  Assessment & Plan  Continue home medication regimen      Medical Problems       Resolved Problems  Date Reviewed: 1/24/2024   None       Discharging Physician / Practitioner: JAYLAN Kirk  PCP: Lenin Hardy MD  Admission Date:   Admission Orders (From admission, onward)       Ordered        01/22/24 1541  Inpatient Admission  Once                          Discharge Date: 01/24/24    Consultations During Hospital Stay:  IP CONSULT TO ORTHOPEDIC SURGERY  IP CONSULT TO PHARMACY  IP CONSULT TO INFECTIOUS DISEASES    Procedures Performed:   None    Significant Findings / Test Results:   CT Left Upper Extremity (1/22/24): Olecranon bursitis containing locules of gas; septic bursitis should be considered in the absence of recent aspiration/intervention. No CT evidence of osteomyelitis.    Incidental Findings:   None    Test Results Pending at Discharge (will require follow up):   None     Outpatient Tests Requested:  Follow up with PCP within 1 wk    Complications:  None    Reason for Admission: Olecranon bursitis    Hospital Course:   Scott Morris is a 78 y.o. male patient with past medical history of CAD, GERD, hyperlipidemia, hypertension, stroke who originally presented to the hospital on 1/22/2024 due to left elbow pain, patient has been following with wound care for a left elbow pressure wound and noted to have left elbow worsening pain swelling and redness.  Patient was initiated on IV antibiotics and ID was consulted as well as orthopedic surgery.  Orthopedic surgery did follow along and there was no recommendation for intervention and to continue with antibiotics as the wound was draining on its own.  Patient did report significant improvement to pain and swelling this morning.    Factious disease did transition patient  "to oral antibiotics and recommends p.o. Keflex 500 mg every 6 hours through 2/6/2024.  Patient is cleared for discharge back to home and has home health care set up.      Please see above list of diagnoses and related plan for additional information.     Condition at Discharge: Good    Discharge Day Visit / Exam:   Subjective: My elbow is feeling much better today.    Vitals: Blood Pressure: 117/61 (01/24/24 0850)  Pulse: 77 (01/24/24 0850)  Temperature: 98 °F (36.7 °C) (01/23/24 2228)  Temp Source: Oral (01/23/24 2228)  Respirations: 18 (01/24/24 0850)  Height: 5' 6\" (167.6 cm) (01/23/24 2228)  Weight - Scale: 56.2 kg (123 lb 14.4 oz) (01/23/24 2228)  SpO2: 96 % (01/24/24 0705)    Exam:   Physical Exam  Vitals and nursing note reviewed.   Constitutional:       General: He is not in acute distress.  Cardiovascular:      Rate and Rhythm: Normal rate.      Pulses: Normal pulses.   Pulmonary:      Effort: Pulmonary effort is normal.   Abdominal:      Palpations: Abdomen is soft.   Musculoskeletal:         General: Swelling (Left Arm) present. No tenderness. Normal range of motion.      Cervical back: Normal range of motion.   Skin:     General: Skin is warm and dry.      Capillary Refill: Capillary refill takes less than 2 seconds.      Comments: Dressing to left arm CDI   Neurological:      Mental Status: He is alert. Mental status is at baseline.   Psychiatric:         Mood and Affect: Mood normal.          Discussion with Family: Updated  (son) via phone. Guillermo    Discharge instructions/Information to patient and family:   See after visit summary for information provided to patient and family.      Provisions for Follow-Up Care:  See after visit summary for information related to follow-up care and any pertinent home health orders.      Mobility at time of Discharge:   Basic Mobility Inpatient Raw Score: 24  JH-HLM Goal: 8: Walk 250 feet or more  JH-HLM Achieved: 7: Walk 25 feet or more  HLM Goal " achieved. Continue to encourage appropriate mobility.     Disposition:   Home with VNA Services (Reminder: Complete face to face encounter)    Planned Readmission: None     Discharge Statement:  I spent 45 minutes discharging the patient. This time was spent on the day of discharge. I had direct contact with the patient on the day of discharge. Greater than 50% of the total time was spent examining patient, answering all patient questions, arranging and discussing plan of care with patient as well as directly providing post-discharge instructions.  Additional time then spent on discharge activities.    Discharge Medications:  See after visit summary for reconciled discharge medications provided to patient and/or family.      **Please Note: This note may have been constructed using a voice recognition system**

## 2024-01-24 NOTE — PROGRESS NOTES
Progress Note - Infectious Disease   Scott Morris 78 y.o. male MRN: [id number]  Unit/Bed#: -01 Encounter: [id number]      Impression/Plan:  1.  Septic olecranon bursitis.  Patient has had atraumatic ulcerations on his bilateral elbows for the last few months and in the last week had progressing redness, swelling on the left.  Was seen at wound care and small incision was made and cultures obtained.  Patient is fortunately hemodynamically stable.  CT imaging consistent with bursitis.  Orthopedic evaluation reviewed.  Cultures reviewed earlier with Klebsiella.  Suspect secondary infection of underlying inflammatory bursitis.  Susceptibilities reviewed later today.  Vancomycin initially discontinued  Ceftriaxone 2 g every 24 hours started today  Based on susceptibilities, will transition to Keflex 500mg Q6H, start 1/25  Patient will need a total of 2 weeks of antibiotic through 2/6/2024  Monitor labs while admitted  Follow-up pending blood cultures particularly given #5 while admitted  Ongoing follow-up by orthopedics  Trend fever curve/vitals  Additional supportive care/discharge per primary    2.  Bilateral elbow chronic ulcers, POA.  Patient reports that these ulcerations have been present for few months and previous irritation persistent.  Suspected underlying inflammatory bursitis and now with complications above  Continue local wound care  Recommend follow-up with wound care center  Ongoing followed by orthopedics  Antibiotics as above  Monitor exam closely     3.  Leukocytosis.  Mild elevation in white count noted on presentation.  Likely related to #1.  Has since downtrended.  Antibiotics as above  Trend fever curve/WBC while admitted     4.  Chronic kidney disease stage III.  Documented as having baseline chronic kidney disease.  Creatinine clearance calculated.  Does affect antibiotic dosing  Antibiotic as above  Further dose adjust as needed  Fluid hydration per primary     5.  MV/AV replacement,  pacemaker and A-fib on anticoagulation.  Fortunately hemodynamically stable and does not appear systemically ill.  He is on anticoagulation which can interact with certain antibiotics  Will need to review for drug interactions with anticoagulation  Follow-up pending blood cultures while admitted  Antibiotics as above     6.  Thrombocytopenia.  Noted to have mild decline in platelets.  Possibly related to the above.  Stable on current antibiotics.  Antibiotics as above  Monitor CBC while admitted  Recommend repeat labs with PCP in 1 to 2 weeks  Transfusional support per primary    Above plan discussed in detail with the patient and with nursing at bedside  Above plan discussed in detail with primary service advance practitioner who is in agreement with initial change of IV antibiotic and now plans for oral therapy and recommendation for clearance by Ortho    ID consult service will sign off at this time.  Please call with questions.    Antibiotics:  Ceftriaxone 1  Total antibiotic 3    24 Hour events:  Yesterday and overnight notes reviewed and no acute events noted    Subjective:  Patient currently denies having any nausea, vomiting, chest pain or shortness of breath.  Tolerating antibiotic without issue.    Objective:  Vitals:  Temp:  [98 °F (36.7 °C)-98.1 °F (36.7 °C)] 98 °F (36.7 °C)  HR:  [71-98] 77  Resp:  [18] 18  BP: ()/(61-78) 117/61  SpO2:  [96 %-97 %] 96 %  Temp (24hrs), Av.1 °F (36.7 °C), Min:98 °F (36.7 °C), Max:98.1 °F (36.7 °C)  Current: Temperature: 98 °F (36.7 °C)    Physical Exam:   General Appearance:  Alert, interactive, nontoxic, no acute distress.   Throat: Oropharynx moist without lesions.    Lungs:   Clear to auscultation bilaterally; no wheezes, rhonchi or rales; respirations unlabored   Heart:  RRR; no murmur, rub or gallop   Abdomen:   Soft, non-tender, non-distended, positive bowel sounds.     Extremities: No clubbing, cyanosis or edema   Skin: No new rashes or lesions. No  draining wounds noted.       Labs, Imaging, & Other studies:   All pertinent labs and imaging studies in PACS were personally reviewed as below.  Results from last 7 days   Lab Units 01/24/24  0522 01/23/24  0603 01/22/24  1237   WBC Thousand/uL 7.88 8.79 11.81*   HEMOGLOBIN g/dL 13.8 14.4 16.0   PLATELETS Thousands/uL 120* 136* 147*     Results from last 7 days   Lab Units 01/24/24  0522 01/23/24  0603 01/22/24  1237   POTASSIUM mmol/L 3.8 3.8 3.8   CHLORIDE mmol/L 107 107 105   CO2 mmol/L 27 29 28   BUN mg/dL 17 19 22   CREATININE mg/dL 0.85 1.03 1.02   EGFR ml/min/1.73sq m 84 69 70   CALCIUM mg/dL 8.8 8.6 9.4   AST U/L 16 17 21   ALT U/L 14 15 22   ALK PHOS U/L 74 78 98     Results from last 7 days   Lab Units 01/22/24  1237 01/22/24  1030   BLOOD CULTURE  No Growth at 24 hrs.  No Growth at 24 hrs.  --    GRAM STAIN RESULT   --  1+ Polys*  Rare Disintegrating polys*  Rare Gram positive cocci in pairs*   WOUND CULTURE   --  3+ Growth of Klebsiella pneumoniae*  3+ Growth of       Lab interpretation/comments: No leukocytosis.  Chemistry unremarkable.  Platelets remain low but stable    Imaging interpretation/comments: No new imaging    Culture data: Cultures Gram stain was corrected as patient isolated gram-negative rods and Klebsiella.  Susceptibilities reviewed.

## 2024-01-24 NOTE — DISCHARGE INSTR - OTHER ORDERS
Skin care plans:  1-Hydraguard to bilateral sacrum, buttock and heels BID and PRN  2-Elevate heels to offload pressure.  3-Ehob cushion in chair when out of bed.  4-Moisturize skin daily with skin nourishing cream.  5-Turn/reposition q2h for pressure re-distribution on skin  6- Left elbow - Irrigate with NSS. Pat dry. Apply calcium alginate with silver over wound bed, cover with ABD and wrap with ronnie. Change daily or PRN for soilage.   7- Right elbow - irrigate with NSS. Pat dry. Apply silicone bordered foam. Flavio T, peel back to check skin integrity q shift. Change q 3 days.     Schedule Follow-up Outpatient Wound Appointment.  Call Outpatient Wound and Ostomy Care Team @ [phone]   Option 1: [place], [place], [place], [place]  Option 2: [place], [place]

## 2024-01-24 NOTE — CASE MANAGEMENT
Case Management Discharge Planning Note    Patient name Scott Morris  Location /-01 MRN [id number]  : 1945 Date 2024       Current Admission Date: 2024  Current Admission Diagnosis:Olecranon bursitis   Patient Active Problem List    Diagnosis Date Noted    Pressure injury of elbow, stage 3 ([facility]) 2024    Tobacco use 2024    H/O mitral valve replacement 2024    Olecranon bursitis 2024    Chronic systolic (congestive) heart failure ([facility]) 2024    Chronic ischemic right MCA stroke 2022    Hypotension 2022    Acute pulmonary embolism without acute cor pulmonale ([facility]) 2022    CKD (chronic kidney disease) 2022    Vasomotor rhinitis 2022    Ambulatory dysfunction 2021    Anemia due to blood loss, acute 2021    History of normocytic normochromic anemia 2021    Impaired fasting glucose 12/10/2018    Idiopathic chronic gout of multiple sites without tophus 2018    Pain in gums 2018    Chronic anticoagulation 2018    Abnormal RBC indices 06/15/2018    Fatigue 06/15/2018    PAF (paroxysmal atrial fibrillation) ([facility]) 2018    Polyp of colon 2018    Acute on chronic combined systolic and diastolic congestive heart failure ([facility]) 2017    CAD (coronary atherosclerotic disease) 2016    Chronic back pain 2016    GERD (gastroesophageal reflux disease) 2016    S/P MVR (mitral valve repair) 10/27/2015    Peptic ulcer disease 2015    Hypercholesterolemia 2015    Hypertension 2015      LOS (days): 2  Geometric Mean LOS (GMLOS) (days): 5.1  Days to GMLOS:3.2     OBJECTIVE:  Risk of Unplanned Readmission Score: 15.08         Current admission status: Inpatient   Preferred Pharmacy:   [id number] - [place] - [address]  [address] [place] [address]  [place] [address]  Phone: [phone] Fax: [phone]    Primary Care Provider: Lenin Hardy  MD    Primary Insurance: MEDICARE  Secondary Insurance: BLUE CROSS    DISCHARGE DETAILS:    Discharge planning discussed with:: Pt at bedside  [place] of Choice: Yes  Comments - Freedom of Choice: As per SLIM, pt is medically cleared for dc. CM provided pt with accepting VNA pt choice list. Pt choice was [facility] which was reserved on [facility]. Pt is aware that they will be contacting pt within 48hrs post dc. CM continues to be available.  CM contacted family/caregiver?: Yes  Were Treatment Team discharge recommendations reviewed with patient/caregiver?: Yes  Did patient/caregiver verbalize understanding of patient care needs?: Yes  Were patient/caregiver advised of the risks associated with not following Treatment Team discharge recommendations?: Yes    Contacts  Patient Contacts: Guillermo  Relationship to Patient:: Family  Contact Method: Phone  Phone Number: [phone]  Reason/Outcome: Continuity of Care, Discharge Planning, Emergency Contact, Referral    Requested Home Health Care         Is the patient interested in HHC at discharge?: Yes  Home Health Discipline requested:: Nursing  Home Health Agency Name:: [facility]  HHA External Referral Reason (only applicable if external HHA name selected): Patient has established relationship with provider  Home Health Follow-Up Provider:: PCP  Home Health Services Needed:: Wound/Ostomy Care  Homebound Criteria Met:: Requires the Assistance of Another Person for Safe Ambulation or to Leave the Home, Uses an Assist Device (i.e. cane, walker, etc)  Supporting Clincal Findings:: Fatigues Easliy in Short Distances, Limited Endurance    DME Referral Provided  Referral made for DME?: No        Treatment Team Recommendation: Home with Home Health Care  Discharge Destination Plan:: Home with Home Health Care  Transport at Discharge : Self

## 2024-01-24 NOTE — PLAN OF CARE
Problem: PAIN - ADULT  Goal: Verbalizes/displays adequate comfort level or baseline comfort level  Description: Interventions:  - Encourage patient to monitor pain and request assistance  - Assess pain using appropriate pain scale  - Administer analgesics based on type and severity of pain and evaluate response  - Implement non-pharmacological measures as appropriate and evaluate response  - Consider cultural and social influences on pain and pain management  - Notify physician/advanced practitioner if interventions unsuccessful or patient reports new pain  Outcome: Progressing     Problem: INFECTION - ADULT  Goal: Absence or prevention of progression during hospitalization  Description: INTERVENTIONS:  - Assess and monitor for signs and symptoms of infection  - Monitor lab/diagnostic results  - Monitor all insertion sites, i.e. indwelling lines, tubes, and drains  - Monitor endotracheal if appropriate and nasal secretions for changes in amount and color  - [place] appropriate cooling/warming therapies per order  - Administer medications as ordered  - Instruct and encourage patient and family to use good hand hygiene technique  - Identify and instruct in appropriate isolation precautions for identified infection/condition  Outcome: Progressing     Problem: METABOLIC, FLUID AND ELECTROLYTES - ADULT  Goal: Electrolytes maintained within normal limits  Description: INTERVENTIONS:  - Monitor labs and assess patient for signs and symptoms of electrolyte imbalances  - Administer electrolyte replacement as ordered  - Monitor response to electrolyte replacements, including repeat lab results as appropriate  - Instruct patient on fluid and nutrition as appropriate  Outcome: Progressing

## 2024-01-25 ENCOUNTER — HOME CARE VISIT (OUTPATIENT)
Dept: HOME HEALTH SERVICES | Facility: HOME HEALTHCARE | Age: 79
End: 2024-01-25
Payer: MEDICARE

## 2024-01-25 ENCOUNTER — TRANSITIONAL CARE MANAGEMENT (OUTPATIENT)
Dept: INTERNAL MEDICINE CLINIC | Facility: CLINIC | Age: 79
End: 2024-01-25

## 2024-01-25 ENCOUNTER — TELEPHONE (OUTPATIENT)
Dept: WOUND CARE | Facility: CLINIC | Age: 79
End: 2024-01-25

## 2024-01-25 ENCOUNTER — HOME CARE VISIT (OUTPATIENT)
Dept: HOME HEALTH SERVICES | Facility: HOME HEALTHCARE | Age: 79
End: 2024-01-25

## 2024-01-25 VITALS
OXYGEN SATURATION: 96 % | HEART RATE: 86 BPM | TEMPERATURE: 98.6 F | SYSTOLIC BLOOD PRESSURE: 110 MMHG | RESPIRATION RATE: 18 BRPM | DIASTOLIC BLOOD PRESSURE: 70 MMHG

## 2024-01-25 PROCEDURE — 400013 VN SOC

## 2024-01-25 PROCEDURE — 10330081 VN NO-PAY CLAIM PROCEDURE

## 2024-01-25 PROCEDURE — G0299 HHS/HOSPICE OF RN EA 15 MIN: HCPCS

## 2024-01-25 NOTE — TELEPHONE ENCOUNTER
Returned patient's call.  He was unavailable. Leave a message on his answering machine to call us back.

## 2024-01-26 ENCOUNTER — HOME CARE VISIT (OUTPATIENT)
Dept: HOME HEALTH SERVICES | Facility: HOME HEALTHCARE | Age: 79
End: 2024-01-26
Payer: MEDICARE

## 2024-01-26 VITALS
TEMPERATURE: 98 F | HEART RATE: 88 BPM | RESPIRATION RATE: 18 BRPM | OXYGEN SATURATION: 98 % | DIASTOLIC BLOOD PRESSURE: 62 MMHG | SYSTOLIC BLOOD PRESSURE: 112 MMHG

## 2024-01-26 PROCEDURE — 10330064 PAD, ABD 5X9" STR LF (1/PK 20PK/BX) MGM1

## 2024-01-26 PROCEDURE — 10330064 FOAM, ADH SIL W/BORDER 3"X3" (10/BX 20BX

## 2024-01-26 PROCEDURE — 10330064 BANDAGE, CNFRM 4"X4.1YDS N/S LF (12RL/BG

## 2024-01-26 PROCEDURE — 10330064 TAPE, ADHSV TRANSPORE WHT 2" (6RL/BX 10B

## 2024-01-26 PROCEDURE — G0299 HHS/HOSPICE OF RN EA 15 MIN: HCPCS

## 2024-01-26 PROCEDURE — 10330064 DRESSING, CALCIUM ALGINATE SHEET 4"X4.75

## 2024-01-27 ENCOUNTER — HOME CARE VISIT (OUTPATIENT)
Dept: HOME HEALTH SERVICES | Facility: HOME HEALTHCARE | Age: 79
End: 2024-01-27
Payer: MEDICARE

## 2024-01-27 LAB
BACTERIA BLD CULT: NORMAL
BACTERIA BLD CULT: NORMAL

## 2024-01-28 ENCOUNTER — HOME CARE VISIT (OUTPATIENT)
Dept: HOME HEALTH SERVICES | Facility: HOME HEALTHCARE | Age: 79
End: 2024-01-28
Payer: MEDICARE

## 2024-01-29 ENCOUNTER — HOME CARE VISIT (OUTPATIENT)
Dept: HOME HEALTH SERVICES | Facility: HOME HEALTHCARE | Age: 79
End: 2024-01-29
Payer: MEDICARE

## 2024-01-29 VITALS
DIASTOLIC BLOOD PRESSURE: 60 MMHG | SYSTOLIC BLOOD PRESSURE: 104 MMHG | BODY MASS INDEX: 20.34 KG/M2 | TEMPERATURE: 97 F | HEART RATE: 78 BPM | OXYGEN SATURATION: 94 % | RESPIRATION RATE: 18 BRPM | WEIGHT: 126 LBS

## 2024-01-29 PROCEDURE — G0299 HHS/HOSPICE OF RN EA 15 MIN: HCPCS

## 2024-01-30 ENCOUNTER — HOME CARE VISIT (OUTPATIENT)
Dept: HOME HEALTH SERVICES | Facility: HOME HEALTHCARE | Age: 79
End: 2024-01-30
Payer: MEDICARE

## 2024-02-01 ENCOUNTER — OFFICE VISIT (OUTPATIENT)
Dept: WOUND CARE | Facility: CLINIC | Age: 79
End: 2024-02-01
Payer: MEDICARE

## 2024-02-01 ENCOUNTER — TELEPHONE (OUTPATIENT)
Dept: WOUND CARE | Facility: CLINIC | Age: 79
End: 2024-02-01

## 2024-02-01 ENCOUNTER — HOME CARE VISIT (OUTPATIENT)
Dept: HOME HEALTH SERVICES | Facility: HOME HEALTHCARE | Age: 79
End: 2024-02-01
Payer: MEDICARE

## 2024-02-01 ENCOUNTER — TELEPHONE (OUTPATIENT)
Age: 79
End: 2024-02-01

## 2024-02-01 VITALS
HEART RATE: 94 BPM | DIASTOLIC BLOOD PRESSURE: 75 MMHG | TEMPERATURE: 95.1 F | RESPIRATION RATE: 18 BRPM | SYSTOLIC BLOOD PRESSURE: 132 MMHG

## 2024-02-01 DIAGNOSIS — M71.122 SEPTIC BURSITIS OF ELBOW, LEFT: ICD-10-CM

## 2024-02-01 DIAGNOSIS — L89.023 PRESSURE INJURY OF LEFT ELBOW, STAGE 3 (HCC): Primary | ICD-10-CM

## 2024-02-01 DIAGNOSIS — L89.013 PRESSURE INJURY OF RIGHT ELBOW, STAGE 3 (HCC): ICD-10-CM

## 2024-02-01 PROCEDURE — 11042 DBRDMT SUBQ TIS 1ST 20SQCM/<: CPT | Performed by: ORTHOPAEDIC SURGERY

## 2024-02-01 PROCEDURE — 99213 OFFICE O/P EST LOW 20 MIN: CPT | Performed by: ORTHOPAEDIC SURGERY

## 2024-02-01 RX ORDER — LIDOCAINE 40 MG/G
CREAM TOPICAL ONCE
Status: COMPLETED | OUTPATIENT
Start: 2024-02-01 | End: 2024-02-01

## 2024-02-01 RX ADMIN — LIDOCAINE: 40 CREAM TOPICAL at 11:34

## 2024-02-01 NOTE — TELEPHONE ENCOUNTER
Caller: Scott    Doctor/Office: NA    Call regarding :  Looking for wound care     Call was transferred to: Transferred over to [facility] for scheduling

## 2024-02-01 NOTE — LETTER
[facility]  [address]  [address]  [place] [address]  Phone#  [phone]  Fax#  [phone]    Patient:  Scott Morris  YOB: 1945  Phone:  [phone]  Date of Visit:  2/1/2024    Orders Placed This Encounter   Procedures   • Wound cleansing and dressings     · Wound cleansing and dressings        Right and Left Elbow wound:      Wash your hands with soap and water.  Remove old dressing, discard into plastic bag and place in trash.  Cleanse the wound with wound wash prior to applying a clean dressing. Do not use tissue or cotton balls. Do not scrub the wound. Pat dry using gauze. (Cleaned with Dakins today)     Shower no      Place hydrafera blue ready on the wound then cover with a gauzr  Secure with ronnie and tape  Change dressing 3 times weekly.      The above was completed today at the wound center.     Standing Status:   Future     Standing Expiration Date:   2/1/2025   • Wound off loading     Off-loading Instructions:     Keep weight and pressure off wound at all times.     Standing Status:   Future     Standing Expiration Date:   2/1/2025         Electronically signed by Bonita Ayala PA-C

## 2024-02-01 NOTE — PROGRESS NOTES
Patient ID: Scott Morris is a 78 y.o. male Date of Birth 1945       Chief Complaint   Patient presents with    Follow Up Wound Care Visit     Bilateral elbow wounds       Allergies:  Patient has no known allergies.    Diagnosis:   Diagnosis ICD-10-CM Associated Orders   1. Pressure injury of left elbow, stage 3 (HCC)  L89.023 lidocaine (LMX) 4 % cream     Wound cleansing and dressings     Wound off loading      2. Pressure injury of right elbow, stage 3 (HCC)  L89.013 lidocaine (LMX) 4 % cream     Wound cleansing and dressings     Wound off loading      3. Septic bursitis of elbow, left  M71.122 Ambulatory Referral to Orthopedic Surgery           Assessment and Plan :  Follow up evaluation of bilateral elbow wounds. Right elbow stage 3 pressure wound progressively healing. Left  stage 3 elbow pressure wound significantly improving with no signs of infection. Adherent yellow/fibrinous slough with serosanguinous drainage.  Circumferential undermining decreased to 3.2 cm from 3.5cm  depth from 12-12 o'clock.  Continue Keflex as directed.  Debrided as below.   Encouraged on smoking cessation  F/u with Orthopedics as directed  F/u with PCP as directed.  Recommend wound management with Hydrofera blue ready, see wound orders below   No harsh cleansers such as alcohol, peroxide, or antibacterial soap, do not submerge in water  Can cleanse with wound wash at dressing changes.   Avoid direct pressure to Wound site.  Followup in 2 week(s) or call sooner with questions or concerns or any signs of infection such as redness, swelling, increased/purulent drainage, fever, chills, increased severe pain.     Subjective:   12/28/23: 79 y/o M with PMHx of GERD, peptic ulcer, CAD, atrial fibrillation, CHF, hx of PE currently anticoagulated, CKD, recent ICD placement on 12/18/23, presents for evaluation of wounds on his bilateral elbows that have been ongoing for approximately four months now. He states that he is unsure of how  these initially began. Has been using hydrogen peroxide on them. Was recently hospitalized for acute on chronic heart failure at which time his elbows were assessed in the hospital and he was prescribed Santyl which he has been using on the bilateral elbow wounds daily. He denies any significant pressure on his elbows during the day but does have difficulty moving around at night and will use his elbows in bed to reposition. Notes 6/10 soreness/pain when pressure is applied to his elbows but pain is not constant. Is currently residing at Mrs. [facility] personal care home on the independent living side. Obtains protein in his diet. Has smoking history but quit multiple years ago. No hx of DM.     1/22/24: Presents for initial eval of bilateral stage 3 elbow pressure wounds. Pt states he has been feeling ill over the last 5 days with increasing pain, redness and swelling on Left elbow wound. Pt admits weakness, lightheadedness, fatigue and lack of appetite.  Pt has been applying hydrocolloid on the wound beds.  No diabetes.  Smokes 2 cigarettes daily. No ETOH or drug use. Pt denies any sob, N/V, CP, fever or chills.    2/1: Presents for follow-up evaluation of bilateral stage 3 elbow pressure wounds. On last visit pt was sent to ER for suspicion of left elbow septic bursitis. Pt was evaluated, admitted and treated for septic bursitis. Wound cultures with 3+ Growth of Klebsiella pneumoniae. He is currently on keflex until 2/6/24 as per ID and scheduled to f/u with PCP. Pt has been applying hydrocolloid to bilateral elbows.  Pt denies any sob, fatigue, N/V, CP, fever or chills.        The following portions of the patient's history were reviewed and updated as appropriate:   Patient Active Problem List   Diagnosis    CAD (coronary atherosclerotic disease)    Acute on chronic combined systolic and diastolic congestive heart failure (HCC)    Chronic back pain    GERD (gastroesophageal reflux disease)    Hypercholesterolemia     Hypertension    PAF (paroxysmal atrial fibrillation) (HCC)    Peptic ulcer disease    S/P MVR (mitral valve repair)    Polyp of colon    Abnormal RBC indices    Fatigue    Chronic anticoagulation    Pain in gums    Idiopathic chronic gout of multiple sites without tophus    Impaired fasting glucose    Anemia due to blood loss, acute    History of normocytic normochromic anemia    Ambulatory dysfunction    Vasomotor rhinitis    Acute pulmonary embolism without acute cor pulmonale (HCC)    CKD (chronic kidney disease)    Hypotension    Chronic ischemic right MCA stroke    Olecranon bursitis    Chronic systolic (congestive) heart failure (HCC)    Pressure injury of elbow, stage 3 (HCC)    Tobacco use    H/O mitral valve replacement     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    CAD (coronary artery disease)     Cancer (HCC)     prostate    Colon polyp     Coronary artery disease     GERD (gastroesophageal reflux disease)     H/O heart artery stent     x2    Hypercholesteremia     Hypertension     Keratotic lesion     Stroke (HCC)     10YRS AGO    Transient cerebral ischemia      Past Surgical History:   Procedure Laterality Date    BACK SURGERY      CARDIAC PACEMAKER PLACEMENT      CARDIAC SURGERY      mvp repair    CAROTID ENDARTERECTOMY Right 03/2020    CHOLECYSTECTOMY      COLONOSCOPY      ESOPHAGOGASTRODUODENOSCOPY N/A 06/22/2018    Procedure: ESOPHAGOGASTRODUODENOSCOPY (EGD);  Surgeon: Sunny Rodrigues III, MD;  Location: [facility];  Service: Gastroenterology    JOINT REPLACEMENT      L hip replacement    ORCHIECTOMY      PROSTATECTOMY      TOTAL HIP ARTHROPLASTY       Family History   Problem Relation Age of Onset    Heart disease Mother     Stroke Mother         CVA    Cancer Father     Hodgkin's lymphoma Father     Lung cancer Father      Social History     Socioeconomic History    Marital status:      Spouse name: Not on file    Number of children: Not on file    Years of  education: Not on file    Highest education level: Not on file   Occupational History    Occupation: retired   Tobacco Use    Smoking status: Every Day     Current packs/day: 0.00     Types: Cigarettes     Last attempt to quit: 2018     Years since quittin.7    Smokeless tobacco: Never    Tobacco comments:     2 cig/day   Vaping Use    Vaping status: Never Used   Substance and Sexual Activity    Alcohol use: Never     Alcohol/week: 2.0 standard drinks of alcohol     Types: 2 Standard drinks or equivalent per week    Drug use: No    Sexual activity: Not Currently     Comment: no high risk sexual behavior   Other Topics Concern    Not on file   Social History Narrative    Not on file     Social Determinants of Health     Financial Resource Strain: Low Risk  (2023)    Received from [facility] Financial Resource Strain (CARDIA)     Difficulty of Paying Living Expenses: Not hard at all   Food Insecurity: No Food Insecurity (2024)    Hunger Vital Sign     Worried About Running Out of Food in the Last Year: Never true     Ran Out of Food in the Last Year: Never true   Transportation Needs: No Transportation Needs (2024)    PRAPARE - Transportation     Lack of Transportation (Medical): No     Lack of Transportation (Non-Medical): No   Physical Activity: Not on file   Stress: Not on file   Social Connections: Not on file   Intimate Partner Violence: Unknown (2023)    Received from [facility]    Humiliation, Afraid, Rape, and Kick questionnaire     Fear of Current or Ex-Partner: Patient declined     Emotionally Abused: Patient declined     Physically Abused: Patient declined     Sexually Abused: Patient declined   Housing Stability: Low Risk  (2024)    Housing Stability Vital Sign     Unable to Pay for Housing in the Last Year: No     Number of Places Lived in the Last Year: 1     Unstable Housing in the Last Year: No   Recent Concern: Housing Stability -  High Risk (12/19/2023)    Received from [facility] Vital Sign     Unable to Pay for Housing in the Last Year: Yes     Number of Places Lived in the Last Year: 1     Unstable Housing in the Last Year: No       Current Outpatient Medications:     atorvastatin (LIPITOR) 40 mg tablet, Take 40 mg by mouth daily  , Disp: , Rfl:     Azelaic Acid 15 % cream, apply to 1 thin layer twice a day to face, Disp: , Rfl:     carvedilol (COREG) 6.25 mg tablet, , Disp: , Rfl:     cephalexin (KEFLEX) 500 mg capsule, Take 1 capsule (500 mg total) by mouth every 6 (six) hours for 52 doses Do not start before January 25, 2024., Disp: 52 capsule, Rfl: 0    clindamycin (CLINDAGEL) 1 % gel, APPLY 1 THIN LAYER TOPICALLY TO FACE IN THE  MORNING, Disp: , Rfl:     clopidogrel (PLAVIX) 75 mg tablet, Take 75 mg by mouth daily, Disp: , Rfl:     digoxin (LANOXIN) 0.125 mg tablet, Take 125 mcg by mouth daily, Disp: , Rfl:     Farxiga 10 MG TABS, Take 10 mg by mouth daily, Disp: , Rfl:     furosemide (LASIX) 40 mg tablet, Take 40 mg by mouth daily PT REPORTS : 1/2 TABLET EVERY OTHER DAY (MWF), Disp: , Rfl:     Lumigan 0.01 % ophthalmic drops, instill 1 drop into both eyes once daily at bedtime, Disp: , Rfl:     midodrine (PROAMATINE) 2.5 mg tablet, Take 1 tablet (2.5 mg total) by mouth 3 (three) times a day before meals (Patient taking differently: Take 2.5 mg by mouth 2 (two) times a day), Disp: 90 tablet, Rfl: 0    midodrine (PROAMATINE) 5 mg tablet, Take 5 mg by mouth 3 (three) times a day before meals, Disp: , Rfl:     Multiple Vitamins-Minerals (PX MENS MULTIVITAMINS) TABS, Take by mouth, Disp: , Rfl:     pantoprazole (PROTONIX) 40 mg tablet, Take 1 tablet (40 mg total) by mouth daily, Disp: 90 tablet, Rfl: 2    sacubitril-valsartan (ENTRESTO) 24-26 MG TABS, Take 1 tablet by mouth 2 (two) times a day (Patient taking differently: Take 0.5 tablets by mouth 2 (two) times a day), Disp: 60 tablet, Rfl: 0    spironolactone  (ALDACTONE) 25 mg tablet, Take 12.5 mg by mouth daily 1/2 TABLET EVERY OTHER DAY (Tu, Th, Sa, Su), Disp: , Rfl:     triamcinolone (KENALOG) 0.5 % cream, Apply topically 3 (three) times a day, Disp: 30 g, Rfl: 1    warfarin (COUMADIN) 2.5 mg tablet, as directed take 1 tablet ON TUESDAY,WEDNESDAY,FRIDAY,SATURDAY,SU...  (REFER TO PRESCRIPTION NOTES)., Disp: , Rfl:     warfarin (COUMADIN) 5 mg tablet, Take 1 tablet (5 mg total) by mouth daily Coumadin 5 mg p.o. Daily- [facility] INR 2-3 (Patient taking differently: Take 5 mg by mouth daily M & Thurs & Sat 5MG  Tues & Wed & Fri & Sun 2.5MG), Disp: 30 tablet, Rfl: 0  No current facility-administered medications for this visit.    Review of Systems   Constitutional:  Negative for appetite change, chills, fatigue, fever and unexpected weight change.   HENT:  Negative for congestion, hearing loss and postnasal drip.    Respiratory:  Negative for cough and shortness of breath.    Cardiovascular:  Negative for leg swelling.   Skin:  Positive for wound (bilateral elbows). Negative for rash.   Neurological:  Negative for dizziness, light-headedness and numbness.   Hematological:  Does not bruise/bleed easily.   Psychiatric/Behavioral: Negative.           Objective:  /75   Pulse 94   Temp (!) 95.1 °F (35.1 °C)   Resp 18   Pain Score: 0-No pain     Physical Exam  Vitals reviewed.   Constitutional:       Appearance: He is normal weight.   Pulmonary:      Effort: Pulmonary effort is normal. No respiratory distress.   Musculoskeletal:      Right elbow: No tenderness.      Left elbow: Tenderness: moderate pain.   Skin:     Findings: Wound (bilateral elbows) present.             Comments: 1. Yellow pink granulated tissue.  2. Adherent yellow/fibrinous slough with serosanguinous drainage.  Circumferential undermining decreased to 3.2 cm from 3.5 cm  depth from 12-12 o'clock.   Neurological:      Mental Status: He is alert.   Psychiatric:         Mood and Affect: Mood normal.                Wound 12/28/23 Pressure Injury Elbow Posterior;Right (Active)   Wound Image   02/01/24 1129   Wound Description Yellow;Pink 02/01/24 1127   Pressure Injury Stage 3 01/22/24 0929   Stephani-wound Assessment Dry 02/01/24 1127   Wound Length (cm) 0.4 cm 02/01/24 1127   Wound Depth (cm) 0.1 cm 02/01/24 1127   Wound Surface Area (cm^2) 0.24 cm^2 02/01/24 1127   Wound Volume (cm^3) 0.024 cm^3 02/01/24 1127   Calculated Wound Volume (cm^3) 0.02 cm^3 02/01/24 1127   Change in Wound Size % -100 02/01/24 1127   Drainage Amount Scant 02/01/24 1127   Drainage Description Serosanguineous 02/01/24 1127   Non-staged Wound Description Full thickness 02/01/24 1127   Patient Tolerance Tolerated well 12/28/23 1425   Dressing Status Intact 02/01/24 1127       Wound 12/28/23 Pressure Injury Elbow Left;Posterior (Active)   Wound Description Yellow;Pink 02/01/24 1130   Pressure Injury Stage 3 01/22/24 0928   Stephani-wound Assessment Edema;Fragile 02/01/24 1130   Wound Length (cm) 0.3 cm 02/01/24 1130   Wound Width (cm) 0.5 cm 02/01/24 1130   Wound Depth (cm) 0.7 cm 02/01/24 1130   Wound Surface Area (cm^2) 0.15 cm^2 02/01/24 1130   Wound Volume (cm^3) 0.105 cm^3 02/01/24 1130   Calculated Wound Volume (cm^3) 0.11 cm^3 02/01/24 1130   Change in Wound Size % -266.67 02/01/24 1130   Number of underminings 1 02/01/24 1130   Undermining 1 3.2 02/01/24 1130   Undermining 1 is depth extending from circ undermining deepest at 12 02/01/24 1130   Drainage Amount Scant 02/01/24 1130   Drainage Description Serosanguineous;Yellow 02/01/24 1130   Non-staged Wound Description Full thickness 02/01/24 1130   Patient Tolerance Tolerated well 12/28/23 1424   Dressing Status Intact 02/01/24 1130     Debridement   Wound 12/28/23 Pressure Injury Elbow Left;Posterior    Universal Protocol:  Consent: Verbal consent obtained. Written consent obtained.  Risks and benefits: risks, benefits and alternatives were discussed  Consent given by: patient  Time out:  "Immediately prior to procedure a \"time out\" was called to verify the correct patient, procedure, equipment, support staff and site/side marked as required.  Patient understanding: patient states understanding of the procedure being performed  Patient identity confirmed: verbally with patient    Debridement Details  Performed by: PA  Debridement type: surgical  Level of debridement: subcutaneous tissue  Pain control: lidocaine 4%      Post-debridement measurements  Length (cm): 0.3  Width (cm): 0.5  Depth (cm): 0.8  Percent debrided: 100%  Surface Area (cm^2): 0.15  Area Debrided (cm^2): 0.15  Volume (cm^3): 0.12    Tissue and other material debrided: subcutaneous tissue  Devitalized tissue debrided: fibrin and slough  Instrument(s) utilized: curette and nippers  Bleeding: small  Hemostasis obtained with: pressure  Procedural pain (0-10): 0  Post-procedural pain: 0   Response to treatment: procedure was tolerated well           Results from last 6 Months   Lab Units 01/22/24  1030   WOUND CULTURE  3+ Growth of Klebsiella pneumoniae*  3+ Growth of     Wound Instructions:  Orders Placed This Encounter   Procedures    Wound cleansing and dressings     · Wound cleansing and dressings        Right and Left Elbow wound:      Wash your hands with soap and water.  Remove old dressing, discard into plastic bag and place in trash.  Cleanse the wound with wound wash prior to applying a clean dressing. Do not use tissue or cotton balls. Do not scrub the wound. Pat dry using gauze. (Cleaned with Dakins today)     Shower no      Place hydrafera blue ready on the wound then cover with a gauzr  Secure with ronnie and tape  Change dressing 3 times weekly.      The above was completed today at the wound center.     Standing Status:   Future     Standing Expiration Date:   2/1/2025    Wound off loading     Off-loading Instructions:     Keep weight and pressure off wound at all times.     Standing Status:   Future     Standing " "Expiration Date:   2/1/2025    Ambulatory Referral to Orthopedic Surgery     Standing Status:   Future     Standing Expiration Date:   2/1/2025     Referral Priority:   Routine     Referral Type:   Consult - AMB     Referral Reason:   Specialty Services Required     Requested Specialty:   Orthopedic Surgery     Number of Visits Requested:   1     Expiration Date:   2/1/2025       Bonita Ayala PA-C, [facility]      Portions of the record may have been created with voice recognition software. Occasional wrong word or \"sound alike\" substitutions may have occurred due to the inherent limitations of voice recognition software. Read the chart carefully and recognize, using context, where substitutions have occurred.    "

## 2024-02-01 NOTE — PATIENT INSTRUCTIONS
Orders Placed This Encounter   Procedures    Wound cleansing and dressings     · Wound cleansing and dressings        Right and Left Elbow wound:      Wash your hands with soap and water.  Remove old dressing, discard into plastic bag and place in trash.  Cleanse the wound with wound wash prior to applying a clean dressing. Do not use tissue or cotton balls. Do not scrub the wound. Pat dry using gauze. (Cleaned with Dakins today)     Shower no      Place hydrafera blue ready on the wound then cover with a gauzr  Secure with ronnie and tape  Change dressing 3 times weekly.      The above was completed today at the wound center.     Standing Status:   Future     Standing Expiration Date:   2/1/2025    Wound off loading     Off-loading Instructions:     Keep weight and pressure off wound at all times.     Standing Status:   Future     Standing Expiration Date:   2/1/2025

## 2024-02-02 ENCOUNTER — TELEPHONE (OUTPATIENT)
Dept: OBGYN CLINIC | Facility: MEDICAL CENTER | Age: 79
End: 2024-02-02

## 2024-02-02 ENCOUNTER — TELEPHONE (OUTPATIENT)
Dept: WOUND CARE | Facility: CLINIC | Age: 79
End: 2024-02-02

## 2024-02-02 PROCEDURE — 10330064

## 2024-02-02 NOTE — TELEPHONE ENCOUNTER
Patient called asking about follow up with ortho. Informed patient that our provider, anayeli ODONNELL, noted that from hospital notes that patient was supposed to follow up with ortho after hospital stay. Patient agreed he will call ortho and schedule follow .

## 2024-02-02 NOTE — TELEPHONE ENCOUNTER
Caller:  Mr Morris     Doctor: Pelon     Reason for call: Septic bursitis of elbow, left     The patient is calling to set up an appointment with Dr Bidr. Due to issue the patient is having, he is now scheduled with Dr Zaman on 2/5 in [place]. Wound Care put in a referral with Dr Bird.    Call back#: [phone]

## 2024-02-03 ENCOUNTER — HOME CARE VISIT (OUTPATIENT)
Dept: HOME HEALTH SERVICES | Facility: HOME HEALTHCARE | Age: 79
End: 2024-02-03
Payer: MEDICARE

## 2024-02-03 PROCEDURE — G0299 HHS/HOSPICE OF RN EA 15 MIN: HCPCS

## 2024-02-05 ENCOUNTER — HOME CARE VISIT (OUTPATIENT)
Dept: HOME HEALTH SERVICES | Facility: HOME HEALTHCARE | Age: 79
End: 2024-02-05
Payer: MEDICARE

## 2024-02-05 ENCOUNTER — OFFICE VISIT (OUTPATIENT)
Dept: OBGYN CLINIC | Facility: MEDICAL CENTER | Age: 79
End: 2024-02-05
Payer: MEDICARE

## 2024-02-05 VITALS
SYSTOLIC BLOOD PRESSURE: 130 MMHG | HEIGHT: 66 IN | DIASTOLIC BLOOD PRESSURE: 79 MMHG | WEIGHT: 125.4 LBS | HEART RATE: 92 BPM | BODY MASS INDEX: 20.15 KG/M2

## 2024-02-05 VITALS
DIASTOLIC BLOOD PRESSURE: 74 MMHG | HEART RATE: 77 BPM | TEMPERATURE: 97 F | OXYGEN SATURATION: 98 % | SYSTOLIC BLOOD PRESSURE: 116 MMHG | RESPIRATION RATE: 18 BRPM

## 2024-02-05 DIAGNOSIS — M71.122 SEPTIC BURSITIS OF ELBOW, LEFT: ICD-10-CM

## 2024-02-05 DIAGNOSIS — M25.522 PAIN IN LEFT ELBOW: Primary | ICD-10-CM

## 2024-02-05 PROCEDURE — 99213 OFFICE O/P EST LOW 20 MIN: CPT | Performed by: ORTHOPAEDIC SURGERY

## 2024-02-05 NOTE — PROGRESS NOTES
[facility] [place]  [address]  [place] [address]       Scott Morris  [id number]  1945    ORTHOPAEDIC SURGERY OUTPATIENT NOTE  2/5/2024      HISTORY:  78 y.o. male presents today evaluation for left elbow pain.  Patient has been having left elbow pain for the last 4 months, denies any injuries.  He states his pain got worse when resting his elbows on the hospital bed for patient was seen in the ED on 1/22/2024 for left elbow septic bursitis and was put on Keflex 500 mg every 6 hours until 2/6/2024. He has bilateral septic bursitis , worse on the left.  He has been treating with wound care and states they were debriding bilateral elbow.  He states he feels a pain is not getting better with the antibiotics.  He is right-hand dominant.    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    CAD (coronary artery disease)     Cancer (HCC)     prostate    Colon polyp     Coronary artery disease     GERD (gastroesophageal reflux disease)     H/O heart artery stent     x2    Hypercholesteremia     Hypertension     Keratotic lesion     Stroke (HCC)     10YRS AGO    Transient cerebral ischemia        Past Surgical History:   Procedure Laterality Date    BACK SURGERY      CARDIAC PACEMAKER PLACEMENT      CARDIAC SURGERY      mvp repair    CAROTID ENDARTERECTOMY Right 03/2020    CHOLECYSTECTOMY      COLONOSCOPY      ESOPHAGOGASTRODUODENOSCOPY N/A 06/22/2018    Procedure: ESOPHAGOGASTRODUODENOSCOPY (EGD);  Surgeon: Sunny Rodrigues III, MD;  Location: [facility];  Service: Gastroenterology    JOINT REPLACEMENT      L hip replacement    ORCHIECTOMY      PROSTATECTOMY      TOTAL HIP ARTHROPLASTY         Social History     Socioeconomic History    Marital status:      Spouse name: Not on file    Number of children: Not on file    Years of education: Not on file    Highest education level: Not on file   Occupational History     Occupation: retired   Tobacco Use    Smoking status: Every Day     Current packs/day: 0.00     Types: Cigarettes     Last attempt to quit: 2018     Years since quittin.7    Smokeless tobacco: Never    Tobacco comments:     2 cig/day   Vaping Use    Vaping status: Never Used   Substance and Sexual Activity    Alcohol use: Never     Alcohol/week: 2.0 standard drinks of alcohol     Types: 2 Standard drinks or equivalent per week    Drug use: No    Sexual activity: Not Currently     Comment: no high risk sexual behavior   Other Topics Concern    Not on file   Social History Narrative    Not on file     Social Determinants of Health     Financial Resource Strain: Low Risk  (2023)    Received from [facility]    Overall Financial Resource Strain (CARDIA)     Difficulty of Paying Living Expenses: Not hard at all   Food Insecurity: No Food Insecurity (2024)    Hunger Vital Sign     Worried About Running Out of Food in the Last Year: Never true     Ran Out of Food in the Last Year: Never true   Transportation Needs: No Transportation Needs (2024)    PRAPARE - Transportation     Lack of Transportation (Medical): No     Lack of Transportation (Non-Medical): No   Physical Activity: Not on file   Stress: Not on file   Social Connections: Not on file   Intimate Partner Violence: Patient Declined (2023)    Received from [facility]    Humiliation, Afraid, Rape, and Kick questionnaire     Fear of Current or Ex-Partner: Patient declined     Emotionally Abused: Patient declined     Physically Abused: Patient declined     Sexually Abused: Patient declined   Housing Stability: Low Risk  (2024)    Housing Stability Vital Sign     Unable to Pay for Housing in the Last Year: No     Number of Places Lived in the Last Year: 1     Unstable Housing in the Last Year: No   Recent Concern: Housing Stability - High Risk (2023)    Received from [facility]    [employer]  Vital Sign     Unable to Pay for Housing in the Last Year: Yes     Number of Places Lived in the Last Year: 1     Unstable Housing in the Last Year: No       Family History   Problem Relation Age of Onset    Heart disease Mother     Stroke Mother         CVA    Cancer Father     Hodgkin's lymphoma Father     Lung cancer Father         Patient's Medications   New Prescriptions    No medications on file   Previous Medications    ATORVASTATIN (LIPITOR) 40 MG TABLET    Take 40 mg by mouth daily      AZELAIC ACID 15 % CREAM    apply to 1 thin layer twice a day to face    CARVEDILOL (COREG) 6.25 MG TABLET        CEPHALEXIN (KEFLEX) 500 MG CAPSULE    Take 1 capsule (500 mg total) by mouth every 6 (six) hours for 52 doses Do not start before January 25, 2024.    CLINDAMYCIN (CLINDAGEL) 1 % GEL    APPLY 1 THIN LAYER TOPICALLY TO FACE IN THE  MORNING    CLOPIDOGREL (PLAVIX) 75 MG TABLET    Take 75 mg by mouth daily    DIGOXIN (LANOXIN) 0.125 MG TABLET    Take 125 mcg by mouth daily    FARXIGA 10 MG TABS    Take 10 mg by mouth daily    FUROSEMIDE (LASIX) 40 MG TABLET    Take 40 mg by mouth daily PT REPORTS : 1/2 TABLET EVERY OTHER DAY (MWF)    LUMIGAN 0.01 % OPHTHALMIC DROPS    instill 1 drop into both eyes once daily at bedtime    MIDODRINE (PROAMATINE) 2.5 MG TABLET    Take 1 tablet (2.5 mg total) by mouth 3 (three) times a day before meals    MIDODRINE (PROAMATINE) 5 MG TABLET    Take 5 mg by mouth 3 (three) times a day before meals    MULTIPLE VITAMINS-MINERALS (PX MENS MULTIVITAMINS) TABS    Take by mouth    PANTOPRAZOLE (PROTONIX) 40 MG TABLET    Take 1 tablet (40 mg total) by mouth daily    SACUBITRIL-VALSARTAN (ENTRESTO) 24-26 MG TABS    Take 1 tablet by mouth 2 (two) times a day    SPIRONOLACTONE (ALDACTONE) 25 MG TABLET    Take 12.5 mg by mouth daily 1/2 TABLET EVERY OTHER DAY (Tu, Th, Sa, Nick)    TRIAMCINOLONE (KENALOG) 0.5 % CREAM    Apply topically 3 (three) times a day    WARFARIN (COUMADIN) 2.5 MG TABLET    as  "directed take 1 tablet ON TUESDAY,WEDNESDAY,FRIDAY,SATURDAY,GARCIA...  (REFER TO PRESCRIPTION NOTES).    WARFARIN (COUMADIN) 5 MG TABLET    Take 1 tablet (5 mg total) by mouth daily Coumadin 5 mg p.o. Daily- Target INR 2-3   Modified Medications    No medications on file   Discontinued Medications    No medications on file       No Known Allergies     /79 (BP Location: Right arm, Patient Position: Sitting, Cuff Size: Standard)   Pulse 92   Ht 5' 6\" (1.676 m)   Wt 56.9 kg (125 lb 6.4 oz)   BMI 20.24 kg/m²      REVIEW OF SYSTEMS:  Constitutional: Negative.    HEENT: Negative.    Respiratory: Negative.    Skin: Negative.    Neurological: Negative.    Psychiatric/Behavioral: Negative.  Musculoskeletal: Negative except for that mentioned in the HPI.    PHYSICAL EXAM:      R elbow:  Healing pinpoint wound, mild erythema, no effusion      L elbow:  Pinpoint wound over the olecranon process, nonpurulent, no active drainage, mild erythema, tenderness to palpation over the olecranon process      IMAGING: Reviewed today    ASSESSMENT AND PLAN:  78 y.o. male patient with left elbow septic bursitis    Patient is continue with Keflex 500 mg until Thursday.  The room today Xeroform, gauze and Ace wrap was applied over the left elbow.  Patient is to follow-up in 1 week for reevaluation left elbow.       Scribe Attestation      I,:  Nimco Garcia am acting as a scribe while in the presence of the attending physician.:       I,:  Thien Zaman DO personally performed the services described in this documentation    as scribed in my presence.:             "

## 2024-02-06 ENCOUNTER — OFFICE VISIT (OUTPATIENT)
Dept: INTERNAL MEDICINE CLINIC | Facility: CLINIC | Age: 79
End: 2024-02-06
Payer: MEDICARE

## 2024-02-06 VITALS
TEMPERATURE: 98.1 F | DIASTOLIC BLOOD PRESSURE: 56 MMHG | HEIGHT: 66 IN | HEART RATE: 88 BPM | BODY MASS INDEX: 20.09 KG/M2 | SYSTOLIC BLOOD PRESSURE: 98 MMHG | RESPIRATION RATE: 16 BRPM | OXYGEN SATURATION: 95 % | WEIGHT: 125 LBS

## 2024-02-06 DIAGNOSIS — M70.22 OLECRANON BURSITIS OF LEFT ELBOW: Primary | ICD-10-CM

## 2024-02-06 PROCEDURE — 99214 OFFICE O/P EST MOD 30 MIN: CPT | Performed by: INTERNAL MEDICINE

## 2024-02-06 NOTE — PROGRESS NOTES
Assessment & Plan     1. Olecranon bursitis of left elbow    He appears to be doing okay.  He has follow-up with wound care and orthopedics in place.  Resume regular follow-up here.     Subjective     Transitional Care Management Review:   Scott Morris is a 78 y.o. male here for TCM follow up.     During the TCM phone call patient stated:  TCM Call     Date and time call was made  1/25/2024  9:08 AM    Hospital care reviewed  Records reviewed    Patient was hospitialized at  [facility]    Date of Admission  01/22/24    Date of discharge  01/24/24    Diagnosis  Olecranon bursitis    Disposition  Home    Were the patients medications reviewed and updated  Yes    Current Symptoms  None      TCM Call     Post hospital issues  None    Should patient be enrolled in anticoag monitoring?  No    Scheduled for follow up?  Yes    Did you obtain your prescribed medications  Yes    Do you need help managing your prescriptions or medications  No    Is transportation to your appointment needed  No    I have advised the patient to call PCP with any new or worsening symptoms  Clair Stevens, Practice Coordinator    Living Arrangements  Family members    Support System  None    Are you recieving any outpatient services  No    Are you recieving home care services  No    Are you using any community resources  No    Current waiver services  No    Have you fallen in the last 12 months  No    Interperter language line needed  No    Counseling  Patient        Patient comes in today for hospital follow-up.  He started to notice swelling in his left forearm and went to the ER.  There was concern for olecranon bursitis and possibly sepsis.  He was treated with IV antibiotics and that overall improved.  He continue to follow with wound care but also will be seeing orthopedics now too.  Both elbows are wrapped now.  He has moved into his personal care home and is going to continue coming here versus the provider at the facility.   "He has had no trouble since going home.  Taking his meds as directed.      Review of Systems   Respiratory:  Negative for shortness of breath.    Cardiovascular:  Negative for chest pain.   Gastrointestinal:  Negative for abdominal pain.   Neurological:  Negative for dizziness and light-headedness.       Objective     BP 98/56 (BP Location: Left arm, Patient Position: Sitting, Cuff Size: Adult)   Pulse 88   Temp 98.1 °F (36.7 °C) (Tympanic)   Resp 16   Ht 5' 6\" (1.676 m)   Wt 56.7 kg (125 lb)   SpO2 95%   BMI 20.18 kg/m²      Physical Exam  Vitals and nursing note reviewed.   Cardiovascular:      Rate and Rhythm: Normal rate and regular rhythm.   Pulmonary:      Effort: Pulmonary effort is normal.      Breath sounds: Normal breath sounds.   Neurological:      Mental Status: He is alert and oriented to person, place, and time.       Medications have been reviewed by provider in current encounter    Lenin Hardy MD         "

## 2024-02-07 ENCOUNTER — HOME CARE VISIT (OUTPATIENT)
Dept: HOME HEALTH SERVICES | Facility: HOME HEALTHCARE | Age: 79
End: 2024-02-07
Payer: MEDICARE

## 2024-02-07 VITALS
DIASTOLIC BLOOD PRESSURE: 68 MMHG | OXYGEN SATURATION: 98 % | RESPIRATION RATE: 18 BRPM | WEIGHT: 125 LBS | SYSTOLIC BLOOD PRESSURE: 110 MMHG | HEART RATE: 84 BPM | TEMPERATURE: 97.4 F | BODY MASS INDEX: 20.18 KG/M2

## 2024-02-07 PROCEDURE — G0299 HHS/HOSPICE OF RN EA 15 MIN: HCPCS

## 2024-02-12 ENCOUNTER — OFFICE VISIT (OUTPATIENT)
Dept: OBGYN CLINIC | Facility: MEDICAL CENTER | Age: 79
End: 2024-02-12
Payer: MEDICARE

## 2024-02-12 ENCOUNTER — HOME CARE VISIT (OUTPATIENT)
Dept: HOME HEALTH SERVICES | Facility: HOME HEALTHCARE | Age: 79
End: 2024-02-12
Payer: MEDICARE

## 2024-02-12 VITALS
DIASTOLIC BLOOD PRESSURE: 81 MMHG | HEART RATE: 80 BPM | WEIGHT: 125 LBS | HEIGHT: 66 IN | SYSTOLIC BLOOD PRESSURE: 137 MMHG | BODY MASS INDEX: 20.09 KG/M2

## 2024-02-12 DIAGNOSIS — M71.122 SEPTIC BURSITIS OF ELBOW, LEFT: Primary | ICD-10-CM

## 2024-02-12 PROCEDURE — 99213 OFFICE O/P EST LOW 20 MIN: CPT | Performed by: ORTHOPAEDIC SURGERY

## 2024-02-12 NOTE — PROGRESS NOTES
[facility] [place]  [address]  [place] [address]       Scott Morris  [id number]  1945    ORTHOPAEDIC SURGERY OUTPATIENT NOTE  2024      HISTORY:  78 y.o. male  presents today follow up for left elbow septic bursitis. He is doing well. He notes he finished the antibiotics. He notes the swelling has decreased. He is complaining of no pain.     Past Medical History:   Diagnosis Date    Anaplasmosis 2018    CAD (coronary artery disease)     Cancer (HCC)     prostate    Colon polyp     Coronary artery disease     GERD (gastroesophageal reflux disease)     H/O heart artery stent     x2    Hypercholesteremia     Hypertension     Keratotic lesion     Stroke (HCC)     10YRS AGO    Transient cerebral ischemia        Past Surgical History:   Procedure Laterality Date    BACK SURGERY      CARDIAC PACEMAKER PLACEMENT      CARDIAC SURGERY      mvp repair    CAROTID ENDARTERECTOMY Right 2020    CHOLECYSTECTOMY      COLONOSCOPY      ESOPHAGOGASTRODUODENOSCOPY N/A 2018    Procedure: ESOPHAGOGASTRODUODENOSCOPY (EGD);  Surgeon: Sunny Rodrigues III, MD;  Location: [facility];  Service: Gastroenterology    JOINT REPLACEMENT      L hip replacement    ORCHIECTOMY      PROSTATECTOMY      TOTAL HIP ARTHROPLASTY         Social History     Socioeconomic History    Marital status:      Spouse name: Not on file    Number of children: Not on file    Years of education: Not on file    Highest education level: Not on file   Occupational History    Occupation: retired   Tobacco Use    Smoking status: Every Day     Current packs/day: 0.00     Types: Cigarettes     Last attempt to quit: 2018     Years since quittin.7    Smokeless tobacco: Never    Tobacco comments:     2 cig/day   Vaping Use    Vaping status: Never Used   Substance and Sexual Activity    Alcohol use: Never     Alcohol/week: 2.0 standard drinks of alcohol     Types: 2  Standard drinks or equivalent per week    Drug use: No    Sexual activity: Not Currently     Comment: no high risk sexual behavior   Other Topics Concern    Not on file   Social History Narrative    Not on file     Social Determinants of Health     Financial Resource Strain: Low Risk  (12/19/2023)    Received from [facility]    Overall Financial Resource Strain (CARDIA)     Difficulty of Paying Living Expenses: Not hard at all   Food Insecurity: No Food Insecurity (1/23/2024)    Hunger Vital Sign     Worried About Running Out of Food in the Last Year: Never true     Ran Out of Food in the Last Year: Never true   Transportation Needs: No Transportation Needs (1/23/2024)    PRAPARE - Transportation     Lack of Transportation (Medical): No     Lack of Transportation (Non-Medical): No   Physical Activity: Not on file   Stress: Not on file   Social Connections: Not on file   Intimate Partner Violence: Patient Declined (12/12/2023)    Received from [facility]    Humiliation, Afraid, Rape, and Kick questionnaire     Fear of Current or Ex-Partner: Patient declined     Emotionally Abused: Patient declined     Physically Abused: Patient declined     Sexually Abused: Patient declined   Housing Stability: Low Risk  (1/23/2024)    Housing Stability Vital Sign     Unable to Pay for Housing in the Last Year: No     Number of Places Lived in the Last Year: 1     Unstable Housing in the Last Year: No   Recent Concern: Housing Stability - High Risk (12/19/2023)    Received from [facility]    Housing Stability Vital Sign     Unable to Pay for Housing in the Last Year: Yes     Number of Places Lived in the Last Year: 1     Unstable Housing in the Last Year: No       Family History   Problem Relation Age of Onset    Heart disease Mother     Stroke Mother         CVA    Cancer Father     Hodgkin's lymphoma Father     Lung cancer Father         Patient's Medications   New Prescriptions    No medications on file  "  Previous Medications    ATORVASTATIN (LIPITOR) 40 MG TABLET    Take 40 mg by mouth daily      AZELAIC ACID 15 % CREAM    apply to 1 thin layer twice a day to face    CARVEDILOL (COREG) 6.25 MG TABLET    Take 3.125 mg by mouth in the morning    CLINDAMYCIN (CLINDAGEL) 1 % GEL    APPLY 1 THIN LAYER TOPICALLY TO FACE IN THE  MORNING    CLOPIDOGREL (PLAVIX) 75 MG TABLET    Take 75 mg by mouth daily    DIGOXIN (LANOXIN) 0.125 MG TABLET    Take 125 mcg by mouth daily    FARXIGA 10 MG TABS    Take 10 mg by mouth daily    FUROSEMIDE (LASIX) 40 MG TABLET    Take 40 mg by mouth daily PT REPORTS : 1/2 TABLET EVERY OTHER DAY (MWF)    LUMIGAN 0.01 % OPHTHALMIC DROPS    instill 1 drop into both eyes once daily at bedtime    MIDODRINE (PROAMATINE) 2.5 MG TABLET    Take 1 tablet (2.5 mg total) by mouth 3 (three) times a day before meals    MIDODRINE (PROAMATINE) 5 MG TABLET    Take 5 mg by mouth 3 (three) times a day before meals    MULTIPLE VITAMINS-MINERALS (PX MENS MULTIVITAMINS) TABS    Take by mouth    PANTOPRAZOLE (PROTONIX) 40 MG TABLET    Take 1 tablet (40 mg total) by mouth daily    SACUBITRIL-VALSARTAN (ENTRESTO) 24-26 MG TABS    Take 1 tablet by mouth 2 (two) times a day    SPIRONOLACTONE (ALDACTONE) 25 MG TABLET    Take 12.5 mg by mouth daily 1/2 TABLET EVERY OTHER DAY (Tu, Th, Sa, Nick)    TRIAMCINOLONE (KENALOG) 0.5 % CREAM    Apply topically 3 (three) times a day    WARFARIN (COUMADIN) 2.5 MG TABLET    as directed take 1 tablet ON TUESDAY,WEDNESDAY,FRIDAY,SATURDAY,NICK...  (REFER TO PRESCRIPTION NOTES).    WARFARIN (COUMADIN) 5 MG TABLET    Take 1 tablet (5 mg total) by mouth daily Coumadin 5 mg p.o. Daily- [facility] INR 2-3   Modified Medications    No medications on file   Discontinued Medications    No medications on file       No Known Allergies     Ht 5' 6\" (1.676 m)   Wt 56.7 kg (125 lb)   BMI 20.18 kg/m²      REVIEW OF SYSTEMS:  Constitutional: Negative.    HEENT: Negative.    Respiratory: Negative.    Skin: " Negative.    Neurological: Negative.    Psychiatric/Behavioral: Negative.  Musculoskeletal: Negative except for that mentioned in the HPI.    PHYSICAL EXAM:      PHYSICAL EXAM:       R elbow:  Healing pinpoint wound, mild erythema, no effusion        L elbow:  Healing Pinpoint wound over the olecranon process, nonpurulent, no active drainage, mild erythema, No tenderness  to palpation over the olecranon process      IMAGING:  Not taken today     ASSESSMENT AND PLAN:  78 y.o. male   with left elbow septic bursitis       Patient is healing well. Advised patient to avoid putting pressure on the elbow until wound is fully healed. He is to follow up PRN.     Scribe Attestation      I,:  Nimco Garcia am acting as a scribe while in the presence of the attending physician.:       I,:  Thien Zaman, DO personally performed the services described in this documentation    as scribed in my presence.:

## 2024-02-15 ENCOUNTER — OFFICE VISIT (OUTPATIENT)
Dept: WOUND CARE | Facility: CLINIC | Age: 79
End: 2024-02-15
Payer: MEDICARE

## 2024-02-15 VITALS
HEART RATE: 91 BPM | TEMPERATURE: 96.8 F | SYSTOLIC BLOOD PRESSURE: 93 MMHG | DIASTOLIC BLOOD PRESSURE: 54 MMHG | RESPIRATION RATE: 18 BRPM

## 2024-02-15 DIAGNOSIS — L89.013 PRESSURE INJURY OF RIGHT ELBOW, STAGE 3 (HCC): ICD-10-CM

## 2024-02-15 DIAGNOSIS — L89.023 PRESSURE INJURY OF LEFT ELBOW, STAGE 3 (HCC): Primary | ICD-10-CM

## 2024-02-15 PROCEDURE — 99212 OFFICE O/P EST SF 10 MIN: CPT | Performed by: ORTHOPAEDIC SURGERY

## 2024-02-15 NOTE — PATIENT INSTRUCTIONS
Orders Placed This Encounter   Procedures    Wound cleansing and dressings     Bilateral elbow wounds    Your wounds are healed    May shower    Wash with mild soap and water  Keep covered for a week to protect newly healed wound (applied silicone bordered foam dressing) Take off when you shower    Keep pressure off of elbow     Standing Status:   Future     Standing Expiration Date:   2/15/2025

## 2024-02-16 ENCOUNTER — HOME CARE VISIT (OUTPATIENT)
Dept: HOME HEALTH SERVICES | Facility: HOME HEALTHCARE | Age: 79
End: 2024-02-16
Payer: MEDICARE

## 2024-02-17 ENCOUNTER — HOME CARE VISIT (OUTPATIENT)
Dept: HOME HEALTH SERVICES | Facility: HOME HEALTHCARE | Age: 79
End: 2024-02-17
Payer: MEDICARE

## 2024-02-17 VITALS — DIASTOLIC BLOOD PRESSURE: 62 MMHG | RESPIRATION RATE: 20 BRPM | TEMPERATURE: 97.4 F | SYSTOLIC BLOOD PRESSURE: 124 MMHG

## 2024-02-17 PROCEDURE — G0299 HHS/HOSPICE OF RN EA 15 MIN: HCPCS

## 2024-02-17 NOTE — CASE COMMUNICATION
Patient requested discharge from home care services as his wounds are now healed. Patient discharged from [facility].

## 2024-02-21 ENCOUNTER — TELEPHONE (OUTPATIENT)
Dept: RADIOLOGY | Facility: CLINIC | Age: 79
End: 2024-02-21

## 2024-02-21 ENCOUNTER — OFFICE VISIT (OUTPATIENT)
Dept: PAIN MEDICINE | Facility: CLINIC | Age: 79
End: 2024-02-21
Payer: MEDICARE

## 2024-02-21 VITALS
HEIGHT: 66 IN | BODY MASS INDEX: 20.09 KG/M2 | SYSTOLIC BLOOD PRESSURE: 99 MMHG | HEART RATE: 91 BPM | WEIGHT: 125 LBS | DIASTOLIC BLOOD PRESSURE: 64 MMHG

## 2024-02-21 DIAGNOSIS — M54.50 CHRONIC LEFT-SIDED LOW BACK PAIN WITHOUT SCIATICA: ICD-10-CM

## 2024-02-21 DIAGNOSIS — G89.29 CHRONIC LEFT-SIDED LOW BACK PAIN WITHOUT SCIATICA: ICD-10-CM

## 2024-02-21 DIAGNOSIS — M47.816 LUMBAR FACET ARTHROPATHY: Primary | ICD-10-CM

## 2024-02-21 PROCEDURE — 99214 OFFICE O/P EST MOD 30 MIN: CPT | Performed by: STUDENT IN AN ORGANIZED HEALTH CARE EDUCATION/TRAINING PROGRAM

## 2024-02-21 RX ORDER — LIDOCAINE 50 MG/G
1 PATCH TOPICAL DAILY
Qty: 10 PATCH | Refills: 0 | Status: SHIPPED | OUTPATIENT
Start: 2024-02-21 | End: 2024-02-22 | Stop reason: SDUPTHER

## 2024-02-21 NOTE — PROGRESS NOTES
Pain Medicine Follow-Up Note    Assessment:  1. Lumbar facet arthropathy    2. Chronic left-sided low back pain without sciatica        Plan:  Orders Placed This Encounter   Procedures    FL spine and pain procedure     Standing Status:   Future     Standing Expiration Date:   2/21/2028     Order Specific Question:   Reason for Exam:     Answer:   left L2-3 AND L3-4 MBB #1     Order Specific Question:   Anticoagulant hold needed?     Answer:   No       New Medications Ordered This Visit   Medications    lidocaine (Lidoderm) 5 %     Sig: Apply 1 patch topically over 12 hours daily Remove & Discard patch within 12 hours or as directed by MD     Dispense:  10 patch     Refill:  0       My impressions and treatment recommendations were discussed in detail with the patient who verbalized understanding and had no further questions.      78-year-old male with ongoing left-sided low back pain which still appears to be facet mediated.  Discussed proceeding with second event given response to the first.  This will help to confirm that we have targeted the correct area of pain.  He will be candidate for mucous ablation.  Patient notably has ICD.    [place] Prescription Drug Monitoring Program report was reviewed and was appropriate     Complete risks and benefits including bleeding, infection, tissue reaction, nerve injury and allergic reaction were discussed. The approach was demonstrated using models and literature was provided. Verbal and written consent was obtained.      Discharge instructions were provided. I personally saw and examined the patient and I agree with the above discussed plan of care.    History of Present Illness:    Scott Morris is a 78 y.o. male who presents to [facility] for interval re-evaluation of the above stated pain complaints. The patient has a past medical and chronic pain history as outlined in the assessment section. He was last seen on 12/7/2023 for left  "L2-3 and L3-4 #1.  He reports up to help some of his pain but not all of his pain because much of his pain is \"spine pain\".  I went over his pain diary which shows an 80% room for nearly 4 to 6 hours.  He still complains of left-sided back pain which is 5 out of 10.  Reports inability to walk long distances because of pain.  Has to frequently sit down.        Other than as stated above, the patient denies any interval changes in medications, medical condition, mental condition, symptoms, or allergies since the last office visit.         Review of Systems:    Review of Systems   Musculoskeletal:  Positive for back pain and gait problem.        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    CAD (coronary artery disease)     Cancer (HCC)     prostate    Colon polyp     Coronary artery disease     GERD (gastroesophageal reflux disease)     H/O heart artery stent     x2    Hypercholesteremia     Hypertension     Keratotic lesion     Stroke (HCC)     10YRS AGO    Transient cerebral ischemia        Past Surgical History:   Procedure Laterality Date    BACK SURGERY      CARDIAC PACEMAKER PLACEMENT      CARDIAC SURGERY      mvp repair    CAROTID ENDARTERECTOMY Right 03/2020    CHOLECYSTECTOMY      COLONOSCOPY      ESOPHAGOGASTRODUODENOSCOPY N/A 06/22/2018    Procedure: ESOPHAGOGASTRODUODENOSCOPY (EGD);  Surgeon: Sunny Rodrigues III, MD;  Location: [facility];  Service: Gastroenterology    JOINT REPLACEMENT      L hip replacement    ORCHIECTOMY      PROSTATECTOMY      TOTAL HIP ARTHROPLASTY         Family History   Problem Relation Age of Onset    Heart disease Mother     Stroke Mother         CVA    Cancer Father     Hodgkin's lymphoma Father     Lung cancer Father        Social History     Occupational History    Occupation: retired   Tobacco Use    Smoking status: Every Day     Current packs/day: 0.00     Types: Cigarettes     Last attempt to quit: 5/22/2018     " Years since quittin.7    Smokeless tobacco: Never    Tobacco comments:     2 cig/day   Vaping Use    Vaping status: Never Used   Substance and Sexual Activity    Alcohol use: Never     Alcohol/week: 2.0 standard drinks of alcohol     Types: 2 Standard drinks or equivalent per week    Drug use: No    Sexual activity: Not Currently     Comment: no high risk sexual behavior         Current Outpatient Medications:     atorvastatin (LIPITOR) 40 mg tablet, Take 40 mg by mouth daily  , Disp: , Rfl:     Azelaic Acid 15 % cream, apply to 1 thin layer twice a day to face, Disp: , Rfl:     carvedilol (COREG) 6.25 mg tablet, Take 3.125 mg by mouth in the morning, Disp: , Rfl:     clindamycin (CLINDAGEL) 1 % gel, APPLY 1 THIN LAYER TOPICALLY TO FACE IN THE  MORNING, Disp: , Rfl:     clopidogrel (PLAVIX) 75 mg tablet, Take 75 mg by mouth daily, Disp: , Rfl:     digoxin (LANOXIN) 0.125 mg tablet, Take 125 mcg by mouth daily, Disp: , Rfl:     Farxiga 10 MG TABS, Take 10 mg by mouth daily, Disp: , Rfl:     furosemide (LASIX) 40 mg tablet, Take 40 mg by mouth daily PT REPORTS : 1/2 TABLET EVERY OTHER DAY (MW), Disp: , Rfl:     lidocaine (Lidoderm) 5 %, Apply 1 patch topically over 12 hours daily Remove & Discard patch within 12 hours or as directed by MD, Disp: 10 patch, Rfl: 0    Lumigan 0.01 % ophthalmic drops, instill 1 drop into both eyes once daily at bedtime, Disp: , Rfl:     Multiple Vitamins-Minerals (PX MENS MULTIVITAMINS) TABS, Take by mouth, Disp: , Rfl:     pantoprazole (PROTONIX) 40 mg tablet, Take 1 tablet (40 mg total) by mouth daily, Disp: 90 tablet, Rfl: 2    spironolactone (ALDACTONE) 25 mg tablet, Take 12.5 mg by mouth daily 1/2 TABLET EVERY OTHER DAY (, , , ), Disp: , Rfl:     triamcinolone (KENALOG) 0.5 % cream, Apply topically 3 (three) times a day, Disp: 30 g, Rfl: 1    warfarin (COUMADIN) 2.5 mg tablet, as directed take 1 tablet ON TUESDAY,WEDNESDAY,FRIDAY,SATURDAY,GARCIA...  (REFER TO PRESCRIPTION  "NOTES)., Disp: , Rfl:     warfarin (COUMADIN) 5 mg tablet, Take 1 tablet (5 mg total) by mouth daily Coumadin 5 mg p.o. Daily- Target INR 2-3 (Patient taking differently: Take 5 mg by mouth daily M & Thurs & Sat 5MG  Tues & Wed & Fri & Sun 2.5MG), Disp: 30 tablet, Rfl: 0    midodrine (PROAMATINE) 2.5 mg tablet, Take 1 tablet (2.5 mg total) by mouth 3 (three) times a day before meals (Patient not taking: Reported on 2/6/2024), Disp: 90 tablet, Rfl: 0    midodrine (PROAMATINE) 5 mg tablet, Take 5 mg by mouth 3 (three) times a day before meals (Patient not taking: Reported on 2/6/2024), Disp: , Rfl:     sacubitril-valsartan (ENTRESTO) 24-26 MG TABS, Take 1 tablet by mouth 2 (two) times a day (Patient taking differently: Take 0.5 tablets by mouth 2 (two) times a day), Disp: 60 tablet, Rfl: 0    No Known Allergies    Physical Exam:    BP 99/64   Pulse 91   Ht 5' 6\" (1.676 m)   Wt 56.7 kg (125 lb)   BMI 20.18 kg/m²     Constitutional:normal, well developed, well nourished, alert, in no distress and non-toxic and no overt pain behavior.  Eyes:anicteric  HEENT:grossly intact  Neck:supple, symmetric, trachea midline and no masses   Pulmonary:even and unlabored  Cardiovascular:No edema or pitting edema present  Skin:Normal without rashes or lesions and well hydrated  Psychiatric:Mood and affect appropriate  Neurologic:Cranial Nerves II-XII grossly intact  Musculoskeletal:normal      Imaging  FL spine and pain procedure    (Results Pending)         Orders Placed This Encounter   Procedures    FL spine and pain procedure     "

## 2024-02-21 NOTE — TELEPHONE ENCOUNTER
Procedure is scheduled. I reviewed all of the instructions with the patient.     He was informed to have a  over the age of 18 to drive him home after the procedure and is requesting a call from the office due to him not having to have a  during his last injection.

## 2024-02-21 NOTE — PATIENT INSTRUCTIONS
Lidocaine Patch (Lidoderm, Novaplus Lidocaine) - (On the skin)   Why this medicine is used:   Treats nerve pain that is caused by herpes zoster or shingles.    Common side effects:  Redness, itching, burning, swelling, blisters where the patch is applied  Dizziness  © Copyright Merative 2023 Information is for End User's use only and may not be sold, redistributed or otherwise used for commercial purposes.

## 2024-02-21 NOTE — H&P (VIEW-ONLY)
Pain Medicine Follow-Up Note    Assessment:  1. Lumbar facet arthropathy    2. Chronic left-sided low back pain without sciatica        Plan:  Orders Placed This Encounter   Procedures    FL spine and pain procedure     Standing Status:   Future     Standing Expiration Date:   2/21/2028     Order Specific Question:   Reason for Exam:     Answer:   left L2-3 AND L3-4 MBB #1     Order Specific Question:   Anticoagulant hold needed?     Answer:   No       New Medications Ordered This Visit   Medications    lidocaine (Lidoderm) 5 %     Sig: Apply 1 patch topically over 12 hours daily Remove & Discard patch within 12 hours or as directed by MD     Dispense:  10 patch     Refill:  0       My impressions and treatment recommendations were discussed in detail with the patient who verbalized understanding and had no further questions.      78-year-old male with ongoing left-sided low back pain which still appears to be facet mediated.  Discussed proceeding with second event given response to the first.  This will help to confirm that we have targeted the correct area of pain.  He will be candidate for mucous ablation.  Patient notably has ICD.    [place] Prescription Drug Monitoring Program report was reviewed and was appropriate     Complete risks and benefits including bleeding, infection, tissue reaction, nerve injury and allergic reaction were discussed. The approach was demonstrated using models and literature was provided. Verbal and written consent was obtained.      Discharge instructions were provided. I personally saw and examined the patient and I agree with the above discussed plan of care.    History of Present Illness:    Scott Morris is a 78 y.o. male who presents to [facility] for interval re-evaluation of the above stated pain complaints. The patient has a past medical and chronic pain history as outlined in the assessment section. He was last seen on 12/7/2023 for left  "L2-3 and L3-4 #1.  He reports up to help some of his pain but not all of his pain because much of his pain is \"spine pain\".  I went over his pain diary which shows an 80% room for nearly 4 to 6 hours.  He still complains of left-sided back pain which is 5 out of 10.  Reports inability to walk long distances because of pain.  Has to frequently sit down.        Other than as stated above, the patient denies any interval changes in medications, medical condition, mental condition, symptoms, or allergies since the last office visit.         Review of Systems:    Review of Systems   Musculoskeletal:  Positive for back pain and gait problem.        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    CAD (coronary artery disease)     Cancer (HCC)     prostate    Colon polyp     Coronary artery disease     GERD (gastroesophageal reflux disease)     H/O heart artery stent     x2    Hypercholesteremia     Hypertension     Keratotic lesion     Stroke (HCC)     10YRS AGO    Transient cerebral ischemia        Past Surgical History:   Procedure Laterality Date    BACK SURGERY      CARDIAC PACEMAKER PLACEMENT      CARDIAC SURGERY      mvp repair    CAROTID ENDARTERECTOMY Right 03/2020    CHOLECYSTECTOMY      COLONOSCOPY      ESOPHAGOGASTRODUODENOSCOPY N/A 06/22/2018    Procedure: ESOPHAGOGASTRODUODENOSCOPY (EGD);  Surgeon: Sunny Rodrigues III, MD;  Location: [facility];  Service: Gastroenterology    JOINT REPLACEMENT      L hip replacement    ORCHIECTOMY      PROSTATECTOMY      TOTAL HIP ARTHROPLASTY         Family History   Problem Relation Age of Onset    Heart disease Mother     Stroke Mother         CVA    Cancer Father     Hodgkin's lymphoma Father     Lung cancer Father        Social History     Occupational History    Occupation: retired   Tobacco Use    Smoking status: Every Day     Current packs/day: 0.00     Types: Cigarettes     Last attempt to quit: 5/22/2018     " Years since quittin.7    Smokeless tobacco: Never    Tobacco comments:     2 cig/day   Vaping Use    Vaping status: Never Used   Substance and Sexual Activity    Alcohol use: Never     Alcohol/week: 2.0 standard drinks of alcohol     Types: 2 Standard drinks or equivalent per week    Drug use: No    Sexual activity: Not Currently     Comment: no high risk sexual behavior         Current Outpatient Medications:     atorvastatin (LIPITOR) 40 mg tablet, Take 40 mg by mouth daily  , Disp: , Rfl:     Azelaic Acid 15 % cream, apply to 1 thin layer twice a day to face, Disp: , Rfl:     carvedilol (COREG) 6.25 mg tablet, Take 3.125 mg by mouth in the morning, Disp: , Rfl:     clindamycin (CLINDAGEL) 1 % gel, APPLY 1 THIN LAYER TOPICALLY TO FACE IN THE  MORNING, Disp: , Rfl:     clopidogrel (PLAVIX) 75 mg tablet, Take 75 mg by mouth daily, Disp: , Rfl:     digoxin (LANOXIN) 0.125 mg tablet, Take 125 mcg by mouth daily, Disp: , Rfl:     Farxiga 10 MG TABS, Take 10 mg by mouth daily, Disp: , Rfl:     furosemide (LASIX) 40 mg tablet, Take 40 mg by mouth daily PT REPORTS : 1/2 TABLET EVERY OTHER DAY (MW), Disp: , Rfl:     lidocaine (Lidoderm) 5 %, Apply 1 patch topically over 12 hours daily Remove & Discard patch within 12 hours or as directed by MD, Disp: 10 patch, Rfl: 0    Lumigan 0.01 % ophthalmic drops, instill 1 drop into both eyes once daily at bedtime, Disp: , Rfl:     Multiple Vitamins-Minerals (PX MENS MULTIVITAMINS) TABS, Take by mouth, Disp: , Rfl:     pantoprazole (PROTONIX) 40 mg tablet, Take 1 tablet (40 mg total) by mouth daily, Disp: 90 tablet, Rfl: 2    spironolactone (ALDACTONE) 25 mg tablet, Take 12.5 mg by mouth daily 1/2 TABLET EVERY OTHER DAY (, , , ), Disp: , Rfl:     triamcinolone (KENALOG) 0.5 % cream, Apply topically 3 (three) times a day, Disp: 30 g, Rfl: 1    warfarin (COUMADIN) 2.5 mg tablet, as directed take 1 tablet ON TUESDAY,WEDNESDAY,FRIDAY,SATURDAY,GARCIA...  (REFER TO PRESCRIPTION  "NOTES)., Disp: , Rfl:     warfarin (COUMADIN) 5 mg tablet, Take 1 tablet (5 mg total) by mouth daily Coumadin 5 mg p.o. Daily- Target INR 2-3 (Patient taking differently: Take 5 mg by mouth daily M & Thurs & Sat 5MG  Tues & Wed & Fri & Sun 2.5MG), Disp: 30 tablet, Rfl: 0    midodrine (PROAMATINE) 2.5 mg tablet, Take 1 tablet (2.5 mg total) by mouth 3 (three) times a day before meals (Patient not taking: Reported on 2/6/2024), Disp: 90 tablet, Rfl: 0    midodrine (PROAMATINE) 5 mg tablet, Take 5 mg by mouth 3 (three) times a day before meals (Patient not taking: Reported on 2/6/2024), Disp: , Rfl:     sacubitril-valsartan (ENTRESTO) 24-26 MG TABS, Take 1 tablet by mouth 2 (two) times a day (Patient taking differently: Take 0.5 tablets by mouth 2 (two) times a day), Disp: 60 tablet, Rfl: 0    No Known Allergies    Physical Exam:    BP 99/64   Pulse 91   Ht 5' 6\" (1.676 m)   Wt 56.7 kg (125 lb)   BMI 20.18 kg/m²     Constitutional:normal, well developed, well nourished, alert, in no distress and non-toxic and no overt pain behavior.  Eyes:anicteric  HEENT:grossly intact  Neck:supple, symmetric, trachea midline and no masses   Pulmonary:even and unlabored  Cardiovascular:No edema or pitting edema present  Skin:Normal without rashes or lesions and well hydrated  Psychiatric:Mood and affect appropriate  Neurologic:Cranial Nerves II-XII grossly intact  Musculoskeletal:normal      Imaging  FL spine and pain procedure    (Results Pending)         Orders Placed This Encounter   Procedures    FL spine and pain procedure     "

## 2024-02-22 ENCOUNTER — TELEPHONE (OUTPATIENT)
Age: 79
End: 2024-02-22

## 2024-02-22 DIAGNOSIS — M47.816 LUMBAR FACET ARTHROPATHY: ICD-10-CM

## 2024-02-22 DIAGNOSIS — G89.29 CHRONIC LEFT-SIDED LOW BACK PAIN WITHOUT SCIATICA: ICD-10-CM

## 2024-02-22 DIAGNOSIS — M54.50 CHRONIC LEFT-SIDED LOW BACK PAIN WITHOUT SCIATICA: ICD-10-CM

## 2024-02-22 RX ORDER — LIDOCAINE 50 MG/G
1 PATCH TOPICAL DAILY
Qty: 10 PATCH | Refills: 0 | Status: CANCELLED | OUTPATIENT
Start: 2024-02-22

## 2024-02-22 RX ORDER — LIDOCAINE 50 MG/G
1 PATCH TOPICAL DAILY
Qty: 10 PATCH | Refills: 0 | Status: SHIPPED | OUTPATIENT
Start: 2024-02-22

## 2024-02-22 NOTE — TELEPHONE ENCOUNTER
PA for LIDO 5 %    Submitted via    []CMM-KEY       [x][employer]-Case ID # Case ID: [id number]        []Faxed to plan   []Other website   []Phone call Case ID #     Office notes sent, clinical questions answered. Awaiting determination    Turnaround time for your insurance to make a decision on your Prior Authorization can take 7-21 business days.

## 2024-02-22 NOTE — TELEPHONE ENCOUNTER
Caller: pb Chavez    Doctor: Sonal    Reason for call: pt stated the lidocaine (Lidoderm) 5 % that was prescribed yesterday 2/21/24 the pharmacy stated they do not have the  prescription for them. Please Advise    Call back#: [phone]      Pharmacy    [id number] - [place] - [address] [place] [address]  This order has not been released to its destination.  [address] [place] [address], [place] [id number]  Phone: [phone]  Fax: [phone]  ZACKARY #: --

## 2024-02-23 NOTE — TELEPHONE ENCOUNTER
Additional info was submitted to plan. They have upheld denial stating dx must be post hepatic neuropathy diabetic neuropathy or cancer related pain

## 2024-03-19 ENCOUNTER — TELEPHONE (OUTPATIENT)
Dept: RADIOLOGY | Facility: CLINIC | Age: 79
End: 2024-03-19

## 2024-03-19 ENCOUNTER — HOSPITAL ENCOUNTER (OUTPATIENT)
Dept: RADIOLOGY | Facility: CLINIC | Age: 79
Discharge: HOME | End: 2024-03-19
Payer: MEDICARE

## 2024-03-19 VITALS
HEART RATE: 97 BPM | OXYGEN SATURATION: 96 % | TEMPERATURE: 96 F | DIASTOLIC BLOOD PRESSURE: 75 MMHG | SYSTOLIC BLOOD PRESSURE: 134 MMHG | RESPIRATION RATE: 18 BRPM

## 2024-03-19 DIAGNOSIS — M47.816 LUMBAR FACET ARTHROPATHY: ICD-10-CM

## 2024-03-19 DIAGNOSIS — M54.50 CHRONIC LEFT-SIDED LOW BACK PAIN WITHOUT SCIATICA: ICD-10-CM

## 2024-03-19 DIAGNOSIS — G89.29 CHRONIC LEFT-SIDED LOW BACK PAIN WITHOUT SCIATICA: ICD-10-CM

## 2024-03-19 PROCEDURE — 64494 INJ PARAVERT F JNT L/S 2 LEV: CPT | Performed by: STUDENT IN AN ORGANIZED HEALTH CARE EDUCATION/TRAINING PROGRAM

## 2024-03-19 PROCEDURE — 64493 INJ PARAVERT F JNT L/S 1 LEV: CPT | Performed by: STUDENT IN AN ORGANIZED HEALTH CARE EDUCATION/TRAINING PROGRAM

## 2024-03-19 RX ORDER — BUPIVACAINE HYDROCHLORIDE 7.5 MG/ML
1.5 INJECTION, SOLUTION EPIDURAL; RETROBULBAR ONCE
Status: COMPLETED | OUTPATIENT
Start: 2024-03-19 | End: 2024-03-19

## 2024-03-19 RX ADMIN — BUPIVACAINE HYDROCHLORIDE 1.5 ML: 7.5 INJECTION, SOLUTION EPIDURAL; RETROBULBAR at 10:52

## 2024-03-19 NOTE — INTERVAL H&P NOTE
Update: (This section must be completed if the H&P was completed greater than 24 hrs to procedure or admission)    H&P reviewed. After examining the patient, I find no changed to the H&P since it had been written.    Patient re-evaluated. Accept as history and physical.    Tobias Pruitt MD/March 19, 2024/10:19 AM

## 2024-03-19 NOTE — DISCHARGE INSTR - LAB

## 2024-03-20 DIAGNOSIS — M47.816 LUMBAR FACET ARTHROPATHY: Primary | ICD-10-CM

## 2024-03-20 NOTE — TELEPHONE ENCOUNTER
Order placed. Patient sees [facility] in [place]. I cannot determine exactly what type of cardiac device he has. Can we please call to determine the exact device and whether it is just an ICD or pacemaker as well. Will need magnet for procedure.

## 2024-03-20 NOTE — TELEPHONE ENCOUNTER
S/w pt. S/p left L2-3 and L3-4 MBB #2 3/19. pt had % relief for 8 hours.  Please place order for Left L2-3,L3-4 RFA  Pt has ICD.

## 2024-03-21 ENCOUNTER — TELEPHONE (OUTPATIENT)
Dept: RADIOLOGY | Facility: CLINIC | Age: 79
End: 2024-03-21

## 2024-03-21 NOTE — TELEPHONE ENCOUNTER
Call out to Dr Chase, Cardiology for information on ICD-BiValent  Need to know if pt is pacemaker dependent     S/W Irma, Tech support at [employer].  States first set of numbers are the pt's model and serial number  Magnet over ICD will disable shock, Pacemaker will be 100% operational, and could inhibit pacing.   If pt is pacemaker dependent the RF noise could be picked up and pacemaker may not know to pace.  May think the HR is higher than it is and pt will not be paced.    Can speak with Technical services at phone number below

## 2024-03-21 NOTE — TELEPHONE ENCOUNTER
Pt scheduled for RFA with Dr Pruitt on 4/18/24.    No medication holds needed for RFA.    Pt given instructions over phone.  Pt does not have myc for follow up instruction message.    Have you completed PT/HEP/Chiro in the past 6 months for dedicated area? Not in last 6 months  If yes, how long did you complete?  What was the frequency?  Did it provide relief?  If no, reason therapy was not completed? Pt has had 2 successful MBBs, also, per Dr Pruitt's note on 11/13/203, pt has previous to 6 months ago tried PT and HEP without relief.

## 2024-03-21 NOTE — TELEPHONE ENCOUNTER
Caller: Scott    Doctor: Sonal    Reason for call: Pt is returning Schedulers call     Call back#: [phone]

## 2024-03-21 NOTE — TELEPHONE ENCOUNTER
Spoke with pt, scheduled injection for 4/18.  Pt provided the information on his ICD card.    Reports it is [employer], Serial#: [id number], Model#: [id number]

## 2024-03-21 NOTE — TELEPHONE ENCOUNTER
Pt called office -- reports he provided the wrong info on the first call  regarding his ICD.    Pts ICD is from [employer]    Card shows him 4 model/serial number sets:   - Model# G547, Serial #: [id number]   - Model# 7841, Serial # [id number]   - Model# 0672, Serial# [id number]   - Model # 4677, Serial# [id number]      Also provided medical provider's help p# from card: [phone]

## 2024-03-25 NOTE — TELEPHONE ENCOUNTER
Caller: [facility]     Doctor: Sonal    Reason for call:  Office called stating patient pacemaker dependent at 85%    Call back#: [phone]

## 2024-03-28 NOTE — TELEPHONE ENCOUNTER
If this is the case, then REP may need to be present to place the pacemaker into asynchronous mode so it will not sense RFA. I'll look to get in touch via number provided below

## 2024-04-18 ENCOUNTER — HOSPITAL ENCOUNTER (OUTPATIENT)
Dept: RADIOLOGY | Facility: CLINIC | Age: 79
Discharge: HOME | End: 2024-04-18
Payer: MEDICARE

## 2024-04-18 ENCOUNTER — TELEPHONE (OUTPATIENT)
Dept: RADIOLOGY | Facility: CLINIC | Age: 79
End: 2024-04-18

## 2024-04-18 VITALS
HEART RATE: 87 BPM | DIASTOLIC BLOOD PRESSURE: 62 MMHG | RESPIRATION RATE: 18 BRPM | OXYGEN SATURATION: 96 % | SYSTOLIC BLOOD PRESSURE: 104 MMHG | TEMPERATURE: 97 F

## 2024-04-18 DIAGNOSIS — M47.816 LUMBAR FACET ARTHROPATHY: ICD-10-CM

## 2024-04-18 RX ADMIN — Medication 3 ML: at 10:54

## 2024-04-18 NOTE — PROGRESS NOTES
Spoke to [employer] rep Geremias Moon and patient's cardiologist Dr. Chase in regards to patient's ICD/PM and RFA procedure.    It was recommended to place a magnet over ICD device to temporary disable ICD function while underdoing JAMARI during RFA. It was also confirmed to me that patient's pacing function will not be impacted and that likelihood of pacemaker sensing JAMARI from RFA would be minimal. Also was confirmed to me that patient is not pacemaker dependent, so if there were interference, that he would be able to tolerate instrinisic rate.

## 2024-04-18 NOTE — DISCHARGE INSTR - LAB

## 2024-04-18 NOTE — H&P
History of Present Illness: The patient is a 78 y.o. 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    CAD (coronary artery disease)     Cancer (HCC)     prostate    Colon polyp     Coronary artery disease     GERD (gastroesophageal reflux disease)     H/O heart artery stent     x2    Hypercholesteremia     Hypertension     Keratotic lesion     Stroke (HCC)     10YRS AGO    Transient cerebral ischemia        Past Surgical History:   Procedure Laterality Date    BACK SURGERY      CARDIAC PACEMAKER PLACEMENT      CARDIAC SURGERY      mvp repair    CAROTID ENDARTERECTOMY Right 03/2020    CHOLECYSTECTOMY      COLONOSCOPY      ESOPHAGOGASTRODUODENOSCOPY N/A 06/22/2018    Procedure: ESOPHAGOGASTRODUODENOSCOPY (EGD);  Surgeon: Sunny Rodrigues III, MD;  Location: [facility];  Service: Gastroenterology    JOINT REPLACEMENT      L hip replacement    ORCHIECTOMY      PROSTATECTOMY      TOTAL HIP ARTHROPLASTY           Current Outpatient Medications:     atorvastatin (LIPITOR) 40 mg tablet, Take 40 mg by mouth daily  , Disp: , Rfl:     Azelaic Acid 15 % cream, apply to 1 thin layer twice a day to face, Disp: , Rfl:     carvedilol (COREG) 6.25 mg tablet, Take 3.125 mg by mouth in the morning, Disp: , Rfl:     clindamycin (CLINDAGEL) 1 % gel, APPLY 1 THIN LAYER TOPICALLY TO FACE IN THE  MORNING, Disp: , Rfl:     clopidogrel (PLAVIX) 75 mg tablet, Take 75 mg by mouth daily, Disp: , Rfl:     digoxin (LANOXIN) 0.125 mg tablet, Take 125 mcg by mouth daily, Disp: , Rfl:     Farxiga 10 MG TABS, Take 10 mg by mouth daily, Disp: , Rfl:     furosemide (LASIX) 40 mg tablet, Take 40 mg by mouth daily PT REPORTS : 1/2 TABLET EVERY OTHER DAY (MWF), Disp: , Rfl:     lidocaine (Lidoderm) 5 %, Apply 1 patch topically over 12 hours daily Remove & Discard patch within 12 hours or as directed by MD, Disp: 10 patch, Rfl: 0    Lumigan 0.01 % ophthalmic drops, instill 1 drop  into both eyes once daily at bedtime, Disp: , Rfl:     midodrine (PROAMATINE) 2.5 mg tablet, Take 1 tablet (2.5 mg total) by mouth 3 (three) times a day before meals (Patient not taking: Reported on 2/6/2024), Disp: 90 tablet, Rfl: 0    midodrine (PROAMATINE) 5 mg tablet, Take 5 mg by mouth 3 (three) times a day before meals (Patient not taking: Reported on 2/6/2024), Disp: , Rfl:     Multiple Vitamins-Minerals (PX MENS MULTIVITAMINS) TABS, Take by mouth, Disp: , Rfl:     pantoprazole (PROTONIX) 40 mg tablet, Take 1 tablet (40 mg total) by mouth daily, Disp: 90 tablet, Rfl: 2    sacubitril-valsartan (ENTRESTO) 24-26 MG TABS, Take 1 tablet by mouth 2 (two) times a day (Patient taking differently: Take 0.5 tablets by mouth 2 (two) times a day), Disp: 60 tablet, Rfl: 0    spironolactone (ALDACTONE) 25 mg tablet, Take 12.5 mg by mouth daily 1/2 TABLET EVERY OTHER DAY (Tu, Th, Sa, Su), Disp: , Rfl:     triamcinolone (KENALOG) 0.5 % cream, Apply topically 3 (three) times a day, Disp: 30 g, Rfl: 1    warfarin (COUMADIN) 2.5 mg tablet, as directed take 1 tablet ON TUESDAY,WEDNESDAY,FRIDAY,SATURDAY,GARCIA...  (REFER TO PRESCRIPTION NOTES)., Disp: , Rfl:     warfarin (COUMADIN) 5 mg tablet, Take 1 tablet (5 mg total) by mouth daily Coumadin 5 mg p.o. Daily- Target INR 2-3 (Patient taking differently: Take 5 mg by mouth daily M & Thurs & Sat 5MG  Tues & Wed & Fri & Sun 2.5MG), Disp: 30 tablet, Rfl: 0    No Known Allergies    Physical Exam: There were no vitals filed for this visit.  General: Awake, Alert, Oriented x 3, Mood and affect appropriate  Respiratory: Respirations even and unlabored  Cardiovascular: Peripheral pulses intact; no edema  Musculoskeletal Exam: pain facet loading positive left    ASA Score: 3         Assessment:   1. Lumbar facet arthropathy        Plan: left L2-3 and L3-4 RFA (PATIENT HAS ICD)

## 2024-04-19 NOTE — TELEPHONE ENCOUNTER
S/w pt. States no improvement so far. Advised he give injection 4-6 weeks for benefit. Pt denies any sunburn like sensation or concerns. Aware of f/u ov

## 2024-04-26 ENCOUNTER — TELEPHONE (OUTPATIENT)
Dept: PAIN MEDICINE | Facility: CLINIC | Age: 79
End: 2024-04-26

## 2024-05-17 NOTE — PROGRESS NOTES
"Pain Medicine Follow-Up Note    Assessment:  1. Chronic left-sided low back pain without sciatica    2. Chronic pain syndrome    3. Myofascial pain syndrome        Plan:  Orders Placed This Encounter   Procedures    Inj Trigger Point Single/Multiple     This order was created via procedure documentation       New Medications Ordered This Visit   Medications    bupivacaine (PF) (MARCAINE) 0.25 % injection 4 mL    methylPREDNISolone acetate (DEPO-MEDROL) injection 40 mg       My impressions and treatment recommendations were discussed in detail with the patient who verbalized understanding and had no further questions.      78-year-old male returns her office following left L2-3 and L3-4 lumbar radiofrequency ablation with no relief of his symptoms.  On examination today he has notable tenderness in the left lower lumbar paraspinal musculature.  Still no radicular symptoms.  Discussed trigger point injection which was agreed upon and performed in the office today with some immediate improvement.  Will reach out to them in about a week to see how he is doing.  If no further improvement, then may consider low-dose tramadol trial.     Universal Protocol:  Consent: Verbal consent obtained. Written consent obtained.  Risks and benefits: risks, benefits and alternatives were discussed  Consent given by: patient  Time out: Immediately prior to procedure a \"time out\" was called to verify the correct patient, procedure, equipment, support staff and site/side marked as required.  Timeout called at: 5/20/2024 10:46 AM.  Patient understanding: patient states understanding of the procedure being performed  Patient consent: the patient's understanding of the procedure matches consent given  Procedure consent: procedure consent matches procedure scheduled  Relevant documents: relevant documents present and verified  Test results: test results available and properly labeled  Site marked: the operative site was marked  Radiology " Images displayed and confirmed. If images not available, report reviewed: imaging studies available  Required items: required blood products, implants, devices, and special equipment available  Patient identity confirmed: verbally with patient  Supporting Documentation  Indications: pain   Procedure Details  Location(s):  Additional procedure details: PROCEDURE NOTE    PATIENT NAME:  Scott Morris    MEDICAL RECORD NUMBER:  [id number]    YOB: 1945    DATE OF PROCEDURE:  05/20/24    PROCEDURE: Trigger point injection x 2 with local anesthetic and steroid in the left lumbar paraspinal muscle groups.  ATTENDING PHYSICIAN: Tobias Pruitt M.D.  PREPROCEDURE DIAGNOSIS: Myofascial pain with identifiable trigger points.  POSTPROCEDURE DIAGNOSIS: Myofascial pain with identifiable trigger points.  ANESTHESIA: Local  ESTIMATED BLOOD LOSS: Minimal  COMPLICATIONS: None  CONSENT: Today's procedure, its potential benefits as well as its risks and potential side effects were reviewed. Discussed risks of the procedure include bleeding, infection, nerve irritation or damage, reactions to the medications, failure of the pain to improve and exacerbation of the pain were explained to the patient, who verbalized understanding and who wished to proceed. Informed consent was signed.  DESCRIPTION OF THE PROCEDURE: After informed consent was obtained, the patient was placed in the seated position. 2 trigger points were identified via palpation and marked with a surgical skin marker. The skin was prepped with antiseptic in the usual sterile fashion. Strict aseptic technique was utilized throughout the procedure.  A 25 gauge, 1 ½ inch needle was then advanced into each identified trigger point. An injectate consisting of 4 ml of 0.25% marcaine with 1 mL of 40mg/mL depo-medrol was slowly injected in divided doses after negative aspiration. The needle was removed with tip intact.  The patient tolerated the procedure and  hemostasis was maintained. There were no apparent paresthesias or complications. The skin was wiped clean, and a band-aid was placed as appropriate. The patient was monitored for an appropriate period of time following the procedure and remained hemodynamically stable and neurovascularly intact. The patient was ultimately discharged to home with supervision in good condition and instructed to call the office to report the response.            [place] Prescription Drug Monitoring Program report was reviewed and was appropriate         Complete risks and benefits including bleeding, infection, tissue reaction, nerve injury and allergic reaction were discussed. The approach was demonstrated using models and literature was provided. Verbal and written consent was obtained.        Discharge instructions were provided. I personally saw and examined the patient and I agree with the above discussed plan of care.    History of Present Illness:    Scott Morris is a 78 y.o. male who presents to [facility] for interval re-evaluation of the above stated pain complaints. The patient has a past medical and chronic pain history as outlined in the assessment section. He was last seen on 4/18/2024 for left L2-3 and L3-4 radiofrequency ablation with only 2 weeks of relief..    Reports 5 out of 10 pain today again in the left lower back.  Worse in the evening.  Constant, dull/aching in nature.    Still has left sided lower back pain without radiation down the lower extremity. It is worse with ambulation and prolonged sitting. It is an aching pain.     Other than as stated above, the patient denies any interval changes in medications, medical condition, mental condition, symptoms, or allergies since the last office visit.         Review of Systems:    Review of Systems   Musculoskeletal:  Positive for back pain and gait problem.         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     CAD (coronary artery disease)     Cancer (HCC)     prostate    Colon polyp     Coronary artery disease     GERD (gastroesophageal reflux disease)     H/O heart artery stent     x2    Hypercholesteremia     Hypertension     Keratotic lesion     Stroke (HCC)     10YRS AGO    Transient cerebral ischemia        Past Surgical History:   Procedure Laterality Date    BACK SURGERY      CARDIAC PACEMAKER PLACEMENT      CARDIAC SURGERY      mvp repair    CAROTID ENDARTERECTOMY Right 2020    CHOLECYSTECTOMY      COLONOSCOPY      ESOPHAGOGASTRODUODENOSCOPY N/A 2018    Procedure: ESOPHAGOGASTRODUODENOSCOPY (EGD);  Surgeon: Sunny Rodrigues III, MD;  Location: [facility];  Service: Gastroenterology    JOINT REPLACEMENT      L hip replacement    ORCHIECTOMY      PROSTATECTOMY      TOTAL HIP ARTHROPLASTY         Family History   Problem Relation Age of Onset    Heart disease Mother     Stroke Mother         CVA    Cancer Father     Hodgkin's lymphoma Father     Lung cancer Father        Social History     Occupational History    Occupation: retired   Tobacco Use    Smoking status: Every Day     Current packs/day: 0.00     Types: Cigarettes     Last attempt to quit: 2018     Years since quittin.0    Smokeless tobacco: Never    Tobacco comments:     2 cig/day   Vaping Use    Vaping status: Never Used   Substance and Sexual Activity    Alcohol use: Never     Alcohol/week: 2.0 standard drinks of alcohol     Types: 2 Standard drinks or equivalent per week    Drug use: No    Sexual activity: Not Currently     Comment: no high risk sexual behavior         Current Outpatient Medications:     atorvastatin (LIPITOR) 40 mg tablet, Take 40 mg by mouth daily  , Disp: , Rfl:     Azelaic Acid 15 % cream, apply to 1 thin layer twice a day to face, Disp: , Rfl:     carvedilol (COREG) 6.25 mg tablet, Take 3.125 mg by mouth in the morning, Disp: , Rfl:     clindamycin (CLINDAGEL) 1 % gel, APPLY 1 THIN LAYER TOPICALLY TO FACE IN  THE  MORNING, Disp: , Rfl:     clopidogrel (PLAVIX) 75 mg tablet, Take 75 mg by mouth daily, Disp: , Rfl:     digoxin (LANOXIN) 0.125 mg tablet, Take 125 mcg by mouth daily, Disp: , Rfl:     Farxiga 10 MG TABS, Take 10 mg by mouth daily, Disp: , Rfl:     furosemide (LASIX) 40 mg tablet, Take 40 mg by mouth daily PT REPORTS : 1/2 TABLET EVERY OTHER DAY ([facility]), Disp: , Rfl:     lidocaine (Lidoderm) 5 %, Apply 1 patch topically over 12 hours daily Remove & Discard patch within 12 hours or as directed by MD, Disp: 10 patch, Rfl: 0    Lumigan 0.01 % ophthalmic drops, instill 1 drop into both eyes once daily at bedtime, Disp: , Rfl:     Multiple Vitamins-Minerals (PX MENS MULTIVITAMINS) TABS, Take by mouth, Disp: , Rfl:     pantoprazole (PROTONIX) 40 mg tablet, Take 1 tablet (40 mg total) by mouth daily, Disp: 90 tablet, Rfl: 2    spironolactone (ALDACTONE) 25 mg tablet, Take 12.5 mg by mouth daily 1/2 TABLET EVERY OTHER DAY (Tu, Th, Sa, Nick), Disp: , Rfl:     triamcinolone (KENALOG) 0.5 % cream, Apply topically 3 (three) times a day, Disp: 30 g, Rfl: 1    warfarin (COUMADIN) 2.5 mg tablet, as directed take 1 tablet ON TUESDAY,WEDNESDAY,FRIDAY,SATURDAY,NICK...  (REFER TO PRESCRIPTION NOTES)., Disp: , Rfl:     warfarin (COUMADIN) 5 mg tablet, Take 1 tablet (5 mg total) by mouth daily Coumadin 5 mg p.o. Daily- Target INR 2-3 (Patient taking differently: Take 5 mg by mouth daily M & Thurs & Sat 5MG  Tues & Wed & Fri & Sun 2.5MG), Disp: 30 tablet, Rfl: 0    midodrine (PROAMATINE) 2.5 mg tablet, Take 1 tablet (2.5 mg total) by mouth 3 (three) times a day before meals (Patient not taking: Reported on 2/6/2024), Disp: 90 tablet, Rfl: 0    midodrine (PROAMATINE) 5 mg tablet, Take 5 mg by mouth 3 (three) times a day before meals (Patient not taking: Reported on 2/6/2024), Disp: , Rfl:     sacubitril-valsartan (ENTRESTO) 24-26 MG TABS, Take 1 tablet by mouth 2 (two) times a day (Patient taking differently: Take 0.5 tablets by mouth 2  "(two) times a day), Disp: 60 tablet, Rfl: 0  No current facility-administered medications for this visit.    No Known Allergies    Physical Exam:    /79   Pulse 78   Ht 5' 5\" (1.651 m)   Wt 56.7 kg (125 lb)   BMI 20.80 kg/m²     Constitutional:normal, well developed, well nourished, alert, in no distress and non-toxic and no overt pain behavior.  Eyes:anicteric  HEENT:grossly intact  Neck:supple, symmetric, trachea midline and no masses   Pulmonary:even and unlabored  Cardiovascular:No edema or pitting edema present  Skin:Normal without rashes or lesions and well hydrated  Psychiatric:Mood and affect appropriate  Neurologic:Cranial Nerves II-XII grossly intact  Musculoskeletal: tender to palpation left lower lumbar paraspinal musculature with identifiable trigger point.       Imaging  No orders to display         Orders Placed This Encounter   Procedures    Inj Trigger Point Single/Multiple     "

## 2024-05-20 ENCOUNTER — RA CDI HCC (OUTPATIENT)
Dept: OTHER | Facility: HOSPITAL | Age: 79
End: 2024-05-20

## 2024-05-20 ENCOUNTER — OFFICE VISIT (OUTPATIENT)
Dept: PAIN MEDICINE | Facility: CLINIC | Age: 79
End: 2024-05-20
Payer: MEDICARE

## 2024-05-20 VITALS
SYSTOLIC BLOOD PRESSURE: 113 MMHG | BODY MASS INDEX: 20.83 KG/M2 | DIASTOLIC BLOOD PRESSURE: 79 MMHG | HEIGHT: 65 IN | HEART RATE: 78 BPM | WEIGHT: 125 LBS

## 2024-05-20 DIAGNOSIS — M54.50 CHRONIC LEFT-SIDED LOW BACK PAIN WITHOUT SCIATICA: Primary | ICD-10-CM

## 2024-05-20 DIAGNOSIS — G89.4 CHRONIC PAIN SYNDROME: ICD-10-CM

## 2024-05-20 DIAGNOSIS — G89.29 CHRONIC LEFT-SIDED LOW BACK PAIN WITHOUT SCIATICA: Primary | ICD-10-CM

## 2024-05-20 DIAGNOSIS — M79.18 MYOFASCIAL PAIN SYNDROME: ICD-10-CM

## 2024-05-20 PROCEDURE — 20552 NJX 1/MLT TRIGGER POINT 1/2: CPT | Performed by: STUDENT IN AN ORGANIZED HEALTH CARE EDUCATION/TRAINING PROGRAM

## 2024-05-20 PROCEDURE — 99214 OFFICE O/P EST MOD 30 MIN: CPT | Performed by: STUDENT IN AN ORGANIZED HEALTH CARE EDUCATION/TRAINING PROGRAM

## 2024-05-20 RX ORDER — METHYLPREDNISOLONE ACETATE 40 MG/ML
40 INJECTION, SUSPENSION INTRA-ARTICULAR; INTRALESIONAL; INTRAMUSCULAR; SOFT TISSUE ONCE
Status: COMPLETED | OUTPATIENT
Start: 2024-05-20 | End: 2024-05-20

## 2024-05-20 RX ORDER — BUPIVACAINE HYDROCHLORIDE 2.5 MG/ML
4 INJECTION, SOLUTION EPIDURAL; INFILTRATION; INTRACAUDAL ONCE
Status: COMPLETED | OUTPATIENT
Start: 2024-05-20 | End: 2024-05-20

## 2024-05-20 RX ADMIN — METHYLPREDNISOLONE ACETATE 40 MG: 40 INJECTION, SUSPENSION INTRA-ARTICULAR; INTRALESIONAL; INTRAMUSCULAR; SOFT TISSUE at 11:08

## 2024-05-20 RX ADMIN — BUPIVACAINE HYDROCHLORIDE 4 ML: 2.5 INJECTION, SOLUTION EPIDURAL; INFILTRATION; INTRACAUDAL at 11:07

## 2024-05-20 NOTE — PROGRESS NOTES
HCC coding opportunities          Chart Reviewed number of suggestions sent to Provider: 2     Patients Insurance   I42.9 and I13.0  Medicare Insurance: Medicare

## 2024-06-18 ENCOUNTER — OFFICE VISIT (OUTPATIENT)
Dept: INTERNAL MEDICINE CLINIC | Facility: CLINIC | Age: 79
End: 2024-06-18
Payer: MEDICARE

## 2024-06-18 VITALS
OXYGEN SATURATION: 98 % | SYSTOLIC BLOOD PRESSURE: 104 MMHG | HEART RATE: 68 BPM | HEIGHT: 65 IN | DIASTOLIC BLOOD PRESSURE: 68 MMHG | BODY MASS INDEX: 20.66 KG/M2 | WEIGHT: 124 LBS

## 2024-06-18 DIAGNOSIS — I10 PRIMARY HYPERTENSION: ICD-10-CM

## 2024-06-18 DIAGNOSIS — E53.8 B12 DEFICIENCY: ICD-10-CM

## 2024-06-18 DIAGNOSIS — F32.9 REACTIVE DEPRESSION: ICD-10-CM

## 2024-06-18 DIAGNOSIS — Z12.5 SCREENING FOR PROSTATE CANCER: ICD-10-CM

## 2024-06-18 DIAGNOSIS — Z12.11 SCREENING FOR COLON CANCER: Primary | ICD-10-CM

## 2024-06-18 PROBLEM — D69.6 PLATELETS DECREASED (HCC): Status: ACTIVE | Noted: 2024-06-18

## 2024-06-18 PROBLEM — C61 PROSTATE CANCER (HCC): Status: ACTIVE | Noted: 2024-06-18

## 2024-06-18 PROBLEM — J96.01 ACUTE RESPIRATORY FAILURE WITH HYPOXIA (HCC): Status: ACTIVE | Noted: 2024-06-18

## 2024-06-18 PROBLEM — E44.0 MODERATE PROTEIN-CALORIE MALNUTRITION (HCC): Status: ACTIVE | Noted: 2024-06-18

## 2024-06-18 PROCEDURE — G0439 PPPS, SUBSEQ VISIT: HCPCS | Performed by: PHYSICIAN ASSISTANT

## 2024-06-18 PROCEDURE — 99214 OFFICE O/P EST MOD 30 MIN: CPT | Performed by: PHYSICIAN ASSISTANT

## 2024-06-18 NOTE — PROGRESS NOTES
Ambulatory Visit  Name: Scott Morris      : 1945      MRN: [id number]  Encounter Provider: Lor Dubon PA-C  Encounter Date: 2024   Encounter department: [facility]    Assessment & Plan   1. Screening for colon cancer  2. B12 deficiency  -     Vitamin B12; Future  3. Screening for prostate cancer  4. Primary hypertension  -     CBC and differential; Future  -     Comprehensive metabolic panel; Future  5. Reactive depression  Assessment & Plan:  Patient reports depression surrounding his current living situation. States he lives in assisted living and people die there every day. He does not enjoy the activities there either. Feels alone  He is considering starting an antidepressant medication, he would like to explore the cost of these medications and follow up with PCP in 2 months  Denies SI - will contact the office sooner if depression worsens      Depression Screening and Follow-up Plan: Patient was screened for depression during today's encounter. They screened negative with a PHQ-2 score of 0.      Preventive health issues were discussed with patient, and age appropriate screening tests were ordered as noted in patient's After Visit Summary. Personalized health advice and appropriate referrals for health education or preventive services given if needed, as noted in patient's After Visit Summary.    History of Present Illness     HPI   Patient Care Team:  Lenin Hardy MD as PCP - General  Neelam Carr PA-C as Physician Assistant (Physician Assistant)    Review of Systems  Medical History Reviewed by provider this encounter:       Annual Wellness Visit Questionnaire   Scott is here for his Subsequent Wellness visit. Last Medicare Wellness visit information reviewed, patient interviewed and updates made to the record today.      Health Risk Assessment:   Patient rates overall health as fair. Patient feels that their physical health rating is  slightly better. Patient is dissatisfied with their life. Eyesight was rated as same. Hearing was rated as same. Patient feels that their emotional and mental health rating is same. Patients states they are sometimes angry. Patient states they are sometimes unusually tired/fatigued. Pain experienced in the last 7 days has been none. Patient states that he has experienced weight loss or gain in last 6 months.     Depression Screening:   PHQ-2 Score: 0      Fall Risk Screening:   In the past year, patient has experienced: no history of falling in past year      Home Safety:  Patient does not have trouble with stairs inside or outside of their home. Patient has working smoke alarms and has working carbon monoxide detector. Home safety hazards include: none.     Nutrition:   Current diet is Regular.     Medications:   Patient is currently taking over-the-counter supplements. OTC medications include: see medication list. Patient is not able to manage medications.     Activities of Daily Living (ADLs)/Instrumental Activities of Daily Living (IADLs):   Walk and transfer into and out of bed and chair?: Yes  Dress and groom yourself?: Yes    Bathe or shower yourself?: Yes    Feed yourself? Yes  Do your laundry/housekeeping?: Yes  Manage your money, pay your bills and track your expenses?: Yes  Make your own meals?: Yes    Do your own shopping?: Yes    Previous Hospitalizations:   Any hospitalizations or ED visits within the last 12 months?: Yes    How many hospitalizations have you had in the last year?: 3-4    Advance Care Planning:   Living will: Yes    Durable POA for healthcare: Yes    Advanced directive: Yes      Cognitive Screening:   Provider or family/friend/caregiver concerned regarding cognition?: No    PREVENTIVE SCREENINGS      Cardiovascular Screening:    General: Screening Not Indicated and History Lipid Disorder      Diabetes Screening:     General: Screening Current      Colorectal Cancer Screening:      General: Screening Current      Prostate Cancer Screening:    General: Screening Not Indicated      Osteoporosis Screening:    General: Screening Current      Abdominal Aortic Aneurysm (AAA) Screening:    Risk factors include: tobacco use        Lung Cancer Screening:     General: Screening Not Indicated      Hepatitis C Screening:    General: Screening Current    Screening, Brief Intervention, and Referral to Treatment (SBIRT)    Screening  Typical number of drinks in a day: 0  Typical number of drinks in a week: 0  Interpretation: Low risk drinking behavior.    Single Item Drug Screening:  How often have you used an illegal drug (including marijuana) or a prescription medication for non-medical reasons in the past year? never    Single Item Drug Screen Score: 0  Interpretation: Negative screen for possible drug use disorder    Brief Intervention  Alcohol & drug use screenings were reviewed. No concerns regarding substance use disorder identified.     Other Counseling Topics:   Car/seat belt/driving safety and sunscreen.     Social Determinants of Health     Financial Resource Strain: Low Risk  (12/19/2023)    [facility]    Overall Financial Resource Strain (CARDIA)     Difficulty of Paying Living Expenses: Not hard at all   Food Insecurity: No Food Insecurity (6/18/2024)    Hunger Vital Sign     Worried About Running Out of Food in the Last Year: Never true     Ran Out of Food in the Last Year: Never true   Transportation Needs: No Transportation Needs (6/18/2024)    PRAPARE - Transportation     Lack of Transportation (Medical): No     Lack of Transportation (Non-Medical): No   Housing Stability: Low Risk  (6/18/2024)    Housing Stability Vital Sign     Unable to Pay for Housing in the Last Year: No     Number of Times Moved in the Last Year: 1     Homeless in the Last Year: No   Utilities: Not At Risk (6/18/2024)    [facility] Utilities     Threatened with loss of utilities: No     No  "results found.    Objective     /68 (BP Location: Left arm, Patient Position: Sitting, Cuff Size: Adult)   Pulse 68   Ht 5' 5\" (1.651 m)   Wt 56.2 kg (124 lb)   SpO2 98%   BMI 20.63 kg/m²     Physical Exam  Administrative Statements   I have spent a total time of 40 minutes on 06/21/24 In caring for this patient including Diagnostic results, Patient and family education, Reviewing / ordering tests, medicine, procedures  , and Obtaining or reviewing history  .        "

## 2024-06-18 NOTE — PATIENT INSTRUCTIONS
Medicare Preventive Visit Patient Instructions  Thank you for completing your Welcome to Medicare Visit or Medicare Annual Wellness Visit today. Your next wellness visit will be due in one year (6/19/2025).  The screening/preventive services that you may require over the next 5-10 years are detailed below. Some tests may not apply to you based off risk factors and/or age. Screening tests ordered at today's visit but not completed yet may show as past due. Also, please note that scanned in results may not display below.  Preventive Screenings:  Service Recommendations Previous Testing/Comments   Colorectal Cancer Screening  Colonoscopy    Fecal Occult Blood Test (FOBT)/Fecal Immunochemical Test (FIT)  Fecal DNA/Cologuard Test  Flexible Sigmoidoscopy Age: 45-75 years old   Colonoscopy: every 10 years (May be performed more frequently if at higher risk)  OR  FOBT/FIT: every 1 year  OR  Cologuard: every 3 years  OR  Sigmoidoscopy: every 5 years  Screening may be recommended earlier than age 45 if at higher risk for colorectal cancer. Also, an individualized decision between you and your healthcare provider will decide whether screening between the ages of 76-85 would be appropriate. Colonoscopy: 02/23/2021  FOBT/FIT: Not on file  Cologuard: Not on file  Sigmoidoscopy: 11/03/2015          Prostate Cancer Screening Individualized decision between patient and health care provider in men between ages of 55-69   Medicare will cover every 12 months beginning on the day after your 50th birthday PSA: No results in last 5 years     Screening Not Indicated     Hepatitis C Screening Once for adults born between 1945 and 1965  More frequently in patients at high risk for Hepatitis C Hep C Antibody: Not on file    Screening Current   Diabetes Screening 1-2 times per year if you're at risk for diabetes or have pre-diabetes Fasting glucose: No results in last 5 years (No results in last 5 years)  A1C: 6.2 % (9/8/2022)  Screening  Current   Cholesterol Screening Once every 5 years if you don't have a lipid disorder. May order more often based on risk factors. Lipid panel: 09/08/2022  Screening Not Indicated  History Lipid Disorder      Other Preventive Screenings Covered by Medicare:  Abdominal Aortic Aneurysm (AAA) Screening: covered once if your at risk. You're considered to be at risk if you have a family history of AAA or a male between the age of 65-75 who smoking at least 100 cigarettes in your lifetime.  Lung Cancer Screening: covers low dose CT scan once per year if you meet all of the following conditions: (1) Age 55-77; (2) No signs or symptoms of lung cancer; (3) Current smoker or have quit smoking within the last 15 years; (4) You have a tobacco smoking history of at least 20 pack years (packs per day x number of years you smoked); (5) You get a written order from a healthcare provider.  Glaucoma Screening: covered annually if you're considered high risk: (1) You have diabetes OR (2) Family history of glaucoma OR (3)  aged 50 and older OR (4)  American aged 65 and older  Osteoporosis Screening: covered every 2 years if you meet one of the following conditions: (1) Have a vertebral abnormality; (2) On glucocorticoid therapy for more than 3 months; (3) Have primary hyperparathyroidism; (4) On osteoporosis medications and need to assess response to drug therapy.  HIV Screening: covered annually if you're between the age of 15-65. Also covered annually if you are younger than 15 and older than 65 with risk factors for HIV infection. For pregnant patients, it is covered up to 3 times per pregnancy.    Immunizations:  Immunization Recommendations   Influenza Vaccine Annual influenza vaccination during flu season is recommended for all persons aged >= 6 months who do not have contraindications   Pneumococcal Vaccine   * Pneumococcal conjugate vaccine = PCV13 (Prevnar 13), PCV15 (Vaxneuvance), PCV20 (Prevnar 20)  *  Pneumococcal polysaccharide vaccine = PPSV23 (Pneumovax) Adults 19-63 yo with certain risk factors or if 65+ yo  If never received any pneumonia vaccine: recommend Prevnar 20 (PCV20)  Give PCV20 if previously received 1 dose of PCV13 or PPSV23   Hepatitis B Vaccine 3 dose series if at intermediate or high risk (ex: diabetes, end stage renal disease, liver disease)   Respiratory syncytial virus (RSV) Vaccine - COVERED BY MEDICARE PART D  * RSVPreF3 (Arexvy) CDC recommends that adults 60 years of age and older may receive a single dose of RSV vaccine using shared clinical decision-making (SCDM)   Tetanus (Td) Vaccine - COST NOT COVERED BY MEDICARE PART B Following completion of primary series, a booster dose should be given every 10 years to maintain immunity against tetanus. Td may also be given as tetanus wound prophylaxis.   Tdap Vaccine - COST NOT COVERED BY MEDICARE PART B Recommended at least once for all adults. For pregnant patients, recommended with each pregnancy.   Shingles Vaccine (Shingrix) - COST NOT COVERED BY MEDICARE PART B  2 shot series recommended in those 19 years and older who have or will have weakened immune systems or those 50 years and older     Health Maintenance Due:      Topic Date Due   • Colorectal Cancer Screening  02/23/2024   • Hepatitis C Screening  Completed     Immunizations Due:      Topic Date Due   • COVID-19 Vaccine (5 - 2023-24 season) 09/01/2023     Advance Directives   What are advance directives?  Advance directives are legal documents that state your wishes and plans for medical care. These plans are made ahead of time in case you lose your ability to make decisions for yourself. Advance directives can apply to any medical decision, such as the treatments you want, and if you want to donate organs.   What are the types of advance directives?  There are many types of advance directives, and each state has rules about how to use them. You may choose a combination of any of  the following:  Living will:  This is a written record of the treatment you want. You can also choose which treatments you do not want, which to limit, and which to stop at a certain time. This includes surgery, medicine, IV fluid, and tube feedings.   Durable power of  for healthcare (DPAHC):  This is a written record that states who you want to make healthcare choices for you when you are unable to make them for yourself. This person, called a proxy, is usually a family member or a friend. You may choose more than 1 proxy.  Do not resuscitate (DNR) order:  A DNR order is used in case your heart stops beating or you stop breathing. It is a request not to have certain forms of treatment, such as CPR. A DNR order may be included in other types of advance directives.  Medical directive:  This covers the care that you want if you are in a coma, near death, or unable to make decisions for yourself. You can list the treatments you want for each condition. Treatment may include pain medicine, surgery, blood transfusions, dialysis, IV or tube feedings, and a ventilator (breathing machine).  Values history:  This document has questions about your views, beliefs, and how you feel and think about life. This information can help others choose the care that you would choose.  Why are advance directives important?  An advance directive helps you control your care. Although spoken wishes may be used, it is better to have your wishes written down. Spoken wishes can be misunderstood, or not followed. Treatments may be given even if you do not want them. An advance directive may make it easier for your family to make difficult choices about your care.   Cigarette Smoking and Your Health   Risks to your health if you smoke:  Nicotine and other chemicals found in tobacco damage every cell in your body. Even if you are a light smoker, you have an increased risk for cancer, heart disease, and lung disease. If you are pregnant or  have diabetes, smoking increases your risk for complications.   Benefits to your health if you stop smoking:   You decrease respiratory symptoms such as coughing, wheezing, and shortness of breath.   You reduce your risk for cancers of the lung, mouth, throat, kidney, bladder, pancreas, stomach, and cervix. If you already have cancer, you increase the benefits of chemotherapy. You also reduce your risk for cancer returning or a second cancer from developing.   You reduce your risk for heart disease, blood clots, heart attack, and stroke.   You reduce your risk for lung infections, and diseases such as pneumonia, asthma, chronic bronchitis, and emphysema.  Your circulation improves. More oxygen can be delivered to your body. If you have diabetes, you lower your risk for complications, such as kidney, artery, and eye diseases. You also lower your risk for nerve damage. Nerve damage can lead to amputations, poor vision, and blindness.  You improve your body's ability to heal and to fight infections.  For more information and support to stop smoking:   Smokefree.gov  Phone: 3- 194 - 787-3538  Web Address: www.[employer]     © Copyright [employer] 2018 Information is for End User's use only and may not be sold, redistributed or otherwise used for commercial purposes. All illustrations and images included in CareNotes® are the copyrighted property of [employer] Inc. or [employer]

## 2024-06-20 PROBLEM — F32.9 REACTIVE DEPRESSION: Status: ACTIVE | Noted: 2024-06-20

## 2024-06-20 NOTE — ASSESSMENT & PLAN NOTE
Patient reports depression surrounding his current living situation. States he lives in assisted living and people die there every day. He does not enjoy the activities there either. Feels alone  He is considering starting an antidepressant medication, he would like to explore the cost of these medications and follow up with PCP in 2 months  Denies SI - will contact the office sooner if depression worsens

## 2024-06-26 LAB
ALBUMIN SERPL-MCNC: 4 G/DL (ref 3.5–5.7)
ALP SERPL-CCNC: 81 U/L (ref 35–120)
ALT SERPL-CCNC: 18 U/L
ANION GAP SERPL CALCULATED.3IONS-SCNC: 12 MMOL/L (ref 3–11)
AST SERPL-CCNC: 20 U/L
BASOPHILS # BLD AUTO: 0.1 THOU/CMM (ref 0–0.1)
BASOPHILS NFR BLD AUTO: 1 %
BILIRUB SERPL-MCNC: 1.1 MG/DL (ref 0.2–1)
BUN SERPL-MCNC: 31 MG/DL (ref 7–28)
CALCIUM SERPL-MCNC: 9.4 MG/DL (ref 8.5–10.1)
CHLORIDE SERPL-SCNC: 102 MMOL/L (ref 100–109)
CO2 SERPL-SCNC: 27 MMOL/L (ref 21–31)
CREAT SERPL-MCNC: 1.18 MG/DL (ref 0.53–1.3)
CYTOLOGY CMNT CVX/VAG CYTO-IMP: ABNORMAL
DIFFERENTIAL METHOD BLD: ABNORMAL
EOSINOPHIL # BLD AUTO: 0.3 THOU/CMM (ref 0–0.5)
EOSINOPHIL NFR BLD AUTO: 3 %
ERYTHROCYTE [DISTWIDTH] IN BLOOD BY AUTOMATED COUNT: 15.7 % (ref 12–16)
GFR/BSA.PRED SERPLBLD CYS-BASED-ARV: 63 ML/MIN/{1.73_M2}
GLUCOSE SERPL-MCNC: 79 MG/DL (ref 65–99)
HCT VFR BLD AUTO: 50.8 % (ref 37–48)
HGB BLD-MCNC: 16.9 G/DL (ref 12.5–17)
LYMPHOCYTES # BLD AUTO: 1 THOU/CMM (ref 1–3)
LYMPHOCYTES NFR BLD AUTO: 11 %
MCH RBC QN AUTO: 34.8 PG (ref 27–36)
MCHC RBC AUTO-ENTMCNC: 33.2 G/DL (ref 32–37)
MCV RBC AUTO: 105 FL (ref 80–100)
MONOCYTES # BLD AUTO: 1.2 THOU/CMM (ref 0.3–1)
MONOCYTES NFR BLD AUTO: 13 %
NEUTROPHILS # BLD AUTO: 6.9 THOU/CMM (ref 1.8–7.8)
NEUTROPHILS NFR BLD AUTO: 72 %
PLATELET # BLD AUTO: 176 THOU/CMM (ref 140–350)
PMV BLD REES-ECKER: 8.2 FL (ref 7.5–11.3)
POTASSIUM SERPL-SCNC: 4 MMOL/L (ref 3.5–5.2)
PROT SERPL-MCNC: 6.6 G/DL (ref 6.3–8.3)
RBC # BLD AUTO: 4.85 MILL/CMM (ref 4–5.4)
SODIUM SERPL-SCNC: 141 MMOL/L (ref 135–145)
VIT B12 SERPL-MCNC: 491 PG/ML (ref 180–914)
WBC # BLD AUTO: 9.5 THOU/CMM (ref 4–10.5)

## 2024-08-15 ENCOUNTER — OFFICE VISIT (OUTPATIENT)
Dept: INTERNAL MEDICINE CLINIC | Facility: CLINIC | Age: 79
End: 2024-08-15
Payer: MEDICARE

## 2024-08-15 VITALS
WEIGHT: 124.2 LBS | RESPIRATION RATE: 19 BRPM | HEART RATE: 96 BPM | SYSTOLIC BLOOD PRESSURE: 102 MMHG | TEMPERATURE: 97.5 F | BODY MASS INDEX: 20.69 KG/M2 | DIASTOLIC BLOOD PRESSURE: 68 MMHG | HEIGHT: 65 IN | OXYGEN SATURATION: 99 %

## 2024-08-15 DIAGNOSIS — F32.9 REACTIVE DEPRESSION: ICD-10-CM

## 2024-08-15 DIAGNOSIS — I25.10 ATHEROSCLEROSIS OF NATIVE CORONARY ARTERY OF NATIVE HEART WITHOUT ANGINA PECTORIS: Primary | ICD-10-CM

## 2024-08-15 DIAGNOSIS — E44.0 MODERATE PROTEIN-CALORIE MALNUTRITION (HCC): ICD-10-CM

## 2024-08-15 PROCEDURE — 99214 OFFICE O/P EST MOD 30 MIN: CPT | Performed by: INTERNAL MEDICINE

## 2024-08-15 PROCEDURE — G2211 COMPLEX E/M VISIT ADD ON: HCPCS | Performed by: INTERNAL MEDICINE

## 2024-08-15 RX ORDER — BUPROPION HYDROCHLORIDE 150 MG/1
150 TABLET, EXTENDED RELEASE ORAL 2 TIMES DAILY
Qty: 180 TABLET | Refills: 1 | Status: SHIPPED | OUTPATIENT
Start: 2024-08-15

## 2024-08-15 NOTE — PROGRESS NOTES
Ambulatory Visit  Name: Scott Morris      : 1945      MRN: [id number]  Encounter Provider: Lenin Hardy MD  Encounter Date: 8/15/2024   Encounter department: [facility] [place]    Assessment & Plan   1. Atherosclerosis of native coronary artery of native heart without angina pectoris  Assessment & Plan:  Appears stable.  Continue close follow-up with cardiology.  2. Reactive depression  Assessment & Plan:  He wanted to try Wellbutrin so we will start that.  Told him to start just once a day for the first week and then go to twice a day.  If any side effects let me know so we can try something else.  Orders:  -     buPROPion (Wellbutrin SR) 150 mg 12 hr tablet; Take 1 tablet (150 mg total) by mouth 2 (two) times a day  3. Moderate protein-calorie malnutrition (HCC)  Assessment & Plan:  Malnutrition Findings:       Hopefully, if his depression improves, his appetite will improve.                          BMI Findings:           Body mass index is 20.67 kg/m².         Depression Screening and Follow-up Plan: Patient's depression screening was positive with a PHQ-9 score of 14.       History of Present Illness     Patient comes in today for routine follow-up.  He still is not happy living at the personal-care home.  He knows he is kind of stuck.  Does not like the food.  Had talked about depression medicine but wanted to look it up first.  He is willing to try something.  Worried about cost as well.  His heart disease has been stable.  Current with cardiology.  Taking all of his medicines as directed.  No other new complaints today.  No further additions to his history.        Review of Systems   Respiratory:  Negative for shortness of breath.    Cardiovascular:  Negative for chest pain.   Gastrointestinal:  Negative for abdominal pain.       Objective     /68 (BP Location: Left arm, Patient Position: Sitting, Cuff Size: Standard)   Pulse 96   Temp 97.5 °F (36.4 °C)  "(Tympanic)   Resp 19   Ht 5' 5\" (1.651 m)   Wt 56.3 kg (124 lb 3.2 oz)   SpO2 99%   BMI 20.67 kg/m²     Physical Exam  Vitals and nursing note reviewed.   Constitutional:       Appearance: Normal appearance. He is well-developed.   Cardiovascular:      Rate and Rhythm: Normal rate and regular rhythm.      Heart sounds: Normal heart sounds.   Pulmonary:      Effort: Pulmonary effort is normal.      Breath sounds: Normal breath sounds.   Abdominal:      Palpations: Abdomen is soft.      Tenderness: There is no abdominal tenderness.   Neurological:      Mental Status: He is alert and oriented to person, place, and time.       Administrative Statements     "

## 2024-08-15 NOTE — ASSESSMENT & PLAN NOTE
Malnutrition Findings:       Hopefully, if his depression improves, his appetite will improve.                          BMI Findings:           Body mass index is 20.67 kg/m².

## 2024-08-15 NOTE — ASSESSMENT & PLAN NOTE
He wanted to try Wellbutrin so we will start that.  Told him to start just once a day for the first week and then go to twice a day.  If any side effects let me know so we can try something else.

## 2024-08-22 ENCOUNTER — TELEPHONE (OUTPATIENT)
Age: 79
End: 2024-08-22

## 2024-08-22 ENCOUNTER — OFFICE VISIT (OUTPATIENT)
Dept: URGENT CARE | Facility: CLINIC | Age: 79
End: 2024-08-22
Payer: MEDICARE

## 2024-08-22 VITALS
TEMPERATURE: 96.8 F | OXYGEN SATURATION: 97 % | RESPIRATION RATE: 18 BRPM | SYSTOLIC BLOOD PRESSURE: 91 MMHG | DIASTOLIC BLOOD PRESSURE: 59 MMHG | HEART RATE: 79 BPM

## 2024-08-22 DIAGNOSIS — S51.012A SKIN TEAR OF LEFT ELBOW WITHOUT COMPLICATION, INITIAL ENCOUNTER: Primary | ICD-10-CM

## 2024-08-22 PROCEDURE — G0463 HOSPITAL OUTPT CLINIC VISIT: HCPCS

## 2024-08-22 PROCEDURE — 99213 OFFICE O/P EST LOW 20 MIN: CPT

## 2024-08-22 RX ORDER — MIDODRINE HYDROCHLORIDE 5 MG/1
5 TABLET ORAL
COMMUNITY

## 2024-08-22 NOTE — PATIENT INSTRUCTIONS
Allow steri strips to fall off on their own.  Keep area clean and dry.   Monitor for signs of infection which include fever/chills, increased redness, warmth, pain, drainage, streaking and follow up if these symptoms occur.     Follow up at wound center.   Follow up with PCP in 3-5 days.  Proceed to the ER with worsening symptoms.

## 2024-08-22 NOTE — TELEPHONE ENCOUNTER
Patient was put on buPROPion (Wellbutrin SR) 150 mg 12 hr tablet . Says he does not like it bc it is causing constipation + red eyes for him. Patient is no longer going to take it. Doesn't wany anything else sent. Says he will discuss it with Dr. Hardy at his next visit.     Scott: [phone]

## 2024-08-22 NOTE — PROGRESS NOTES
[facility]        NAME: Scott Morris is a 79 y.o. male  : 1945    MRN: [id number]  DATE: 2024  TIME: 10:05 AM    Assessment and Plan   Skin tear of left elbow without complication, initial encounter [S51.012A]  1. Skin tear of left elbow without complication, initial encounter  Ambulatory Referral to Wound Care        BP noted to be low today. Patient denies denies dizziness, lightheadedness.     Patient Instructions     Allow steri strips to fall off on their own.  Keep area clean and dry.   Monitor for signs of infection which include fever/chills, increased redness, warmth, pain, drainage, streaking and follow up if these symptoms occur.     Follow up at wound center.   Follow up with PCP in 3-5 days.  Proceed to the ER with worsening symptoms.     Chief Complaint     Chief Complaint   Patient presents with    Extremity Laceration     Pt legs gave out and left elbow hit off of sheet rock wall yesterday evening. Pt is currently on coumadin. Denies hitting head, no loss of consciousness. Does live at [facility]. [facility] personal care home  and was treated by staff there         History of Present Illness       The patient presents today with his son for further evaluation of a skin tear to his L elbow which occurred last evening. He states he was standing when his legs gave out causing him to fall up against a wall. He denies hitting his head or LOC. He denies dizziness prior to the fall. He has an extensive cardiac history which included combined HF, low EF, valve repair. Is on coumadin. Reports history of hypotension, per son cardio is aware and is still making adjustments to his meds. Reports having a history of bursitis and a wound to his L elbow which was managed by wound care.         Review of Systems   Review of Systems   Constitutional:  Negative for chills and fever.   Musculoskeletal:  Positive for back pain (chronic). Negative for myalgias.   Skin:  Positive for wound (L  elbow). Negative for rash.   Neurological:  Negative for dizziness, syncope, facial asymmetry, speech difficulty, weakness, light-headedness and headaches.         Current Medications       Current Outpatient Medications:     atorvastatin (LIPITOR) 40 mg tablet, Take 40 mg by mouth daily  , Disp: , Rfl:     clopidogrel (PLAVIX) 75 mg tablet, Take 75 mg by mouth daily, Disp: , Rfl:     DIGOXIN PO, Take by mouth, Disp: , Rfl:     Farxiga 10 MG TABS, Take 10 mg by mouth daily, Disp: , Rfl:     furosemide (LASIX) 40 mg tablet, Take 40 mg by mouth daily PT REPORTS : 1/2 TABLET EVERY OTHER DAY ([facility]), Disp: , Rfl:     Lumigan 0.01 % ophthalmic drops, instill 1 drop into both eyes once daily at bedtime, Disp: , Rfl:     midodrine (PROAMATINE) 5 mg tablet, Take 5 mg by mouth 3 (three) times a day before meals, Disp: , Rfl:     Multiple Vitamins-Minerals (PX MENS MULTIVITAMINS) TABS, Take by mouth, Disp: , Rfl:     pantoprazole (PROTONIX) 40 mg tablet, Take 1 tablet (40 mg total) by mouth daily, Disp: 90 tablet, Rfl: 2    sacubitril-valsartan (ENTRESTO) 24-26 MG TABS, Take 1 tablet by mouth 2 (two) times a day (Patient taking differently: Take 0.5 tablets by mouth 2 (two) times a day), Disp: 60 tablet, Rfl: 0    spironolactone (ALDACTONE) 25 mg tablet, Take 12.5 mg by mouth daily 1/2 TABLET EVERY OTHER DAY (Tu, Th, Sa, Su), Disp: , Rfl:     warfarin (COUMADIN) 2.5 mg tablet, as directed take 1 tablet ON TUESDAY,WEDNESDAY,FRIDAY,SATURDAY,GARCIA...  (REFER TO PRESCRIPTION NOTES)., Disp: , Rfl:     warfarin (COUMADIN) 5 mg tablet, Take 1 tablet (5 mg total) by mouth daily Coumadin 5 mg p.o. Daily- Target INR 2-3 (Patient taking differently: Take 5 mg by mouth daily M & Thurs & Sat 5MG  Tues & Wed & Fri & Sun 2.5MG), Disp: 30 tablet, Rfl: 0    Azelaic Acid 15 % cream, apply to 1 thin layer twice a day to face, Disp: , Rfl:     buPROPion (Wellbutrin SR) 150 mg 12 hr tablet, Take 1 tablet (150 mg total) by mouth 2 (two) times a day,  Disp: 180 tablet, Rfl: 1    carvedilol (COREG) 6.25 mg tablet, Take 3.125 mg by mouth in the morning, Disp: , Rfl:     Current Allergies     Allergies as of 08/22/2024    (No Known Allergies)            The following portions of the patient's history were reviewed and updated as appropriate: allergies, current medications, past family history, past medical history, past social history, past surgical history and problem list.     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    CAD (coronary artery disease)     Cancer (HCC)     prostate    Colon polyp     Coronary artery disease     GERD (gastroesophageal reflux disease)     H/O heart artery stent     x2    Hypercholesteremia     Hypertension     Keratotic lesion     Stroke (HCC)     10YRS AGO    Transient cerebral ischemia        Past Surgical History:   Procedure Laterality Date    BACK SURGERY      CARDIAC PACEMAKER PLACEMENT      CARDIAC SURGERY      mvp repair    CAROTID ENDARTERECTOMY Right 03/2020    CHOLECYSTECTOMY      COLONOSCOPY      ESOPHAGOGASTRODUODENOSCOPY N/A 06/22/2018    Procedure: ESOPHAGOGASTRODUODENOSCOPY (EGD);  Surgeon: Sunny Rodrigues III, MD;  Location: [facility];  Service: Gastroenterology    JOINT REPLACEMENT      L hip replacement    ORCHIECTOMY      PROSTATECTOMY      TOTAL HIP ARTHROPLASTY         Family History   Problem Relation Age of Onset    Heart disease Mother     Stroke Mother         CVA    Cancer Father     Hodgkin's lymphoma Father     Lung cancer Father          Medications have been verified.        Objective   BP 91/59   Pulse 79   Temp (!) 96.8 °F (36 °C)   Resp 18   SpO2 97%        Physical Exam     Physical Exam  Vitals and nursing note reviewed.   Constitutional:       General: He is not in acute distress.     Appearance: Normal appearance. He is not ill-appearing.   HENT:      Head: Normocephalic and atraumatic.      Right Ear: External ear normal.      Left Ear: External ear normal.      Nose:  Nose normal. No congestion or rhinorrhea.      Mouth/Throat:      Lips: Pink.      Mouth: Mucous membranes are moist.   Eyes:      General: Vision grossly intact.      Extraocular Movements: Extraocular movements intact.      Pupils: Pupils are equal, round, and reactive to light.   Cardiovascular:      Rate and Rhythm: Normal rate and regular rhythm.      Heart sounds: Normal heart sounds. No murmur heard.  Pulmonary:      Effort: Pulmonary effort is normal. No respiratory distress.      Breath sounds: Normal breath sounds. No decreased air movement. No decreased breath sounds, wheezing, rhonchi or rales.   Musculoskeletal:         General: Normal range of motion.      Left elbow: No swelling, deformity or effusion. Normal range of motion. Tenderness present in olecranon process.      Cervical back: Normal range of motion.      Comments: Full AROM L elbow without pain.    Skin:     General: Skin is warm.      Findings: Wound present. No rash.      Comments: 4.5 cm skin tear to posterior L elbow. Bleeding controlled. Pink granular tissue to wound bed. Surrounding tissue with ecchymosis. No erythema, warmth, swelling, drainage. See picture attached to chart.    Neurological:      Mental Status: He is alert and oriented to person, place, and time.      Motor: Motor function is intact.      Gait: Gait is intact.   Psychiatric:         Attention and Perception: Attention normal.         Mood and Affect: Mood normal.

## 2024-12-11 ENCOUNTER — TELEPHONE (OUTPATIENT)
Age: 79
End: 2024-12-11

## 2024-12-11 NOTE — TELEPHONE ENCOUNTER
DON- from [facility] reports pts oxygen level is 87% and nurse had pt take deep breaths they got the Oxygen levels back up to 96% on room air.   Nurse assessed pts lungs and reports she did hear some wheezing in the upper lobes but the lower lobes were clear. DON states they have a mobile xray that can come out to scan pts lungs.     PCP please put in the order for an (xray). Please  Fax#[phone].  Any questions or concerns, please call [phone].    
Patient wants to know if he can have an appt sooner then This Monday. He asked for a call back to advise   Thank you   
Spoke to Suzan provided recommendations considering multiple conditions patient should be seen somewhere soon (ED/urgent care).. verbally acknowledged.    
Spoke w/pt scheduled SDA for 12/12.  
No thyroid enlargement. MMM.

## 2024-12-12 ENCOUNTER — OFFICE VISIT (OUTPATIENT)
Dept: INTERNAL MEDICINE CLINIC | Facility: CLINIC | Age: 79
End: 2024-12-12
Payer: MEDICARE

## 2024-12-12 VITALS
DIASTOLIC BLOOD PRESSURE: 72 MMHG | SYSTOLIC BLOOD PRESSURE: 112 MMHG | BODY MASS INDEX: 20.66 KG/M2 | WEIGHT: 124 LBS | TEMPERATURE: 97.3 F | OXYGEN SATURATION: 99 % | HEART RATE: 88 BPM | HEIGHT: 65 IN

## 2024-12-12 DIAGNOSIS — R05.2 SUBACUTE COUGH: Primary | ICD-10-CM

## 2024-12-12 DIAGNOSIS — R09.02 HYPOXIA: ICD-10-CM

## 2024-12-12 DIAGNOSIS — I50.43 ACUTE ON CHRONIC COMBINED SYSTOLIC AND DIASTOLIC CONGESTIVE HEART FAILURE (HCC): ICD-10-CM

## 2024-12-12 PROBLEM — J96.01 ACUTE RESPIRATORY FAILURE WITH HYPOXIA (HCC): Status: RESOLVED | Noted: 2024-06-18 | Resolved: 2024-12-12

## 2024-12-12 PROBLEM — C61 PROSTATE CANCER (HCC): Status: RESOLVED | Noted: 2024-06-18 | Resolved: 2024-12-12

## 2024-12-12 PROBLEM — I26.99 ACUTE PULMONARY EMBOLISM WITHOUT ACUTE COR PULMONALE (HCC): Status: RESOLVED | Noted: 2022-11-06 | Resolved: 2024-12-12

## 2024-12-12 PROBLEM — D69.6 PLATELETS DECREASED (HCC): Status: RESOLVED | Noted: 2024-06-18 | Resolved: 2024-12-12

## 2024-12-12 PROCEDURE — 99214 OFFICE O/P EST MOD 30 MIN: CPT | Performed by: INTERNAL MEDICINE

## 2024-12-12 PROCEDURE — G2211 COMPLEX E/M VISIT ADD ON: HCPCS | Performed by: INTERNAL MEDICINE

## 2024-12-12 RX ORDER — CEFUROXIME AXETIL 500 MG/1
500 TABLET ORAL EVERY 12 HOURS SCHEDULED
Qty: 20 TABLET | Refills: 0 | Status: SHIPPED | OUTPATIENT
Start: 2024-12-12 | End: 2024-12-22

## 2024-12-12 NOTE — ASSESSMENT & PLAN NOTE
Wt Readings from Last 3 Encounters:   12/12/24 56.2 kg (124 lb)   08/15/24 56.3 kg (124 lb 3.2 oz)   06/18/24 56.2 kg (124 lb)     I bargained with him.  Instead of getting a chest x-ray and blood work, he is going to take an extra water pill tomorrow.  Take the antibiotics.  If not improving by next week, he will call and then agreed to go for further testing to include a chest x-ray and blood work.

## 2024-12-12 NOTE — PROGRESS NOTES
Name: Scott Morris      : 1945      MRN: [id number]  Encounter Provider: Lenin Hardy MD  Encounter Date: 2024   Encounter department: [facility] INTERNAL MEDICINE [place]  :  Assessment & Plan  Subacute cough  The least he has bronchitis.  He has Rales at both bases which I believe are chronic.  No definite pneumonia.  He was really reluctant to get about going to the hospital for chest x-ray.  Orders:  •  cefuroxime (CEFTIN) 500 mg tablet; Take 1 tablet (500 mg total) by mouth every 12 (twelve) hours for 10 days    Acute on chronic combined systolic and diastolic congestive heart failure (HCC)  Wt Readings from Last 3 Encounters:   24 56.2 kg (124 lb)   08/15/24 56.3 kg (124 lb 3.2 oz)   24 56.2 kg (124 lb)     I bargained with him.  Instead of getting a chest x-ray and blood work, he is going to take an extra water pill tomorrow.  Take the antibiotics.  If not improving by next week, he will call and then agreed to go for further testing to include a chest x-ray and blood work.               Hypoxia                History of Present Illness     Patient comes in today because the personal care facility he lives at yesterday called that his oxygen level was initially low but came back up rather quickly.  He states his cardiologist noticed that as well.  Here it is normal.  The personal care facility wanted to order a mobile x-ray.  But I wanted to have him checked somewhere.  States he has had at least a cold for almost a week now.  He states he probably should have come here sooner.  He reports a lot of postnasal drip.  Some cough.  Now productive.  No fevers.  He is taking his diuretics as directed.  Pressure is controlled.  It looks like there was some issue with him not taking the midodrine but that seems to be doing better.  He thinks he may have a sinus infection because he felt this way in the past and that is what it eventually was.  But no sinus pain or pressure.  Some  "slight headache from time to time though      Review of Systems   Constitutional:  Negative for fever.   HENT:  Positive for congestion.    Respiratory:  Positive for cough. Negative for shortness of breath.    Cardiovascular:  Negative for chest pain.   Gastrointestinal:  Negative for abdominal pain.       Objective   /72 (BP Location: Left arm, Patient Position: Sitting, Cuff Size: Standard)   Pulse 88   Temp (!) 97.3 °F (36.3 °C) (Temporal)   Ht 5' 5\" (1.651 m)   Wt 56.2 kg (124 lb)   SpO2 99%   BMI 20.63 kg/m²      Physical Exam  Vitals and nursing note reviewed.   Constitutional:       Appearance: Normal appearance.   Cardiovascular:      Rate and Rhythm: Normal rate and regular rhythm.   Pulmonary:      Effort: Pulmonary effort is normal.      Breath sounds: Rales (Rales at both bases, chronic) present.   Neurological:      Mental Status: He is alert.         "

## 2024-12-13 ENCOUNTER — TELEPHONE (OUTPATIENT)
Age: 79
End: 2024-12-13

## 2024-12-13 NOTE — TELEPHONE ENCOUNTER
Patient states he started his antibiotic,he read in the side effects about loose or watery stool. He did have bout of watery stool, would like to know if that is something he should be worried about or if he is okay to continue taking. Patient requests call back today to discuss.

## 2024-12-16 DIAGNOSIS — K21.9 GASTROESOPHAGEAL REFLUX DISEASE WITHOUT ESOPHAGITIS: ICD-10-CM

## 2024-12-16 RX ORDER — PANTOPRAZOLE SODIUM 40 MG/1
40 TABLET, DELAYED RELEASE ORAL DAILY
Qty: 90 TABLET | Refills: 1 | Status: SHIPPED | OUTPATIENT
Start: 2024-12-16

## 2024-12-16 NOTE — TELEPHONE ENCOUNTER
Medication: pantoprazole (PROTONIX) 40 mg tablet     Dose/Frequency:  Take 1 tablet (40 mg total) by mouth daily,     Quantity: 90 tablets    Pharmacy: [id number] - [place] [address] [phone]     Office:   [x] PCP/Provider -  Lenin Hardy MD   [] Speciality/Provider -     Does the patient have enough for 3 days?   [] Yes   [x] No - Send as HP to POD

## 2024-12-16 NOTE — TELEPHONE ENCOUNTER
Patient stated he is still on antibiotic now for 5 day and is feeling a little better and he wants to know if he should still take the remaining amount.  Please call patient   Thank you

## 2025-02-17 ENCOUNTER — OFFICE VISIT (OUTPATIENT)
Dept: INTERNAL MEDICINE CLINIC | Facility: CLINIC | Age: 80
End: 2025-02-17
Payer: MEDICARE

## 2025-02-17 VITALS
TEMPERATURE: 97.8 F | WEIGHT: 124 LBS | HEIGHT: 65 IN | SYSTOLIC BLOOD PRESSURE: 116 MMHG | BODY MASS INDEX: 20.66 KG/M2 | OXYGEN SATURATION: 94 % | RESPIRATION RATE: 18 BRPM | DIASTOLIC BLOOD PRESSURE: 70 MMHG

## 2025-02-17 DIAGNOSIS — R06.00 DYSPNEA, UNSPECIFIED TYPE: Primary | ICD-10-CM

## 2025-02-17 PROCEDURE — G2211 COMPLEX E/M VISIT ADD ON: HCPCS | Performed by: INTERNAL MEDICINE

## 2025-02-17 PROCEDURE — 99214 OFFICE O/P EST MOD 30 MIN: CPT | Performed by: INTERNAL MEDICINE

## 2025-02-17 RX ORDER — CEFUROXIME AXETIL 500 MG/1
500 TABLET ORAL EVERY 12 HOURS SCHEDULED
Qty: 20 TABLET | Refills: 0 | Status: SHIPPED | OUTPATIENT
Start: 2025-02-17 | End: 2025-02-22

## 2025-02-17 NOTE — PROGRESS NOTES
"Name: Scott Morris      : 1945      MRN: [id number]  Encounter Provider: Lenin Hardy MD  Encounter Date: 2025   Encounter department: [facility] INTERNAL MEDICINE [place]  :  Assessment & Plan  Dyspnea, unspecified type  Unclear etiology.  He states this was similar to how he felt last time.  His lungs also last time did not sound good but chest x-ray was clear.  Since he thought he got better with the antibiotic last time, I sent that but we are both not sure.  Wanted to send him for another chest x-ray but he really did not want to go.  I did get him to agree to do blood work.  Will make sure there is no anemia, heart failure, obvious infection, etc.  Orders:  •  cefuroxime (CEFTIN) 500 mg tablet; Take 1 tablet (500 mg total) by mouth every 12 (twelve) hours for 10 days  •  CBC and differential; Future  •  Comprehensive metabolic panel; Future  •  B-Type Natriuretic Peptide(BNP); Future           History of Present Illness   Patient comes in today complaining of shortness of breath and cough again.  It is somewhat confusing.  Initially he states that he felt this way the same when he was seen in December.  But he states the 10-day course of medicine got rid of it.  But then he said that he still feels short of breath and has a worsening cough in the last week.  No fevers.  States his diuretics are working well.  Probably too well he states.  His weight is steady.      Review of Systems   Constitutional:  Negative for fever.   Respiratory:  Positive for cough and shortness of breath.        Objective   /70 (BP Location: Left arm, Patient Position: Sitting, Cuff Size: Adult)   Temp 97.8 °F (36.6 °C) (Tympanic)   Resp 18   Ht 5' 5\" (1.651 m)   Wt 56.2 kg (124 lb)   SpO2 94%   BMI 20.63 kg/m²      Physical Exam  Vitals and nursing note reviewed.   Constitutional:       Appearance: Normal appearance. He is not ill-appearing.   Pulmonary:      Effort: Pulmonary effort is normal.     "  Comments: Coarse breath sounds  Musculoskeletal:      Right lower leg: No edema.      Left lower leg: No edema.   Neurological:      Mental Status: He is alert.

## 2025-02-20 ENCOUNTER — HOSPITAL ENCOUNTER (INPATIENT)
Facility: HOSPITAL | Age: 80
LOS: 2 days | Discharge: HOME HEALTH | End: 2025-02-22
Attending: EMERGENCY MEDICINE | Admitting: STUDENT IN AN ORGANIZED HEALTH CARE EDUCATION/TRAINING PROGRAM
Payer: MEDICARE

## 2025-02-20 ENCOUNTER — APPOINTMENT (EMERGENCY)
Dept: RADIOLOGY | Facility: HOSPITAL | Age: 80
End: 2025-02-20
Payer: MEDICARE

## 2025-02-20 ENCOUNTER — APPOINTMENT (EMERGENCY)
Dept: CT IMAGING | Facility: HOSPITAL | Age: 80
End: 2025-02-20
Payer: MEDICARE

## 2025-02-20 DIAGNOSIS — I50.22 CHRONIC SYSTOLIC (CONGESTIVE) HEART FAILURE (HCC): ICD-10-CM

## 2025-02-20 DIAGNOSIS — Z98.890 S/P MVR (MITRAL VALVE REPAIR): ICD-10-CM

## 2025-02-20 DIAGNOSIS — J10.1 INFLUENZA A: Primary | ICD-10-CM

## 2025-02-20 DIAGNOSIS — Z95.2 H/O MITRAL VALVE REPLACEMENT: ICD-10-CM

## 2025-02-20 LAB
2HR DELTA HS TROPONIN: -6 NG/L
4HR DELTA HS TROPONIN: 0 NG/L
ALBUMIN SERPL BCG-MCNC: 4.1 G/DL (ref 3.5–5)
ALP SERPL-CCNC: 66 U/L (ref 34–104)
ALT SERPL W P-5'-P-CCNC: 20 U/L (ref 7–52)
ANION GAP SERPL CALCULATED.3IONS-SCNC: 8 MMOL/L (ref 4–13)
APTT PPP: 43 SECONDS (ref 23–34)
AST SERPL W P-5'-P-CCNC: 39 U/L (ref 13–39)
BASOPHILS # BLD AUTO: 0.05 THOUSANDS/ΜL (ref 0–0.1)
BASOPHILS NFR BLD AUTO: 1 % (ref 0–1)
BILIRUB SERPL-MCNC: 1.04 MG/DL (ref 0.2–1)
BUN SERPL-MCNC: 35 MG/DL (ref 5–25)
CALCIUM SERPL-MCNC: 9.2 MG/DL (ref 8.4–10.2)
CARDIAC TROPONIN I PNL SERPL HS: 14 NG/L (ref ?–50)
CARDIAC TROPONIN I PNL SERPL HS: 20 NG/L (ref ?–50)
CARDIAC TROPONIN I PNL SERPL HS: 20 NG/L (ref ?–50)
CHLORIDE SERPL-SCNC: 104 MMOL/L (ref 96–108)
CO2 SERPL-SCNC: 28 MMOL/L (ref 21–32)
CREAT SERPL-MCNC: 1.67 MG/DL (ref 0.6–1.3)
EOSINOPHIL # BLD AUTO: 0.08 THOUSAND/ΜL (ref 0–0.61)
EOSINOPHIL NFR BLD AUTO: 1 % (ref 0–6)
ERYTHROCYTE [DISTWIDTH] IN BLOOD BY AUTOMATED COUNT: 14.5 % (ref 11.6–15.1)
FLUAV RNA RESP QL NAA+PROBE: POSITIVE
FLUBV RNA RESP QL NAA+PROBE: NEGATIVE
GFR SERPL CREATININE-BSD FRML MDRD: 38 ML/MIN/1.73SQ M
GLUCOSE SERPL-MCNC: 83 MG/DL (ref 65–140)
GLUCOSE SERPL-MCNC: 84 MG/DL (ref 65–140)
HCT VFR BLD AUTO: 49.4 % (ref 36.5–49.3)
HGB BLD-MCNC: 15.6 G/DL (ref 12–17)
IMM GRANULOCYTES # BLD AUTO: 0.04 THOUSAND/UL (ref 0–0.2)
IMM GRANULOCYTES NFR BLD AUTO: 1 % (ref 0–2)
INR PPP: 2.89 (ref 0.85–1.19)
LACTATE SERPL-SCNC: 1.7 MMOL/L (ref 0.5–2)
LYMPHOCYTES # BLD AUTO: 0.84 THOUSANDS/ΜL (ref 0.6–4.47)
LYMPHOCYTES NFR BLD AUTO: 12 % (ref 14–44)
MCH RBC QN AUTO: 34.1 PG (ref 26.8–34.3)
MCHC RBC AUTO-ENTMCNC: 31.6 G/DL (ref 31.4–37.4)
MCV RBC AUTO: 108 FL (ref 82–98)
MONOCYTES # BLD AUTO: 1.08 THOUSAND/ΜL (ref 0.17–1.22)
MONOCYTES NFR BLD AUTO: 15 % (ref 4–12)
NEUTROPHILS # BLD AUTO: 5.01 THOUSANDS/ΜL (ref 1.85–7.62)
NEUTS SEG NFR BLD AUTO: 70 % (ref 43–75)
NRBC BLD AUTO-RTO: 0 /100 WBCS
PLATELET # BLD AUTO: 165 THOUSANDS/UL (ref 149–390)
PMV BLD AUTO: 9.5 FL (ref 8.9–12.7)
POTASSIUM SERPL-SCNC: 4.8 MMOL/L (ref 3.5–5.3)
PROCALCITONIN SERPL-MCNC: <0.05 NG/ML
PROT SERPL-MCNC: 7.1 G/DL (ref 6.4–8.4)
PROTHROMBIN TIME: 30.9 SECONDS (ref 12.3–15)
RBC # BLD AUTO: 4.58 MILLION/UL (ref 3.88–5.62)
RSV RNA RESP QL NAA+PROBE: NEGATIVE
SARS-COV-2 RNA RESP QL NAA+PROBE: NEGATIVE
SODIUM SERPL-SCNC: 140 MMOL/L (ref 135–147)
WBC # BLD AUTO: 7.1 THOUSAND/UL (ref 4.31–10.16)

## 2025-02-20 PROCEDURE — 85730 THROMBOPLASTIN TIME PARTIAL: CPT | Performed by: EMERGENCY MEDICINE

## 2025-02-20 PROCEDURE — 83605 ASSAY OF LACTIC ACID: CPT | Performed by: EMERGENCY MEDICINE

## 2025-02-20 PROCEDURE — 99285 EMERGENCY DEPT VISIT HI MDM: CPT

## 2025-02-20 PROCEDURE — 82948 REAGENT STRIP/BLOOD GLUCOSE: CPT

## 2025-02-20 PROCEDURE — 85610 PROTHROMBIN TIME: CPT | Performed by: EMERGENCY MEDICINE

## 2025-02-20 PROCEDURE — 87040 BLOOD CULTURE FOR BACTERIA: CPT | Performed by: EMERGENCY MEDICINE

## 2025-02-20 PROCEDURE — 0241U HB NFCT DS VIR RESP RNA 4 TRGT: CPT | Performed by: EMERGENCY MEDICINE

## 2025-02-20 PROCEDURE — 71046 X-RAY EXAM CHEST 2 VIEWS: CPT

## 2025-02-20 PROCEDURE — 94640 AIRWAY INHALATION TREATMENT: CPT

## 2025-02-20 PROCEDURE — 80053 COMPREHEN METABOLIC PANEL: CPT | Performed by: EMERGENCY MEDICINE

## 2025-02-20 PROCEDURE — 93005 ELECTROCARDIOGRAM TRACING: CPT

## 2025-02-20 PROCEDURE — 99285 EMERGENCY DEPT VISIT HI MDM: CPT | Performed by: EMERGENCY MEDICINE

## 2025-02-20 PROCEDURE — 84145 PROCALCITONIN (PCT): CPT | Performed by: EMERGENCY MEDICINE

## 2025-02-20 PROCEDURE — 74177 CT ABD & PELVIS W/CONTRAST: CPT

## 2025-02-20 PROCEDURE — 96361 HYDRATE IV INFUSION ADD-ON: CPT

## 2025-02-20 PROCEDURE — 96365 THER/PROPH/DIAG IV INF INIT: CPT

## 2025-02-20 PROCEDURE — 94644 CONT INHLJ TX 1ST HOUR: CPT

## 2025-02-20 PROCEDURE — 99223 1ST HOSP IP/OBS HIGH 75: CPT | Performed by: STUDENT IN AN ORGANIZED HEALTH CARE EDUCATION/TRAINING PROGRAM

## 2025-02-20 PROCEDURE — 85025 COMPLETE CBC W/AUTO DIFF WBC: CPT | Performed by: EMERGENCY MEDICINE

## 2025-02-20 PROCEDURE — 36415 COLL VENOUS BLD VENIPUNCTURE: CPT | Performed by: EMERGENCY MEDICINE

## 2025-02-20 PROCEDURE — 71275 CT ANGIOGRAPHY CHEST: CPT

## 2025-02-20 PROCEDURE — 84484 ASSAY OF TROPONIN QUANT: CPT | Performed by: EMERGENCY MEDICINE

## 2025-02-20 RX ORDER — MIDODRINE HYDROCHLORIDE 5 MG/1
10 TABLET ORAL 2 TIMES DAILY
Status: DISCONTINUED | OUTPATIENT
Start: 2025-02-20 | End: 2025-02-22 | Stop reason: HOSPADM

## 2025-02-20 RX ORDER — CARVEDILOL 3.12 MG/1
3.12 TABLET ORAL DAILY
Status: DISCONTINUED | OUTPATIENT
Start: 2025-02-21 | End: 2025-02-22 | Stop reason: HOSPADM

## 2025-02-20 RX ORDER — OSELTAMIVIR PHOSPHATE 30 MG/1
30 CAPSULE ORAL ONCE
Status: COMPLETED | OUTPATIENT
Start: 2025-02-20 | End: 2025-02-20

## 2025-02-20 RX ORDER — OSELTAMIVIR PHOSPHATE 30 MG/1
30 CAPSULE ORAL EVERY 24 HOURS
Status: DISCONTINUED | OUTPATIENT
Start: 2025-02-21 | End: 2025-02-22 | Stop reason: HOSPADM

## 2025-02-20 RX ORDER — PANTOPRAZOLE SODIUM 40 MG/1
40 TABLET, DELAYED RELEASE ORAL DAILY
Status: DISCONTINUED | OUTPATIENT
Start: 2025-02-21 | End: 2025-02-22 | Stop reason: HOSPADM

## 2025-02-20 RX ORDER — BIMATOPROST 0.3 MG/ML
1 SOLUTION/ DROPS OPHTHALMIC
Status: DISCONTINUED | OUTPATIENT
Start: 2025-02-20 | End: 2025-02-22 | Stop reason: HOSPADM

## 2025-02-20 RX ORDER — WARFARIN SODIUM 2.5 MG/1
2.5 TABLET ORAL
Status: COMPLETED | OUTPATIENT
Start: 2025-02-20 | End: 2025-02-20

## 2025-02-20 RX ORDER — ATORVASTATIN CALCIUM 40 MG/1
40 TABLET, FILM COATED ORAL
Status: DISCONTINUED | OUTPATIENT
Start: 2025-02-20 | End: 2025-02-22 | Stop reason: HOSPADM

## 2025-02-20 RX ORDER — ALBUTEROL SULFATE 5 MG/ML
10 SOLUTION RESPIRATORY (INHALATION) ONCE
Status: COMPLETED | OUTPATIENT
Start: 2025-02-20 | End: 2025-02-20

## 2025-02-20 RX ORDER — FUROSEMIDE 40 MG/1
40 TABLET ORAL DAILY
Status: DISCONTINUED | OUTPATIENT
Start: 2025-02-21 | End: 2025-02-22 | Stop reason: HOSPADM

## 2025-02-20 RX ORDER — DIGOXIN 125 MCG
125 TABLET ORAL DAILY
Status: DISCONTINUED | OUTPATIENT
Start: 2025-02-21 | End: 2025-02-22 | Stop reason: HOSPADM

## 2025-02-20 RX ORDER — ATORVASTATIN CALCIUM 40 MG/1
40 TABLET, FILM COATED ORAL DAILY
Status: DISCONTINUED | OUTPATIENT
Start: 2025-02-21 | End: 2025-02-20

## 2025-02-20 RX ORDER — SODIUM CHLORIDE FOR INHALATION 0.9 %
12 VIAL, NEBULIZER (ML) INHALATION ONCE
Status: COMPLETED | OUTPATIENT
Start: 2025-02-20 | End: 2025-02-20

## 2025-02-20 RX ADMIN — Medication 2000 MG: at 12:06

## 2025-02-20 RX ADMIN — IPRATROPIUM BROMIDE 1 MG: 0.5 SOLUTION RESPIRATORY (INHALATION) at 11:32

## 2025-02-20 RX ADMIN — IOHEXOL 100 ML: 350 INJECTION, SOLUTION INTRAVENOUS at 13:01

## 2025-02-20 RX ADMIN — ALBUTEROL SULFATE 10 MG: 2.5 SOLUTION RESPIRATORY (INHALATION) at 11:32

## 2025-02-20 RX ADMIN — SODIUM CHLORIDE 1000 ML: 0.9 INJECTION, SOLUTION INTRAVENOUS at 11:37

## 2025-02-20 RX ADMIN — WARFARIN SODIUM 2.5 MG: 2.5 TABLET ORAL at 21:27

## 2025-02-20 RX ADMIN — OSELTAMAVIR PHOSPHATE 30 MG: 30 CAPSULE ORAL at 17:53

## 2025-02-20 RX ADMIN — ISODIUM CHLORIDE 12 ML: 0.03 SOLUTION RESPIRATORY (INHALATION) at 11:32

## 2025-02-20 RX ADMIN — SODIUM CHLORIDE 1000 ML: 0.9 INJECTION, SOLUTION INTRAVENOUS at 12:46

## 2025-02-20 RX ADMIN — MIDODRINE HYDROCHLORIDE 10 MG: 5 TABLET ORAL at 21:27

## 2025-02-20 NOTE — ASSESSMENT & PLAN NOTE
Wt Readings from Last 3 Encounters:   02/20/25 57.2 kg (126 lb 1.7 oz)   02/17/25 56.2 kg (124 lb)   12/12/24 56.2 kg (124 lb)     Echo from 12/2023 reportedly noted with EF of 20%  Currently appears euvolemic.  Currently oxygen 96 to 97% on room air.  Hold Lasix, spironolactone, Entresto  Resume home dose of midodrine.  Continue with low-salt, fluid restricted diet.

## 2025-02-20 NOTE — ASSESSMENT & PLAN NOTE
Patient was hypotensive earlier however currently blood pressure better 112/54.  Hold Lasix, spironolactone, Entresto,Coreg.  Resume midodrine

## 2025-02-20 NOTE — PLAN OF CARE
Problem: Potential for Falls  Goal: Patient will remain free of falls  Description: INTERVENTIONS:  - Educate patient/family on patient safety including physical limitations  - Instruct patient to call for assistance with activity   - Consult OT/PT to assist with strengthening/mobility   - Keep Call bell within reach  - Keep bed low and locked with side rails adjusted as appropriate  - Keep care items and personal belongings within reach  - Initiate and maintain comfort rounds  - Make Fall Risk Sign visible to staff  - Offer Toileting every 2 Hours, in advance of need  - Initiate/Maintain bed alarm  - Obtain necessary fall risk management equipment  - Apply yellow socks and bracelet for high fall risk patients  - Consider moving patient to room near nurses station  Outcome: Progressing     Problem: PAIN - ADULT  Goal: Verbalizes/displays adequate comfort level or baseline comfort level  Description: Interventions:  - Encourage patient to monitor pain and request assistance  - Assess pain using appropriate pain scale  - Administer analgesics based on type and severity of pain and evaluate response  - Implement non-pharmacological measures as appropriate and evaluate response  - Consider cultural and social influences on pain and pain management  - Notify physician/advanced practitioner if interventions unsuccessful or patient reports new pain  Outcome: Progressing     Problem: INFECTION - ADULT  Goal: Absence or prevention of progression during hospitalization  Description: INTERVENTIONS:  - Assess and monitor for signs and symptoms of infection  - Monitor lab/diagnostic results  - Monitor all insertion sites, i.e. indwelling lines, tubes, and drains  - Monitor endotracheal if appropriate and nasal secretions for changes in amount and color  - [place] appropriate cooling/warming therapies per order  - Administer medications as ordered  - Instruct and encourage patient and family to use good hand hygiene  technique  - Identify and instruct in appropriate isolation precautions for identified infection/condition  Outcome: Progressing  Goal: Absence of fever/infection during neutropenic period  Description: INTERVENTIONS:  - Monitor WBC    Outcome: Progressing     Problem: SAFETY ADULT  Goal: Patient will remain free of falls  Description: INTERVENTIONS:  - Educate patient/family on patient safety including physical limitations  - Instruct patient to call for assistance with activity   - Consult OT/PT to assist with strengthening/mobility   - Keep Call bell within reach  - Keep bed low and locked with side rails adjusted as appropriate  - Keep care items and personal belongings within reach  - Initiate and maintain comfort rounds  - Make Fall Risk Sign visible to staff  - Offer Toileting every 2 Hours, in advance of need  - Initiate/Maintain bed alarm  - Obtain necessary fall risk management equipment  - Apply yellow socks and bracelet for high fall risk patients  - Consider moving patient to room near nurses station  Outcome: Progressing  Goal: Maintain or return to baseline ADL function  Description: INTERVENTIONS:  -  Assess patient's ability to carry out ADLs; assess patient's baseline for ADL function and identify physical deficits which impact ability to perform ADLs (bathing, care of mouth/teeth, toileting, grooming, dressing, etc.)  - Assess/evaluate cause of self-care deficits   - Assess range of motion  - Assess patient's mobility; develop plan if impaired  - Assess patient's need for assistive devices and provide as appropriate  - Encourage maximum independence but intervene and supervise when necessary  - Involve family in performance of ADLs  - Assess for home care needs following discharge   - Consider OT consult to assist with ADL evaluation and planning for discharge  - Provide patient education as appropriate  Outcome: Progressing     Problem: DISCHARGE PLANNING  Goal: Discharge to home or other  facility with appropriate resources  Description: INTERVENTIONS:  - Identify barriers to discharge w/patient and caregiver  - Arrange for needed discharge resources and transportation as appropriate  - Identify discharge learning needs (meds, wound care, etc.)  - Arrange for interpretive services to assist at discharge as needed  - Refer to Case Management Department for coordinating discharge planning if the patient needs post-hospital services based on physician/advanced practitioner order or complex needs related to functional status, cognitive ability, or social support system  Outcome: Progressing     Problem: Knowledge Deficit  Goal: Patient/family/caregiver demonstrates understanding of disease process, treatment plan, medications, and discharge instructions  Description: Complete learning assessment and assess knowledge base.  Interventions:  - Provide teaching at level of understanding  - Provide teaching via preferred learning methods  Outcome: Progressing

## 2025-02-20 NOTE — ASSESSMENT & PLAN NOTE
79-year-old patient with past medical history of cardiomyopathy with EF of 20%, ICD in place, CKD, CAD, paroxysmal A-fib currently on Coumadin, history of aortic and mitral prosthetic valve, active smoker.  Has been having episode of cough, shortness of breath for 5 to 6 days and was seen by PCP and was started on Ceftin however presents to emergency room with progression of symptoms.    Tested positive for influenza A.  Patient was noted with hypoxia 87% ambulation in the emergency room  CTA PE study report reviewed noted negative for PE, no acute intrathoracic or intra-abdominal pathology noted.    Currently patient appears comfortable nondistressed.    Acutely nontoxic appearing.  O2 saturation 96% room air.  Hemodynamically stable.  Continue with renally adjusted Tamiflu for 5-day course.  Patient was given dose of cefepime in the emergency room however will monitor off antibiotics.  2 sets of blood cultures were sent in the ED.  Follow-up with PT RUBEN ashby.  Would benefit from ambulatory O2 evaluation.

## 2025-02-20 NOTE — ASSESSMENT & PLAN NOTE
Present on admission history of CKD with baseline creatinine around 0.9-1.18.   Presented with creatinine 1.67.  BUN 35.    Patient was not hypotensive on presentation likely prerenal due to hypotension.  Patient had received 2 L fluid in the emergency room.  Will hold off on further IV fluids given CHF of 20%.  Currently on renally adjusted Tamiflu.  Hold home dose of Entresto, spironolactone, Lasix  Resume home dose of midodrine.

## 2025-02-20 NOTE — ASSESSMENT & PLAN NOTE
Present on admission history of paroxysmal A-fib currently on Coumadin.  EKG noted with A-fib rate controlled  INR currently therapeutic 2.9.

## 2025-02-20 NOTE — H&P
H&P - Hospitalist   Name: Scott Morris 79 y.o. male I MRN: [id number]  Unit/Bed#: -01 I Date of Admission: 2/20/2025   Date of Service: 2/20/2025 I Hospital Day: 0     Assessment & Plan  Influenza A  79-year-old patient with past medical history of cardiomyopathy with EF of 20%, ICD in place, CKD, CAD, paroxysmal A-fib currently on Coumadin, history of aortic and mitral prosthetic valve, active smoker.  Has been having episode of cough, shortness of breath for 5 to 6 days and was seen by PCP and was started on Ceftin however presents to emergency room with progression of symptoms.    Tested positive for influenza A.  Patient was noted with hypoxia 87% ambulation in the emergency room  CTA PE study report reviewed noted negative for PE, no acute intrathoracic or intra-abdominal pathology noted.    Currently patient appears comfortable nondistressed.    Acutely nontoxic appearing.  O2 saturation 96% room air.  Hemodynamically stable.  Continue with renally adjusted Tamiflu for 5-day course.  Patient was given dose of cefepime in the emergency room however will monitor off antibiotics.  2 sets of blood cultures were sent in the ED.  Follow-up with PT RUBEN ashby.  Would benefit from ambulatory O2 evaluation.  NED (acute kidney injury) (HCC)  Present on admission history of CKD with baseline creatinine around 0.9-1.18.   Presented with creatinine 1.67.  BUN 35.    Patient was not hypotensive on presentation likely prerenal due to hypotension.  Patient had received 2 L fluid in the emergency room.  Will hold off on further IV fluids given CHF of 20%.  Currently on renally adjusted Tamiflu.  Hold home dose of Entresto, spironolactone, Lasix  Resume home dose of midodrine.    CAD (coronary atherosclerotic disease)  Present on admission history of CAD.  Currently asymptomatic.  HS trop negative x2  Patient is on Coumadin, Coreg, Entresto, spironolactone at home.  Currently not on Plavix.      Hypertension  Patient  was hypotensive earlier however currently blood pressure better 112/54.  Hold Lasix, spironolactone, Entresto,Coreg.  Resume midodrine    PAF (paroxysmal atrial fibrillation) (HCC)  Present on admission history of paroxysmal A-fib currently on Coumadin.  EKG noted with A-fib rate controlled  INR currently therapeutic 2.9.    Chronic systolic (congestive) heart failure (HCC)  Wt Readings from Last 3 Encounters:   02/20/25 57.2 kg (126 lb 1.7 oz)   02/17/25 56.2 kg (124 lb)   12/12/24 56.2 kg (124 lb)     Echo from 12/2023 reportedly noted with EF of 20%  Currently appears euvolemic.  Currently oxygen 96 to 97% on room air.  Hold Lasix, spironolactone, Entresto  Resume home dose of midodrine.  Continue with low-salt, fluid restricted diet.          Tobacco use  Counseled on cessation.      VTE Pharmacologic Prophylaxis:   Moderate Risk (Score 3-4) - Pharmacological DVT Prophylaxis Ordered: warfarin (Coumadin).  Code Status: Level 3 - DNAR and DNI   Discussion with family: Updated  (son) at bedside.    Anticipated Length of Stay: Patient will be admitted on an inpatient basis with an anticipated length of stay of greater than 2 midnights secondary to Influenza A infection, NED.    History of Present Illness   Chief Complaint: Cough, shortness of breath    Scott Morris is a 79 y.o. male with a PMH of CAD, systolic CHF with EF of 20%, ICD in place, GERD, hypertension, paroxysmal A-fib currently on Coumadin, bioprosthetic aortic and bioprosthetic mitral valve, anemia, CKD, CVA, protein calorie malnutrition, who presents with complaining of shortness of breath, cough.  Patient reports having shortness of breath since Sunday and was seen by PCP on Tuesday and was given antibiotics/Ceftin.  Patient also complaining of a wet cough and lightheadedness.  Further evaluation tested positive for influenza A in emergency room.  Patient was also noted to have oxygen 87% on ambulation in the emergency room  therefore hospitalized.  Currently on encounter patient appears comfortable not in distress.  Currently O2 saturation 96% on room air at rest.  Currently denies chest pain, fever, chills, nausea, diarrhea, abdominal pain, any other complaints.  He is eager to go home hopefully by tomorrow.  No other events better.    Review of Systems   Constitutional:  Positive for fatigue. Negative for chills, fever and unexpected weight change.   HENT:  Negative for congestion.    Eyes:  Negative for visual disturbance.   Respiratory:  Positive for cough and shortness of breath.    Cardiovascular:  Negative for chest pain, palpitations and leg swelling.   Gastrointestinal:  Negative for abdominal pain, nausea and vomiting.   Endocrine: Negative for polyuria.   Genitourinary:  Negative for hematuria.   Neurological:  Positive for weakness.       Historical Information   Past Medical History:   Diagnosis Date    Anaplasmosis 2018    CAD (coronary artery disease)     Cancer (HCC)     prostate    Colon polyp     Coronary artery disease     GERD (gastroesophageal reflux disease)     H/O heart artery stent     x2    Hypercholesteremia     Hypertension     Keratotic lesion     Stroke (HCC)     10YRS AGO    Transient cerebral ischemia      Past Surgical History:   Procedure Laterality Date    BACK SURGERY      CARDIAC PACEMAKER PLACEMENT      CARDIAC SURGERY      mvp repair    CAROTID ENDARTERECTOMY Right 2020    CHOLECYSTECTOMY      COLONOSCOPY      ESOPHAGOGASTRODUODENOSCOPY N/A 2018    Procedure: ESOPHAGOGASTRODUODENOSCOPY (EGD);  Surgeon: Sunny Rodrigues III, MD;  Location: [facility];  Service: Gastroenterology    JOINT REPLACEMENT      L hip replacement    ORCHIECTOMY      PROSTATECTOMY      TOTAL HIP ARTHROPLASTY       Social History     Tobacco Use    Smoking status: Every Day     Current packs/day: 0.00     Types: Cigarettes     Last attempt to quit: 2018     Years since quittin.7    Smokeless tobacco:  Never    Tobacco comments:     2 cig/day   Vaping Use    Vaping status: Never Used   Substance and Sexual Activity    Alcohol use: Never     Alcohol/week: 2.0 standard drinks of alcohol     Types: 2 Standard drinks or equivalent per week    Drug use: No    Sexual activity: Not Currently     Comment: no high risk sexual behavior     E-Cigarette/Vaping    E-Cigarette Use Never User      E-Cigarette/Vaping Substances    Nicotine No     THC No     CBD No     Flavoring No     Other No     Unknown No      Family History   Problem Relation Age of Onset    Heart disease Mother     Stroke Mother         CVA    Cancer Father     Hodgkin's lymphoma Father     Lung cancer Father      Social History:  Marital Status:        Meds/Allergies   I have reviewed home medications with a medical source (PCP, Pharmacy, other).  Prior to Admission medications    Medication Sig Start Date End Date Taking? Authorizing Provider   atorvastatin (LIPITOR) 40 mg tablet Take 40 mg by mouth daily      Historical Provider, MD   Azelaic Acid 15 % cream apply to 1 thin layer twice a day to face 9/1/22   Historical Provider, MD   buPROPion (Wellbutrin SR) 150 mg 12 hr tablet Take 1 tablet (150 mg total) by mouth 2 (two) times a day 8/15/24   Lenin Hardy MD   carvedilol (COREG) 6.25 mg tablet Take 3.125 mg by mouth in the morning 1/17/24   Historical Provider, MD   cefuroxime (CEFTIN) 500 mg tablet Take 1 tablet (500 mg total) by mouth every 12 (twelve) hours for 10 days 2/17/25 2/27/25  Lenin Hardy MD   clopidogrel (PLAVIX) 75 mg tablet Take 75 mg by mouth daily 7/30/21   Historical Provider, MD   DIGOXIN PO Take by mouth    Historical Provider, MD   Farxiga 10 MG TABS Take 10 mg by mouth daily 3/4/22   Historical Provider, MD   furosemide (LASIX) 40 mg tablet Take 40 mg by mouth daily PT REPORTS : 1/2 TABLET EVERY OTHER DAY (MWF)    Historical Provider, MD [place] 0.01 % ophthalmic drops instill 1 drop into both eyes once daily at bedtime  11/22/22   Historical Provider, MD   midodrine (PROAMATINE) 5 mg tablet Take 5 mg by mouth 3 (three) times a day before meals    Historical Provider, MD   Multiple Vitamins-Minerals (PX MENS MULTIVITAMINS) TABS Take by mouth    Historical Provider, MD   pantoprazole (PROTONIX) 40 mg tablet Take 1 tablet (40 mg total) by mouth daily 12/16/24   Lenin Hardy MD   sacubitril-valsartan (ENTRESTO) 24-26 MG TABS Take 1 tablet by mouth 2 (two) times a day  Patient taking differently: Take 0.5 tablets by mouth 2 (two) times a day 11/14/22 2/17/25  Abilio Benton MD   spironolactone (ALDACTONE) 25 mg tablet Take 12.5 mg by mouth daily 1/2 TABLET EVERY OTHER DAY (Tu, Th, Sa, Su)    Historical Provider, MD   warfarin (COUMADIN) 2.5 mg tablet as directed take 1 tablet ON TUESDAY,WEDNESDAY,FRIDAY,SATURDAY,SU...  (REFER TO PRESCRIPTION NOTES). 10/20/23   Historical Provider, MD   warfarin (COUMADIN) 5 mg tablet Take 1 tablet (5 mg total) by mouth daily Coumadin 5 mg p.o. Daily- Target INR 2-3 11/14/22   Abilio Benton MD   apixaban (Eliquis) 5 mg Take 2 tablets (10 mg total) by mouth 2 (two) times a day for 7 days, THEN 1 tablet (5 mg total) 2 (two) times a day for 23 days. 11/8/22 11/14/22  Roel Pineda PA-C     No Known Allergies    Objective :  Temp:  [97.3 °F (36.3 °C)-97.5 °F (36.4 °C)] 97.3 °F (36.3 °C)  HR:  [68-94] 90  BP: ()/(51-74) 112/54  Resp:  [20-24] 23  SpO2:  [95 %-99 %] 96 %  O2 Device: None (Room air)    Physical Exam  Constitutional:       General: He is not in acute distress.     Appearance: Normal appearance. He is not ill-appearing, toxic-appearing or diaphoretic.      Comments: Elderly male patient, acutely nontoxic appearing.   HENT:      Mouth/Throat:      Mouth: Mucous membranes are dry.   Cardiovascular:      Rate and Rhythm: Normal rate.      Pulses: Normal pulses.   Pulmonary:      Effort: Pulmonary effort is normal. No respiratory distress.      Breath sounds: No wheezing.       Comments: O2 saturation 96% room air.  Abdominal:      General: Bowel sounds are normal. There is no distension.      Palpations: Abdomen is soft.      Tenderness: There is no abdominal tenderness.   Musculoskeletal:      Right lower leg: No edema.      Left lower leg: No edema.   Neurological:      Mental Status: He is alert and oriented to person, place, and time. Mental status is at baseline.   Psychiatric:         Mood and Affect: Mood normal.         Behavior: Behavior normal.          Lines/Drains:            Lab Results: I have reviewed the following results:  Results from last 7 days   Lab Units 02/20/25  1133   WBC Thousand/uL 7.10   HEMOGLOBIN g/dL 15.6   HEMATOCRIT % 49.4*   PLATELETS Thousands/uL 165   SEGS PCT % 70   LYMPHO PCT % 12*   MONO PCT % 15*   EOS PCT % 1     Results from last 7 days   Lab Units 02/20/25  1133   SODIUM mmol/L 140   POTASSIUM mmol/L 4.8   CHLORIDE mmol/L 104   CO2 mmol/L 28   BUN mg/dL 35*   CREATININE mg/dL 1.67*   ANION GAP mmol/L 8   CALCIUM mg/dL 9.2   ALBUMIN g/dL 4.1   TOTAL BILIRUBIN mg/dL 1.04*   ALK PHOS U/L 66   ALT U/L 20   AST U/L 39   GLUCOSE RANDOM mg/dL 83     Results from last 7 days   Lab Units 02/20/25  1133   INR  2.89*     Results from last 7 days   Lab Units 02/20/25  1205   POC GLUCOSE mg/dl 84     Lab Results   Component Value Date    HGBA1C 6.2 (H) 09/08/2022    HGBA1C 6.2 09/08/2022    HGBA1C 6.1 (H) 02/17/2022     Results from last 7 days   Lab Units 02/20/25  1133   LACTIC ACID mmol/L 1.7   PROCALCITONIN ng/ml <0.05       Imaging Results Review: I reviewed radiology reports from this admission including: CTA PE study report reviewed..  Other Study Results Review: EKG was personally reviewed and my interpretation is: A-fib rate controlled...    Administrative Statements   I have spent a total time of 75 minutes in caring for this patient on the day of the visit/encounter including Diagnostic results, Patient and family education, Impressions, Counseling  / Coordination of care, Documenting in the medical record, Reviewing/placing orders in the medical record (including tests, medications, and/or procedures), and Obtaining or reviewing history  .    ** Please Note: This note has been constructed using a voice recognition system. **

## 2025-02-20 NOTE — ED PROVIDER NOTES
ED Disposition       None          Assessment & Plan       Medical Decision Making  Patient is a 79-year-old male past medical history of hypertension, hyperlipidemia, atrial fibrillation, CVA, GERD, CHF, CAD, gout, CKD, reactive airway disease presenting for shortness of breath.  Patient is ill-appearing at bedside, mildly hypotensive with moderate conversational dyspnea and scattered wheezes and rhonchi without rales, equal pulses, no lower extremity edema no other significant physical exam findings.  Obtain septic workup and begin with chest x-ray will consider CT if no source identified, viral testing, labs to assess for electrolyte abnormalities, anemia, NED, leukocytosis, give breathing treatment and fluids and continue to monitor.    Amount and/or Complexity of Data Reviewed  Labs: ordered.  Radiology: ordered.    Risk  Prescription drug management.        ED Course as of 02/20/25 1757   Thu Feb 20, 2025   1548 Workup remarkable only for flu B.  Patient has consistently had appropriate blood pressures after 2 L of fluids.  Have discussed disposition and patient states he would like to go home, will obtain ambulatory pulse ox and if able to ambulate without difficulty or without desaturation will discharge home.   1605 Patient desaturated with walking pulse ox.       Medications   albuterol inhalation solution 10 mg (has no administration in time range)   ipratropium (ATROVENT) 0.02 % inhalation solution 1 mg (has no administration in time range)   sodium chloride 0.9 % inhalation solution 12 mL (has no administration in time range)   sodium chloride 0.9 % bolus 1,000 mL (has no administration in time range)   cefepime (MAXIPIME) 2 g/50 mL dextrose IVPB (has no administration in time range)       ED Risk Strat Scores                                                History of Present Illness       Chief Complaint   Patient presents with   • Shortness of Breath     Pt arrived via wheelchair c/o SOB since  . Was seen by PCP on Tuesday, prescribed ABX. PT also c/o wet cough. -cp +light headed        Past Medical History:   Diagnosis Date   • Anaplasmosis 2018   • CAD (coronary artery disease)    • Cancer (HCC)     prostate   • Colon polyp    • Coronary artery disease    • GERD (gastroesophageal reflux disease)    • H/O heart artery stent     x2   • Hypercholesteremia    • Hypertension    • Keratotic lesion    • Stroke (HCC)     10YRS AGO   • Transient cerebral ischemia       Past Surgical History:   Procedure Laterality Date   • BACK SURGERY     • CARDIAC PACEMAKER PLACEMENT     • CARDIAC SURGERY      mvp repair   • CAROTID ENDARTERECTOMY Right 2020   • CHOLECYSTECTOMY     • COLONOSCOPY     • ESOPHAGOGASTRODUODENOSCOPY N/A 2018    Procedure: ESOPHAGOGASTRODUODENOSCOPY (EGD);  Surgeon: Sunny Rodrigues III, MD;  Location: [facility];  Service: Gastroenterology   • JOINT REPLACEMENT      L hip replacement   • ORCHIECTOMY     • PROSTATECTOMY     • TOTAL HIP ARTHROPLASTY        Family History   Problem Relation Age of Onset   • Heart disease Mother    • Stroke Mother         CVA   • Cancer Father    • Hodgkin's lymphoma Father    • Lung cancer Father       Social History     Tobacco Use   • Smoking status: Every Day     Current packs/day: 0.00     Types: Cigarettes     Last attempt to quit: 2018     Years since quittin.7   • Smokeless tobacco: Never   • Tobacco comments:     2 cig/day   Vaping Use   • Vaping status: Never Used   Substance Use Topics   • Alcohol use: Never     Alcohol/week: 2.0 standard drinks of alcohol     Types: 2 Standard drinks or equivalent per week   • Drug use: No      E-Cigarette/Vaping   • E-Cigarette Use Never User       E-Cigarette/Vaping Substances   • Nicotine No    • THC No    • CBD No    • Flavoring No    • Other No    • Unknown No       I have reviewed and agree with the history as documented.     Patient is 79-year-old male past medical history of CAD,  hypertension, hyperlipidemia, CHF, GERD, atrial fibrillation, mitral valve replacement, CVA, CKD, reactive airway disease, gout presenting with shortness of breath.  Patient notes shortness of breath exertional in nature and constant worsening for the last 4 days associated with productive cough.  Was seen by PCP 2 days ago and put on antibiotics, chart review reveals cefuroxime.  Patient notes lightheadedness but denies chest pain, leg pain or swelling, rashes, vision changes, dysuria, nausea/vomiting/diarrhea, fevers.  Has been compliant with his medication.        Review of Systems   All other systems reviewed and are negative.          Objective       ED Triage Vitals   Temperature Pulse Blood Pressure Respirations SpO2 Patient Position - Orthostatic VS   02/20/25 1059 02/20/25 1059 02/20/25 1116 02/20/25 1059 02/20/25 1059 02/20/25 1116   97.5 °F (36.4 °C) 68 (!) 89/55 20 96 % Lying      Temp Source Heart Rate Source BP Location FiO2 (%) Pain Score    02/20/25 1059 02/20/25 1059 02/20/25 1116 -- --    Temporal Monitor Right arm        Vitals      Date and Time Temp Pulse SpO2 Resp BP Pain Score FACES Pain Rating User   02/20/25 1116 -- 94 99 % 24 89/55 -- -- LF   02/20/25 1059 97.5 °F (36.4 °C) 68 96 % 20 -- -- -- GP            Physical Exam  Vitals reviewed.   Constitutional:       General: He is not in acute distress.     Appearance: Normal appearance. He is ill-appearing.   HENT:      Mouth/Throat:      Mouth: Mucous membranes are moist.   Eyes:      Conjunctiva/sclera: Conjunctivae normal.      Comments: Normal conjunctiva   Cardiovascular:      Rate and Rhythm: Normal rate and regular rhythm.      Pulses: Normal pulses.      Heart sounds: Normal heart sounds.   Pulmonary:      Effort: Pulmonary effort is normal.      Breath sounds: Normal breath sounds.      Comments: Scattered expiratory wheezing and rhonchi without obvious rales or diminished lung sounds with moderate conversational dyspnea but without  retractions or accessory muscle use  Abdominal:      General: Abdomen is flat.      Palpations: Abdomen is soft.      Tenderness: There is no abdominal tenderness.   Musculoskeletal:         General: No swelling. Normal range of motion.      Cervical back: Neck supple.      Right lower leg: No tenderness. No edema.      Left lower leg: No tenderness. No edema.   Skin:     General: Skin is warm and dry.   Neurological:      General: No focal deficit present.      Mental Status: He is alert.   Psychiatric:         Mood and Affect: Mood normal.         Results Reviewed       None            XR chest pa and lateral    (Results Pending)       Procedures    ED Medication and Procedure Management   Prior to Admission Medications   Prescriptions Last Dose Informant Patient Reported? Taking?   Azelaic Acid 15 % cream  Self Yes No   Sig: apply to 1 thin layer twice a day to face   DIGOXIN PO   Yes No   Sig: Take by mouth   Farxiga 10 MG TABS  Self Yes No   Sig: Take 10 mg by mouth daily   [place] 0.01 % ophthalmic drops  Self Yes No   Sig: instill 1 drop into both eyes once daily at bedtime   Multiple Vitamins-Minerals (PX MENS MULTIVITAMINS) TABS  Self Yes No   Sig: Take by mouth   atorvastatin (LIPITOR) 40 mg tablet  Self Yes No   Sig: Take 40 mg by mouth daily     buPROPion (Wellbutrin SR) 150 mg 12 hr tablet   No No   Sig: Take 1 tablet (150 mg total) by mouth 2 (two) times a day   carvedilol (COREG) 6.25 mg tablet  Self Yes No   Sig: Take 3.125 mg by mouth in the morning   cefuroxime (CEFTIN) 500 mg tablet   No No   Sig: Take 1 tablet (500 mg total) by mouth every 12 (twelve) hours for 10 days   clopidogrel (PLAVIX) 75 mg tablet  Self Yes No   Sig: Take 75 mg by mouth daily   furosemide (LASIX) 40 mg tablet  Self Yes No   Sig: Take 40 mg by mouth daily PT REPORTS : 1/2 TABLET EVERY OTHER DAY (MWF)   midodrine (PROAMATINE) 5 mg tablet   Yes No   Sig: Take 5 mg by mouth 3 (three) times a day before meals   pantoprazole  (PROTONIX) 40 mg tablet   No No   Sig: Take 1 tablet (40 mg total) by mouth daily   sacubitril-valsartan (ENTRESTO) 24-26 MG TABS   No No   Sig: Take 1 tablet by mouth 2 (two) times a day   Patient taking differently: Take 0.5 tablets by mouth 2 (two) times a day   spironolactone (ALDACTONE) 25 mg tablet  Self Yes No   Sig: Take 12.5 mg by mouth daily 1/2 TABLET EVERY OTHER DAY (Tu, Th, Sa, Su)   warfarin (COUMADIN) 2.5 mg tablet  Self Yes No   Sig: as directed take 1 tablet ON TUESDAY,WEDNESDAY,FRIDAY,SATURDAY,GARCIA...  (REFER TO PRESCRIPTION NOTES).   warfarin (COUMADIN) 5 mg tablet  Self No No   Sig: Take 1 tablet (5 mg total) by mouth daily Coumadin 5 mg p.o. Daily- Target INR 2-3      Facility-Administered Medications: None     Patient's Medications   Discharge Prescriptions    No medications on file     No discharge procedures on file.  ED SEPSIS DOCUMENTATION            Melva Triplett DO  02/20/25 2320

## 2025-02-20 NOTE — ASSESSMENT & PLAN NOTE
Present on admission history of CAD.  Currently asymptomatic.  HS trop negative x2  Patient is on Coumadin, Coreg, Entresto, spironolactone at home.  Currently not on Plavix.

## 2025-02-21 LAB
ALBUMIN SERPL BCG-MCNC: 3.5 G/DL (ref 3.5–5)
ALP SERPL-CCNC: 55 U/L (ref 34–104)
ALT SERPL W P-5'-P-CCNC: 15 U/L (ref 7–52)
ANION GAP SERPL CALCULATED.3IONS-SCNC: 6 MMOL/L (ref 4–13)
AST SERPL W P-5'-P-CCNC: 25 U/L (ref 13–39)
ATRIAL RATE: 500 BPM
BILIRUB SERPL-MCNC: 0.7 MG/DL (ref 0.2–1)
BUN SERPL-MCNC: 26 MG/DL (ref 5–25)
CALCIUM SERPL-MCNC: 8.4 MG/DL (ref 8.4–10.2)
CHLORIDE SERPL-SCNC: 109 MMOL/L (ref 96–108)
CO2 SERPL-SCNC: 25 MMOL/L (ref 21–32)
CREAT SERPL-MCNC: 1.19 MG/DL (ref 0.6–1.3)
ERYTHROCYTE [DISTWIDTH] IN BLOOD BY AUTOMATED COUNT: 14.3 % (ref 11.6–15.1)
GFR SERPL CREATININE-BSD FRML MDRD: 58 ML/MIN/1.73SQ M
GLUCOSE SERPL-MCNC: 87 MG/DL (ref 65–140)
HCT VFR BLD AUTO: 45.7 % (ref 36.5–49.3)
HGB BLD-MCNC: 14.9 G/DL (ref 12–17)
INR PPP: 3.11 (ref 0.85–1.19)
MCH RBC QN AUTO: 35 PG (ref 26.8–34.3)
MCHC RBC AUTO-ENTMCNC: 32.6 G/DL (ref 31.4–37.4)
MCV RBC AUTO: 107 FL (ref 82–98)
PLATELET # BLD AUTO: 145 THOUSANDS/UL (ref 149–390)
PMV BLD AUTO: 9.8 FL (ref 8.9–12.7)
POTASSIUM SERPL-SCNC: 4 MMOL/L (ref 3.5–5.3)
PROT SERPL-MCNC: 5.9 G/DL (ref 6.4–8.4)
PROTHROMBIN TIME: 32.6 SECONDS (ref 12.3–15)
QRS AXIS: -22 DEGREES
QRSD INTERVAL: 116 MS
QT INTERVAL: 366 MS
QTC INTERVAL: 469 MS
RBC # BLD AUTO: 4.26 MILLION/UL (ref 3.88–5.62)
SODIUM SERPL-SCNC: 140 MMOL/L (ref 135–147)
T WAVE AXIS: 151 DEGREES
VENTRICULAR RATE: 99 BPM
WBC # BLD AUTO: 6.32 THOUSAND/UL (ref 4.31–10.16)

## 2025-02-21 PROCEDURE — 85027 COMPLETE CBC AUTOMATED: CPT | Performed by: STUDENT IN AN ORGANIZED HEALTH CARE EDUCATION/TRAINING PROGRAM

## 2025-02-21 PROCEDURE — 93010 ELECTROCARDIOGRAM REPORT: CPT | Performed by: INTERNAL MEDICINE

## 2025-02-21 PROCEDURE — 97162 PT EVAL MOD COMPLEX 30 MIN: CPT

## 2025-02-21 PROCEDURE — 99232 SBSQ HOSP IP/OBS MODERATE 35: CPT | Performed by: NURSE PRACTITIONER

## 2025-02-21 PROCEDURE — 85610 PROTHROMBIN TIME: CPT | Performed by: STUDENT IN AN ORGANIZED HEALTH CARE EDUCATION/TRAINING PROGRAM

## 2025-02-21 PROCEDURE — 94760 N-INVAS EAR/PLS OXIMETRY 1: CPT

## 2025-02-21 PROCEDURE — 80053 COMPREHEN METABOLIC PANEL: CPT | Performed by: STUDENT IN AN ORGANIZED HEALTH CARE EDUCATION/TRAINING PROGRAM

## 2025-02-21 PROCEDURE — 94664 DEMO&/EVAL PT USE INHALER: CPT

## 2025-02-21 PROCEDURE — 94640 AIRWAY INHALATION TREATMENT: CPT

## 2025-02-21 PROCEDURE — 97166 OT EVAL MOD COMPLEX 45 MIN: CPT

## 2025-02-21 RX ORDER — GUAIFENESIN/DEXTROMETHORPHAN 100-10MG/5
10 SYRUP ORAL EVERY 4 HOURS PRN
Qty: 118 ML | Refills: 0 | Status: SHIPPED | OUTPATIENT
Start: 2025-02-21

## 2025-02-21 RX ORDER — GUAIFENESIN/DEXTROMETHORPHAN 100-10MG/5
10 SYRUP ORAL EVERY 4 HOURS PRN
Status: DISCONTINUED | OUTPATIENT
Start: 2025-02-21 | End: 2025-02-22 | Stop reason: HOSPADM

## 2025-02-21 RX ORDER — OSELTAMIVIR PHOSPHATE 30 MG/1
30 CAPSULE ORAL EVERY 24 HOURS
Qty: 3 CAPSULE | Refills: 0 | Status: SHIPPED | OUTPATIENT
Start: 2025-02-22 | End: 2025-02-25

## 2025-02-21 RX ORDER — ALBUTEROL SULFATE 0.83 MG/ML
2.5 SOLUTION RESPIRATORY (INHALATION) EVERY 6 HOURS PRN
Status: DISCONTINUED | OUTPATIENT
Start: 2025-02-21 | End: 2025-02-22 | Stop reason: HOSPADM

## 2025-02-21 RX ORDER — BENZONATATE 100 MG/1
100 CAPSULE ORAL 3 TIMES DAILY
Status: DISCONTINUED | OUTPATIENT
Start: 2025-02-21 | End: 2025-02-22 | Stop reason: HOSPADM

## 2025-02-21 RX ORDER — DIGOXIN 125 MCG
125 TABLET ORAL DAILY
Qty: 30 TABLET | Refills: 0 | Status: SHIPPED | OUTPATIENT
Start: 2025-02-21 | End: 2025-03-23

## 2025-02-21 RX ORDER — BENZONATATE 100 MG/1
100 CAPSULE ORAL 3 TIMES DAILY
Qty: 20 CAPSULE | Refills: 0 | Status: SHIPPED | OUTPATIENT
Start: 2025-02-21

## 2025-02-21 RX ADMIN — PANTOPRAZOLE SODIUM 40 MG: 40 TABLET, DELAYED RELEASE ORAL at 09:47

## 2025-02-21 RX ADMIN — BENZONATATE 100 MG: 100 CAPSULE ORAL at 09:46

## 2025-02-21 RX ADMIN — ATORVASTATIN CALCIUM 40 MG: 40 TABLET, FILM COATED ORAL at 21:25

## 2025-02-21 RX ADMIN — BIMATOPROST 1 DROP: 0.3 SOLUTION/ DROPS OPHTHALMIC at 00:20

## 2025-02-21 RX ADMIN — GUAIFENESIN AND DEXTROMETHORPHAN 10 ML: 100; 10 SYRUP ORAL at 10:19

## 2025-02-21 RX ADMIN — OSELTAMAVIR PHOSPHATE 30 MG: 30 CAPSULE ORAL at 00:20

## 2025-02-21 RX ADMIN — GUAIFENESIN AND DEXTROMETHORPHAN 10 ML: 100; 10 SYRUP ORAL at 17:58

## 2025-02-21 RX ADMIN — BENZONATATE 100 MG: 100 CAPSULE ORAL at 16:58

## 2025-02-21 RX ADMIN — MIDODRINE HYDROCHLORIDE 10 MG: 5 TABLET ORAL at 21:25

## 2025-02-21 RX ADMIN — ALBUTEROL SULFATE 2.5 MG: 2.5 SOLUTION RESPIRATORY (INHALATION) at 22:54

## 2025-02-21 RX ADMIN — MIDODRINE HYDROCHLORIDE 10 MG: 5 TABLET ORAL at 09:46

## 2025-02-21 RX ADMIN — DIGOXIN 125 MCG: 125 TABLET ORAL at 09:46

## 2025-02-21 RX ADMIN — BENZONATATE 100 MG: 100 CAPSULE ORAL at 21:25

## 2025-02-21 RX ADMIN — ATORVASTATIN CALCIUM 40 MG: 40 TABLET, FILM COATED ORAL at 00:20

## 2025-02-21 NOTE — PLAN OF CARE
Problem: Potential for Falls  Goal: Patient will remain free of falls  Description: INTERVENTIONS:  - Educate patient/family on patient safety including physical limitations  - Instruct patient to call for assistance with activity   - Consult OT/PT to assist with strengthening/mobility   - Keep Call bell within reach  - Keep bed low and locked with side rails adjusted as appropriate  - Keep care items and personal belongings within reach  - Initiate and maintain comfort rounds  - Make Fall Risk Sign visible to staff  - Offer Toileting every  Hours, in advance of need  - Initiate/Maintain alarm  - Obtain necessary fall risk management equipment:   - Apply yellow socks and bracelet for high fall risk patients  - Consider moving patient to room near nurses station  Outcome: Progressing     Problem: PAIN - ADULT  Goal: Verbalizes/displays adequate comfort level or baseline comfort level  Description: Interventions:  - Encourage patient to monitor pain and request assistance  - Assess pain using appropriate pain scale  - Administer analgesics based on type and severity of pain and evaluate response  - Implement non-pharmacological measures as appropriate and evaluate response  - Consider cultural and social influences on pain and pain management  - Notify physician/advanced practitioner if interventions unsuccessful or patient reports new pain  Outcome: Progressing     Problem: INFECTION - ADULT  Goal: Absence or prevention of progression during hospitalization  Description: INTERVENTIONS:  - Assess and monitor for signs and symptoms of infection  - Monitor lab/diagnostic results  - Monitor all insertion sites, i.e. indwelling lines, tubes, and drains  - Monitor endotracheal if appropriate and nasal secretions for changes in amount and color  - [place] appropriate cooling/warming therapies per order  - Administer medications as ordered  - Instruct and encourage patient and family to use good hand hygiene technique  -  Identify and instruct in appropriate isolation precautions for identified infection/condition  Outcome: Progressing  Goal: Absence of fever/infection during neutropenic period  Description: INTERVENTIONS:  - Monitor WBC    Outcome: Progressing     Problem: SAFETY ADULT  Goal: Patient will remain free of falls  Description: INTERVENTIONS:  - Educate patient/family on patient safety including physical limitations  - Instruct patient to call for assistance with activity   - Consult OT/PT to assist with strengthening/mobility   - Keep Call bell within reach  - Keep bed low and locked with side rails adjusted as appropriate  - Keep care items and personal belongings within reach  - Initiate and maintain comfort rounds  - Make Fall Risk Sign visible to staff  - Offer Toileting every  Hours, in advance of need  - Initiate/Maintainalarm  - Obtain necessary fall risk management equipm  - Apply yellow socks and bracelet for high fall risk patients  - Consider moving patient to room near nurses station  Outcome: Progressing  Goal: Maintain or return to baseline ADL function  Description: INTERVENTIONS:  -  Assess patient's ability to carry out ADLs; assess patient's baseline for ADL function and identify physical deficits which impact ability to perform ADLs (bathing, care of mouth/teeth, toileting, grooming, dressing, etc.)  - Assess/evaluate cause of self-care deficits   - Assess range of motion  - Assess patient's mobility; develop plan if impaired  - Assess patient's need for assistive devices and provide as appropriate  - Encourage maximum independence but intervene and supervise when necessary  - Involve family in performance of ADLs  - Assess for home care needs following discharge   - Consider OT consult to assist with ADL evaluation and planning for discharge  - Provide patient education as appropriate  Outcome: Progressing     Problem: DISCHARGE PLANNING  Goal: Discharge to home or other facility with appropriate  resources  Description: INTERVENTIONS:  - Identify barriers to discharge w/patient and caregiver  - Arrange for needed discharge resources and transportation as appropriate  - Identify discharge learning needs (meds, wound care, etc.)  - Arrange for interpretive services to assist at discharge as needed  - Refer to Case Management Department for coordinating discharge planning if the patient needs post-hospital services based on physician/advanced practitioner order or complex needs related to functional status, cognitive ability, or social support system  Outcome: Progressing     Problem: Knowledge Deficit  Goal: Patient/family/caregiver demonstrates understanding of disease process, treatment plan, medications, and discharge instructions  Description: Complete learning assessment and assess knowledge base.  Interventions:  - Provide teaching at level of understanding  - Provide teaching via preferred learning methods  Outcome: Progressing

## 2025-02-21 NOTE — ASSESSMENT & PLAN NOTE
Present on admission history of paroxysmal A-fib currently on Coumadin.  EKG noted with A-fib rate controlled  INR currently therapeutic 2.9.     Lab Results   Component Value Date    INR 2.89 (H) 02/20/2025    INR 1.95 (H) 01/24/2024

## 2025-02-21 NOTE — ASSESSMENT & PLAN NOTE
Present on admission history of CKD with baseline creatinine around 0.9-1.18.   Presented with creatinine 1.67.    Patient had received 2 L fluid in the emergency room.  Resolved with IV fluids  Will hold off on further IV fluids given CHF of 20%.  Currently on renally adjusted Tamiflu.  Hold home dose of Entresto, spironolactone, Lasix  Resume on discharge  Resume home dose of midodrine.

## 2025-02-21 NOTE — PHYSICAL THERAPY NOTE
Physical Therapy Evaluation     Patient's Name: Scott Morris    Admitting Diagnosis  SOB (shortness of breath) [R06.02]  Influenza A [J10.1]    Problem List  Patient Active Problem List   Diagnosis    CAD (coronary atherosclerotic disease)    Acute on chronic combined systolic and diastolic congestive heart failure (HCC)    Chronic back pain    GERD (gastroesophageal reflux disease)    Hypercholesterolemia    Hypertension    PAF (paroxysmal atrial fibrillation) (HCC)    Peptic ulcer disease    S/P MVR (mitral valve repair)    Polyp of colon    Abnormal RBC indices    Fatigue    Chronic anticoagulation    NED (acute kidney injury) (HCC)    Pain in gums    Idiopathic chronic gout of multiple sites without tophus    Impaired fasting glucose    Anemia due to blood loss, acute    History of normocytic normochromic anemia    Ambulatory dysfunction    Vasomotor rhinitis    CKD (chronic kidney disease)    Hypotension    Chronic ischemic right MCA stroke    Olecranon bursitis    Chronic systolic (congestive) heart failure (HCC)    Pressure injury of elbow, stage 3 (HCC)    Tobacco use    H/O mitral valve replacement    Moderate protein-calorie malnutrition (HCC)    Reactive depression    Influenza A     Past Medical History  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    CAD (coronary artery disease)     Cancer (HCC)     prostate    Colon polyp     Coronary artery disease     GERD (gastroesophageal reflux disease)     H/O heart artery stent     x2    Hypercholesteremia     Hypertension     Keratotic lesion     Stroke (HCC)     10YRS AGO    Transient cerebral ischemia      Past Surgical History  Past Surgical History:   Procedure Laterality Date    BACK SURGERY      CARDIAC PACEMAKER PLACEMENT      CARDIAC SURGERY      mvp repair    CAROTID ENDARTERECTOMY Right 03/2020    CHOLECYSTECTOMY      COLONOSCOPY      ESOPHAGOGASTRODUODENOSCOPY N/A 06/22/2018    Procedure: ESOPHAGOGASTRODUODENOSCOPY (EGD);  " Surgeon: Sunny Rodrigues III, MD;  Location: [facility];  Service: Gastroenterology    JOINT REPLACEMENT      L hip replacement    ORCHIECTOMY      PROSTATECTOMY      TOTAL HIP ARTHROPLASTY        02/21/25 0996   PT Last Visit   PT Visit Date 02/21/25   Note Type   Note type Evaluation   Pain Assessment   Pain Assessment Tool 0-10   Pain Score No Pain   Restrictions/Precautions   Weight Bearing Precautions Per Order No   Braces or Orthoses Other (Comment)  (none per patient)   Other Precautions Contact/isolation;Droplet precautions;Chair Alarm;Bed Alarm;Fall Risk  (+Flu)   Home Living   Type of Home Apartment  (Mrs. [facility])   Home Layout One level;Able to live on main level with bedroom/bathroom;Performs ADLs on one level  (No MARCELL)   Bathroom Shower/Tub Walk-in shower   Bathroom Toilet Standard   Bathroom Equipment Grab bars in shower;Built-in shower seat;Grab bars around toilet   Bathroom Accessibility Accessible   Home Equipment Walker;Cane   Additional Comments Pt ambulates without an AD.   Prior Function   Level of [place] Independent with functional mobility;Independent with ADLs;Independent with IADLS   Lives With Alone;Facility staff   Receives Help From Other (Comment)  (facility staff)   IADLs Independent with driving;Independent with medication management;Family/Friend/Other provides meals   Falls in the last 6 months 0   Vocational Retired   General   Family/Caregiver Present No   Cognition   Overall Cognitive Status WFL   Arousal/Participation Alert   Orientation Level Oriented X4   Memory Within functional limits   Following Commands Follows all commands and directions without difficulty   Comments Pt agreeable to PT.   Subjective   Subjective \"My walking is ok, it's my breathing.\"   RLE Assessment   RLE Assessment X   Strength RLE   RLE Overall Strength 4-/5   LLE Assessment   LLE Assessment X   Strength LLE   LLE Overall Strength 4-/5   Light Touch   RLE Light Touch Grossly intact  (pt reports " diminished)   LLE Light Touch Grossly intact  (pt reports diminished)   Bed Mobility   Additional Comments Pt was received standing in bathroom in NAD.   Transfers   Sit to Stand 5  Supervision   Additional items Assist x 1;Verbal cues   Stand to Sit 5  Supervision   Additional items Assist x 1;Armrests;Verbal cues   Ambulation/Elevation   Gait pattern Short stride;Shuffling;Foward flexed   Gait Assistance   (CG assist)   Additional items Assist x 1;Verbal cues   Assistive Device None   Distance 50 feet   Balance   Static Sitting Good   Dynamic Sitting Fair +   Static Standing Fair   Dynamic Standing Fair -   Ambulatory Fair -   Endurance Deficit   Endurance Deficit Yes   Endurance Deficit Description decreased activity tolerance; pt was received on room air; SpO2 89-91% with functional mobility; dyspnea on exertion noted   Activity Tolerance   Activity Tolerance Patient tolerated treatment well;Patient limited by fatigue   Medical Staff Made Aware OT Mona  (Co-evaluation performed with OT secondary to complex medical condition of patient and regression of functional status from baseline. PT/OT goals were addressed separately.)   Nurse Made Aware RN Fatimah   Assessment   Prognosis Good   Problem List Decreased strength;Decreased endurance;Impaired balance;Decreased mobility   Assessment Pt is 79 year old male seen for PT evaluation s/p admit to [facility] on [facility]/2025 with Influenza A. PT consulted to assess pt's functional mobility and discharge needs. Order placed for PT evaluation and treatment, with out of bed to chair order. Comorbidities affecting pt's physical performance at time of assessment include hypertension, paroxysmal atrial fibrillation, NED, chronic systolic CHF, and tobacco use. Prior to hospitalization, pt was independent with all functional mobility without an AD. Pt ambulates unrestricted distances on all terrain and elevations. Pt resides at Mrs. [facility]etts; one level with no steps to enter.  Personal factors affecting pt at time of initial evaluation include difficulty ambulating household distances, difficulty ambulating community distances, difficulty navigating level surfaces without external assistance, difficulty performing dynamic tasks in the community, and difficulty performing ADLs and IADLs. Please find objective findings from PT assessment regarding body systems outlined above with impairments and limitations including weakness, impaired balance, decreased endurance, gait deviations, decreased activity tolerance, decreased functional mobility tolerance, and fall risk. The following objective measures were performed on initial evaluation Barthel Index: 55/100, Modified [place]: 3 (moderate disability), and AM-PAC 6-Clicks: 18/24. Pt's clinical presentation is currently evolving seen in pt's presentation of need for ongoing medical management/monitoring, pt is a fall risk, and pt requires cues and assist for safety with functional mobility. Pt to benefit from continued PT treatment to address deficits as defined above and maximize pt's level of function and independence with mobility. From a PT standpoint, recommendation at time of discharge would be level 3, minimal resource intensity in order to facilitate return to prior level of function.   Barriers to Discharge Other (Comment)  (decline in functional mobility)   Goals   STG Expiration Date 03/03/25   Short Term Goal #1 In 10 days: Increase bilateral LE strength 1/2 grade to facilitate independent mobility, Perform all bed mobility tasks modified independent to decrease caregiver burden, Perform all transfers modified independent to improve independence, Ambulate > 250 ft. with least restrictive assistive device modified independent w/o LOB and w/ normalized gait pattern 100% of the time, and Increase all balance 1/2 grade to decrease risk for falls   Plan   Treatment/Interventions Functional transfer training;LE  strengthening/ROM;Therapeutic exercise;Endurance training;Patient/family training;Bed mobility;Gait training;Spoke to nursing;OT   PT Frequency 2-3x/wk   Discharge Recommendation   Rehab Resource Intensity Level, PT III (Minimum Resource Intensity)   AM-PAC Basic Mobility Inpatient   Turning in Flat Bed Without Bedrails 3   Lying on Back to Sitting on Edge of Flat Bed Without Bedrails 3   Moving Bed to Chair 3   Standing Up From Chair Using Arms 3   Walk in Room 3   Climb 3-5 Stairs With Railing 3   Basic Mobility Inpatient Raw Score 18   Basic Mobility Standardized Score 41.05   [facility] Highest Level Of Mobility   -HLM Goal 6: Walk 10 steps or more   -HLM Achieved 7: Walk 25 feet or more   Modified White Scale   Modified White Scale 3   Barthel Index   Feeding 10   Bathing 0   Grooming Score 5   Dressing Score 5   Bladder Score 10   Bowels Score 10   Toilet Use Score 5   Transfers (Bed/Chair) Score 10   Mobility (Level Surface) Score 0   Stairs Score 0   Barthel Index Score 55     PT Evaluation Time: 0911-0925  Vi Valencia, PT, DPT     Walking

## 2025-02-21 NOTE — OCCUPATIONAL THERAPY NOTE
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    CAD (coronary artery disease)     Cancer (HCC)     prostate    Colon polyp     Coronary artery disease     GERD (gastroesophageal reflux disease)     H/O heart artery stent     x2    Hypercholesteremia     Hypertension     Keratotic lesion     Stroke (HCC)     10YRS AGO    Transient cerebral ischemia      Past Surgical History  Past Surgical History:   Procedure Laterality Date    BACK SURGERY      CARDIAC PACEMAKER PLACEMENT      CARDIAC SURGERY      mvp repair    CAROTID ENDARTERECTOMY Right 03/2020    CHOLECYSTECTOMY      COLONOSCOPY      ESOPHAGOGASTRODUODENOSCOPY N/A 06/22/2018    Procedure: ESOPHAGOGASTRODUODENOSCOPY (EGD);  Surgeon: Sunny Rodrigues III, MD;  Location: [facility];  Service: Gastroenterology    JOINT REPLACEMENT      L hip replacement    ORCHIECTOMY      PROSTATECTOMY      TOTAL HIP ARTHROPLASTY           02/21/25 0910   OT Last Visit   OT Visit Date 02/21/25   Note Type   Note type Evaluation   Pain Assessment   Pain Assessment Tool 0-10   Pain Score No Pain   Restrictions/Precautions   Weight Bearing Precautions Per Order No   Braces or Orthoses   (none)   Other Precautions Fall Risk;Contact/isolation;Droplet precautions   Home Living   Type of Home Assisted living  (Ms. Ricketts)   Home Layout One level;Performs ADLs on one level  (no MARCELL)   Bathroom Shower/Tub Walk-in shower   Bathroom Toilet Standard   Bathroom Equipment Grab bars in shower;Built-in shower seat;Grab bars around toilet   Bathroom Accessibility Accessible   Home Equipment  Cane;Walker   Prior Function   Level of [place] Independent with ADLs;Independent with functional mobility;Independent with IADLS   Lives With Facility staff   Receives Help From   (facility staff)   IADLs Independent with driving;Independent with medication management;Family/Friend/Other provides meals   Falls in the last 6 months 0   Vocational Retired   Subjective   Subjective Patient agreeable therapy evaluation   ADL   Where Assessed Other (Comment)  (Assist levels for some self care tasks are based on functional assessment of performance skills and deficits observed during session.)   Eating Assistance 7  Independent   Eating Deficit Setup   Grooming Assistance 5  Supervision/Setup   Grooming Deficit Setup;Supervision/safety  (standing at sink)   UB Dressing Assistance 6  Modified independent   UB Dressing Deficit Setup   LB Dressing Assistance 5  Supervision/Setup   LB Dressing Deficit Supervision/safety;Increased time to complete   Toileting Assistance  5  Supervision/Setup   Toileting Deficit Setup;Supervison/safety;Increased time to complete   Bed Mobility   Additional Comments pt received standing at sink in bathroom   Transfers   Sit to Stand 5  Supervision   Additional items Assist x 1   Stand to Sit 5  Supervision   Additional items Assist x 1;Armrests   Stand pivot   (CGA)   Additional items Assist x 1;Increased time required   Additional Comments without AD   Activity Tolerance   Activity Tolerance Patient tolerated treatment well   Medical Staff Made Aware Vi PT  (Pt seen for co-evaluation with Physical Therapist due to pt's medical complexity, functional limitations and limited activity tolerance.)   Nurse Made Aware Fatimah RN   RUE Assessment   RUE Assessment   (mild generalized deconditioning noted throughout, grossly WNL)   LUE Assessment   LUE Assessment   (mild generalized deconditioning noted throughout, grossly WNL)   Sensation   Light Touch Partial deficits in the RLE;Partial  deficits in the LLE   Vision-Basic Assessment   Current Vision Wears glasses only for reading   Psychosocial   Psychosocial (WDL) WDL   Cognition   Overall Cognitive Status WFL   Arousal/Participation Alert;Cooperative   Attention Within functional limits   Orientation Level Oriented X4   Memory Within functional limits   Following Commands Follows all commands and directions without difficulty   Assessment   Limitation Decreased ADL status;Decreased endurance;Decreased high-level ADLs   Assessment Pt is a 79 y.o. male seen for OT evaluation s/p admit to [facility] on 2/20/2025 w/ Influenza A.  Comorbidities affecting pt's functional performance at time of assessment include: HTN, previous surgery, CHF, and hx of chronic back pain, hx of CVA . Personal factors affecting pt at time of IE include:limited home support, difficulty performing IADLS , and health management . Prior to admission, pt was independent with ADLs and functional mobility without AD. Upon evaluation: Pt requires supervision to CGA x1 without AD for mobility, and supervision for most basic ADLs 2* the following deficits impacting occupational performance: decreased balance and decreased tolerance. Pt to benefit from continued skilled OT tx while in the hospital to address deficits as defined above and maximize level of functional independence w ADL's and functional mobility. Occupational Performance areas to address include: grooming, bathing/shower, toilet hygiene, dressing, and functional mobility. From OT standpoint, recommendation at time of d/c would be Level 3 Minimal Resource Intensity.   Plan   Treatment Interventions ADL retraining;Functional transfer training;Patient/family training   Goal Expiration Date 03/07/25   OT Treatment Day 0   OT Frequency 2-3x/wk   Discharge Recommendation   Rehab Resource Intensity Level, OT III (Minimum Resource Intensity)   AM-PAC Daily Activity Inpatient   Lower Body Dressing 3   Bathing 3   Toileting 4   Upper  Body Dressing 3   Grooming 4   Eating 4   Daily Activity Raw Score 21   Daily Activity Standardized Score (Calc for Raw Score >=11) 44.27   AM-PAC Applied Cognition Inpatient   Following a Speech/Presentation 4   Understanding Ordinary Conversation 4   Taking Medications 4   Remembering Where Things Are Placed or Put Away 4   Remembering List of 4-5 Errands 4   Taking Care of Complicated Tasks 4   Applied Cognition Raw Score 24   Applied Cognition Standardized Score 62.21       Goals: to be met by 03/07/25    Patient will perform functional bed mobility with independence, with HOB flat, no rails  Patient will perform functional transfers with modified independence using LRAD in preparation for ADL tasks, with good safety awareness  Patient will perform LB dressing task with independence  Patient will perform toilet transfer with independence  Patient will perform toileting with independence, including hygiene and clothing management   Patient will improve activity tolerance by participating in 25 minutes of session at a time in preparation for participation in ADL tasks  Patient will identify 3 potential fall hazards and identify compensatory techniques to decrease fall risk in the home environment         ARMAAN Moore/L

## 2025-02-21 NOTE — ASSESSMENT & PLAN NOTE
Has been having episode of cough, shortness of breath for 5 to 6 days and was seen by PCP and was started on Ceftin however presents to emergency room with progression of symptoms.    Tested positive for influenza A.  Acute Respiratory Failure with Hypoxia, POA, now resolved, due to Influenza A, a/e/b worsening SOB, RR 35, expiratory wheezing and rhonchi, moderate conversational dyspnea, hypoxia with O2 sat 87-89% with ambulation, treated with chest imaging, Tamiflu, blood cultures, Cefepime, and ambulatory O2 eval   CTA PE study report reviewed noted negative for PE, no acute intrathoracic or intra-abdominal pathology noted.    O2 saturation 96% room air.  Continue with renally adjusted Tamiflu for 5-day course.  No indication for antibiotics at this time  2 sets of blood cultures were sent in the ED.  PT OT eval. stable to return back to Mrs. [facility]

## 2025-02-21 NOTE — CASE MANAGEMENT
Case Management Assessment & Discharge Planning Note    Patient name Scott Morrsi  Location /-01 MRN [id number]  : 1945 Date 2025       Current Admission Date: 2025  Current Admission Diagnosis:Influenza A   Patient Active Problem List    Diagnosis Date Noted Date Diagnosed    Influenza A 2025     Reactive depression 2024     Moderate protein-calorie malnutrition (HCC) 2024     Pressure injury of elbow, stage 3 (HCC) 2024     Tobacco use 2024     H/O mitral valve replacement 2024     Olecranon bursitis 2024     Chronic systolic (congestive) heart failure ([facility]) 2024     Chronic ischemic right MCA stroke 2022     Hypotension 2022     CKD (chronic kidney disease) 2022     Vasomotor rhinitis 2022     Ambulatory dysfunction 2021     Anemia due to blood loss, acute 2021     History of normocytic normochromic anemia 2021     Impaired fasting glucose 12/10/2018     Idiopathic chronic gout of multiple sites without tophus 2018     Pain in gums 2018     NED (acute kidney injury) ([facility]) 2018     Chronic anticoagulation 2018     Abnormal RBC indices 06/15/2018     Fatigue 06/15/2018     PAF (paroxysmal atrial fibrillation) ([facility]) 2018     Polyp of colon 2018     Acute on chronic combined systolic and diastolic congestive heart failure ([facility]) 2017     CAD (coronary atherosclerotic disease) 2016     Chronic back pain 2016     GERD (gastroesophageal reflux disease) 2016     S/P MVR (mitral valve repair) 10/27/2015     Peptic ulcer disease 2015     Hypercholesterolemia 2015     Hypertension 2015       LOS (days): 1  Geometric Mean LOS (GMLOS) (days): 3  Days to GMLOS:2     OBJECTIVE:    Risk of Unplanned Readmission Score: 12.82         Current admission status: Inpatient       Preferred Pharmacy:   [id number] [place]   [address]  [address]  [place] [address]  Phone: [phone] Fax: [phone]    Primary Care Provider: Lenin Hardy MD    Primary Insurance: MEDICARE  Secondary Insurance: BLUE CROSS    ASSESSMENT:  Active Health Care Proxies       Scott Morris Health Care Representative - Son   Primary Phone: [phone] (Mobile)                 Advance Directives  Does patient have a Health Care POA?: Yes  Does patient have Advance Directives?: Yes  Primary Contact: Scott Morris (Son)  [phone] (Mobile)         Readmission Root Cause  30 Day Readmission: No    Patient Information  Admitted from:: Facility  Mental Status: Alert  During Assessment patient was accompanied by: Not accompanied during assessment  Assessment information provided by:: Patient  Primary Caregiver: Self  Support Systems: Son  [facility] of Residence: [place]  What city do you live in?: [place]  ([facility])  Home entry access options. Select all that apply.: No steps to enter home  Type of Current Residence: 2 story home  Upon entering residence, is there a bedroom on the main floor (no further steps)?: Yes  Upon entering residence, is there a bathroom on the main floor (no further steps)?: Yes  Living Arrangements: Lives Alone  Is patient a ?: No    Activities of Daily Living Prior to Admission  Functional Status: Independent  Completes ADLs independently?: Yes  Ambulates independently?: Yes  Does patient use assisted devices?: No  Does patient currently own DME?: Yes  What DME does the patient currently own?: Straight Cane, Walker, Other (Comment) (bathroom has built in shower seat)  Does patient have a history of Outpatient Therapy (PT/OT)?: Yes  Does the patient have a history of Short-Term Rehab?: No  Does patient have a history of HHC?: Yes  Does patient currently have HHC?: No         Patient Information Continued  Income Source: Other (Comment) (SSI and pension)  Does patient have prescription  coverage?: Yes  Does patient receive dialysis treatments?: No  Does patient have a history of substance abuse?: No  Does patient have a history of Mental Health Diagnosis?: No         Means of Transportation  Means of Transport to Appts:: Drives Self      Social Determinants of Health (SDOH)      Flowsheet Row Most Recent Value   Housing Stability    In the last 12 months, was there a time when you were not able to pay the mortgage or rent on time? N   In the past 12 months, how many times have you moved where you were living? 0   At any time in the past 12 months, were you homeless or living in a shelter (including now)? N   Transportation Needs    In the past 12 months, has lack of transportation kept you from medical appointments or from getting medications? no   In the past 12 months, has lack of transportation kept you from meetings, work, or from getting things needed for daily living? No   Food Insecurity    Within the past 12 months, you worried that your food would run out before you got the money to buy more. Never true   Within the past 12 months, the food you bought just didn't last and you didn't have money to get more. Never true   Utilities    In the past 12 months has the electric, gas, oil, or water company threatened to shut off services in your home? No            DISCHARGE DETAILS:    Discharge planning discussed with:: pt  Freedom of Choice: Yes  Comments - Freedom of Choice: Met w pt at bedside; introduced self and explained role of CM, including arranging DME, STR, home health, out pt tx.  Advised pt that CM will make appropriate referrals based on treatment team recommendations and MD orders, and he can consider recommended plan of care and choose from available vendors.  CM contacted family/caregiver?: No- see comments (pt did not want son contacted)  Were Treatment Team discharge recommendations reviewed with patient/caregiver?: Yes  Did patient/caregiver verbalize understanding of patient  care needs?: Yes  Were patient/caregiver advised of the risks associated with not following Treatment Team discharge recommendations?: Yes    Contacts  Patient Contacts: Scott Morris (Son)  [phone] (Mobile)  Relationship to Patient:: Family    Requested Home Health Care         Is the patient interested in HHC at discharge?: No    DME Referral Provided  Referral made for DME?: No    Other Referral/Resources/Interventions Provided:  Referral Comments: Pt reports he is feeling very unwell but has been told he has to leave the hospital tomorrow.  Pt says he was going to be discharged today but he convinced medical staff to allow him to stay until tomorrow.  Pt says he was told that nothing can be done for him here in the hospital that can't be done at home;  he needs to rest, drink fluids and let the flu run it's course.  Pt says he is anxious because he is so SOB and can barely make it to the bathroom and back.  [facility] independent section on the first floor.  Pt says facility cleans his room and provides his meals, but nothing more.  If he needed any assistance, the facility would not provide it.  Discussed IMM and provided pt w a copy.  He responded by saying he didn't want to create any problems and would likely not pursue benefit.  Offered to call tessa Chavez to review;  pt was not agreeable.  Tx team recommending Level III for pt, ie, outpt therapy or home health.  Pt says he does not feel well enough to drive and says facility offers PT/OT.  T/C made to Mandi at [facility] and case discussed.  Call transferred to  Vi.  She reports facility staff would not be able to assist pt as he is at an independent level of functioning, which is in a different area of facility and has a different licensure.  She offered to discuss pt moving to assisted living area until he feels better, but says arrangements can't be done until Monday.  She can arrange for pt to have PT  and OT by their inhouse provider, [id number], next week;  she is requesting it be noted on pt's d/c instructions that pt should have PT/OT eval and tx.  She reports therapists can treat pt in his room, and meals can be delivered to him as well.  Asked if facility nursing can monitor pt and the response was no.  Vi reports that home health agency they use is [facility].  Referral made w request to see pt on Sunday, if possible.  Vi reports she is concerned about pt and plans to call his son to discuss.  S/W Vi re IMM and pt's reluctance to use benefit;  she was grateful for info.  W/E CM asked to check on pt re: IMM and look at response to HH referral in [facility].         Treatment Team Recommendation: Outpatient Rehab, Home with Home Health Care  Discharge Destination Plan:: Home with Home Health Care  Transport at Discharge : Family                             IMM Given (Date):: 02/21/25  IMM Given to:: Patient  Family notified:: pt did not want son contacted

## 2025-02-21 NOTE — ASSESSMENT & PLAN NOTE
Wt Readings from Last 3 Encounters:   02/20/25 57.2 kg (126 lb 1.7 oz)   02/17/25 56.2 kg (124 lb)   12/12/24 56.2 kg (124 lb)     Echo from 12/2023 reportedly noted with EF of 20%  Currently appears euvolemic.  Currently oxygen 96 to 97% on room air.  Hold Lasix, spironolactone, Entresto  Resume on discharge  Resume home dose of midodrine.  Continue with low-salt, fluid restricted diet.   No

## 2025-02-21 NOTE — PLAN OF CARE
Problem: PHYSICAL THERAPY ADULT  Goal: Performs mobility at highest level of function for planned discharge setting.  See evaluation for individualized goals.  Description: Treatment/Interventions: Functional transfer training, LE strengthening/ROM, Therapeutic exercise, Endurance training, Patient/family training, Bed mobility, Gait training, Spoke to nursing, OT          See flowsheet documentation for full assessment, interventions and recommendations.  Note: Prognosis: Good  Problem List: Decreased strength, Decreased endurance, Impaired balance, Decreased mobility  Assessment: Pt is 79 year old male seen for PT evaluation s/p admit to [facility] on 2/20/2025 with Influenza A. PT consulted to assess pt's functional mobility and discharge needs. Order placed for PT evaluation and treatment, with out of bed to chair order. Comorbidities affecting pt's physical performance at time of assessment include hypertension, paroxysmal atrial fibrillation, NED, chronic systolic CHF, and tobacco use. Prior to hospitalization, pt was independent with all functional mobility without an AD. Pt ambulates unrestricted distances on all terrain and elevations. Pt resides at [facility]; one level with no steps to enter. Personal factors affecting pt at time of initial evaluation include difficulty ambulating household distances, difficulty ambulating community distances, difficulty navigating level surfaces without external assistance, difficulty performing dynamic tasks in the community, and difficulty performing ADLs and IADLs. Please find objective findings from PT assessment regarding body systems outlined above with impairments and limitations including weakness, impaired balance, decreased endurance, gait deviations, decreased activity tolerance, decreased functional mobility tolerance, and fall risk. The following objective measures were performed on initial evaluation Barthel Index: 55/100, Modified Sathya: 3  (moderate disability), and -PAC 6-Clicks: 18/24. Pt's clinical presentation is currently evolving seen in pt's presentation of need for ongoing medical management/monitoring, pt is a fall risk, and pt requires cues and assist for safety with functional mobility. Pt to benefit from continued PT treatment to address deficits as defined above and maximize pt's level of function and independence with mobility. From a PT standpoint, recommendation at time of discharge would be level 3, minimal resource intensity in order to facilitate return to prior level of function.    Barriers to Discharge: Other (Comment) (decline in functional mobility)     Rehab Resource Intensity Level, PT: III (Minimum Resource Intensity)    See flowsheet documentation for full assessment.

## 2025-02-21 NOTE — PLAN OF CARE
Problem: OCCUPATIONAL THERAPY ADULT  Goal: Performs self-care activities at highest level of function for planned discharge setting.  See evaluation for individualized goals.  Description: Treatment Interventions: ADL retraining, Functional transfer training, Patient/family training          See flowsheet documentation for full assessment, interventions and recommendations.   Outcome: Progressing  Note: Limitation: Decreased ADL status, Decreased endurance, Decreased high-level ADLs     Assessment: Pt is a 79 y.o. male seen for OT evaluation s/p admit to [facility] on 2/20/2025 w/ Influenza A.  Comorbidities affecting pt's functional performance at time of assessment include: HTN, previous surgery, CHF, and hx of chronic back pain, hx of CVA . Personal factors affecting pt at time of IE include:limited home support, difficulty performing IADLS , and health management . Prior to admission, pt was independent with ADLs and functional mobility without AD. Upon evaluation: Pt requires supervision to CGA x1 without AD for mobility, and supervision for most basic ADLs 2* the following deficits impacting occupational performance: decreased balance and decreased tolerance. Pt to benefit from continued skilled OT tx while in the hospital to address deficits as defined above and maximize level of functional independence w ADL's and functional mobility. Occupational Performance areas to address include: grooming, bathing/shower, toilet hygiene, dressing, and functional mobility. From OT standpoint, recommendation at time of d/c would be Level 3 Minimal Resource Intensity.     Rehab Resource Intensity Level, OT: III (Minimum Resource Intensity)

## 2025-02-21 NOTE — PROGRESS NOTES
Progress Note - Hospitalist   Name: Scott Morris 79 y.o. male I MRN: [id number]  Unit/Bed#: -01 I Date of Admission: 2/20/2025   Date of Service: 2/21/2025 I Hospital Day: 1    Assessment & Plan  Influenza A  Has been having episode of cough, shortness of breath for 5 to 6 days and was seen by PCP and was started on Ceftin however presents to emergency room with progression of symptoms.    Tested positive for influenza A.  Acute Respiratory Failure with Hypoxia, POA, now resolved, due to Influenza A, a/e/b worsening SOB, RR 35, expiratory wheezing and rhonchi, moderate conversational dyspnea, hypoxia with O2 sat 87-89% with ambulation, treated with chest imaging, Tamiflu, blood cultures, Cefepime, and ambulatory O2 eval   CTA PE study report reviewed noted negative for PE, no acute intrathoracic or intra-abdominal pathology noted.    O2 saturation 96% room air.  Continue with renally adjusted Tamiflu for 5-day course.  No indication for antibiotics at this time  2 sets of blood cultures were sent in the ED.  PT OT eval. stable to return back to [facility]  NED (acute kidney injury) (HCC)  Present on admission history of CKD with baseline creatinine around 0.9-1.18.   Presented with creatinine 1.67.    Patient had received 2 L fluid in the emergency room.  Resolved with IV fluids  Will hold off on further IV fluids given CHF of 20%.  Currently on renally adjusted Tamiflu.  Hold home dose of Entresto, spironolactone, Lasix  Resume on discharge  Resume home dose of midodrine.    Hypertension  Patient was hypotensive earlier however currently blood pressure better 112/54.  Hold Lasix, spironolactone, Entresto,Coreg.  Resume midodrine    PAF (paroxysmal atrial fibrillation) (HCC)  Present on admission history of paroxysmal A-fib currently on Coumadin.  EKG noted with A-fib rate controlled  INR currently therapeutic 2.9.     Lab Results   Component Value Date    INR 2.89 (H) 02/20/2025    INR 1.95 (H)  01/24/2024     Chronic systolic (congestive) heart failure (HCC)  Wt Readings from Last 3 Encounters:   02/20/25 57.2 kg (126 lb 1.7 oz)   02/17/25 56.2 kg (124 lb)   12/12/24 56.2 kg (124 lb)     Echo from 12/2023 reportedly noted with EF of 20%  Currently appears euvolemic.  Currently oxygen 96 to 97% on room air.  Hold Lasix, spironolactone, Entresto  Resume on discharge  Resume home dose of midodrine.  Continue with low-salt, fluid restricted diet.  Tobacco use  Counseled on cessation.  H/O mitral valve replacement      VTE Pharmacologic Prophylaxis:   Moderate Risk (Score 3-4) - Pharmacological DVT Prophylaxis Ordered: warfarin (Coumadin).    Mobility:   Basic Mobility Inpatient Raw Score: 18  JH-HLM Goal: 6: Walk 10 steps or more  JH-HLM Achieved: 7: Walk 25 feet or more  JH-HLM Goal achieved. Continue to encourage appropriate mobility.    Patient Centered Rounds: I performed bedside rounds with nursing staff today.  Fatimah  Discussions with Specialists or Other Care Team Provider: Discussed plan of care with case management reviewed notes    Education and Discussions with Family / Patient: Discussed plan of care with patient patient's son Guillermo    Current Length of Stay: 1 day(s)  Current Patient Status: Inpatient   Certification Statement: The patient will continue to require additional inpatient hospital stay due to monitoring  Discharge Plan: Anticipate discharge tomorrow to prior assisted or independent living facility.    Code Status: Level 3 - DNAR and DNI    Subjective   Patient reports that he feels very fatigued and weak and deconditioned he is able to complete his ADLs on his own is tolerating his meals I informed him that he is likely to feel rundown for upwards of a week as he does have influenza he verbalizes understanding he has been stable on room air and overall is doing well he does report some dyspnea with exertion which I explained to him again can also last upwards to a week given his  influenza diagnosis    Objective :  Temp:  [97.3 °F (36.3 °C)-98.3 °F (36.8 °C)] 98.1 °F (36.7 °C)  HR:  [84-95] 95  BP: ()/(54-74) 118/66  Resp:  [18-23] 18  SpO2:  [92 %-97 %] 93 %  O2 Device: None (Room air)    Body mass index is 20.98 kg/m².     Input and Output Summary (last 24 hours):     Intake/Output Summary (Last 24 hours) at 2/21/2025 1310  Last data filed at 2/20/2025 1900  Gross per 24 hour   Intake 480 ml   Output --   Net 480 ml       Physical Exam  Vitals reviewed.   Constitutional:       General: He is not in acute distress.  Cardiovascular:      Rate and Rhythm: Normal rate.      Heart sounds: No murmur heard.  Pulmonary:      Breath sounds: Normal breath sounds.   Musculoskeletal:         General: Normal range of motion.   Skin:     General: Skin is warm.      Coloration: Skin is pale.   Neurological:      Mental Status: He is alert. Mental status is at baseline.       Lines/Drains:        Lab Results: I have reviewed the following results:   Results from last 7 days   Lab Units 02/21/25  0452 02/20/25  1133   WBC Thousand/uL 6.32 7.10   HEMOGLOBIN g/dL 14.9 15.6   HEMATOCRIT % 45.7 49.4*   PLATELETS Thousands/uL 145* 165   SEGS PCT %  --  70   LYMPHO PCT %  --  12*   MONO PCT %  --  15*   EOS PCT %  --  1     Results from last 7 days   Lab Units 02/21/25  0452   SODIUM mmol/L 140   POTASSIUM mmol/L 4.0   CHLORIDE mmol/L 109*   CO2 mmol/L 25   BUN mg/dL 26*   CREATININE mg/dL 1.19   ANION GAP mmol/L 6   CALCIUM mg/dL 8.4   ALBUMIN g/dL 3.5   TOTAL BILIRUBIN mg/dL 0.70   ALK PHOS U/L 55   ALT U/L 15   AST U/L 25   GLUCOSE RANDOM mg/dL 87     Results from last 7 days   Lab Units 02/20/25  1133   INR  2.89*     Results from last 7 days   Lab Units 02/20/25  1205   POC GLUCOSE mg/dl 84         Results from last 7 days   Lab Units 02/20/25  1133   LACTIC ACID mmol/L 1.7   PROCALCITONIN ng/ml <0.05       Recent Cultures (last 7 days):   Results from last 7 days   Lab Units 02/20/25  1131  02/20/25  1133   BLOOD CULTURE  Received in Microbiology Lab. Culture in Progress. Received in Microbiology Lab. Culture in Progress.       Imaging Results Review: No pertinent imaging studies reviewed.  Other Study Results Review: No additional pertinent studies reviewed.    Last 24 Hours Medication List:     Current Facility-Administered Medications:     atorvastatin (LIPITOR) tablet 40 mg, HS    benzonatate (TESSALON PERLES) capsule 100 mg, TID    bimatoprost (LUMIGAN) 0.03 % ophthalmic drops 1 drop, HS    [Held by provider] carvedilol (COREG) tablet 3.125 mg, Daily    dextromethorphan-guaiFENesin (ROBITUSSIN DM) oral syrup 10 mL, Q4H PRN    digoxin (LANOXIN) tablet 125 mcg, Daily    [Held by provider] furosemide (LASIX) tablet 40 mg, Daily    midodrine (PROAMATINE) tablet 10 mg, BID    oseltamivir (TAMIFLU) capsule 30 mg, Q24H    pantoprazole (PROTONIX) EC tablet 40 mg, Daily    Administrative Statements   Today, Patient Was Seen By: JAYLAN Marina  I have spent a total time of 45 minutes in caring for this patient on the day of the visit/encounter including Diagnostic results, Risks and benefits of tx options, Importance of tx compliance, Risk factor reductions, Counseling / Coordination of care, Documenting in the medical record, and Reviewing/placing orders in the medical record (including tests, medications, and/or procedures).    **Please Note: This note may have been constructed using a voice recognition system.**

## 2025-02-22 VITALS
HEART RATE: 94 BPM | WEIGHT: 126.1 LBS | TEMPERATURE: 97.4 F | HEIGHT: 65 IN | DIASTOLIC BLOOD PRESSURE: 87 MMHG | BODY MASS INDEX: 21.01 KG/M2 | OXYGEN SATURATION: 96 % | SYSTOLIC BLOOD PRESSURE: 131 MMHG | RESPIRATION RATE: 18 BRPM

## 2025-02-22 LAB
ANION GAP SERPL CALCULATED.3IONS-SCNC: 5 MMOL/L (ref 4–13)
BUN SERPL-MCNC: 21 MG/DL (ref 5–25)
CALCIUM SERPL-MCNC: 9 MG/DL (ref 8.4–10.2)
CHLORIDE SERPL-SCNC: 107 MMOL/L (ref 96–108)
CO2 SERPL-SCNC: 28 MMOL/L (ref 21–32)
CREAT SERPL-MCNC: 1.06 MG/DL (ref 0.6–1.3)
ERYTHROCYTE [DISTWIDTH] IN BLOOD BY AUTOMATED COUNT: 14 % (ref 11.6–15.1)
GFR SERPL CREATININE-BSD FRML MDRD: 66 ML/MIN/1.73SQ M
GLUCOSE SERPL-MCNC: 100 MG/DL (ref 65–140)
HCT VFR BLD AUTO: 48.1 % (ref 36.5–49.3)
HGB BLD-MCNC: 15.6 G/DL (ref 12–17)
INR PPP: 2.75 (ref 0.85–1.19)
MAGNESIUM SERPL-MCNC: 1.7 MG/DL (ref 1.9–2.7)
MCH RBC QN AUTO: 34.6 PG (ref 26.8–34.3)
MCHC RBC AUTO-ENTMCNC: 32.4 G/DL (ref 31.4–37.4)
MCV RBC AUTO: 107 FL (ref 82–98)
PLATELET # BLD AUTO: 154 THOUSANDS/UL (ref 149–390)
PMV BLD AUTO: 9.3 FL (ref 8.9–12.7)
POTASSIUM SERPL-SCNC: 4.3 MMOL/L (ref 3.5–5.3)
PROTHROMBIN TIME: 29.7 SECONDS (ref 12.3–15)
RBC # BLD AUTO: 4.51 MILLION/UL (ref 3.88–5.62)
SODIUM SERPL-SCNC: 140 MMOL/L (ref 135–147)
WBC # BLD AUTO: 8.36 THOUSAND/UL (ref 4.31–10.16)

## 2025-02-22 PROCEDURE — 99239 HOSP IP/OBS DSCHRG MGMT >30: CPT

## 2025-02-22 PROCEDURE — 85610 PROTHROMBIN TIME: CPT | Performed by: NURSE PRACTITIONER

## 2025-02-22 PROCEDURE — 85027 COMPLETE CBC AUTOMATED: CPT | Performed by: NURSE PRACTITIONER

## 2025-02-22 PROCEDURE — 83735 ASSAY OF MAGNESIUM: CPT | Performed by: NURSE PRACTITIONER

## 2025-02-22 PROCEDURE — 80048 BASIC METABOLIC PNL TOTAL CA: CPT | Performed by: NURSE PRACTITIONER

## 2025-02-22 RX ADMIN — GUAIFENESIN AND DEXTROMETHORPHAN 10 ML: 100; 10 SYRUP ORAL at 01:00

## 2025-02-22 RX ADMIN — DIGOXIN 125 MCG: 125 TABLET ORAL at 08:32

## 2025-02-22 RX ADMIN — OSELTAMAVIR PHOSPHATE 30 MG: 30 CAPSULE ORAL at 01:00

## 2025-02-22 RX ADMIN — BENZONATATE 100 MG: 100 CAPSULE ORAL at 08:32

## 2025-02-22 RX ADMIN — PANTOPRAZOLE SODIUM 40 MG: 40 TABLET, DELAYED RELEASE ORAL at 08:32

## 2025-02-22 RX ADMIN — GUAIFENESIN AND DEXTROMETHORPHAN 10 ML: 100; 10 SYRUP ORAL at 08:32

## 2025-02-22 RX ADMIN — MIDODRINE HYDROCHLORIDE 10 MG: 5 TABLET ORAL at 08:32

## 2025-02-22 NOTE — ASSESSMENT & PLAN NOTE
Present on admission history of paroxysmal A-fib currently on Coumadin.  EKG noted with A-fib rate controlled  INR currently therapeutic 2.75     Lab Results   Component Value Date    INR 2.75 (H) 02/22/2025    INR 3.11 (H) 02/21/2025

## 2025-02-22 NOTE — CASE MANAGEMENT
Case Management Discharge Planning Note    Patient name Scott Morris  Location /-01 MRN [id number]  : 1945 Date 2025       Current Admission Date: 2025  Current Admission Diagnosis:Influenza A   Patient Active Problem List    Diagnosis Date Noted Date Diagnosed    Influenza A 2025     Reactive depression 2024     Moderate protein-calorie malnutrition (HCC) 2024     Pressure injury of elbow, stage 3 ([facility]) 2024     Tobacco use 2024     H/O mitral valve replacement 2024     Olecranon bursitis 2024     Chronic systolic (congestive) heart failure ([facility]) 2024     Chronic ischemic right MCA stroke 2022     Hypotension 2022     CKD (chronic kidney disease) 2022     Vasomotor rhinitis 2022     Ambulatory dysfunction 2021     Anemia due to blood loss, acute 2021     History of normocytic normochromic anemia 2021     Impaired fasting glucose 12/10/2018     Idiopathic chronic gout of multiple sites without tophus 2018     Pain in gums 2018     NED (acute kidney injury) ([facility]) 2018     Chronic anticoagulation 2018     Abnormal RBC indices 06/15/2018     Fatigue 06/15/2018     PAF (paroxysmal atrial fibrillation) ([facility]) 2018     Polyp of colon 2018     Acute on chronic combined systolic and diastolic congestive heart failure ([facility]) 2017     CAD (coronary atherosclerotic disease) 2016     Chronic back pain 2016     GERD (gastroesophageal reflux disease) 2016     S/P MVR (mitral valve repair) 10/27/2015     Peptic ulcer disease 2015     Hypercholesterolemia 2015     Hypertension 2015       LOS (days): 2  Geometric Mean LOS (GMLOS) (days): 3  Days to GMLOS:1.1     OBJECTIVE:  Risk of Unplanned Readmission Score: 11.2         Current admission status: Inpatient   Preferred Pharmacy:   [id number] [place] [address]  [address]  [address] [facility] [address]  Phone: [phone] Fax: [phone]    Primary Care Provider: Lenin Hardy MD    Primary Insurance: MEDICARE  Secondary Insurance: BLUE CROSS    DISCHARGE DETAILS:    Discharge planning discussed with:: patient and son Scott  Freedom of Choice: Yes  Comments - Freedom of Choice: CM spoke to zeb Chavez via phone.  Pt to be d/bal back to [facility] where pt has his own independent apt.  Zeb Chavez will transport.  Pt is a Level III.  [facility] reserved  CM contacted family/caregiver?: Yes  Were Treatment Team discharge recommendations reviewed with patient/caregiver?: Yes  Did patient/caregiver verbalize understanding of patient care needs?: Yes  Were patient/caregiver advised of the risks associated with not following Treatment Team discharge recommendations?: Yes    Contacts  Patient Contacts: Scott  Relationship to Patient:: Family  Contact Method: In Person, Phone  Phone Number: [phone]  Reason/Outcome: Discharge Planning    Requested Home Health Care         Is the patient interested in [facility] at discharge?: Yes  Home Health Discipline requested:: Occupational Therapy, Physical Therapy, Nursing  [facility] External Referral Reason (only applicable if external HHA name selected): Patient has established relationship with provider  Home Health Follow-Up Provider:: PCP  Home Health Services Needed:: Evaluate Functional Status and Safety, Gait/ADL Training, Strengthening/Theraputic Exercises to Improve Function  Homebound Criteria Met:: Requires the Assistance of Another Person for Safe Ambulation or to Leave the Home  Supporting Clincal Findings:: Fatigues Easliy in Short Distances, Limited Endurance    DME Referral Provided  Referral made for DME?: No    Other Referral/Resources/Interventions Provided:  Interventions: HHC  Referral Comments: [facility] grabiel    Would you like to participate in our  [facility] service program?  : No - Declined    Treatment Team Recommendation: Home with Home Health Care  Discharge Destination Plan:: Home with Home Health Care  Transport at Discharge : Family

## 2025-02-22 NOTE — ASSESSMENT & PLAN NOTE
Present on admission history of CKD with baseline creatinine around 0.9-1.18.   Presented with creatinine 1.67.  Current creatinine 1.06    Patient had received 2 L fluid in the emergency room.  Resolved with IV fluids  Will hold off on further IV fluids given CHF of 20%.  Currently on renally adjusted Tamiflu.  Hold home dose of Entresto, spironolactone, Lasix  Resume on discharge  Resume home dose of midodrine.

## 2025-02-22 NOTE — DISCHARGE SUMMARY
Discharge Summary - Hospitalist   Name: Scott Morris 79 y.o. male I MRN: [id number]  Unit/Bed#: -01 I Date of Admission: 2/20/2025   Date of Service: 2/22/2025 I Hospital Day: 2     Assessment & Plan  Influenza A  Has been having episode of cough, shortness of breath for 5 to 6 days and was seen by PCP and was started on Ceftin however presents to emergency room with progression of symptoms.    Tested positive for influenza A.  Acute Respiratory Failure with Hypoxia, POA, now resolved, due to Influenza A, a/e/b worsening SOB, RR 35, expiratory wheezing and rhonchi, moderate conversational dyspnea, hypoxia with O2 sat 87-89% with ambulation, treated with chest imaging, Tamiflu, blood cultures, Cefepime, and ambulatory O2 eval   CTA PE study report reviewed noted negative for PE, no acute intrathoracic or intra-abdominal pathology noted.    O2 saturation 96% room air.  Continue with renally adjusted Tamiflu for 5-day course.  No indication for antibiotics at this time  2 sets of blood cultures were sent in the ED.  PT OT eval. stable to return back to [facility]  NED (acute kidney injury) (HCC)  Present on admission history of CKD with baseline creatinine around 0.9-1.18.   Presented with creatinine 1.67.  Current creatinine 1.06    Patient had received 2 L fluid in the emergency room.  Resolved with IV fluids  Will hold off on further IV fluids given CHF of 20%.  Currently on renally adjusted Tamiflu.  Hold home dose of Entresto, spironolactone, Lasix  Resume on discharge  Resume home dose of midodrine.    Hypertension  Patient was hypotensive earlier however currently blood pressure better 112/54.  Hold Lasix, spironolactone, Entresto,Coreg.  Resume midodrine    PAF (paroxysmal atrial fibrillation) (HCC)  Present on admission history of paroxysmal A-fib currently on Coumadin.  EKG noted with A-fib rate controlled  INR currently therapeutic 2.75     Lab Results   Component Value Date    INR 2.75 (H)  02/22/2025    INR 3.11 (H) 02/21/2025     Chronic systolic (congestive) heart failure (HCC)  Wt Readings from Last 3 Encounters:   02/20/25 57.2 kg (126 lb 1.7 oz)   02/17/25 56.2 kg (124 lb)   12/12/24 56.2 kg (124 lb)     Echo from 12/2023 reportedly noted with EF of 20%  Currently appears euvolemic.  Currently oxygen 96 to 97% on room air.  Hold Lasix, spironolactone, Entresto  Resume on discharge  Resume home dose of midodrine.  Continue with low-salt, fluid restricted diet.  Tobacco use  Counseled on cessation.    Discharging Physician / Practitioner: Liborio Maldonado PA-C  PCP: Lenin Hardy MD  Admission Date:   Admission Orders (From admission, onward)       Ordered        02/20/25 1628  INPATIENT ADMISSION  Once                          Discharge Date: 02/22/25    Consultations During Hospital Stay:  None    Procedures Performed:   None    Significant Findings / Test Results:   XR chest pa and lateral  Result Date: 2/20/2025  Impression: No acute cardiopulmonary disease. Workstation performed: [id number]     CT pe study w abdomen pelvis w contrast  Result Date: 2/20/2025  Impression: No acute pulmonary embolism. No acute intrathoracic or intra-abdominal pathology. Workstation performed: [id number]       No Chest XR results available for this patient.     Results for orders placed during the hospital encounter of 11/06/22    Echo complete w/ contrast if indicated    Interpretation Summary    Left Ventricle: Left ventricular cavity size is normal. Wall thickness is mildly increased. There is mild concentric hypertrophy. Systolic function is severely reduced (15-20%). There is severe global hypokinesis. Diastolic function is normal.    Right Ventricle: Right ventricular cavity size is dilated. Systolic function is reduced.    Left Atrium: The atrium is mildly dilated.    Right Atrium: The atrium is mildly dilated.  Prominent kishor terminalis is visible.    Aortic Valve: There is a bioprosthetic aortic  valve.  It was well seated with no evidence of rocking motion.    Mitral Valve: There is a bioprosthetic mitral valve.  It was well seated with no evidence of rocking motion.    Tricuspid Valve: There is mild to moderate regurgitation. The right ventricular systolic pressure is normal.      Recent Labs     02/20/25  1133 02/20/25  1134   BLOODCX No Growth at 24 hrs. No Growth at 24 hrs.       Incidental Findings:   None other than noted above; I reviewed the above mentioned incidental findings with the patient and/or family and they expressed understanding.    Test Results Pending at Discharge (will require follow up):   None     Outpatient Tests Requested:  None    Complications:  None    Reason for Admission: Shortness of Breath (Pt arrived via wheelchair c/o SOB since Sunday. Was seen by PCP on Tuesday, prescribed ABX. PT also c/o wet cough. -cp +light headed )       Hospital Course:   Patient initially presented with having generalized weakness and shortness of breath.  He was found to have influenza A.  Patient was started on Tamiflu a brief infectious workup while given IVF as patient also had NED.  NED improved and patient continued to improve in terms of shortness of breath.  Patient was deemed medically stable on 2/22.  Patient was seen by PT/OT who ultimately recommended minimal resource intensity.  Patient is medically stable to be discharged home.  For further information regards to course hospitalization, please notes attached.    Please see above list of diagnoses and related plan for additional information.     Condition at Discharge: good    Discharge Day Visit / Exam:   Subjective: Patient reports to be feeling relatively well although he is nervous about being discharged home.  He currently denies any chest pain/pressure, palpitations, lightheadedness, nausea, shortness of breath, or chills.  Offers no new complaints at this time. No acute events reported overnight. Understanding of plan.  All  "questions answered to the best of my ability at this time to patient satisfaction. Plan of care agreed upon.  Vitals: Blood Pressure: 131/87 (02/22/25 0739)  Pulse: 94 (02/22/25 0739)  Temperature: (!) 97.4 °F (36.3 °C) (02/22/25 0739)  Temp Source: Oral (02/20/25 1735)  Respirations: 18 (02/20/25 2205)  Height: 5' 5\" (165.1 cm) (02/20/25 1735)  Weight - Scale: 57.2 kg (126 lb 1.7 oz) (02/20/25 1735)  SpO2: 96 % (02/22/25 0739)  Exam:   Physical Exam  Vitals and nursing note reviewed.   Constitutional:       General: He is not in acute distress.     Appearance: He is normal weight. He is not ill-appearing, toxic-appearing or diaphoretic.   HENT:      Head: Normocephalic.      Nose: Nose normal.      Mouth/Throat:      Mouth: Mucous membranes are moist.      Pharynx: Oropharynx is clear.   Eyes:      General: No scleral icterus.     Conjunctiva/sclera: Conjunctivae normal.      Pupils: Pupils are equal, round, and reactive to light.   Cardiovascular:      Rate and Rhythm: Normal rate and regular rhythm.      Heart sounds: No murmur heard.     No friction rub. No gallop.   Pulmonary:      Effort: Pulmonary effort is normal. No respiratory distress.      Breath sounds: Normal breath sounds. No stridor. No wheezing, rhonchi or rales.   Abdominal:      General: Abdomen is flat.      Palpations: Abdomen is soft.   Musculoskeletal:         General: Normal range of motion.      Cervical back: Normal range of motion and neck supple.      Right lower leg: No edema.      Left lower leg: No edema.   Lymphadenopathy:      Cervical: No cervical adenopathy.   Skin:     General: Skin is warm.      Coloration: Skin is not jaundiced or pale.      Findings: No bruising, erythema or lesion.   Neurological:      General: No focal deficit present.      Mental Status: He is alert and oriented to person, place, and time. Mental status is at baseline.      Cranial Nerves: No cranial nerve deficit.      Motor: No weakness.   Psychiatric:    "      Mood and Affect: Mood normal.         Behavior: Behavior normal.         Thought Content: Thought content normal.          Discussion with Family: Updated  (son) at bedside.    Discharge instructions/Information to patient and family:   See after visit summary for information provided to patient and family.      Provisions for Follow-Up Care:  See after visit summary for information related to follow-up care and any pertinent home health orders.       Mobility at time of Discharge:   Basic Mobility Inpatient Raw Score: 18  JH-HLM Goal: 6: Walk 10 steps or more  JH-HLM Achieved: 1: Laying in bed  HLM Goal NOT achieved. Continue to encourage mobility in post discharge setting.    Disposition:   Home    Planned Readmission: none     Discharge Statement:  I spent 65 minutes discharging the patient. This time was spent on the day of discharge. I had direct contact with the patient on the day of discharge. Greater than 50% of the total time was spent examining patient, answering all patient questions, arranging and discussing plan of care with patient as well as directly providing post-discharge instructions.  Additional time then spent on discharge activities.    Discharge Medications:  See after visit summary for reconciled discharge medications provided to patient and/or family.      **Please Note: This note may have been constructed using a voice recognition system**

## 2025-02-22 NOTE — DISCHARGE INSTR - AVS FIRST PAGE
Please follow-up with your PCP within 1 week.  I have also ordered medication called Tamiflu.  Please take this once a day until course is completed

## 2025-02-22 NOTE — ASSESSMENT & PLAN NOTE
Wt Readings from Last 3 Encounters:   02/20/25 57.2 kg (126 lb 1.7 oz)   02/17/25 56.2 kg (124 lb)   12/12/24 56.2 kg (124 lb)     Echo from 12/2023 reportedly noted with EF of 20%  Currently appears euvolemic.  Currently oxygen 96 to 97% on room air.  Hold Lasix, spironolactone, Entresto  Resume on discharge  Resume home dose of midodrine.  Continue with low-salt, fluid restricted diet.

## 2025-02-24 ENCOUNTER — TRANSITIONAL CARE MANAGEMENT (OUTPATIENT)
Dept: INTERNAL MEDICINE CLINIC | Facility: CLINIC | Age: 80
End: 2025-02-24

## 2025-02-24 ENCOUNTER — PATIENT OUTREACH (OUTPATIENT)
Dept: CASE MANAGEMENT | Facility: OTHER | Age: 80
End: 2025-02-24

## 2025-02-24 ENCOUNTER — TELEPHONE (OUTPATIENT)
Age: 80
End: 2025-02-24

## 2025-02-24 NOTE — TELEPHONE ENCOUNTER
Opal called from [facility] to let provider know that patient was discharged from the hospital.  He was offered O2 but declined. He has also declined PT and OT.  Opal offered to make him a hospital discharge appointment but patient denied because he is still contagious and he can't drive due to his shortness of breath.  When patient talks, his O2 is at 92 but at rest it is 96.      Opal will be back to visit on Wednesday, 3/26.  She asked if you can sign her notes when she sends them in.      If you should have any questions you can reach Opal at [phone].    Please advise, thank you.

## 2025-02-24 NOTE — TELEPHONE ENCOUNTER
Contacted Arcelia to clarify date. Opal will be back to visit on Wednesday, 2/26. She asked if you can sign her notes when she sends them in.

## 2025-02-25 ENCOUNTER — OFFICE VISIT (OUTPATIENT)
Dept: INTERNAL MEDICINE CLINIC | Facility: CLINIC | Age: 80
End: 2025-02-25
Payer: MEDICARE

## 2025-02-25 ENCOUNTER — PATIENT OUTREACH (OUTPATIENT)
Dept: CASE MANAGEMENT | Facility: OTHER | Age: 80
End: 2025-02-25

## 2025-02-25 ENCOUNTER — TELEPHONE (OUTPATIENT)
Dept: INTERNAL MEDICINE CLINIC | Facility: CLINIC | Age: 80
End: 2025-02-25

## 2025-02-25 VITALS
RESPIRATION RATE: 17 BRPM | SYSTOLIC BLOOD PRESSURE: 108 MMHG | HEIGHT: 65 IN | BODY MASS INDEX: 20.33 KG/M2 | DIASTOLIC BLOOD PRESSURE: 62 MMHG | WEIGHT: 122 LBS | HEART RATE: 72 BPM | OXYGEN SATURATION: 94 %

## 2025-02-25 DIAGNOSIS — J10.1 INFLUENZA A: Primary | ICD-10-CM

## 2025-02-25 DIAGNOSIS — R53.1 GENERALIZED WEAKNESS: ICD-10-CM

## 2025-02-25 DIAGNOSIS — R26.2 AMBULATORY DYSFUNCTION: Primary | ICD-10-CM

## 2025-02-25 LAB
BACTERIA BLD CULT: NORMAL
BACTERIA BLD CULT: NORMAL

## 2025-02-25 PROCEDURE — 99495 TRANSJ CARE MGMT MOD F2F 14D: CPT | Performed by: INTERNAL MEDICINE

## 2025-02-25 RX ORDER — CARVEDILOL 3.12 MG/1
3.12 TABLET ORAL 2 TIMES DAILY WITH MEALS
COMMUNITY
Start: 2025-02-25

## 2025-02-25 NOTE — TELEPHONE ENCOUNTER
Call placed to [facility] and spoke with Kailyn, advised to order PT/OT. Kailyn is in need of an order to be faxed to [phone]   Orders pending  Please advise

## 2025-02-25 NOTE — PROGRESS NOTES
Outpatient Care Management Note:    New HR[facility] referral received. Patient was hospitalized from 2/20-2/22/25 with influenza A.  He presented with cough and shortness of breath.  He was treated with tamiflu and antibiotics. He developed an NED and was treated with IV fluids. He is to follow up with his PCP.     Voice mail message left for Scott, with my contact information, introducing myself and requesting a call back.

## 2025-02-25 NOTE — PROGRESS NOTES
Transition of Care Visit  Name: Scott Morris      : 1945      MRN: [id number]  Encounter Provider: Lenin Hardy MD  Encounter Date: 2025   Encounter department: [facility] INTERNAL MEDICINE [place]    Assessment & Plan  Influenza A  Resolved but heavily deconditioned.  Will try and add PT and OT to his visiting nurse regimen.       Generalized weakness  Plan as above.            History of Present Illness     Transitional Care Management Review:   Scott Morris is a 79 y.o. male here for TCM follow up.     During the TCM phone call patient stated:  TCM Call     Date and time call was made  2025 10:11 AM    Hospital care reviewed  Records reviewed    Patient was hospitialized at  [facility]    Date of Admission  25    Date of discharge  25    Diagnosis  Influenza A    Disposition  Home    Were the patients medications reviewed and updated  Yes    Current Symptoms  None      TCM Call     Post hospital issues  None    Should patient be enrolled in anticoag monitoring?  No    Scheduled for follow up?  Yes    Did you obtain your prescribed medications  Yes    Do you need help managing your prescriptions or medications  No    Is transportation to your appointment needed  No    I have advised the patient to call PCP with any new or worsening symptoms  Clair Stevens Practice Coordinator    Living Arrangements  Family members    Support System  None    Are you recieving any outpatient services  No    Are you recieving home care services  No    Are you using any community resources  No    Current waiver services  No    Have you fallen in the last 12 months  No    Interperter language line needed  No    Counseling  Patient        Patient comes in today for hospital follow-up with his son.  After coming here he really did not improve in the next few days and went to the hospital.  Seem to get better with hydration but then was hypoxic with ambulation.  Testing came back  "positive for influenza A.  He was given Tamiflu although he has been sick for over a week already.  Slowly improved.  He declined rehab.  Since coming home he still feels weak.  He is back at the personal care facility.  Visiting nurses are coming but he has no PT or OT.  His son notes he is not really doing much.  Probably not drinking enough water and getting dehydrated again.      Review of Systems   Respiratory:  Positive for shortness of breath.    Neurological:  Positive for weakness.     Objective   /62 (BP Location: Left arm, Patient Position: Sitting, Cuff Size: Adult)   Pulse 72   Resp 17   Ht 5' 5\" (1.651 m)   Wt 55.3 kg (122 lb)   SpO2 94%   BMI 20.30 kg/m²     Physical Exam  Vitals and nursing note reviewed.   Constitutional:       Appearance: Normal appearance.   Cardiovascular:      Rate and Rhythm: Normal rate and regular rhythm.   Pulmonary:      Effort: Pulmonary effort is normal.      Breath sounds: Normal breath sounds.   Neurological:      Mental Status: He is alert.       Medications have been reviewed by provider in current encounter      "

## 2025-02-26 ENCOUNTER — PATIENT OUTREACH (OUTPATIENT)
Dept: CASE MANAGEMENT | Facility: OTHER | Age: 80
End: 2025-02-26

## 2025-02-26 NOTE — PROGRESS NOTES
Outpatient Care Management Note:    Voice mail message received from Scott' son, Guillermo, returning my call. He wanted to verify if physical and occupational therapy were ordered for his dad.     CM called [facility]. They did receive the orders and will be starting services this week.     CM called Scott' son, Guillermo. He states that his dad did see Dr Hardy yesterday. Guillermo was concerned that he felt his father was not improving.  We reviewed that he should monitor his father for any worsening cough, increased or discolored secretions, shortness of breath,  fever etc. He should call Dr Hardy with any concerns.  CM reviewed that it may be helpful to purchase a pulse oximeter to measure his oxygen levels.  If his levels are dropping below 88-90%, he should call Dr Hardy or seek emergency care depending on the situation.     Guillermo was happy that physical and occupational therapy would be starting.     We discussed checking daily weights and keeping a weight log.  CM reviewed that he should not weigh himself if he is a fall risk.  CM reviewed that he should call cardiology if he gains 3 lb in 1 day or 5 lb in 1 week. We reviewed monitoring for increased shortness of breath, swelling, orthopnea and decreased exercise tolerance.     Guillermo did note that his father lives in independent living at [facility].     Guillermo declined completing a med review. He stated that his father manages his medications independently. He does use weekly med boxes. Guillermo is trying to keep tabs to be sure everything is being taken correctly.     Guillermo has my contact information.

## 2025-02-28 ENCOUNTER — NURSE TRIAGE (OUTPATIENT)
Dept: INTERNAL MEDICINE CLINIC | Facility: CLINIC | Age: 80
End: 2025-02-28

## 2025-02-28 ENCOUNTER — APPOINTMENT (EMERGENCY)
Dept: RADIOLOGY | Facility: HOSPITAL | Age: 80
End: 2025-02-28
Payer: MEDICARE

## 2025-02-28 ENCOUNTER — HOSPITAL ENCOUNTER (EMERGENCY)
Facility: HOSPITAL | Age: 80
Discharge: HOME | End: 2025-02-28
Attending: EMERGENCY MEDICINE
Payer: MEDICARE

## 2025-02-28 VITALS
RESPIRATION RATE: 20 BRPM | SYSTOLIC BLOOD PRESSURE: 113 MMHG | WEIGHT: 122 LBS | HEART RATE: 88 BPM | HEIGHT: 65 IN | DIASTOLIC BLOOD PRESSURE: 71 MMHG | BODY MASS INDEX: 20.33 KG/M2 | TEMPERATURE: 97.3 F | OXYGEN SATURATION: 93 %

## 2025-02-28 DIAGNOSIS — J44.9 OBSTRUCTIVE AIRWAY DISEASE (HCC): ICD-10-CM

## 2025-02-28 DIAGNOSIS — J18.9 PNEUMONIA: Primary | ICD-10-CM

## 2025-02-28 LAB
ANION GAP SERPL CALCULATED.3IONS-SCNC: 6 MMOL/L (ref 4–13)
BASOPHILS # BLD AUTO: 0.05 THOUSANDS/ÂΜL (ref 0–0.1)
BASOPHILS NFR BLD AUTO: 1 % (ref 0–1)
BNP SERPL-MCNC: 1576 PG/ML (ref 0–100)
BUN SERPL-MCNC: 24 MG/DL (ref 5–25)
CALCIUM SERPL-MCNC: 9 MG/DL (ref 8.4–10.2)
CHLORIDE SERPL-SCNC: 102 MMOL/L (ref 96–108)
CO2 SERPL-SCNC: 31 MMOL/L (ref 21–32)
CREAT SERPL-MCNC: 1.28 MG/DL (ref 0.6–1.3)
EOSINOPHIL # BLD AUTO: 0.17 THOUSAND/ÂΜL (ref 0–0.61)
EOSINOPHIL NFR BLD AUTO: 2 % (ref 0–6)
ERYTHROCYTE [DISTWIDTH] IN BLOOD BY AUTOMATED COUNT: 14.1 % (ref 11.6–15.1)
GFR SERPL CREATININE-BSD FRML MDRD: 53 ML/MIN/1.73SQ M
GLUCOSE SERPL-MCNC: 88 MG/DL (ref 65–140)
HCT VFR BLD AUTO: 48 % (ref 36.5–49.3)
HGB BLD-MCNC: 15.4 G/DL (ref 12–17)
IMM GRANULOCYTES # BLD AUTO: 0.03 THOUSAND/UL (ref 0–0.2)
IMM GRANULOCYTES NFR BLD AUTO: 0 % (ref 0–2)
LYMPHOCYTES # BLD AUTO: 0.84 THOUSANDS/ÂΜL (ref 0.6–4.47)
LYMPHOCYTES NFR BLD AUTO: 9 % (ref 14–44)
MCH RBC QN AUTO: 34.8 PG (ref 26.8–34.3)
MCHC RBC AUTO-ENTMCNC: 32.1 G/DL (ref 31.4–37.4)
MCV RBC AUTO: 109 FL (ref 82–98)
MONOCYTES # BLD AUTO: 1.29 THOUSAND/ÂΜL (ref 0.17–1.22)
MONOCYTES NFR BLD AUTO: 13 % (ref 4–12)
NEUTROPHILS # BLD AUTO: 7.44 THOUSANDS/ÂΜL (ref 1.85–7.62)
NEUTS SEG NFR BLD AUTO: 75 % (ref 43–75)
NRBC BLD AUTO-RTO: 0 /100 WBCS
PLATELET # BLD AUTO: 255 THOUSANDS/UL (ref 149–390)
PMV BLD AUTO: 9.5 FL (ref 8.9–12.7)
POTASSIUM SERPL-SCNC: 4.5 MMOL/L (ref 3.5–5.3)
RBC # BLD AUTO: 4.42 MILLION/UL (ref 3.88–5.62)
SODIUM SERPL-SCNC: 139 MMOL/L (ref 135–147)
WBC # BLD AUTO: 9.82 THOUSAND/UL (ref 4.31–10.16)

## 2025-02-28 PROCEDURE — 71045 X-RAY EXAM CHEST 1 VIEW: CPT

## 2025-02-28 PROCEDURE — 99285 EMERGENCY DEPT VISIT HI MDM: CPT

## 2025-02-28 PROCEDURE — 83880 ASSAY OF NATRIURETIC PEPTIDE: CPT | Performed by: EMERGENCY MEDICINE

## 2025-02-28 PROCEDURE — 94640 AIRWAY INHALATION TREATMENT: CPT

## 2025-02-28 PROCEDURE — 96374 THER/PROPH/DIAG INJ IV PUSH: CPT

## 2025-02-28 PROCEDURE — 99285 EMERGENCY DEPT VISIT HI MDM: CPT | Performed by: EMERGENCY MEDICINE

## 2025-02-28 PROCEDURE — 96361 HYDRATE IV INFUSION ADD-ON: CPT

## 2025-02-28 PROCEDURE — 80048 BASIC METABOLIC PNL TOTAL CA: CPT | Performed by: EMERGENCY MEDICINE

## 2025-02-28 PROCEDURE — 36415 COLL VENOUS BLD VENIPUNCTURE: CPT | Performed by: EMERGENCY MEDICINE

## 2025-02-28 PROCEDURE — 93005 ELECTROCARDIOGRAM TRACING: CPT

## 2025-02-28 PROCEDURE — 85025 COMPLETE CBC W/AUTO DIFF WBC: CPT | Performed by: EMERGENCY MEDICINE

## 2025-02-28 RX ORDER — PREDNISONE 20 MG/1
40 TABLET ORAL DAILY
Qty: 8 TABLET | Refills: 0 | Status: SHIPPED | OUTPATIENT
Start: 2025-02-28 | End: 2025-03-04

## 2025-02-28 RX ORDER — IPRATROPIUM BROMIDE AND ALBUTEROL SULFATE 2.5; .5 MG/3ML; MG/3ML
3 SOLUTION RESPIRATORY (INHALATION) ONCE
Status: COMPLETED | OUTPATIENT
Start: 2025-02-28 | End: 2025-02-28

## 2025-02-28 RX ORDER — METHYLPREDNISOLONE SODIUM SUCCINATE 125 MG/2ML
80 INJECTION, POWDER, LYOPHILIZED, FOR SOLUTION INTRAMUSCULAR; INTRAVENOUS ONCE
Status: COMPLETED | OUTPATIENT
Start: 2025-02-28 | End: 2025-02-28

## 2025-02-28 RX ORDER — ALBUTEROL SULFATE 90 UG/1
2 INHALANT RESPIRATORY (INHALATION) ONCE
Status: COMPLETED | OUTPATIENT
Start: 2025-02-28 | End: 2025-02-28

## 2025-02-28 RX ADMIN — AMOXICILLIN AND CLAVULANATE POTASSIUM 1 TABLET: 875; 125 TABLET, FILM COATED ORAL at 16:38

## 2025-02-28 RX ADMIN — SODIUM CHLORIDE 500 ML: 0.9 INJECTION, SOLUTION INTRAVENOUS at 16:03

## 2025-02-28 RX ADMIN — METHYLPREDNISOLONE SODIUM SUCCINATE 80 MG: 125 INJECTION, POWDER, FOR SOLUTION INTRAMUSCULAR; INTRAVENOUS at 15:43

## 2025-02-28 RX ADMIN — ALBUTEROL SULFATE 2 PUFF: 90 AEROSOL, METERED RESPIRATORY (INHALATION) at 16:39

## 2025-02-28 RX ADMIN — IPRATROPIUM BROMIDE AND ALBUTEROL SULFATE 3 ML: 2.5; .5 SOLUTION RESPIRATORY (INHALATION) at 15:39

## 2025-02-28 NOTE — TELEPHONE ENCOUNTER
Call placed to pts son who states that patient LMOM for son stating that ambulance will be taking pt to [facility] for evaluation. Son states pt should be en route now

## 2025-02-28 NOTE — ED PROVIDER NOTES
Time reflects when diagnosis was documented in both MDM as applicable and the Disposition within this note       Time User Action Codes Description Comment    2/28/2025  4:22 PM Mirza Mercado Add [J18.9] Pneumonia     2/28/2025  4:22 PM Mirza Mercado Add [J44.9] Obstructive airway disease (HCC)           ED Disposition       ED Disposition   Discharge    Condition   Stable    Date/Time   Fri Feb 28, 2025  4:21 PM    Comment   Scott Morris discharge to home/self care.                   Assessment & Plan       Medical Decision Making  Patient is a 79-year-old male who presents for evaluation of ongoing cough and dyspnea in the setting of recently diagnosed with flu.  Chest x-ray reviewed and shows possible developing pneumonia, will treat with Augmentin.  Previous notes reviewed, noted to be hypoxic on ambulation at home but no evidence of hypoxia or increased work of breathing here.  Patient with some moderate expiratory wheezing however, consistent with obstructive airway disease.  Patient will also be started on steroids with PCP follow-up given and strict return precautions.    Amount and/or Complexity of Data Reviewed  Labs: ordered.  Radiology: ordered and independent interpretation performed.    Risk  Prescription drug management.        ED Course as of 02/28/25 1732   Fri Feb 28, 2025   1621 Ambulatory puls ox 93% per staff       Medications   sodium chloride 0.9 % bolus 500 mL (500 mL Intravenous New Bag 2/28/25 1603)   ipratropium-albuterol (DUO-NEB) 0.5-2.5 mg/3 mL inhalation solution 3 mL (3 mL Nebulization Given 2/28/25 1539)   methylPREDNISolone sodium succinate (Solu-MEDROL) injection 80 mg (80 mg Intravenous Given 2/28/25 1543)   albuterol (PROVENTIL HFA,VENTOLIN HFA) inhaler 2 puff (2 puffs Inhalation Given 2/28/25 1639)   amoxicillin-clavulanate (AUGMENTIN) 875-125 mg per tablet 1 tablet (1 tablet Oral Given 2/28/25 1638)       ED Risk Strat Scores                            SBIRT 22yo+       Flowsheet Row Most Recent Value   Initial Alcohol Screen: US AUDIT-C     1. How often do you have a drink containing alcohol? 0 Filed at: 02/28/2025 1514   2. How many drinks containing alcohol do you have on a typical day you are drinking?  0 Filed at: 02/28/2025 1514   3b. FEMALE Any Age, or MALE 65+: How often do you have 4 or more drinks on one occassion? 0 Filed at: 02/28/2025 1514   Audit-C Score 0 Filed at: 02/28/2025 1514   ENRIQUETA: How many times in the past year have you...    Used an illegal drug or used a prescription medication for non-medical reasons? Never Filed at: 02/28/2025 1514                            History of Present Illness       Chief Complaint   Patient presents with    Shortness of Breath     Pt recently discharged from [facility] (Feb 21-22) positive for influenza A. Pt has no complaints.  Sent by the nurse from the care home.       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    CAD (coronary artery disease)     Cancer (HCC)     prostate    Colon polyp     Coronary artery disease     GERD (gastroesophageal reflux disease)     H/O heart artery stent     x2    Hypercholesteremia     Hypertension     Keratotic lesion     Stroke (HCC)     10YRS AGO    Transient cerebral ischemia       Past Surgical History:   Procedure Laterality Date    BACK SURGERY      CARDIAC PACEMAKER PLACEMENT      CARDIAC SURGERY      mvp repair    CAROTID ENDARTERECTOMY Right 03/2020    CHOLECYSTECTOMY      COLONOSCOPY      ESOPHAGOGASTRODUODENOSCOPY N/A 06/22/2018    Procedure: ESOPHAGOGASTRODUODENOSCOPY (EGD);  Surgeon: Sunny Rodrigues III, MD;  Location: [facility];  Service: Gastroenterology    JOINT REPLACEMENT      L hip replacement    ORCHIECTOMY      PROSTATECTOMY      TOTAL HIP ARTHROPLASTY        Family History   Problem Relation Age of Onset    Heart disease Mother     Stroke Mother         CVA    Cancer Father     Hodgkin's lymphoma Father     Lung cancer Father       Social  History     Tobacco Use    Smoking status: Former     Current packs/day: 0.00     Types: Cigarettes     Quit date: 2018     Years since quittin.7    Smokeless tobacco: Never    Tobacco comments:     2 cig/day   Vaping Use    Vaping status: Never Used   Substance Use Topics    Alcohol use: Never     Alcohol/week: 2.0 standard drinks of alcohol     Types: 2 Standard drinks or equivalent per week    Drug use: No      E-Cigarette/Vaping    E-Cigarette Use Never User       E-Cigarette/Vaping Substances    Nicotine No     THC No     CBD No     Flavoring No     Other No     Unknown No       I have reviewed and agree with the history as documented.     Patient is a 79-year-old male with history of CHF that presents for evaluation of cough and dyspnea.  8 days ago he was diagnosed with the flu and admitted for 2 days to the hospital.  Given antibiotics and Tamiflu at that time.  He says that he largely feels the same with some moderate exertional dyspnea and cough that is productive in nature.  He denies chest pain vomiting or abdominal pain.  Mild diarrhea.  He has not been taking any medications for his symptoms.        Review of Systems   Constitutional:  Negative for fever.   HENT:  Negative for sore throat.    Eyes:  Negative for photophobia.   Respiratory:  Positive for cough and shortness of breath.    Cardiovascular:  Negative for chest pain.   Gastrointestinal:  Positive for diarrhea. Negative for abdominal pain.   Genitourinary:  Negative for dysuria.   Musculoskeletal:  Negative for back pain.   Skin:  Negative for rash.   Neurological:  Negative for light-headedness.   Hematological:  Negative for adenopathy.   Psychiatric/Behavioral:  Negative for agitation.    All other systems reviewed and are negative.          Objective       ED Triage Vitals   Temperature Pulse Blood Pressure Respirations SpO2 Patient Position - Orthostatic VS   25 1517 25 1514 25 1514 25 1514 25 1515  02/28/25 1514   (!) 97.3 °F (36.3 °C) 88 113/71 20 100 % Lying      Temp Source Heart Rate Source BP Location FiO2 (%) Pain Score    02/28/25 1517 02/28/25 1514 02/28/25 1514 -- 02/28/25 1514    Temporal Monitor Left arm  No Pain      Vitals      Date and Time Temp Pulse SpO2 Resp BP Pain Score FACES Pain Rating User   02/28/25 1619 -- -- -- -- -- No Pain -- JW   02/28/25 1619 -- -- 93 % -- -- -- -- JZ   02/28/25 1517 97.3 °F (36.3 °C) -- -- -- -- -- --    02/28/25 1515 -- -- 100 % -- -- -- --    02/28/25 1514 -- 88 -- 20 113/71 No Pain --             Physical Exam  Vitals reviewed.   Constitutional:       General: He is not in acute distress.     Appearance: He is well-developed.   HENT:      Head: Normocephalic.   Eyes:      Pupils: Pupils are equal, round, and reactive to light.   Cardiovascular:      Rate and Rhythm: Normal rate and regular rhythm.      Heart sounds: Normal heart sounds. No murmur heard.     No friction rub. No gallop.   Pulmonary:      Effort: Pulmonary effort is normal.      Breath sounds: Normal breath sounds.      Comments: Patient with moderate expiratory wheezing heard throughout, no increased work of breathing  Abdominal:      General: Bowel sounds are normal. There is no distension.      Palpations: Abdomen is soft.      Tenderness: There is no abdominal tenderness. There is no guarding.   Musculoskeletal:         General: Normal range of motion.      Cervical back: Normal range of motion and neck supple.   Skin:     Capillary Refill: Capillary refill takes less than 2 seconds.   Neurological:      Mental Status: He is alert and oriented to person, place, and time.      Cranial Nerves: No cranial nerve deficit.      Sensory: No sensory deficit.      Motor: No abnormal muscle tone.   Psychiatric:         Behavior: Behavior normal.         Thought Content: Thought content normal.         Judgment: Judgment normal.         Results Reviewed       Procedure Component Value Units  Date/Time    B-Type Natriuretic Peptide(BNP) [[id number]]  (Abnormal) Collected: 02/28/25 1601    Lab Status: Final result Specimen: Blood from Arm, Right Updated: 02/28/25 1634     BNP 1,576 pg/mL     Basic metabolic panel [[id number]] Collected: 02/28/25 1545    Lab Status: Final result Specimen: Blood from Arm, Right Updated: 02/28/25 1608     Sodium 139 mmol/L      Potassium 4.5 mmol/L      Chloride 102 mmol/L      CO2 31 mmol/L      ANION GAP 6 mmol/L      BUN 24 mg/dL      Creatinine 1.28 mg/dL      Glucose 88 mg/dL      Calcium 9.0 mg/dL      eGFR 53 ml/min/1.73sq m     Narrative:      National Kidney Disease Foundation guidelines for Chronic Kidney Disease (CKD):     Stage 1 with normal or high GFR (GFR > 90 mL/min/1.73 square meters)    Stage 2 Mild CKD (GFR = 60-89 mL/min/1.73 square meters)    Stage 3A Moderate CKD (GFR = 45-59 mL/min/1.73 square meters)    Stage 3B Moderate CKD (GFR = 30-44 mL/min/1.73 square meters)    Stage 4 Severe CKD (GFR = 15-29 mL/min/1.73 square meters)    Stage 5 End Stage CKD (GFR <15 mL/min/1.73 square meters)  Note: GFR calculation is accurate only with a steady state creatinine    CBC and differential [[id number]]  (Abnormal) Collected: 02/28/25 1532    Lab Status: Final result Specimen: Blood from Arm, Right Updated: 02/28/25 1537     WBC 9.82 Thousand/uL      RBC 4.42 Million/uL      Hemoglobin 15.4 g/dL      Hematocrit 48.0 %       fL      MCH 34.8 pg      MCHC 32.1 g/dL      RDW 14.1 %      MPV 9.5 fL      Platelets 255 Thousands/uL      nRBC 0 /100 WBCs      Segmented % 75 %      Immature Grans % 0 %      Lymphocytes % 9 %      Monocytes % 13 %      Eosinophils Relative 2 %      Basophils Relative 1 %      Absolute Neutrophils 7.44 Thousands/µL      Absolute Immature Grans 0.03 Thousand/uL      Absolute Lymphocytes 0.84 Thousands/µL      Absolute Monocytes 1.29 Thousand/µL      Eosinophils Absolute 0.17 Thousand/µL      Basophils Absolute 0.05 Thousands/µL              XR chest 1 view portable   Final Interpretation by Lori Oliveros MD (02/28 5646)      Mild linear opacity in the left base, likely atelectasis. Pneumonia not excluded in the appropriate clinical setting.            Workstation performed: [id number]             ECG 12 Lead Documentation Only    Date/Time: 2/28/2025 5:32 PM    Performed by: Mirza Mercado MD  Authorized by: Mirza Mercado MD    ECG reviewed by me, the ED Provider: yes    Patient location:  ED  Previous ECG:     Previous ECG:  Compared to current    Comparison to cardiac monitor: Yes    Interpretation:     Interpretation: abnormal    Rate:     ECG rate assessment: normal    Rhythm:     Rhythm: paced    Pacing:     Capture:  Complete    Type of pacing:  Ventricular  Ectopy:     Ectopy: none    QRS:     QRS axis:  Left    QRS intervals:  Wide  Conduction:     Conduction: normal    ST segments:     ST segments:  Normal  T waves:     T waves: normal        ED Medication and Procedure Management   Prior to Admission Medications   Prescriptions Last Dose Informant Patient Reported? Taking?   Farxiga 10 MG TABS  Self Yes No   Sig: Take 5 mg by mouth daily   Lumigan 0.01 % ophthalmic drops  Self Yes No   Sig: instill 1 drop into both eyes once daily at bedtime   Multiple Vitamins-Minerals (PX MENS MULTIVITAMINS) TABS  Self Yes No   Sig: Take by mouth   atorvastatin (LIPITOR) 40 mg tablet  Self Yes No   Sig: Take 40 mg by mouth daily     benzonatate (TESSALON PERLES) 100 mg capsule   No No   Sig: Take 1 capsule (100 mg total) by mouth 3 (three) times a day   carvedilol (COREG) 3.125 mg tablet   Yes No   Sig: Take 3.125 mg by mouth 2 (two) times a day with meals   clopidogrel (PLAVIX) 75 mg tablet  Self Yes No   Sig: Take 75 mg by mouth daily   dextromethorphan-guaiFENesin (ROBITUSSIN DM)  mg/5 mL syrup   No No   Sig: Take 10 mL by mouth every 4 (four) hours as needed for cough   digoxin (LANOXIN) 0.125 mg tablet   No No   Sig: Take 1  tablet (125 mcg total) by mouth in the morning   furosemide (LASIX) 40 mg tablet  Self Yes No   Sig: Take 40 mg by mouth daily PT REPORTS : 1/2 TABLET EVERY OTHER DAY (MWF)   midodrine (PROAMATINE) 5 mg tablet   Yes No   Sig: Take 10 mg by mouth 2 (two) times a day   pantoprazole (PROTONIX) 40 mg tablet   No No   Sig: Take 1 tablet (40 mg total) by mouth daily   sacubitril-valsartan (ENTRESTO) 24-26 MG TABS   No No   Sig: Take 1 tablet by mouth 2 (two) times a day   spironolactone (ALDACTONE) 25 mg tablet  Self Yes No   Sig: Take 12.5 mg by mouth daily 1/2 TABLET EVERY OTHER DAY (Tu, Th, Sa, Su)   warfarin (COUMADIN) 2.5 mg tablet  Self Yes No   Sig: as directed take 1 tablet ON TUESDAY,WEDNESDAY,FRIDAY,SATURDAY,SU...  (REFER TO PRESCRIPTION NOTES).   warfarin (COUMADIN) 5 mg tablet  Self No No   Sig: Take 1 tablet (5 mg total) by mouth daily Coumadin 5 mg p.o. Daily- Target INR 2-3      Facility-Administered Medications: None     Patient's Medications   Discharge Prescriptions    AMOXICILLIN-CLAVULANATE (AUGMENTIN) 875-125 MG PER TABLET    Take 1 tablet by mouth every 12 (twelve) hours for 5 days       Start Date: 2/28/2025 End Date: 3/5/2025       Order Dose: 1 tablet       Quantity: 10 tablet    Refills: 0    PREDNISONE 20 MG TABLET    Take 2 tablets (40 mg total) by mouth daily for 4 days       Start Date: 2/28/2025 End Date: 3/4/2025       Order Dose: 40 mg       Quantity: 8 tablet    Refills: 0     No discharge procedures on file.  ED SEPSIS DOCUMENTATION   Time reflects when diagnosis was documented in both MDM as applicable and the Disposition within this note       Time User Action Codes Description Comment    2/28/2025  4:22 PM Mirza Mercado [J18.9] Pneumonia     2/28/2025  4:22 PM Mirza Mercado [J44.9] Obstructive airway disease (HCC)                  Mirza Mercado MD  02/28/25 5548

## 2025-02-28 NOTE — TELEPHONE ENCOUNTER
Regarding: Patient trouble breathing  ----- Message from Debbie COFFEY sent at 2/28/2025  2:02 PM EST -----  Patients visiting nurse made this call her contact number is [phone]    Patient lower lung  breathing sounds diminished,   blood pressure low,   when he walks 10 feet his O2 drops to 86 and takes more than 2 mins to come back up. Looks dehydrated and it even talking makes him tired when he talks. was seen in office Tuesday.  Patient refused O2 for at home when he was last released from hospital last time  Possibly going to ED today.

## 2025-03-03 ENCOUNTER — PATIENT OUTREACH (OUTPATIENT)
Dept: CASE MANAGEMENT | Facility: OTHER | Age: 80
End: 2025-03-03

## 2025-03-03 NOTE — PROGRESS NOTES
Outpatient Care Management Note:    Discharge ADT alert received. Patient was in ER on 2/28/25 with ongoing cough and exertional dyspnea. He was treated for pneumonia. Discharged on augmentin.  He was also started on prednisone X 4 days for wheezing. He is to follow up with his PCP.     CM called Scott and spoke with his son, Guillermo. He states his dad is doing better today. They will monitor for any increased cough,shortness of breath, fever etc. They will monitor his pulse ox and will call the PCP with any concerns.     Scott continues to be followed by [facility].      He picked up and started both his augmentin and prednisone.     Scott states he has started checking daily weights. He know to call cardiology if he would gain by 3/5 pound rule.     CM will follow up in 1-2 weeks.

## 2025-03-04 LAB
ATRIAL RATE: 85 BPM
QRS AXIS: -43 DEGREES
QRSD INTERVAL: 144 MS
QT INTERVAL: 400 MS
QTC INTERVAL: 472 MS
T WAVE AXIS: 131 DEGREES
VENTRICULAR RATE: 84 BPM

## 2025-03-04 PROCEDURE — 93010 ELECTROCARDIOGRAM REPORT: CPT | Performed by: INTERNAL MEDICINE

## 2025-03-12 ENCOUNTER — PATIENT OUTREACH (OUTPATIENT)
Dept: CASE MANAGEMENT | Facility: OTHER | Age: 80
End: 2025-03-12

## 2025-03-12 NOTE — PROGRESS NOTES
Outpatient Care Management Note:    Voice mail message left for Scott and his son, Guillermo, with my contact information, requesting a call back to follow up.

## 2025-03-19 ENCOUNTER — PATIENT OUTREACH (OUTPATIENT)
Dept: CASE MANAGEMENT | Facility: OTHER | Age: 80
End: 2025-03-19

## 2025-03-19 NOTE — LETTER
Date: 03/19/25    Dear Scott Morris,   My name is Leigh Ann Miles.  I am a registered nurse care manager working with   [address] [address] [phone].   I have not been able to reach you and would like to set a time that I can talk with you over the phone.  My work is to help patients that have complex medical conditions get the care they need. This includes patients who may have been in the hospital or emergency room.     Please call me with any questions you may have. I look forward to speaking with you.  Sincerely,  Leigh Ann Ginger  [phone]  Outpatient Care Manager  Copy:  (primary care physician name and address)

## 2025-03-19 NOTE — PROGRESS NOTES
Outpatient Care Management Note:    Voice mail message left for Scott and his son, Guillermo, with my contact information, requesting a call back to follow up. (2nd attempt)    Unable to reach letter mailed to patient.       
How Severe Is It?: moderate
Is This A New Presentation, Or A Follow-Up?: Infection

## 2025-03-20 ENCOUNTER — RA CDI HCC (OUTPATIENT)
Dept: OTHER | Facility: HOSPITAL | Age: 80
End: 2025-03-20

## 2025-03-20 ENCOUNTER — OFFICE VISIT (OUTPATIENT)
Dept: INTERNAL MEDICINE CLINIC | Facility: CLINIC | Age: 80
End: 2025-03-20
Payer: MEDICARE

## 2025-03-20 VITALS
BODY MASS INDEX: 19.96 KG/M2 | OXYGEN SATURATION: 95 % | HEART RATE: 88 BPM | SYSTOLIC BLOOD PRESSURE: 100 MMHG | HEIGHT: 65 IN | WEIGHT: 119.8 LBS | RESPIRATION RATE: 20 BRPM | DIASTOLIC BLOOD PRESSURE: 66 MMHG

## 2025-03-20 DIAGNOSIS — J10.1 INFLUENZA A: ICD-10-CM

## 2025-03-20 DIAGNOSIS — R06.00 DYSPNEA, UNSPECIFIED TYPE: ICD-10-CM

## 2025-03-20 DIAGNOSIS — R35.0 URINARY FREQUENCY: Primary | ICD-10-CM

## 2025-03-20 PROCEDURE — 99214 OFFICE O/P EST MOD 30 MIN: CPT | Performed by: INTERNAL MEDICINE

## 2025-03-20 PROCEDURE — G2211 COMPLEX E/M VISIT ADD ON: HCPCS | Performed by: INTERNAL MEDICINE

## 2025-03-20 NOTE — PROGRESS NOTES
HCC coding opportunities          Chart Reviewed number of suggestions sent to Provider: 2     Patients Insurance   I13.0 and I42.9  Medicare Insurance: Medicare

## 2025-03-20 NOTE — PROGRESS NOTES
"Name: Scott Morris      : 1945      MRN: [id number]  Encounter Provider: Lenin Hardy MD  Encounter Date: 3/20/2025   Encounter department: [facility] [place]  :  Assessment & Plan  Urinary frequency  Unclear.  With his recent hospitalization and dehydration during that, could have a UTI.  Will treat for that and follow-up if he can give us a urine sample.  Orders:  •  POCT urine dip  •  cephalexin (KEFLEX) 250 mg capsule; Take 1 capsule (250 mg total) by mouth 3 (three) times a day for 7 days    Influenza A  Resolved.       Dyspnea, unspecified type  Resolved.  Back to baseline.              History of Present Illness   Patient comes in today for follow-up.  He moved up his hospital follow-up appointment because he is having trouble with frequent urination and urgency, more noticeable at night.  His pneumonia symptoms have resolved.  His breathing is back to his baseline.  Does not feel short of breath.  With urination he has no burning.  His diuretics have not been changed.  He does not notice it during the day.  He gives a history of a total prostatectomy many years ago.      Review of Systems   Constitutional:  Negative for fever.       Objective   /66 (BP Location: Left arm, Patient Position: Sitting, Cuff Size: Adult)   Pulse 88   Resp 20   Ht 5' 5\" (1.651 m)   Wt 54.3 kg (119 lb 12.8 oz)   SpO2 95%   BMI 19.94 kg/m²      Physical Exam  Vitals and nursing note reviewed.   Constitutional:       Appearance: Normal appearance. He is well-developed.   Cardiovascular:      Rate and Rhythm: Normal rate and regular rhythm.      Heart sounds: Normal heart sounds.   Pulmonary:      Effort: Pulmonary effort is normal.      Breath sounds: Normal breath sounds.   Neurological:      Mental Status: He is alert and oriented to person, place, and time.   Psychiatric:         Mood and Affect: Mood normal.         Behavior: Behavior normal.         "

## 2025-03-23 PROBLEM — J10.1 INFLUENZA A: Status: RESOLVED | Noted: 2025-02-20 | Resolved: 2025-03-23

## 2025-03-25 ENCOUNTER — PATIENT OUTREACH (OUTPATIENT)
Dept: CASE MANAGEMENT | Facility: OTHER | Age: 80
End: 2025-03-25

## 2025-03-25 NOTE — PROGRESS NOTES
Outpatient Care Management Note:    Voice mail message received from Scott noting that he received my unable to reach letter.     CM called Scott back. He states that he is doing well. He denies any further cough or shortness of breath. He still has [facility] following him.      Scott checks daily weights. Today he weighed 119lb. He knows to call cardiology if he would gain 3 lb in 1 day or 5 lb in 1 week.     He did note that he is on an antibiotic due to urinary frequency. He states it is improving on the antibiotic.    Scott declined ongoing outreach stating he is doing well.  Tomorrow he will be out of the hospital for 30 days. He will call his PCP with any concerns.

## 2025-04-22 NOTE — PLAN OF CARE
Problem: Potential for Falls  Goal: Patient will remain free of falls  Description: INTERVENTIONS:  - Educate patient/family on patient safety including physical limitations  - Instruct patient to call for assistance with activity   - Consult OT/PT to assist with strengthening/mobility   - Keep Call bell within reach  - Keep bed low and locked with side rails adjusted as appropriate  - Keep care items and personal belongings within reach  - Initiate and maintain comfort rounds  - Make Fall Risk Sign visible to staff  - Offer Toileting every  Hours, in advance of need  - Initiate/Maintain alarm  - Obtain necessary fall risk management equipment:   - Apply yellow socks and bracelet for high fall risk patients  - Consider moving patient to room near nurses station  Outcome: Adequate for Discharge      hyperglycemia

## 2025-06-26 ENCOUNTER — OFFICE VISIT (OUTPATIENT)
Dept: INTERNAL MEDICINE CLINIC | Facility: CLINIC | Age: 80
End: 2025-06-26
Payer: MEDICARE

## 2025-06-26 VITALS
OXYGEN SATURATION: 99 % | DIASTOLIC BLOOD PRESSURE: 70 MMHG | SYSTOLIC BLOOD PRESSURE: 116 MMHG | HEIGHT: 65 IN | BODY MASS INDEX: 20.16 KG/M2 | WEIGHT: 121 LBS | RESPIRATION RATE: 17 BRPM | HEART RATE: 65 BPM

## 2025-06-26 DIAGNOSIS — I25.10 ATHEROSCLEROSIS OF NATIVE CORONARY ARTERY OF NATIVE HEART WITHOUT ANGINA PECTORIS: ICD-10-CM

## 2025-06-26 DIAGNOSIS — R53.83 FATIGUE, UNSPECIFIED TYPE: ICD-10-CM

## 2025-06-26 DIAGNOSIS — I10 PRIMARY HYPERTENSION: Primary | ICD-10-CM

## 2025-06-26 DIAGNOSIS — E78.00 HYPERCHOLESTEROLEMIA: ICD-10-CM

## 2025-06-26 PROBLEM — I50.43 ACUTE ON CHRONIC COMBINED SYSTOLIC AND DIASTOLIC CONGESTIVE HEART FAILURE (HCC): Status: RESOLVED | Noted: 2017-08-18 | Resolved: 2025-06-26

## 2025-06-26 PROBLEM — L89.003: Status: RESOLVED | Noted: 2024-01-23 | Resolved: 2025-06-26

## 2025-06-26 PROCEDURE — G2211 COMPLEX E/M VISIT ADD ON: HCPCS | Performed by: INTERNAL MEDICINE

## 2025-06-26 PROCEDURE — 99214 OFFICE O/P EST MOD 30 MIN: CPT | Performed by: INTERNAL MEDICINE

## 2025-06-26 RX ORDER — MIDODRINE HYDROCHLORIDE 10 MG/1
1 TABLET ORAL 2 TIMES DAILY
COMMUNITY
Start: 2025-06-06

## 2025-06-26 NOTE — PROGRESS NOTES
"Name: Scott Morris      : 1945      MRN: [id number]  Encounter Provider: Lenin Hardy MD  Encounter Date: 2025   Encounter department: [facility]IC[facility] [place]  :  Assessment & Plan  Primary hypertension  Well-controlled.  Orders:  •  CBC and differential; Future  •  Comprehensive metabolic panel; Future  •  Lipid panel; Future    Atherosclerosis of native coronary artery of native heart without angina pectoris  Stable.  Continue follow-up with cardiology.  No change in medications.       Hypercholesterolemia  Stable but due for labs.  Order that for next time.       Fatigue, unspecified type  His biggest complaint but probably multifactorial with his medications and his heart disease.              History of Present Illness   Patient comes in today for routine follow-up.  He states he is doing okay at this point.  Just tired.  Which has been going on for a while.  Nothing specific.  He thinks it is from his medicines and he is probably correct.  His blood pressure is controlled.  Heart disease is stable.  States he will be seeing cardiology next month.  Taking his other medicines as directed.  Denies any new complaints today.  No further additions to his history.      Review of Systems   Respiratory:  Negative for shortness of breath.    Cardiovascular:  Negative for chest pain.   Gastrointestinal:  Negative for abdominal pain.       Objective   /70 (BP Location: Left arm, Patient Position: Sitting, Cuff Size: Adult)   Pulse 65   Resp 17   Ht 5' 5\" (1.651 m)   Wt 54.9 kg (121 lb)   SpO2 99%   BMI 20.14 kg/m²      Physical Exam  Vitals and nursing note reviewed.   Constitutional:       Appearance: Normal appearance. He is well-developed.     Cardiovascular:      Rate and Rhythm: Normal rate and regular rhythm.      Heart sounds: Normal heart sounds.   Pulmonary:      Effort: Pulmonary effort is normal.      Breath sounds: Normal breath sounds.   Abdominal:      " Palpations: Abdomen is soft.      Tenderness: There is no abdominal tenderness.     Neurological:      Mental Status: He is alert and oriented to person, place, and time.     Psychiatric:         Mood and Affect: Mood normal.         Behavior: Behavior normal.

## 2025-06-26 NOTE — ASSESSMENT & PLAN NOTE
Well-controlled.  Orders:  •  CBC and differential; Future  •  Comprehensive metabolic panel; Future  •  Lipid panel; Future

## 2025-08-06 ENCOUNTER — TELEPHONE (OUTPATIENT)
Age: 80
End: 2025-08-06

## 2025-08-06 ENCOUNTER — OFFICE VISIT (OUTPATIENT)
Dept: INTERNAL MEDICINE CLINIC | Facility: CLINIC | Age: 80
End: 2025-08-06
Payer: MEDICARE

## 2025-08-06 VITALS
HEART RATE: 100 BPM | OXYGEN SATURATION: 98 % | TEMPERATURE: 96.8 F | SYSTOLIC BLOOD PRESSURE: 124 MMHG | BODY MASS INDEX: 20.14 KG/M2 | HEIGHT: 65 IN | DIASTOLIC BLOOD PRESSURE: 86 MMHG

## 2025-08-06 DIAGNOSIS — J20.9 ACUTE BRONCHITIS, UNSPECIFIED ORGANISM: Primary | ICD-10-CM

## 2025-08-06 LAB
SARS-COV-2 AG UPPER RESP QL IA: NEGATIVE
VALID CONTROL: NORMAL

## 2025-08-06 PROCEDURE — G2211 COMPLEX E/M VISIT ADD ON: HCPCS | Performed by: INTERNAL MEDICINE

## 2025-08-06 PROCEDURE — 87811 SARS-COV-2 COVID19 W/OPTIC: CPT | Performed by: INTERNAL MEDICINE

## 2025-08-06 PROCEDURE — 99214 OFFICE O/P EST MOD 30 MIN: CPT | Performed by: INTERNAL MEDICINE

## 2025-08-06 RX ORDER — ALBUTEROL SULFATE 90 UG/1
2 INHALANT RESPIRATORY (INHALATION) EVERY 6 HOURS PRN
Qty: 18 G | Refills: 1 | Status: SHIPPED | OUTPATIENT
Start: 2025-08-06

## 2025-08-09 ENCOUNTER — HOSPITAL ENCOUNTER (OUTPATIENT)
Facility: HOSPITAL | Age: 80
Setting detail: OBSERVATION
Discharge: NURSING, REHAB OR LONG-TERM CARE | End: 2025-08-10
Attending: EMERGENCY MEDICINE | Admitting: INTERNAL MEDICINE
Payer: MEDICARE

## 2025-08-09 ENCOUNTER — APPOINTMENT (EMERGENCY)
Dept: RADIOLOGY | Facility: HOSPITAL | Age: 80
End: 2025-08-09
Payer: MEDICARE

## 2025-08-11 ENCOUNTER — TELEPHONE (OUTPATIENT)
Age: 80
End: 2025-08-11